# Patient Record
Sex: FEMALE | Race: WHITE | NOT HISPANIC OR LATINO | Employment: OTHER | URBAN - NONMETROPOLITAN AREA
[De-identification: names, ages, dates, MRNs, and addresses within clinical notes are randomized per-mention and may not be internally consistent; named-entity substitution may affect disease eponyms.]

---

## 2017-01-01 ENCOUNTER — OFFICE VISIT (OUTPATIENT)
Dept: ORTHOPEDIC SURGERY | Facility: CLINIC | Age: 77
End: 2017-01-01

## 2017-01-01 VITALS — WEIGHT: 140 LBS | HEIGHT: 62 IN | BODY MASS INDEX: 25.76 KG/M2 | RESPIRATION RATE: 16 BRPM

## 2017-01-01 DIAGNOSIS — S72.002D CLOSED FRACTURE OF NECK OF LEFT FEMUR WITH ROUTINE HEALING, SUBSEQUENT ENCOUNTER: Primary | ICD-10-CM

## 2017-01-01 DIAGNOSIS — Z98.890 STATUS POST HIP SURGERY: ICD-10-CM

## 2017-01-01 PROCEDURE — 73502 X-RAY EXAM HIP UNI 2-3 VIEWS: CPT | Performed by: ORTHOPAEDIC SURGERY

## 2017-01-01 PROCEDURE — 99213 OFFICE O/P EST LOW 20 MIN: CPT | Performed by: ORTHOPAEDIC SURGERY

## 2017-01-01 RX ORDER — LIDOCAINE HYDROCHLORIDE 30 MG/G
1 CREAM TOPICAL 3 TIMES WEEKLY
COMMUNITY
Start: 2017-09-22

## 2017-01-01 RX ORDER — VIT B COMP NO.3/FOLIC/C/BIOTIN 1 MG-60 MG
1 TABLET ORAL DAILY
COMMUNITY
Start: 2017-09-18

## 2017-01-30 ENCOUNTER — HOSPITAL (OUTPATIENT)
Dept: OTHER | Age: 77
End: 2017-01-30
Attending: FAMILY MEDICINE

## 2017-01-30 ENCOUNTER — IMAGING SERVICES (OUTPATIENT)
Dept: OTHER | Age: 77
End: 2017-01-30

## 2017-01-31 ENCOUNTER — CHARTING TRANS (OUTPATIENT)
Dept: OTHER | Age: 77
End: 2017-01-31

## 2017-03-01 ENCOUNTER — CHARTING TRANS (OUTPATIENT)
Dept: OTHER | Age: 77
End: 2017-03-01

## 2017-03-01 ASSESSMENT — PAIN SCALES - GENERAL: PAINLEVEL_OUTOF10: 0

## 2017-03-02 ENCOUNTER — CHARTING TRANS (OUTPATIENT)
Dept: OTHER | Age: 77
End: 2017-03-02

## 2017-03-02 ENCOUNTER — LAB SERVICES (OUTPATIENT)
Dept: OTHER | Age: 77
End: 2017-03-02

## 2017-03-02 LAB
ALBUMIN SERPL-MCNC: 4.1 G/DL (ref 3.6–5.1)
ALBUMIN/GLOB SERPL: 1.2 (ref 1–2.4)
ALP SERPL-CCNC: 52 UNITS/L (ref 45–117)
ALT SERPL-CCNC: 23 UNITS/L
ANION GAP SERPL CALC-SCNC: 14 MMOL/L (ref 10–20)
AST SERPL-CCNC: 24 UNITS/L
BASOPHILS # BLD: 0 K/MCL (ref 0–0.3)
BASOPHILS NFR BLD: 0 %
BILIRUB SERPL-MCNC: 0.3 MG/DL (ref 0.2–1)
BUN SERPL-MCNC: 14 MG/DL (ref 6–20)
BUN/CREAT SERPL: 19 (ref 7–25)
CALCIUM SERPL-MCNC: 9.5 MG/DL (ref 8.4–10.2)
CHLORIDE SERPL-SCNC: 108 MMOL/L (ref 98–107)
CHOLEST SERPL-MCNC: 189 MG/DL
CHOLEST/HDLC SERPL: 2.7
CO2 SERPL-SCNC: 25 MMOL/L (ref 21–32)
CREAT SERPL-MCNC: 0.73 MG/DL (ref 0.51–0.95)
DIFFERENTIAL METHOD BLD: ABNORMAL
EOSINOPHIL # BLD: 0.1 K/MCL (ref 0.1–0.5)
EOSINOPHIL NFR BLD: 1 %
ERYTHROCYTE [DISTWIDTH] IN BLOOD: 14 % (ref 11–15)
GLOBULIN SER-MCNC: 3.5 G/DL (ref 2–4)
GLUCOSE SERPL-MCNC: 93 MG/DL (ref 65–99)
HDLC SERPL-MCNC: 70 MG/DL
HEMATOCRIT: 41.8 % (ref 36–46.5)
HEMOGLOBIN: 13.7 G/DL (ref 12–15.5)
LDLC SERPL CALC-MCNC: 81 MG/DL
LENGTH OF FAST TIME PATIENT: ABNORMAL HRS
LENGTH OF FAST TIME PATIENT: ABNORMAL HRS
LYMPHOCYTES # BLD: 2.2 K/MCL (ref 1–4)
LYMPHOCYTES NFR BLD: 43 %
MEAN CORPUSCULAR HEMOGLOBIN: 31.4 PG (ref 26–34)
MEAN CORPUSCULAR HGB CONC: 32.8 G/DL (ref 32–36.5)
MEAN CORPUSCULAR VOLUME: 95.9 FL (ref 78–100)
MONOCYTES # BLD: 0.4 K/MCL (ref 0.3–0.9)
MONOCYTES NFR BLD: 7 %
NEUTROPHILS # BLD: 2.4 K/MCL (ref 1.8–7.7)
NEUTROPHILS NFR BLD: 49 %
NONHDLC SERPL-MCNC: 119 MG/DL
PLATELET COUNT: 225 K/MCL (ref 140–450)
POTASSIUM SERPL-SCNC: 4.1 MMOL/L (ref 3.4–5.1)
RED CELL COUNT: 4.36 MIL/MCL (ref 4–5.2)
SODIUM SERPL-SCNC: 143 MMOL/L (ref 135–145)
TOTAL PROTEIN: 7.6 G/DL (ref 6.4–8.2)
TRIGL SERPL-MCNC: 190 MG/DL
WHITE BLOOD COUNT: 5 K/MCL (ref 4.2–11)

## 2017-03-03 LAB
APPEARANCE UR: CLEAR
BACTERIA #/AREA URNS HPF: ABNORMAL /HPF
BILIRUB UR QL: NEGATIVE
COLOR UR: YELLOW
GLUCOSE UR-MCNC: NEGATIVE MG/DL
HYALINE CASTS #/AREA URNS LPF: ABNORMAL /LPF (ref 0–5)
KETONES UR-MCNC: NEGATIVE MG/DL
MUCOUS THREADS URNS QL MICRO: PRESENT
NITRITE UR QL: NEGATIVE
PH UR: 6 UNITS (ref 5–7)
PROT UR QL: NEGATIVE MG/DL
RBC #/AREA URNS HPF: ABNORMAL /HPF (ref 0–3)
RBC-URINE: NEGATIVE
SP GR UR: 1.01 (ref 1–1.03)
SPECIMEN SOURCE: ABNORMAL
SQUAMOUS #/AREA URNS HPF: ABNORMAL /HPF (ref 0–5)
UROBILINOGEN UR QL: 0.2 MG/DL (ref 0–1)
WBC #/AREA URNS HPF: ABNORMAL /HPF (ref 0–5)
WBC-URINE: ABNORMAL

## 2017-03-13 ENCOUNTER — LAB (OUTPATIENT)
Dept: LAB | Facility: HOSPITAL | Age: 77
End: 2017-03-13
Attending: INTERNAL MEDICINE

## 2017-03-13 ENCOUNTER — TRANSCRIBE ORDERS (OUTPATIENT)
Dept: LAB | Facility: HOSPITAL | Age: 77
End: 2017-03-13

## 2017-03-13 DIAGNOSIS — N18.4 CHRONIC KIDNEY DISEASE, STAGE IV (SEVERE) (HCC): ICD-10-CM

## 2017-03-13 DIAGNOSIS — N18.4 CHRONIC KIDNEY DISEASE, STAGE IV (SEVERE) (HCC): Primary | ICD-10-CM

## 2017-03-13 LAB
ALBUMIN SERPL-MCNC: 3.6 G/DL (ref 3.5–5)
ANION GAP SERPL CALCULATED.3IONS-SCNC: 11 MMOL/L
BUN BLD-MCNC: 54 MG/DL (ref 7–20)
BUN/CREAT SERPL: 18 (ref 7.1–23.5)
CALCIUM SPEC-SCNC: 8.9 MG/DL (ref 8.4–10.2)
CHLORIDE SERPL-SCNC: 108 MMOL/L (ref 98–107)
CO2 SERPL-SCNC: 26 MMOL/L (ref 26–30)
CREAT BLD-MCNC: 3 MG/DL (ref 0.6–1.3)
GFR SERPL CREATININE-BSD FRML MDRD: 15 ML/MIN/1.73
GLUCOSE BLD-MCNC: 194 MG/DL (ref 74–98)
PHOSPHATE SERPL-MCNC: 4.6 MG/DL (ref 2.5–4.5)
POTASSIUM BLD-SCNC: 5 MMOL/L (ref 3.5–5.1)
SODIUM BLD-SCNC: 140 MMOL/L (ref 137–145)

## 2017-03-13 PROCEDURE — 80069 RENAL FUNCTION PANEL: CPT | Performed by: INTERNAL MEDICINE

## 2017-03-13 PROCEDURE — 36415 COLL VENOUS BLD VENIPUNCTURE: CPT

## 2017-04-24 ENCOUNTER — OFFICE VISIT (OUTPATIENT)
Dept: ENDOCRINOLOGY | Facility: CLINIC | Age: 77
End: 2017-04-24

## 2017-04-24 VITALS
HEART RATE: 73 BPM | OXYGEN SATURATION: 98 % | SYSTOLIC BLOOD PRESSURE: 178 MMHG | BODY MASS INDEX: 25.52 KG/M2 | DIASTOLIC BLOOD PRESSURE: 70 MMHG | HEIGHT: 63 IN | WEIGHT: 144 LBS

## 2017-04-24 DIAGNOSIS — E10.9 TYPE 1 DIABETES MELLITUS WITHOUT COMPLICATION (HCC): Primary | ICD-10-CM

## 2017-04-24 DIAGNOSIS — I10 ESSENTIAL HYPERTENSION: ICD-10-CM

## 2017-04-24 DIAGNOSIS — E78.2 MIXED HYPERLIPIDEMIA: ICD-10-CM

## 2017-04-24 DIAGNOSIS — E03.9 ACQUIRED HYPOTHYROIDISM: ICD-10-CM

## 2017-04-24 LAB
ARTICHOKE IGE QN: 82 MG/DL (ref 0–130)
CHOLEST SERPL-MCNC: 202 MG/DL (ref 0–200)
GLUCOSE BLDC GLUCOMTR-MCNC: 264 MG/DL (ref 70–130)
HBA1C MFR BLD: 9.5 %
HDLC SERPL-MCNC: 86 MG/DL (ref 40–60)
T4 FREE SERPL-MCNC: 1.07 NG/DL (ref 0.89–1.76)
TRIGL SERPL-MCNC: 174 MG/DL (ref 0–150)
TSH SERPL DL<=0.05 MIU/L-ACNC: 7.45 MIU/ML (ref 0.35–5.35)

## 2017-04-24 PROCEDURE — 82947 ASSAY GLUCOSE BLOOD QUANT: CPT | Performed by: INTERNAL MEDICINE

## 2017-04-24 PROCEDURE — 99214 OFFICE O/P EST MOD 30 MIN: CPT | Performed by: INTERNAL MEDICINE

## 2017-04-24 PROCEDURE — 80061 LIPID PANEL: CPT | Performed by: INTERNAL MEDICINE

## 2017-04-24 PROCEDURE — 83036 HEMOGLOBIN GLYCOSYLATED A1C: CPT | Performed by: INTERNAL MEDICINE

## 2017-04-24 PROCEDURE — 84443 ASSAY THYROID STIM HORMONE: CPT | Performed by: INTERNAL MEDICINE

## 2017-04-24 PROCEDURE — 84439 ASSAY OF FREE THYROXINE: CPT | Performed by: INTERNAL MEDICINE

## 2017-04-24 NOTE — PROGRESS NOTES
Sigrid Casarez 77 y.o.  [facility]:Follow-up; Diabetes (Type I, last eye exam was one month ago with Dr Pozo (in Dr Pope's office)); Hypothyroidism; and Chronic Kidney Disease    [place]: Follow-up; Diabetes (Type I, last eye exam was one month ago with Dr Pozo (in Dr Pope's office)); Hypothyroidism; and Chronic Kidney Disease    Blood sugar and 90 day average sugar reviewed  Results for orders placed or performed in visit on 04/24/17   POC Glycosylated Hemoglobin (Hb A1C)   Result Value Ref Range    Hemoglobin A1C 9.5 %   POC Glucose Fingerstick   Result Value Ref Range    Glucose 264 (A) 70 - 130 mg/dL   she has a terrible time estimating carbs and adjusting insulin   This has gotten worse with kidney failure  occ low sugars - about 1 x a week  Worse in am- discussed cutting toujeo   She is using sliding scale pre meal humalog  She wakes up urinating all night   She cannot tell what will make her sugar go up- she feels like diet soda causes severe high sugars  She is up to date with eye exam Dr Iverson 3/17 - getting injection in right eye monthly  Neuropathy with no callus or ulcer  Seeing nephrology monthly ([place], Dr Baker)  On statin, no ace /arb   Sees nephrology for ckd - nephrotic proteinuria and creat baseline 3.0  Blood sugar fsting   Pre lunch [id number]  Pre dinner       Allergies   Allergen Reactions   • Duloxetine    • Exenatide    • Lisinopril Cough   • Penicillins    • Phentermine        Current Outpatient Prescriptions:   •  amLODIPine (NORVASC) 5 MG tablet, Take 1 tablet by mouth Daily., Disp: , Rfl:   •  aspirin 81 MG EC tablet, Take 1 tablet by mouth daily., Disp: , Rfl:   •  carvedilol (COREG) 25 MG tablet, Take 1 tablet by mouth 2 (two) times a day., Disp: , Rfl:   •  Cholecalciferol (VITAMIN D) 1000 UNITS tablet, Take  by mouth., Disp: , Rfl:   •  ferrous sulfate 325 (65 FE) MG tablet, Take 1 tablet by mouth 2 (two) times a day. With meals, Disp: , Rfl:   •  furosemide  (LASIX) 20 MG tablet, Take 2 pills daily, Disp: 180 tablet, Rfl: 2  •  HUMALOG KWIKPEN 100 UNIT/ML solution pen-injector, INJECT 5 TO 15 UNITS THREE TIMES A DAY WITH MEALS OR AS DIRECTED, Disp: 30 mL, Rfl: 2  •  Insulin Pen Needle (BD ULTRA-FINE PEN NEEDLES) 29G X 12.7MM misc, Use 3 times daily, Disp: , Rfl:   •  levothyroxine (SYNTHROID, LEVOTHROID) 75 MCG tablet, Take 1 tablet by mouth daily., Disp: 30 tablet, Rfl: 5  •  Pediatric Multivitamins-Iron (FLINTSTONES PLUS IRON PO), Take 1 tablet by mouth Daily., Disp: , Rfl:   •  simvastatin (ZOCOR) 20 MG tablet, Take  by mouth., Disp: , Rfl:   •  glucose blood (FREESTYLE LITE) test strip, 3 (three) times a day., Disp: , Rfl:   •  TOUJEO SOLOSTAR 300 UNIT/ML solution pen-injector, 10 Units Daily., Disp: , Rfl:   Patient Active Problem List    Diagnosis   • Bilateral nondiabetic proliferative retinopathy [H35.23]   • Anemia [D64.9]     Overview Note:     Impression: 09/09/2015 - check cbc, iron and vitamin B12  Impression: 01/06/2015 - acd - followed by nephrology;      • Chronic kidney disease, stage IV (severe) [N18.4]     Overview Note:     Impression: 09/09/2015 - update cmp  Impression: 09/30/2014 - update cmp- continues to f/u with nephrology  Impression: 04/30/2014 - sees nephrology  may be causing some of her low sugars;      • Depression [F32.9]     Overview Note:     Impression: 01/06/2015 - add citalopram 20 mg daily;      • Diabetic peripheral neuropathy [E11.42]     Overview Note:     Impression: 05/15/2015 - stable without foot lesion  Impression: 04/30/2014 - trial elevil- if no help use ultram.;      • 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 [E10.3599]     Overview Note:     Impression: 05/15/2015 - discussed f/u - discuss occulomotor findings; Description: [facility] 2010     • Dizziness [R42]     Overview Note:     Impression: 09/09/2015 - some postural but not all orthostatic in nature  repeat bp  "is good  has tried zofran and meclizine  cat scan normal  refer ent, MRI scheduled  Impression: 05/15/2015 - supine frontal ha  check cat scan  meclizine at hs  zofran prn; Description: [facility]- vestibular training.     • Fatigue [R53.83]   • Hypertension [I10]     Overview Note:     Impression: 09/09/2015 - higher bp, repeat improved  check bp daily at home, call if over 145/85  Impression: 05/15/2015 - bp repeat 172/70  add cardura 1 mg bid  Impression: 05/15/2015 - bp repeat 172/70  Impression: 01/06/2015 - bp high- double lasix  cont f/u with nephrology  Impression: 09/30/2014 - bp is good today, no changes  Impression: 04/30/2014 - bp is stable overall, goals reviewed;      • Hypoglycemia [E16.2]   • Hypothyroidism [E03.9]     Overview Note:     Impression: 09/09/2015 - update tfts  we recently changed  Impression: 05/15/2015 - last tsh 1/15 normal  Impression: 01/06/2015 - we reduced supplement dose - check tft  Impression: 09/30/2014 - check tft  Impression: 04/30/2014 - check tft;      • Diabetic macular edema [E11.311]     Overview Note:     Impression: 09/30/2014 - stable eye exam per patient;      • Menopause present [Z78.0]   • Mixed hyperlipidemia [E78.2]     Overview Note:     Impression: 09/09/2015 - check flp  Impression: 09/30/2014 - update flp; Description: check flp     • Osteopenia [M85.80]     Overview Note:     Description: 3/10     • Type 1 diabetes mellitus [E10.9]     Overview Note:     Impression: 09/09/2015 - insulin dependant- poor control  struggles with insulin adjustment with exercise and carbs.  discussed sick day management again and she will start covering higher sugars. we have discussed this in the past, blood sugar was \"high\" this am and she did cover with insulin , commended for this  is up to date with exam  f/u 3-4 months  Impression: 01/06/2015 - blood sugar 166, hgn a1c 9.1% (average 210)  update lab work today  continue to work on blood sugar control- is improving " gradually  cont f/u  has nephropathy and retinopathy  foot care is good  Impression: 01/06/2015 - blood sugar 166, hgn a1c 9.1% (average 210)  update lab work today  continue to work on blood sugar control- is improving gradually  cont f/u; Description: x 39 years     • Vitamin D deficiency [E55.9]     Overview Note:     Impression: 09/09/2015 - repeat levels;        Review of Systems   Constitutional: Negative for activity change, appetite change, chills, diaphoresis, fatigue, fever and unexpected weight change.   HENT: Negative for congestion, dental problem, drooling, ear discharge, ear pain, facial swelling, hearing loss, mouth sores, nosebleeds, postnasal drip, rhinorrhea, sinus pressure, sneezing, sore throat, tinnitus, trouble swallowing and voice change.    Eyes: Negative for photophobia, pain, discharge, redness, itching and visual disturbance.        Retinopathy with laser, injections    Respiratory: Negative for apnea, cough, choking, chest tightness, shortness of breath, wheezing and stridor.    Cardiovascular: Negative for chest pain, palpitations and leg swelling.   Gastrointestinal: Negative for abdominal distention, abdominal pain, anal bleeding, blood in stool, constipation, diarrhea, nausea, rectal pain and vomiting.   Endocrine: Negative for cold intolerance, heat intolerance, polydipsia, polyphagia and polyuria.   Genitourinary: Negative for decreased urine volume, difficulty urinating, dysuria, enuresis, flank pain, frequency, genital sores, hematuria and urgency.        Ckd - end stage    Musculoskeletal: Negative for arthralgias, back pain, gait problem, joint swelling, myalgias, neck pain and neck stiffness.   Skin: Negative for color change, pallor, rash and wound.   Allergic/Immunologic: Negative for environmental allergies, food allergies and immunocompromised state.   Neurological: Negative for dizziness, tremors, seizures, syncope, facial asymmetry, speech difficulty, weakness,  "light-headedness, numbness and headaches.        Neuropathy    Hematological: Negative for adenopathy. Does not bruise/bleed easily.   Psychiatric/Behavioral: Negative for agitation, behavioral problems, confusion, decreased concentration, dysphoric mood, hallucinations, self-injury, sleep disturbance and suicidal ideas. The patient is not nervous/anxious and is not hyperactive.         Stress eating      Social History     Social History   • Marital status:      Spouse name: N/A   • Number of children: N/A   • Years of education: N/A     Occupational History   • Not on file.     Social History Main Topics   • Smoking status: Former Smoker   • Smokeless tobacco: Not on file   • Alcohol use No   • Drug use: Not on file   • Sexual activity: Not on file     Other Topics Concern   • Not on file     Social History Narrative     Family History   Problem Relation Age of Onset   • Diabetes Other    • Heart disease Other    • Heart attack Mother    • Lung cancer Father      /70  Pulse 73  Ht 63\" (160 cm)  Wt 144 lb (65.3 kg)  SpO2 98%  BMI 25.51 kg/m2  Physical Exam   Constitutional: She is oriented to person, place, and time. She appears well-developed and well-nourished.   HENT:   Head: Normocephalic and atraumatic.   Nose: Nose normal.   Mouth/Throat: Oropharynx is clear and moist.   Eyes: Conjunctivae, EOM and lids are normal. Pupils are equal, round, and reactive to light.   Neck: Trachea normal and normal range of motion. Neck supple. Carotid bruit is not present. No tracheal deviation present. No thyroid mass and no thyromegaly present.   Cardiovascular: Normal rate, regular rhythm, normal heart sounds and intact distal pulses.  Exam reveals no gallop and no friction rub.    No murmur heard.  Pulmonary/Chest: Effort normal and breath sounds normal. No respiratory distress. She has no wheezes.   Musculoskeletal: Normal range of motion. She exhibits edema. She exhibits no deformity.       Neurological " Sensory Findings - Altered hot/cold right ankle/foot discrimination and altered hot/cold left ankle/foot discrimination. Altered sharp/dull right ankle/foot discrimination and altered sharp/dull left ankle/foot discrimination. Right ankle/foot altered proprioception and left ankle/foot altered proprioception.    Vascular Status -  Her exam exhibits right foot vasculature normal. Her exam exhibits no right foot edema. Her exam exhibits left foot vasculature normal. Her exam exhibits no left foot edema.   Skin Integrity  -  Her right foot skin is intact.     Sigrid 's left foot skin is intact. .  Lymphadenopathy:     She has no cervical adenopathy.   Neurological: She is alert and oriented to person, place, and time. She has normal reflexes. She displays normal reflexes. No cranial nerve deficit.   Skin: Skin is warm and dry. No rash noted. No cyanosis or erythema. Nails show no clubbing.   Psychiatric: She has a normal mood and affect. Her speech is normal and behavior is normal. Judgment and thought content normal. Cognition and memory are normal.   Nursing note and vitals reviewed.    Results for orders placed or performed in visit on 03/13/17   Renal Function Panel   Result Value Ref Range    Glucose 194 (H) 74 - 98 mg/dL    BUN 54 (H) 7 - 20 mg/dL    Creatinine 3.00 (H) 0.60 - 1.30 mg/dL    Sodium 140 137 - 145 mmol/L    Potassium 5.0 3.5 - 5.1 mmol/L    Chloride 108 (H) 98 - 107 mmol/L    CO2 26.0 26.0 - 30.0 mmol/L    Calcium 8.9 8.4 - 10.2 mg/dL    Albumin 3.60 3.50 - 5.00 g/dL    Phosphorus 4.6 (H) 2.5 - 4.5 mg/dL    Anion Gap 11.0 mmol/L    BUN/Creatinine Ratio 18.0 7.1 - 23.5    eGFR Non African Amer 15 (L) >60 mL/min/1.73     Problem List Items Addressed This Visit        Cardiovascular and Mediastinum    Hypertension     bp is higher- continue current medications and monitor at home- call if over 165/95  F/u 3 months          Mixed hyperlipidemia     Update flp          Relevant Orders    TSH    Lipid Panel        Endocrine    Hypothyroidism     Check tfts          Relevant Orders    TSH    T4, Free    Lipid Panel    Type 1 diabetes mellitus - Primary     Chronic high blood sugar and 90 day average sugar   Results for orders placed or performed in visit on 04/24/17   POC Glycosylated Hemoglobin (Hb A1C)   Result Value Ref Range    Hemoglobin A1C 9.5 %   POC Glucose Fingerstick   Result Value Ref Range    Glucose 264 (A) 70 - 130 mg/dL     She is using sliding scale  Is having higher pp sugars (eating due to stress)  Low sugars in the morning  Discussed reducing toujeo to 9 units if fasting low sugars continue   Is utd with eye exam Dr Iverson(getting injections OD)  Nephrotic proteinuria with stage 4 ckd followed by Dr Baker  H/o arb/ace but not currently on this  Discussed foot care, monitor closely (neuropathy stable)  She would like xanax for nerves and to help cut back on eating - will discuss with pcp          Relevant Orders    POC Glycosylated Hemoglobin (Hb A1C) (Completed)    POC Glucose Fingerstick (Completed)        Return in about 3 months (around 7/24/2017) for Recheck 30 min .      Mary Beth Emerson MA

## 2017-04-24 NOTE — ASSESSMENT & PLAN NOTE
bp is higher- continue current medications and monitor at home- call if over 165/95  F/u 3 months

## 2017-04-24 NOTE — ASSESSMENT & PLAN NOTE
Chronic high blood sugar and 90 day average sugar   Results for orders placed or performed in visit on 04/24/17   POC Glycosylated Hemoglobin (Hb A1C)   Result Value Ref Range    Hemoglobin A1C 9.5 %   POC Glucose Fingerstick   Result Value Ref Range    Glucose 264 (A) 70 - 130 mg/dL     She is using sliding scale  Is having higher pp sugars (eating due to stress)  Low sugars in the morning  Discussed reducing toujeo to 9 units if fasting low sugars continue   Is utd with eye exam Dr Iverson(getting injections OD)  Nephrotic proteinuria with stage 4 ckd followed by Dr Baker  H/o arb/ace but not currently on this  Discussed foot care, monitor closely (neuropathy stable)  She would like xanax for nerves and to help cut back on eating - will discuss with pcp

## 2017-05-04 ENCOUNTER — CHARTING TRANS (OUTPATIENT)
Dept: OTHER | Age: 77
End: 2017-05-04

## 2017-05-04 ASSESSMENT — PAIN SCALES - GENERAL: PAINLEVEL_OUTOF10: 0

## 2017-05-17 ENCOUNTER — CHARTING TRANS (OUTPATIENT)
Dept: OTHER | Age: 77
End: 2017-05-17

## 2017-05-17 ASSESSMENT — PAIN SCALES - GENERAL: PAINLEVEL_OUTOF10: 0

## 2017-06-10 ENCOUNTER — APPOINTMENT (OUTPATIENT)
Dept: GENERAL RADIOLOGY | Facility: HOSPITAL | Age: 77
End: 2017-06-10

## 2017-06-10 ENCOUNTER — HOSPITAL ENCOUNTER (INPATIENT)
Facility: HOSPITAL | Age: 77
LOS: 3 days | Discharge: NURSING, REHAB OR LONG-TERM CARE | End: 2017-06-13
Attending: EMERGENCY MEDICINE | Admitting: INTERNAL MEDICINE

## 2017-06-10 DIAGNOSIS — E03.9 ACQUIRED HYPOTHYROIDISM: ICD-10-CM

## 2017-06-10 DIAGNOSIS — S72.002A CLOSED FRACTURE OF NECK OF LEFT FEMUR, INITIAL ENCOUNTER (HCC): ICD-10-CM

## 2017-06-10 DIAGNOSIS — Z74.09 IMPAIRED MOBILITY AND ADLS: ICD-10-CM

## 2017-06-10 DIAGNOSIS — D64.9 ANEMIA, UNSPECIFIED TYPE: ICD-10-CM

## 2017-06-10 DIAGNOSIS — E10.22 TYPE 1 DIABETES MELLITUS WITH DIABETIC CHRONIC KIDNEY DISEASE, UNSPECIFIED CKD STAGE (HCC): ICD-10-CM

## 2017-06-10 DIAGNOSIS — E11.42 DIABETIC PERIPHERAL NEUROPATHY (HCC): ICD-10-CM

## 2017-06-10 DIAGNOSIS — Z74.09 IMPAIRED FUNCTIONAL MOBILITY, BALANCE, GAIT, AND ENDURANCE: ICD-10-CM

## 2017-06-10 DIAGNOSIS — S72.002A HIP FRACTURE, LEFT, CLOSED, INITIAL ENCOUNTER (HCC): Primary | ICD-10-CM

## 2017-06-10 DIAGNOSIS — N18.4 CHRONIC KIDNEY DISEASE, STAGE IV (SEVERE) (HCC): ICD-10-CM

## 2017-06-10 DIAGNOSIS — Z78.9 IMPAIRED MOBILITY AND ADLS: ICD-10-CM

## 2017-06-10 PROBLEM — Z91.81 STATUS POST FALL: Status: ACTIVE | Noted: 2017-06-10

## 2017-06-10 PROBLEM — E11.21 TYPE 2 DIABETES MELLITUS WITH NEPHROPATHY (HCC): Status: ACTIVE | Noted: 2017-06-10

## 2017-06-10 LAB
ABO GROUP BLD: NORMAL
ABO GROUP BLD: NORMAL
ALBUMIN SERPL-MCNC: 3.6 G/DL (ref 3.5–5)
ALBUMIN/GLOB SERPL: 1.2 G/DL (ref 1–2)
ALP SERPL-CCNC: 97 U/L (ref 38–126)
ALT SERPL W P-5'-P-CCNC: 25 U/L (ref 13–69)
ANION GAP SERPL CALCULATED.3IONS-SCNC: 16 MMOL/L
APTT PPP: 27 SECONDS (ref 25–36)
AST SERPL-CCNC: 24 U/L (ref 15–46)
BACTERIA UR QL AUTO: ABNORMAL /HPF
BASOPHILS # BLD AUTO: 0.03 10*3/MM3 (ref 0–0.2)
BASOPHILS NFR BLD AUTO: 0.3 % (ref 0–2.5)
BILIRUB SERPL-MCNC: 0.6 MG/DL (ref 0.2–1.3)
BILIRUB UR QL STRIP: NEGATIVE
BLD GP AB SCN SERPL QL: NEGATIVE
BUN BLD-MCNC: 52 MG/DL (ref 7–20)
BUN/CREAT SERPL: 17.3 (ref 7.1–23.5)
CALCIUM SPEC-SCNC: 9 MG/DL (ref 8.4–10.2)
CHLORIDE SERPL-SCNC: 107 MMOL/L (ref 98–107)
CLARITY UR: ABNORMAL
CO2 SERPL-SCNC: 22 MMOL/L (ref 26–30)
COLOR UR: YELLOW
CREAT BLD-MCNC: 3 MG/DL (ref 0.6–1.3)
DEPRECATED RDW RBC AUTO: 42.8 FL (ref 37–54)
EOSINOPHIL # BLD AUTO: 0.26 10*3/MM3 (ref 0–0.7)
EOSINOPHIL NFR BLD AUTO: 2.6 % (ref 0–7)
ERYTHROCYTE [DISTWIDTH] IN BLOOD BY AUTOMATED COUNT: 13 % (ref 11.5–14.5)
GFR SERPL CREATININE-BSD FRML MDRD: 15 ML/MIN/1.73
GLOBULIN UR ELPH-MCNC: 3 GM/DL
GLUCOSE BLD-MCNC: 202 MG/DL (ref 74–98)
GLUCOSE BLDC GLUCOMTR-MCNC: 118 MG/DL (ref 70–130)
GLUCOSE BLDC GLUCOMTR-MCNC: 168 MG/DL (ref 70–130)
GLUCOSE BLDC GLUCOMTR-MCNC: 202 MG/DL (ref 70–130)
GLUCOSE BLDC GLUCOMTR-MCNC: 212 MG/DL (ref 70–130)
GLUCOSE UR STRIP-MCNC: ABNORMAL MG/DL
HCT VFR BLD AUTO: 30.2 % (ref 37–47)
HGB BLD-MCNC: 9.9 G/DL (ref 12–16)
HGB UR QL STRIP.AUTO: ABNORMAL
HYALINE CASTS UR QL AUTO: ABNORMAL /LPF
IMM GRANULOCYTES # BLD: 0.03 10*3/MM3 (ref 0–0.06)
IMM GRANULOCYTES NFR BLD: 0.3 % (ref 0–0.6)
INR PPP: 1.12 (ref 0.9–1.1)
KETONES UR QL STRIP: NEGATIVE
LEUKOCYTE ESTERASE UR QL STRIP.AUTO: ABNORMAL
LYMPHOCYTES # BLD AUTO: 0.71 10*3/MM3 (ref 0.6–3.4)
LYMPHOCYTES NFR BLD AUTO: 7.1 % (ref 10–50)
MAGNESIUM SERPL-MCNC: 2.1 MG/DL (ref 1.6–2.3)
MCH RBC QN AUTO: 29.6 PG (ref 27–31)
MCHC RBC AUTO-ENTMCNC: 32.8 G/DL (ref 30–37)
MCV RBC AUTO: 90.1 FL (ref 81–99)
MONOCYTES # BLD AUTO: 0.49 10*3/MM3 (ref 0–0.9)
MONOCYTES NFR BLD AUTO: 4.9 % (ref 0–12)
NEUTROPHILS # BLD AUTO: 8.49 10*3/MM3 (ref 2–6.9)
NEUTROPHILS NFR BLD AUTO: 84.8 % (ref 37–80)
NITRITE UR QL STRIP: POSITIVE
NRBC BLD MANUAL-RTO: 0 /100 WBC (ref 0–0)
PH UR STRIP.AUTO: 6 [PH] (ref 5–8)
PLATELET # BLD AUTO: 178 10*3/MM3 (ref 130–400)
PMV BLD AUTO: 11.5 FL (ref 6–12)
POTASSIUM BLD-SCNC: 4 MMOL/L (ref 3.5–5.1)
PROT SERPL-MCNC: 6.6 G/DL (ref 6.3–8.2)
PROT UR QL STRIP: ABNORMAL
PROTHROMBIN TIME: 12.3 SECONDS (ref 9.3–12.1)
RBC # BLD AUTO: 3.35 10*6/MM3 (ref 4.2–5.4)
RBC # UR: ABNORMAL /HPF
REF LAB TEST METHOD: ABNORMAL
RH BLD: POSITIVE
RH BLD: POSITIVE
SODIUM BLD-SCNC: 141 MMOL/L (ref 137–145)
SP GR UR STRIP: 1.02 (ref 1–1.03)
SQUAMOUS #/AREA URNS HPF: ABNORMAL /HPF
TROPONIN I SERPL-MCNC: <0.012 NG/ML (ref 0–0.03)
UROBILINOGEN UR QL STRIP: ABNORMAL
WBC NRBC COR # BLD: 10.01 10*3/MM3 (ref 4.8–10.8)
WBC UR QL AUTO: ABNORMAL /HPF

## 2017-06-10 PROCEDURE — 86920 COMPATIBILITY TEST SPIN: CPT

## 2017-06-10 PROCEDURE — 86901 BLOOD TYPING SEROLOGIC RH(D): CPT | Performed by: EMERGENCY MEDICINE

## 2017-06-10 PROCEDURE — 86901 BLOOD TYPING SEROLOGIC RH(D): CPT

## 2017-06-10 PROCEDURE — 85730 THROMBOPLASTIN TIME PARTIAL: CPT | Performed by: EMERGENCY MEDICINE

## 2017-06-10 PROCEDURE — 84484 ASSAY OF TROPONIN QUANT: CPT | Performed by: INTERNAL MEDICINE

## 2017-06-10 PROCEDURE — 83735 ASSAY OF MAGNESIUM: CPT | Performed by: EMERGENCY MEDICINE

## 2017-06-10 PROCEDURE — 81001 URINALYSIS AUTO W/SCOPE: CPT | Performed by: ORTHOPAEDIC SURGERY

## 2017-06-10 PROCEDURE — 87086 URINE CULTURE/COLONY COUNT: CPT | Performed by: ORTHOPAEDIC SURGERY

## 2017-06-10 PROCEDURE — 73502 X-RAY EXAM HIP UNI 2-3 VIEWS: CPT

## 2017-06-10 PROCEDURE — 86850 RBC ANTIBODY SCREEN: CPT | Performed by: EMERGENCY MEDICINE

## 2017-06-10 PROCEDURE — 99285 EMERGENCY DEPT VISIT HI MDM: CPT

## 2017-06-10 PROCEDURE — 93005 ELECTROCARDIOGRAM TRACING: CPT | Performed by: EMERGENCY MEDICINE

## 2017-06-10 PROCEDURE — 87077 CULTURE AEROBIC IDENTIFY: CPT | Performed by: ORTHOPAEDIC SURGERY

## 2017-06-10 PROCEDURE — 85025 COMPLETE CBC W/AUTO DIFF WBC: CPT | Performed by: EMERGENCY MEDICINE

## 2017-06-10 PROCEDURE — 99222 1ST HOSP IP/OBS MODERATE 55: CPT | Performed by: ORTHOPAEDIC SURGERY

## 2017-06-10 PROCEDURE — 93005 ELECTROCARDIOGRAM TRACING: CPT | Performed by: ORTHOPAEDIC SURGERY

## 2017-06-10 PROCEDURE — 99223 1ST HOSP IP/OBS HIGH 75: CPT | Performed by: INTERNAL MEDICINE

## 2017-06-10 PROCEDURE — 25010000002 MORPHINE PER 10 MG: Performed by: INTERNAL MEDICINE

## 2017-06-10 PROCEDURE — 25010000002 ONDANSETRON PER 1 MG: Performed by: EMERGENCY MEDICINE

## 2017-06-10 PROCEDURE — 85610 PROTHROMBIN TIME: CPT | Performed by: EMERGENCY MEDICINE

## 2017-06-10 PROCEDURE — 73562 X-RAY EXAM OF KNEE 3: CPT

## 2017-06-10 PROCEDURE — 80053 COMPREHEN METABOLIC PANEL: CPT | Performed by: EMERGENCY MEDICINE

## 2017-06-10 PROCEDURE — 87186 SC STD MICRODIL/AGAR DIL: CPT | Performed by: ORTHOPAEDIC SURGERY

## 2017-06-10 PROCEDURE — 82962 GLUCOSE BLOOD TEST: CPT

## 2017-06-10 PROCEDURE — 25010000002 HEPARIN (PORCINE) PER 1000 UNITS: Performed by: INTERNAL MEDICINE

## 2017-06-10 PROCEDURE — 86900 BLOOD TYPING SEROLOGIC ABO: CPT | Performed by: EMERGENCY MEDICINE

## 2017-06-10 PROCEDURE — 86900 BLOOD TYPING SEROLOGIC ABO: CPT

## 2017-06-10 PROCEDURE — 63710000001 INSULIN ASPART PER 5 UNITS: Performed by: INTERNAL MEDICINE

## 2017-06-10 PROCEDURE — 71020 HC CHEST PA AND LATERAL: CPT

## 2017-06-10 RX ORDER — HYDROCODONE BITARTRATE AND ACETAMINOPHEN 5; 325 MG/1; MG/1
1 TABLET ORAL ONCE
Status: COMPLETED | OUTPATIENT
Start: 2017-06-10 | End: 2017-06-10

## 2017-06-10 RX ORDER — ACETAMINOPHEN 325 MG/1
650 TABLET ORAL EVERY 4 HOURS PRN
Status: DISCONTINUED | OUTPATIENT
Start: 2017-06-10 | End: 2017-06-11

## 2017-06-10 RX ORDER — DEXTROSE MONOHYDRATE 25 G/50ML
25 INJECTION, SOLUTION INTRAVENOUS
Status: DISCONTINUED | OUTPATIENT
Start: 2017-06-10 | End: 2017-06-13 | Stop reason: HOSPADM

## 2017-06-10 RX ORDER — NICOTINE POLACRILEX 4 MG
15 LOZENGE BUCCAL
Status: DISCONTINUED | OUTPATIENT
Start: 2017-06-10 | End: 2017-06-13 | Stop reason: HOSPADM

## 2017-06-10 RX ORDER — HEPARIN SODIUM 5000 [USP'U]/ML
5000 INJECTION, SOLUTION INTRAVENOUS; SUBCUTANEOUS EVERY 8 HOURS
Status: DISCONTINUED | OUTPATIENT
Start: 2017-06-10 | End: 2017-06-11

## 2017-06-10 RX ORDER — SODIUM CHLORIDE 0.9 % (FLUSH) 0.9 %
1-10 SYRINGE (ML) INJECTION AS NEEDED
Status: DISCONTINUED | OUTPATIENT
Start: 2017-06-10 | End: 2017-06-13 | Stop reason: HOSPADM

## 2017-06-10 RX ORDER — ONDANSETRON 4 MG/1
4 TABLET, ORALLY DISINTEGRATING ORAL EVERY 6 HOURS PRN
Status: DISCONTINUED | OUTPATIENT
Start: 2017-06-10 | End: 2017-06-13 | Stop reason: HOSPADM

## 2017-06-10 RX ORDER — SODIUM CHLORIDE 9 MG/ML
125 INJECTION, SOLUTION INTRAVENOUS CONTINUOUS
Status: DISCONTINUED | OUTPATIENT
Start: 2017-06-10 | End: 2017-06-10

## 2017-06-10 RX ORDER — ONDANSETRON 2 MG/ML
4 INJECTION INTRAMUSCULAR; INTRAVENOUS EVERY 6 HOURS PRN
Status: DISCONTINUED | OUTPATIENT
Start: 2017-06-10 | End: 2017-06-13 | Stop reason: HOSPADM

## 2017-06-10 RX ORDER — CARVEDILOL 25 MG/1
25 TABLET ORAL 2 TIMES DAILY
Status: DISCONTINUED | OUTPATIENT
Start: 2017-06-10 | End: 2017-06-13 | Stop reason: HOSPADM

## 2017-06-10 RX ORDER — ONDANSETRON 4 MG/1
4 TABLET, FILM COATED ORAL EVERY 6 HOURS PRN
Status: DISCONTINUED | OUTPATIENT
Start: 2017-06-10 | End: 2017-06-13 | Stop reason: HOSPADM

## 2017-06-10 RX ORDER — ATORVASTATIN CALCIUM 10 MG/1
10 TABLET, FILM COATED ORAL DAILY
Status: DISCONTINUED | OUTPATIENT
Start: 2017-06-10 | End: 2017-06-13 | Stop reason: HOSPADM

## 2017-06-10 RX ORDER — NALOXONE HCL 0.4 MG/ML
0.4 VIAL (ML) INJECTION
Status: DISCONTINUED | OUTPATIENT
Start: 2017-06-10 | End: 2017-06-10

## 2017-06-10 RX ORDER — MORPHINE SULFATE 2 MG/ML
2 INJECTION, SOLUTION INTRAMUSCULAR; INTRAVENOUS EVERY 4 HOURS PRN
Status: DISCONTINUED | OUTPATIENT
Start: 2017-06-10 | End: 2017-06-13 | Stop reason: HOSPADM

## 2017-06-10 RX ORDER — SODIUM CHLORIDE 0.9 % (FLUSH) 0.9 %
10 SYRINGE (ML) INJECTION AS NEEDED
Status: DISCONTINUED | OUTPATIENT
Start: 2017-06-10 | End: 2017-06-10

## 2017-06-10 RX ORDER — NALOXONE HCL 0.4 MG/ML
0.4 VIAL (ML) INJECTION
Status: DISCONTINUED | OUTPATIENT
Start: 2017-06-10 | End: 2017-06-13 | Stop reason: HOSPADM

## 2017-06-10 RX ORDER — LEVOTHYROXINE SODIUM 0.07 MG/1
75 TABLET ORAL EVERY MORNING
Status: DISCONTINUED | OUTPATIENT
Start: 2017-06-10 | End: 2017-06-13 | Stop reason: HOSPADM

## 2017-06-10 RX ORDER — SODIUM CHLORIDE 9 MG/ML
100 INJECTION, SOLUTION INTRAVENOUS CONTINUOUS
Status: DISCONTINUED | OUTPATIENT
Start: 2017-06-10 | End: 2017-06-11

## 2017-06-10 RX ORDER — ASPIRIN 81 MG/1
81 TABLET ORAL DAILY
Status: DISCONTINUED | OUTPATIENT
Start: 2017-06-10 | End: 2017-06-13 | Stop reason: HOSPADM

## 2017-06-10 RX ORDER — AMLODIPINE BESYLATE 5 MG/1
5 TABLET ORAL DAILY
Status: DISCONTINUED | OUTPATIENT
Start: 2017-06-10 | End: 2017-06-11

## 2017-06-10 RX ORDER — MORPHINE SULFATE 2 MG/ML
1 INJECTION, SOLUTION INTRAMUSCULAR; INTRAVENOUS EVERY 4 HOURS PRN
Status: DISCONTINUED | OUTPATIENT
Start: 2017-06-10 | End: 2017-06-10

## 2017-06-10 RX ORDER — ONDANSETRON 2 MG/ML
4 INJECTION INTRAMUSCULAR; INTRAVENOUS ONCE
Status: COMPLETED | OUTPATIENT
Start: 2017-06-10 | End: 2017-06-10

## 2017-06-10 RX ORDER — ONDANSETRON 4 MG/1
4 TABLET, ORALLY DISINTEGRATING ORAL ONCE
Status: DISCONTINUED | OUTPATIENT
Start: 2017-06-10 | End: 2017-06-10

## 2017-06-10 RX ADMIN — HYDROCODONE BITARTRATE AND ACETAMINOPHEN 1 TABLET: 5; 325 TABLET ORAL at 06:07

## 2017-06-10 RX ADMIN — HEPARIN SODIUM 5000 UNITS: 5000 INJECTION, SOLUTION INTRAVENOUS; SUBCUTANEOUS at 12:25

## 2017-06-10 RX ADMIN — LEVOTHYROXINE SODIUM 75 MCG: 75 TABLET ORAL at 12:26

## 2017-06-10 RX ADMIN — SODIUM CHLORIDE 125 ML/HR: 9 INJECTION, SOLUTION INTRAVENOUS at 07:51

## 2017-06-10 RX ADMIN — ASPIRIN 81 MG: 81 TABLET, COATED ORAL at 12:25

## 2017-06-10 RX ADMIN — HEPARIN SODIUM 5000 UNITS: 5000 INJECTION, SOLUTION INTRAVENOUS; SUBCUTANEOUS at 20:15

## 2017-06-10 RX ADMIN — CARVEDILOL 25 MG: 25 TABLET, FILM COATED ORAL at 20:15

## 2017-06-10 RX ADMIN — Medication 10 ML: at 12:26

## 2017-06-10 RX ADMIN — AMLODIPINE BESYLATE 5 MG: 5 TABLET ORAL at 12:25

## 2017-06-10 RX ADMIN — INSULIN ASPART 3 UNITS: 100 INJECTION, SOLUTION INTRAVENOUS; SUBCUTANEOUS at 12:26

## 2017-06-10 RX ADMIN — ONDANSETRON 4 MG: 2 INJECTION INTRAMUSCULAR; INTRAVENOUS at 07:53

## 2017-06-10 RX ADMIN — ACETAMINOPHEN 650 MG: 325 TABLET, FILM COATED ORAL at 17:20

## 2017-06-10 RX ADMIN — MORPHINE SULFATE 1 MG: 2 INJECTION, SOLUTION INTRAMUSCULAR; INTRAVENOUS at 12:25

## 2017-06-10 RX ADMIN — SODIUM CHLORIDE 100 ML/HR: 9 INJECTION, SOLUTION INTRAVENOUS at 12:26

## 2017-06-10 RX ADMIN — CARVEDILOL 25 MG: 25 TABLET, FILM COATED ORAL at 12:26

## 2017-06-10 RX ADMIN — INSULIN ASPART 2 UNITS: 100 INJECTION, SOLUTION INTRAVENOUS; SUBCUTANEOUS at 17:17

## 2017-06-10 RX ADMIN — ATORVASTATIN CALCIUM 10 MG: 10 TABLET, FILM COATED ORAL at 12:26

## 2017-06-10 RX ADMIN — SODIUM CHLORIDE 100 ML/HR: 9 INJECTION, SOLUTION INTRAVENOUS at 17:16

## 2017-06-10 RX ADMIN — MORPHINE SULFATE 2 MG: 2 INJECTION, SOLUTION INTRAMUSCULAR; INTRAVENOUS at 17:16

## 2017-06-10 RX ADMIN — Medication 10 ML: at 17:16

## 2017-06-10 NOTE — ED PROVIDER NOTES
Subjective   History of Present Illness  TRIAGE CHIEF COMPLAINT:   Chief Complaint   Patient presents with   • Fall         HPI: Sigrid Casarez   is a 77 y.o. female   who presents to the emergency department complaining of Left lower extremity injury.  Yesterday morning around 10 AM patient fell when her dog tripped her.  She landed on her left side.  Patient denies striking her head or losing consciousness.  Patient states she felt immediate pain in her left hip and left knee.  States now she still has some left hip discomfort but the majority of her pain is in her left knee.  This evening she noticed left knee swelling and states she has no longer able to ambulate due to pain.  She took a single dose of aspirin for pain after injury but nothing else.  Denies prior injury to this area.  No other complaints.  Denies any recent illness.            Review of Systems   All other systems reviewed and are negative.      Past Medical History:   Diagnosis Date   • Anemia    • Chronic kidney disease    • Chronic kidney disease    • Community acquired pneumonia    • Dexa Body Composition study lumosacral spine and femur     ostepenic   • Diabetic nephropathy, type I    • Diabetic peripheral neuropathy type 1    • Hypertension    • Menopause    • Pneumonia        Allergies   Allergen Reactions   • Duloxetine    • Exenatide    • Lisinopril Cough   • Penicillins    • Phentermine        Past Surgical History:   Procedure Laterality Date   • CATARACT EXTRACTION     • CHOLECYSTECTOMY         Family History   Problem Relation Age of Onset   • Diabetes Other    • Heart disease Other    • Heart attack Mother    • Lung cancer Father        Social History     Social History   • Marital status:      Spouse name: N/A   • Number of children: N/A   • Years of education: N/A     Social History Main Topics   • Smoking status: Former Smoker   • Smokeless tobacco: None   • Alcohol use No   • Drug use: No   • Sexual activity: Not Asked      Other Topics Concern   • None     Social History Narrative   • None           Objective   Physical Exam    CONSTITUTIONAL: Awake, oriented, appears non-toxic   HENT: Atraumatic, normocephalic, oral mucosa pink and moist, airway patent. Nares patent without drainage. External ears normal.  Hard of hearing.  EYES: Conjunctiva clear, EOMI, PERRL   NECK: Trachea midline, non-tender, supple   CARDIOVASCULAR: Normal heart rate, Normal rhythm, No murmurs, rubs, gallops   PULMONARY/CHEST: Clear to auscultation, no rhonchi, wheezes, or rales. Symmetrical breath sounds. Non-tender.   ABDOMINAL: Non-distended, soft, non-tender - no rebound or guarding. BS normal.   NEUROLOGIC: Non-focal, moving all four extremities, no gross sensory or motor deficits.   EXTREMITIES: Left lateral hip tenderness.  Left lower extremity is not shortened or rotated.  Left knee with mild-to-moderate diffuse swelling and tenderness with infrapatellar effusion.  No ecchymosis or erythema.  Range of motion limited by pain.  Distal pulses, capillary refill, sensation intact.   SKIN: Warm, Dry, No erythema, No rash     XR Hip With or Without Pelvis 2 - 3 View Left   Final Result   Subcapital/transcervical nondisplaced impaction fracture of the left femoral neck.      Authenticated by Roberto Chávez MD on 06/10/2017 07:24:55 AM      XR Knee 3 View Left   Final Result   1. Infrapatellar soft tissue swelling with mild spurring on the superior patella.      2. Subtle lucent as above. Recommend clinical correlation with site of pain.      Authenticated by Roberto Chávez MD on 06/10/2017 07:22:45 AM              EKG:         Procedures         ED Course  ED Course          ED COURSE / MEDICAL DECISION MAKING:   Nursing notes reviewed.    Left hip fracture after mechanical fall yesterday.  Case discussed with Dr. Sheehan who will consult.  Preoperative labs ordered.  Case discussed with Dr. Ribera who will admit to the hospitalist service.    DECISION TO  DISCHARGE/ADMIT: see ED care timeline       Electronically signed by: Breezy Cheatham MD, 6/10/2017 7:37 AM              MDM    Final diagnoses:   Hip fracture, left, closed, initial encounter            Breezy Cheatham MD  06/10/17 0737

## 2017-06-10 NOTE — H&P
[facility]   HISTORY AND PHYSICAL      Name:  Sigrid Casarez   Age:  77 y.o.  Sex:  female  :  1940  MRN:  [id number]   Visit Number:  [id number]  Admission Date:  6/10/2017  Date Of Service:  06/10/17  Primary Care Physician:  Tiburcio Hernandez MD   Primary Nephrologist: Deric Mejia MD  Primary Cardiologist: Rafal Steve MD    Chief Complaint:     Left hip pain since yesterday.    History Of Presenting Illness:      This is a 77-year-old pleasant female with history of diabetes mellitus type 2 with nephropathy, chronic kidney disease was brought to the emergency room by her  with the above complaints.  The history is obtained from the patient as well as the medical chart.    Patient states that she was in her usual state of health until yesterday morning.  While she was walking at home, she was tripped over by her dog and she fell on her left side.  She did have some pain in her left hip but was able to get up and walk.  She however started having increasing pain through evening but the pain significantly got worse through the night and this morning.  She states that the pain prior to coming to the hospital was 9 out of 10 in intensity and she was not able to walk or do any of her activities of daily living.  No history of head trauma.    Patient was evaluated in the emergency room.  She was hemodynamically stable.  Blood work was unremarkable except for her baseline creatinine of 3 and chronic anemia with a hemoglobin of 10.  Troponin was negative.  CT of the head was negative for any acute abnormalities.  X-ray of the pelvis showed nondisplaced left hip fracture.  A call was placed to Dr. Sheehan from orthopedic services who recommended admission for surgical intervention.  She is currently lying down on bed and is comfortable at rest and denies any significant left hip pain at this time.    Patient has been seen by Dr. Steve in the past and did have cardiac stress  test which was negative.  She was seen by both Dr. Sheehan and Dr. Steve this morning.    Review Of Systems:     General ROS: Patient denies any fevers, chills or loss of consciousness.  Psychological ROS: No history of any hallucinations and delusions.  Ophthalmic ROS: No history of any diplopia or transient loss of vision.  ENT ROS: No history of sore throat, nasal congestion or ear pain.   Allergy and Immunology ROS: No history of rash or itching.  Hematological and Lymphatic ROS: No history of neck swelling or easy bleeding.  Endocrine ROS: No history of any recent unintentional weight gain or loss.  Respiratory ROS: No history of cough or shortness of breath.  Cardiovascular ROS: No history of chest pain or palpitations.  Gastrointestinal ROS: No history of nausea and vomiting. Denies any abdominal pain. No diarrhea.  Genito-Urinary ROS: No history of dysuria or hematuria.  Musculoskeletal ROS: No muscle pain. No calf pain.  Complaints of left hip pain.  Neurological ROS: No history of any focal weakness. No loss of consciousness. Denies any numbness. Denies neck pain.  Dermatological ROS: No history of any redness or pruritis.     Past Medical History:    Past Medical History:   Diagnosis Date   • Anemia    • Chronic kidney disease    • Chronic kidney disease    • Community acquired pneumonia    • Dexa Body Composition study lumosacral spine and femur     ostepenic   • Diabetic nephropathy, type I    • Diabetic peripheral neuropathy type 1    • Hypertension    • Menopause    • Pneumonia        Past Surgical history:    Past Surgical History:   Procedure Laterality Date   • CATARACT EXTRACTION     • CHOLECYSTECTOMY         Social History:    Social History     Social History   • Marital status:      Spouse name: N/A   • Number of children: N/A   • Years of education: N/A     Occupational History   • Not on file.     Social History Main Topics   • Smoking status: Former Smoker   • Smokeless tobacco: Not  on file   • Alcohol use No   • Drug use: No   • Sexual activity: Not on file     Other Topics Concern   • Not on file     Social History Narrative   • No narrative on file       Family History:    Family History   Problem Relation Age of Onset   • Diabetes Other    • Heart disease Other    • Heart attack Mother    • Lung cancer Father        Allergies:      Duloxetine; Exenatide; Lisinopril; Penicillins; and Phentermine    Home Medications:    Prior to Admission Medications     Prescriptions Last Dose Informant Patient Reported? Taking?    amLODIPine (NORVASC) 5 MG tablet   Yes No    Take 1 tablet by mouth Daily.    aspirin 81 MG EC tablet   Yes No    Take 1 tablet by mouth daily.    carvedilol (COREG) 25 MG tablet   Yes No    Take 1 tablet by mouth 2 (two) times a day.    Cholecalciferol (VITAMIN D) 1000 UNITS tablet  Self Yes No    Take 1,000 Units by mouth 2 (Two) Times a Day.    ferrous sulfate 325 (65 FE) MG tablet  Self Yes No    Take 1 tablet by mouth 1 (One) Time Per Week. With meals    furosemide (LASIX) 20 MG tablet  Self No No    Take 2 pills daily    Patient taking differently:  Take 20 mg by mouth Every Other Day. Take 2 pills daily    glucose blood (FREESTYLE LITE) test strip   Yes No    3 (three) times a day.    HUMALOG KWIKPEN 100 UNIT/ML solution pen-injector   No No    INJECT 5 TO 15 UNITS THREE TIMES A DAY WITH MEALS OR AS DIRECTED    Insulin Pen Needle (BD ULTRA-FINE PEN NEEDLES) 29G X 12.7MM misc   Yes No    Use 3 times daily    levothyroxine (SYNTHROID, LEVOTHROID) 75 MCG tablet   No No    Take 1 tablet by mouth daily.    Pediatric Multivitamins-Iron (FLINTSTONES PLUS IRON PO)   Yes No    Take 1 tablet by mouth Daily.    simvastatin (ZOCOR) 20 MG tablet   Yes No    Take  by mouth.    TOUJEO SOLOSTAR 300 UNIT/ML solution pen-injector   Yes No    10 Units Daily.            ED Medications:    Medications   sodium chloride 0.9 % flush 10 mL (not administered)   sodium chloride 0.9 % infusion (125  mL/hr Intravenous New Bag 6/10/17 0751)   HYDROcodone-acetaminophen (NORCO) 5-325 MG per tablet 1 tablet (1 tablet Oral Given 6/10/17 0607)   ondansetron (ZOFRAN) injection 4 mg (4 mg Intravenous Given 6/10/17 6757)       Vital Signs:    Temp:  [97.7 °F (36.5 °C)] 97.7 °F (36.5 °C)  Heart Rate:  [75-86] 78  Resp:  [17-18] 18  BP: (145-176)/(60-90) 176/60    Last 3 weights    06/10/17  0935 06/10/17  0937 06/10/17  1113   Weight: 144 lb 11.2 oz (65.6 kg) 145 lb (65.8 kg) 145 lb 1 oz (65.8 kg)       Body mass index is 25.7 kg/(m^2).    Physical Exam:    General Appearance:  Alert and cooperative, not in any acute distress.   Head:  Atraumatic and normocephalic, without obvious abnormality.   Eyes:          PERRLA, conjunctivae and sclerae normal, no Icterus. No pallor. Extra-occular movements are within normal limits.   Ears:  Ears appear intact with no abnormalities noted.   Throat: No oral lesions, no thrush, oral mucosa moist.   Neck: Supple, trachea midline, no thyromegaly, no carotid bruit.   Back:   No kyphoscoliosis present. No tenderness to palpation,   range of motion normal.   Lungs:   Chest shape is normal. Breath sounds heard bilaterally equally.  No crackles or wheezing. No Pleural rub or bronchial breathing.   Heart:  Normal S1 and S2, no murmur, no gallop, no rub. No JVD.   Abdomen:   Normal bowel sounds, no masses, no organomegaly. Soft non-tender, non-distended, no guarding, no rebound tenderness.   Extremities: Moves all extremities well, no edema, no cyanosis, no clubbing.  No ecchymosis noted on the left hip.   Pulses: Pulses palpable and equal bilaterally.   Skin: No bleeding, bruising or rash.   Neurologic: Alert and oriented x 3. Moves all four limbs equally. No tremors. No facial asymetry.     Laboratory data:    I have reviewed the labs done in the emergency room.      Results from last 7 days  Lab Units 06/10/17  0747   SODIUM mmol/L 141   POTASSIUM mmol/L 4.0   CHLORIDE mmol/L 107   TOTAL  CO2 mmol/L 22.0*   BUN mg/dL 52*   CREATININE mg/dL 3.00*   CALCIUM mg/dL 9.0   BILIRUBIN mg/dL 0.6   ALK PHOS U/L 97   ALT (SGPT) U/L 25   AST (SGOT) U/L 24   GLUCOSE mg/dL 202*       Results from last 7 days  Lab Units 06/10/17  0747   WBC 10*3/mm3 10.01   HEMOGLOBIN g/dL 9.9*   HEMATOCRIT % 30.2*   PLATELETS 10*3/mm3 178       Results from last 7 days  Lab Units 06/10/17  0747   INR  1.12*     EKG:      EKG done in the emergency room was reviewed by me.  It shows sinus rhythm at 80 bpm.  Normal axis.  No significant ST-T changes were noted.    Radiology:    Imaging Results (last 72 hours)     Procedure Component Value Units Date/Time    XR Knee 3 View Left [[id number]] Collected:  06/10/17 0722     Updated:  06/10/17 0724    Narrative:       FINAL REPORT    CLINICAL HISTORY:  LT KNEE PAIN, FALL    FINDINGS:  Three views of the left knee were performed. There is no evidence of a joint effusion. There is infrapatellar soft tissue swelling. There is mild spurring along the superior patella. On the internal view there is subtle lucency within the lateral femoral   condyle which most likely represents muscular insertion site rather than fracture. Recommend clinical correlation with site of pain.      Impression:       1. Infrapatellar soft tissue swelling with mild spurring on the superior patella.    2. Subtle lucent as above. Recommend clinical correlation with site of pain.    Authenticated by Roberto Chávez MD on 06/10/2017 07:22:45 AM    XR Hip With or Without Pelvis 2 - 3 View Left [[id number]] Collected:  06/10/17 0724     Updated:  06/10/17 0726    Narrative:       FINAL REPORT    CLINICAL HISTORY:  LT HIP PAIN, FALL    FINDINGS:  LEFT HIP    2 views of the left hip are obtained. Films are underpenetrated. The bones are osteopenic. There are degenerative facet changes in the lower lumbar spine. There is SI joint sclerosis bilaterally. There is a subcapital/transcervical nondisplaced impaction   fracture of the  left femoral neck. There is no acute soft tissue abnormality.      Impression:       Subcapital/transcervical nondisplaced impaction fracture of the left femoral neck.    Authenticated by Roberto Chávez MD on 06/10/2017 07:24:55 AM    XR Chest 2 View [[id number]] Collected:  06/10/17 0936     Updated:  06/10/17 0939    Narrative:       PROCEDURE: XR CHEST 2 VW-        HISTORY: medical clearance; S72.002A-Fracture of unspecified part of  neck of left femur, initial encounter for closed fracture     COMPARISON: April 5, 2016.     FINDINGS: The heart is normal in size. The mediastinum is unremarkable.  There are chronic interstitial lung changes. There is also chronic  blunting of the left costophrenic angle. No new opacities were effusions  are evident. There is no pneumothorax. There are no acute osseous  abnormalities.           Impression:       No acute cardiopulmonary process.     This report was finalized on 6/10/2017 9:37 AM by Mariel Solano M.D..          Assessment:    1.  Left hip nondisplaced fracture, present on admission.  2.  Status post mechanical fall at home.  3.  Diabetes mellitus type 2 with nephropathy.  4.  Chronic kidney disease stage IV.  5.  Essential hypertension.  6.  Acquired hypothyroidism.  7.  Anemia of chronic disease.    Plan:     Patient is currently being admitted to the medical floor due to her left hip fracture.  We will keep her on bedrest.  She will be placed on IV fluids and nothing by mouth walker midnight.  She was seen by Dr. Sheehan and Dr. Steve this morning and I have discussed the patient's condition with both of them.  We will also consult Dr. Mejia from nephrology.  Her home medications will be continued.  We will start her on heparin for DVT prophylaxis from tomorrow.  She will be placed on incentive spirometry.  She will be placed on morphine for pain control.  Further recommendations depend upon her clinical course.  She lives with her  and prefers to  go home with home health when discharged.    Jordan Ribera MD  06/10/17  11:14 AM    Please note that portions of this note may have been completed with a voice recognition program. Efforts were made to edit the dictations, but occasionally words are mistranscribed.

## 2017-06-10 NOTE — PLAN OF CARE
Problem: Patient Care Overview (Adult)  Goal: Plan of Care Review  Outcome: Ongoing (interventions implemented as appropriate)    06/10/17 1004   Coping/Psychosocial Response Interventions   Plan Of Care Reviewed With patient   Patient Care Overview   Progress no change   Outcome Evaluation   Outcome Summary/Follow up Plan patient is resting in bed without s/s of distress;          Problem: Fall Risk (Adult)  Goal: Absence of Falls  Outcome: Ongoing (interventions implemented as appropriate)    Problem: Skin Integrity Impairment, Risk/Actual (Adult)  Goal: Skin Integrity/Wound Healing  Outcome: Ongoing (interventions implemented as appropriate)    Problem: Pain, Acute (Adult)  Goal: Acceptable Pain Control/Comfort Level  Outcome: Ongoing (interventions implemented as appropriate)

## 2017-06-10 NOTE — PROGRESS NOTES
"Adult Nutrition  Assessment/PES    Patient Name:  Sigrid Casarez  YOB: 1940  MRN: [id number]  Admit Date:  6/10/2017    Assessment Date:  6/10/2017        Reason for Assessment       06/10/17 1111    Reason for Assessment    Reason For Assessment/Visit admission assessment;diagnosis/disease state    Identified At Risk By Screening Criteria diagnosis    Diagnosis Diagnosis    Nutrition related Increased nutrition needs    Cardiac HTN    Endocrine DM Type 1;Hypothyroid    Ortho Fracture    Renal CKD                Anthropometrics       06/10/17 1113    Anthropometrics    Height Method Stated    Height 160 cm (63\")    Weight Method Bed scale    Weight 65.8 kg (145 lb 1 oz)    Ideal Body Weight (IBW)    Ideal Body Weight (IBW), Female 53.12    % Ideal Body Weight 124.13    Body Mass Index (BMI)    BMI (kg/m2) 25.75    BMI Grade 25 - 29.9 - overweight            Labs/Tests/Procedures/Meds       06/10/17 1113    Labs/Tests/Procedures/Meds    Labs/Tests Review Reviewed   High: Glucose, BUN, Cr   Low: Hgb/Hct    Medication Review Reviewed, pertinent              Estimated/Assessed Needs       06/10/17 1114    Calculation Measurements    Weight Used For Calculations 65.8 kg (145 lb 1 oz)    Height Used for Calculations 1.6 m (5' 3\")    Estimated/Assessed Energy Needs    Energy Need Method [place] Jeor    Age 77    RMR ([place]St[place] Jeor Equation) 1102.12    Activity Factors ([place] Jeor)  Out of bed, ambulatory  1.3    Estimated Kcal Range  ~7297-7160    Estimated/Assessed Protein Needs    Weight Used for Protein Calculation 65.8 kg (145 lb 1 oz)    Protein (gm/kg) 0.8    0.8 Gm Protein (gm) 52.64    Estimated Protein Range ~39.48-52.64 gm    Estimated/Assessed Fluid Needs    Fluid Need Method --   output + 1000 ml            Nutrition Prescription Ordered       06/10/17 1115    Nutrition Prescription PO    Current PO Diet NPO   x 1 day            Evaluation of Received Nutrient/Fluid Intake       " 06/10/17 1116    PO Evaluation    Number of Days PO Intake Evaluated Insufficient Data   NPO              Problem/Interventions:        Problem 1       06/10/17 1116    Nutrition Diagnoses Problem 1    Problem 1 Increased Nutrient Needs    Macronutrient Kcal;Fluid;Protein    Micronutrient Vitamin;Mineral    Etiology (related to) Medical Diagnosis    Ortho Fracture    Signs/Symptoms (evidenced by) Other (comment)   healing            Problem 2       06/10/17 1116    Nutrition Diagnoses Problem 2    Problem 2 Inadequate Intake/Infusion    Inadequate Intake Type Oral    Macronutrient Kcal;Fluid;Fiber;Protein;Carbohydrate;Fat    Micronutrient Vitamin;Mineral    Etiology (related to) MNT for Treatment/Condition    Signs/Symptoms (evidenced by) NPO            Problem 3       06/10/17 1117    Nutrition Diagnoses Problem 3    Problem 3 Altered Nutrition Related to Labs    Etiology (related to) Medical Diagnosis    Endocrine DM Type 1    Renal CKD    Signs/Symptoms (evidenced by) Biochemical    Labs Reviewed Done    Specific Labs Noted Glucose;BUN;Creatinine;Hgb & Hct                Intervention Goal       06/10/17 1117    Intervention Goal    General Meet nutritional needs for age/condition    PO Advance diet            Nutrition Intervention       06/10/17 1117    Nutrition Intervention    RD/Tech Action Await begin PO;Follow Tx progress            Nutrition Prescription       06/10/17 1117    Nutrition Prescription PO    PO Prescription Begin/change diet;Begin/change supplement    Begin/Change Diet to Clear Liquid    Supplement Ensure Clear    Supplement Frequency 3 times a day    New PO Prescription Ordered? No, recommended    Other Orders    Obtain Weight Daily    Obtain Weight Ordered? No, recommended    Supplement Vitamin mineral supplement    Supplement Ordered? No, recommended            Education/Evaluation       06/10/17 1118    Education    Education Provided education regarding;Education topics    Education  Topics Cardiac diabetic;Renal diet    Monitor/Evaluation    Monitor Per protocol;PO intake;Pertinent labs;Weight        Comments:  Rec. #1: Advance diet once medically feasible to Consistent Carbohydrate, Cardiac, Renal. Rec. #2: Consider adding MVI with minerals to promote healing daily. RD to add nutritional supplements once diet advances. RD to follow pt. Consult RD PRN.     Electronically signed by:  Massiel Claudio RD  06/10/17 11:18 AM

## 2017-06-10 NOTE — ED NOTES
DR LUNDBERG WAS CALLED AT 0729 AND TRANSFERRED TO DR BABIN AT THIS TIME.      Suze Swann  06/10/17 4672

## 2017-06-10 NOTE — CONSULTS
Nephrology Consultation    Referring Provider:   Tiburcio Hernandez MD    Reason for Consultation:    Chronic kidney disease stage IV and associated problems.      Subjective     Chief complaint   Chief Complaint   Patient presents with   • Fall       History of present illness:    Patient is a 77 year old white female with multiple medical problems as listed below in the past medical history.  She has known chronic kidney disease stage IV details about the dialysis and access placement has been discussed with her and she is scheduled to have a fistula placement evaluation during this month.  She is known to have 6-8 g of proteinuria for years secondary to diabetes.    She can recall well the circumstances of her injury. She states that at about 10:30 am she was taking shower curtains outside to give to her  to throw away when her one year old dog came running across the yard and knocked her to the ground. She stated this is unusual for the dog to do though says since she was holding the curtains the dog may not have known who was there. She was able to stand and her hip was sore though she could walk and thought it was a deep bruise. 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   • Diabetic nephropathy, type I    • Diabetic peripheral neuropathy type 1    • Hypertension    • Menopause    • Pneumonia        Past Surgical History:   Procedure Laterality Date   • CATARACT EXTRACTION     • CHOLECYSTECTOMY         Family History   Problem Relation Age of Onset   • Diabetes Other    • Heart disease Other    • Heart attack Mother    • Lung cancer Father           negative h/o  ESRD     Social History   Substance Use Topics   • Smoking status: Former Smoker   • Smokeless tobacco: None   • Alcohol use No     Prescriptions Prior to Admission   Medication Sig Dispense Refill Last Dose   • amLODIPine (NORVASC) 5 MG tablet Take 1 tablet by mouth Daily.   Taking   • aspirin 81 MG EC tablet Take 1 tablet by mouth daily.   Taking   • carvedilol (COREG) 25 MG tablet Take 1 tablet by mouth 2 (two) times a day.   Taking   • Cholecalciferol (VITAMIN D) 1000 UNITS tablet Take 1,000 Units by mouth 2 (Two) Times a Day.   Taking   • ferrous sulfate 325 (65 FE) MG tablet Take 1 tablet by mouth 1 (One) Time Per Week. With meals   Taking   • furosemide (LASIX) 20 MG tablet Take 2 pills daily (Patient taking differently: Take 20 mg by mouth Every Other Day. Take 2 pills daily) 180 tablet 2 Taking   • glucose blood (FREESTYLE LITE) test strip 3 (three) times a day.   Taking   • HUMALOG KWIKPEN 100 UNIT/ML solution pen-injector INJECT 5 TO 15 UNITS THREE TIMES A DAY WITH MEALS OR AS DIRECTED 30 mL 2 Taking   • Insulin Pen Needle (BD ULTRA-FINE PEN NEEDLES) 29G X 12.7MM misc Use 3 times daily   Taking   • levothyroxine (SYNTHROID, LEVOTHROID) 75 MCG tablet Take 1 tablet by mouth daily. 30 tablet 5 Taking   • Pediatric Multivitamins-Iron (FLINTSTONES PLUS IRON PO) Take 1 tablet by mouth Daily.   Taking   • simvastatin (ZOCOR) 20 MG tablet Take  by mouth.   Taking   • TOUJEO SOLOSTAR 300 UNIT/ML solution pen-injector 10 Units Daily.   Taking     Allergies:  Duloxetine; Exenatide; Lisinopril; Penicillins; and Phentermine    Review of Systems  Constitutional: Negative for fever and chills, no diaphoresis, fatigue and unexpected weight change.   HENT: Negative for congestion and hearing loss.   Eyes: Negative for redness and visual disturbance.   Respiratory: negative for shortness of breath. Negative for chest pain . Negative for cough and chest tightness.   Cardiovascular: Negative for chest pain and palpitations.  "  Gastrointestinal: Negative for abdominal distention, abdominal pain and blood in stool. Denies any constipation or diarrhea.  Endocrine: Negative for cold or heat intolerance.   Genitourinary: Positive for difficulty urinating, dysuria and frequency.   Musculoskeletal: Negative for arthralgias, back pain and myalgias.  Since her fall she is having hip pain before the fall she had off and on mild arthralgias.  Skin: Negative for color change, rash and wound.   Neurological: Negative for syncope, new onset weakness or numbness of any extremities. Denies any headaches.   Hematological: Negative for adenopathy. Does not bruise/bleed easily.   Psychiatric/Behavioral: Negative for confusion. The patient is not nervous/anxious.     Objective     Vital Signs  /60 (BP Location: Left arm, Patient Position: Lying)  Pulse 78  Temp 97.7 °F (36.5 °C) (Oral)   Resp 18  Ht 63\" (160 cm)  Wt 145 lb 1 oz (65.8 kg)  SpO2 94%  BMI 25.7 kg/m2         I/O this shift:  In: 240 [P.O.:240]  Out: 400 [Urine:400]    Intake/Output Summary (Last 24 hours) at 06/10/17 1557  Last data filed at 06/10/17 1246   Gross per 24 hour   Intake              240 ml   Output              400 ml   Net             -160 ml       Physical Exam:     General Appearance:   Alert, cooperative, in no acute distress.     Head:   Normocephalic, without obvious abnormality, atraumatic.     Eyes:       Normal, conjunctivae and sclerae, no icterus, no pallor, corneas clear, PERRLA        Throat:   Oral mucosa dry      Neck:  No adenopathy, supple, trachea midline, no thyromegaly, no carotid bruit, no JVD      Back:   No CVA tenderness on Percussion.     Lungs:    Clear to auscultation and fair air movement noted.      Heart:   Regular rhythm and normal rate, normal S1 and S2.       Abdomen:   Scaphoid. Normal bowel sounds, no masses, no organomegaly, soft non-tender, non-distended, no guarding, no rebound tenderness        Extremities:  Moves all " extremities. Except the left hip and knee area, no edema, no cyanosis, no redness.     Pulses:  Pulses palpable and equal bilaterally but weak.     Skin:  No bleeding, bruising or rash        Neurologic:  Cranial nerves grossly intact, move all extremities             Results Review:  Lab Results (last 7 days)     Procedure Component Value Units Date/Time    POC Glucose Fingerstick [[id number]]  (Abnormal) Collected:  06/10/17 0730    Specimen:  Blood Updated:  06/10/17 0735     Glucose 212 (H) mg/dL       Serial Number: [id number]    : [id number]       CBC & Differential [[id number]] Collected:  06/10/17 0747    Specimen:  Blood Updated:  06/10/17 0812    Narrative:       The following orders were created for panel order CBC & Differential.  Procedure                               Abnormality         Status                     ---------                               -----------         ------                     CBC Auto Differential[[id number]]        Abnormal            Final result                 Please view results for these tests on the individual orders.    CBC Auto Differential [[id number]]  (Abnormal) Collected:  06/10/17 0747    Specimen:  Blood Updated:  06/10/17 0812     WBC 10.01 10*3/mm3      RBC 3.35 (L) 10*6/mm3      Hemoglobin 9.9 (L) g/dL      Hematocrit 30.2 (L) %      MCV 90.1 fL      MCH 29.6 pg      MCHC 32.8 g/dL      RDW 13.0 %      RDW-SD 42.8 fl      MPV 11.5 fL      Platelets 178 10*3/mm3      Neutrophil % 84.8 (H) %      Lymphocyte % 7.1 (L) %      Monocyte % 4.9 %      Eosinophil % 2.6 %      Basophil % 0.3 %      Immature Grans % 0.3 %      Neutrophils, Absolute 8.49 (H) 10*3/mm3      Lymphocytes, Absolute 0.71 10*3/mm3      Monocytes, Absolute 0.49 10*3/mm3      Eosinophils, Absolute 0.26 10*3/mm3      Basophils, Absolute 0.03 10*3/mm3      Immature Grans, Absolute 0.03 10*3/mm3      nRBC 0.0 /100 WBC     Comprehensive Metabolic Panel [[id number]]  (Abnormal) Collected:  06/10/17 0747     Specimen:  Blood Updated:  06/10/17 0821     Glucose 202 (H) mg/dL       Glucose >180, Hemoglobin A1C recommended.        BUN 52 (H) mg/dL      Creatinine 3.00 (H) mg/dL      Sodium 141 mmol/L      Potassium 4.0 mmol/L      Chloride 107 mmol/L      CO2 22.0 (L) mmol/L      Calcium 9.0 mg/dL      Total Protein 6.6 g/dL      Albumin 3.60 g/dL      ALT (SGPT) 25 U/L      AST (SGOT) 24 U/L      Alkaline Phosphatase 97 U/L      Total Bilirubin 0.6 mg/dL      eGFR Non African Amer 15 (L) mL/min/1.73      Globulin 3.0 gm/dL      A/G Ratio 1.2 g/dL      BUN/Creatinine Ratio 17.3     Anion Gap 16.0 mmol/L     Narrative:       The MDRD GFR formula is only valid for adults with stable renal function between ages 18 and 70.    Magnesium [[id number]]  (Normal) Collected:  06/10/17 0747    Specimen:  Blood Updated:  06/10/17 0821     Magnesium 2.1 mg/dL     Protime-INR [[id number]]  (Abnormal) Collected:  06/10/17 0747    Specimen:  Blood Updated:  06/10/17 0828     Protime 12.3 (H) Seconds      INR 1.12 (H)    aPTT [[id number]]  (Normal) Collected:  06/10/17 0747    Specimen:  Blood Updated:  06/10/17 0828     PTT 27.0 seconds     POC Glucose Fingerstick [[id number]]  (Abnormal) Collected:  06/10/17 1133    Specimen:  Blood Updated:  06/10/17 1135     Glucose 202 (H) mg/dL       Serial Number: [id number]    : [id number]       Troponin [[id number]]  (Normal) Collected:  06/10/17 0751    Specimen:  Blood Updated:  06/10/17 1137     Troponin I <0.012 ng/mL     Narrative:       Normal Patient Upper Reference Limit (URL) (99th Percentile)=0.03 ng/mL   Non-AMI Illness Reference Limit=0.03-0.11 ng/mL   AMI Confirmation=0.12 ng/mL and above        Imaging Results (last 72 hours)     Procedure Component Value Units Date/Time    XR Knee 3 View Left [[id number]] Collected:  06/10/17 0722     Updated:  06/10/17 0724    Narrative:       FINAL REPORT    CLINICAL HISTORY:  LT KNEE PAIN, FALL    FINDINGS:  Three views of the left knee were  performed. There is no evidence of a joint effusion. There is infrapatellar soft tissue swelling. There is mild spurring along the superior patella. On the internal view there is subtle lucency within the lateral femoral   condyle which most likely represents muscular insertion site rather than fracture. Recommend clinical correlation with site of pain.      Impression:       1. Infrapatellar soft tissue swelling with mild spurring on the superior patella.    2. Subtle lucent as above. Recommend clinical correlation with site of pain.    Authenticated by Roberto Chávez MD on 06/10/2017 07:22:45 AM    XR Hip With or Without Pelvis 2 - 3 View Left [[id number]] Collected:  06/10/17 0724     Updated:  06/10/17 0726    Narrative:       FINAL REPORT    CLINICAL HISTORY:  LT HIP PAIN, FALL    FINDINGS:  LEFT HIP    2 views of the left hip are obtained. Films are underpenetrated. The bones are osteopenic. There are degenerative facet changes in the lower lumbar spine. There is SI joint sclerosis bilaterally. There is a subcapital/transcervical nondisplaced impaction   fracture of the left femoral neck. There is no acute soft tissue abnormality.      Impression:       Subcapital/transcervical nondisplaced impaction fracture of the left femoral neck.    Authenticated by Roberto Chávez MD on 06/10/2017 07:24:55 AM    XR Chest 2 View [[id number]] Collected:  06/10/17 0936     Updated:  06/10/17 0939    Narrative:       PROCEDURE: XR CHEST 2 VW-        HISTORY: medical clearance; S72.002A-Fracture of unspecified part of  neck of left femur, initial encounter for closed fracture     COMPARISON: April 5, 2016.     FINDINGS: The heart is normal in size. The mediastinum is unremarkable.  There are chronic interstitial lung changes. There is also chronic  blunting of the left costophrenic angle. No new opacities were effusions  are evident. There is no pneumothorax. There are no acute osseous  abnormalities.           Impression:        No acute cardiopulmonary process.           This report was finalized on 6/10/2017 9:37 AM by Mariel Solano M.D..              amLODIPine 5 mg Oral Daily   aspirin 81 mg Oral Daily   atorvastatin 10 mg Oral Daily   carvedilol 25 mg Oral BID   heparin (porcine) 5,000 Units Subcutaneous Q8H   insulin aspart 0-7 Units Subcutaneous 4x Daily AC & at Bedtime   levothyroxine 75 mcg Oral QAM       sodium chloride 100 mL/hr Last Rate: 100 mL/hr (06/10/17 1226)       Assessment/Plan     1.   Chronic kidney disease, stage IV (severe): Continue to follow labs closely likelihood all 4 ATN secondary to surgical intervention is a possibility.  We'll continue to follow daily labs and hopefully she can maintain her renal perfusion and function during this acute issues of hip fracture and repair.  2.   Hip fracture, left: Likely surgery scheduled for tomorrow.  3.   Chronic anemia: History of iron deficiency as well as IV iron along with erythropoietin therapy.  4 acute settings we'll transfuse as needed.  4.   Essential hypertension: Continue to optimize medications and keep the blood pressure in 140-150 range.  5.   Acquired hypothyroidism: Treated  6.   Status post fall: Does not appear to have syncopal episode it is all mechanical.  She has been thinking about letting the dog, go at this point.  7.   Type 2 diabetes mellitus with nephropathy: She is agreed for dialysis as needed.      Details discussed with the patient as well as family and the hospitalist service.     Rest as ordered    In closing, I sincerely appreciate opportunity to participate in care of this patient. If I can be of any further assistance with the management of this patient, please don’t hesitate to contact me.    Deric Mejia MD  06/10/17  3:57 PM    EMR Dragon/Transcription disclaimer:   Much of this encounter note is an electronic transcription/translation of spoken language to printed text. The electronic translation of spoken language may  permit erroneous, or at times, nonsensical words or phrases to be inadvertently transcribed; Although I have reviewed the note for such errors, some may still exist.

## 2017-06-10 NOTE — ED NOTES
DR. BARCLAY WAS CALLED AT 0732 AND TRANSFERRED TO DR. BABIN AT THIS TIME.      Suze Swann  06/10/17 3290

## 2017-06-10 NOTE — CONSULTS
Rafal Steve MD  CARDIOLOGY CONSULT    PATIENT: Sigrid Casarez                                                   DATE: 06/10/17   412/1  : 1940     PRIMARY PHYSICIAN: Tiburcio Hernandez M.D.    CHIEF COMPLAINT: Risk profile for atherosclerotic process and preoperative risk stratification    HISTORY OF PRESENT ILLNESS:  The patient is a 77-year-old  female with significant risk profile for atherosclerotic cardiovascular disease comprising  of longstanding and poorly controlled diabetes, hypertension with significant  hypertensive heart disease, dyslipidemia and decreased functional capacity who  has a history of progressive , who is known to have diffuse atherosclerotic process and history of recurrent fluid overload dyspnea and fluid overload and chronic kidney disease, who was hospitalized earlier on account of left hip fracture for which orthopedic options of being explored..       The patient, who is known to have well-preserved global LV systolic function, relates history of functional decline for the last several years,   predominantly on account of exertional dyspnea, which to some degree has   Has improved after patient underwent thoracentesis.  Patient had prior history of orthopnea and was sleeping in a recliner but lately patient can sleep relatively flat at night without significant shortness of hair which still, however, remains an issue during physical activities.  Nonetheless patient has been able to do household chores without major issues.  She has history of dependent edema as well which to some degree has worsened in the recent past and was thought to reflect intermittent cor pulmonale versus running venous insufficiency and potential food overload secondary to chronic kidney disease       The patient who has several substrates for dysrhythmia, denies palpitation in general and there is no recent history of syncope or loss of consciousness.  Patient, who was walking towards the door  with shower curtains was apparently struck by her dog and patient fell over in the yard which is followed by immediate pain involving left hip area.  Eventually patient was able to get up without assistance the walking lead to worsening of her pain though she did not seek medical attention yesterday.  Eventually patient developed severe left hip pain yesterday prompting evaluation in emergency room which was demonstrated to have hip fracture.      The patient with potential for coronary artery plaque, has not had any recurrent chest discomfort since loss encounter though she is functionally limited.     Review of systems: constitutional:  Patient has had some weight gain and has been afebrile. HEENT: No history of nasal discharge/nasal obstruction or sore throat. CNS: No history of headache and no history of visual complaints, seizure disorder, or sensory or motor complaints. BRONCHOPULMONARY: Patient has no history of cough, pleuritic chest pain or hemoptysis. GI: No history of nausea, vomiting, hematemesis, melena; No history of rectal bleeding : No history of nocturia, hematuria or dysuria; MUSCULOSKELETAL: History of hip pain after fracture DERM: No history of skin rash. ENDO: No history of cold or heat intolerance or thyromegaly. HEMATOPOIETEC: No history of bleeding from any site, anemia or lymphadenopathy. PSYCH: No history depression or anxiety; SLEEP: Poor sleeping pattern    PAST MEDICAL HISTORY:   1. Diffuse atherosclerotic process.   A. Likely carotid artery disease with bilateral carotid bruits especially right.   B. Rather high likelihood of underlying coronary artery disease with history of chronic stable angina with no definitive prior scintigraphic or angiographic studies.  Patient eventually underwent myocardial perfusion imaging studies in 2017 which did not assess nuchal reversible scintigraphic ischemia.  Patient had fixed apical for pain.  C. History of unremarkable duplex arterial  "Doppler.  2. History of longstanding hypertension with significant hypertensive heart   3. disease as suggested by concentric left ventricular hypertrophy based on non-recorded echocardiogram in December 2015.   4. History of cellulitis, left lower extremity with likely underlying chronic venous insufficiency.  Her duplex Doppler in the past did not demonstrate DVT.  5. History of diabetes since mid-70s with poor glycemic control.   6. History of longstanding dyslipidemia.   7. Prior history of tobacco abuse.   8. History of pleural effusion prompting thoracentesis under care of Dr. Walls.  9. High likelihood of sleep-disordered breathing for which she was recommended polysomnography which she declined.  10. History of chronic kidney disease.      PAST SURGICAL HISTORY:   1. Gallbladder surgery.   2. Gastric surgery.   3. History of thoracentesis.      SOCIAL HISTORY: The patient is a homemaker. Had smoked at least a pack per day for almost 50 years, but quit smoking in 2006. Her  was a   nonsmoker.       FAMILY HISTORY: Noncontributory.     PHYSICAL EXAMINATION:  GENERAL: Very pleasant elderly female /60 (BP Location: Left arm, Patient Position: Lying)  Pulse 78  Temp 97.7 °F (36.5 °C) (Oral)   Resp 18  Ht 63\" (160 cm)  Wt 145 lb 1 oz (65.8 kg)  SpO2 94%  BMI 25.7 kg/m2 Body mass index is 25.7 kg/(m^2). HEENT:  Head: Atraumatic, normocephalic, No tenderness over paranasal sinuses, external nares normal. No oral or nasal mucosal lesion. Sclera non-icteric ocular movements are normal with pupils reacting both to light and accommodations. Ocular fundi not seen. NECK: Carotid upstroke is normal, patient has a right carotid bruit, no JVD, no thyromegaly, no cervical or axillary lymphadenopathy. HEART: Grandin beat is not displaced, no thrill is appreciated and both heart sounds are normal.  There is no murmur or rub audible. BRONCHOPULMONARY: Patient has good air entry bilaterally with bronchovesicular " sounds. No adventious sounds audible. GI & : Soft, no tenderness elicited and no viscera are palpable. Bowel sounds are positive and there is no renal bruits audible. EXTREMITIES:  There is no radio-femoral delay . All vessels are normally palpable and there are no femoral bruits audible. Patient does not have dependent edema. DERM: No skin rash. CNS: No gross motor neurological deficit. MUSCULOSKELETAL: Not examined       Intake/Output Summary (Last 24 hours) at 06/10/17 1203  Last data filed at 06/10/17 1115   Gross per 24 hour   Intake                0 ml   Output              400 ml   Net             -400 ml     Wt Readings from Last 7 Encounters:   06/10/17 145 lb 1 oz (65.8 kg)   04/24/17 144 lb (65.3 kg)   12/22/16 142 lb (64.4 kg)   08/22/16 129 lb (58.5 kg)   05/17/16 127 lb 11.2 oz (57.9 kg)   09/09/15 150 lb (68 kg)   05/15/15 150 lb 15.9 oz (68.5 kg)     LABS:     Results from last 7 days  Lab Units 06/10/17  0747   WBC 10*3/mm3 10.01   HEMOGLOBIN g/dL 9.9*   HEMATOCRIT % 30.2*   PLATELETS 10*3/mm3 178       Results from last 7 days  Lab Units 06/10/17  0747   SODIUM mmol/L 141   POTASSIUM mmol/L 4.0   CHLORIDE mmol/L 107   TOTAL CO2 mmol/L 22.0*   BUN mg/dL 52*   CREATININE mg/dL 3.00*   GLUCOSE mg/dL 202*   CALCIUM mg/dL 9.0       Results from last 7 days  Lab Units 06/10/17  0751   TROPONIN I ng/mL <0.012            Results from last 7 days  Lab Units 06/10/17  0747   INR  1.12*   APTT seconds 27.0     Lab Results   Component Value Date    HGBA1C 9.5 04/24/2017           RADIOLOGY: Imaging Results (last 24 hours)     Procedure Component Value Units Date/Time    XR Knee 3 View Left [[id number]] Collected:  06/10/17 0722     Updated:  06/10/17 0724    Narrative:       FINAL REPORT    CLINICAL HISTORY:  LT KNEE PAIN, FALL    FINDINGS:  Three views of the left knee were performed. There is no evidence of a joint effusion. There is infrapatellar soft tissue swelling. There is mild spurring along the  superior patella. On the internal view there is subtle lucency within the lateral femoral   condyle which most likely represents muscular insertion site rather than fracture. Recommend clinical correlation with site of pain.      Impression:       1. Infrapatellar soft tissue swelling with mild spurring on the superior patella.    2. Subtle lucent as above. Recommend clinical correlation with site of pain.    Authenticated by Roberto Chávez MD on 06/10/2017 07:22:45 AM    XR Hip With or Without Pelvis 2 - 3 View Left [[id number]] Collected:  06/10/17 0724     Updated:  06/10/17 0726    Narrative:       FINAL REPORT    CLINICAL HISTORY:  LT HIP PAIN, FALL    FINDINGS:  LEFT HIP    2 views of the left hip are obtained. Films are underpenetrated. The bones are osteopenic. There are degenerative facet changes in the lower lumbar spine. There is SI joint sclerosis bilaterally. There is a subcapital/transcervical nondisplaced impaction   fracture of the left femoral neck. There is no acute soft tissue abnormality.      Impression:       Subcapital/transcervical nondisplaced impaction fracture of the left femoral neck.    Authenticated by Roberto Chávez MD on 06/10/2017 07:24:55 AM    XR Chest 2 View [[id number]] Collected:  06/10/17 0936     Updated:  06/10/17 0939    Narrative:       PROCEDURE: XR CHEST 2 VW-        HISTORY: medical clearance; S72.002A-Fracture of unspecified part of  neck of left femur, initial encounter for closed fracture     COMPARISON: April 5, 2016.     FINDINGS: The heart is normal in size. The mediastinum is unremarkable.  There are chronic interstitial lung changes. There is also chronic  blunting of the left costophrenic angle. No new opacities were effusions  are evident. There is no pneumothorax. There are no acute osseous  abnormalities.           Impression:       No acute cardiopulmonary process.           This report was finalized on 6/10/2017 9:37 AM by Mariel Solano M.D..           Allergies   Allergen Reactions   • Duloxetine    • Exenatide    • Lisinopril Cough   • Penicillins    • Phentermine        amLODIPine 5 mg Oral Daily   aspirin 81 mg Oral Daily   atorvastatin 10 mg Oral Daily   carvedilol 25 mg Oral BID   heparin (porcine) 5,000 Units Subcutaneous Q8H   insulin aspart 0-7 Units Subcutaneous 4x Daily AC & at Bedtime   levothyroxine 75 mcg Oral QAM        ASSESSMENT /PLAN:    1. Patient is 77 y.o. with risk profile for atherosclerotic cardiovascular disease as outlined previously, who was hospitalized on account of Left femur fracture for which orthopedic options are being entertained.  As noted earlier patient does have potential for diffuse atherosclerotic process with obvious carotid bruit and hence based association potential for underlying coronary artery plaque as well.  However as noted earlier her fairly recent myocardial perfusion imaging study did not demonstrate significant reversible scintigraphic ischemia.  Understandably lack of scintigraphic ischemia would not exclude underlying plaque especially given history of diabetes and chronic kidney disease.  However at this stage patient fortunately does not have any high risk predictors such as depressed LV function, critical valvular disease, high-grade dysrhythmias or recent coronary event in she should be able to proceed with proposed procedure under coverage of beta blocker therapy and statin.  Patient fully understand that it is possible to reduce risk for cardiovascular dick-and postoperative morbidity and mortality that is not possible to eliminate the risk entirely.  Patient, fully understands risks benefit profile as decided to proceed with proposed procedure as scheduled for tomorrow.  2. Patient, who is known to have normal LV function based on multiple imaging studies, continues to experience shortness of hair which appears to reflect volume overload secondary to kidney disease pressure was thought to have  high likelihood of sleep-disordered breathing and she declined option of polysomnography under care of Dr. Walls.  She has undergone thoracentesis with some symptomatic improvement.  3. Patient has history of chronic lower extremity dependent edema which may reflect intermittent cor pulmonale versus chronic venous insufficiency.  Depending on her description she merit further workup as an outpatient.  4. Risk reduction.  Patient fortunately is an ex-smoker.  Given history of diabetes and carotid bruit she would likely merit statin to stabilize the plaque.  5. Other issues noted include history of chronic kidney disease which is thought to reflect diabetic nephropathy, history of recurrent fluid overload and history of multiple muscular skeletal issues.                     In closing, I sincerely appreciate opportunity to participate in care of this patient. If I can be of any further assistance with the management of this patient, please don’t hesitate to contact me.    WILBER VELASCO M.D. [facility]    Please note that portions of this note may have been completed with a voice recognition program. Efforts were made to edit the dictations, but occasionally words are mistranscribed.

## 2017-06-10 NOTE — H&P
[facility]   HISTORY AND PHYSICAL      Name:  Sigrid Casarez   Age:  77 y.o.  Sex:  female  :  1940  MRN:  [id number]   Visit Number:  [id number]  Admission Date:  6/10/2017  Date Of Service:  06/10/17  Primary Care Physician:  Tiburcio Hernandez MD    Chief Complaint:     Left hip pain and inability to ambulate after a mechanical fall.    History Of Presenting Illness:      77 year old woman with past medical history notable for type 1 diabetes with neuropathy, cardiac disease, hypertension, anemia and chronic renal insufficiency nearing end-stage and with planning for possible dialysis in the near future, was at home morning of 17 in her usual health.  She can recall well the circumstances of her injury.  She states that at about 10:30 am she was taking shower curtains outside to give to her  to throw away when her one year old dog came running across the yard and knocked her to the ground.  She stated this is unusual for the dog to do though says since she was holding the curtains the dog may not have known who was there.  She was able to stand and her hip was sore though she could walk and thought it was a deep bruise.  By that night her hip pain worsened, she noticed some knee swelling and she could not walk at all and at 5 am this morning told her  she needed to go to the hospital.  Exam and imaging at the hospital revealed a Garden type 1 subcapital impacted femoral neck fracture of the left hip.  Her knee imaging was without any acute fracture.  She was admitted to the hospitalist service, and plans for Cardiology and possibly Nephrology consultations and plans for possible hip fracture surgery.    Review Of Systems:    General ROS: No fevers, chills or loss of consciousness.  Psychological ROS:  No hallucinations and delusions.  Ophthalmic ROS: No visual disturbance  ENT ROS: No acute sore throat, nasal congestion.   Allergy and Immunology ROS: No rash.  Hematological  and Lymphatic ROS: No tendency to bleed.  Endocrine ROS: No recent reported weight gain or loss.  Respiratory ROS: No acute shortness of breath.  Cardiovascular ROS: No reported chest pain or tightness.  Gastrointestinal ROS: No reported abdominal pain, nausea and vomiting.  Genito-Urinary ROS: No reported dysuria or hematuria.    She reports her GFR gradually decreased and appointment to vascular surgeon was planned for later this month for possible arm shunt placement in preparation for any plans for dialysis.  Musculoskeletal ROS: Complains of focal left hip pain and radiation to distal thigh.  No knee effusion noted.  No focal calf pain.  Neurological ROS: No acute focal motor weakness. No acute loss of sensation.  Dermatological ROS: No redness, rash or pruritis.     Past Medical History:    Past Medical History:   Diagnosis Date   • Anemia    • Chronic kidney disease    • Chronic kidney disease    • Community acquired pneumonia    • Dexa Body Composition study lumosacral spine and femur     ostepenic   • Diabetic nephropathy, type I    • Diabetic peripheral neuropathy type 1    • Hypertension    • Menopause    • Pneumonia        Past Surgical history:    Past Surgical History:   Procedure Laterality Date   • CATARACT EXTRACTION     • CHOLECYSTECTOMY         Social History:    Social History     Social History   • Marital status:      Spouse name: N/A   • Number of children: N/A   • Years of education: N/A     Social History Main Topics   • Smoking status: Former Smoker   • Smokeless tobacco: None   • Alcohol use No   • Drug use: No   • Sexual activity: Not Asked     Other Topics Concern   • None     Social History Narrative   • None       Family History:    Family History   Problem Relation Age of Onset   • Diabetes Other    • Heart disease Other    • Heart attack Mother    • Lung cancer Father        Allergies:      Duloxetine; Exenatide; Lisinopril; Penicillins; and Phentermine    Home  Medications:    Prior to Admission Medications     Prescriptions Last Dose Informant Patient Reported? Taking?    amLODIPine (NORVASC) 5 MG tablet   Yes No    Take 1 tablet by mouth Daily.    aspirin 81 MG EC tablet   Yes No    Take 1 tablet by mouth daily.    carvedilol (COREG) 25 MG tablet   Yes No    Take 1 tablet by mouth 2 (two) times a day.    Cholecalciferol (VITAMIN D) 1000 UNITS tablet  Self Yes No    Take 1,000 Units by mouth 2 (Two) Times a Day.    ferrous sulfate 325 (65 FE) MG tablet  Self Yes No    Take 1 tablet by mouth 1 (One) Time Per Week. With meals    furosemide (LASIX) 20 MG tablet  Self No No    Take 2 pills daily    Patient taking differently:  Take 20 mg by mouth Every Other Day. Take 2 pills daily    glucose blood (FREESTYLE LITE) test strip   Yes No    3 (three) times a day.    HUMALOG KWIKPEN 100 UNIT/ML solution pen-injector   No No    INJECT 5 TO 15 UNITS THREE TIMES A DAY WITH MEALS OR AS DIRECTED    Insulin Pen Needle (Gremln ULTRA-FINE PEN NEEDLES) 29G X 12.7MM misc   Yes No    Use 3 times daily    levothyroxine (SYNTHROID, LEVOTHROID) 75 MCG tablet   No No    Take 1 tablet by mouth daily.    Pediatric Multivitamins-Iron (FLINTSTONES PLUS IRON PO)   Yes No    Take 1 tablet by mouth Daily.    simvastatin (ZOCOR) 20 MG tablet   Yes No    Take  by mouth.    TOUJEO SOLOSTAR 300 UNIT/ML solution pen-injector   Yes No    10 Units Daily.             ED Medications:    Medications   amLODIPine (NORVASC) tablet 5 mg (not administered)   aspirin EC tablet 81 mg (not administered)   carvedilol (COREG) tablet 25 mg (not administered)   levothyroxine (SYNTHROID, LEVOTHROID) tablet 75 mcg (not administered)   atorvastatin (LIPITOR) tablet 10 mg (not administered)   sodium chloride 0.9 % flush 1-10 mL (not administered)   acetaminophen (TYLENOL) tablet 650 mg (not administered)   ondansetron (ZOFRAN) tablet 4 mg (not administered)     Or   ondansetron ODT (ZOFRAN-ODT) disintegrating tablet 4 mg (not  "administered)     Or   ondansetron (ZOFRAN) injection 4 mg (not administered)   heparin (porcine) 5000 UNIT/ML injection 5,000 Units (not administered)   morphine injection 1 mg (not administered)     And   naloxone (NARCAN) injection 0.4 mg (not administered)   dextrose (GLUTOSE) oral gel 15 g (not administered)   dextrose (D50W) solution 25 g (not administered)   glucagon (human recombinant) (GLUCAGEN DIAGNOSTIC) injection 1 mg (not administered)   insulin aspart (novoLOG) injection 0-7 Units (not administered)   sodium chloride 0.9 % infusion (not administered)   HYDROcodone-acetaminophen (NORCO) 5-325 MG per tablet 1 tablet (1 tablet Oral Given 6/10/17 0607)   ondansetron (ZOFRAN) injection 4 mg (4 mg Intravenous Given 6/10/17 0753)       Vital Signs:    Temp:  [97.7 °F (36.5 °C)] 97.7 °F (36.5 °C)  Heart Rate:  [75-86] 78  Resp:  [17-18] 18  BP: (145-176)/(60-90) 176/60    Vitals:    06/10/17 0934 06/10/17 0935 06/10/17 0937 06/10/17 1113   BP: 176/60      BP Location: Left arm      Patient Position: Lying      Pulse: 78      Resp: 18      Temp: 97.7 °F (36.5 °C)      TempSrc: Oral      SpO2: 94%      Weight:  144 lb 11.2 oz (65.6 kg) 145 lb (65.8 kg) 145 lb 1 oz (65.8 kg)   Height:    63\" (160 cm)       Last 2 weights    06/10/17  0937 06/10/17  1113   Weight: 145 lb (65.8 kg) 145 lb 1 oz (65.8 kg)       Body mass index is 25.7 kg/(m^2).      Intake/Output Summary (Last 24 hours) at 06/10/17 1156  Last data filed at 06/10/17 1115   Gross per 24 hour   Intake                0 ml   Output              400 ml   Net             -400 ml       Physical Exam:    General Appearance:    Alert and cooperative, not in any acute distress.   Head:    Atraumatic and normocephalic, without obvious abnormality.   Eyes:            PERRLA, Extra-occular movements are within normal limits.   Ears:    Ears appear intact with no abnormalities noted.   Throat:   Oral mucosa moist.   Neck:   Supple, trachea midline.   Back:     No " kyphoscoliosis present.  No midline spine tenderrness.   Lungs:     Breath sounds heard bilaterally.  No active wheezing.      Heart:    Regular rate and rhythm.   Abdomen:     Soft, non-tender, non-distended, no guarding.   Extremities:   Moves all extremities, limited left hip and leg mobility due to hip pain.  No deformity of left leg noted, no calf pain and Shavon sign negative, no cyanosis, no edema.   Pulses:   Distal pulses palpable and equal bilaterally   Skin:   No acute bleeding, bruising or rash.  No open wounds.   Lymph nodes:   No palpable adenopathy   Neurologic:   Motor and sensation grossly intact.       Labs:    Lab Results (last 24 hours)     Procedure Component Value Units Date/Time    POC Glucose Fingerstick [[id number]]  (Abnormal) Collected:  06/10/17 0730    Specimen:  Blood Updated:  06/10/17 0735     Glucose 212 (H) mg/dL       Serial Number: [id number]    : [id number]       CBC & Differential [[id number]] Collected:  06/10/17 0747    Specimen:  Blood Updated:  06/10/17 0812    Narrative:       The following orders were created for panel order CBC & Differential.  Procedure                               Abnormality         Status                     ---------                               -----------         ------                     CBC Auto Differential[[id number]]        Abnormal            Final result                 Please view results for these tests on the individual orders.    CBC Auto Differential [[id number]]  (Abnormal) Collected:  06/10/17 0747    Specimen:  Blood Updated:  06/10/17 0812     WBC 10.01 10*3/mm3      RBC 3.35 (L) 10*6/mm3      Hemoglobin 9.9 (L) g/dL      Hematocrit 30.2 (L) %      MCV 90.1 fL      MCH 29.6 pg      MCHC 32.8 g/dL      RDW 13.0 %      RDW-SD 42.8 fl      MPV 11.5 fL      Platelets 178 10*3/mm3      Neutrophil % 84.8 (H) %      Lymphocyte % 7.1 (L) %      Monocyte % 4.9 %      Eosinophil % 2.6 %      Basophil % 0.3 %      Immature Grans % 0.3 %       Neutrophils, Absolute 8.49 (H) 10*3/mm3      Lymphocytes, Absolute 0.71 10*3/mm3      Monocytes, Absolute 0.49 10*3/mm3      Eosinophils, Absolute 0.26 10*3/mm3      Basophils, Absolute 0.03 10*3/mm3      Immature Grans, Absolute 0.03 10*3/mm3      nRBC 0.0 /100 WBC     Comprehensive Metabolic Panel [[id number]]  (Abnormal) Collected:  06/10/17 0747    Specimen:  Blood Updated:  06/10/17 0821     Glucose 202 (H) mg/dL       Glucose >180, Hemoglobin A1C recommended.        BUN 52 (H) mg/dL      Creatinine 3.00 (H) mg/dL      Sodium 141 mmol/L      Potassium 4.0 mmol/L      Chloride 107 mmol/L      CO2 22.0 (L) mmol/L      Calcium 9.0 mg/dL      Total Protein 6.6 g/dL      Albumin 3.60 g/dL      ALT (SGPT) 25 U/L      AST (SGOT) 24 U/L      Alkaline Phosphatase 97 U/L      Total Bilirubin 0.6 mg/dL      eGFR Non African Amer 15 (L) mL/min/1.73      Globulin 3.0 gm/dL      A/G Ratio 1.2 g/dL      BUN/Creatinine Ratio 17.3     Anion Gap 16.0 mmol/L     Narrative:       The MDRD GFR formula is only valid for adults with stable renal function between ages 18 and 70.    Magnesium [[id number]]  (Normal) Collected:  06/10/17 0747    Specimen:  Blood Updated:  06/10/17 0821     Magnesium 2.1 mg/dL     Protime-INR [[id number]]  (Abnormal) Collected:  06/10/17 0747    Specimen:  Blood Updated:  06/10/17 0828     Protime 12.3 (H) Seconds      INR 1.12 (H)    aPTT [[id number]]  (Normal) Collected:  06/10/17 0747    Specimen:  Blood Updated:  06/10/17 0828     PTT 27.0 seconds     POC Glucose Fingerstick [[id number]]  (Abnormal) Collected:  06/10/17 1133    Specimen:  Blood Updated:  06/10/17 1135     Glucose 202 (H) mg/dL       Serial Number: [id number]    : [id number]       Troponin [[id number]]  (Normal) Collected:  06/10/17 0751    Specimen:  Blood Updated:  06/10/17 1137     Troponin I <0.012 ng/mL     Narrative:       Normal Patient Upper Reference Limit (URL) (99th Percentile)=0.03 ng/mL   Non-AMI Illness Reference  Limit=0.03-0.11 ng/mL   AMI Confirmation=0.12 ng/mL and above    Hemoglobin A1c [[id number]] Updated:  06/10/17 1146    Specimen:  Blood           Radiology:    Imaging Results (last 72 hours)     Procedure Component Value Units Date/Time    XR Knee 3 View Left [[id number]] Collected:  06/10/17 0722     Updated:  06/10/17 0724    Narrative:       FINAL REPORT    CLINICAL HISTORY:  LT KNEE PAIN, FALL    FINDINGS:  Three views of the left knee were performed. There is no evidence of a joint effusion. There is infrapatellar soft tissue swelling. There is mild spurring along the superior patella. On the internal view there is subtle lucency within the lateral femoral   condyle which most likely represents muscular insertion site rather than fracture. Recommend clinical correlation with site of pain.      Impression:       1. Infrapatellar soft tissue swelling with mild spurring on the superior patella.    2. Subtle lucent as above. Recommend clinical correlation with site of pain.    Authenticated by Roberto Chávez MD on 06/10/2017 07:22:45 AM    XR Hip With or Without Pelvis 2 - 3 View Left [[id number]] Collected:  06/10/17 0724     Updated:  06/10/17 0726    Narrative:       FINAL REPORT    CLINICAL HISTORY:  LT HIP PAIN, FALL    FINDINGS:  LEFT HIP    2 views of the left hip are obtained. Films are underpenetrated. The bones are osteopenic. There are degenerative facet changes in the lower lumbar spine. There is SI joint sclerosis bilaterally. There is a subcapital/transcervical nondisplaced impaction   fracture of the left femoral neck. There is no acute soft tissue abnormality.      Impression:       Subcapital/transcervical nondisplaced impaction fracture of the left femoral neck.    Authenticated by Roberto Chávez MD on 06/10/2017 07:24:55 AM    XR Chest 2 View [[id number]] Collected:  06/10/17 0936     Updated:  06/10/17 0939    Narrative:       PROCEDURE: XR CHEST 2 VW-        HISTORY: medical clearance;  S72.002A-Fracture of unspecified part of  neck of left femur, initial encounter for closed fracture     COMPARISON: April 5, 2016.     FINDINGS: The heart is normal in size. The mediastinum is unremarkable.  There are chronic interstitial lung changes. There is also chronic  blunting of the left costophrenic angle. No new opacities were effusions  are evident. There is no pneumothorax. There are no acute osseous  abnormalities.           Impression:       No acute cardiopulmonary process.           This report was finalized on 6/10/2017 9:37 AM by Mariel Solano M.D..          Assessment:    Closed nondisplaced mild impacted Garden Type 1 subcapital left femoral neck fracture    Plan:     Medical optimization.  Cardiology consult with MELISSA Steve MD.  Per admission and orthopaedic pre-op orders as well.  Risks, benefits and merits of different hip fracture treatment options reviewed with the patient and she understands well. In particular reviewed that with smaller operation of cannulated screws placement, there is also the potential that the fracture may not heal and for persistent pain and limitations could require revision to hip hemiarthroplasty replacement.  Plan for multiple cannulated compression screws fixation versus left hip hemiarthroplasty replacement for femoral neck fracture, depending on fracture position, findings and factors at time of surgery.  Multi-modal anesthesia, multi-modal DVT prophylaxis, surgical antibiotic prophylaxis, PT/OT after surgery and considerations of home health PT/OT versus Rehabilitation Facility placement after hospital discharge depending on her early walker ambulation capability and progress.      Jimmy Sheehan MD  06/10/17  11:56 AM

## 2017-06-11 ENCOUNTER — ANESTHESIA (OUTPATIENT)
Dept: PERIOP | Facility: HOSPITAL | Age: 77
End: 2017-06-11

## 2017-06-11 ENCOUNTER — APPOINTMENT (OUTPATIENT)
Dept: GENERAL RADIOLOGY | Facility: HOSPITAL | Age: 77
End: 2017-06-11

## 2017-06-11 ENCOUNTER — ANESTHESIA EVENT (OUTPATIENT)
Dept: PERIOP | Facility: HOSPITAL | Age: 77
End: 2017-06-11

## 2017-06-11 LAB
ANION GAP SERPL CALCULATED.3IONS-SCNC: 11 MMOL/L
BASOPHILS # BLD AUTO: 0.02 10*3/MM3 (ref 0–0.2)
BASOPHILS NFR BLD AUTO: 0.3 % (ref 0–2.5)
BILIRUB UR QL STRIP: NEGATIVE
BUN BLD-MCNC: 44 MG/DL (ref 7–20)
BUN/CREAT SERPL: 13.8 (ref 7.1–23.5)
CALCIUM SPEC-SCNC: 7.9 MG/DL (ref 8.4–10.2)
CHLORIDE SERPL-SCNC: 111 MMOL/L (ref 98–107)
CLARITY UR: ABNORMAL
CO2 SERPL-SCNC: 21 MMOL/L (ref 26–30)
COLOR UR: ABNORMAL
CREAT BLD-MCNC: 3.2 MG/DL (ref 0.6–1.3)
DEPRECATED RDW RBC AUTO: 45.2 FL (ref 37–54)
EOSINOPHIL # BLD AUTO: 0.23 10*3/MM3 (ref 0–0.7)
EOSINOPHIL NFR BLD AUTO: 3.2 % (ref 0–7)
ERYTHROCYTE [DISTWIDTH] IN BLOOD BY AUTOMATED COUNT: 13.2 % (ref 11.5–14.5)
GFR SERPL CREATININE-BSD FRML MDRD: 14 ML/MIN/1.73
GLUCOSE BLD-MCNC: 119 MG/DL (ref 74–98)
GLUCOSE BLDC GLUCOMTR-MCNC: 122 MG/DL (ref 70–130)
GLUCOSE UR STRIP-MCNC: NEGATIVE MG/DL
HCT VFR BLD AUTO: 24.1 % (ref 37–47)
HCT VFR BLD AUTO: 25 % (ref 37–47)
HGB BLD-MCNC: 7.6 G/DL (ref 12–16)
HGB BLD-MCNC: 7.8 G/DL (ref 12–16)
HGB UR QL STRIP.AUTO: ABNORMAL
IMM GRANULOCYTES # BLD: 0.03 10*3/MM3 (ref 0–0.06)
IMM GRANULOCYTES NFR BLD: 0.4 % (ref 0–0.6)
KETONES UR QL STRIP: NEGATIVE
LEUKOCYTE ESTERASE UR QL STRIP.AUTO: ABNORMAL
LYMPHOCYTES # BLD AUTO: 1.08 10*3/MM3 (ref 0.6–3.4)
LYMPHOCYTES NFR BLD AUTO: 14.9 % (ref 10–50)
MCH RBC QN AUTO: 29.6 PG (ref 27–31)
MCHC RBC AUTO-ENTMCNC: 31.5 G/DL (ref 30–37)
MCV RBC AUTO: 93.8 FL (ref 81–99)
MONOCYTES # BLD AUTO: 0.45 10*3/MM3 (ref 0–0.9)
MONOCYTES NFR BLD AUTO: 6.2 % (ref 0–12)
NEUTROPHILS # BLD AUTO: 5.45 10*3/MM3 (ref 2–6.9)
NEUTROPHILS NFR BLD AUTO: 75 % (ref 37–80)
NITRITE UR QL STRIP: NEGATIVE
NRBC BLD MANUAL-RTO: 0 /100 WBC (ref 0–0)
PH UR STRIP.AUTO: 5.5 [PH] (ref 5–8)
PLATELET # BLD AUTO: 139 10*3/MM3 (ref 130–400)
PMV BLD AUTO: 12 FL (ref 6–12)
POTASSIUM BLD-SCNC: 4 MMOL/L (ref 3.5–5.1)
PROT UR QL STRIP: ABNORMAL
RBC # BLD AUTO: 2.57 10*6/MM3 (ref 4.2–5.4)
SODIUM BLD-SCNC: 139 MMOL/L (ref 137–145)
SP GR UR STRIP: 1.02 (ref 1–1.03)
UROBILINOGEN UR QL STRIP: ABNORMAL
WBC NRBC COR # BLD: 7.26 10*3/MM3 (ref 4.8–10.8)

## 2017-06-11 PROCEDURE — 25010000002 MORPHINE PER 10 MG

## 2017-06-11 PROCEDURE — 85025 COMPLETE CBC W/AUTO DIFF WBC: CPT | Performed by: INTERNAL MEDICINE

## 2017-06-11 PROCEDURE — 86900 BLOOD TYPING SEROLOGIC ABO: CPT

## 2017-06-11 PROCEDURE — 82962 GLUCOSE BLOOD TEST: CPT

## 2017-06-11 PROCEDURE — 0QH734Z INSERTION OF INTERNAL FIXATION DEVICE INTO LEFT UPPER FEMUR, PERCUTANEOUS APPROACH: ICD-10-PCS | Performed by: ORTHOPAEDIC SURGERY

## 2017-06-11 PROCEDURE — 27236 TREAT THIGH FRACTURE: CPT | Performed by: ORTHOPAEDIC SURGERY

## 2017-06-11 PROCEDURE — 25010000002 MORPHINE PER 10 MG: Performed by: INTERNAL MEDICINE

## 2017-06-11 PROCEDURE — 25010000002 ONDANSETRON PER 1 MG: Performed by: INTERNAL MEDICINE

## 2017-06-11 PROCEDURE — 36430 TRANSFUSION BLD/BLD COMPNT: CPT

## 2017-06-11 PROCEDURE — 25010000002 DEXAMETHASONE SODIUM PHOSPHATE 10 MG/ML SOLUTION: Performed by: NURSE ANESTHETIST, CERTIFIED REGISTERED

## 2017-06-11 PROCEDURE — 85018 HEMOGLOBIN: CPT | Performed by: INTERNAL MEDICINE

## 2017-06-11 PROCEDURE — 81003 URINALYSIS AUTO W/O SCOPE: CPT | Performed by: INTERNAL MEDICINE

## 2017-06-11 PROCEDURE — 99232 SBSQ HOSP IP/OBS MODERATE 35: CPT | Performed by: INTERNAL MEDICINE

## 2017-06-11 PROCEDURE — 25010000002 ENOXAPARIN PER 10 MG: Performed by: ORTHOPAEDIC SURGERY

## 2017-06-11 PROCEDURE — P9016 RBC LEUKOCYTES REDUCED: HCPCS

## 2017-06-11 PROCEDURE — C1713 ANCHOR/SCREW BN/BN,TIS/BN: HCPCS | Performed by: ORTHOPAEDIC SURGERY

## 2017-06-11 PROCEDURE — 87040 BLOOD CULTURE FOR BACTERIA: CPT | Performed by: INTERNAL MEDICINE

## 2017-06-11 PROCEDURE — 73501 X-RAY EXAM HIP UNI 1 VIEW: CPT

## 2017-06-11 PROCEDURE — 85014 HEMATOCRIT: CPT | Performed by: INTERNAL MEDICINE

## 2017-06-11 PROCEDURE — 63710000001 INSULIN ASPART PER 5 UNITS: Performed by: INTERNAL MEDICINE

## 2017-06-11 PROCEDURE — 25010000002 DEXAMETHASONE PER 1 MG: Performed by: NURSE ANESTHETIST, CERTIFIED REGISTERED

## 2017-06-11 PROCEDURE — 83036 HEMOGLOBIN GLYCOSYLATED A1C: CPT | Performed by: INTERNAL MEDICINE

## 2017-06-11 PROCEDURE — 25010000002 PROPOFOL 200 MG/20ML EMULSION: Performed by: NURSE ANESTHETIST, CERTIFIED REGISTERED

## 2017-06-11 PROCEDURE — 25010000002 MEPERIDINE PER 100 MG: Performed by: NURSE ANESTHETIST, CERTIFIED REGISTERED

## 2017-06-11 PROCEDURE — 76000 FLUOROSCOPY <1 HR PHYS/QHP: CPT

## 2017-06-11 PROCEDURE — 80048 BASIC METABOLIC PNL TOTAL CA: CPT | Performed by: INTERNAL MEDICINE

## 2017-06-11 DEVICE — IMPLANTABLE DEVICE: Type: IMPLANTABLE DEVICE | Site: HIP | Status: FUNCTIONAL

## 2017-06-11 RX ORDER — HYDROCODONE BITARTRATE AND ACETAMINOPHEN 7.5; 325 MG/1; MG/1
1 TABLET ORAL EVERY 6 HOURS PRN
Status: DISCONTINUED | OUTPATIENT
Start: 2017-06-11 | End: 2017-06-13 | Stop reason: HOSPADM

## 2017-06-11 RX ORDER — DEXAMETHASONE SODIUM PHOSPHATE 10 MG/ML
INJECTION, SOLUTION INTRAMUSCULAR; INTRAVENOUS
Status: DISPENSED
Start: 2017-06-11 | End: 2017-06-11

## 2017-06-11 RX ORDER — NALOXONE HCL 0.4 MG/ML
0.4 VIAL (ML) INJECTION
Status: DISCONTINUED | OUTPATIENT
Start: 2017-06-11 | End: 2017-06-13 | Stop reason: HOSPADM

## 2017-06-11 RX ORDER — ONDANSETRON 4 MG/1
4 TABLET, FILM COATED ORAL EVERY 6 HOURS PRN
Status: DISCONTINUED | OUTPATIENT
Start: 2017-06-11 | End: 2017-06-13 | Stop reason: HOSPADM

## 2017-06-11 RX ORDER — CLINDAMYCIN PHOSPHATE 150 MG/ML
INJECTION, SOLUTION INTRAVENOUS
Status: DISPENSED
Start: 2017-06-11 | End: 2017-06-11

## 2017-06-11 RX ORDER — ONDANSETRON 2 MG/ML
4 INJECTION INTRAMUSCULAR; INTRAVENOUS ONCE
Status: DISCONTINUED | OUTPATIENT
Start: 2017-06-11 | End: 2017-06-11

## 2017-06-11 RX ORDER — DEXAMETHASONE SODIUM PHOSPHATE 10 MG/ML
INJECTION, SOLUTION INTRAMUSCULAR; INTRAVENOUS AS NEEDED
Status: DISCONTINUED | OUTPATIENT
Start: 2017-06-11 | End: 2017-06-11 | Stop reason: SURG

## 2017-06-11 RX ORDER — ONDANSETRON 4 MG/1
4 TABLET, ORALLY DISINTEGRATING ORAL EVERY 6 HOURS PRN
Status: DISCONTINUED | OUTPATIENT
Start: 2017-06-11 | End: 2017-06-13 | Stop reason: HOSPADM

## 2017-06-11 RX ORDER — PROPOFOL 10 MG/ML
INJECTION, EMULSION INTRAVENOUS AS NEEDED
Status: DISCONTINUED | OUTPATIENT
Start: 2017-06-11 | End: 2017-06-11 | Stop reason: SURG

## 2017-06-11 RX ORDER — WHITE PETROLATUM 450 MG/G
1 STICK TOPICAL 3 TIMES DAILY PRN
Status: DISCONTINUED | OUTPATIENT
Start: 2017-06-11 | End: 2017-06-13 | Stop reason: HOSPADM

## 2017-06-11 RX ORDER — MORPHINE SULFATE 4 MG/ML
4 INJECTION, SOLUTION INTRAMUSCULAR; INTRAVENOUS
Status: DISCONTINUED | OUTPATIENT
Start: 2017-06-11 | End: 2017-06-13 | Stop reason: HOSPADM

## 2017-06-11 RX ORDER — DOCUSATE SODIUM 100 MG/1
100 CAPSULE, LIQUID FILLED ORAL 2 TIMES DAILY PRN
Status: DISCONTINUED | OUTPATIENT
Start: 2017-06-11 | End: 2017-06-13 | Stop reason: HOSPADM

## 2017-06-11 RX ORDER — CLINDAMYCIN PHOSPHATE 150 MG/ML
INJECTION, SOLUTION INTRAVENOUS
Status: DISCONTINUED
Start: 2017-06-11 | End: 2017-06-11 | Stop reason: WASHOUT

## 2017-06-11 RX ORDER — MEPERIDINE HYDROCHLORIDE 50 MG/ML
50 INJECTION INTRAMUSCULAR; INTRAVENOUS; SUBCUTANEOUS ONCE
Status: DISCONTINUED | OUTPATIENT
Start: 2017-06-11 | End: 2017-06-11

## 2017-06-11 RX ORDER — MORPHINE SULFATE 2 MG/ML
2 INJECTION, SOLUTION INTRAMUSCULAR; INTRAVENOUS
Status: DISCONTINUED | OUTPATIENT
Start: 2017-06-11 | End: 2017-06-11

## 2017-06-11 RX ORDER — BUPIVACAINE HYDROCHLORIDE 2.5 MG/ML
INJECTION, SOLUTION EPIDURAL; INFILTRATION; INTRACAUDAL AS NEEDED
Status: DISCONTINUED | OUTPATIENT
Start: 2017-06-11 | End: 2017-06-11 | Stop reason: SURG

## 2017-06-11 RX ORDER — ONDANSETRON 2 MG/ML
4 INJECTION INTRAMUSCULAR; INTRAVENOUS EVERY 6 HOURS PRN
Status: DISCONTINUED | OUTPATIENT
Start: 2017-06-11 | End: 2017-06-13 | Stop reason: HOSPADM

## 2017-06-11 RX ORDER — MORPHINE SULFATE 2 MG/ML
INJECTION, SOLUTION INTRAMUSCULAR; INTRAVENOUS
Status: COMPLETED
Start: 2017-06-11 | End: 2017-06-11

## 2017-06-11 RX ORDER — BUPIVACAINE HYDROCHLORIDE 2.5 MG/ML
INJECTION, SOLUTION EPIDURAL; INFILTRATION; INTRACAUDAL
Status: DISPENSED
Start: 2017-06-11 | End: 2017-06-11

## 2017-06-11 RX ORDER — SODIUM CHLORIDE 9 MG/ML
30 INJECTION, SOLUTION INTRAVENOUS CONTINUOUS
Status: ACTIVE | OUTPATIENT
Start: 2017-06-11 | End: 2017-06-13

## 2017-06-11 RX ORDER — MEPERIDINE HYDROCHLORIDE 50 MG/ML
INJECTION INTRAMUSCULAR; INTRAVENOUS; SUBCUTANEOUS AS NEEDED
Status: DISCONTINUED | OUTPATIENT
Start: 2017-06-11 | End: 2017-06-11 | Stop reason: SURG

## 2017-06-11 RX ORDER — ACETAMINOPHEN 325 MG/1
650 TABLET ORAL EVERY 12 HOURS PRN
Status: DISCONTINUED | OUTPATIENT
Start: 2017-06-11 | End: 2017-06-13 | Stop reason: HOSPADM

## 2017-06-11 RX ORDER — DEXAMETHASONE SODIUM PHOSPHATE 4 MG/ML
INJECTION, SOLUTION INTRA-ARTICULAR; INTRALESIONAL; INTRAMUSCULAR; INTRAVENOUS; SOFT TISSUE AS NEEDED
Status: DISCONTINUED | OUTPATIENT
Start: 2017-06-11 | End: 2017-06-11 | Stop reason: SURG

## 2017-06-11 RX ORDER — BISACODYL 10 MG
10 SUPPOSITORY, RECTAL RECTAL DAILY PRN
Status: DISCONTINUED | OUTPATIENT
Start: 2017-06-11 | End: 2017-06-13 | Stop reason: HOSPADM

## 2017-06-11 RX ORDER — ACETAMINOPHEN 650 MG/1
650 SUPPOSITORY RECTAL EVERY 4 HOURS PRN
Status: DISCONTINUED | OUTPATIENT
Start: 2017-06-11 | End: 2017-06-11

## 2017-06-11 RX ORDER — SODIUM CHLORIDE 0.9 % (FLUSH) 0.9 %
1-10 SYRINGE (ML) INJECTION AS NEEDED
Status: DISCONTINUED | OUTPATIENT
Start: 2017-06-11 | End: 2017-06-13 | Stop reason: HOSPADM

## 2017-06-11 RX ADMIN — ENOXAPARIN SODIUM 30 MG: 30 INJECTION SUBCUTANEOUS at 22:20

## 2017-06-11 RX ADMIN — MORPHINE SULFATE 2 MG: 2 INJECTION, SOLUTION INTRAMUSCULAR; INTRAVENOUS at 02:45

## 2017-06-11 RX ADMIN — CARVEDILOL 25 MG: 25 TABLET, FILM COATED ORAL at 22:20

## 2017-06-11 RX ADMIN — SODIUM CHLORIDE 100 ML/HR: 9 INJECTION, SOLUTION INTRAVENOUS at 13:19

## 2017-06-11 RX ADMIN — SODIUM CHLORIDE 600 MG: 9 INJECTION, SOLUTION INTRAVENOUS at 17:52

## 2017-06-11 RX ADMIN — SODIUM CHLORIDE 100 ML/HR: 9 INJECTION, SOLUTION INTRAVENOUS at 22:15

## 2017-06-11 RX ADMIN — DEXAMETHASONE SODIUM PHOSPHATE 4 MG: 4 INJECTION, SOLUTION INTRAMUSCULAR; INTRAVENOUS at 08:28

## 2017-06-11 RX ADMIN — MORPHINE SULFATE 2 MG: 2 INJECTION, SOLUTION INTRAMUSCULAR; INTRAVENOUS at 10:45

## 2017-06-11 RX ADMIN — MEPERIDINE HYDROCHLORIDE 50 MG: 50 INJECTION INTRAMUSCULAR; INTRAVENOUS; SUBCUTANEOUS at 10:18

## 2017-06-11 RX ADMIN — ONDANSETRON 4 MG: 2 INJECTION INTRAMUSCULAR; INTRAVENOUS at 08:33

## 2017-06-11 RX ADMIN — MORPHINE SULFATE 2 MG: 2 INJECTION, SOLUTION INTRAMUSCULAR; INTRAVENOUS at 13:19

## 2017-06-11 RX ADMIN — ACETAMINOPHEN 650 MG: 650 SUPPOSITORY RECTAL at 02:45

## 2017-06-11 RX ADMIN — ONDANSETRON 4 MG: 2 INJECTION INTRAMUSCULAR; INTRAVENOUS at 06:15

## 2017-06-11 RX ADMIN — SODIUM CHLORIDE: 9 INJECTION, SOLUTION INTRAVENOUS at 08:36

## 2017-06-11 RX ADMIN — BUPIVACAINE HYDROCHLORIDE 50 ML: 2.5 INJECTION, SOLUTION EPIDURAL; INFILTRATION; INTRACAUDAL; PERINEURAL at 11:00

## 2017-06-11 RX ADMIN — SODIUM CHLORIDE 900 MG: 9 INJECTION, SOLUTION INTRAVENOUS at 08:36

## 2017-06-11 RX ADMIN — PROPOFOL 60 MG: 10 INJECTION, EMULSION INTRAVENOUS at 08:33

## 2017-06-11 RX ADMIN — DEXAMETHASONE SODIUM PHOSPHATE 10 MG: 10 INJECTION, SOLUTION INTRAMUSCULAR; INTRAVENOUS at 11:00

## 2017-06-11 RX ADMIN — INSULIN ASPART 4 UNITS: 100 INJECTION, SOLUTION INTRAVENOUS; SUBCUTANEOUS at 17:52

## 2017-06-11 NOTE — PROGRESS NOTES
Discharge Planning Assessment  CHANDAN Matthews     Patient Name: Sigrid Casarez  MRN: [id number]  Today's Date: 6/11/2017    Admit Date: 6/10/2017          Discharge Needs Assessment       06/11/17 1611    Discharge Needs Assessment    Concerns To Be Addressed discharge planning concerns    Readmission Within The Last 30 Days no previous admission in last 30 days    Equipment Currently Used at Home walker, standard;oxygen            Discharge Plan       06/11/17 1612    Case Management/Social Work Plan    Plan SNF short term rehab     Patient/Family In Agreement With Plan yes    Additional Comments Pt would prefer to go for short term rehab and would prefer [facility] she also would like her  to be here  when the the admissions coordinator  from  the [facility] visits         Discharge Placement     No information found                Demographic Summary       06/11/17 1611    Referral Information    Admission Type inpatient    Arrived From home or self-care    Referral Source admission list    Contact Information    Permission Granted to Share Information With ;family/designee            Functional Status     None            Psychosocial     None            Abuse/Neglect     None            Legal     None            Substance Abuse     None            Patient Forms     None          Melody Botello

## 2017-06-11 NOTE — ANESTHESIA PREPROCEDURE EVALUATION
Anesthesia Evaluation     Patient summary reviewed and Nursing notes reviewed   no history of anesthetic complications:  NPO Solid Status: > 8 hours  NPO Liquid Status: > 8 hours     Airway   Mallampati: I  TM distance: >3 FB  Neck ROM: full  no difficulty expected  Dental - normal exam     Pulmonary - negative pulmonary ROS and normal exam   Cardiovascular - normal exam    ECG reviewed    (+) hypertension, hyperlipidemia      Neuro/Psych  (+) dizziness/light headedness,    GI/Hepatic/Renal/Endo    (+)  diabetes mellitus well controlled,     Musculoskeletal (-) negative ROS    Abdominal    Substance History - negative use     OB/GYN negative ob/gyn ROS         Other - negative ROS                                       Anesthesia Plan    ASA 3     general     intravenous induction   Anesthetic plan and risks discussed with patient and spouse/significant other.

## 2017-06-11 NOTE — OP NOTE
[place] [address] [facility],  [address]  [place]  [address]  [phone]      OPERATIVE REPORT      PATIENT NAME:  Sigrid Casarez                            YOB: 1940       PREOP DIAGNOSIS:   Left hip femoral neck fracture, Garden Type 1.    POSTOP DIAGNOSIS:   Same    PROCEDURE:    Compression screws fixation of Left hip femoral neck fracture.    SURGEON:     Yimi Sheehan MD    OPERATIVE TEAM:  Circulator: Leydi Huang RN      Scrub Person: Sylvester Song; Jimmy Flores    ANESTHETIST:  CRNA: Vj Montero CRNA    ANESTHESIA:  General plus left fascia-iliacus block.    FLUIDS:      300 ml crystalloid and one unit PRBC (300 ml)    ESTIM BLOOD LOSS:   50 ml      URINE:      200 ml    FINDINGS:     Mild impacted valgus nondisplaced femoral neck fracture.    SPECIMENS:    None.    IMPLANTS:     Synthes 7.0 mm diameter cannulated partial threaded compression screws (85 mm, 80 mm, 75 mm)    DRAINS:     Regan to gravity.    COMPLICATIONS:    None    DISPOSITION:    Stable to Recovery    INDICATIONS:   Painful limiting hip fracture on clinical exam and imaging.    NARRATIVE:    Risks, benefits and alternatives discussed and informed consent for surgical procedure obtained.  Risks discussed including but not limited to anesthesia, infection, nerve/vessel/tendon injury, fracture malunion or nonunion, implant loosening, hip dislocation, morbidities from any chronic medical conditions, DVT, PE and death.  Goals include pain relief, earlier mobilization and rehabilitation to improve ambulatory and functional level.  Patient presents for planned hip fracture stabilization surgery.     Antibiotic prophylaxis was given.  Surgeon site marking and a time out were performed prior to the procedure.  Anesthesia was effective and well tolerated.  The patient was placed onto the fracture table in the supine position with care taken to pad all areas of the body and observe skin  precautions.  Also, care was taken to provide DVT/PE prophylaxis including athrombic protocol.  The away leg was kept in a comfortable position in the padded armstrong and to provide room for the C-arm image.  The involved leg was gently placed in a padded fracture boot in mild traction.  The hip and leg were prepped and draped in the usual sterile fashion.    After sterile prep and drape, a small incision was made at the proximal lateral aspect of the femur and dissection to the level of the bone.  A parallel aiming 135 degree guide was used and three parallel threaded pins were placed from the proximal lateral aspect of the femur and extending across the femoral neck fracture and into the femoral head in the desired positions.  Care was taken to keep the pins parallel and to prefer the stronger medial calcar bone.  Over the pins, standard depth gauge measurement, drilling and self tapping screw placement was performed.  Care was taken to use the shorter partial threads when indicated to obtain compression at the fracture site.  After final screw placement, final C-arm views were saved showing a good fracture reduction and hardware position in AP and lateral planes.      The small wound was irrigated copiously.  Routine closure was performed and a sterile dressing applied.  The patient was gently removed from the fracture table and supine on the hospital bed.  The anesthesia was effective and well tolerated.  There were no complications of the procedure.  The patient was transferred in stable condition to recovery.

## 2017-06-11 NOTE — PLAN OF CARE
Problem: Perioperative Period (Adult)  Goal: Signs and Symptoms of Listed Potential Problems Will be Absent or Manageable (Perioperative Period)  Outcome: Ongoing (interventions implemented as appropriate)    06/11/17 1030   Perioperative Period   Problems Assessed (Perioperative Period) all   Problems Present (Perioperative Period) pain

## 2017-06-11 NOTE — INTERVAL H&P NOTE
"H&P updated. The patient was examined and admission anemia of hemoglobin 9.9 now decreased and hemoglobin today is 7.8.  Type and screen ordered yesterday and blood transfusion ordered for today.  Reviewed with hospitalist Dr. Ribera and with anesthetist DIAZ Montero CRNA     BP (!) 163/38  Pulse 72  Temp 99 °F (37.2 °C) (Oral)   Resp 16  Ht 63\" (160 cm)  Wt 148 lb 12.8 oz (67.5 kg)  SpO2 90%  BMI 26.36 kg/m2    .  WBC   Date Value Ref Range Status   06/11/2017 7.26 4.80 - 10.80 10*3/mm3 Final     RBC   Date Value Ref Range Status   06/11/2017 2.57 (L) 4.20 - 5.40 10*6/mm3 Final     Hemoglobin   Date Value Ref Range Status   06/11/2017 7.8 (C) 12.0 - 16.0 g/dL Final     Hematocrit   Date Value Ref Range Status   06/11/2017 25.0 (L) 37.0 - 47.0 % Final     MCV   Date Value Ref Range Status   06/11/2017 93.8 81.0 - 99.0 fL Final     MCH   Date Value Ref Range Status   06/11/2017 29.6 27.0 - 31.0 pg Final     MCHC   Date Value Ref Range Status   06/11/2017 31.5 30.0 - 37.0 g/dL Final     RDW   Date Value Ref Range Status   06/11/2017 13.2 11.5 - 14.5 % Final     RDW-SD   Date Value Ref Range Status   06/11/2017 45.2 37.0 - 54.0 fl Final     MPV   Date Value Ref Range Status   06/11/2017 12.0 6.0 - 12.0 fL Final     Platelets   Date Value Ref Range Status   06/11/2017 139 130 - 400 10*3/mm3 Final     Neutrophil %   Date Value Ref Range Status   06/11/2017 75.0 37.0 - 80.0 % Final     Lymphocyte %   Date Value Ref Range Status   06/11/2017 14.9 10.0 - 50.0 % Final     Monocyte %   Date Value Ref Range Status   06/11/2017 6.2 0.0 - 12.0 % Final     Eosinophil %   Date Value Ref Range Status   06/11/2017 3.2 0.0 - 7.0 % Final     Basophil %   Date Value Ref Range Status   06/11/2017 0.3 0.0 - 2.5 % Final     Immature Grans %   Date Value Ref Range Status   06/11/2017 0.4 0.0 - 0.6 % Final     Neutrophils, Absolute   Date Value Ref Range Status   06/11/2017 5.45 2.00 - 6.90 10*3/mm3 Final     Lymphocytes, Absolute "   Date Value Ref Range Status   06/11/2017 1.08 0.60 - 3.40 10*3/mm3 Final     Monocytes, Absolute   Date Value Ref Range Status   06/11/2017 0.45 0.00 - 0.90 10*3/mm3 Final     Eosinophils, Absolute   Date Value Ref Range Status   06/11/2017 0.23 0.00 - 0.70 10*3/mm3 Final     Basophils, Absolute   Date Value Ref Range Status   06/11/2017 0.02 0.00 - 0.20 10*3/mm3 Final     Immature Grans, Absolute   Date Value Ref Range Status   06/11/2017 0.03 0.00 - 0.06 10*3/mm3 Final     nRBC   Date Value Ref Range Status   06/11/2017 0.0 0.0 - 0.0 /100 WBC Final         Glucose   Date Value Ref Range Status   06/11/2017 119 (H) 74 - 98 mg/dL Final     Sodium   Date Value Ref Range Status   06/11/2017 139 137 - 145 mmol/L Final     Potassium   Date Value Ref Range Status   06/11/2017 4.0 3.5 - 5.1 mmol/L Final     CO2   Date Value Ref Range Status   06/11/2017 21.0 (L) 26.0 - 30.0 mmol/L Final     Chloride   Date Value Ref Range Status   06/11/2017 111 (H) 98 - 107 mmol/L Final     Anion Gap   Date Value Ref Range Status   06/11/2017 11.0 mmol/L Final     Creatinine   Date Value Ref Range Status   06/11/2017 3.20 (H) 0.60 - 1.30 mg/dL Final     BUN   Date Value Ref Range Status   06/11/2017 44 (H) 7 - 20 mg/dL Final     BUN/Creatinine Ratio   Date Value Ref Range Status   06/11/2017 13.8 7.1 - 23.5 Final     Calcium   Date Value Ref Range Status   06/11/2017 7.9 (L) 8.4 - 10.2 mg/dL Final     eGFR Non  Amer   Date Value Ref Range Status   06/11/2017 14 (L) >60 mL/min/1.73 Final     Alkaline Phosphatase   Date Value Ref Range Status   06/10/2017 97 38 - 126 U/L Final     Total Protein   Date Value Ref Range Status   06/10/2017 6.6 6.3 - 8.2 g/dL Final     ALT (SGPT)   Date Value Ref Range Status   06/10/2017 25 13 - 69 U/L Final     AST (SGOT)   Date Value Ref Range Status   06/10/2017 24 15 - 46 U/L Final     Total Bilirubin   Date Value Ref Range Status   06/10/2017 0.6 0.2 - 1.3 mg/dL Final     Albumin   Date Value  Ref Range Status   06/10/2017 3.60 3.50 - 5.00 g/dL Final     Globulin   Date Value Ref Range Status   06/10/2017 3.0 gm/dL Final     A/G Ratio   Date Value Ref Range Status   06/10/2017 1.2 1.0 - 2.0 g/dL Final       Jimmy Sheehan MD  6/11/2017  8:33 AM

## 2017-06-11 NOTE — ADDENDUM NOTE
Addendum  created 06/11/17 1143 by Vj Montero CRNA    Anesthesia Intra Blocks edited, Anesthesia Intra Meds edited, Child order released for a procedure order, Sign clinical note

## 2017-06-11 NOTE — PLAN OF CARE
Problem: Patient Care Overview (Adult)  Goal: Plan of Care Review  Outcome: Ongoing (interventions implemented as appropriate)    06/11/17 1120   Coping/Psychosocial Response Interventions   Plan Of Care Reviewed With patient   Patient Care Overview   Progress improving   Outcome Evaluation   Outcome Summary/Follow up Plan vss, pacu discharge criteria met

## 2017-06-11 NOTE — ANESTHESIA PROCEDURE NOTES
Peripheral Block    Patient location during procedure: post-op  Start time: 6/11/2017 10:50 AM  Stop time: 6/11/2017 11:03 AM  Reason for block: at surgeon's request and post-op pain management  Preanesthetic Checklist  Completed: patient identified, site marked, surgical consent, pre-op evaluation, timeout performed, IV checked, risks and benefits discussed and monitors and equipment checked  Peripheral Block Prep:  Sterile barriers:cap, gloves and mask  Prep: ChloraPrep  Patient monitoring: EKG, continuous pulse oximetry and blood pressure monitoring  Peripheral Procedure  Sedation:yes  Guidance:ultrasound guided  Images:still images obtained    Block Type:fascia iliaca compartment  Injection Technique:single-shot  Needle Type:Tuohy  Needle Gauge:20 G    Medications  Comment:Adjuncts per total volume of LA:    Decadron 10 mg PSF    Local Injected:bupivacaine 0.25% Local Amount Injected:50mL  Post Assessment  Injection Assessment: negative aspiration for heme, no paresthesia on injection and incremental injection  Patient Tolerance:comfortable throughout block  Complications:no

## 2017-06-11 NOTE — PROGRESS NOTES
"Nephrology Progress Note.    LOS: 1 day    Patient Care Team:  Tiburcio Hernandez MD as PCP - General    Chief Complaint:    Chief Complaint   Patient presents with   • Fall       Subjective   Patient seen and examined this morning.  Events from last night noted.  Patient denies having any fevers chills.  No nausea or vomiting no abdominal pain.  Denies any chest pain shortness of breath cough or sputum production.  There is no significant edema.   Patient also denies having new onset weakness of numbness of either extremity. She is in good spirits.      Review of Systems:    The pertinent  ROS was done and it is noted above, rest  was negative.    Objective     Vital Signs  /95 (BP Location: Left arm, Patient Position: Lying)  Pulse 73  Temp 99.1 °F (37.3 °C) (Temporal Artery )   Resp 17  Ht 63\" (160 cm)  Wt 148 lb 12.8 oz (67.5 kg)  SpO2 91%  BMI 26.36 kg/m2      I/O this shift:  In: 700 [I.V.:300; Blood:300; IV Piggyback:100]  Out: 50 [Blood:50]    Intake/Output Summary (Last 24 hours) at 06/11/17 1104  Last data filed at 06/11/17 1030   Gross per 24 hour   Intake             2787 ml   Output             1000 ml   Net             1787 ml       Physical Exam:    General Appearance: alert, oriented x 3, no acute distress,   HEENT: pupils round and reactive to light, oral mucosa dry, extra occular movements intact.  Neck: supple, no JVD, trachea midline  Lungs: Clear to Auscultation, unlabored breathing effort  Heart: RRR, normal S1 and S2, no S3, no rub  Abdomen: soft, non-tender, no palpable bladder, present bowel sounds to auscultation  Extremities: Trace edema, no cyanosis or clubbing.   Neuro: normal speech and mental status, grossly non focal.     Results Review:      Results from last 7 days  Lab Units 06/11/17  0449 06/10/17  0747   SODIUM mmol/L 139 141   POTASSIUM mmol/L 4.0 4.0   CHLORIDE mmol/L 111* 107   TOTAL CO2 mmol/L 21.0* 22.0*   BUN mg/dL 44* 52*   CREATININE mg/dL 3.20* 3.00*   CALCIUM " mg/dL 7.9* 9.0   BILIRUBIN mg/dL  --  0.6   ALK PHOS U/L  --  97   ALT (SGPT) U/L  --  25   AST (SGOT) U/L  --  24   GLUCOSE mg/dL 119* 202*       Estimated Creatinine Clearance: 13.1 mL/min (by C-G formula based on Cr of 3.2).      Results from last 7 days  Lab Units 06/10/17  0747   MAGNESIUM mg/dL 2.1               Results from last 7 days  Lab Units 06/11/17  0703 06/11/17  0449 06/10/17  0747   WBC 10*3/mm3  --  7.26 10.01   HEMOGLOBIN g/dL 7.8* 7.6* 9.9*   PLATELETS 10*3/mm3  --  139 178         Results from last 7 days  Lab Units 06/10/17  0747   INR  1.12*         Imaging Results (last 24 hours)     Procedure Component Value Units Date/Time    FL C Arm During Surgery [[id number]] Resulted:  06/11/17 1007     Updated:  06/11/17 1007    Narrative:       This procedure was auto-finalized with no dictation required.    XR Hip 1 View Without Pelvis Left (Surgery Only) [[id number]] Updated:  06/11/17 1007          amLODIPine 5 mg Oral Daily   [MAR Hold] aspirin 81 mg Oral Daily   [MAR Hold] atorvastatin 10 mg Oral Daily   bupivacaine PF      bupivacaine PF      carvedilol 25 mg Oral BID   clindamycin      clindamycin      dexamethasone sodium phosphate      [MAR Hold] insulin aspart 0-7 Units Subcutaneous 4x Daily AC & at Bedtime   [MAR Hold] levothyroxine 75 mcg Oral QAM   meperidine 50 mg Intravenous Once   ondansetron 4 mg Intravenous Once       sodium chloride 100 mL/hr Last Rate: 100 mL/hr (06/10/17 1716)       Medication Review:   Current Facility-Administered Medications   Medication Dose Route Frequency Provider Last Rate Last Dose   • [MAR Hold] acetaminophen (TYLENOL) suppository 650 mg  650 mg Rectal Q4H PRN Moe Barrera DO   650 mg at 06/11/17 0245   • [MAR Hold] acetaminophen (TYLENOL) tablet 650 mg  650 mg Oral Q4H PRN Jordan Ribera MD   650 mg at 06/10/17 1720   • amLODIPine (NORVASC) tablet 5 mg  5 mg Oral Daily Jordan Ribera MD   5 mg at 06/10/17 1225   • [MAR Hold] aspirin EC tablet 81 mg  81  mg Oral Daily Jordan Ribera MD   81 mg at 06/10/17 1225   • [MAR Hold] atorvastatin (LIPITOR) tablet 10 mg  10 mg Oral Daily Jordan Ribera MD   10 mg at 06/10/17 1226   • bupivacaine PF (MARCAINE) 0.25 % injection  - ADS Override Pull            • bupivacaine PF (MARCAINE) 0.25 % injection  - ADS Override Pull            • carvedilol (COREG) tablet 25 mg  25 mg Oral BID Jordan Ribera MD   25 mg at 06/10/17 2015   • [MAR Hold] CHAPSTICK 1 each  1 each Apply externally TID PRN Moe Barrera DO       • clindamycin (CLEOCIN) 900 MG/6ML injection  - ADS Override Pull            • clindamycin (CLEOCIN) 900 MG/6ML injection  - ADS Override Pull            • dexamethasone sodium phosphate 10 MG/ML injection  - ADS Override Pull            • [MAR Hold] dextrose (D50W) solution 25 g  25 g Intravenous Q15 Min PRN Jordan Ribera MD       • [MAR Hold] dextrose (GLUTOSE) oral gel 15 g  15 g Oral Q15 Min PRN Jordan Ribera MD       • [MAR Hold] glucagon (human recombinant) (GLUCAGEN DIAGNOSTIC) injection 1 mg  1 mg Subcutaneous Q15 Min PRN Jordan Ribera MD       • HYDROmorphone (DILAUDID) injection 0.5 mg  0.5 mg Intravenous Q10 Min PRN Vj Montero CRNA       • [MAR Hold] insulin aspart (novoLOG) injection 0-7 Units  0-7 Units Subcutaneous 4x Daily AC & at Bedtime Jordan Ribera MD   2 Units at 06/10/17 1717   • [MAR Hold] levothyroxine (SYNTHROID, LEVOTHROID) tablet 75 mcg  75 mcg Oral QAM Jordan Ribera MD   75 mcg at 06/10/17 1226   • meperidine (DEMEROL) injection 50 mg  50 mg Intravenous Once Vj Montero CRNA       • [MAR Hold] morphine injection 2 mg  2 mg Intravenous Q4H PRN Deric Mejia MD   2 mg at 06/11/17 0245    And   • [MAR Hold] naloxone (NARCAN) injection 0.4 mg  0.4 mg Intravenous Q5 Min PRN Deric Mejia MD       • morphine injection 2 mg  2 mg Intravenous Q10 Min PRN Vj Montero CRNA   2 mg at 06/11/17 1045   • [MAR Hold] ondansetron (ZOFRAN) tablet 4 mg  4 mg Oral Q6H PRN  Jordan Ribera MD        Or   • [MAR Hold] ondansetron ODT (ZOFRAN-ODT) disintegrating tablet 4 mg  4 mg Oral Q6H PRN Jordan Ribera MD        Or   • [MAR Hold] ondansetron (ZOFRAN) injection 4 mg  4 mg Intravenous Q6H PRN Jordan Ribera MD   4 mg at 06/11/17 0833   • ondansetron (ZOFRAN) injection 4 mg  4 mg Intravenous Once Vj Montero, CRNA       • [MAR Hold] sodium chloride 0.9 % flush 1-10 mL  1-10 mL Intravenous PRN Jordan Ribera MD   10 mL at 06/10/17 1716   • sodium chloride 0.9 % infusion  100 mL/hr Intravenous Continuous Jimmy Sheehan  mL/hr at 06/10/17 1716         Assessment/Plan      1. Chronic kidney disease, stage IV (severe): Continue to follow labs closely likelihood all 4 ATN secondary to surgical intervention is a possibility. We'll continue to follow daily labs and hopefully she can maintain her renal perfusion and function during this acute issues of hip fracture and repair.Slight worsening noted.  Continue to follow daily labs.  2. Chronic anemia: History of iron deficiency as well as IV iron along with erythropoietin therapy.  She did drop her hemoglobin and was transfused.  3. Essential hypertension: Continue to optimize medications and keep the blood pressure in 140-150 range.  4. Acquired hypothyroidism: Treated  5. Status post fall: Does not appear to have syncopal episode it is all mechanical. She has been thinking about letting the dog, go at this point.  6. Type 2 diabetes mellitus with nephropathy: She is agreed for dialysis as needed.  Continue sliding scale for the time being.  7. Hip fracture, left: S/P repair.            Details were discussed with the patient as well as family in the room.  I will also discussed the assessment and plan with the hospitalist service.    Rest as ordered.    Deric Mejia MD  06/11/17  11:04 AM      EMR Dragon/Transcription disclaimer:   Much of this encounter note is an electronic transcription/translation of spoken language to  printed text. The electronic translation of spoken language may permit erroneous, or at times, nonsensical words or phrases to be inadvertently transcribed; Although I have reviewed the note for such errors, some may still exist.

## 2017-06-11 NOTE — PROGRESS NOTES
[facility]    Name:  Sigrid Casarez   Age:  77 y.o.  Sex:  female  :  1940  MRN:  [id number]   Visit Number:  [id number]  Admission Date:  6/10/2017  Date Of Service:  17  Primary Care Physician:  Tiburcio Hernandez MD     LOS: 1 day :  Patient Care Team:  Tiburcio Hernandez MD as PCP - General:    Chief Complaint:      Left hip pain.    Subjective / Interval History:     Patient is currently lying down on the bed and is comfortable at rest except for the left hip pain.  She was evaluated by Dr. Sheehan this morning and he is taking her down for surgical intervention to her left hip.  Unfortunately, she did drop her hemoglobin from a baseline of 10 to 7.8 today.  No active bleeding noted.  She probably has chronic anemia due to her chronic kidney disease but probably lost some amount of blood due to the hip fracture.  No fevers, chest pain or shortness of breath.    Review of Systems:     General ROS: Patient denies any fevers, chills or loss of consciousness.  Respiratory ROS: Denies cough or shortness of breath.  Cardiovascular ROS: Denies chest pain or palpitations. No history of exertional chest pain.  Gastrointestinal ROS: Denies nausea and vomiting. Denies any abdominal pain. No diarrhea.  Neurological ROS: Denies any focal weakness. No loss of consciousness. Denies any numbness.  Dermatological ROS: Denies any redness or pruritis.    Vital Signs:    Temp:  [98.8 °F (37.1 °C)-100.4 °F (38 °C)] 99 °F (37.2 °C)  Heart Rate:  [72-87] 72  Resp:  [16] 16  BP: (106-181)/(38-69) 163/38    Intake and output:    I/O last 3 completed shifts:  In:  [P.O.:600; I.V.:987]  Out: 950 [Urine:950]  I/O this shift:  In: 300 [Blood:300]  Out: -     Physical Examination:    General Appearance:  Alert and cooperative, not in any acute distress.   Head:  Atraumatic and normocephalic, without obvious abnormality.   Eyes:          PERRLA, conjunctivae and sclerae normal, no  Icterus. No pallor. Extra-occular movements are within normal limits.   Neck: Supple, trachea midline, no thyromegaly, no carotid bruit.   Lungs:   Chest shape is normal. Breath sounds heard bilaterally equally.  No crackles or wheezing. No Pleural rub or bronchial breathing.   Heart:  Normal S1 and S2, no murmur, no gallop, no rub. No JVD   Abdomen:   Normal bowel sounds, no masses, no organomegaly. Soft       non-tender, non-distended, no guarding, no rebound tenderness.   Extremities: Moves all extremities well, no edema, no cyanosis, no            Clubbing. No ecchymosis noted on the left hip.   Skin: No bleeding, bruising or rash.   Neurologic: Awake, alert and oriented times 3. Moves all 4 extremities equally.   Laboratory results:      Results from last 7 days  Lab Units 06/11/17  0449 06/10/17  0747   SODIUM mmol/L 139 141   POTASSIUM mmol/L 4.0 4.0   CHLORIDE mmol/L 111* 107   TOTAL CO2 mmol/L 21.0* 22.0*   BUN mg/dL 44* 52*   CREATININE mg/dL 3.20* 3.00*   CALCIUM mg/dL 7.9* 9.0   BILIRUBIN mg/dL  --  0.6   ALK PHOS U/L  --  97   ALT (SGPT) U/L  --  25   AST (SGOT) U/L  --  24   GLUCOSE mg/dL 119* 202*       Results from last 7 days  Lab Units 06/11/17  0703 06/11/17  0449 06/10/17  0747   WBC 10*3/mm3  --  7.26 10.01   HEMOGLOBIN g/dL 7.8* 7.6* 9.9*   HEMATOCRIT % 25.0* 24.1* 30.2*   PLATELETS 10*3/mm3  --  139 178       Results from last 7 days  Lab Units 06/10/17  0747   INR  1.12*       Results from last 7 days  Lab Units 06/10/17  0751   TROPONIN I ng/mL <0.012       Results from last 7 days  Lab Units 06/10/17  1405   URINECX  >100,000 CFU/mL Gram Negative Bacilli*     I have reviewed the patient's laboratory results.    Radiology results:    Imaging Results (last 24 hours)     Procedure Component Value Units Date/Time    FL C Arm During Surgery [[id number]] Resulted:  06/11/17 1007     Updated:  06/11/17 1007    Narrative:       This procedure was auto-finalized with no dictation required.    XR Hip  1 View Without Pelvis Left (Surgery Only) [[id number]] Updated:  06/11/17 1007        Medication Review:     I have reviewed the patients active and prn medications.     Principal Problem:    Hip fracture, left  Active Problems:    Status post fall    Chronic kidney disease, stage IV (severe)    Chronic anemia    Essential hypertension    Type 2 diabetes mellitus with nephropathy    Acquired hypothyroidism      Assessment:    1. Left hip nondisplaced fracture, present on admission.  2. Status post mechanical fall at home.  3.  Acute blood loss anemia secondary to #1, status post 1 unit of packed red blood cell transfusion.  4. Diabetes mellitus type 2 with nephropathy.  5. Chronic kidney disease stage IV.  6. Essential hypertension.  7. Acquired hypothyroidism.  8. Anemia of chronic disease.    Plan:    Patient is currently being taken down to the OR for surgery.  We will transfuse her with 1 unit of packed red blood cells perioperatively.  She was seen by Dr. Steve yesterday and he has cleared her for surgery.  She will be continued on IV fluids and pain medications.  I have discussed the patient's condition with Dr. Sheehan.  She will be started on DVT prophylaxis anticoagulation after her surgery.  I have discussed the patient's condition with her  was at the bedside.  Further recommendations depend upon her clinical course.    Jordan Ribera MD  06/11/17  10:30 AM    Please note that portions of this note may have been completed with a voice recognition program. Efforts were made to edit the dictations, but occasionally words are mistranscribed.

## 2017-06-11 NOTE — ANESTHESIA POSTPROCEDURE EVALUATION
Patient: Sigrid Casarez    Procedure Summary     Date Anesthesia Start Anesthesia Stop Room / Location    06/11/17 0827 1031  [facility] /  [facility]       Procedure Diagnosis Surgeon Provider     LEFT HIP MULTIPLE CANNULATED COMPRESSION SCREWS- FRMORAL NECK  FRACTURE (Left Hip) Chronic kidney disease, stage IV (severe); Acquired hypothyroidism; Diabetic peripheral neuropathy; Closed fracture of neck of left femur, initial encounter; Hip fracture, left, closed, initial encounter; Type 1 diabetes mellitus with diabetic chronic kidney disease, unspecified CKD stage; Anemia, unspecified type  (Chronic kidney disease, stage IV (severe) [N18.4]; Acquired hypothyroidism [E03.9]; Diabetic peripheral neuropathy [E11.42]; Closed fracture of neck of left femur, initial encounter [S72.002A]; Hip fracture, left, closed, initial encounter [S72.002A]; Type 1 diabetes mellitus with diabetic chronic kidney disease, unspecified CKD stage [E10.22, N18.9]; Anemia, unspecified type [D64.9]) MD Vj Fay CRNA          Anesthesia Type: general  Last vitals  BP     Temp      Pulse     Resp      SpO2        Post Anesthesia Care and Evaluation    Patient location during evaluation: PACU  Patient participation: complete - patient participated  Level of consciousness: responsive to light touch  Pain score: 0  Pain management: adequate  Airway patency: patent  Anesthetic complications: No anesthetic complications  PONV Status: controlled  Cardiovascular status: acceptable and stable  Respiratory status: acceptable and face mask  Hydration status: acceptable

## 2017-06-11 NOTE — PLAN OF CARE
Problem: Perioperative Period (Adult)  Intervention: Promote Pulmonary Hygiene and Secretion Clearance    06/11/17 1030   Promote Aggressive Pulmonary Hygiene/Secretion Management   Cough And Deep Breathing done with encouragement       Intervention: Monitor/Manage Pain    06/11/17 1030   Safety Interventions   Medication Review/Management medications reviewed

## 2017-06-11 NOTE — PROGRESS NOTES
Cardiology Progress Note  Rafal Steve MD  PATIENT: Sigrid Casarez : 1940   DATE: 17   [facility] OR/NONE    Patient Care Team:  Tiburcio Hernandez MD as PCP - General    Subjective .  Patient has uneventfully undergone orthopedic procedure today and other than borderline hypertension, which is attributed to anesthetic agent, patient has not had any intraoperative issues though postoperatively when patient was drowsy and somnolent she did develop significant desaturation.  At this stage patient has no ongoing complaints except for discomfort at the surgical site.  She has not experienced any chest pain, pressure or tightness.      Vital Sign Min/Max for last 24 hours  Temp  Min: 98 °F (36.7 °C)  Max: 100.4 °F (38 °C)   BP  Min: 106/48  Max: 181/69   Pulse  Min: 67  Max: 87   Resp  Min: 13  Max: 18   SpO2  Min: 90 %  Max: 98 %   Flow (L/min)  Min: 2  Max: 6   Weight  Min: 148 lb 12.8 oz (67.5 kg)  Max: 148 lb 12.8 oz (67.5 kg)     Wt Readings from Last 7 Encounters:   17 148 lb 12.8 oz (67.5 kg)   17 144 lb (65.3 kg)   16 142 lb (64.4 kg)   16 129 lb (58.5 kg)   16 127 lb 11.2 oz (57.9 kg)   09/09/15 150 lb (68 kg)   05/15/15 150 lb 15.9 oz (68.5 kg)          Physical Exam:    CONSTITUTIONAL: Not in acute distress.    NECK: Carotid upstroke is normal, there are no carotid bruits, no JVD, no thyromegaly, no cervical or axillary lymphadenopathy.     HEART: [place] beat is not displaced, no thrill is appreciated and both heart sounds are normal.  There is no murmur or rub audible.     BRONCHOPULMONARY: Patient has good air entry bilaterally with bronchovesicular sounds. No adventious sounds audible.     GI & : Soft, no tenderness elicited and no viscera are palpable. Bowel sounds are positive and there is no renal bruits audible.       Intake/Output Summary (Last 24 hours) at 17 1251  Last data filed at 17 1120   Gross per 24 hour   Intake             2747 ml   Output               650 ml   Net             2097 ml       Results Review:    LABS:     Results from last 7 days  Lab Units 06/11/17  0703 06/11/17  0449 06/10/17  0747   WBC 10*3/mm3  --  7.26 10.01   HEMOGLOBIN g/dL 7.8* 7.6* 9.9*   HEMATOCRIT % 25.0* 24.1* 30.2*   PLATELETS 10*3/mm3  --  139 178     I reviewed the patient's new clinical results.    Results from last 7 days  Lab Units 06/11/17  0703 06/11/17  0449 06/10/17  0747   WBC 10*3/mm3  --  7.26 10.01   HEMOGLOBIN g/dL 7.8* 7.6* 9.9*   HEMATOCRIT % 25.0* 24.1* 30.2*   PLATELETS 10*3/mm3  --  139 178       Results from last 7 days  Lab Units 06/11/17  0449 06/10/17  0747   SODIUM mmol/L 139 141   POTASSIUM mmol/L 4.0 4.0   CHLORIDE mmol/L 111* 107   TOTAL CO2 mmol/L 21.0* 22.0*   BUN mg/dL 44* 52*   CREATININE mg/dL 3.20* 3.00*   GLUCOSE mg/dL 119* 202*   CALCIUM mg/dL 7.9* 9.0       Results from last 7 days  Lab Units 06/10/17  0751   TROPONIN I ng/mL <0.012            Results from last 7 days  Lab Units 06/10/17  0747   INR  1.12*   APTT seconds 27.0     Lab Results   Component Value Date    HGBA1C 9.5 04/24/2017               RADIOLOGY: Imaging Results (last 24 hours)     Procedure Component Value Units Date/Time    FL C Arm During Surgery [[id number]] Resulted:  06/11/17 1007     Updated:  06/11/17 1007    Narrative:       This procedure was auto-finalized with no dictation required.    XR Hip 1 View Without Pelvis Left (Surgery Only) [[id number]] Updated:  06/11/17 1007    XR Hip With or Without Pelvis 1 View Left [[id number]] Updated:  06/11/17 1107                 Allergies   Allergen Reactions   • Duloxetine    • Exenatide    • Lisinopril Cough   • Penicillins    • Phentermine        amLODIPine 5 mg Oral Daily   aspirin 81 mg Oral Daily   atorvastatin 10 mg Oral Daily   bupivacaine PF      bupivacaine PF      carvedilol 25 mg Oral BID   clindamycin 600 mg Intravenous Q8H   clindamycin      clindamycin      dexamethasone sodium phosphate      [START ON 6/12/2017]  enoxaparin 30 mg Subcutaneous Daily   insulin aspart 0-7 Units Subcutaneous 4x Daily AC & at Bedtime   levothyroxine 75 mcg Oral QAM        Principal Problem:    Hip fracture, left  Active Problems:    Chronic anemia    Essential hypertension    Acquired hypothyroidism    Status post fall    Type 2 diabetes mellitus with nephropathy    Chronic kidney disease, stage IV (severe)    Assessment/Plan     1. Patient, who has evidence of diffuse atherosclerotic process but has had negative myocardial perfusion imaging study in the past, has undergone orthopedic procedure uneventfully today without major issues.  She did have transient hypotension which is attributed to anesthesia.  2. Patient, who is known to have normal global LV systolic function, had significant desaturation when she was drowsy and somnolent which is thought to be worrisome for sleep-disordered breathing.  I have had again extensive discussion with the patient as well as her extended family regarding need for polysomnography which she has canceled on multiple occasions on account of apparent intolerance to BiPAP when she was hospitalized previously for fluid overload.  3. Patient has history of recurrent fluid overload which is thought to reflect extracardiac issues.  She does have history of sleep disorder breathing and underlying progressive kidney disease.  Patient also has history of anemia after fracture conceivably reflecting local hemorrhage.  She understandably does have underlying chronic kidney disease and potential contributor mechanism.  4. Other issues noted include history of carotid disease and history of chronic venous insufficiency.    I discussed the patients findings and my recommendations with the patient and family.    Rafal Steve MD  06/11/17  12:51 PM  Please note that portions of this note may have been completed with a voice recognition program. Efforts were made to edit the dictations, but occasionally words are  mistranscribed.

## 2017-06-11 NOTE — PLAN OF CARE
Problem: Patient Care Overview (Adult)  Goal: Plan of Care Review  Outcome: Ongoing (interventions implemented as appropriate)    06/10/17 2015   Coping/Psychosocial Response Interventions   Plan Of Care Reviewed With patient   Patient Care Overview   Progress improving   Outcome Evaluation   Outcome Summary/Follow up Plan patient has minimal complaint of pain-supportive family at bedside-blood sugar monitored with coverage per protocol-NPO after midnight-will continue to monitor       Goal: Adult Individualization and Mutuality  Outcome: Ongoing (interventions implemented as appropriate)  Goal: Discharge Needs Assessment  Outcome: Ongoing (interventions implemented as appropriate)    Problem: Fall Risk (Adult)  Goal: Identify Related Risk Factors and Signs and Symptoms  Outcome: Ongoing (interventions implemented as appropriate)  Goal: Absence of Falls  Outcome: Ongoing (interventions implemented as appropriate)    Problem: Skin Integrity Impairment, Risk/Actual (Adult)  Goal: Identify Related Risk Factors and Signs and Symptoms  Outcome: Ongoing (interventions implemented as appropriate)  Goal: Skin Integrity/Wound Healing  Outcome: Ongoing (interventions implemented as appropriate)    Problem: Pain, Acute (Adult)  Goal: Identify Related Risk Factors and Signs and Symptoms  Outcome: Ongoing (interventions implemented as appropriate)  Goal: Acceptable Pain Control/Comfort Level  Outcome: Ongoing (interventions implemented as appropriate)

## 2017-06-12 LAB
ABO + RH BLD: NORMAL
ANION GAP SERPL CALCULATED.3IONS-SCNC: 22.1 MMOL/L
BACTERIA SPEC AEROBE CULT: ABNORMAL
BASOPHILS # BLD AUTO: 0.02 10*3/MM3 (ref 0–0.2)
BASOPHILS NFR BLD AUTO: 0.2 % (ref 0–2.5)
BH BB BLOOD EXPIRATION DATE: NORMAL
BH BB BLOOD TYPE BARCODE: 6200
BH BB DISPENSE STATUS: NORMAL
BH BB PRODUCT CODE: NORMAL
BH BB UNIT NUMBER: NORMAL
BUN BLD-MCNC: 56 MG/DL (ref 7–20)
BUN/CREAT SERPL: 15.6 (ref 7.1–23.5)
CALCIUM SPEC-SCNC: 8.2 MG/DL (ref 8.4–10.2)
CHLORIDE SERPL-SCNC: 104 MMOL/L (ref 98–107)
CO2 SERPL-SCNC: 12 MMOL/L (ref 26–30)
CREAT BLD-MCNC: 3.6 MG/DL (ref 0.6–1.3)
CROSSMATCH INTERPRETATION: NORMAL
DEPRECATED RDW RBC AUTO: 48.8 FL (ref 37–54)
EOSINOPHIL # BLD AUTO: 0 10*3/MM3 (ref 0–0.7)
EOSINOPHIL NFR BLD AUTO: 0 % (ref 0–7)
ERYTHROCYTE [DISTWIDTH] IN BLOOD BY AUTOMATED COUNT: 13.8 % (ref 11.5–14.5)
GFR SERPL CREATININE-BSD FRML MDRD: 12 ML/MIN/1.73
GLUCOSE BLD-MCNC: 437 MG/DL (ref 74–98)
GLUCOSE BLDC GLUCOMTR-MCNC: 182 MG/DL (ref 70–130)
GLUCOSE BLDC GLUCOMTR-MCNC: 276 MG/DL (ref 70–130)
GLUCOSE BLDC GLUCOMTR-MCNC: 315 MG/DL (ref 70–130)
GLUCOSE BLDC GLUCOMTR-MCNC: 350 MG/DL (ref 70–130)
GLUCOSE BLDC GLUCOMTR-MCNC: 354 MG/DL (ref 70–130)
GLUCOSE BLDC GLUCOMTR-MCNC: 453 MG/DL (ref 70–130)
HBA1C MFR BLD: 8.8 % (ref 3–6)
HCT VFR BLD AUTO: 30.3 % (ref 37–47)
HGB BLD-MCNC: 9.4 G/DL (ref 12–16)
IMM GRANULOCYTES # BLD: 0.13 10*3/MM3 (ref 0–0.06)
IMM GRANULOCYTES NFR BLD: 1.3 % (ref 0–0.6)
LYMPHOCYTES # BLD AUTO: 0.45 10*3/MM3 (ref 0.6–3.4)
LYMPHOCYTES NFR BLD AUTO: 4.6 % (ref 10–50)
MCH RBC QN AUTO: 29.8 PG (ref 27–31)
MCHC RBC AUTO-ENTMCNC: 31 G/DL (ref 30–37)
MCV RBC AUTO: 96.2 FL (ref 81–99)
MONOCYTES # BLD AUTO: 0.28 10*3/MM3 (ref 0–0.9)
MONOCYTES NFR BLD AUTO: 2.8 % (ref 0–12)
NEUTROPHILS # BLD AUTO: 9.01 10*3/MM3 (ref 2–6.9)
NEUTROPHILS NFR BLD AUTO: 91.1 % (ref 37–80)
NRBC BLD MANUAL-RTO: 0 /100 WBC (ref 0–0)
PLATELET # BLD AUTO: 139 10*3/MM3 (ref 130–400)
PMV BLD AUTO: 12.4 FL (ref 6–12)
POTASSIUM BLD-SCNC: 5.1 MMOL/L (ref 3.5–5.1)
RBC # BLD AUTO: 3.15 10*6/MM3 (ref 4.2–5.4)
SODIUM BLD-SCNC: 133 MMOL/L (ref 137–145)
UNIT  ABO: NORMAL
UNIT  RH: NORMAL
WBC NRBC COR # BLD: 9.89 10*3/MM3 (ref 4.8–10.8)

## 2017-06-12 PROCEDURE — 25010000002 FUROSEMIDE PER 20 MG: Performed by: INTERNAL MEDICINE

## 2017-06-12 PROCEDURE — 80048 BASIC METABOLIC PNL TOTAL CA: CPT | Performed by: ORTHOPAEDIC SURGERY

## 2017-06-12 PROCEDURE — 82962 GLUCOSE BLOOD TEST: CPT

## 2017-06-12 PROCEDURE — 99232 SBSQ HOSP IP/OBS MODERATE 35: CPT | Performed by: INTERNAL MEDICINE

## 2017-06-12 PROCEDURE — 97116 GAIT TRAINING THERAPY: CPT

## 2017-06-12 PROCEDURE — 25010000002 ENOXAPARIN PER 10 MG: Performed by: ORTHOPAEDIC SURGERY

## 2017-06-12 PROCEDURE — 97165 OT EVAL LOW COMPLEX 30 MIN: CPT

## 2017-06-12 PROCEDURE — 63710000001 INSULIN ASPART PER 5 UNITS: Performed by: INTERNAL MEDICINE

## 2017-06-12 PROCEDURE — 97162 PT EVAL MOD COMPLEX 30 MIN: CPT

## 2017-06-12 PROCEDURE — 63710000001 INSULIN DETEMIR PER 5 UNITS: Performed by: INTERNAL MEDICINE

## 2017-06-12 PROCEDURE — 25010000002 MORPHINE PER 10 MG: Performed by: INTERNAL MEDICINE

## 2017-06-12 PROCEDURE — 85025 COMPLETE CBC W/AUTO DIFF WBC: CPT | Performed by: ORTHOPAEDIC SURGERY

## 2017-06-12 PROCEDURE — 99024 POSTOP FOLLOW-UP VISIT: CPT | Performed by: ORTHOPAEDIC SURGERY

## 2017-06-12 PROCEDURE — 97530 THERAPEUTIC ACTIVITIES: CPT

## 2017-06-12 RX ORDER — FUROSEMIDE 20 MG/1
20 TABLET ORAL 2 TIMES DAILY
Status: DISCONTINUED | OUTPATIENT
Start: 2017-06-13 | End: 2017-06-13 | Stop reason: HOSPADM

## 2017-06-12 RX ORDER — ALPRAZOLAM 0.5 MG/1
0.5 TABLET ORAL NIGHTLY
Status: DISCONTINUED | OUTPATIENT
Start: 2017-06-12 | End: 2017-06-13 | Stop reason: HOSPADM

## 2017-06-12 RX ORDER — FUROSEMIDE 10 MG/ML
20 INJECTION INTRAMUSCULAR; INTRAVENOUS ONCE
Status: COMPLETED | OUTPATIENT
Start: 2017-06-12 | End: 2017-06-12

## 2017-06-12 RX ADMIN — MORPHINE SULFATE 2 MG: 2 INJECTION, SOLUTION INTRAMUSCULAR; INTRAVENOUS at 09:47

## 2017-06-12 RX ADMIN — ALPRAZOLAM 0.5 MG: 0.5 TABLET ORAL at 21:06

## 2017-06-12 RX ADMIN — LEVOTHYROXINE SODIUM 75 MCG: 75 TABLET ORAL at 06:00

## 2017-06-12 RX ADMIN — ASPIRIN 81 MG: 81 TABLET, COATED ORAL at 08:45

## 2017-06-12 RX ADMIN — INSULIN ASPART 10 UNITS: 100 INJECTION, SOLUTION INTRAVENOUS; SUBCUTANEOUS at 06:40

## 2017-06-12 RX ADMIN — INSULIN ASPART 10 UNITS: 100 INJECTION, SOLUTION INTRAVENOUS; SUBCUTANEOUS at 12:00

## 2017-06-12 RX ADMIN — FUROSEMIDE 20 MG: 10 INJECTION, SOLUTION INTRAMUSCULAR; INTRAVENOUS at 09:44

## 2017-06-12 RX ADMIN — INSULIN ASPART 3 UNITS: 100 INJECTION, SOLUTION INTRAVENOUS; SUBCUTANEOUS at 21:05

## 2017-06-12 RX ADMIN — HYDROCODONE BITARTRATE AND ACETAMINOPHEN 1 TABLET: 7.5; 325 TABLET ORAL at 02:35

## 2017-06-12 RX ADMIN — ENOXAPARIN SODIUM 30 MG: 30 INJECTION SUBCUTANEOUS at 21:04

## 2017-06-12 RX ADMIN — ATORVASTATIN CALCIUM 10 MG: 10 TABLET, FILM COATED ORAL at 08:45

## 2017-06-12 RX ADMIN — INSULIN DETEMIR 20 UNITS: 100 INJECTION, SOLUTION SUBCUTANEOUS at 09:00

## 2017-06-12 RX ADMIN — CARVEDILOL 25 MG: 25 TABLET, FILM COATED ORAL at 08:46

## 2017-06-12 RX ADMIN — ONDANSETRON 4 MG: 4 TABLET, ORALLY DISINTEGRATING ORAL at 08:38

## 2017-06-12 RX ADMIN — CARVEDILOL 25 MG: 25 TABLET, FILM COATED ORAL at 21:06

## 2017-06-12 RX ADMIN — INSULIN ASPART 5 UNITS: 100 INJECTION, SOLUTION INTRAVENOUS; SUBCUTANEOUS at 17:55

## 2017-06-12 RX ADMIN — SODIUM CHLORIDE 600 MG: 9 INJECTION, SOLUTION INTRAVENOUS at 01:00

## 2017-06-12 NOTE — THERAPY EVALUATION
Acute Care - Physical Therapy Initial Evaluation   [place]     Patient Name: Sigrid Casarez  : 1940  MRN: [id number]  Today's Date: 2017   Onset of Illness/Injury or Date of Surgery Date: 06/10/17  Date of Referral to PT: 17  Referring Physician: Dr. Sheehan      Admit Date: 6/10/2017     Visit Dx:    ICD-10-CM ICD-9-CM   1. Hip fracture, left, closed, initial encounter S72.002A 820.8   2. Closed fracture of neck of left femur, initial encounter S72.002A 820.8   3. Anemia, unspecified type D64.9 285.9   4. Chronic kidney disease, stage IV (severe) N18.4 585.4   5. Diabetic peripheral neuropathy E11.42 250.60     357.2   6. Acquired hypothyroidism E03.9 244.9   7. Type 1 diabetes mellitus with diabetic chronic kidney disease, unspecified CKD stage E10.22 250.41    N18.9 585.9   8. Impaired mobility and ADLs Z74.09 799.89   9. Impaired functional mobility, balance, gait, and endurance Z74.09 V49.89     Patient Active Problem List   Diagnosis   • Chronic anemia   • Chronic kidney disease, stage IV (severe)   • Depression   • Diabetic peripheral neuropathy   • 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   • Dizziness   • Fatigue   • Essential hypertension   • Hypoglycemia   • Acquired hypothyroidism   • Diabetic macular edema   • Menopause present   • Mixed hyperlipidemia   • Osteopenia   • Type 1 diabetes mellitus   • Vitamin D deficiency   • Bilateral nondiabetic proliferative retinopathy   • Hip fracture, left   • Status post fall   • Type 2 diabetes mellitus with nephropathy   • Chronic kidney disease, stage IV (severe)     Past Medical History:   Diagnosis Date   • Anemia    • Chronic kidney disease    • Chronic kidney disease    • Community acquired pneumonia    • Dexa Body Composition study lumosacral spine and femur     ostepenic   • Diabetic nephropathy, type I    • Diabetic peripheral neuropathy type 1    • Hypertension    • Menopause     • Pneumonia      Past Surgical History:   Procedure Laterality Date   • CATARACT EXTRACTION     • CHOLECYSTECTOMY     • HIP CANNULATED SCREW PLACEMENT Left 6/11/2017    Procedure:  LEFT HIP MULTIPLE CANNULATED COMPRESSION SCREWS- FERMORAL NECK  FRACTURE;  Surgeon: Jimmy Sheehan MD;  Location: [facility];  Service:           PT ASSESSMENT (last 72 hours)      PT Evaluation       06/12/17 0946 06/12/17 0940    Rehab Evaluation    Document Type evaluation  - evaluation  -JR    Subjective Information agree to therapy;no complaints  - agree to therapy;no complaints  -JR    Patient Effort, Rehab Treatment good  -AH good  -JR    Symptoms Noted During/After Treatment increased pain  - increased pain  -JR    General Information    Patient Profile Review yes  - yes  -JR    Onset of Illness/Injury or Date of Surgery Date 06/10/17  -AH 06/09/17  -    Referring Physician Dr. Sheehan  - Jimmy Sheehan MD  -JR    General Observations Pt received supine in bed with 2L O2 via nc.  - Supine with 2LPM of oxygen per nasal cannula, IV in LUE, LLE dressing   -JR    Pertinent History Of Current Problem Pt with fall resulting in hip fracture with ORIF LLE.  - fall with resulting left femur fx, s/p ORIF  -JR    Precautions/Limitations --   Pt is WBAT LLE  - --   WBAT  -JR    Prior Level of Function independent:;all household mobility;ADL's  - independent:;all household mobility  -    Equipment Currently Used at Home cane, straight;commode;shower chair  - cane, straight;commode;shower chair  -JR    Plans/Goals Discussed With patient;agreed upon  - patient;agreed upon  -JR    Risks Reviewed patient:;increased discomfort  - patient:;increased discomfort  -    Benefits Reviewed patient:;improve function;increase independence;increase strength  - patient:;increase balance;increase strength;increase independence;improve function  -JR    Barriers to Rehab  none identified  -JR    Living Environment    Lives  With spouse  - spouse  -JR    Living Arrangements house  - house  -JR    Home Accessibility bed and bath on same level;stairs to enter home  - bed and bath on same level;stairs to enter home  -JR    Number of Stairs to Enter Home 2   Pt has 2 steps to front door and 3 steps to back door  - 2  -JR    Living Environment Comment  Going to rehab prior to home  -    Clinical Impression    Date of Referral to PT  06/11/17  -    PT Diagnosis  Gait abnormality, decreased balance and activity tolerance  -    Prognosis  Good  -    Patient/Family Goals Statement  Patient wants to return to normal activity  -    Criteria for Skilled Therapeutic Interventions Met  yes;treatment indicated  -    Pathology/Pathophysiology Noted (Describe Specifically for Each System)  musculoskeletal;pulmonary;neuromuscular;endocrine/metabolic  -    Impairments Found (describe specific impairments)  gait, locomotion, and balance;aerobic capacity/endurance;joint integrity and mobility;ROM  -JR    Rehab Potential  good, to achieve stated therapy goals  -    Vital Signs    Pre SpO2 (%) 96  - 96  -JR    O2 Delivery Pre Treatment supplemental O2   2L  - supplemental O2   2LPM per nasal cannula  -    Intra SpO2 (%) 84   sats were noted to drop prior to activity, O2 was replaced  - 84  -JR    O2 Delivery Intra Treatment room air  - supplemental O2   2LPM per nasal cannula  -    Post SpO2 (%) 96  - 96  -JR    O2 Delivery Post Treatment supplemental O2   2L  -AH supplemental O2   2LPM per nasal cannula  -JR    Pre Patient Position Supine  - Supine  -JR    Intra Patient Position Sitting  - Standing  -    Post Patient Position Sitting  - Sitting  -    Pain Assessment    Pain Assessment 0-10  - 0-10  -JR    Pain Score 0  - 0  -JR    Post Pain Score 0   Pt c/o 8/10 pain transitioning from supine>sit/stand>sit  - 0   8/10 with hip movements and transitional movements  -    Pain Type Acute pain;Surgical pain   - Acute pain;Surgical pain  -    Pain Location Leg  - Hip  -JR    Pain Orientation Left  - Left  -JR    Pain Intervention(s) Repositioned;Ambulation/increased activity  - Medication (See MAR);Repositioned;Ambulation/increased activity  -    Response to Interventions tolerated  -     Vision Assessment/Intervention    Visual Impairment WFL with corrective lenses  -     Cognitive Assessment/Intervention    Current Cognitive/Communication Assessment functional  - functional  -    Orientation Status oriented x 4  - oriented x 4  -    Follows Commands/Answers Questions able to follow multi-step instructions  - able to follow multi-step instructions  -    Personal Safety WNL/WFL  - decreased awareness, need for assist  -    Personal Safety Interventions  gait belt;fall prevention program maintained  -    ROM (Range of Motion)    General ROM no range of motion deficits identified  -     General ROM Detail  WFL except Left hip d/t pain s/p ORIF  -    MMT (Manual Muscle Testing)    General MMT Assessment no strength deficits identified  -     General MMT Assessment Detail  WFL except Left Hip NT d/t pain  -    Mobility Assessment/Training    Extremity Weight-Bearing Status  left lower extremity  -    Left Lower Extremity Weight-Bearing  weight-bearing as tolerated  -    Bed Mobility, Assessment/Treatment    Bed Mobility, Assistive Device bed rails;head of bed elevated  - head of bed elevated;bed rails  -    Bed Mob, Supine to Sit, [place] minimum assist (75% patient effort)  - minimum assist (75% patient effort)  -    Bed Mobility, Safety Issues  decreased use of legs for bridging/pushing  -    Bed Mobility, Impairments  pain;strength decreased;ROM decreased;motor control impaired  -    Transfer Assessment/Treatment    Transfers, Sit-Stand [place] minimum assist (75% patient effort)  - minimum assist (75% patient effort);verbal cues required  -     Transfers, Stand-Sit [place] minimum assist (75% patient effort)  - minimum assist (75% patient effort);verbal cues required  -    Transfers, Sit-Stand-Sit, Assist Device rolling walker  - rolling walker  -    Transfer, Safety Issues  steps too close front assistive device;step length decreased;sequencing ability decreased;balance decreased during turns;weight-shifting ability decreased  -    Transfer, Impairments  strength decreased;ROM decreased;impaired balance;pain  -    Gait Assessment/Treatment    Gait, [place] Level  contact guard assist;verbal cues required  -    Gait, Assistive Device  rolling walker  -    Gait, Distance (Feet)  66  -    Gait, Gait Pattern Analysis  swing-to gait  -    Gait, Gait Deviations  decreased heel strike;weight-shifting ability decreased;stride length decreased;step length decreased;forward flexed posture;marianne decreased  -    Gait, Safety Issues  steps too close front assistive device;weight-shifting ability decreased;step length decreased;supplemental O2   2LPM per nasal cannula  -    Gait, Impairments  pain;strength decreased;ROM decreased;impaired balance;coordination impaired;postural control impaired  -    Positioning and Restraints    Pre-Treatment Position in bed  - in bed  -    Post Treatment Position chair  - chair  -    In Chair sitting;call light within reach;encouraged to call for assist  - sitting;call light within reach;encouraged to call for assist;notified nsg  -JR      06/11/17 2030 06/11/17 1611    General Information    Equipment Currently Used at Home walker, standard;oxygen;glucometer  -DW walker, standard;oxygen  -LP    Living Environment    Transportation Available ambulance  -       06/10/17 2015 06/10/17 1700    General Information    Equipment Currently Used at Home glucometer  -DW none  -CC    Living Environment    Transportation Available ambulance  -       06/10/17 0939       Living Environment     Lives With spouse  -CC     Living Arrangements house  -CC     Home Accessibility no concerns  -CC     Stair Railings at Home inside, present on left side;outside, present on left side  -CC     Type of Financial/Environmental Concern none  -CC     Transportation Available car  -CC       User Key  (r) = Recorded By, (t) = Taken By, (c) = Cosigned By    Initials Name Provider Type    YAIR Frost Occupational Therapist     Lise Pacheco, PT Physical Therapist    CC Kitty Knapp, RN Registered Nurse    WANDA Jeronimo, RN Registered Nurse    LATISHA Botello           Physical Therapy Education     Title: PT OT SLP Therapies (Active)     Topic: Physical Therapy (Active)     Point: Mobility training (Done)    Learning Progress Summary    Learner Readiness Method Response Comment Documented by Status   Patient Acceptance E,TB VU,DU Role of PT/POC, safety with mobility  06/12/17 1510 Done                      User Key     Initials Effective Dates Name Provider Type Discipline     10/26/16 -  Lise Pacheco, PT Physical Therapist PT                PT Recommendation and Plan  Anticipated Discharge Disposition: inpatient rehabilitation facility  Planned Therapy Interventions: strengthening, balance training, bed mobility training, transfer training, gait training, patient/family education  PT Frequency: 2 times/day, daily (daily Sat/Sun)  Plan of Care Review  Plan Of Care Reviewed With: patient  Outcome Summary/Follow up Plan: Patient participated well in skilled PT evaluation and demonstrates decreased strength, balance, transfers, gait quality, and activity tolerance.  She is expected to benefit from additional PT while hospitalized and upon discharge to inpatient rehab setting, prior to return to home.                                                                       IP PT Goals       06/12/17 1518          Bed Mobility PT LTG    Bed Mobility PT LTG, Date Established 06/12/17  -      Bed  Mobility PT LTG, Time to Achieve 2 wks  -JR      Bed Mobility PT LTG, Activity Type all bed mobility  -JR      Bed Mobility PT LTG, [place] Level conditional independence  -JR      Bed Mobility PT Goal  LTG, Assist Device bed rails  -JR      Bed Mobility PT LTG, Date Goal Reviewed 06/12/17  -JR      Bed Mobility PT LTG, Outcome goal ongoing  -JR      Transfer Training PT LTG    Transfer Training PT LTG, Date Established 06/12/17  -JR      Transfer Training PT LTG, Time to Achieve 2 wks  -JR      Transfer Training PT LTG, Activity Type sit to stand/stand to sit;bed to chair /chair to bed  -JR      Transfer Training PT LTG, [place] Level supervision required  -JR      Transfer Training PT LTG, Assist Device walker, rolling  -JR      Transfer Training PT  LTG, Date Goal Reviewed 06/12/17  -JR      Transfer Training PT LTG, Outcome goal ongoing  -JR      Gait Training PT LTG    Gait Training Goal PT LTG, Date Established 06/12/17  -JR      Gait Training Goal PT LTG, Time to Achieve 2 wks  -JR      Gait Training Goal PT LTG, [place] Level supervision required  -JR      Gait Training Goal PT LTG, Assist Device walker, rolling  -JR      Gait Training Goal PT LTG, Distance to Achieve 125  -JR      Gait Training Goal PT LTG, Additional Goal with even step lengths and heel strike for initial contact at least 75% of the time  -JR      Gait Training Goal PT LTG, Date Goal Reviewed 06/12/17  -JR      Gait Training Goal PT LTG, Outcome goal ongoing  -JR        User Key  (r) = Recorded By, (t) = Taken By, (c) = Cosigned By    Initials Name Provider Type    JR Lise Pacheco PT Physical Therapist                Outcome Measures       06/12/17 0946 06/12/17 0940       How much help from another person do you currently need...    Turning from your back to your side while in flat bed without using bedrails?  3  -JR     Moving from lying on back to sitting on the side of a flat bed without bedrails?  3  -JR     Moving to  and from a bed to a chair (including a wheelchair)?  3  -JR     Standing up from a chair using your arms (e.g., wheelchair, bedside chair)?  3  -JR     Climbing 3-5 steps with a railing?  2  -JR     To walk in hospital room?  3  -JR     AM-PAC 6 Clicks Score  17  -JR     How much help from another is currently needed...    Putting on and taking off regular lower body clothing? 3  -AH      Bathing (including washing, rinsing, and drying) 3  -AH      Toileting (which includes using toilet bed pan or urinal) 3  -AH      Putting on and taking off regular upper body clothing 4  -AH      Taking care of personal grooming (such as brushing teeth) 4  -AH      Eating meals 4  -      Score 21  -      Functional Assessment    Outcome Measure Options AM-PAC 6 Clicks Daily Activity (OT)  - AM-PAC 6 Clicks Basic Mobility (PT)  -       User Key  (r) = Recorded By, (t) = Taken By, (c) = Cosigned By    Initials Name Provider Type     Aline Frost Occupational Therapist    JR Lise Pacheco, PT Physical Therapist           Time Calculation:         PT Charges       06/12/17 1514          Time Calculation    Start Time 0940  -      PT Received On 06/12/17  -      PT Goal Re-Cert Due Date 06/22/17  -        User Key  (r) = Recorded By, (t) = Taken By, (c) = Cosigned By    Initials Name Provider Type    JR Lise Pacheco PT Physical Therapist          Therapy Charges for Today     Code Description Service Date Service Provider Modifiers Qty    00967185641 HC PT EVAL MOD COMPLEXITY 4 6/12/2017 Lise Pacheco, PT GP 1          PT G-Codes  Outcome Measure Options: AM-PAC 6 Clicks Daily Activity (OT)      Lise Pacheco, PT  6/12/2017

## 2017-06-12 NOTE — PLAN OF CARE
Problem: Patient Care Overview (Adult)  Goal: Plan of Care Review  Outcome: Ongoing (interventions implemented as appropriate)    06/12/17 1518   Coping/Psychosocial Response Interventions   Plan Of Care Reviewed With patient   Outcome Evaluation   Outcome Summary/Follow up Plan Patient participated well in skilled PT evaluation and demonstrates decreased strength, balance, transfers, gait quality, and activity tolerance. She is expected to benefit from additional PT while hospitalized and upon discharge to inpatient rehab setting, prior to return to home.          Problem: Inpatient Physical Therapy  Goal: Bed Mobility Goal LTG- PT  Outcome: Ongoing (interventions implemented as appropriate)    06/12/17 1518   Bed Mobility PT LTG   Bed Mobility PT LTG, Date Established 06/12/17   Bed Mobility PT LTG, Time to Achieve 2 wks   Bed Mobility PT LTG, Activity Type all bed mobility   Bed Mobility PT LTG, [place] Level conditional independence   Bed Mobility PT Goal LTG, Assist Device bed rails   Bed Mobility PT LTG, Date Goal Reviewed 06/12/17   Bed Mobility PT LTG, Outcome goal ongoing       Goal: Transfer Training Goal 1 LTG- PT  Outcome: Ongoing (interventions implemented as appropriate)    06/12/17 1518   Transfer Training PT LTG   Transfer Training PT LTG, Date Established 06/12/17   Transfer Training PT LTG, Time to Achieve 2 wks   Transfer Training PT LTG, Activity Type sit to stand/stand to sit;bed to chair /chair to bed   Transfer Training PT LTG, [place] Level supervision required   Transfer Training PT LTG, Assist Device walker, rolling   Transfer Training PT LTG, Date Goal Reviewed 06/12/17   Transfer Training PT LTG, Outcome goal ongoing       Goal: Gait Training Goal LTG- PT  Outcome: Ongoing (interventions implemented as appropriate)    06/12/17 1518   Gait Training PT LTG   Gait Training Goal PT LTG, Date Established 06/12/17   Gait Training Goal PT LTG, Time to Achieve 2 wks   Gait Training Goal  PT LTG, [place] Level supervision required   Gait Training Goal PT LTG, Assist Device walker, rolling   Gait Training Goal PT LTG, Distance to Achieve 125   Gait Training Goal PT LTG, Additional Goal with even step lengths and heel strike for initial contact at least 75% of the time   Gait Training Goal PT LTG, Date Goal Reviewed 06/12/17   Gait Training Goal PT LTG, Outcome goal ongoing

## 2017-06-12 NOTE — PROGRESS NOTES
Continued Stay Note  CHANDAN [facility]     Patient Name: Sigrid Casarez  MRN: [id number]  Today's Date: 6/12/2017    Admit Date: 6/10/2017          Discharge Plan       06/12/17 1643    Case Management/Social Work Plan    Plan Short Term Rehab    Final Note    Final Note Spoke with pt regarding rehab.  She would like referrals sent to [facility] and [facility] facilities.  She is willing to go to [place], but last resort.  She states she would like to stay close to home.  Referrals efaxed.              Discharge Codes     None            Melody Marquez

## 2017-06-12 NOTE — PROGRESS NOTES
"Nephrology Progress Note.    LOS: 2 days    Patient Care Team:  Tiburcio Hernandez MD as PCP - General    Chief Complaint:    Chief Complaint   Patient presents with   • Fall       Subjective   Patient seen and examined this morning.  Events from last night noted.  Patient denies having any fevers chills.  No nausea or vomiting no abdominal pain.  Denies any chest pain shortness of breath cough or sputum production.  There is no significant edema.   Patient also denies having new onset weakness of numbness of either extremity.She said her heels are sore.  She feels like that she is ready to get up and walk around.  Pain is under fairly good control.  She wants to go to the rehabilitation and arrangements are being made.  She said she had some problems sleeping last night that has been fairly anxious does not want pain medicine to help her sleep.  Although she does not think that she has significant nausea but would like sublingual Zofran she thinks if she ever had nausea this works really well.      Review of Systems:    The pertinent  ROS was done and it is noted above, rest  was negative.    Objective     Vital Signs  /49 (BP Location: Right arm, Patient Position: Lying)  Pulse 87  Temp 97.9 °F (36.6 °C) (Oral)   Resp 16  Ht 63\" (160 cm)  Wt 148 lb 11.2 oz (67.4 kg)  SpO2 95%  BMI 26.34 kg/m2           Intake/Output Summary (Last 24 hours) at 06/12/17 0823  Last data filed at 06/12/17 0600   Gross per 24 hour   Intake          3173.33 ml   Output              650 ml   Net          2523.33 ml       Physical Exam:    General Appearance: alert, oriented x 3, no acute distress,   HEENT: pupils round and reactive to light, oral mucosa dry, extra occular movements intact.  Neck: supple, no JVD, trachea midline  Lungs: Clear to Auscultation, unlabored breathing effort  Heart: RRR, normal S1 and S2, no S3, no rub  Abdomen: soft, non-tender, no palpable bladder, present bowel sounds to auscultation  Extremities: " Trace to 1+ edema, no cyanosis or clubbing.   Neuro: normal speech and mental status, grossly non focal.     Results Review:      Results from last 7 days  Lab Units 06/12/17  0528 06/11/17  0449 06/10/17  0747   SODIUM mmol/L 133* 139 141   POTASSIUM mmol/L 5.1 4.0 4.0   CHLORIDE mmol/L 104 111* 107   TOTAL CO2 mmol/L 12.0* 21.0* 22.0*   BUN mg/dL 56* 44* 52*   CREATININE mg/dL 3.60* 3.20* 3.00*   CALCIUM mg/dL 8.2* 7.9* 9.0   BILIRUBIN mg/dL  --   --  0.6   ALK PHOS U/L  --   --  97   ALT (SGPT) U/L  --   --  25   AST (SGOT) U/L  --   --  24   GLUCOSE mg/dL 437* 119* 202*       Estimated Creatinine Clearance: 11.7 mL/min (by C-G formula based on Cr of 3.6).      Results from last 7 days  Lab Units 06/10/17  0747   MAGNESIUM mg/dL 2.1               Results from last 7 days  Lab Units 06/12/17  0528 06/11/17  0703 06/11/17  0449 06/10/17  0747   WBC 10*3/mm3 9.89  --  7.26 10.01   HEMOGLOBIN g/dL 9.4* 7.8* 7.6* 9.9*   PLATELETS 10*3/mm3 139  --  139 178         Results from last 7 days  Lab Units 06/10/17  0747   INR  1.12*         Imaging Results (last 24 hours)     Procedure Component Value Units Date/Time    FL C Arm During Surgery [[id number]] Resulted:  06/11/17 1007     Updated:  06/11/17 1007    Narrative:       This procedure was auto-finalized with no dictation required.    XR Hip 1 View Without Pelvis Left (Surgery Only) [[id number]] Updated:  06/11/17 1007    XR Hip With or Without Pelvis 1 View Left [[id number]] Updated:  06/11/17 1107          aspirin 81 mg Oral Daily   atorvastatin 10 mg Oral Daily   carvedilol 25 mg Oral BID   enoxaparin 30 mg Subcutaneous Q24H   insulin aspart 0-7 Units Subcutaneous 4x Daily AC & at Bedtime   insulin detemir 20 Units Subcutaneous QAM   levothyroxine 75 mcg Oral QAM       sodium chloride 100 mL/hr Last Rate: 100 mL/hr (06/12/17 0130)       Medication Review:   Current Facility-Administered Medications   Medication Dose Route Frequency Provider Last Rate Last Dose   •  acetaminophen (TYLENOL) tablet 650 mg  650 mg Oral Q12H PRN Jimmy Sheehan MD       • aspirin EC tablet 81 mg  81 mg Oral Daily Jordan Ribera MD   81 mg at 06/10/17 1225   • atorvastatin (LIPITOR) tablet 10 mg  10 mg Oral Daily Jordan Ribera MD   10 mg at 06/10/17 1226   • bisacodyl (DULCOLAX) suppository 10 mg  10 mg Rectal Daily PRN Jimmy Sheehan MD       • carvedilol (COREG) tablet 25 mg  25 mg Oral BID Jordan Ribera MD   25 mg at 06/11/17 2220   • CHAPSTICK 1 each  1 each Apply externally TID PRN Moe Barrera DO       • dextrose (D50W) solution 25 g  25 g Intravenous Q15 Min PRN Jordan Ribera MD       • dextrose (GLUTOSE) oral gel 15 g  15 g Oral Q15 Min PRN Jordan Ribera MD       • docusate sodium (COLACE) capsule 100 mg  100 mg Oral BID PRN Jimmy Sheehan MD       • enoxaparin (LOVENOX) syringe 30 mg  30 mg Subcutaneous Q24H Jimmy Sheehan MD   30 mg at 06/11/17 2220   • glucagon (human recombinant) (GLUCAGEN DIAGNOSTIC) injection 1 mg  1 mg Subcutaneous Q15 Min PRN Jordan Ribera MD       • HYDROcodone-acetaminophen (NORCO) 7.5-325 MG per tablet 1 tablet  1 tablet Oral Q6H PRN Jimmy Sheehan MD   1 tablet at 06/12/17 0235   • insulin aspart (novoLOG) injection 0-7 Units  0-7 Units Subcutaneous 4x Daily AC & at Bedtime Jordan Ribera MD   10 Units at 06/12/17 0640   • insulin detemir (LEVEMIR) injection 20 Units  20 Units Subcutaneous QAM Jordan Ribera MD       • levothyroxine (SYNTHROID, LEVOTHROID) tablet 75 mcg  75 mcg Oral QAM Jordan Ribera MD   75 mcg at 06/12/17 0600   • morphine injection 2 mg  2 mg Intravenous Q4H PRN Deric Mejia MD   2 mg at 06/11/17 1319    And   • naloxone (NARCAN) injection 0.4 mg  0.4 mg Intravenous Q5 Min PRN Deric Mejia MD       • Morphine injection 4 mg  4 mg Intravenous Q2H PRN Jimmy Sheehan MD        And   • naloxone (NARCAN) injection 0.4 mg  0.4 mg Intravenous Q5 Min PRN Jimmy Sheehan MD       • ondansetron (ZOFRAN) tablet 4 mg  4 mg Oral  Q6H PRN Jordan Ribera MD        Or   • ondansetron ODT (ZOFRAN-ODT) disintegrating tablet 4 mg  4 mg Oral Q6H PRN Jordan Ribera MD        Or   • ondansetron (ZOFRAN) injection 4 mg  4 mg Intravenous Q6H PRN Jordan Ribera MD   4 mg at 06/11/17 0833   • ondansetron (ZOFRAN) tablet 4 mg  4 mg Oral Q6H PRN Jimmy Sheehan MD        Or   • ondansetron ODT (ZOFRAN-ODT) disintegrating tablet 4 mg  4 mg Oral Q6H PRN Jimmy Sheehan MD        Or   • ondansetron (ZOFRAN) injection 4 mg  4 mg Intravenous Q6H PRN Jimmy Sheehan MD       • sodium chloride 0.9 % flush 1-10 mL  1-10 mL Intravenous PRN Jordan Ribera MD   10 mL at 06/10/17 1716   • sodium chloride 0.9 % flush 1-10 mL  1-10 mL Intravenous PRN Jimmy Sheehan MD       • sodium chloride 0.9 % infusion  100 mL/hr Intravenous Continuous Jimmy Sheehan  mL/hr at 06/12/17 0130 100 mL/hr at 06/12/17 0130       Assessment/Plan      1. Chronic kidney disease, stage IV (severe): Continue to follow labs closely likelihood all 4 ATN secondary to surgical intervention is a possibility. We'll continue to follow daily labs and hopefully she can maintain her renal perfusion and function during this acute issues of hip fracture and repair.Slight worsening noted.  Continue to follow daily labs.  2. Hip fracture, left: Status post surgery yesterday, her pain is under control she is actively participating in physical therapy.  3. Fluid overload: She does appear to have some extra fluid on she's been off her diuretics I'll go ahead and give her 1 dose of IV Lasix today and reassess on day-to-day basis.  4. Chronic anemia: History of iron deficiency as well as IV iron along with erythropoietin therapy.  She did drop her hemoglobin and was transfused.  Hemoglobin today is 9.4  5. Essential hypertension: Continue to optimize medications and keep the blood pressure in 140-150 range.  6. Acquired hypothyroidism: Treated  7. Status post fall: Does not appear to have syncopal  episode it is all mechanical. She has been thinking about letting the dog, go at this point.  She is willing to go ahead and go to the rehabilitation.  8. Type 2 diabetes mellitus with nephropathy: She is agreed for dialysis as needed.  Continue sliding scale for the time being.        Details were discussed with the patient as well as family in the room.  I will also discussed the assessment and plan with the hospitalist service.    Rest as ordered.    Deric Mejia MD  06/12/17  8:23 AM      EMR Dragon/Transcription disclaimer:   Much of this encounter note is an electronic transcription/translation of spoken language to printed text. The electronic translation of spoken language may permit erroneous, or at times, nonsensical words or phrases to be inadvertently transcribed; Although I have reviewed the note for such errors, some may still exist.

## 2017-06-12 NOTE — THERAPY TREATMENT NOTE
Acute Care - Physical Therapy Treatment Note   [facility]     Patient Name: Sigrid Casarez  : 1940  MRN: [id number]  Today's Date: 2017  Onset of Illness/Injury or Date of Surgery Date: 06/10/17  Date of Referral to PT: 17  Referring Physician: Dr. Sheehan    Admit Date: 6/10/2017    Visit Dx:    ICD-10-CM ICD-9-CM   1. Hip fracture, left, closed, initial encounter S72.002A 820.8   2. Closed fracture of neck of left femur, initial encounter S72.002A 820.8   3. Anemia, unspecified type D64.9 285.9   4. Chronic kidney disease, stage IV (severe) N18.4 585.4   5. Diabetic peripheral neuropathy E11.42 250.60     357.2   6. Acquired hypothyroidism E03.9 244.9   7. Type 1 diabetes mellitus with diabetic chronic kidney disease, unspecified CKD stage E10.22 250.41    N18.9 585.9   8. Impaired mobility and ADLs Z74.09 799.89   9. Impaired functional mobility, balance, gait, and endurance Z74.09 V49.89     Patient Active Problem List   Diagnosis   • Chronic anemia   • Chronic kidney disease, stage IV (severe)   • Depression   • Diabetic peripheral neuropathy   • 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   • Dizziness   • Fatigue   • Essential hypertension   • Hypoglycemia   • Acquired hypothyroidism   • Diabetic macular edema   • Menopause present   • Mixed hyperlipidemia   • Osteopenia   • Type 1 diabetes mellitus   • Vitamin D deficiency   • Bilateral nondiabetic proliferative retinopathy   • Hip fracture, left   • Status post fall   • Type 2 diabetes mellitus with nephropathy   • Chronic kidney disease, stage IV (severe)               Adult Rehabilitation Note       17 1255          Rehab Assessment/Intervention    Discipline physical therapist  -JR      Document Type therapy note (daily note)  -JR      Subjective Information agree to therapy;complains of;fatigue;pain  -JR      Patient Effort, Rehab Treatment good  -JR      Symptoms Noted  During/After Treatment none  -JR      Recorded by [JR] Lise Pacheco PT      Pain Assessment    Pain Assessment 0-10  -JR      Pain Score 0  -JR      Post Pain Score 0  -JR      Pain Type Acute pain;Surgical pain  -JR      Pain Location Hip  -JR      Pain Orientation Left  -JR      Pain Intervention(s) Ambulation/increased activity;Medication (See MAR)  -JR      Response to Interventions only c/o pain with movements  -JR      Recorded by [JR] Lise Pacheco PT      Bed Mobility, Assessment/Treatment    Bed Mobility, Assistive Device head of bed elevated;bed rails  -JR      Bed Mob, Sit to Supine, [place] minimum assist (75% patient effort)  -JR      Recorded by [JR] Lise Pacheco PT      Transfer Assessment/Treatment    Transfers, Sit-Stand [place] minimum assist (75% patient effort)  -JR      Transfers, Stand-Sit [place] minimum assist (75% patient effort)  -JR      Transfers, Sit-Stand-Sit, Assist Device rolling walker  -JR      Recorded by [JR] Lise Pacheco PT      Gait Assessment/Treatment    Gait, [place] Level contact guard assist  -JR      Gait, Assistive Device rolling walker  -JR      Gait, Distance (Feet) 84  -JR      Gait, Gait Pattern Analysis swing-to gait  -JR      Gait, Gait Deviations marianne decreased;decreased heel strike;forward flexed posture;stride length decreased;swing-to-stance ratio decreased  -JR      Gait, Safety Issues weight-shifting ability decreased;step length decreased;sequencing ability decreased;steps too close front assistive device  -JR      Gait, Impairments pain;strength decreased;ROM decreased;impaired balance;coordination impaired;postural control impaired  -JR      Recorded by [JR] Lise Pacheco PT      Therapy Exercises    Left Lower Extremity AROM:;10 reps;sitting;LAQ;ankle pumps/circles  -JR      Recorded by [JR] Lise Pacheco PT      Positioning and Restraints    Pre-Treatment Position sitting in chair/recliner  -JR      Post Treatment Position bed  -JR       In Bed supine;call light within reach;encouraged to call for assist;notified nsg  -JR      Recorded by [JR] Lise Pacheco, PT        User Key  (r) = Recorded By, (t) = Taken By, (c) = Cosigned By    Initials Name Effective Dates    JR Lise Pacheco, PT 10/26/16 -                 IP PT Goals       06/12/17 1518          Bed Mobility PT LTG    Bed Mobility PT LTG, Date Established 06/12/17  -JR      Bed Mobility PT LTG, Time to Achieve 2 wks  -JR      Bed Mobility PT LTG, Activity Type all bed mobility  -JR      Bed Mobility PT LTG, [place] Level conditional independence  -JR      Bed Mobility PT Goal  LTG, Assist Device bed rails  -JR      Bed Mobility PT LTG, Date Goal Reviewed 06/12/17  -JR      Bed Mobility PT LTG, Outcome goal ongoing  -JR      Transfer Training PT LTG    Transfer Training PT LTG, Date Established 06/12/17  -JR      Transfer Training PT LTG, Time to Achieve 2 wks  -JR      Transfer Training PT LTG, Activity Type sit to stand/stand to sit;bed to chair /chair to bed  -JR      Transfer Training PT LTG, [place] Level supervision required  -JR      Transfer Training PT LTG, Assist Device walker, rolling  -JR      Transfer Training PT  LTG, Date Goal Reviewed 06/12/17  -JR      Transfer Training PT LTG, Outcome goal ongoing  -JR      Gait Training PT LTG    Gait Training Goal PT LTG, Date Established 06/12/17  -JR      Gait Training Goal PT LTG, Time to Achieve 2 wks  -JR      Gait Training Goal PT LTG, [place] Level supervision required  -JR      Gait Training Goal PT LTG, Assist Device walker, rolling  -JR      Gait Training Goal PT LTG, Distance to Achieve 125  -JR      Gait Training Goal PT LTG, Additional Goal with even step lengths and heel strike for initial contact at least 75% of the time  -JR      Gait Training Goal PT LTG, Date Goal Reviewed 06/12/17  -JR      Gait Training Goal PT LTG, Outcome goal ongoing  -JR        User Key  (r) = Recorded By, (t) = Taken By, (c) = Cosigned  By    Initials Name Provider Type    JR Lise Pacheco, MARCUS Physical Therapist          Physical Therapy Education     Title: PT OT SLP Therapies (Active)     Topic: Physical Therapy (Active)     Point: Mobility training (Done)    Learning Progress Summary    Learner Readiness Method Response Comment Documented by Status   Patient Acceptance E,TB VU,DU Role of PT/POC, safety with mobility  06/12/17 1510 Done                      User Key     Initials Effective Dates Name Provider Type Discipline     10/26/16 -  Lise Pacheco PT Physical Therapist PT                    PT Recommendation and Plan  Anticipated Discharge Disposition: inpatient rehabilitation facility  Planned Therapy Interventions: strengthening, balance training, bed mobility training, transfer training, gait training, patient/family education  PT Frequency: 2 times/day, daily (daily Sat/Sun)  Plan of Care Review  Plan Of Care Reviewed With: patient  Progress: progress toward functional goals as expected  Outcome Summary/Follow up Plan: Patient is able to perform LLE therapeutic exercises and gait with RW.  She is able to improve gait quality this afternoon.  She is expected to continue progressing with additional PT services.          Outcome Measures       06/12/17 0946 06/12/17 0940       How much help from another person do you currently need...    Turning from your back to your side while in flat bed without using bedrails?  3  -JR     Moving from lying on back to sitting on the side of a flat bed without bedrails?  3  -JR     Moving to and from a bed to a chair (including a wheelchair)?  3  -JR     Standing up from a chair using your arms (e.g., wheelchair, bedside chair)?  3  -JR     Climbing 3-5 steps with a railing?  2  -JR     To walk in hospital room?  3  -JR     AM-PAC 6 Clicks Score  17  -JR     How much help from another is currently needed...    Putting on and taking off regular lower body clothing? 3  -AH      Bathing (including washing,  rinsing, and drying) 3  -AH      Toileting (which includes using toilet bed pan or urinal) 3  -AH      Putting on and taking off regular upper body clothing 4  -AH      Taking care of personal grooming (such as brushing teeth) 4  -AH      Eating meals 4  -      Score 21  -      Functional Assessment    Outcome Measure Options AM-PAC 6 Clicks Daily Activity (OT)  - AM-PAC 6 Clicks Basic Mobility (PT)  -       User Key  (r) = Recorded By, (t) = Taken By, (c) = Cosigned By    Initials Name Provider Type     Aline Frost Occupational Therapist    JR Lise Pacheco, PT Physical Therapist           Time Calculation:         PT Charges       06/12/17 1946 06/12/17 1514       Time Calculation    Start Time 1255  - 0940  -     PT Received On 06/12/17  - 06/12/17  -     PT Goal Re-Cert Due Date 06/22/17  - 06/22/17  -     Time Calculation- PT    Total Timed Code Minutes- PT 28 minute(s)  -        User Key  (r) = Recorded By, (t) = Taken By, (c) = Cosigned By    Initials Name Provider Type    JR Lise Pacheco, MARCUS Physical Therapist          Therapy Charges for Today     Code Description Service Date Service Provider Modifiers Qty    34541897967 HC PT EVAL MOD COMPLEXITY 4 6/12/2017 Lise Pacheco, PT GP 1    30254799161 HC GAIT TRAINING EA 15 MIN 6/12/2017 Lise Pacheco, PT GP 1    12536178923 HC PT THERAPEUTIC ACT EA 15 MIN 6/12/2017 Lise Pacheco, PT GP 1          PT G-Codes  Outcome Measure Options: AM-PAC 6 Clicks Daily Activity (OT)    Lise Pacheco PT  6/12/2017

## 2017-06-12 NOTE — PLAN OF CARE
Problem: Patient Care Overview (Adult)  Goal: Plan of Care Review  Outcome: Ongoing (interventions implemented as appropriate)    06/12/17 1139   Coping/Psychosocial Response Interventions   Plan Of Care Reviewed With patient   Patient Care Overview   Progress (OT evaluation completed today)   Outcome Evaluation   Outcome Summary/Follow up Plan Pt seen for OT evaluation today. Pt presents with decreased independence with self care, transfers and functional mobility tasks. Pt is expected to benefit from skilled OT to improve her functional independence with ADL tasks.         Problem: Inpatient Occupational Therapy  Goal: Bed Mobility Goal LTG- OT  Outcome: Ongoing (interventions implemented as appropriate)    06/12/17 1139   Bed Mobility OT LTG   Bed Mobility OT LTG, Date Established 06/12/17   Bed Mobility OT LTG, Time to Achieve 2 wks   Bed Mobility OT LTG, Activity Type supine to sit/sit to supine   Bed Mobility OT LTG, [place] Level contact guard assist   Bed Mobility OT LTG, Assist Device bed rails   Bed Mobility OT LTG, Outcome goal ongoing       Goal: Strength Goal LTG- OT  Outcome: Ongoing (interventions implemented as appropriate)    06/12/17 1139   Strength OT LTG   Strength Goal OT LTG, Date Established 06/12/17   Strength Goal OT LTG, Time to Achieve 2 wks   Strength Goal OT LTG, Functional Goal Pt will participate in BUE strengthening ex using theraband for resistance   Strength Goal OT LTG, Outcome goal ongoing       Goal: LB Dressing Goal LTG- OT  Outcome: Ongoing (interventions implemented as appropriate)    06/12/17 1139   LB Dressing OT LTG   LB Dressing Goal OT LTG, Date Established 06/12/17   LB Dressing Goal OT LTG, Time to Achieve 2 wks   LB Dressing Goal OT LTG, [place] Level contact guard assist   LB Dressing Goal OT LTG, Adaptive Equipment sock-aid   LB Dressing Goal OT LTG, Outcome goal ongoing       Goal: Functional Mobility Goal LTG- OT  Outcome: Ongoing (interventions  implemented as appropriate)    06/12/17 1139   Functional Mobility OT LTG   Functional Mobility Goal OT LTG, Date Established 06/12/17   Functional Mobility Goal OT LTG, Time to Achieve 2 wks   Functional Mobility Goal OT LTG, [place] Level contact guard   Functional Mobility Goal OT LTG, Assist Device rolling walker   Functional Mobility Goal OT LTG, Additional Goal Pt will walk 150' with cga using RW   Functional Mobility Goal OT LTG, Outcome goal ongoing

## 2017-06-12 NOTE — THERAPY EVALUATION
Acute Care - Occupational Therapy Initial Evaluation   [facility]     Patient Name: Sigrid Casarez  : 1940  MRN: [id number]  Today's Date: 2017  Onset of Illness/Injury or Date of Surgery Date: 06/10/17  Date of Referral to OT: 17  Referring Physician: Dr. Sheehan    Admit Date: 6/10/2017       ICD-10-CM ICD-9-CM   1. Hip fracture, left, closed, initial encounter S72.002A 820.8   2. Closed fracture of neck of left femur, initial encounter S72.002A 820.8   3. Anemia, unspecified type D64.9 285.9   4. Chronic kidney disease, stage IV (severe) N18.4 585.4   5. Diabetic peripheral neuropathy E11.42 250.60     357.2   6. Acquired hypothyroidism E03.9 244.9   7. Type 1 diabetes mellitus with diabetic chronic kidney disease, unspecified CKD stage E10.22 250.41    N18.9 585.9   8. Impaired mobility and ADLs Z74.09 799.89     Patient Active Problem List   Diagnosis   • Chronic anemia   • Chronic kidney disease, stage IV (severe)   • Depression   • Diabetic peripheral neuropathy   • 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   • Dizziness   • Fatigue   • Essential hypertension   • Hypoglycemia   • Acquired hypothyroidism   • Diabetic macular edema   • Menopause present   • Mixed hyperlipidemia   • Osteopenia   • Type 1 diabetes mellitus   • Vitamin D deficiency   • Bilateral nondiabetic proliferative retinopathy   • Hip fracture, left   • Status post fall   • Type 2 diabetes mellitus with nephropathy   • Chronic kidney disease, stage IV (severe)     Past Medical History:   Diagnosis Date   • Anemia    • Chronic kidney disease    • Chronic kidney disease    • Community acquired pneumonia    • Dexa Body Composition study lumosacral spine and femur     ostepenic   • Diabetic nephropathy, type I    • Diabetic peripheral neuropathy type 1    • Hypertension    • Menopause    • Pneumonia      Past Surgical History:   Procedure Laterality Date   • CATARACT  EXTRACTION     • CHOLECYSTECTOMY     • HIP CANNULATED SCREW PLACEMENT Left 6/11/2017    Procedure:  LEFT HIP MULTIPLE CANNULATED COMPRESSION SCREWS- FERMORAL NECK  FRACTURE;  Surgeon: Jimmy Sheehan MD;  Location: [facility];  Service:           OT ASSESSMENT FLOWSHEET (last 72 hours)      OT Evaluation       06/12/17 0946 06/11/17 2030 06/11/17 1611 06/10/17 2015 06/10/17 1800    Rehab Evaluation    Document Type evaluation  -        Subjective Information agree to therapy;no complaints  -        Patient Effort, Rehab Treatment good  -        Symptoms Noted During/After Treatment increased pain  -        General Information    Patient Profile Review yes  -        Onset of Illness/Injury or Date of Surgery Date 06/10/17  -        Referring Physician Dr. Sheehan  -        General Observations Pt received supine in bed with 2L O2 via nc.  -        Pertinent History Of Current Problem Pt with fall resulting in hip fracture with ORIF LLE.  -        Precautions/Limitations --   Pt is WBAT LLE  -        Prior Level of Function independent:;all household mobility;ADL's  -        Equipment Currently Used at Home cane, straight;commode;shower chair  - walker, standard;oxygen;glucometer  - walker, standard;oxygen  -LP glucometer  -     Plans/Goals Discussed With patient;agreed upon  -        Risks Reviewed patient:;increased discomfort  -        Benefits Reviewed patient:;improve function;increase independence;increase strength  -        Living Environment    Lives With spouse  -        Living Arrangements house  -        Home Accessibility bed and bath on same level;stairs to enter home  -        Number of Stairs to Enter Home 2   Pt has 2 steps to front door and 3 steps to back door  -        Transportation Available  ambulance  -  ambulance  -     Clinical Impression    Date of Referral to OT 06/11/17  -        OT Diagnosis decreased ADL independence  -         Patient/Family Goals Statement Pt would like to return home, but is agreeable to rehab if needed  -        Criteria for Skilled Therapeutic Interventions Met yes;treatment indicated  -        Rehab Potential good, to achieve stated therapy goals  -        Anticipated Discharge Disposition home with home health  -        Functional Level Prior    Ambulation 0-->independent  -AH    0-->independent  -CC    Transferring 0-->independent  -AH    0-->independent  -CC    Toileting 0-->independent  -AH    0-->independent  -CC    Bathing 0-->independent  -AH    0-->independent  -CC    Dressing 0-->independent  -AH    0-->independent  -CC    Eating 0-->independent  -AH    0-->independent  -CC    Communication 0-->understands/communicates without difficulty  -    0-->understands/communicates without difficulty  -    Swallowing 0-->swallows foods/liquids without difficulty  -AH    0-->swallows foods/liquids without difficulty  -    Prior Functional Level Comment     none  -    Vital Signs    Pre SpO2 (%) 96  -AH        O2 Delivery Pre Treatment supplemental O2   2L  -AH        Intra SpO2 (%) 84   sats were noted to drop prior to activity, O2 was replaced  -        O2 Delivery Intra Treatment room air  -        Post SpO2 (%) 96  -AH        O2 Delivery Post Treatment supplemental O2   2L  -AH        Pre Patient Position Supine  -        Intra Patient Position Sitting  -        Post Patient Position Sitting  -        Pain Assessment    Pain Assessment 0-10  -        Pain Score 0  -        Post Pain Score 0   Pt c/o 8/10 pain transitioning from supine>sit/stand>sit  -        Pain Type Acute pain;Surgical pain  -        Pain Location Leg  -        Pain Orientation Left  -        Pain Intervention(s) Repositioned;Ambulation/increased activity  -        Response to Interventions tolerated  -        Vision Assessment/Intervention    Visual Impairment WFL with corrective lenses  -         Cognitive Assessment/Intervention    Current Cognitive/Communication Assessment functional  -        Orientation Status oriented x 4  -        Follows Commands/Answers Questions able to follow multi-step instructions  -        Personal Safety WNL/WFL  -        ROM (Range of Motion)    General ROM no range of motion deficits identified  -        MMT (Manual Muscle Testing)    General MMT Assessment no strength deficits identified  -        Bed Mobility, Assessment/Treatment    Bed Mobility, Assistive Device bed rails;head of bed elevated  -        Bed Mob, Supine to Sit, [place]th[place]w minimum assist (75% patient effort)  -        Transfer Assessment/Treatment    Transfers, Sit-Stand Barth[place]w minimum assist (75% patient effort)  -        Transfers, Stand-Sit [place]w minimum assist (75% patient effort)  -        Transfers, Sit-Stand-Sit, Assist Device rolling walker  -        Functional Mobility    Functional Mobility- Ind. Level contact guard assist  -        Functional Mobility- Device rolling walker  -        Functional Mobility-Distance (Feet) 66  -        Functional Mobility-Maintain WBing able to maintain weight bearing status  -        Functional Mobility- Safety Issues supplemental O2  -        Upper Body Bathing Assessment/Training    UB Bathing Assess/Train, Barth[place]w Level set up required  -        Lower Body Bathing Assessment/Training    LB Bathing Assess/Train, [place]th[place]w Level minimum assist (75% patient effort)  -        Upper Body Dressing Assessment/Training    UB Dressing Assess/Train, Bartholomew set up required  -        Lower Body Dressing Assessment/Training    LB Dressing Assess/Train, Bartholomew minimum assist (75% patient effort)  -        Toileting Assessment/Training    Toileting Assess/Train, Indepen Level minimum assist (75% patient effort)  -        Grooming Assessment/Training    Grooming Assess/Train, Indepen Level independent   -        General Therapy Interventions    Planned Therapy Interventions adaptive equipment training;ADL retraining;strengthening;transfer training  -        Positioning and Restraints    Pre-Treatment Position in bed  -        Post Treatment Position chair  -        In Chair sitting;call light within reach;encouraged to call for assist  -          06/10/17 1700 06/10/17 0939             General Information    Equipment Currently Used at Home none  -CC        Living Environment    Lives With  spouse  -       Living Arrangements  house  -       Home Accessibility  no concerns  -       Stair Railings at Home  inside, present on left side;outside, present on left side  -       Type of Financial/Environmental Concern  none  -CC       Transportation Available  car  -CC         User Key  (r) = Recorded By, (t) = Taken By, (c) = Cosigned By    Initials Name Effective Dates     Aline Frost 10/26/16 -     CC Kitty Knapp RN 10/26/16 -     DW Kina Jeronimo RN 10/26/16 -     LATISHA Botello 10/26/16 -            Occupational Therapy Education     Title: PT OT SLP Therapies (Active)     Topic: Occupational Therapy (Active)     Point: ADL training (Done)    Description: Instruct learner(s) on proper safety adaptation and remediation techniques during self care or transfers.   Instruct in proper use of assistive devices.    Learning Progress Summary    Learner Readiness Method Response Comment Documented by Status   Patient Acceptance E VU Role of OT  06/12/17 1138 Done                      User Key     Initials Effective Dates Name Provider Type Discipline     10/26/16 -  Aline Frost Occupational Therapist OT                  OT Recommendation and Plan  Anticipated Discharge Disposition: home with home health  Planned Therapy Interventions: adaptive equipment training, ADL retraining, strengthening, transfer training  Plan of Care Review  Plan Of Care Reviewed With: patient  Progress:   (OT evaluation completed today)  Outcome Summary/Follow up Plan: Pt seen for OT evaluation today.  Pt presents with decreased independence with self care, transfers and functional mobility tasks.  Pt is expected to benefit from skilled OT to improve her functional independence with ADL tasks.          OT Goals       06/12/17 1139          Bed Mobility OT LTG    Bed Mobility OT LTG, Date Established 06/12/17  -      Bed Mobility OT LTG, Time to Achieve 2 wks  -      Bed Mobility OT LTG, Activity Type supine to sit/sit to supine  -      Bed Mobility OT LTG, [place] Level contact guard assist  -      Bed Mobility OT LTG, Assist Device bed rails  -      Bed Mobility OT LTG, Outcome goal ongoing  -      Strength OT LTG    Strength Goal OT LTG, Date Established 06/12/17  -      Strength Goal OT LTG, Time to Achieve 2 wks  -      Strength Goal OT LTG, Functional Goal Pt will participate in BUE strengthening ex using theraband for resistance  -      Strength Goal OT LTG, Outcome goal ongoing  -      LB Dressing OT LTG    LB Dressing Goal OT LTG, Date Established 06/12/17  -      LB Dressing Goal OT LTG, Time to Achieve 2 wks  -      LB Dressing Goal OT LTG, [place] Level contact guard assist  -      LB Dressing Goal OT LTG, Adaptive Equipment sock-aid  -      LB Dressing Goal OT LTG, Outcome goal ongoing  -      Functional Mobility OT LTG    Functional Mobility Goal OT LTG, Date Established 06/12/17  -      Functional Mobility Goal OT LTG, Time to Achieve 2 wks  -      Functional Mobility Goal OT LTG, [place] Level contact guard  -      Functional Mobility Goal OT LTG, Assist Device rolling walker  -      Functional Mobility Goal OT LTG, Additional Goal Pt will walk 150' with cga using RW  -      Functional Mobility Goal OT LTG, Outcome goal ongoing  -        User Key  (r) = Recorded By, (t) = Taken By, (c) = Cosigned By    Initials Name Provider Type    YAIR Mireles  Sant[place] Occupational Therapist                Outcome Measures       06/12/17 0946          How much help from another is currently needed...    Putting on and taking off regular lower body clothing? 3  -AH      Bathing (including washing, rinsing, and drying) 3  -AH      Toileting (which includes using toilet bed pan or urinal) 3  -AH      Putting on and taking off regular upper body clothing 4  -AH      Taking care of personal grooming (such as brushing teeth) 4  -AH      Eating meals 4  -      Score 21  -      Functional Assessment    Outcome Measure Options AM-PAC 6 Clicks Daily Activity (OT)  -        User Key  (r) = Recorded By, (t) = Taken By, (c) = Cosigned By    Initials Name Provider Type     Aline Frost Occupational Therapist          Time Calculation:   OT Start Time: 0946    Therapy Charges for Today     Code Description Service Date Service Provider Modifiers Qty    [id number]  OT EVAL LOW COMPLEXITY 4 6/12/2017 Aline Frost GO 1               Aline Frost  6/12/2017

## 2017-06-12 NOTE — PLAN OF CARE
Problem: Patient Care Overview (Adult)  Goal: Plan of Care Review  Outcome: Ongoing (interventions implemented as appropriate)    06/11/17 2030   Coping/Psychosocial Response Interventions   Plan Of Care Reviewed With patient   Patient Care Overview   Progress improving   Outcome Evaluation   Outcome Summary/Follow up Plan patient minimal complaint of pain-post op left hip fracture per Dr. Sheehan-dressing intact-blood sugar coverage per protocol-will monitor       Goal: Adult Individualization and Mutuality  Outcome: Ongoing (interventions implemented as appropriate)  Goal: Discharge Needs Assessment  Outcome: Ongoing (interventions implemented as appropriate)    Problem: Fall Risk (Adult)  Goal: Identify Related Risk Factors and Signs and Symptoms  Outcome: Ongoing (interventions implemented as appropriate)  Goal: Absence of Falls  Outcome: Ongoing (interventions implemented as appropriate)    Problem: Skin Integrity Impairment, Risk/Actual (Adult)  Goal: Identify Related Risk Factors and Signs and Symptoms  Outcome: Ongoing (interventions implemented as appropriate)  Goal: Skin Integrity/Wound Healing  Outcome: Ongoing (interventions implemented as appropriate)    Problem: Pain, Acute (Adult)  Goal: Identify Related Risk Factors and Signs and Symptoms  Outcome: Ongoing (interventions implemented as appropriate)  Goal: Acceptable Pain Control/Comfort Level  Outcome: Ongoing (interventions implemented as appropriate)

## 2017-06-12 NOTE — PLAN OF CARE
Problem: Patient Care Overview (Adult)  Goal: Plan of Care Review  Outcome: Ongoing (interventions implemented as appropriate)    06/12/17 1948   Coping/Psychosocial Response Interventions   Plan Of Care Reviewed With patient   Patient Care Overview   Progress progress toward functional goals as expected   Outcome Evaluation   Outcome Summary/Follow up Plan Patient is able to perform LLE therapeutic exercises and gait with RW. She is able to improve gait quality this afternoon. She is expected to continue progressing with additional PT services.         Problem: Inpatient Physical Therapy  Goal: Bed Mobility Goal LTG- PT  Outcome: Ongoing (interventions implemented as appropriate)    06/12/17 1518   Bed Mobility PT LTG   Bed Mobility PT LTG, Date Established 06/12/17   Bed Mobility PT LTG, Time to Achieve 2 wks   Bed Mobility PT LTG, Activity Type all bed mobility   Bed Mobility PT LTG, [place] Level conditional independence   Bed Mobility PT Goal LTG, Assist Device bed rails   Bed Mobility PT LTG, Date Goal Reviewed 06/12/17   Bed Mobility PT LTG, Outcome goal ongoing       Goal: Transfer Training Goal 1 LTG- PT  Outcome: Ongoing (interventions implemented as appropriate)    06/12/17 1518   Transfer Training PT LTG   Transfer Training PT LTG, Date Established 06/12/17   Transfer Training PT LTG, Time to Achieve 2 wks   Transfer Training PT LTG, Activity Type sit to stand/stand to sit;bed to chair /chair to bed   Transfer Training PT LTG, [place] Level supervision required   Transfer Training PT LTG, Assist Device walker, rolling   Transfer Training PT LTG, Date Goal Reviewed 06/12/17   Transfer Training PT LTG, Outcome goal ongoing       Goal: Gait Training Goal LTG- PT  Outcome: Ongoing (interventions implemented as appropriate)    06/12/17 1518   Gait Training PT LTG   Gait Training Goal PT LTG, Date Established 06/12/17   Gait Training Goal PT LTG, Time to Achieve 2 wks   Gait Training Goal PT LTG,  [place] Level supervision required   Gait Training Goal PT LTG, Assist Device walker, rolling   Gait Training Goal PT LTG, Distance to Achieve 125   Gait Training Goal PT LTG, Additional Goal with even step lengths and heel strike for initial contact at least 75% of the time   Gait Training Goal PT LTG, Date Goal Reviewed 06/12/17   Gait Training Goal PT LTG, Outcome goal ongoing

## 2017-06-12 NOTE — PROGRESS NOTES
[facility]    Name:  Sigrid Casarez   Age:  77 y.o.  Sex:  female  :  1940  MRN:  [id number]   Visit Number:  [id number]  Admission Date:  6/10/2017  Date Of Service:  17  Primary Care Physician:  Tiburcio Hernandez MD     LOS: 2 days :  Patient Care Team:  Tiburcio Hernandez MD as PCP - General:    Chief Complaint:      Left hip pain.    Subjective / Interval History:     Patient is currently lying down in the bed and is comfortable.  Her left hip pain has improved.  She is status post ORIF of the left hip done by Dr. Sheehan yesterday.  She received 1 unit of packed red blood cells transfusion yesterday and her hemoglobin has remained stable.  She did not require any further transfusions.  No fevers, chest pain or shortness of breath.    Review of Systems:     General ROS: Patient denies any fevers, chills or loss of consciousness.  Respiratory ROS: Denies cough or shortness of breath.  Cardiovascular ROS: Denies chest pain or palpitations. No history of exertional chest pain.  Gastrointestinal ROS: Denies nausea and vomiting. Denies any abdominal pain. No diarrhea.  Neurological ROS: Denies any focal weakness. No loss of consciousness. Denies any numbness.  Dermatological ROS: Denies any redness or pruritis.    Vital Signs:    Temp:  [97.9 °F (36.6 °C)-98.3 °F (36.8 °C)] 98.2 °F (36.8 °C)  Heart Rate:  [71-87] 72  Resp:  [16-18] 16  BP: (137-155)/(41-49) 137/48    Intake and output:    I/O last 3 completed shifts:  In: 3293.3 [P.O.:1500; I.V.:1393.3; Blood:300; IV Piggyback:100]  Out: 1000 [Urine:950; Blood:50]  I/O this shift:  In: 360 [P.O.:360]  Out: -     Physical Examination:    General Appearance:  Alert and cooperative, not in any acute distress.   Head:  Atraumatic and normocephalic, without obvious abnormality.   Eyes:          PERRLA, conjunctivae and sclerae normal, no Icterus. No pallor. Extra-occular movements are within normal limits.   Neck:  Supple, trachea midline, no thyromegaly, no carotid bruit.   Lungs:   Chest shape is normal. Breath sounds heard bilaterally equally.  No crackles or wheezing. No Pleural rub or bronchial breathing.   Heart:  Normal S1 and S2, no murmur, no gallop, no rub. No JVD   Abdomen:   Normal bowel sounds, no masses, no organomegaly. Soft       non-tender, non-distended, no guarding, no rebound tenderness.   Extremities: Moves all extremities well, no edema, no cyanosis, no            Clubbing.  Surgical bandage is noted in the lateral aspect of the left midthigh.   Skin: No bleeding, bruising or rash.   Neurologic: Awake, alert and oriented times 3. Moves all 4 extremities equally.   Laboratory results:      Results from last 7 days  Lab Units 06/12/17  0528 06/11/17  0449 06/10/17  0747   SODIUM mmol/L 133* 139 141   POTASSIUM mmol/L 5.1 4.0 4.0   CHLORIDE mmol/L 104 111* 107   TOTAL CO2 mmol/L 12.0* 21.0* 22.0*   BUN mg/dL 56* 44* 52*   CREATININE mg/dL 3.60* 3.20* 3.00*   CALCIUM mg/dL 8.2* 7.9* 9.0   BILIRUBIN mg/dL  --   --  0.6   ALK PHOS U/L  --   --  97   ALT (SGPT) U/L  --   --  25   AST (SGOT) U/L  --   --  24   GLUCOSE mg/dL 437* 119* 202*       Results from last 7 days  Lab Units 06/12/17  0528 06/11/17  0703 06/11/17  0449 06/10/17  0747   WBC 10*3/mm3 9.89  --  7.26 10.01   HEMOGLOBIN g/dL 9.4* 7.8* 7.6* 9.9*   HEMATOCRIT % 30.3* 25.0* 24.1* 30.2*   PLATELETS 10*3/mm3 139  --  139 178       Results from last 7 days  Lab Units 06/10/17  0747   INR  1.12*       Results from last 7 days  Lab Units 06/10/17  0751   TROPONIN I ng/mL <0.012       Results from last 7 days  Lab Units 06/11/17  0457 06/11/17  0454 06/10/17  1405   BLOODCX  No growth at 24 hours No growth at 24 hours  --    URINECX   --   --  >100,000 CFU/mL Citrobacter freundii complex*     I have reviewed the patient's laboratory results.    Radiology results:    Imaging Results (last 24 hours)     Procedure Component Value Units Date/Time    XR Hip 1  View Without Pelvis Left (Surgery Only) [[id number]] Collected:  06/12/17 0956     Updated:  06/12/17 1051    Narrative:       PROCEDURE: XR HIP W OR WO PELVIS 1 VIEW LEFT-, XR HIP 1 VIEW WO PELVIS  LEFT- performed at 10:54 AM     HISTORY: post surgery; S72.002A-Fracture of unspecified part of neck of  left femur, initial encounter for closed fracture; S72.002A-Fracture of  unspecified part of neck of left femur, initial encounter for closed  fracture; D64.9-Anemia, unspecified; N18.4-Chronic kidney disease, stage  4 (severe); E11.42-Type 2 diabetes mellitus with diabetic  polyneuropathy; E03.9-Hypothyroidism, unspecified; E10.22-Type 1     COMPARISON: 3 hours prior.     FINDINGS:  A 2 view exam demonstrates 3 screws spanning a subcapital  left femoral neck fracture. The fracture appears well aligned.   Skin  staples are present.       Impression:       Postsurgical changes as above.                  PROCEDURE: XR HIP W OR WO PELVIS 1 VIEW LEFT-, XR HIP 1 VIEW WO PELVIS  LEFT- performed at 7:37 AM     History: post surgery; S72.002A-Fracture of unspecified part of neck of  left femur, initial encounter for closed fracture; S72.002A-Fracture of  unspecified part of neck of left femur, initial encounter for closed  fracture; D64.9-Anemia, unspecified; N18.4-Chronic kidney disease, stage  4 (severe); E11.42-Type 2 diabetes mellitus with diabetic  polyneuropathy; E03.9-Hypothyroidism, unspecified; E10.22-Type 1     COMPARISON: None.     FINDINGS:  A 4 view intraoperative exam demonstrates 3 screws spanning a  left subcapital femoral neck fracture.  Fracture appears well aligned.     IMPRESSION: Postsurgical changes as above.                 Images were reviewed, interpreted, and dictated by Dr. Solano.  Transcribed by Dariela Medina PA-C.     This report was finalized on 6/12/2017 10:49 AM by Mariel Solano M.D..    XR Hip With or Without Pelvis 1 View Left [[id number]] Collected:  06/12/17 0956     Updated:   06/12/17 1051    Narrative:       PROCEDURE: XR HIP W OR WO PELVIS 1 VIEW LEFT-, XR HIP 1 VIEW WO PELVIS  LEFT- performed at 10:54 AM     HISTORY: post surgery; S72.002A-Fracture of unspecified part of neck of  left femur, initial encounter for closed fracture; S72.002A-Fracture of  unspecified part of neck of left femur, initial encounter for closed  fracture; D64.9-Anemia, unspecified; N18.4-Chronic kidney disease, stage  4 (severe); E11.42-Type 2 diabetes mellitus with diabetic  polyneuropathy; E03.9-Hypothyroidism, unspecified; E10.22-Type 1     COMPARISON: 3 hours prior.     FINDINGS:  A 2 view exam demonstrates 3 screws spanning a subcapital  left femoral neck fracture. The fracture appears well aligned.   Skin  staples are present.       Impression:       Postsurgical changes as above.                  PROCEDURE: XR HIP W OR WO PELVIS 1 VIEW LEFT-, XR HIP 1 VIEW WO PELVIS  LEFT- performed at 7:37 AM     History: post surgery; S72.002A-Fracture of unspecified part of neck of  left femur, initial encounter for closed fracture; S72.002A-Fracture of  unspecified part of neck of left femur, initial encounter for closed  fracture; D64.9-Anemia, unspecified; N18.4-Chronic kidney disease, stage  4 (severe); E11.42-Type 2 diabetes mellitus with diabetic  polyneuropathy; E03.9-Hypothyroidism, unspecified; E10.22-Type 1     COMPARISON: None.     FINDINGS:  A 4 view intraoperative exam demonstrates 3 screws spanning a  left subcapital femoral neck fracture.  Fracture appears well aligned.     IMPRESSION: Postsurgical changes as above.                 Images were reviewed, interpreted, and dictated by Dr. Solano.  Transcribed by Dariela Medina PA-C.     This report was finalized on 6/12/2017 10:49 AM by Mariel Solano M.D..        Medication Review:     I have reviewed the patients active and prn medications.     Principal Problem:    Hip fracture, left  Active Problems:    Status post fall    Chronic kidney  disease, stage IV (severe)    Chronic anemia    Essential hypertension    Type 2 diabetes mellitus with nephropathy    Acquired hypothyroidism      Assessment:    1. Left hip nondisplaced fracture, present on admission, Status post ORIF on 6/11/2017.  2. Status post mechanical fall at home.  3.  Acute blood loss anemia secondary to #1, status post 1 unit of packed red blood cell transfusion.  4. Diabetes mellitus type 2 with nephropathy.  5. Chronic kidney disease stage IV.  6. Essential hypertension.  7. Acquired hypothyroidism.  8. Anemia of chronic disease.    Plan:    Patient is currently doing better and has tolerated the left hip surgery very well.  She was seen by Dr. Sheehan this morning and she will be started on physical and occupational therapy.  She is on Lovenox for DVT prophylaxis.  She is also being followed by Dr. Steve and Dr. Mejia.  Her creatinine is 3.6 today which is slightly worse compared to her baseline.  Her anemia is likely related to chronic kidney disease but she did have acute blood loss related worsening due to the hip fracture.  We will hold off any further blood transfusions at this time.  Hopefully, she should be able to go to rehabilitation facility when bed is available.    Jordan Ribera MD  06/12/17  12:55 PM    Please note that portions of this note may have been completed with a voice recognition program. Efforts were made to edit the dictations, but occasionally words are mistranscribed.

## 2017-06-12 NOTE — PROGRESS NOTES
"Patient: Sigrid Casarez  Procedure(s):   LEFT HIP MULTIPLE CANNULATED COMPRESSION SCREWS- FERMORAL NECK  FRACTURE  Anesthesia type: [unfilled]    Patient location: [facility]  Last vitals: /44  Pulse 71  Temp 97.8 °F (36.6 °C) (Oral)   Resp 16  Ht 63\" (160 cm)  Wt 148 lb 11.2 oz (67.4 kg)  SpO2 96%  BMI 26.34 kg/m2  Level of consciousness: awake, alert and oriented    Post-anesthesia pain: adequate analgesia  Airway patency: patent  Respiratory: unassisted  Cardiovascular: stable and blood pressure at baseline  Hydration: euvolemic    Anesthetic complications: no  "

## 2017-06-12 NOTE — PROGRESS NOTES
[facility]  PROGRESS NOTE    Name:  Sigrid Casarez   Age:  77 y.o.  Sex:  female  :  1940  MRN:  [id number]   Visit Number:  [id number]  Admission Date:  06/10/17    Date Of Service:  17  Primary Care Physician:  Tiburcio Hernandez MD     LOS: 2 days :  Patient Care Team:  Tiburcio Hernandez MD as PCP - General:    Chief Complaint:      Pain controlled, patient comfortable, tolerating diet.  Complains of some contralateral right heel soreness and skin precautions observed.    Subjective / Interval History:     As above noted in chief complaint.  She is post-op day #1 from left hip compression screws for femoral neck fracture.    Review of Systems:     General ROS: No fever spike, no chills or loss of consciousness.  Ophthalmic ROS: no acute visual disturbance.  ENT ROS: No sinus congestion or sore throat.   Respiratory ROS: No shortness of breath.   Cardiovascular ROS: No chest pain or palpitations.  Gastrointestinal ROS: No acute abdominal change. Non-distended.  Genito-Urinary ROS: No reported dysuria or hematuria.  Musculoskeletal ROS: No deep calf pain. Shavon sign negative.  No acute focal motor deficit.  Neurological ROS: No cyanosis, no new numbness or tingling.  Dermatological ROS: No erythema.  No rash or pruritis.  Left hip incision clean, dry, no erythema, no drainage, pristine.    Vital Signs:    Temp:  [97.9 °F (36.6 °C)-98.3 °F (36.8 °C)] 98.2 °F (36.8 °C)  Heart Rate:  [71-87] 72  Resp:  [16-18] 16  BP: (137-155)/(41-49) 137/48    Intake and output:    I/O last 3 completed shifts:  In: 3293.3 [P.O.:1500; I.V.:1393.3; Blood:300; IV Piggyback:100]  Out: 1000 [Urine:950; Blood:50]  I/O this shift:  In: 360 [P.O.:360]  Out: -     Physical Examination:    General Appearance:    Alert and cooperative, no acute distress.   Head:    Atraumatic and normocephalic, without obvious abnormality.   Eyes:    PERRLA.  Extraocular movements are within normal limits.   ENT:   No acute change.    Neck:   Supple, trachea midline.   Lungs:     Normal respirations, unlabored.    Heart:    Regular rate.   Abdomen:     Soft non-tender, non-distended.   Extremities:   No new motor deficit, no calf pain, Shavon sign negative.   Skin:     No rash.  No cyanosis.  No erythema.  No active drainage.  With contact precautions, sterile dressing change done.   Neurologic:   Sensation intact, pulses intact.     Laboratory results:    Lab Results (last 24 hours)     Procedure Component Value Units Date/Time    Hemoglobin A1c [[id number]]  (Abnormal) Collected:  06/11/17 0449    Specimen:  Blood Updated:  06/12/17 0242     Hemoglobin A1C 8.8 (H) %     Narrative:       Expected HgbA1C results:     3% to 6% HgbA1C      HgbA1C                 Estimated Average Glucose     5%                              97 mg/dl   6%                             126 mg/dl   7%                             154 mg/dl   8%                             183 mg/dl   9%                             212 mg/dl  >10%                           >240 mg/dl      Blood Culture [[id number]]  (Normal) Collected:  06/11/17 0457    Specimen:  Blood from Arm, Left Updated:  06/12/17 0601     Blood Culture No growth at 24 hours    Blood Culture [[id number]]  (Normal) Collected:  06/11/17 0454    Specimen:  Blood from Hand, Left Updated:  06/12/17 0601     Blood Culture No growth at 24 hours    POC Glucose Fingerstick [[id number]]  (Abnormal) Collected:  06/11/17 1338    Specimen:  Blood Updated:  06/12/17 0631     Glucose 182 (H) mg/dL       Serial Number: [id number]    : [id number]       POC Glucose Fingerstick [[id number]]  (Abnormal) Collected:  06/11/17 1605    Specimen:  Blood Updated:  06/12/17 0631     Glucose 276 (H) mg/dL       Serial Number: [id number]    : [id number]       CBC & Differential [[id number]] Collected:  06/12/17 0528    Specimen:  Blood Updated:  06/12/17 0631    Narrative:       The following orders were created for panel order CBC &  Differential.  Procedure                               Abnormality         Status                     ---------                               -----------         ------                     CBC Auto Differential[[id number]]        Abnormal            Final result                 Please view results for these tests on the individual orders.    CBC Auto Differential [[id number]]  (Abnormal) Collected:  06/12/17 0528    Specimen:  Blood Updated:  06/12/17 0631     WBC 9.89 10*3/mm3      RBC 3.15 (L) 10*6/mm3      Hemoglobin 9.4 (L) g/dL      Hematocrit 30.3 (L) %      MCV 96.2 fL      MCH 29.8 pg      MCHC 31.0 g/dL      RDW 13.8 %      RDW-SD 48.8 fl      MPV 12.4 (H) fL      Platelets 139 10*3/mm3      Neutrophil % 91.1 (H) %      Lymphocyte % 4.6 (L) %      Monocyte % 2.8 %      Eosinophil % 0.0 %      Basophil % 0.2 %      Immature Grans % 1.3 (H) %      Neutrophils, Absolute 9.01 (H) 10*3/mm3      Lymphocytes, Absolute 0.45 (L) 10*3/mm3      Monocytes, Absolute 0.28 10*3/mm3      Eosinophils, Absolute 0.00 10*3/mm3      Basophils, Absolute 0.02 10*3/mm3      Immature Grans, Absolute 0.13 (H) 10*3/mm3      nRBC 0.0 /100 WBC     POC Glucose Fingerstick [[id number]]  (Abnormal) Collected:  06/11/17 2007    Specimen:  Blood Updated:  06/12/17 0631     Glucose 350 (H) mg/dL       Serial Number: [id number]    : [id number]       POC Glucose Fingerstick [[id number]]  (Abnormal) Collected:  06/12/17 0612    Specimen:  Blood Updated:  06/12/17 0632     Glucose 453 (C) mg/dL       Serial Number: [id number]    : [id number]       Basic Metabolic Panel [[id number]]  (Abnormal) Collected:  06/12/17 0528    Specimen:  Blood Updated:  06/12/17 0655     Glucose 437 (C) mg/dL      BUN 56 (H) mg/dL      Creatinine 3.60 (H) mg/dL      Sodium 133 (L) mmol/L      Potassium 5.1 mmol/L      Chloride 104 mmol/L      CO2 12.0 (L) mmol/L      Calcium 8.2 (L) mg/dL      eGFR Non African Amer 12 (L) mL/min/1.73      BUN/Creatinine Ratio 15.6      Anion Gap 22.1 mmol/L     Narrative:       The MDRD GFR formula is only valid for adults with stable renal function between ages 18 and 70.    Urine Culture [[id number]]  (Abnormal)  (Susceptibility) Collected:  06/10/17 1405    Specimen:  Urine from Urine, Clean Catch Updated:  06/12/17 1027     Urine Culture >100,000 CFU/mL Citrobacter freundii complex (A)      Identification and KASSANDRA to follow.         Susceptibility      Citrobacter freundii complex     KASSANDRA     Amikacin <=16 ug/ml Susceptible     Ampicillin <=8 ug/ml Resistant     Ampicillin + Sulbactam <=8/4 ug/ml Resistant     Aztreonam <=4 ug/ml Susceptible     Cefazolin >16 ug/ml Resistant     Cefepime <=4 ug/ml Susceptible     Cefotaxime <=2 ug/ml Susceptible     Cefoxitin >16 ug/ml Resistant     Ceftazidime <=1 ug/ml Susceptible     Ceftriaxone <=8 ug/ml Susceptible     Cefuroxime 8 ug/ml Resistant     Ciprofloxacin <=1 ug/ml Susceptible     Doripenem <=0.5 ug/ml Susceptible     Ertapenem <=1 ug/ml Susceptible     Gentamicin <=4 ug/ml Susceptible     Levofloxacin <=2 ug/ml Susceptible     Nitrofurantoin <=32 ug/ml Susceptible     Piperacillin + Tazobactam <=16 ug/ml Susceptible     Tetracycline <=4 ug/ml Susceptible     Tigecycline <=1 ug/ml Susceptible     Tobramycin <=4 ug/ml Susceptible     Trimethoprim + Sulfamethoxazole <=2/38 ug/ml Susceptible                          I have reviewed the patient's laboratory results.    Radiology results:    Imaging Results (last 24 hours)     Procedure Component Value Units Date/Time    XR Hip 1 View Without Pelvis Left (Surgery Only) [[id number]] Collected:  06/12/17 0956     Updated:  06/12/17 1051    Narrative:       PROCEDURE: XR HIP W OR WO PELVIS 1 VIEW LEFT-, XR HIP 1 VIEW WO PELVIS  LEFT- performed at 10:54 AM     HISTORY: post surgery; S72.002A-Fracture of unspecified part of neck of  left femur, initial encounter for closed fracture; S72.002A-Fracture of  unspecified part of neck of left femur, initial  encounter for closed  fracture; D64.9-Anemia, unspecified; N18.4-Chronic kidney disease, stage  4 (severe); E11.42-Type 2 diabetes mellitus with diabetic  polyneuropathy; E03.9-Hypothyroidism, unspecified; E10.22-Type 1     COMPARISON: 3 hours prior.     FINDINGS:  A 2 view exam demonstrates 3 screws spanning a subcapital  left femoral neck fracture. The fracture appears well aligned.   Skin  staples are present.       Impression:       Postsurgical changes as above.                  PROCEDURE: XR HIP W OR WO PELVIS 1 VIEW LEFT-, XR HIP 1 VIEW WO PELVIS  LEFT- performed at 7:37 AM     History: post surgery; S72.002A-Fracture of unspecified part of neck of  left femur, initial encounter for closed fracture; S72.002A-Fracture of  unspecified part of neck of left femur, initial encounter for closed  fracture; D64.9-Anemia, unspecified; N18.4-Chronic kidney disease, stage  4 (severe); E11.42-Type 2 diabetes mellitus with diabetic  polyneuropathy; E03.9-Hypothyroidism, unspecified; E10.22-Type 1     COMPARISON: None.     FINDINGS:  A 4 view intraoperative exam demonstrates 3 screws spanning a  left subcapital femoral neck fracture.  Fracture appears well aligned.     IMPRESSION: Postsurgical changes as above.                 Images were reviewed, interpreted, and dictated by Dr. Solano.  Transcribed by Dariela Medina PA-C.     This report was finalized on 6/12/2017 10:49 AM by Mariel Solano M.D..    XR Hip With or Without Pelvis 1 View Left [[id number]] Collected:  06/12/17 0956     Updated:  06/12/17 1051    Narrative:       PROCEDURE: XR HIP W OR WO PELVIS 1 VIEW LEFT-, XR HIP 1 VIEW WO PELVIS  LEFT- performed at 10:54 AM     HISTORY: post surgery; S72.002A-Fracture of unspecified part of neck of  left femur, initial encounter for closed fracture; S72.002A-Fracture of  unspecified part of neck of left femur, initial encounter for closed  fracture; D64.9-Anemia, unspecified; N18.4-Chronic kidney disease,  stage  4 (severe); E11.42-Type 2 diabetes mellitus with diabetic  polyneuropathy; E03.9-Hypothyroidism, unspecified; E10.22-Type 1     COMPARISON: 3 hours prior.     FINDINGS:  A 2 view exam demonstrates 3 screws spanning a subcapital  left femoral neck fracture. The fracture appears well aligned.   Skin  staples are present.       Impression:       Postsurgical changes as above.                  PROCEDURE: XR HIP W OR WO PELVIS 1 VIEW LEFT-, XR HIP 1 VIEW WO PELVIS  LEFT- performed at 7:37 AM     History: post surgery; S72.002A-Fracture of unspecified part of neck of  left femur, initial encounter for closed fracture; S72.002A-Fracture of  unspecified part of neck of left femur, initial encounter for closed  fracture; D64.9-Anemia, unspecified; N18.4-Chronic kidney disease, stage  4 (severe); E11.42-Type 2 diabetes mellitus with diabetic  polyneuropathy; E03.9-Hypothyroidism, unspecified; E10.22-Type 1     COMPARISON: None.     FINDINGS:  A 4 view intraoperative exam demonstrates 3 screws spanning a  left subcapital femoral neck fracture.  Fracture appears well aligned.     IMPRESSION: Postsurgical changes as above.                 Images were reviewed, interpreted, and dictated by Dr. Solano.  Transcribed by Dariela Medina PA-C.     This report was finalized on 6/12/2017 10:49 AM by Mariel Solano M.D..          I have reviewed the patient's radiology reports.    C-arm hip images and additional pelvis and hip images taken in recovery room shows no acute concern.  Good position and alignment of prosthesis and/or hardware.       Assessment/Plan    Principal Problem:    Hip fracture, left  Active Problems:    Chronic anemia    Essential hypertension    Acquired hypothyroidism    Status post fall    Type 2 diabetes mellitus with nephropathy    Chronic kidney disease, stage IV (severe)        Continue current management per the detailed orders and instructions, including skin, safety and fall precautions,  respiratory therapy with incentive spirometer, advance diet as tolerated, bowel regimen, multi-modal analgesia, multi-modal DVT prophylaxis, Hospitalist consult, PT/OT following post-surgical rehab protocol, and Case Management for discharge plans.  Patient and  request [facility].    Jimmy Sheehan MD  06/12/17  11:35 AM

## 2017-06-13 VITALS
DIASTOLIC BLOOD PRESSURE: 42 MMHG | RESPIRATION RATE: 16 BRPM | SYSTOLIC BLOOD PRESSURE: 156 MMHG | OXYGEN SATURATION: 94 % | BODY MASS INDEX: 26.35 KG/M2 | TEMPERATURE: 98.2 F | WEIGHT: 148.7 LBS | HEIGHT: 63 IN | HEART RATE: 65 BPM

## 2017-06-13 LAB
ALBUMIN SERPL-MCNC: 2.9 G/DL (ref 3.5–5)
ANION GAP SERPL CALCULATED.3IONS-SCNC: 15.6 MMOL/L
BUN BLD-MCNC: 72 MG/DL (ref 7–20)
BUN/CREAT SERPL: 18 (ref 7.1–23.5)
CALCIUM SPEC-SCNC: 8.2 MG/DL (ref 8.4–10.2)
CHLORIDE SERPL-SCNC: 105 MMOL/L (ref 98–107)
CO2 SERPL-SCNC: 19 MMOL/L (ref 26–30)
CREAT BLD-MCNC: 4 MG/DL (ref 0.6–1.3)
DEPRECATED RDW RBC AUTO: 46.6 FL (ref 37–54)
ERYTHROCYTE [DISTWIDTH] IN BLOOD BY AUTOMATED COUNT: 13.6 % (ref 11.5–14.5)
GFR SERPL CREATININE-BSD FRML MDRD: 11 ML/MIN/1.73
GLUCOSE BLD-MCNC: 135 MG/DL (ref 74–98)
GLUCOSE BLDC GLUCOMTR-MCNC: 146 MG/DL (ref 70–130)
GLUCOSE BLDC GLUCOMTR-MCNC: 206 MG/DL (ref 70–130)
GLUCOSE BLDC GLUCOMTR-MCNC: 340 MG/DL (ref 70–130)
GLUCOSE BLDC GLUCOMTR-MCNC: 413 MG/DL (ref 70–130)
HCT VFR BLD AUTO: 29.9 % (ref 37–47)
HGB BLD-MCNC: 9.7 G/DL (ref 12–16)
MCH RBC QN AUTO: 30.3 PG (ref 27–31)
MCHC RBC AUTO-ENTMCNC: 32.4 G/DL (ref 30–37)
MCV RBC AUTO: 93.4 FL (ref 81–99)
PHOSPHATE SERPL-MCNC: 6.4 MG/DL (ref 2.5–4.5)
PLATELET # BLD AUTO: 163 10*3/MM3 (ref 130–400)
PMV BLD AUTO: 12.4 FL (ref 6–12)
POTASSIUM BLD-SCNC: 4.6 MMOL/L (ref 3.5–5.1)
RBC # BLD AUTO: 3.2 10*6/MM3 (ref 4.2–5.4)
SODIUM BLD-SCNC: 135 MMOL/L (ref 137–145)
WBC NRBC COR # BLD: 10.65 10*3/MM3 (ref 4.8–10.8)

## 2017-06-13 PROCEDURE — 97530 THERAPEUTIC ACTIVITIES: CPT

## 2017-06-13 PROCEDURE — 99238 HOSP IP/OBS DSCHRG MGMT 30/<: CPT | Performed by: INTERNAL MEDICINE

## 2017-06-13 PROCEDURE — 97116 GAIT TRAINING THERAPY: CPT

## 2017-06-13 PROCEDURE — 63710000001 INSULIN ASPART PER 5 UNITS: Performed by: INTERNAL MEDICINE

## 2017-06-13 PROCEDURE — 85027 COMPLETE CBC AUTOMATED: CPT | Performed by: INTERNAL MEDICINE

## 2017-06-13 PROCEDURE — 97110 THERAPEUTIC EXERCISES: CPT

## 2017-06-13 PROCEDURE — 80069 RENAL FUNCTION PANEL: CPT | Performed by: INTERNAL MEDICINE

## 2017-06-13 PROCEDURE — 82962 GLUCOSE BLOOD TEST: CPT

## 2017-06-13 RX ORDER — HYDROCODONE BITARTRATE AND ACETAMINOPHEN 5; 325 MG/1; MG/1
1 TABLET ORAL EVERY 6 HOURS PRN
Qty: 20 TABLET | Refills: 0 | Status: SHIPPED | OUTPATIENT
Start: 2017-06-13 | End: 2017-08-16

## 2017-06-13 RX ADMIN — CARVEDILOL 25 MG: 25 TABLET, FILM COATED ORAL at 08:35

## 2017-06-13 RX ADMIN — FUROSEMIDE 20 MG: 20 TABLET ORAL at 08:35

## 2017-06-13 RX ADMIN — BISACODYL 10 MG: 10 SUPPOSITORY RECTAL at 13:10

## 2017-06-13 RX ADMIN — ASPIRIN 81 MG: 81 TABLET, COATED ORAL at 08:35

## 2017-06-13 RX ADMIN — ATORVASTATIN CALCIUM 10 MG: 10 TABLET, FILM COATED ORAL at 08:35

## 2017-06-13 RX ADMIN — INSULIN ASPART 5 UNITS: 100 INJECTION, SOLUTION INTRAVENOUS; SUBCUTANEOUS at 11:18

## 2017-06-13 NOTE — PLAN OF CARE
Problem: Patient Care Overview (Adult)  Goal: Plan of Care Review  Outcome: Unable to achieve outcome(s) by discharge Date Met:  06/13/17 06/13/17 1504   Coping/Psychosocial Response Interventions   Plan Of Care Reviewed With patient   Patient Care Overview   Progress progress towards functional goals is fair   Outcome Evaluation   Outcome Summary/Follow up Plan Patient able to partially achieve 1/3 outlined goals prior to D/C.         Problem: Inpatient Physical Therapy  Goal: Bed Mobility Goal LTG- PT  Outcome: Outcome(s) achieved Date Met:  06/13/17 06/12/17 1518 06/13/17 1504   Bed Mobility PT LTG   Bed Mobility PT LTG, Date Established 06/12/17 --    Bed Mobility PT LTG, Time to Achieve 2 wks --    Bed Mobility PT LTG, Activity Type all bed mobility --    Bed Mobility PT LTG, [place] Level conditional independence --    Bed Mobility PT Goal LTG, Assist Device bed rails --    Bed Mobility PT LTG, Date Goal Reviewed 06/12/17 --    Bed Mobility PT LTG, Outcome --  goal met   Bed Mobility PT LTG, Reason Goal Not Met --  discharged from facility       Goal: Transfer Training Goal 1 LTG- PT  Outcome: Unable to achieve outcome(s) by discharge Date Met:  06/13/17 06/12/17 1518 06/13/17 1504   Transfer Training PT LTG   Transfer Training PT LTG, Date Established 06/12/17 --    Transfer Training PT LTG, Time to Achieve 2 wks --    Transfer Training PT LTG, Activity Type sit to stand/stand to sit;bed to chair /chair to bed --    Transfer Training PT LTG, [place] Level supervision required --    Transfer Training PT LTG, Assist Device walker, rolling --    Transfer Training PT LTG, Date Goal Reviewed 06/12/17 --    Transfer Training PT LTG, Outcome --  goal not met   Transfer Training PT LTG, Reason Goal Not Met --  discharged from facility       Goal: Gait Training Goal LTG- PT  Outcome: Unable to achieve outcome(s) by discharge Date Met:  06/13/17 06/12/17 1518 06/13/17 1504   Gait Training PT LTG    Gait Training Goal PT LTG, Date Established 06/12/17 --    Gait Training Goal PT LTG, Time to Achieve 2 wks --    Gait Training Goal PT LTG, [place] Level supervision required --    Gait Training Goal PT LTG, Assist Device walker, rolling --    Gait Training Goal PT LTG, Distance to Achieve 125 --    Gait Training Goal PT LTG, Additional Goal with even step lengths and heel strike for initial contact at least 75% of the time --    Gait Training Goal PT LTG, Date Goal Reviewed 06/12/17 --    Gait Training Goal PT LTG, Outcome --  goal not met   Gait Training Goal PT LTG, Reason Goal Not Met --  discharged from facility

## 2017-06-13 NOTE — DISCHARGE PLACEMENT REQUEST
"ANTHONY Walker RN  Case Management  Phone:  [phone]; Fax:  [phone]    New referral for [facility]      Curtis Casarez (77 y.o. Female)     Date of Birth Social Security Number Address Home Phone MRN    1940  [address] [place], [place] [address] [phone] [id number]    Evangel[employer] Marital Status          Sikhism        Admission Date Admission Type Admitting Provider Attending Provider Department, Room/Bed    6/10/17 Emergency Jordan Ribera MD Pais, Roshan, MD [facility] MED SURG  4, 412/1    Discharge Date Discharge Disposition Discharge Destination                      Attending Provider: Jordan Ribera MD     Allergies:  Duloxetine, Exenatide, Lisinopril, Penicillins, Phentermine    Isolation:  None   Infection:  None   Code Status:  FULL    Ht:  63\" (160 cm)   Wt:  148 lb 11.2 oz (67.4 kg)    Admission Cmt:  None   Principal Problem:  Hip fracture, left [S72.002A]                 Active Insurance as of 6/10/2017     Primary Coverage     Payor Plan Insurance Group Employer/Plan Group    MEDICARE MEDICARE A & B      Payor Plan Address Payor Plan Phone Number Effective From Effective To    [address] [phone] 1/1/2003     [place] [address]       Subscriber Name Subscriber Birth Date Member ID       CURTIS CASAREZ 1940 [id number]           Secondary Coverage     Payor Plan Insurance Group Employer/Plan Group    MISC MED SUPP MISC MCARE SUPPLEMENT [id number]     Coverage Address Coverage Phone Number Effective From Effective To    [address] [facility] [phone] 5/17/2011     [place] [address]       Subscriber Name Subscriber Birth Date Member ID       CURTIS CASAREZ 1940 [id number]                 Emergency Contacts      (Rel.) Home Phone Work Phone Mobile Phone    Sasha Casareze (Spouse) [phone] -- [phone]               History & Physical      Jordan Ribera MD at 6/10/2017 11:14 AM              [facility] " HOSPITALIST   HISTORY AND PHYSICAL      Name:  Sigrid Casarez   Age:  77 y.o.  Sex:  female  :  1940  MRN:  [id number]   Visit Number:  [id number]  Admission Date:  6/10/2017  Date Of Service:  06/10/17  Primary Care Physician:  Tiburcio Hernandez MD   Primary Nephrologist: Deric Mejia MD  Primary Cardiologist: Rafal Steve MD    Chief Complaint:     Left hip pain since yesterday.    History Of Presenting Illness:      This is a 77-year-old pleasant female with history of diabetes mellitus type 2 with nephropathy, chronic kidney disease was brought to the emergency room by her  with the above complaints.  The history is obtained from the patient as well as the medical chart.    Patient states that she was in her usual state of health until yesterday morning.  While she was walking at home, she was tripped over by her dog and she fell on her left side.  She did have some pain in her left hip but was able to get up and walk.  She however started having increasing pain through evening but the pain significantly got worse through the night and this morning.  She states that the pain prior to coming to the hospital was 9 out of 10 in intensity and she was not able to walk or do any of her activities of daily living.  No history of head trauma.    Patient was evaluated in the emergency room.  She was hemodynamically stable.  Blood work was unremarkable except for her baseline creatinine of 3 and chronic anemia with a hemoglobin of 10.  Troponin was negative.  CT of the head was negative for any acute abnormalities.  X-ray of the pelvis showed nondisplaced left hip fracture.  A call was placed to Dr. Sheehan from orthopedic services who recommended admission for surgical intervention.  She is currently lying down on bed and is comfortable at rest and denies any significant left hip pain at this time.    Patient has been seen by Dr. Steve in the past and did have cardiac stress test which was negative.  She  was seen by both Dr. Sheehan and Dr. Steve this morning.    Review Of Systems:     General ROS: Patient denies any fevers, chills or loss of consciousness.  Psychological ROS: No history of any hallucinations and delusions.  Ophthalmic ROS: No history of any diplopia or transient loss of vision.  ENT ROS: No history of sore throat, nasal congestion or ear pain.   Allergy and Immunology ROS: No history of rash or itching.  Hematological and Lymphatic ROS: No history of neck swelling or easy bleeding.  Endocrine ROS: No history of any recent unintentional weight gain or loss.  Respiratory ROS: No history of cough or shortness of breath.  Cardiovascular ROS: No history of chest pain or palpitations.  Gastrointestinal ROS: No history of nausea and vomiting. Denies any abdominal pain. No diarrhea.  Genito-Urinary ROS: No history of dysuria or hematuria.  Musculoskeletal ROS: No muscle pain. No calf pain.  Complaints of left hip pain.  Neurological ROS: No history of any focal weakness. No loss of consciousness. Denies any numbness. Denies neck pain.  Dermatological ROS: No history of any redness or pruritis.     Past Medical History:    Past Medical History:   Diagnosis Date   • Anemia    • Chronic kidney disease    • Chronic kidney disease    • Community acquired pneumonia    • Dexa Body Composition study lumosacral spine and femur     ostepenic   • Diabetic nephropathy, type I    • Diabetic peripheral neuropathy type 1    • Hypertension    • Menopause    • Pneumonia        Past Surgical history:    Past Surgical History:   Procedure Laterality Date   • CATARACT EXTRACTION     • CHOLECYSTECTOMY         Social History:    Social History     Social History   • Marital status:      Spouse name: N/A   • Number of children: N/A   • Years of education: N/A     Occupational History   • Not on file.     Social History Main Topics   • Smoking status: Former Smoker   • Smokeless tobacco: Not on file   • Alcohol use No   •  Drug use: No   • Sexual activity: Not on file     Other Topics Concern   • Not on file     Social History Narrative   • No narrative on file       Family History:    Family History   Problem Relation Age of Onset   • Diabetes Other    • Heart disease Other    • Heart attack Mother    • Lung cancer Father        Allergies:      Duloxetine; Exenatide; Lisinopril; Penicillins; and Phentermine    Home Medications:    Prior to Admission Medications     Prescriptions Last Dose Informant Patient Reported? Taking?    amLODIPine (NORVASC) 5 MG tablet   Yes No    Take 1 tablet by mouth Daily.    aspirin 81 MG EC tablet   Yes No    Take 1 tablet by mouth daily.    carvedilol (COREG) 25 MG tablet   Yes No    Take 1 tablet by mouth 2 (two) times a day.    Cholecalciferol (VITAMIN D) 1000 UNITS tablet  Self Yes No    Take 1,000 Units by mouth 2 (Two) Times a Day.    ferrous sulfate 325 (65 FE) MG tablet  Self Yes No    Take 1 tablet by mouth 1 (One) Time Per Week. With meals    furosemide (LASIX) 20 MG tablet  Self No No    Take 2 pills daily    Patient taking differently:  Take 20 mg by mouth Every Other Day. Take 2 pills daily    glucose blood (FREESTYLE LITE) test strip   Yes No    3 (three) times a day.    HUMALOG KWIKPEN 100 UNIT/ML solution pen-injector   No No    INJECT 5 TO 15 UNITS THREE TIMES A DAY WITH MEALS OR AS DIRECTED    Insulin Pen Needle (BD ULTRA-FINE PEN NEEDLES) 29G X 12.7MM misc   Yes No    Use 3 times daily    levothyroxine (SYNTHROID, LEVOTHROID) 75 MCG tablet   No No    Take 1 tablet by mouth daily.    Pediatric Multivitamins-Iron (FLINTSTONES PLUS IRON PO)   Yes No    Take 1 tablet by mouth Daily.    simvastatin (ZOCOR) 20 MG tablet   Yes No    Take  by mouth.    TOUJEO SOLOSTAR 300 UNIT/ML solution pen-injector   Yes No    10 Units Daily.            ED Medications:    Medications   sodium chloride 0.9 % flush 10 mL (not administered)   sodium chloride 0.9 % infusion (125 mL/hr Intravenous New Bag  6/10/17 0751)   HYDROcodone-acetaminophen (NORCO) 5-325 MG per tablet 1 tablet (1 tablet Oral Given 6/10/17 0607)   ondansetron (ZOFRAN) injection 4 mg (4 mg Intravenous Given 6/10/17 2633)       Vital Signs:    Temp:  [97.7 °F (36.5 °C)] 97.7 °F (36.5 °C)  Heart Rate:  [75-86] 78  Resp:  [17-18] 18  BP: (145-176)/(60-90) 176/60    Last 3 weights    06/10/17  0935 06/10/17  0937 06/10/17  1113   Weight: 144 lb 11.2 oz (65.6 kg) 145 lb (65.8 kg) 145 lb 1 oz (65.8 kg)       Body mass index is 25.7 kg/(m^2).    Physical Exam:    General Appearance:  Alert and cooperative, not in any acute distress.   Head:  Atraumatic and normocephalic, without obvious abnormality.   Eyes:          PERRLA, conjunctivae and sclerae normal, no Icterus. No pallor. Extra-occular movements are within normal limits.   Ears:  Ears appear intact with no abnormalities noted.   Throat: No oral lesions, no thrush, oral mucosa moist.   Neck: Supple, trachea midline, no thyromegaly, no carotid bruit.   Back:   No kyphoscoliosis present. No tenderness to palpation,   range of motion normal.   Lungs:   Chest shape is normal. Breath sounds heard bilaterally equally.  No crackles or wheezing. No Pleural rub or bronchial breathing.   Heart:  Normal S1 and S2, no murmur, no gallop, no rub. No JVD.   Abdomen:   Normal bowel sounds, no masses, no organomegaly. Soft non-tender, non-distended, no guarding, no rebound tenderness.   Extremities: Moves all extremities well, no edema, no cyanosis, no clubbing.  No ecchymosis noted on the left hip.   Pulses: Pulses palpable and equal bilaterally.   Skin: No bleeding, bruising or rash.   Neurologic: Alert and oriented x 3. Moves all four limbs equally. No tremors. No facial asymetry.     Laboratory data:    I have reviewed the labs done in the emergency room.      Results from last 7 days  Lab Units 06/10/17  0747   SODIUM mmol/L 141   POTASSIUM mmol/L 4.0   CHLORIDE mmol/L 107   TOTAL CO2 mmol/L 22.0*   BUN mg/dL  52*   CREATININE mg/dL 3.00*   CALCIUM mg/dL 9.0   BILIRUBIN mg/dL 0.6   ALK PHOS U/L 97   ALT (SGPT) U/L 25   AST (SGOT) U/L 24   GLUCOSE mg/dL 202*       Results from last 7 days  Lab Units 06/10/17  0747   WBC 10*3/mm3 10.01   HEMOGLOBIN g/dL 9.9*   HEMATOCRIT % 30.2*   PLATELETS 10*3/mm3 178       Results from last 7 days  Lab Units 06/10/17  0747   INR  1.12*     EKG:      EKG done in the emergency room was reviewed by me.  It shows sinus rhythm at 80 bpm.  Normal axis.  No significant ST-T changes were noted.    Radiology:    Imaging Results (last 72 hours)     Procedure Component Value Units Date/Time    XR Knee 3 View Left [[id number]] Collected:  06/10/17 0722     Updated:  06/10/17 0724    Narrative:       FINAL REPORT    CLINICAL HISTORY:  LT KNEE PAIN, FALL    FINDINGS:  Three views of the left knee were performed. There is no evidence of a joint effusion. There is infrapatellar soft tissue swelling. There is mild spurring along the superior patella. On the internal view there is subtle lucency within the lateral femoral   condyle which most likely represents muscular insertion site rather than fracture. Recommend clinical correlation with site of pain.      Impression:       1. Infrapatellar soft tissue swelling with mild spurring on the superior patella.    2. Subtle lucent as above. Recommend clinical correlation with site of pain.    Authenticated by Roberto Chávez MD on 06/10/2017 07:22:45 AM    XR Hip With or Without Pelvis 2 - 3 View Left [[id number]] Collected:  06/10/17 0724     Updated:  06/10/17 0726    Narrative:       FINAL REPORT    CLINICAL HISTORY:  LT HIP PAIN, FALL    FINDINGS:  LEFT HIP    2 views of the left hip are obtained. Films are underpenetrated. The bones are osteopenic. There are degenerative facet changes in the lower lumbar spine. There is SI joint sclerosis bilaterally. There is a subcapital/transcervical nondisplaced impaction   fracture of the left femoral neck. There is no  acute soft tissue abnormality.      Impression:       Subcapital/transcervical nondisplaced impaction fracture of the left femoral neck.    Authenticated by Roberto Chávez MD on 06/10/2017 07:24:55 AM    XR Chest 2 View [[id number]] Collected:  06/10/17 0936     Updated:  06/10/17 0939    Narrative:       PROCEDURE: XR CHEST 2 VW-        HISTORY: medical clearance; S72.002A-Fracture of unspecified part of  neck of left femur, initial encounter for closed fracture     COMPARISON: April 5, 2016.     FINDINGS: The heart is normal in size. The mediastinum is unremarkable.  There are chronic interstitial lung changes. There is also chronic  blunting of the left costophrenic angle. No new opacities were effusions  are evident. There is no pneumothorax. There are no acute osseous  abnormalities.           Impression:       No acute cardiopulmonary process.     This report was finalized on 6/10/2017 9:37 AM by Mariel Solano M.D..          Assessment:    1.  Left hip nondisplaced fracture, present on admission.  2.  Status post mechanical fall at home.  3.  Diabetes mellitus type 2 with nephropathy.  4.  Chronic kidney disease stage IV.  5.  Essential hypertension.  6.  Acquired hypothyroidism.  7.  Anemia of chronic disease.    Plan:     Patient is currently being admitted to the medical floor due to her left hip fracture.  We will keep her on bedrest.  She will be placed on IV fluids and nothing by mouth walker midnight.  She was seen by Dr. Sheehan and Dr. Steve this morning and I have discussed the patient's condition with both of them.  We will also consult Dr. Mejia from nephrology.  Her home medications will be continued.  We will start her on heparin for DVT prophylaxis from tomorrow.  She will be placed on incentive spirometry.  She will be placed on morphine for pain control.  Further recommendations depend upon her clinical course.  She lives with her  and prefers to go home with home health when  discharged.                Operative/Procedure Notes (most recent note)      Jimmy Sheehan MD at 6/11/2017  1:45 PM  Version 1 of 1           [facility]  [address], [address]  [place]  [address] [phone]      OPERATIVE REPORT      PATIENT NAME:  Sigrid Casarez                            YOB: 1940       PREOP DIAGNOSIS:   Left hip femoral neck fracture, Garden Type 1.    POSTOP DIAGNOSIS:   Same    PROCEDURE:    Compression screws fixation of Left hip femoral neck fracture.    SURGEON:     Yimi Sheehan MD    OPERATIVE TEAM:  Circulator: Leydi Huang RN      Scrub Person: Sylvester Flores    ANESTHETIST:  CRNA: Vj Montero CRNA    ANESTHESIA:  General plus left fascia-iliacus block.    FLUIDS:      300 ml crystalloid and one unit PRBC (300 ml)    ESTIM BLOOD LOSS:   50 ml      URINE:      200 ml    FINDINGS:     Mild impacted valgus nondisplaced femoral neck fracture.    SPECIMENS:    None.    IMPLANTS:     Synthes 7.0 mm diameter cannulated partial threaded compression screws (85 mm, 80 mm, 75 mm)    DRAINS:     Regan to gravity.    COMPLICATIONS:    None    DISPOSITION:    Stable to Recovery    INDICATIONS:   Painful limiting hip fracture on clinical exam and imaging.    NARRATIVE:    Risks, benefits and alternatives discussed and informed consent for surgical procedure obtained.  Risks discussed including but not limited to anesthesia, infection, nerve/vessel/tendon injury, fracture malunion or nonunion, implant loosening, hip dislocation, morbidities from any chronic medical conditions, DVT, PE and death.  Goals include pain relief, earlier mobilization and rehabilitation to improve ambulatory and functional level.  Patient presents for planned hip fracture stabilization surgery.     Antibiotic prophylaxis was given.  Surgeon site marking and a time out were performed prior to the procedure.  Anesthesia was effective and well  tolerated.  The patient was placed onto the fracture table in the supine position with care taken to pad all areas of the body and observe skin precautions.  Also, care was taken to provide DVT/PE prophylaxis including athrombic protocol.  The away leg was kept in a comfortable position in the padded armstrong and to provide room for the C-arm image.  The involved leg was gently placed in a padded fracture boot in mild traction.  The hip and leg were prepped and draped in the usual sterile fashion.    After sterile prep and drape, a small incision was made at the proximal lateral aspect of the femur and dissection to the level of the bone.  A parallel aiming 135 degree guide was used and three parallel threaded pins were placed from the proximal lateral aspect of the femur and extending across the femoral neck fracture and into the femoral head in the desired positions.  Care was taken to keep the pins parallel and to prefer the stronger medial calcar bone.  Over the pins, standard depth gauge measurement, drilling and self tapping screw placement was performed.  Care was taken to use the shorter partial threads when indicated to obtain compression at the fracture site.  After final screw placement, final C-arm views were saved showing a good fracture reduction and hardware position in AP and lateral planes.      The small wound was irrigated copiously.  Routine closure was performed and a sterile dressing applied.  The patient was gently removed from the fracture table and supine on the hospital bed.  The anesthesia was effective and well tolerated.  There were no complications of the procedure.  The patient was transferred in stable condition to recovery.     Electronically signed by Jimmy Sheehan MD at 6/11/2017  1:48 PM           Physician Progress Notes (last 24 hours) (Notes from 6/12/2017  8:24 AM through 6/13/2017  8:24 AM)      Jimmy Sheehan MD at 6/12/2017 11:34 AM  Version 1 of 1                [facility]  PROGRESS NOTE    Name:  Sigrid Casarez   Age:  77 y.o.  Sex:  female  :  1940  MRN:  [id number]   Visit Number:  [id number]  Admission Date:  06/10/17    Date Of Service:  17  Primary Care Physician:  Tiburcio Hernandez MD     LOS: 2 days :  Patient Care Team:  Tiburcio Hernandez MD as PCP - General:    Chief Complaint:      Pain controlled, patient comfortable, tolerating diet.  Complains of some contralateral right heel soreness and skin precautions observed.    Subjective / Interval History:     As above noted in chief complaint.  She is post-op day #1 from left hip compression screws for femoral neck fracture.    Review of Systems:     General ROS: No fever spike, no chills or loss of consciousness.  Ophthalmic ROS: no acute visual disturbance.  ENT ROS: No sinus congestion or sore throat.   Respiratory ROS: No shortness of breath.   Cardiovascular ROS: No chest pain or palpitations.  Gastrointestinal ROS: No acute abdominal change. Non-distended.  Genito-Urinary ROS: No reported dysuria or hematuria.  Musculoskeletal ROS: No deep calf pain. Shavon sign negative.  No acute focal motor deficit.  Neurological ROS: No cyanosis, no new numbness or tingling.  Dermatological ROS: No erythema.  No rash or pruritis.  Left hip incision clean, dry, no erythema, no drainage, pristine.    Vital Signs:    Temp:  [97.9 °F (36.6 °C)-98.3 °F (36.8 °C)] 98.2 °F (36.8 °C)  Heart Rate:  [71-87] 72  Resp:  [16-18] 16  BP: (137-155)/(41-49) 137/48    Intake and output:    I/O last 3 completed shifts:  In: 3293.3 [P.O.:1500; I.V.:1393.3; Blood:300; IV Piggyback:100]  Out: 1000 [Urine:950; Blood:50]  I/O this shift:  In: 360 [P.O.:360]  Out: -     Physical Examination:    General Appearance:    Alert and cooperative, no acute distress.   Head:    Atraumatic and normocephalic, without obvious abnormality.   Eyes:    PERRLA.  Extraocular movements are within normal limits.   ENT:   No acute  change.   Neck:   Supple, trachea midline.   Lungs:     Normal respirations, unlabored.    Heart:    Regular rate.   Abdomen:     Soft non-tender, non-distended.   Extremities:   No new motor deficit, no calf pain, Shavon sign negative.   Skin:     No rash.  No cyanosis.  No erythema.  No active drainage.  With contact precautions, sterile dressing change done.   Neurologic:   Sensation intact, pulses intact.     Laboratory results:    Lab Results (last 24 hours)     Procedure Component Value Units Date/Time    Hemoglobin A1c [[id number]]  (Abnormal) Collected:  06/11/17 0449    Specimen:  Blood Updated:  06/12/17 0242     Hemoglobin A1C 8.8 (H) %     Narrative:       Expected HgbA1C results:     3% to 6% HgbA1C      HgbA1C                 Estimated Average Glucose     5%                              97 mg/dl   6%                             126 mg/dl   7%                             154 mg/dl   8%                             183 mg/dl   9%                             212 mg/dl  >10%                           >240 mg/dl      Blood Culture [[id number]]  (Normal) Collected:  06/11/17 0457    Specimen:  Blood from Arm, Left Updated:  06/12/17 0601     Blood Culture No growth at 24 hours    Blood Culture [[id number]]  (Normal) Collected:  06/11/17 0454    Specimen:  Blood from Hand, Left Updated:  06/12/17 0601     Blood Culture No growth at 24 hours    POC Glucose Fingerstick [[id number]]  (Abnormal) Collected:  06/11/17 1338    Specimen:  Blood Updated:  06/12/17 0631     Glucose 182 (H) mg/dL       Serial Number: [id number]    : [id number]       POC Glucose Fingerstick [[id number]]  (Abnormal) Collected:  06/11/17 1605    Specimen:  Blood Updated:  06/12/17 0631     Glucose 276 (H) mg/dL       Serial Number: [id number]    : [id number]       CBC & Differential [[id number]] Collected:  06/12/17 0528    Specimen:  Blood Updated:  06/12/17 0631    Narrative:       The following orders were created for panel order CBC &  Differential.  Procedure                               Abnormality         Status                     ---------                               -----------         ------                     CBC Auto Differential[[id number]]        Abnormal            Final result                 Please view results for these tests on the individual orders.    CBC Auto Differential [[id number]]  (Abnormal) Collected:  06/12/17 0528    Specimen:  Blood Updated:  06/12/17 0631     WBC 9.89 10*3/mm3      RBC 3.15 (L) 10*6/mm3      Hemoglobin 9.4 (L) g/dL      Hematocrit 30.3 (L) %      MCV 96.2 fL      MCH 29.8 pg      MCHC 31.0 g/dL      RDW 13.8 %      RDW-SD 48.8 fl      MPV 12.4 (H) fL      Platelets 139 10*3/mm3      Neutrophil % 91.1 (H) %      Lymphocyte % 4.6 (L) %      Monocyte % 2.8 %      Eosinophil % 0.0 %      Basophil % 0.2 %      Immature Grans % 1.3 (H) %      Neutrophils, Absolute 9.01 (H) 10*3/mm3      Lymphocytes, Absolute 0.45 (L) 10*3/mm3      Monocytes, Absolute 0.28 10*3/mm3      Eosinophils, Absolute 0.00 10*3/mm3      Basophils, Absolute 0.02 10*3/mm3      Immature Grans, Absolute 0.13 (H) 10*3/mm3      nRBC 0.0 /100 WBC     POC Glucose Fingerstick [[id number]]  (Abnormal) Collected:  06/11/17 2007    Specimen:  Blood Updated:  06/12/17 0631     Glucose 350 (H) mg/dL       Serial Number: [id number]    : [id number]       POC Glucose Fingerstick [[id number]]  (Abnormal) Collected:  06/12/17 0612    Specimen:  Blood Updated:  06/12/17 0632     Glucose 453 (C) mg/dL       Serial Number: [id number]    : [id number]       Basic Metabolic Panel [[id number]]  (Abnormal) Collected:  06/12/17 0528    Specimen:  Blood Updated:  06/12/17 0655     Glucose 437 (C) mg/dL      BUN 56 (H) mg/dL      Creatinine 3.60 (H) mg/dL      Sodium 133 (L) mmol/L      Potassium 5.1 mmol/L      Chloride 104 mmol/L      CO2 12.0 (L) mmol/L      Calcium 8.2 (L) mg/dL      eGFR Non African Amer 12 (L) mL/min/1.73      BUN/Creatinine Ratio 15.6      Anion Gap 22.1 mmol/L     Narrative:       The MDRD GFR formula is only valid for adults with stable renal function between ages 18 and 70.    Urine Culture [[id number]]  (Abnormal)  (Susceptibility) Collected:  06/10/17 1405    Specimen:  Urine from Urine, Clean Catch Updated:  06/12/17 1027     Urine Culture >100,000 CFU/mL Citrobacter freundii complex (A)      Identification and KASSANDRA to follow.         Susceptibility      Citrobacter freundii complex     KASSANDRA     Amikacin <=16 ug/ml Susceptible     Ampicillin <=8 ug/ml Resistant     Ampicillin + Sulbactam <=8/4 ug/ml Resistant     Aztreonam <=4 ug/ml Susceptible     Cefazolin >16 ug/ml Resistant     Cefepime <=4 ug/ml Susceptible     Cefotaxime <=2 ug/ml Susceptible     Cefoxitin >16 ug/ml Resistant     Ceftazidime <=1 ug/ml Susceptible     Ceftriaxone <=8 ug/ml Susceptible     Cefuroxime 8 ug/ml Resistant     Ciprofloxacin <=1 ug/ml Susceptible     Doripenem <=0.5 ug/ml Susceptible     Ertapenem <=1 ug/ml Susceptible     Gentamicin <=4 ug/ml Susceptible     Levofloxacin <=2 ug/ml Susceptible     Nitrofurantoin <=32 ug/ml Susceptible     Piperacillin + Tazobactam <=16 ug/ml Susceptible     Tetracycline <=4 ug/ml Susceptible     Tigecycline <=1 ug/ml Susceptible     Tobramycin <=4 ug/ml Susceptible     Trimethoprim + Sulfamethoxazole <=2/38 ug/ml Susceptible                          I have reviewed the patient's laboratory results.    Radiology results:    Imaging Results (last 24 hours)     Procedure Component Value Units Date/Time    XR Hip 1 View Without Pelvis Left (Surgery Only) [[id number]] Collected:  06/12/17 0956     Updated:  06/12/17 1051    Narrative:       PROCEDURE: XR HIP W OR WO PELVIS 1 VIEW LEFT-, XR HIP 1 VIEW WO PELVIS  LEFT- performed at 10:54 AM     HISTORY: post surgery; S72.002A-Fracture of unspecified part of neck of  left femur, initial encounter for closed fracture; S72.002A-Fracture of  unspecified part of neck of left femur, initial  encounter for closed  fracture; D64.9-Anemia, unspecified; N18.4-Chronic kidney disease, stage  4 (severe); E11.42-Type 2 diabetes mellitus with diabetic  polyneuropathy; E03.9-Hypothyroidism, unspecified; E10.22-Type 1     COMPARISON: 3 hours prior.     FINDINGS:  A 2 view exam demonstrates 3 screws spanning a subcapital  left femoral neck fracture. The fracture appears well aligned.   Skin  staples are present.       Impression:       Postsurgical changes as above.                  PROCEDURE: XR HIP W OR WO PELVIS 1 VIEW LEFT-, XR HIP 1 VIEW WO PELVIS  LEFT- performed at 7:37 AM     History: post surgery; S72.002A-Fracture of unspecified part of neck of  left femur, initial encounter for closed fracture; S72.002A-Fracture of  unspecified part of neck of left femur, initial encounter for closed  fracture; D64.9-Anemia, unspecified; N18.4-Chronic kidney disease, stage  4 (severe); E11.42-Type 2 diabetes mellitus with diabetic  polyneuropathy; E03.9-Hypothyroidism, unspecified; E10.22-Type 1     COMPARISON: None.     FINDINGS:  A 4 view intraoperative exam demonstrates 3 screws spanning a  left subcapital femoral neck fracture.  Fracture appears well aligned.     IMPRESSION: Postsurgical changes as above.                 Images were reviewed, interpreted, and dictated by Dr. Solnao.  Transcribed by Dariela Medina PA-C.     This report was finalized on 6/12/2017 10:49 AM by Mariel Solano M.D..    XR Hip With or Without Pelvis 1 View Left [[id number]] Collected:  06/12/17 0956     Updated:  06/12/17 1051    Narrative:       PROCEDURE: XR HIP W OR WO PELVIS 1 VIEW LEFT-, XR HIP 1 VIEW WO PELVIS  LEFT- performed at 10:54 AM     HISTORY: post surgery; S72.002A-Fracture of unspecified part of neck of  left femur, initial encounter for closed fracture; S72.002A-Fracture of  unspecified part of neck of left femur, initial encounter for closed  fracture; D64.9-Anemia, unspecified; N18.4-Chronic kidney disease,  stage  4 (severe); E11.42-Type 2 diabetes mellitus with diabetic  polyneuropathy; E03.9-Hypothyroidism, unspecified; E10.22-Type 1     COMPARISON: 3 hours prior.     FINDINGS:  A 2 view exam demonstrates 3 screws spanning a subcapital  left femoral neck fracture. The fracture appears well aligned.   Skin  staples are present.       Impression:       Postsurgical changes as above.                  PROCEDURE: XR HIP W OR WO PELVIS 1 VIEW LEFT-, XR HIP 1 VIEW WO PELVIS  LEFT- performed at 7:37 AM     History: post surgery; S72.002A-Fracture of unspecified part of neck of  left femur, initial encounter for closed fracture; S72.002A-Fracture of  unspecified part of neck of left femur, initial encounter for closed  fracture; D64.9-Anemia, unspecified; N18.4-Chronic kidney disease, stage  4 (severe); E11.42-Type 2 diabetes mellitus with diabetic  polyneuropathy; E03.9-Hypothyroidism, unspecified; E10.22-Type 1     COMPARISON: None.     FINDINGS:  A 4 view intraoperative exam demonstrates 3 screws spanning a  left subcapital femoral neck fracture.  Fracture appears well aligned.     IMPRESSION: Postsurgical changes as above.                 Images were reviewed, interpreted, and dictated by Dr. Solano.  Transcribed by Dariela Medina PA-C.     This report was finalized on 6/12/2017 10:49 AM by Mariel Solano M.D..          I have reviewed the patient's radiology reports.    C-arm hip images and additional pelvis and hip images taken in recovery room shows no acute concern.  Good position and alignment of prosthesis and/or hardware.       Assessment/Plan    Principal Problem:    Hip fracture, left  Active Problems:    Chronic anemia    Essential hypertension    Acquired hypothyroidism    Status post fall    Type 2 diabetes mellitus with nephropathy    Chronic kidney disease, stage IV (severe)        Continue current management per the detailed orders and instructions, including skin, safety and fall precautions,  respiratory therapy with incentive spirometer, advance diet as tolerated, bowel regimen, multi-modal analgesia, multi-modal DVT prophylaxis, Hospitalist consult, PT/OT following post-surgical rehab protocol, and Case Management for discharge plans.  Patient and  request [facility].    Jimmy Sheehan MD  17  11:35 AM     Electronically signed by Jimmy Sheehan MD at 2017 11:43 AM      Jordan Ribera MD at 2017 12:55 PM  Version 1 of 1               [facility]    Name:  Sigrid Casarez   Age:  77 y.o.  Sex:  female  :  1940  MRN:  [id number]   Visit Number:  [id number]  Admission Date:  6/10/2017  Date Of Service:  17  Primary Care Physician:  Tiburcio Hernandez MD     LOS: 2 days :  Patient Care Team:  Tiburcio Hernandez MD as PCP - General:    Chief Complaint:      Left hip pain.    Subjective / Interval History:     Patient is currently lying down in the bed and is comfortable.  Her left hip pain has improved.  She is status post ORIF of the left hip done by Dr. Sheehan yesterday.  She received 1 unit of packed red blood cells transfusion yesterday and her hemoglobin has remained stable.  She did not require any further transfusions.  No fevers, chest pain or shortness of breath.    Review of Systems:     General ROS: Patient denies any fevers, chills or loss of consciousness.  Respiratory ROS: Denies cough or shortness of breath.  Cardiovascular ROS: Denies chest pain or palpitations. No history of exertional chest pain.  Gastrointestinal ROS: Denies nausea and vomiting. Denies any abdominal pain. No diarrhea.  Neurological ROS: Denies any focal weakness. No loss of consciousness. Denies any numbness.  Dermatological ROS: Denies any redness or pruritis.    Vital Signs:    Temp:  [97.9 °F (36.6 °C)-98.3 °F (36.8 °C)] 98.2 °F (36.8 °C)  Heart Rate:  [71-87] 72  Resp:  [16-18] 16  BP: (137-155)/(41-49)  137/48    Intake and output:    I/O last 3 completed shifts:  In: 3293.3 [P.O.:1500; I.V.:1393.3; Blood:300; IV Piggyback:100]  Out: 1000 [Urine:950; Blood:50]  I/O this shift:  In: 360 [P.O.:360]  Out: -     Physical Examination:    General Appearance:  Alert and cooperative, not in any acute distress.   Head:  Atraumatic and normocephalic, without obvious abnormality.   Eyes:          PERRLA, conjunctivae and sclerae normal, no Icterus. No pallor. Extra-occular movements are within normal limits.   Neck: Supple, trachea midline, no thyromegaly, no carotid bruit.   Lungs:   Chest shape is normal. Breath sounds heard bilaterally equally.  No crackles or wheezing. No Pleural rub or bronchial breathing.   Heart:  Normal S1 and S2, no murmur, no gallop, no rub. No JVD   Abdomen:   Normal bowel sounds, no masses, no organomegaly. Soft       non-tender, non-distended, no guarding, no rebound tenderness.   Extremities: Moves all extremities well, no edema, no cyanosis, no            Clubbing.  Surgical bandage is noted in the lateral aspect of the left midthigh.   Skin: No bleeding, bruising or rash.   Neurologic: Awake, alert and oriented times 3. Moves all 4 extremities equally.   Laboratory results:      Results from last 7 days  Lab Units 06/12/17  0528 06/11/17  0449 06/10/17  0747   SODIUM mmol/L 133* 139 141   POTASSIUM mmol/L 5.1 4.0 4.0   CHLORIDE mmol/L 104 111* 107   TOTAL CO2 mmol/L 12.0* 21.0* 22.0*   BUN mg/dL 56* 44* 52*   CREATININE mg/dL 3.60* 3.20* 3.00*   CALCIUM mg/dL 8.2* 7.9* 9.0   BILIRUBIN mg/dL  --   --  0.6   ALK PHOS U/L  --   --  97   ALT (SGPT) U/L  --   --  25   AST (SGOT) U/L  --   --  24   GLUCOSE mg/dL 437* 119* 202*       Results from last 7 days  Lab Units 06/12/17 0528 06/11/17  0703 06/11/17  0449 06/10/17  0747   WBC 10*3/mm3 9.89  --  7.26 10.01   HEMOGLOBIN g/dL 9.4* 7.8* 7.6* 9.9*   HEMATOCRIT % 30.3* 25.0* 24.1* 30.2*   PLATELETS 10*3/mm3 139  --  139 178       Results from last  7 days  Lab Units 06/10/17  0747   INR  1.12*       Results from last 7 days  Lab Units 06/10/17  0751   TROPONIN I ng/mL <0.012       Results from last 7 days  Lab Units 06/11/17  0457 06/11/17  0454 06/10/17  1405   BLOODCX  No growth at 24 hours No growth at 24 hours  --    URINECX   --   --  >100,000 CFU/mL Citrobacter freundii complex*     I have reviewed the patient's laboratory results.    Radiology results:    Imaging Results (last 24 hours)     Procedure Component Value Units Date/Time    XR Hip 1 View Without Pelvis Left (Surgery Only) [[id number]] Collected:  06/12/17 0956     Updated:  06/12/17 1051    Narrative:       PROCEDURE: XR HIP W OR WO PELVIS 1 VIEW LEFT-, XR HIP 1 VIEW WO PELVIS  LEFT- performed at 10:54 AM     HISTORY: post surgery; S72.002A-Fracture of unspecified part of neck of  left femur, initial encounter for closed fracture; S72.002A-Fracture of  unspecified part of neck of left femur, initial encounter for closed  fracture; D64.9-Anemia, unspecified; N18.4-Chronic kidney disease, stage  4 (severe); E11.42-Type 2 diabetes mellitus with diabetic  polyneuropathy; E03.9-Hypothyroidism, unspecified; E10.22-Type 1     COMPARISON: 3 hours prior.     FINDINGS:  A 2 view exam demonstrates 3 screws spanning a subcapital  left femoral neck fracture. The fracture appears well aligned.   Skin  staples are present.       Impression:       Postsurgical changes as above.                  PROCEDURE: XR HIP W OR WO PELVIS 1 VIEW LEFT-, XR HIP 1 VIEW WO PELVIS  LEFT- performed at 7:37 AM     History: post surgery; S72.002A-Fracture of unspecified part of neck of  left femur, initial encounter for closed fracture; S72.002A-Fracture of  unspecified part of neck of left femur, initial encounter for closed  fracture; D64.9-Anemia, unspecified; N18.4-Chronic kidney disease, stage  4 (severe); E11.42-Type 2 diabetes mellitus with diabetic  polyneuropathy; E03.9-Hypothyroidism, unspecified; E10.22-Type 1      COMPARISON: None.     FINDINGS:  A 4 view intraoperative exam demonstrates 3 screws spanning a  left subcapital femoral neck fracture.  Fracture appears well aligned.     IMPRESSION: Postsurgical changes as above.                 Images were reviewed, interpreted, and dictated by Dr. Solano.  Transcribed by Dariela Medina PA-C.     This report was finalized on 6/12/2017 10:49 AM by Mariel Solano M.D..    XR Hip With or Without Pelvis 1 View Left [[id number]] Collected:  06/12/17 0956     Updated:  06/12/17 1051    Narrative:       PROCEDURE: XR HIP W OR WO PELVIS 1 VIEW LEFT-, XR HIP 1 VIEW WO PELVIS  LEFT- performed at 10:54 AM     HISTORY: post surgery; S72.002A-Fracture of unspecified part of neck of  left femur, initial encounter for closed fracture; S72.002A-Fracture of  unspecified part of neck of left femur, initial encounter for closed  fracture; D64.9-Anemia, unspecified; N18.4-Chronic kidney disease, stage  4 (severe); E11.42-Type 2 diabetes mellitus with diabetic  polyneuropathy; E03.9-Hypothyroidism, unspecified; E10.22-Type 1     COMPARISON: 3 hours prior.     FINDINGS:  A 2 view exam demonstrates 3 screws spanning a subcapital  left femoral neck fracture. The fracture appears well aligned.   Skin  staples are present.       Impression:       Postsurgical changes as above.                  PROCEDURE: XR HIP W OR WO PELVIS 1 VIEW LEFT-, XR HIP 1 VIEW WO PELVIS  LEFT- performed at 7:37 AM     History: post surgery; S72.002A-Fracture of unspecified part of neck of  left femur, initial encounter for closed fracture; S72.002A-Fracture of  unspecified part of neck of left femur, initial encounter for closed  fracture; D64.9-Anemia, unspecified; N18.4-Chronic kidney disease, stage  4 (severe); E11.42-Type 2 diabetes mellitus with diabetic  polyneuropathy; E03.9-Hypothyroidism, unspecified; E10.22-Type 1     COMPARISON: None.     FINDINGS:  A 4 view intraoperative exam demonstrates 3 screws spanning  a  left subcapital femoral neck fracture.  Fracture appears well aligned.     IMPRESSION: Postsurgical changes as above.                 Images were reviewed, interpreted, and dictated by Dr. Solano.  Transcribed by Dariela Medina PA-C.     This report was finalized on 6/12/2017 10:49 AM by Mariel Solano M.D..        Medication Review:     I have reviewed the patients active and prn medications.     Principal Problem:    Hip fracture, left  Active Problems:    Status post fall    Chronic kidney disease, stage IV (severe)    Chronic anemia    Essential hypertension    Type 2 diabetes mellitus with nephropathy    Acquired hypothyroidism      Assessment:    1. Left hip nondisplaced fracture, present on admission, Status post ORIF on 6/11/2017.  2. Status post mechanical fall at home.  3.  Acute blood loss anemia secondary to #1, status post 1 unit of packed red blood cell transfusion.  4. Diabetes mellitus type 2 with nephropathy.  5. Chronic kidney disease stage IV.  6. Essential hypertension.  7. Acquired hypothyroidism.  8. Anemia of chronic disease.    Plan:    Patient is currently doing better and has tolerated the left hip surgery very well.  She was seen by Dr. Sheehan this morning and she will be started on physical and occupational therapy.  She is on Lovenox for DVT prophylaxis.  She is also being followed by Dr. Steve and Dr. Mejia.  Her creatinine is 3.6 today which is slightly worse compared to her baseline.  Her anemia is likely related to chronic kidney disease but she did have acute blood loss related worsening due to the hip fracture.  We will hold off any further blood transfusions at this time.  Hopefully, she should be able to go to rehabilitation facility when bed is available.    Jordan Ribera MD  06/12/17  12:55 PM    Please note that portions of this note may have been completed with a voice recognition program. Efforts were made to edit the dictations, but occasionally words are  mistranscribed.       Electronically signed by Jordan Ribera MD at 6/12/2017  5:12 PM        Consult Notes (last 24 hours) (Notes from 6/12/2017  8:24 AM through 6/13/2017  8:24 AM)     No notes of this type exist for this encounter.           Physical Therapy Notes (last 24 hours) (Notes from 6/12/2017  8:25 AM through 6/13/2017  8:25 AM)      Lise Pacheco, PT at 6/12/2017  3:33 PM  Version 1 of 1         Problem: Patient Care Overview (Adult)  Goal: Plan of Care Review  Outcome: Ongoing (interventions implemented as appropriate)    06/12/17 1518   Coping/Psychosocial Response Interventions   Plan Of Care Reviewed With patient   Outcome Evaluation   Outcome Summary/Follow up Plan Patient participated well in skilled PT evaluation and demonstrates decreased strength, balance, transfers, gait quality, and activity tolerance. She is expected to benefit from additional PT while hospitalized and upon discharge to inpatient rehab setting, prior to return to home.          Problem: Inpatient Physical Therapy  Goal: Bed Mobility Goal LTG- PT  Outcome: Ongoing (interventions implemented as appropriate)    06/12/17 1518   Bed Mobility PT LTG   Bed Mobility PT LTG, Date Established 06/12/17   Bed Mobility PT LTG, Time to Achieve 2 wks   Bed Mobility PT LTG, Activity Type all bed mobility   Bed Mobility PT LTG, [place] Level conditional independence   Bed Mobility PT Goal LTG, Assist Device bed rails   Bed Mobility PT LTG, Date Goal Reviewed 06/12/17   Bed Mobility PT LTG, Outcome goal ongoing       Goal: Transfer Training Goal 1 LTG- PT  Outcome: Ongoing (interventions implemented as appropriate)    06/12/17 1518   Transfer Training PT LTG   Transfer Training PT LTG, Date Established 06/12/17   Transfer Training PT LTG, Time to Achieve 2 wks   Transfer Training PT LTG, Activity Type sit to stand/stand to sit;bed to chair /chair to bed   Transfer Training PT LTG, [place]ps Level supervision required   Transfer Training  PT LTG, Assist Device walker, rolling   Transfer Training PT LTG, Date Goal Reviewed 17   Transfer Training PT LTG, Outcome goal ongoing       Goal: Gait Training Goal LTG- PT  Outcome: Ongoing (interventions implemented as appropriate)    17 1518   Gait Training PT LTG   Gait Training Goal PT LTG, Date Established 17   Gait Training Goal PT LTG, Time to Achieve 2 wks   Gait Training Goal PT LTG, [place] Level supervision required   Gait Training Goal PT LTG, Assist Device walker, rolling   Gait Training Goal PT LTG, Distance to Achieve 125   Gait Training Goal PT LTG, Additional Goal with even step lengths and heel strike for initial contact at least 75% of the time   Gait Training Goal PT LTG, Date Goal Reviewed 17   Gait Training Goal PT LTG, Outcome goal ongoing              Electronically signed by Lise Pacheco PT at 2017  3:33 PM      Lise Pacheco PT at 2017  3:34 PM  Version 1 of 1         Acute Care - Physical Therapy Initial Evaluation   [facility]     Patient Name: Sigrid Casarez  : 1940  MRN: [id number]  Today's Date: 2017   Onset of Illness/Injury or Date of Surgery Date: 06/10/17  Date of Referral to PT: 17  Referring Physician: Dr. Sheehan      Admit Date: 6/10/2017     Visit Dx:    ICD-10-CM ICD-9-CM   1. Hip fracture, left, closed, initial encounter S72.002A 820.8   2. Closed fracture of neck of left femur, initial encounter S72.002A 820.8   3. Anemia, unspecified type D64.9 285.9   4. Chronic kidney disease, stage IV (severe) N18.4 585.4   5. Diabetic peripheral neuropathy E11.42 250.60     357.2   6. Acquired hypothyroidism E03.9 244.9   7. Type 1 diabetes mellitus with diabetic chronic kidney disease, unspecified CKD stage E10.22 250.41    N18.9 585.9   8. Impaired mobility and ADLs Z74.09 799.89   9. Impaired functional mobility, balance, gait, and endurance Z74.09 V49.89     Patient Active Problem List   Diagnosis   • Chronic anemia   •  Chronic kidney disease, stage IV (severe)   • Depression   • Diabetic peripheral neuropathy   • 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   • Dizziness   • Fatigue   • Essential hypertension   • Hypoglycemia   • Acquired hypothyroidism   • Diabetic macular edema   • Menopause present   • Mixed hyperlipidemia   • Osteopenia   • Type 1 diabetes mellitus   • Vitamin D deficiency   • Bilateral nondiabetic proliferative retinopathy   • Hip fracture, left   • Status post fall   • Type 2 diabetes mellitus with nephropathy   • Chronic kidney disease, stage IV (severe)     Past Medical History:   Diagnosis Date   • Anemia    • Chronic kidney disease    • Chronic kidney disease    • Community acquired pneumonia    • Dexa Body Composition study lumosacral spine and femur     ostepenic   • Diabetic nephropathy, type I    • Diabetic peripheral neuropathy type 1    • Hypertension    • Menopause    • Pneumonia      Past Surgical History:   Procedure Laterality Date   • CATARACT EXTRACTION     • CHOLECYSTECTOMY     • HIP CANNULATED SCREW PLACEMENT Left 6/11/2017    Procedure:  LEFT HIP MULTIPLE CANNULATED COMPRESSION SCREWS- FERMORAL NECK  FRACTURE;  Surgeon: Jimmy Sheehan MD;  Location: [facility];  Service:           PT ASSESSMENT (last 72 hours)      PT Evaluation       06/12/17 0946 06/12/17 0940    Rehab Evaluation    Document Type evaluation  -AH evaluation  -JR    Subjective Information agree to therapy;no complaints  -AH agree to therapy;no complaints  -JR    Patient Effort, Rehab Treatment good  -AH good  -JR    Symptoms Noted During/After Treatment increased pain  -AH increased pain  -JR    General Information    Patient Profile Review yes  -AH yes  -JR    Onset of Illness/Injury or Date of Surgery Date 06/10/17  -AH 06/09/17  -JR    Referring Physician Dr. Sheehan  - iJmmy Sheehan MD  -JR    General Observations Pt received supine in bed with 2L O2 via nc.   - Supine with 2LPM of oxygen per nasal cannula, IV in LUE, LLE dressing   -JR    Pertinent History Of Current Problem Pt with fall resulting in hip fracture with ORIF LLE.  - fall with resulting left femur fx, s/p ORIF  -JR    Precautions/Limitations --   Pt is WBAT LLE  - --   WBAT  -JR    Prior Level of Function independent:;all household mobility;ADL's  - independent:;all household mobility  -JR    Equipment Currently Used at Home cane, straight;commode;shower chair  - cane, straight;commode;shower chair  -    Plans/Goals Discussed With patient;agreed upon  - patient;agreed upon  -    Risks Reviewed patient:;increased discomfort  - patient:;increased discomfort  -    Benefits Reviewed patient:;improve function;increase independence;increase strength  - patient:;increase balance;increase strength;increase independence;improve function  -    Barriers to Rehab  none identified  -    Living Environment    Lives With spouse  - spouse  -    Living Arrangements house  - house  -JR    Home Accessibility bed and bath on same level;stairs to enter home  - bed and bath on same level;stairs to enter home  -JR    Number of Stairs to Enter Home 2   Pt has 2 steps to front door and 3 steps to back door  - 2  -JR    Living Environment Comment  Going to rehab prior to home  -JR    Clinical Impression    Date of Referral to PT  06/11/17  -JR    PT Diagnosis  Gait abnormality, decreased balance and activity tolerance  -JR    Prognosis  Good  -JR    Patient/Family Goals Statement  Patient wants to return to normal activity  -    Criteria for Skilled Therapeutic Interventions Met  yes;treatment indicated  -JR    Pathology/Pathophysiology Noted (Describe Specifically for Each System)  musculoskeletal;pulmonary;neuromuscular;endocrine/metabolic  -JR    Impairments Found (describe specific impairments)  gait, locomotion, and balance;aerobic capacity/endurance;joint integrity and mobility;ROM  -JR     Rehab Potential  good, to achieve stated therapy goals  -    Vital Signs    Pre SpO2 (%) 96  -AH 96  -JR    O2 Delivery Pre Treatment supplemental O2   2L  -AH supplemental O2   2LPM per nasal cannula  -JR    Intra SpO2 (%) 84   sats were noted to drop prior to activity, O2 was replaced  - 84  -JR    O2 Delivery Intra Treatment room air  - supplemental O2   2LPM per nasal cannula  -JR    Post SpO2 (%) 96  -AH 96  -JR    O2 Delivery Post Treatment supplemental O2   2L  -AH supplemental O2   2LPM per nasal cannula  -JR    Pre Patient Position Supine  - Supine  -JR    Intra Patient Position Sitting  - Standing  -JR    Post Patient Position Sitting  - Sitting  -JR    Pain Assessment    Pain Assessment 0-10  - 0-10  -JR    Pain Score 0  - 0  -JR    Post Pain Score 0   Pt c/o 8/10 pain transitioning from supine>sit/stand>sit  - 0   8/10 with hip movements and transitional movements  -    Pain Type Acute pain;Surgical pain  - Acute pain;Surgical pain  -    Pain Location Leg  - Hip  -    Pain Orientation Left  - Left  -    Pain Intervention(s) Repositioned;Ambulation/increased activity  - Medication (See MAR);Repositioned;Ambulation/increased activity  -    Response to Interventions tolerated  -     Vision Assessment/Intervention    Visual Impairment WFL with corrective lenses  -     Cognitive Assessment/Intervention    Current Cognitive/Communication Assessment functional  - functional  -    Orientation Status oriented x 4  - oriented x 4  -    Follows Commands/Answers Questions able to follow multi-step instructions  - able to follow multi-step instructions  -    Personal Safety WNL/WFL  - decreased awareness, need for assist  -    Personal Safety Interventions  gait belt;fall prevention program maintained  -    ROM (Range of Motion)    General ROM no range of motion deficits identified  -     General ROM Detail  WFL except Left hip d/t pain s/p ORIF  -    MMT  (Manual Muscle Testing)    General MMT Assessment no strength deficits identified  -     General MMT Assessment Detail  WFL except Left Hip NT d/t pain  -    Mobility Assessment/Training    Extremity Weight-Bearing Status  left lower extremity  -    Left Lower Extremity Weight-Bearing  weight-bearing as tolerated  -    Bed Mobility, Assessment/Treatment    Bed Mobility, Assistive Device bed rails;head of bed elevated  - head of bed elevated;bed rails  -    Bed Mob, Supine to Sit, [place] minimum assist (75% patient effort)  - minimum assist (75% patient effort)  -    Bed Mobility, Safety Issues  decreased use of legs for bridging/pushing  -    Bed Mobility, Impairments  pain;strength decreased;ROM decreased;motor control impaired  -    Transfer Assessment/Treatment    Transfers, Sit-Stand [place] minimum assist (75% patient effort)  - minimum assist (75% patient effort);verbal cues required  -    Transfers, Stand-Sit [place] minimum assist (75% patient effort)  - minimum assist (75% patient effort);verbal cues required  -    Transfers, Sit-Stand-Sit, Assist Device rolling walker  - rolling walker  -    Transfer, Safety Issues  steps too close front assistive device;step length decreased;sequencing ability decreased;balance decreased during turns;weight-shifting ability decreased  -    Transfer, Impairments  strength decreased;ROM decreased;impaired balance;pain  -    Gait Assessment/Treatment    Gait, [place] Level  contact guard assist;verbal cues required  -    Gait, Assistive Device  rolling walker  -    Gait, Distance (Feet)  66  -    Gait, Gait Pattern Analysis  swing-to gait  -    Gait, Gait Deviations  decreased heel strike;weight-shifting ability decreased;stride length decreased;step length decreased;forward flexed posture;marianne decreased  -    Gait, Safety Issues  steps too close front assistive device;weight-shifting ability  decreased;step length decreased;supplemental O2   2LPM per nasal cannula  -    Gait, Impairments  pain;strength decreased;ROM decreased;impaired balance;coordination impaired;postural control impaired  -    Positioning and Restraints    Pre-Treatment Position in bed  - in bed  -    Post Treatment Position chair  - chair  -    In Chair sitting;call light within reach;encouraged to call for assist  - sitting;call light within reach;encouraged to call for assist;notified nsg  -JR      06/11/17 2030 06/11/17 1611    General Information    Equipment Currently Used at Home walker, standard;oxygen;glucometer  - walker, standard;oxygen  -LP    Living Environment    Transportation Available ambulance  -       06/10/17 2015 06/10/17 1700    General Information    Equipment Currently Used at Home glucometer  - none  -    Living Environment    Transportation Available ambulance  -       06/10/17 0939       Living Environment    Lives With spouse  -     Living Arrangements house  -     Home Accessibility no concerns  -     Stair Railings at Home inside, present on left side;outside, present on left side  -     Type of Financial/Environmental Concern none  -CC     Transportation Available car  -CC       User Key  (r) = Recorded By, (t) = Taken By, (c) = Cosigned By    Initials Name Provider Type    YAIR Frost Occupational Therapist     Lise Pacheco, PT Physical Therapist    CC Kitty Knapp, RN Registered Nurse     Kina Jeronimo, RN Registered Nurse    LATISHA Botello           Physical Therapy Education     Title: PT OT SLP Therapies (Active)     Topic: Physical Therapy (Active)     Point: Mobility training (Done)    Learning Progress Summary    Learner Readiness Method Response Comment Documented by Status   Patient Acceptance E,TB VU,DU Role of PT/POC, safety with mobility  06/12/17 1510 Done                      User Key     Initials Effective Dates Name  Provider Type Discipline     10/26/16 -  Lise Pacheco, PT Physical Therapist PT                PT Recommendation and Plan  Anticipated Discharge Disposition: inpatient rehabilitation facility  Planned Therapy Interventions: strengthening, balance training, bed mobility training, transfer training, gait training, patient/family education  PT Frequency: 2 times/day, daily (daily Sat/Sun)  Plan of Care Review  Plan Of Care Reviewed With: patient  Outcome Summary/Follow up Plan: Patient participated well in skilled PT evaluation and demonstrates decreased strength, balance, transfers, gait quality, and activity tolerance.  She is expected to benefit from additional PT while hospitalized and upon discharge to inpatient rehab setting, prior to return to home.                                                                       IP PT Goals       06/12/17 1518          Bed Mobility PT LTG    Bed Mobility PT LTG, Date Established 06/12/17  -      Bed Mobility PT LTG, Time to Achieve 2 wks  -      Bed Mobility PT LTG, Activity Type all bed mobility  -      Bed Mobility PT LTG, [place] Level conditional independence  -      Bed Mobility PT Goal  LTG, Assist Device bed rails  -      Bed Mobility PT LTG, Date Goal Reviewed 06/12/17  -      Bed Mobility PT LTG, Outcome goal ongoing  -      Transfer Training PT LTG    Transfer Training PT LTG, Date Established 06/12/17  -      Transfer Training PT LTG, Time to Achieve 2 wks  -      Transfer Training PT LTG, Activity Type sit to stand/stand to sit;bed to chair /chair to bed  -      Transfer Training PT LTG, [place] Level supervision required  -      Transfer Training PT LTG, Assist Device walker, rolling  -      Transfer Training PT  LTG, Date Goal Reviewed 06/12/17  -      Transfer Training PT LTG, Outcome goal ongoing  -      Gait Training PT LTG    Gait Training Goal PT LTG, Date Established 06/12/17  -      Gait Training Goal PT LTG,  Time to Achieve 2 wks  -      Gait Training Goal PT LTG, [place] Level supervision required  -      Gait Training Goal PT LTG, Assist Device walker, rolling  -      Gait Training Goal PT LTG, Distance to Achieve 125  -      Gait Training Goal PT LTG, Additional Goal with even step lengths and heel strike for initial contact at least 75% of the time  -      Gait Training Goal PT LTG, Date Goal Reviewed 06/12/17  -      Gait Training Goal PT LTG, Outcome goal ongoing  -        User Key  (r) = Recorded By, (t) = Taken By, (c) = Cosigned By    Initials Name Provider Type    JR Lise Pacheco, PT Physical Therapist                Outcome Measures       06/12/17 0946 06/12/17 0940       How much help from another person do you currently need...    Turning from your back to your side while in flat bed without using bedrails?  3  -JR     Moving from lying on back to sitting on the side of a flat bed without bedrails?  3  -JR     Moving to and from a bed to a chair (including a wheelchair)?  3  -JR     Standing up from a chair using your arms (e.g., wheelchair, bedside chair)?  3  -JR     Climbing 3-5 steps with a railing?  2  -JR     To walk in hospital room?  3  -     AM-PAC 6 Clicks Score  17  -JR     How much help from another is currently needed...    Putting on and taking off regular lower body clothing? 3  -AH      Bathing (including washing, rinsing, and drying) 3  -AH      Toileting (which includes using toilet bed pan or urinal) 3  -AH      Putting on and taking off regular upper body clothing 4  -AH      Taking care of personal grooming (such as brushing teeth) 4  -AH      Eating meals 4  -      Score 21  -      Functional Assessment    Outcome Measure Options AM-PAC 6 Clicks Daily Activity (OT)  - AM-[facility] 6 Clicks Basic Mobility (PT)  -       User Key  (r) = Recorded By, (t) = Taken By, (c) = Cosigned By    Initials Name Provider Type     Aline Frost Occupational Therapist    JR Lezama  Junior PT Physical Therapist           Time Calculation:         PT Charges       06/12/17 1514          Time Calculation    Start Time 0940  -JR      PT Received On 06/12/17  -      PT Goal Re-Cert Due Date 06/22/17  -JR        User Key  (r) = Recorded By, (t) = Taken By, (c) = Cosigned By    Initials Name Provider Type    JR Lise Pacheco PT Physical Therapist          Therapy Charges for Today     Code Description Service Date Service Provider Modifiers Qty    23624230447 HC PT EVAL MOD COMPLEXITY 4 6/12/2017 Lise Pacheco, PT GP 1          PT G-Codes  Outcome Measure Options: AM-PAC 6 Clicks Daily Activity (OT)      Lise Pacheco PT  6/12/2017          Electronically signed by Lise Pacheco PT at 6/12/2017  3:36 PM      Lise Pacheco PT at 6/12/2017  7:52 PM  Version 1 of 1         Problem: Patient Care Overview (Adult)  Goal: Plan of Care Review  Outcome: Ongoing (interventions implemented as appropriate)    06/12/17 1948   Coping/Psychosocial Response Interventions   Plan Of Care Reviewed With patient   Patient Care Overview   Progress progress toward functional goals as expected   Outcome Evaluation   Outcome Summary/Follow up Plan Patient is able to perform LLE therapeutic exercises and gait with RW. She is able to improve gait quality this afternoon. She is expected to continue progressing with additional PT services.         Problem: Inpatient Physical Therapy  Goal: Bed Mobility Goal LTG- PT  Outcome: Ongoing (interventions implemented as appropriate)    06/12/17 1518   Bed Mobility PT LTG   Bed Mobility PT LTG, Date Established 06/12/17   Bed Mobility PT LTG, Time to Achieve 2 wks   Bed Mobility PT LTG, Activity Type all bed mobility   Bed Mobility PT LTG, [place] Level conditional independence   Bed Mobility PT Goal LTG, Assist Device bed rails   Bed Mobility PT LTG, Date Goal Reviewed 06/12/17   Bed Mobility PT LTG, Outcome goal ongoing       Goal: Transfer Training Goal 1 LTG- PT  Outcome: Ongoing  (interventions implemented as appropriate)    17 1518   Transfer Training PT LTG   Transfer Training PT LTG, Date Established 17   Transfer Training PT LTG, Time to Achieve 2 wks   Transfer Training PT LTG, Activity Type sit to stand/stand to sit;bed to chair /chair to bed   Transfer Training PT LTG, [place] Level supervision required   Transfer Training PT LTG, Assist Device walker, rolling   Transfer Training PT LTG, Date Goal Reviewed 17   Transfer Training PT LTG, Outcome goal ongoing       Goal: Gait Training Goal LTG- PT  Outcome: Ongoing (interventions implemented as appropriate)    17 1518   Gait Training PT LTG   Gait Training Goal PT LTG, Date Established 17   Gait Training Goal PT LTG, Time to Achieve 2 wks   Gait Training Goal PT LTG, [place] Level supervision required   Gait Training Goal PT LTG, Assist Device walker, rolling   Gait Training Goal PT LTG, Distance to Achieve 125   Gait Training Goal PT LTG, Additional Goal with even step lengths and heel strike for initial contact at least 75% of the time   Gait Training Goal PT LTG, Date Goal Reviewed 17   Gait Training Goal PT LTG, Outcome goal ongoing              Electronically signed by Lise Pacheco PT at 2017  7:52 PM      Lise Pacheco PT at 2017  7:53 PM  Version 1 of 1         Acute Care - Physical Therapy Treatment Note  CHANDAN Matthews     Patient Name: Sigrid Casarez  : 1940  MRN: [id number]  Today's Date: 2017  Onset of Illness/Injury or Date of Surgery Date: 06/10/17  Date of Referral to PT: 17  Referring Physician: Dr. Sheehan    Admit Date: 6/10/2017    Visit Dx:    ICD-10-CM ICD-9-CM   1. Hip fracture, left, closed, initial encounter S72.002A 820.8   2. Closed fracture of neck of left femur, initial encounter S72.002A 820.8   3. Anemia, unspecified type D64.9 285.9   4. Chronic kidney disease, stage IV (severe) N18.4 585.4   5. Diabetic peripheral neuropathy E11.42  250.60     357.2   6. Acquired hypothyroidism E03.9 244.9   7. Type 1 diabetes mellitus with diabetic chronic kidney disease, unspecified CKD stage E10.22 250.41    N18.9 585.9   8. Impaired mobility and ADLs Z74.09 799.89   9. Impaired functional mobility, balance, gait, and endurance Z74.09 V49.89     Patient Active Problem List   Diagnosis   • Chronic anemia   • Chronic kidney disease, stage IV (severe)   • Depression   • Diabetic peripheral neuropathy   • 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   • Dizziness   • Fatigue   • Essential hypertension   • Hypoglycemia   • Acquired hypothyroidism   • Diabetic macular edema   • Menopause present   • Mixed hyperlipidemia   • Osteopenia   • Type 1 diabetes mellitus   • Vitamin D deficiency   • Bilateral nondiabetic proliferative retinopathy   • Hip fracture, left   • Status post fall   • Type 2 diabetes mellitus with nephropathy   • Chronic kidney disease, stage IV (severe)               Adult Rehabilitation Note       06/12/17 1255          Rehab Assessment/Intervention    Discipline physical therapist  -JR      Document Type therapy note (daily note)  -JR      Subjective Information agree to therapy;complains of;fatigue;pain  -JR      Patient Effort, Rehab Treatment good  -JR      Symptoms Noted During/After Treatment none  -JR      Recorded by [JR] Lise Pacheco, PT      Pain Assessment    Pain Assessment 0-10  -JR      Pain Score 0  -JR      Post Pain Score 0  -JR      Pain Type Acute pain;Surgical pain  -JR      Pain Location Hip  -JR      Pain Orientation Left  -JR      Pain Intervention(s) Ambulation/increased activity;Medication (See MAR)  -JR      Response to Interventions only c/o pain with movements  -JR      Recorded by [JR] Lise Pacheco, PT      Bed Mobility, Assessment/Treatment    Bed Mobility, Assistive Device head of bed elevated;bed rails  -JR      Bed Mob, Sit to Supine, [place] minimum assist  (75% patient effort)  -JR      Recorded by [] Lise Pacheco PT      Transfer Assessment/Treatment    Transfers, Sit-Stand [place] minimum assist (75% patient effort)  -JR      Transfers, Stand-Sit [place] minimum assist (75% patient effort)  -JR      Transfers, Sit-Stand-Sit, Assist Device rolling walker  -JR      Recorded by [JR] Lise Pacheco PT      Gait Assessment/Treatment    Gait, [place] Level contact guard assist  -JR      Gait, Assistive Device rolling walker  -JR      Gait, Distance (Feet) 84  -JR      Gait, Gait Pattern Analysis swing-to gait  -JR      Gait, Gait Deviations marianne decreased;decreased heel strike;forward flexed posture;stride length decreased;swing-to-stance ratio decreased  -JR      Gait, Safety Issues weight-shifting ability decreased;step length decreased;sequencing ability decreased;steps too close front assistive device  -JR      Gait, Impairments pain;strength decreased;ROM decreased;impaired balance;coordination impaired;postural control impaired  -JR      Recorded by [] Lise Pacheco PT      Therapy Exercises    Left Lower Extremity AROM:;10 reps;sitting;LAQ;ankle pumps/circles  -JR      Recorded by [] Lise Pacheco PT      Positioning and Restraints    Pre-Treatment Position sitting in chair/recliner  -JR      Post Treatment Position bed  -JR      In Bed supine;call light within reach;encouraged to call for assist;notified nsg  -JR      Recorded by [] Lise Pacheco PT        User Key  (r) = Recorded By, (t) = Taken By, (c) = Cosigned By    Initials Name Effective Dates    JR Lise Pacheco PT 10/26/16 -                 IP PT Goals       06/12/17 1518          Bed Mobility PT LTG    Bed Mobility PT LTG, Date Established 06/12/17  -JR      Bed Mobility PT LTG, Time to Achieve 2 wks  -JR      Bed Mobility PT LTG, Activity Type all bed mobility  -JR      Bed Mobility PT LTG, [place] Level conditional independence  -JR      Bed Mobility PT Goal  LTG, Assist Device  bed rails  -JR      Bed Mobility PT LTG, Date Goal Reviewed 06/12/17  -      Bed Mobility PT LTG, Outcome goal ongoing  -      Transfer Training PT LTG    Transfer Training PT LTG, Date Established 06/12/17  -      Transfer Training PT LTG, Time to Achieve 2 wks  -JR      Transfer Training PT LTG, Activity Type sit to stand/stand to sit;bed to chair /chair to bed  -JR      Transfer Training PT LTG, [place] Level supervision required  -JR      Transfer Training PT LTG, Assist Device walker, rolling  -JR      Transfer Training PT  LTG, Date Goal Reviewed 06/12/17  -      Transfer Training PT LTG, Outcome goal ongoing  -      Gait Training PT LTG    Gait Training Goal PT LTG, Date Established 06/12/17  -      Gait Training Goal PT LTG, Time to Achieve 2 wks  -JR      Gait Training Goal PT LTG, [place] Level supervision required  -JR      Gait Training Goal PT LTG, Assist Device walker, rolling  -JR      Gait Training Goal PT LTG, Distance to Achieve 125  -JR      Gait Training Goal PT LTG, Additional Goal with even step lengths and heel strike for initial contact at least 75% of the time  -JR      Gait Training Goal PT LTG, Date Goal Reviewed 06/12/17  -      Gait Training Goal PT LTG, Outcome goal ongoing  -        User Key  (r) = Recorded By, (t) = Taken By, (c) = Cosigned By    Initials Name Provider Type    JR Lise Pacheco PT Physical Therapist          Physical Therapy Education     Title: PT OT SLP Therapies (Active)     Topic: Physical Therapy (Active)     Point: Mobility training (Done)    Learning Progress Summary    Learner Readiness Method Response Comment Documented by Status   Patient Acceptance E,TB VU,DU Role of PT/POC, safety with mobility  06/12/17 1510 Done                      User Key     Initials Effective Dates Name Provider Type Discipline     10/26/16 -  Lise Pacheco, PT Physical Therapist PT                    PT Recommendation and Plan  Anticipated Discharge  Disposition: inpatient rehabilitation facility  Planned Therapy Interventions: strengthening, balance training, bed mobility training, transfer training, gait training, patient/family education  PT Frequency: 2 times/day, daily (daily Sat/Sun)  Plan of Care Review  Plan Of Care Reviewed With: patient  Progress: progress toward functional goals as expected  Outcome Summary/Follow up Plan: Patient is able to perform LLE therapeutic exercises and gait with RW.  She is able to improve gait quality this afternoon.  She is expected to continue progressing with additional PT services.          Outcome Measures       06/12/17 0946 06/12/17 0940       How much help from another person do you currently need...    Turning from your back to your side while in flat bed without using bedrails?  3  -JR     Moving from lying on back to sitting on the side of a flat bed without bedrails?  3  -JR     Moving to and from a bed to a chair (including a wheelchair)?  3  -JR     Standing up from a chair using your arms (e.g., wheelchair, bedside chair)?  3  -JR     Climbing 3-5 steps with a railing?  2  -JR     To walk in hospital room?  3  -JR     AM-PAC 6 Clicks Score  17  -JR     How much help from another is currently needed...    Putting on and taking off regular lower body clothing? 3  -AH      Bathing (including washing, rinsing, and drying) 3  -AH      Toileting (which includes using toilet bed pan or urinal) 3  -AH      Putting on and taking off regular upper body clothing 4  -AH      Taking care of personal grooming (such as brushing teeth) 4  -AH      Eating meals 4  -      Score 21  -AH      Functional Assessment    Outcome Measure Options AM-PAC 6 Clicks Daily Activity (OT)  - AM-PAC 6 Clicks Basic Mobility (PT)  -       User Key  (r) = Recorded By, (t) = Taken By, (c) = Cosigned By    Initials Name Provider Type    YAIR Frost Occupational Therapist    JR Lise Pacheco, PT Physical Therapist           Time  Calculation:         PT Charges       06/12/17 1946 06/12/17 1514       Time Calculation    Start Time 1255  - 0940  -     PT Received On 06/12/17  -JR 06/12/17  -     PT Goal Re-Cert Due Date 06/22/17  -JR 06/22/17  -     Time Calculation- PT    Total Timed Code Minutes- PT 28 minute(s)  -        User Key  (r) = Recorded By, (t) = Taken By, (c) = Cosigned By    Initials Name Provider Type    JR Lise Pacheco PT Physical Therapist          Therapy Charges for Today     Code Description Service Date Service Provider Modifiers Qty    14966786899 HC PT EVAL MOD COMPLEXITY 4 6/12/2017 Lise Pacheco, PT GP 1    57912296875 HC GAIT TRAINING EA 15 MIN 6/12/2017 Lise Pacheco, PT GP 1    13059396732 HC PT THERAPEUTIC ACT EA 15 MIN 6/12/2017 Lise Pacheco, MARCUS GP 1          PT G-Codes  Outcome Measure Options: AM-PAC 6 Clicks Daily Activity (OT)    Lise Pacheco PT  6/12/2017          Electronically signed by Lise Pacheco PT at 6/12/2017  7:54 PM           Occupational Therapy Notes (last 24 hours) (Notes from 6/12/2017  8:25 AM through 6/13/2017  8:25 AM)      Aline Frost at 6/12/2017 11:42 AM  Version 1 of 1         Problem: Patient Care Overview (Adult)  Goal: Plan of Care Review  Outcome: Ongoing (interventions implemented as appropriate)    06/12/17 1139   Coping/Psychosocial Response Interventions   Plan Of Care Reviewed With patient   Patient Care Overview   Progress (OT evaluation completed today)   Outcome Evaluation   Outcome Summary/Follow up Plan Pt seen for OT evaluation today. Pt presents with decreased independence with self care, transfers and functional mobility tasks. Pt is expected to benefit from skilled OT to improve her functional independence with ADL tasks.         Problem: Inpatient Occupational Therapy  Goal: Bed Mobility Goal LTG- OT  Outcome: Ongoing (interventions implemented as appropriate)    06/12/17 1139   Bed Mobility OT LTG   Bed Mobility OT LTG, Date Established 06/12/17   Bed Mobility  OT LTG, Time to Achieve 2 wks   Bed Mobility OT LTG, Activity Type supine to sit/sit to supine   Bed Mobility OT LTG, [place] Level contact guard assist   Bed Mobility OT LTG, Assist Device bed rails   Bed Mobility OT LTG, Outcome goal ongoing       Goal: Strength Goal LTG- OT  Outcome: Ongoing (interventions implemented as appropriate)    17 1139   Strength OT LTG   Strength Goal OT LTG, Date Established 17   Strength Goal OT LTG, Time to Achieve 2 wks   Strength Goal OT LTG, Functional Goal Pt will participate in BUE strengthening ex using theraband for resistance   Strength Goal OT LTG, Outcome goal ongoing       Goal: LB Dressing Goal LTG- OT  Outcome: Ongoing (interventions implemented as appropriate)    17 1139   LB Dressing OT LTG   LB Dressing Goal OT LTG, Date Established 17   LB Dressing Goal OT LTG, Time to Achieve 2 wks   LB Dressing Goal OT LTG, [place] Level contact guard assist   LB Dressing Goal OT LTG, Adaptive Equipment sock-aid   LB Dressing Goal OT LTG, Outcome goal ongoing       Goal: Functional Mobility Goal LTG- OT  Outcome: Ongoing (interventions implemented as appropriate)    17 1139   Functional Mobility OT LTG   Functional Mobility Goal OT LTG, Date Established 17   Functional Mobility Goal OT LTG, Time to Achieve 2 wks   Functional Mobility Goal OT LTG, [place] Level contact guard   Functional Mobility Goal OT LTG, Assist Device rolling walker   Functional Mobility Goal OT LTG, Additional Goal Pt will walk 150' with cga using RW   Functional Mobility Goal OT LTG, Outcome goal ongoing              Electronically signed by Aline Frost at 2017 11:42 AM      Aline Frost at 2017 11:43 AM  Version 1 of 1         Acute Care - Occupational Therapy Initial Evaluation  CHANDAN Matthews     Patient Name: Sigrid Casarez  : 1940  MRN: [id number]  Today's Date: 2017  Onset of Illness/Injury or Date of Surgery Date: 06/10/17  Date  of Referral to OT: 06/11/17  Referring Physician: Dr. Sheehan    Admit Date: 6/10/2017       ICD-10-CM ICD-9-CM   1. Hip fracture, left, closed, initial encounter S72.002A 820.8   2. Closed fracture of neck of left femur, initial encounter S72.002A 820.8   3. Anemia, unspecified type D64.9 285.9   4. Chronic kidney disease, stage IV (severe) N18.4 585.4   5. Diabetic peripheral neuropathy E11.42 250.60     357.2   6. Acquired hypothyroidism E03.9 244.9   7. Type 1 diabetes mellitus with diabetic chronic kidney disease, unspecified CKD stage E10.22 250.41    N18.9 585.9   8. Impaired mobility and ADLs Z74.09 799.89     Patient Active Problem List   Diagnosis   • Chronic anemia   • Chronic kidney disease, stage IV (severe)   • Depression   • Diabetic peripheral neuropathy   • 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   • Dizziness   • Fatigue   • Essential hypertension   • Hypoglycemia   • Acquired hypothyroidism   • Diabetic macular edema   • Menopause present   • Mixed hyperlipidemia   • Osteopenia   • Type 1 diabetes mellitus   • Vitamin D deficiency   • Bilateral nondiabetic proliferative retinopathy   • Hip fracture, left   • Status post fall   • Type 2 diabetes mellitus with nephropathy   • Chronic kidney disease, stage IV (severe)     Past Medical History:   Diagnosis Date   • Anemia    • Chronic kidney disease    • Chronic kidney disease    • Community acquired pneumonia    • Dexa Body Composition study lumosacral spine and femur     ostepenic   • Diabetic nephropathy, type I    • Diabetic peripheral neuropathy type 1    • Hypertension    • Menopause    • Pneumonia      Past Surgical History:   Procedure Laterality Date   • CATARACT EXTRACTION     • CHOLECYSTECTOMY     • HIP CANNULATED SCREW PLACEMENT Left 6/11/2017    Procedure:  LEFT HIP MULTIPLE CANNULATED COMPRESSION SCREWS- FERMORAL NECK  FRACTURE;  Surgeon: Jimmy Sheehan MD;  Location: [facility]  OR;  Service:           OT ASSESSMENT FLOWSHEET (last 72 hours)      OT Evaluation       06/12/17 0946 06/11/17 2030 06/11/17 1611 06/10/17 2015 06/10/17 1800    Rehab Evaluation    Document Type evaluation  -        Subjective Information agree to therapy;no complaints  -        Patient Effort, Rehab Treatment good  -        Symptoms Noted During/After Treatment increased pain  -        General Information    Patient Profile Review yes  -        Onset of Illness/Injury or Date of Surgery Date 06/10/17  -        Referring Physician Dr. Sheehan  -        General Observations Pt received supine in bed with 2L O2 via nc.  -        Pertinent History Of Current Problem Pt with fall resulting in hip fracture with ORIF LLE.  -        Precautions/Limitations --   Pt is WBAT LLE  -        Prior Level of Function independent:;all household mobility;ADL's  -        Equipment Currently Used at Home cane, straight;commode;shower chair  - walker, standard;oxygen;glucometer  - walker, standard;oxygen  -LP glucometer  -     Plans/Goals Discussed With patient;agreed upon  -        Risks Reviewed patient:;increased discomfort  -        Benefits Reviewed patient:;improve function;increase independence;increase strength  -        Living Environment    Lives With spouse  -        Living Arrangements house  -        Home Accessibility bed and bath on same level;stairs to enter home  -        Number of Stairs to Enter Home 2   Pt has 2 steps to front door and 3 steps to back door  -        Transportation Available  ambulance  -  ambulance  -     Clinical Impression    Date of Referral to OT 06/11/17  -        OT Diagnosis decreased ADL independence  -        Patient/Family Goals Statement Pt would like to return home, but is agreeable to rehab if needed  [facility]        Criteria for Skilled Therapeutic Interventions Met yes;treatment indicated  -        Rehab Potential good, to achieve stated  therapy goals  -        Anticipated Discharge Disposition home with home health  -        Functional Level Prior    Ambulation 0-->independent  -AH    0-->independent  -CC    Transferring 0-->independent  -AH    0-->independent  -CC    Toileting 0-->independent  -AH    0-->independent  -CC    Bathing 0-->independent  -AH    0-->independent  -CC    Dressing 0-->independent  -AH    0-->independent  -CC    Eating 0-->independent  -AH    0-->independent  -CC    Communication 0-->understands/communicates without difficulty  -AH    0-->understands/communicates without difficulty  -CC    Swallowing 0-->swallows foods/liquids without difficulty  -    0-->swallows foods/liquids without difficulty  -    Prior Functional Level Comment     none  -    Vital Signs    Pre SpO2 (%) 96  -        O2 Delivery Pre Treatment supplemental O2   2L  -AH        Intra SpO2 (%) 84   sats were noted to drop prior to activity, O2 was replaced  -        O2 Delivery Intra Treatment room air  -        Post SpO2 (%) 96  -        O2 Delivery Post Treatment supplemental O2   2L  -AH        Pre Patient Position Supine  -        Intra Patient Position Sitting  -        Post Patient Position Sitting  -        Pain Assessment    Pain Assessment 0-10  -        Pain Score 0  -        Post Pain Score 0   Pt c/o 8/10 pain transitioning from supine>sit/stand>sit  -        Pain Type Acute pain;Surgical pain  -        Pain Location Leg  -        Pain Orientation Left  -        Pain Intervention(s) Repositioned;Ambulation/increased activity  -        Response to Interventions tolerated  -        Vision Assessment/Intervention    Visual Impairment WFL with corrective lenses  -        Cognitive Assessment/Intervention    Current Cognitive/Communication Assessment functional  -        Orientation Status oriented x 4  -        Follows Commands/Answers Questions able to follow multi-step instructions  -        Personal  Safety WNL/WFL  -        ROM (Range of Motion)    General ROM no range of motion deficits identified  -        MMT (Manual Muscle Testing)    General MMT Assessment no strength deficits identified  -        Bed Mobility, Assessment/Treatment    Bed Mobility, Assistive Device bed rails;head of bed elevated  -        Bed Mob, Supine to Sit, [place] minimum assist (75% patient effort)  -        Transfer Assessment/Treatment    Transfers, Sit-Stand [place] minimum assist (75% patient effort)  -        Transfers, Stand-Sit [place] minimum assist (75% patient effort)  -        Transfers, Sit-Stand-Sit, Assist Device rolling walker  -        Functional Mobility    Functional Mobility- Ind. Level contact guard assist  -        Functional Mobility- Device rolling walker  -        Functional Mobility-Distance (Feet) 66  -        Functional Mobility-Maintain WBing able to maintain weight bearing status  -        Functional Mobility- Safety Issues supplemental O2  -        Upper Body Bathing Assessment/Training    UB Bathing Assess/Train, [place] Level set up required  -        Lower Body Bathing Assessment/Training    LB Bathing Assess/Train, [place] Level minimum assist (75% patient effort)  -        Upper Body Dressing Assessment/Training    UB Dressing Assess/Train, [place] set up required  -        Lower Body Dressing Assessment/Training    LB Dressing Assess/Train, [place] minimum assist (75% patient effort)  -        Toileting Assessment/Training    Toileting Assess/Train, [place] Level minimum assist (75% patient effort)  -        Grooming Assessment/Training    Grooming Assess/Train, [place]ep[place] Level independent  -        General Therapy Interventions    Planned Therapy Interventions adaptive equipment training;ADL retraining;strengthening;transfer training  -        Positioning and Restraints    Pre-Treatment Position in bed  -        Post  Treatment Position chair  -        In Chair sitting;call light within reach;encouraged to call for assist  -          06/10/17 1700 06/10/17 0966             General Information    Equipment Currently Used at Home none  -CC        Living Environment    Lives With  spouse  -CC       Living Arrangements  house  -CC       Home Accessibility  no concerns  -CC       Stair Railings at Home  inside, present on left side;outside, present on left side  -CC       Type of Financial/Environmental Concern  none  -CC       Transportation Available  car  -CC         User Key  (r) = Recorded By, (t) = Taken By, (c) = Cosigned By    Initials Name Effective Dates     Aline Frost 10/26/16 -     CC Kitty Knapp, RN 10/26/16 -     DW Kina Jeronimo RN 10/26/16 -     LP Melody Botello 10/26/16 -            Occupational Therapy Education     Title: PT OT SLP Therapies (Active)     Topic: Occupational Therapy (Active)     Point: ADL training (Done)    Description: Instruct learner(s) on proper safety adaptation and remediation techniques during self care or transfers.   Instruct in proper use of assistive devices.    Learning Progress Summary    Learner Readiness Method Response Comment Documented by Status   Patient Acceptance E VU Role of OT  06/12/17 1138 Done                      User Key     Initials Effective Dates Name Provider Type Discipline     10/26/16 -  Aline Frost Occupational Therapist OT                  OT Recommendation and Plan  Anticipated Discharge Disposition: home with home health  Planned Therapy Interventions: adaptive equipment training, ADL retraining, strengthening, transfer training  Plan of Care Review  Plan Of Care Reviewed With: patient  Progress:  (OT evaluation completed today)  Outcome Summary/Follow up Plan: Pt seen for OT evaluation today.  Pt presents with decreased independence with self care, transfers and functional mobility tasks.  Pt is expected to benefit from skilled OT  to improve her functional independence with ADL tasks.          OT Goals       06/12/17 1139          Bed Mobility OT LTG    Bed Mobility OT LTG, Date Established 06/12/17  -      Bed Mobility OT LTG, Time to Achieve 2 wks  -      Bed Mobility OT LTG, Activity Type supine to sit/sit to supine  -      Bed Mobility OT LTG, [place] Level contact guard assist  -      Bed Mobility OT LTG, Assist Device bed rails  -      Bed Mobility OT LTG, Outcome goal ongoing  -      Strength OT LTG    Strength Goal OT LTG, Date Established 06/12/17  -      Strength Goal OT LTG, Time to Achieve 2 wks  -      Strength Goal OT LTG, Functional Goal Pt will participate in BUE strengthening ex using theraband for resistance  -      Strength Goal OT LTG, Outcome goal ongoing  -      LB Dressing OT LTG    LB Dressing Goal OT LTG, Date Established 06/12/17  -      LB Dressing Goal OT LTG, Time to Achieve 2 wks  -      LB Dressing Goal OT LTG, [place] Level contact guard assist  -      LB Dressing Goal OT LTG, Adaptive Equipment sock-aid  -      LB Dressing Goal OT LTG, Outcome goal ongoing  -      Functional Mobility OT LTG    Functional Mobility Goal OT LTG, Date Established 06/12/17  -      Functional Mobility Goal OT LTG, Time to Achieve 2 wks  -      Functional Mobility Goal OT LTG, [place] Level contact guard  -      Functional Mobility Goal OT LTG, Assist Device rolling walker  -      Functional Mobility Goal OT LTG, Additional Goal Pt will walk 150' with cga using RW  -      Functional Mobility Goal OT LTG, Outcome goal ongoing  -        User Key  (r) = Recorded By, (t) = Taken By, (c) = Cosigned By    Initials Name Provider Type    YAIR Frost Occupational Therapist                Outcome Measures       06/12/17 0994          How much help from another is currently needed...    Putting on and taking off regular lower body clothing? 3  -      Bathing (including washing,  rinsing, and drying) 3  -AH      Toileting (which includes using toilet bed pan or urinal) 3  -AH      Putting on and taking off regular upper body clothing 4  -AH      Taking care of personal grooming (such as brushing teeth) 4  -AH      Eating meals 4  -      Score 21  -      Functional Assessment    Outcome Measure Options AM-PAC 6 Clicks Daily Activity (OT)  -AH        User Key  (r) = Recorded By, (t) = Taken By, (c) = Cosigned By    Initials Name Provider Type     Aline Frost Occupational Therapist          Time Calculation:   OT Start Time: 0946    Therapy Charges for Today     Code Description Service Date Service Provider Modifiers Qty    80910931281 HC OT EVAL LOW COMPLEXITY 4 6/12/2017 Aline Frost GO 1               Aline Frost  6/12/2017     Electronically signed by Aline Frost at 6/12/2017 11:44 AM

## 2017-06-13 NOTE — DISCHARGE PLACEMENT REQUEST
"ANTHONY Walker RN  Case Management  Phone:  [phone]; Fax:  [phone]    Discharge summary.      Curtis Casarez (77 y.o. Female)     Date of Birth Social Security Number Address Home Phone MRN    1940  [address] [place] [address] [phone] [id number]    Christianity Marital Status          Anabaptist        Admission Date Admission Type Admitting Provider Attending Provider Department, Room/Bed    6/10/17 Emergency Jordan Ribera MD Pais, Roshan, MD [facility] MED SURG  4, 412/1    Discharge Date Discharge Disposition Discharge Destination         Rehab Facility or Unit (DC - External)             Attending Provider: Jordan Ribera MD     Allergies:  Duloxetine, Exenatide, Lisinopril, Penicillins, Phentermine    Isolation:  None   Infection:  None   Code Status:  FULL    Ht:  63\" (160 cm)   Wt:  148 lb 11.2 oz (67.4 kg)    Admission Cmt:  None   Principal Problem:  Hip fracture, left [S72.002A]                 Active Insurance as of 6/10/2017     Primary Coverage     Payor Plan Insurance Group Employer/Plan Group    MEDICARE MEDICARE A & B      Payor Plan Address Payor Plan Phone Number Effective From Effective To    [address] [phone] 1/1/2003     [place] [address]       Subscriber Name Subscriber Birth Date Member ID       CURTIS CASAREZ 1940 [id number]           Secondary Coverage     Payor Plan Insurance Group Employer/Plan Group    MISC MED SUPP MISC MCARE SUPPLEMENT [id number]     Coverage Address Coverage Phone Number Effective From Effective To    [address] [facility] [phone] 5/17/2011     [place] [address]       Subscriber Name Subscriber Birth Date Member ID       CURTIS CASAREZ 1940 [id number]                 Emergency Contacts      (Rel.) Home Phone Work Phone Mobile Phone    HermelindoGhassan (Spouse) [phone] -- [phone]               Discharge Summary      Jordan Ribera MD at 6/13/2017 11:27 AM      "         [facility]   [facility]      Name:  Sigrid Casarez   Age:  77 y.o.  Sex:  female  :  1940  MRN:  [id number]   Visit Number:  [id number]    Admission Date:  6/10/2017  Date of Discharge:  2017  Primary Care Physician:  Tiburcio Hernandez MD   Primary Nephrologist: Deric Mejia MD  Primary Cardiologist: Rafal Steve MD    Discharge Diagnoses:     1. Left hip nondisplaced fracture, present on admission, Status post ORIF on 2017.  2. Status post mechanical fall at home.  3. Acute blood loss anemia secondary to #1, status post 1 unit of packed red blood cell transfusion.  4. Diabetes mellitus type 2 with nephropathy.  5. Chronic kidney disease stage V.  6. Essential hypertension.  7. Acquired hypothyroidism.  8. Anemia of chronic disease.    Principal Problem:    Hip fracture, left  Active Problems:    Status post fall    Chronic kidney disease, stage IV (severe)    Chronic anemia    Essential hypertension    Type 2 diabetes mellitus with nephropathy    Acquired hypothyroidism      Presenting Problem:    Hip fracture, left, closed, initial encounter [S72.002A]     Consults:     Consults     Date and Time Order Name Status Description    2017 1246 Inpatient Consult to Hospitalist      6/10/2017 1144 Inpatient Consult to Cardiology      6/10/2017 1121 Inpatient Consult to Nephrology Completed     6/10/2017 1120 Inpatient Consult to Orthopedic Surgery      6/10/2017 1120 Inpatient Consult to Cardiology          Consulting Physician(s)     Provider Relationship    Rafal Steve MD Consulting Physician    Deric Mejia MD Consulting Physician    Jordan Ribera MD Consulting Physician    Jimmy Sheehan MD Consulting Physician        Procedures Performed:    Procedure(s):   LEFT HIP MULTIPLE CANNULATED COMPRESSION SCREWS- FERMORAL NECK  FRACTURE      1 Unit of PRBC transfusion.    History of presenting illness:    This is a 77-year-old pleasant female with  history of diabetes mellitus type 2 with nephropathy, chronic kidney disease was brought to the emergency room by her  with the above complaints. The history is obtained from the patient as well as the medical chart.     Patient states that she was in her usual state of health until yesterday morning. While she was walking at home, she was tripped over by her dog and she fell on her left side. She did have some pain in her left hip but was able to get up and walk. She however started having increasing pain through evening but the pain significantly got worse through the night and this morning. She states that the pain prior to coming to the hospital was 9 out of 10 in intensity and she was not able to walk or do any of her activities of daily living. No history of head trauma.     Patient was evaluated in the emergency room. She was hemodynamically stable. Blood work was unremarkable except for her baseline creatinine of 3 and chronic anemia with a hemoglobin of 10. Troponin was negative. CT of the head was negative for any acute abnormalities. X-ray of the pelvis showed nondisplaced left hip fracture. A call was placed to Dr. Sheehan from orthopedic services who recommended admission for surgical intervention. She is currently lying down on bed and is comfortable at rest and denies any significant left hip pain at this time.    Hospital Course:    Patient was admitted to the medical floor and was seen by Dr. Sheehan who scheduled her for left hip ORIF next day. She was seen by Dr. Steve and was cleared for surgery. She does not have any history of CAD. She was continued on her home medications.  Patient did drop her hemoglobin from 9.9 on admission to 7.8 today.  She was transfused with 1 unit of packed red blood cell transfusion perioperatively.  She underwent left hip open reduction and internal fixation on 6/11/2017 by Dr. Sheehan and she tolerated the procedure well.  She was transferred back to the  floor in stable condition.    Patient was also seen by Dr. Mejia who is her primary nephrologist.  Patient was continued on gentle hydration.  She does have chronic kidney disease stage IV/V and in fact has an AV fistula surgery planned for this week.  Patient did not require any further blood transfusions and her hemoglobin remained stable.  Her hemoglobin is 9.7 today.  She was on Lovenox for DVT prophylaxis.  She was seen by physical and occupational therapy and was able to walk in the hallway without any difficulty.    On admission she was noted to have borderline positive urinalysis.  She did not have urinary tract symptoms.  It is possible that the patient may have colonization and her urine culture did grow Citrobacter.  She did have significant epithelial cells on her urinalysis, she did not have any fevers or white count elevation.  I will hold off on antibiotic therapy at this time.    Patient is currently doing better and will be discharged to rehabilitation facility.  She is advised to follow-up with Dr. Mejia in 2-3 weeks.  She is otherwise to follow-up with Dr. Sheehan in one week.  She is otherwise to follow-up with her primary care physician in 2 weeks.  No change has been made to her home medication regimen.  She will be discharged on Lortab for pain control.  I have discussed the patient's condition and discharge plan with Dr. Sheehan and Dr. Mejia.  Her creatinine is 4 at discharge.    Vital Signs:    Temp:  [97.8 °F (36.6 °C)-98.3 °F (36.8 °C)] 98.2 °F (36.8 °C)  Heart Rate:  [65-74] 65  Resp:  [16-18] 16  BP: (120-177)/(42-70) 156/42    Physical Exam:    General Appearance:  Alert and cooperative, not in any acute distress.   Head:  Atraumatic and normocephalic, without obvious abnormality.   Eyes:          PERRLA, conjunctivae and sclerae normal, no Icterus. No pallor. Extra-occular movements are within normal limits.   Ears:  Ears appear intact with no abnormalities noted.   Throat: No oral  lesions, no thrush, oral mucosa moist.   Neck: Supple, trachea midline, no thyromegaly, no carotid bruit.   Back:   No kyphoscoliosis present. No tenderness to palpation,   range of motion normal.   Lungs:   Chest shape is normal. Breath sounds heard bilaterally equally.  No crackles or wheezing. No Pleural rub or bronchial breathing.   Heart:  Normal S1 and S2, no murmur, no gallop, no rub. No JVD.   Abdomen:   Normal bowel sounds, no masses, no organomegaly. Soft     non-tender, non-distended, no guarding, no rebound tenderness.   Extremities: Moves all extremities well, no edema, no cyanosis, no clubbing. Surgical bandage is noted in the lateral aspect of the left midthigh.   Pulses: Pulses palpable and equal bilaterally.   Skin: No bleeding, bruising or rash.   Neurologic: Alert and oriented x 3. Moves all four limbs equally. No tremors. No facial asymetry.     Pertinent Lab Results:       Results from last 7 days  Lab Units 06/13/17  0555 06/12/17  0528 06/11/17  0449 06/10/17  0747   SODIUM mmol/L 135* 133* 139 141   POTASSIUM mmol/L 4.6 5.1 4.0 4.0   CHLORIDE mmol/L 105 104 111* 107   TOTAL CO2 mmol/L 19.0* 12.0* 21.0* 22.0*   BUN mg/dL 72* 56* 44* 52*   CREATININE mg/dL 4.00* 3.60* 3.20* 3.00*   CALCIUM mg/dL 8.2* 8.2* 7.9* 9.0   BILIRUBIN mg/dL  --   --   --  0.6   ALK PHOS U/L  --   --   --  97   ALT (SGPT) U/L  --   --   --  25   AST (SGOT) U/L  --   --   --  24   GLUCOSE mg/dL 135* 437* 119* 202*       Results from last 7 days  Lab Units 06/13/17  0555 06/12/17  0528 06/11/17  0703 06/11/17  0449   WBC 10*3/mm3 10.65 9.89  --  7.26   HEMOGLOBIN g/dL 9.7* 9.4* 7.8* 7.6*   HEMATOCRIT % 29.9* 30.3* 25.0* 24.1*   PLATELETS 10*3/mm3 163 139  --  139       Results from last 7 days  Lab Units 06/10/17  0747   INR  1.12*       Results from last 7 days  Lab Units 06/10/17  0751   TROPONIN I ng/mL <0.012                       Results from last 7 days  Lab Units 06/11/17  0410   COLOR UA  Straw   GLUCOSE UA   Negative   KETONES UA  Negative   LEUKOCYTES UA  Moderate (2+)*   PH, URINE  5.5   BILIRUBIN UA  Negative   UROBILINOGEN UA  0.2 E.U./dL     Pain Management Panel     Pain Management Panel Latest Ref Rng & Units 8/22/2016 12/29/2015    CREATININE UR Not Estab. mg/dL 44.9 17.3          Results from last 7 days  Lab Units 06/11/17  0457 06/11/17  0454 06/10/17  1405   BLOODCX  No growth at 2 days No growth at 2 days  --    URINECX   --   --  >100,000 CFU/mL Citrobacter freundii complex*     Pertinent Radiology Results:    Imaging Results (all)     Procedure Component Value Units Date/Time    XR Knee 3 View Left [[id number]] Collected:  06/10/17 0722     Updated:  06/10/17 0724    Narrative:       FINAL REPORT    CLINICAL HISTORY:  LT KNEE PAIN, FALL    FINDINGS:  Three views of the left knee were performed. There is no evidence of a joint effusion. There is infrapatellar soft tissue swelling. There is mild spurring along the superior patella. On the internal view there is subtle lucency within the lateral femoral   condyle which most likely represents muscular insertion site rather than fracture. Recommend clinical correlation with site of pain.      Impression:       1. Infrapatellar soft tissue swelling with mild spurring on the superior patella.    2. Subtle lucent as above. Recommend clinical correlation with site of pain.    Authenticated by Roberto Chávez MD on 06/10/2017 07:22:45 AM    XR Hip With or Without Pelvis 2 - 3 View Left [[id number]] Collected:  06/10/17 0724     Updated:  06/10/17 0726    Narrative:       FINAL REPORT    CLINICAL HISTORY:  LT HIP PAIN, FALL    FINDINGS:  LEFT HIP    2 views of the left hip are obtained. Films are underpenetrated. The bones are osteopenic. There are degenerative facet changes in the lower lumbar spine. There is SI joint sclerosis bilaterally. There is a subcapital/transcervical nondisplaced impaction   fracture of the left femoral neck. There is no acute soft tissue  abnormality.      Impression:       Subcapital/transcervical nondisplaced impaction fracture of the left femoral neck.    Authenticated by Roberto Chávez MD on 06/10/2017 07:24:55 AM    XR Chest 2 View [[id number]] Collected:  06/10/17 0936     Updated:  06/10/17 0939    Narrative:       PROCEDURE: XR CHEST 2 VW-        HISTORY: medical clearance; S72.002A-Fracture of unspecified part of  neck of left femur, initial encounter for closed fracture     COMPARISON: April 5, 2016.     FINDINGS: The heart is normal in size. The mediastinum is unremarkable.  There are chronic interstitial lung changes. There is also chronic  blunting of the left costophrenic angle. No new opacities were effusions  are evident. There is no pneumothorax. There are no acute osseous  abnormalities.           Impression:       No acute cardiopulmonary process.     This report was finalized on 6/10/2017 9:37 AM by Mariel Solano M.D..    FL C Arm During Surgery [[id number]] Resulted:  06/11/17 1007     Updated:  06/11/17 1007    Narrative:       This procedure was auto-finalized with no dictation required.    XR Hip 1 View Without Pelvis Left (Surgery Only) [[id number]] Collected:  06/12/17 0956     Updated:  06/12/17 1051    Narrative:       PROCEDURE: XR HIP W OR WO PELVIS 1 VIEW LEFT-, XR HIP 1 VIEW WO PELVIS  LEFT- performed at 10:54 AM     HISTORY: post surgery; S72.002A-Fracture of unspecified part of neck of  left femur, initial encounter for closed fracture; S72.002A-Fracture of  unspecified part of neck of left femur, initial encounter for closed  fracture; D64.9-Anemia, unspecified; N18.4-Chronic kidney disease, stage  4 (severe); E11.42-Type 2 diabetes mellitus with diabetic  polyneuropathy; E03.9-Hypothyroidism, unspecified; E10.22-Type 1     COMPARISON: 3 hours prior.     FINDINGS:  A 2 view exam demonstrates 3 screws spanning a subcapital  left femoral neck fracture. The fracture appears well aligned.   Skin  staples are  present.       Impression:       Postsurgical changes as above.          PROCEDURE: XR HIP W OR WO PELVIS 1 VIEW LEFT-, XR HIP 1 VIEW WO PELVIS  LEFT- performed at 7:37 AM     History: post surgery; S72.002A-Fracture of unspecified part of neck of  left femur, initial encounter for closed fracture; S72.002A-Fracture of  unspecified part of neck of left femur, initial encounter for closed  fracture; D64.9-Anemia, unspecified; N18.4-Chronic kidney disease, stage  4 (severe); E11.42-Type 2 diabetes mellitus with diabetic  polyneuropathy; E03.9-Hypothyroidism, unspecified; E10.22-Type 1     COMPARISON: None.     FINDINGS:  A 4 view intraoperative exam demonstrates 3 screws spanning a  left subcapital femoral neck fracture.  Fracture appears well aligned.     IMPRESSION: Postsurgical changes as above.      Images were reviewed, interpreted, and dictated by Dr. Solano.  Transcribed by Dariela Medina PA-C.     This report was finalized on 6/12/2017 10:49 AM by Mariel Solano M.D..    XR Hip With or Without Pelvis 1 View Left [[id number]] Collected:  06/12/17 0956     Updated:  06/12/17 1051    Narrative:       PROCEDURE: XR HIP W OR WO PELVIS 1 VIEW LEFT-, XR HIP 1 VIEW WO PELVIS  LEFT- performed at 10:54 AM     HISTORY: post surgery; S72.002A-Fracture of unspecified part of neck of  left femur, initial encounter for closed fracture; S72.002A-Fracture of  unspecified part of neck of left femur, initial encounter for closed  fracture; D64.9-Anemia, unspecified; N18.4-Chronic kidney disease, stage  4 (severe); E11.42-Type 2 diabetes mellitus with diabetic  polyneuropathy; E03.9-Hypothyroidism, unspecified; E10.22-Type 1     COMPARISON: 3 hours prior.     FINDINGS:  A 2 view exam demonstrates 3 screws spanning a subcapital  left femoral neck fracture. The fracture appears well aligned.   Skin  staples are present.       Impression:       Postsurgical changes as above.        PROCEDURE: XR HIP W OR WO PELVIS 1 VIEW  LEFT-, XR HIP 1 VIEW WO PELVIS  LEFT- performed at 7:37 AM     History: post surgery; S72.002A-Fracture of unspecified part of neck of  left femur, initial encounter for closed fracture; S72.002A-Fracture of  unspecified part of neck of left femur, initial encounter for closed  fracture; D64.9-Anemia, unspecified; N18.4-Chronic kidney disease, stage  4 (severe); E11.42-Type 2 diabetes mellitus with diabetic  polyneuropathy; E03.9-Hypothyroidism, unspecified; E10.22-Type 1     COMPARISON: None.     FINDINGS:  A 4 view intraoperative exam demonstrates 3 screws spanning a  left subcapital femoral neck fracture.  Fracture appears well aligned.     IMPRESSION: Postsurgical changes as above.      Images were reviewed, interpreted, and dictated by Dr. Solano.  Transcribed by Dariela Medina PA-C.     This report was finalized on 6/12/2017 10:49 AM by Mariel Solano M.D..        Condition on Discharge:      Stable.    Code status during the hospital stay:    Full Code    Discharge Disposition:    Rehab Facility or Unit (DC - External)    Discharge Medications:     Hermelindo Sondra   Home Medication Instructions DANIELA:[id number]    Printed on:06/13/17 1127   Medication Information                      amLODIPine (NORVASC) 5 MG tablet  Take 1 tablet by mouth Daily.             aspirin 81 MG EC tablet  Take 1 tablet by mouth daily.             carvedilol (COREG) 25 MG tablet  Take 1 tablet by mouth 2 (two) times a day.             Cholecalciferol (VITAMIN D) 1000 UNITS tablet  Take 1,000 Units by mouth 2 (Two) Times a Day.             enoxaparin (LOVENOX) 30 MG/0.3ML solution syringe  Inject 0.3 mL under the skin Daily.             ferrous sulfate 325 (65 FE) MG tablet  Take 1 tablet by mouth 1 (One) Time Per Week. With meals             furosemide (LASIX) 20 MG tablet  Take 2 pills daily             glucose blood (FREESTYLE LITE) test strip  3 (three) times a day.             HUMALOG KWIKPEN 100 UNIT/ML solution  pen-injector  INJECT 5 TO 15 UNITS THREE TIMES A DAY WITH MEALS OR AS DIRECTED             HYDROcodone-acetaminophen (NORCO) 5-325 MG per tablet  Take 1 tablet by mouth Every 6 (Six) Hours As Needed for Moderate Pain (4-6).             Insulin Pen Needle (BD ULTRA-FINE PEN NEEDLES) 29G X 12.7MM misc  Use 3 times daily             levothyroxine (SYNTHROID, LEVOTHROID) 75 MCG tablet  Take 1 tablet by mouth daily.             Pediatric Multivitamins-Iron (FLINTSTONES PLUS IRON PO)  Take 1 tablet by mouth Daily.             simvastatin (ZOCOR) 20 MG tablet  Take  by mouth.             TOUJEO SOLOSTAR 300 UNIT/ML solution pen-injector  10 Units Daily.               Discharge Diet:     Diet Instructions     Diet: Consistent Carbohydrate, Cardiac, Renal; Thin Liquids, No Restrictions       Discharge Diet:   Consistent Carbohydrate  Cardiac  Renal      Fluid Consistency:  Thin Liquids, No Restrictions               Activity at Discharge:     Activity Instructions     Activity as Tolerated                   Follow-up Appointments:    Follow-up Information     Follow up with Tiburcio Hernandez MD Follow up in 2 week(s).    Specialty:  Internal Medicine    Contact information:    [address]  [place] [address]  [phone]          Follow up with Jimmy Sheehan MD Follow up in 1 week(s).    Specialty:  Orthopedic Surgery    Contact information:    [address]  [facility] [place] [facility] [place] [address]  [phone]          Follow up with Deric Mejia MD Follow up in 3 week(s).    Specialties:  Nephrology, Hospitalist    Contact information:    [address]  [place] [address]  [phone]          Future Appointments  Date Time Provider Department [place]   8/18/2017 11:00 AM Violeta Pittman MD [facility] None     Test Results Pending at Discharge:     Order Current Status    Blood Culture Preliminary result    Blood Culture Preliminary result             Jordan Ribera MD  06/13/17  11:27 AM    Time  spent: 20 min    Please note that portions of this note may have been completed with a voice recognition program. Efforts were made to edit the dictations, but occasionally words are mistranscribed.       Electronically signed by Jordan Ribera MD at 6/13/2017 11:51 AM

## 2017-06-13 NOTE — THERAPY DISCHARGE NOTE
Acute Care - Physical Therapy Discharge Summary   [place]       Patient Name: Sigrid Casarez  : 1940  MRN: [id number]    Today's Date: 2017  Onset of Illness/Injury or Date of Surgery Date: 06/10/17    Date of Referral to PT: 17  Referring Physician: Dr. Sheehan      Admit Date: 6/10/2017      PT Recommendation and Plan    Visit Dx:    ICD-10-CM ICD-9-CM   1. Hip fracture, left, closed, initial encounter S72.002A 820.8   2. Closed fracture of neck of left femur, initial encounter S72.002A 820.8   3. Anemia, unspecified type D64.9 285.9   4. Chronic kidney disease, stage IV (severe) N18.4 585.4   5. Diabetic peripheral neuropathy E11.42 250.60     357.2   6. Acquired hypothyroidism E03.9 244.9   7. Type 1 diabetes mellitus with diabetic chronic kidney disease, unspecified CKD stage E10.22 250.41    N18.9 585.9   8. Impaired mobility and ADLs Z74.09 799.89   9. Impaired functional mobility, balance, gait, and endurance Z74.09 V49.89             Outcome Measures       17 0804 17 0946 17 0940    How much help from another person do you currently need...    Turning from your back to your side while in flat bed without using bedrails? 4  -RM  3  -JR    Moving from lying on back to sitting on the side of a flat bed without bedrails? 4  -RM  3  -JR    Moving to and from a bed to a chair (including a wheelchair)? 3  -RM  3  -JR    Standing up from a chair using your arms (e.g., wheelchair, bedside chair)? 3  -RM  3  -JR    Climbing 3-5 steps with a railing? 2  -RM  2  -JR    To walk in hospital room? 3  -RM  3  -JR    AM-PAC 6 Clicks Score 19  -RM  17  -JR    How much help from another is currently needed...    Putting on and taking off regular lower body clothing?  3  -AH     Bathing (including washing, rinsing, and drying)  3  -AH     Toileting (which includes using toilet bed pan or urinal)  3  -AH     Putting on and taking off regular upper body clothing  4  -AH     Taking care of  personal grooming (such as brushing teeth)  4  -     Eating meals  4  -     Score  21  -     Functional Assessment    Outcome Measure Options AM-PAC 6 Clicks Basic Mobility (PT)  - AM-PAC 6 Clicks Daily Activity (OT)  - AM-PAC 6 Clicks Basic Mobility (PT)  -JR      User Key  (r) = Recorded By, (t) = Taken By, (c) = Cosigned By    Initials Name Provider Type     Aline Frost Occupational Therapist     Lise Pacheco, PT Physical Therapist     Shaji Saab PTA Physical Therapy Assistant                PT Charges       06/13/17 1149          Time Calculation    Start Time 0804  -RM      PT Received On 06/13/17  -RM      PT Goal Re-Cert Due Date 06/22/17  -RM      Time Calculation- PT    Total Timed Code Minutes- PT 39 minute(s)  -        User Key  (r) = Recorded By, (t) = Taken By, (c) = Cosigned By    Initials Name Provider Type     Shaji Saab PTA Physical Therapy Assistant                  IP PT Goals       06/13/17 1504 06/13/17 1145 06/12/17 1518    Bed Mobility PT LTG    Bed Mobility PT LTG, Date Established   06/12/17  -    Bed Mobility PT LTG, Time to Achieve   2 wks  -JR    Bed Mobility PT LTG, Activity Type   all bed mobility  -JR    Bed Mobility PT LTG, [place] Level   conditional independence  -JR    Bed Mobility PT Goal  LTG, Assist Device   bed rails  -JR    Bed Mobility PT LTG, Date Goal Reviewed   06/12/17  -JR    Bed Mobility PT LTG, Outcome goal met  -LM goal met  -RM goal ongoing  -JR    Bed Mobility PT LTG, Reason Goal Not Met discharged from facility  -      Transfer Training PT LTG    Transfer Training PT LTG, Date Established   06/12/17  -JR    Transfer Training PT LTG, Time to Achieve   2 wks  -JR    Transfer Training PT LTG, Activity Type   sit to stand/stand to sit;bed to chair /chair to bed  -JR    Transfer Training PT LTG, [place] Level   supervision required  -JR    Transfer Training PT LTG, Assist Device   walker, rolling  -JR    Transfer Training  PT  LTG, Date Goal Reviewed   06/12/17  -JR    Transfer Training PT LTG, Outcome goal not met  -LM goal ongoing  -RM goal ongoing  -JR    Transfer Training PT LTG, Reason Goal Not Met discharged from facility  -      Gait Training PT LTG    Gait Training Goal PT LTG, Date Established   06/12/17  -JR    Gait Training Goal PT LTG, Time to Achieve   2 wks  -JR    Gait Training Goal PT LTG, [place] Level   supervision required  -JR    Gait Training Goal PT LTG, Assist Device   walker, rolling  -JR    Gait Training Goal PT LTG, Distance to Achieve   125  -JR    Gait Training Goal PT LTG, Additional Goal   with even step lengths and heel strike for initial contact at least 75% of the time  -JR    Gait Training Goal PT LTG, Date Goal Reviewed   06/12/17  -JR    Gait Training Goal PT LTG, Outcome goal not met  -LM goal ongoing  -RM goal ongoing  -JR    Gait Training Goal PT LTG, Reason Goal Not Met discharged from facility  -        User Key  (r) = Recorded By, (t) = Taken By, (c) = Cosigned By    Initials Name Provider Type    JR Lise Pacheco, PT Physical Therapist    CLOTILDE Chin, PT Physical Therapist    RM Shaji Saab, PTA Physical Therapy Assistant              PT Discharge Summary  Anticipated Discharge Disposition: inpatient rehabilitation facility  Reason for Discharge: Discharge from facility  Outcomes Achieved: Refer to plan of care for updates on goals achieved  Discharge Destination: Inpatient rehabilitation facility      Shikha Chin, PT   6/13/2017

## 2017-06-13 NOTE — PROGRESS NOTES
Continued Stay Note  CHANDAN Matthews     Patient Name: Sigrid Casarez  MRN: [id number]  Today's Date: 6/13/2017    Admit Date: 6/10/2017          Discharge Plan       06/13/17 1232    Case Management/Social Work Plan    Plan [facility]    Final Note    Final Note Received phone call from Loida with the [facility].  They can accept pt today, if family is in agreement.  Spoke with pt and  regarding DCP to rehab facility.  They are in agreement with going to [facility].  Called Loida at [phone] and update given.  She states she will be out of the office and requests pt be discharge to their facility no earlier than 2 pm, but no later then 4 pm.  Report can be called to [phone].  Discharge summary efaxed to [phone].                Discharge Codes     None        Expected Discharge Date and Time     Expected Discharge Date Expected Discharge Time    Jun 13, 2017             Melody Marquez

## 2017-06-13 NOTE — DISCHARGE SUMMARY
[facility]      Name:  Sigrid Casarez   Age:  77 y.o.  Sex:  female  :  1940  MRN:  [id number]   Visit Number:  [id number]    Admission Date:  6/10/2017  Date of Discharge:  2017  Primary Care Physician:  Tiburcio Hernandez MD   Primary Nephrologist: Deric Mejia MD  Primary Cardiologist: Rafal Steve MD    Discharge Diagnoses:     1. Left hip nondisplaced fracture, present on admission, Status post ORIF on 2017.  2. Status post mechanical fall at home.  3. Acute blood loss anemia secondary to #1, status post 1 unit of packed red blood cell transfusion.  4. Diabetes mellitus type 2 with nephropathy.  5. Chronic kidney disease stage V.  6. Essential hypertension.  7. Acquired hypothyroidism.  8. Anemia of chronic disease.    Principal Problem:    Hip fracture, left  Active Problems:    Status post fall    Chronic kidney disease, stage IV (severe)    Chronic anemia    Essential hypertension    Type 2 diabetes mellitus with nephropathy    Acquired hypothyroidism      Presenting Problem:    Hip fracture, left, closed, initial encounter [S72.002A]     Consults:     Consults     Date and Time Order Name Status Description    2017 1246 Inpatient Consult to Hospitalist      6/10/2017 1144 Inpatient Consult to Cardiology      6/10/2017 1121 Inpatient Consult to Nephrology Completed     6/10/2017 1120 Inpatient Consult to Orthopedic Surgery      6/10/2017 1120 Inpatient Consult to Cardiology          Consulting Physician(s)     Provider Relationship    Rafal Steve MD Consulting Physician    Deric Mejia MD Consulting Physician    Jordan Ribera MD Consulting Physician    Jimmy Sheehan MD Consulting Physician        Procedures Performed:    Procedure(s):   LEFT HIP MULTIPLE CANNULATED COMPRESSION SCREWS- FERMORAL NECK  FRACTURE      1 Unit of PRBC transfusion.    History of presenting illness:    This is a 77-year-old pleasant female with history of  diabetes mellitus type 2 with nephropathy, chronic kidney disease was brought to the emergency room by her  with the above complaints. The history is obtained from the patient as well as the medical chart.     Patient states that she was in her usual state of health until yesterday morning. While she was walking at home, she was tripped over by her dog and she fell on her left side. She did have some pain in her left hip but was able to get up and walk. She however started having increasing pain through evening but the pain significantly got worse through the night and this morning. She states that the pain prior to coming to the hospital was 9 out of 10 in intensity and she was not able to walk or do any of her activities of daily living. No history of head trauma.     Patient was evaluated in the emergency room. She was hemodynamically stable. Blood work was unremarkable except for her baseline creatinine of 3 and chronic anemia with a hemoglobin of 10. Troponin was negative. CT of the head was negative for any acute abnormalities. X-ray of the pelvis showed nondisplaced left hip fracture. A call was placed to Dr. Sheehan from orthopedic services who recommended admission for surgical intervention. She is currently lying down on bed and is comfortable at rest and denies any significant left hip pain at this time.    Hospital Course:    Patient was admitted to the medical floor and was seen by Dr. Sheehan who scheduled her for left hip ORIF next day. She was seen by Dr. Steve and was cleared for surgery. She does not have any history of CAD. She was continued on her home medications.  Patient did drop her hemoglobin from 9.9 on admission to 7.8 today.  She was transfused with 1 unit of packed red blood cell transfusion perioperatively.  She underwent left hip open reduction and internal fixation on 6/11/2017 by Dr. Sheehan and she tolerated the procedure well.  She was transferred back to the floor in  stable condition.    Patient was also seen by Dr. Mejia who is her primary nephrologist.  Patient was continued on gentle hydration.  She does have chronic kidney disease stage IV/V and in fact has an AV fistula surgery planned for this week.  Patient did not require any further blood transfusions and her hemoglobin remained stable.  Her hemoglobin is 9.7 today.  She was on Lovenox for DVT prophylaxis.  She was seen by physical and occupational therapy and was able to walk in the hallway without any difficulty.    On admission she was noted to have borderline positive urinalysis.  She did not have urinary tract symptoms.  It is possible that the patient may have colonization and her urine culture did grow Citrobacter.  She did have significant epithelial cells on her urinalysis, she did not have any fevers or white count elevation.  I will hold off on antibiotic therapy at this time.    Patient is currently doing better and will be discharged to rehabilitation facility.  She is advised to follow-up with Dr. Mejia in 2-3 weeks.  She is otherwise to follow-up with Dr. Sheehan in one week.  She is otherwise to follow-up with her primary care physician in 2 weeks.  No change has been made to her home medication regimen.  She will be discharged on Lortab for pain control.  I have discussed the patient's condition and discharge plan with Dr. Sheehan and Dr. Mejia.  Her creatinine is 4 at discharge.    Vital Signs:    Temp:  [97.8 °F (36.6 °C)-98.3 °F (36.8 °C)] 98.2 °F (36.8 °C)  Heart Rate:  [65-74] 65  Resp:  [16-18] 16  BP: (120-177)/(42-70) 156/42    Physical Exam:    General Appearance:  Alert and cooperative, not in any acute distress.   Head:  Atraumatic and normocephalic, without obvious abnormality.   Eyes:          PERRLA, conjunctivae and sclerae normal, no Icterus. No pallor. Extra-occular movements are within normal limits.   Ears:  Ears appear intact with no abnormalities noted.   Throat: No oral lesions,  no thrush, oral mucosa moist.   Neck: Supple, trachea midline, no thyromegaly, no carotid bruit.   Back:   No kyphoscoliosis present. No tenderness to palpation,   range of motion normal.   Lungs:   Chest shape is normal. Breath sounds heard bilaterally equally.  No crackles or wheezing. No Pleural rub or bronchial breathing.   Heart:  Normal S1 and S2, no murmur, no gallop, no rub. No JVD.   Abdomen:   Normal bowel sounds, no masses, no organomegaly. Soft     non-tender, non-distended, no guarding, no rebound tenderness.   Extremities: Moves all extremities well, no edema, no cyanosis, no clubbing. Surgical bandage is noted in the lateral aspect of the left midthigh.   Pulses: Pulses palpable and equal bilaterally.   Skin: No bleeding, bruising or rash.   Neurologic: Alert and oriented x 3. Moves all four limbs equally. No tremors. No facial asymetry.     Pertinent Lab Results:       Results from last 7 days  Lab Units 06/13/17  0555 06/12/17  0528 06/11/17  0449 06/10/17  0747   SODIUM mmol/L 135* 133* 139 141   POTASSIUM mmol/L 4.6 5.1 4.0 4.0   CHLORIDE mmol/L 105 104 111* 107   TOTAL CO2 mmol/L 19.0* 12.0* 21.0* 22.0*   BUN mg/dL 72* 56* 44* 52*   CREATININE mg/dL 4.00* 3.60* 3.20* 3.00*   CALCIUM mg/dL 8.2* 8.2* 7.9* 9.0   BILIRUBIN mg/dL  --   --   --  0.6   ALK PHOS U/L  --   --   --  97   ALT (SGPT) U/L  --   --   --  25   AST (SGOT) U/L  --   --   --  24   GLUCOSE mg/dL 135* 437* 119* 202*       Results from last 7 days  Lab Units 06/13/17  0555 06/12/17  0528 06/11/17  0703 06/11/17  0449   WBC 10*3/mm3 10.65 9.89  --  7.26   HEMOGLOBIN g/dL 9.7* 9.4* 7.8* 7.6*   HEMATOCRIT % 29.9* 30.3* 25.0* 24.1*   PLATELETS 10*3/mm3 163 139  --  139       Results from last 7 days  Lab Units 06/10/17  0747   INR  1.12*       Results from last 7 days  Lab Units 06/10/17  0751   TROPONIN I ng/mL <0.012                       Results from last 7 days  Lab Units 06/11/17  0410   COLOR UA  Straw   GLUCOSE UA  Negative    KETONES UA  Negative   LEUKOCYTES UA  Moderate (2+)*   PH, URINE  5.5   BILIRUBIN UA  Negative   UROBILINOGEN UA  0.2 E.U./dL     Pain Management Panel     Pain Management Panel Latest Ref Rng & Units 8/22/2016 12/29/2015    CREATININE UR Not Estab. mg/dL 44.9 17.3          Results from last 7 days  Lab Units 06/11/17  0457 06/11/17  0454 06/10/17  1405   BLOODCX  No growth at 2 days No growth at 2 days  --    URINECX   --   --  >100,000 CFU/mL Citrobacter freundii complex*     Pertinent Radiology Results:    Imaging Results (all)     Procedure Component Value Units Date/Time    XR Knee 3 View Left [[id number]] Collected:  06/10/17 0722     Updated:  06/10/17 0724    Narrative:       FINAL REPORT    CLINICAL HISTORY:  LT KNEE PAIN, FALL    FINDINGS:  Three views of the left knee were performed. There is no evidence of a joint effusion. There is infrapatellar soft tissue swelling. There is mild spurring along the superior patella. On the internal view there is subtle lucency within the lateral femoral   condyle which most likely represents muscular insertion site rather than fracture. Recommend clinical correlation with site of pain.      Impression:       1. Infrapatellar soft tissue swelling with mild spurring on the superior patella.    2. Subtle lucent as above. Recommend clinical correlation with site of pain.    Authenticated by Roberto Chávez MD on 06/10/2017 07:22:45 AM    XR Hip With or Without Pelvis 2 - 3 View Left [[id number]] Collected:  06/10/17 0724     Updated:  06/10/17 0726    Narrative:       FINAL REPORT    CLINICAL HISTORY:  LT HIP PAIN, FALL    FINDINGS:  LEFT HIP    2 views of the left hip are obtained. Films are underpenetrated. The bones are osteopenic. There are degenerative facet changes in the lower lumbar spine. There is SI joint sclerosis bilaterally. There is a subcapital/transcervical nondisplaced impaction   fracture of the left femoral neck. There is no acute soft tissue  abnormality.      Impression:       Subcapital/transcervical nondisplaced impaction fracture of the left femoral neck.    Authenticated by Roberto Chávez MD on 06/10/2017 07:24:55 AM    XR Chest 2 View [[id number]] Collected:  06/10/17 0936     Updated:  06/10/17 0939    Narrative:       PROCEDURE: XR CHEST 2 VW-        HISTORY: medical clearance; S72.002A-Fracture of unspecified part of  neck of left femur, initial encounter for closed fracture     COMPARISON: April 5, 2016.     FINDINGS: The heart is normal in size. The mediastinum is unremarkable.  There are chronic interstitial lung changes. There is also chronic  blunting of the left costophrenic angle. No new opacities were effusions  are evident. There is no pneumothorax. There are no acute osseous  abnormalities.           Impression:       No acute cardiopulmonary process.     This report was finalized on 6/10/2017 9:37 AM by Mariel Solano M.D..    FL C Arm During Surgery [[id number]] Resulted:  06/11/17 1007     Updated:  06/11/17 1007    Narrative:       This procedure was auto-finalized with no dictation required.    XR Hip 1 View Without Pelvis Left (Surgery Only) [[id number]] Collected:  06/12/17 0956     Updated:  06/12/17 1051    Narrative:       PROCEDURE: XR HIP W OR WO PELVIS 1 VIEW LEFT-, XR HIP 1 VIEW WO PELVIS  LEFT- performed at 10:54 AM     HISTORY: post surgery; S72.002A-Fracture of unspecified part of neck of  left femur, initial encounter for closed fracture; S72.002A-Fracture of  unspecified part of neck of left femur, initial encounter for closed  fracture; D64.9-Anemia, unspecified; N18.4-Chronic kidney disease, stage  4 (severe); E11.42-Type 2 diabetes mellitus with diabetic  polyneuropathy; E03.9-Hypothyroidism, unspecified; E10.22-Type 1     COMPARISON: 3 hours prior.     FINDINGS:  A 2 view exam demonstrates 3 screws spanning a subcapital  left femoral neck fracture. The fracture appears well aligned.   Skin  staples are  present.       Impression:       Postsurgical changes as above.          PROCEDURE: XR HIP W OR WO PELVIS 1 VIEW LEFT-, XR HIP 1 VIEW WO PELVIS  LEFT- performed at 7:37 AM     History: post surgery; S72.002A-Fracture of unspecified part of neck of  left femur, initial encounter for closed fracture; S72.002A-Fracture of  unspecified part of neck of left femur, initial encounter for closed  fracture; D64.9-Anemia, unspecified; N18.4-Chronic kidney disease, stage  4 (severe); E11.42-Type 2 diabetes mellitus with diabetic  polyneuropathy; E03.9-Hypothyroidism, unspecified; E10.22-Type 1     COMPARISON: None.     FINDINGS:  A 4 view intraoperative exam demonstrates 3 screws spanning a  left subcapital femoral neck fracture.  Fracture appears well aligned.     IMPRESSION: Postsurgical changes as above.      Images were reviewed, interpreted, and dictated by Dr. Solano.  Transcribed by Dariela Medina PA-C.     This report was finalized on 6/12/2017 10:49 AM by Mariel Solano M.D..    XR Hip With or Without Pelvis 1 View Left [[id number]] Collected:  06/12/17 0956     Updated:  06/12/17 1051    Narrative:       PROCEDURE: XR HIP W OR WO PELVIS 1 VIEW LEFT-, XR HIP 1 VIEW WO PELVIS  LEFT- performed at 10:54 AM     HISTORY: post surgery; S72.002A-Fracture of unspecified part of neck of  left femur, initial encounter for closed fracture; S72.002A-Fracture of  unspecified part of neck of left femur, initial encounter for closed  fracture; D64.9-Anemia, unspecified; N18.4-Chronic kidney disease, stage  4 (severe); E11.42-Type 2 diabetes mellitus with diabetic  polyneuropathy; E03.9-Hypothyroidism, unspecified; E10.22-Type 1     COMPARISON: 3 hours prior.     FINDINGS:  A 2 view exam demonstrates 3 screws spanning a subcapital  left femoral neck fracture. The fracture appears well aligned.   Skin  staples are present.       Impression:       Postsurgical changes as above.        PROCEDURE: XR HIP W OR WO PELVIS 1 VIEW  LEFT-, XR HIP 1 VIEW WO PELVIS  LEFT- performed at 7:37 AM     History: post surgery; S72.002A-Fracture of unspecified part of neck of  left femur, initial encounter for closed fracture; S72.002A-Fracture of  unspecified part of neck of left femur, initial encounter for closed  fracture; D64.9-Anemia, unspecified; N18.4-Chronic kidney disease, stage  4 (severe); E11.42-Type 2 diabetes mellitus with diabetic  polyneuropathy; E03.9-Hypothyroidism, unspecified; E10.22-Type 1     COMPARISON: None.     FINDINGS:  A 4 view intraoperative exam demonstrates 3 screws spanning a  left subcapital femoral neck fracture.  Fracture appears well aligned.     IMPRESSION: Postsurgical changes as above.      Images were reviewed, interpreted, and dictated by Dr. Solano.  Transcribed by Dariela Medina PA-C.     This report was finalized on 6/12/2017 10:49 AM by Mariel Solano M.D..        Condition on Discharge:      Stable.    Code status during the hospital stay:    Full Code    Discharge Disposition:    Rehab Facility or Unit (DC - External)    Discharge Medications:     Hermelindo Sondra   Home Medication Instructions DANIELA:[id number]    Printed on:06/13/17 1127   Medication Information                      amLODIPine (NORVASC) 5 MG tablet  Take 1 tablet by mouth Daily.             aspirin 81 MG EC tablet  Take 1 tablet by mouth daily.             carvedilol (COREG) 25 MG tablet  Take 1 tablet by mouth 2 (two) times a day.             Cholecalciferol (VITAMIN D) 1000 UNITS tablet  Take 1,000 Units by mouth 2 (Two) Times a Day.             enoxaparin (LOVENOX) 30 MG/0.3ML solution syringe  Inject 0.3 mL under the skin Daily.             ferrous sulfate 325 (65 FE) MG tablet  Take 1 tablet by mouth 1 (One) Time Per Week. With meals             furosemide (LASIX) 20 MG tablet  Take 2 pills daily             glucose blood (FREESTYLE LITE) test strip  3 (three) times a day.             HUMALOG KWIKPEN 100 UNIT/ML solution  pen-injector  INJECT 5 TO 15 UNITS THREE TIMES A DAY WITH MEALS OR AS DIRECTED             HYDROcodone-acetaminophen (NORCO) 5-325 MG per tablet  Take 1 tablet by mouth Every 6 (Six) Hours As Needed for Moderate Pain (4-6).             Insulin Pen Needle (BD ULTRA-FINE PEN NEEDLES) 29G X 12.7MM misc  Use 3 times daily             levothyroxine (SYNTHROID, LEVOTHROID) 75 MCG tablet  Take 1 tablet by mouth daily.             Pediatric Multivitamins-Iron (FLINTSTONES PLUS IRON PO)  Take 1 tablet by mouth Daily.             simvastatin (ZOCOR) 20 MG tablet  Take  by mouth.             TOUJEO SOLOSTAR 300 UNIT/ML solution pen-injector  10 Units Daily.               Discharge Diet:     Diet Instructions     Diet: Consistent Carbohydrate, Cardiac, Renal; Thin Liquids, No Restrictions       Discharge Diet:   Consistent Carbohydrate  Cardiac  Renal      Fluid Consistency:  Thin Liquids, No Restrictions               Activity at Discharge:     Activity Instructions     Activity as Tolerated                   Follow-up Appointments:    Follow-up Information     Follow up with Tiburcio Hernandez MD Follow up in 2 week(s).    Specialty:  Internal Medicine    Contact information:    [address]  [place] [address]  [phone]          Follow up with Jimmy Sheehan MD Follow up in 1 week(s).    Specialty:  Orthopedic Surgery    Contact information:    [address]  [facility] [address]  [phone]          Follow up with Deric Mejia MD Follow up in 3 week(s).    Specialties:  Nephrology, Hospitalist    Contact information:    [address]  [place] [address]  [phone]          Future Appointments  Date Time Provider Department [place]   8/18/2017 11:00 AM Violeta Pittman MD [facility] None     Test Results Pending at Discharge:     Order Current Status    Blood Culture Preliminary result    Blood Culture Preliminary result             Jordan Ribera MD  06/13/17  11:27 AM    Time  spent: 20 min    Please note that portions of this note may have been completed with a voice recognition program. Efforts were made to edit the dictations, but occasionally words are mistranscribed.

## 2017-06-13 NOTE — PLAN OF CARE
Problem: Patient Care Overview (Adult)  Goal: Plan of Care Review  Outcome: Ongoing (interventions implemented as appropriate)    06/13/17 1145   Coping/Psychosocial Response Interventions   Plan Of Care Reviewed With patient   Patient Care Overview   Progress improving   Outcome Evaluation   Outcome Summary/Follow up Plan Pt presented with improved pain control as well as increased activity tolerance evident by increased ambulation distance and quality as well as increased ther ex reps. Pt does require several verbal cues to maintain and stay on task as well as for sequencing during ambulation.          Problem: Inpatient Physical Therapy  Goal: Bed Mobility Goal LTG- PT  Outcome: Outcome(s) achieved Date Met:  06/13/17 06/12/17 1518 06/13/17 1145   Bed Mobility PT LTG   Bed Mobility PT LTG, Date Established 06/12/17 --    Bed Mobility PT LTG, Time to Achieve 2 wks --    Bed Mobility PT LTG, Activity Type all bed mobility --    Bed Mobility PT LTG, [place] Level conditional independence --    Bed Mobility PT Goal LTG, Assist Device bed rails --    Bed Mobility PT LTG, Date Goal Reviewed 06/12/17 --    Bed Mobility PT LTG, Outcome --  goal met       Goal: Transfer Training Goal 1 LTG- PT  Outcome: Ongoing (interventions implemented as appropriate)    06/12/17 1518 06/13/17 1145   Transfer Training PT LTG   Transfer Training PT LTG, Date Established 06/12/17 --    Transfer Training PT LTG, Time to Achieve 2 wks --    Transfer Training PT LTG, Activity Type sit to stand/stand to sit;bed to chair /chair to bed --    Transfer Training PT LTG, [place] Level supervision required --    Transfer Training PT LTG, Assist Device walker, rolling --    Transfer Training PT LTG, Date Goal Reviewed 06/12/17 --    Transfer Training PT LTG, Outcome --  goal ongoing       Goal: Gait Training Goal LTG- PT  Outcome: Ongoing (interventions implemented as appropriate)    06/12/17 1518 06/13/17 1145   Gait Training PT LTG    Gait Training Goal PT LTG, Date Established 06/12/17 --    Gait Training Goal PT LTG, Time to Achieve 2 wks --    Gait Training Goal PT LTG, [place] Level supervision required --    Gait Training Goal PT LTG, Assist Device walker, rolling --    Gait Training Goal PT LTG, Distance to Achieve 125 --    Gait Training Goal PT LTG, Additional Goal with even step lengths and heel strike for initial contact at least 75% of the time --    Gait Training Goal PT LTG, Date Goal Reviewed 06/12/17 --    Gait Training Goal PT LTG, Outcome --  goal ongoing

## 2017-06-13 NOTE — THERAPY TREATMENT NOTE
Acute Care - Physical Therapy Treatment Note   [facility]     Patient Name: Sigrid Casarez  : 1940  MRN: [id number]  Today's Date: 2017  Onset of Illness/Injury or Date of Surgery Date: 06/10/17  Date of Referral to PT: 17  Referring Physician: Dr. Sheehan    Admit Date: 6/10/2017    Visit Dx:    ICD-10-CM ICD-9-CM   1. Hip fracture, left, closed, initial encounter S72.002A 820.8   2. Closed fracture of neck of left femur, initial encounter S72.002A 820.8   3. Anemia, unspecified type D64.9 285.9   4. Chronic kidney disease, stage IV (severe) N18.4 585.4   5. Diabetic peripheral neuropathy E11.42 250.60     357.2   6. Acquired hypothyroidism E03.9 244.9   7. Type 1 diabetes mellitus with diabetic chronic kidney disease, unspecified CKD stage E10.22 250.41    N18.9 585.9   8. Impaired mobility and ADLs Z74.09 799.89   9. Impaired functional mobility, balance, gait, and endurance Z74.09 V49.89     Patient Active Problem List   Diagnosis   • Chronic anemia   • Chronic kidney disease, stage IV (severe)   • Depression   • Diabetic peripheral neuropathy   • 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   • Dizziness   • Fatigue   • Essential hypertension   • Hypoglycemia   • Acquired hypothyroidism   • Diabetic macular edema   • Menopause present   • Mixed hyperlipidemia   • Osteopenia   • Type 1 diabetes mellitus   • Vitamin D deficiency   • Bilateral nondiabetic proliferative retinopathy   • Hip fracture, left   • Status post fall   • Type 2 diabetes mellitus with nephropathy   • Chronic kidney disease, stage IV (severe)               Adult Rehabilitation Note       17 0804 17 1255       Rehab Assessment/Intervention    Discipline physical therapy assistant  -RM physical therapist  -JR     Document Type therapy note (daily note)  -RM therapy note (daily note)  -JR     Subjective Information agree to therapy;no complaints  -RM agree to  therapy;complains of;fatigue;pain  -JR     Patient Effort, Rehab Treatment good  -RM good  -JR     Symptoms Noted During/After Treatment none  -RM none  -JR     Recorded by [RM] Shaji Saab PTA [JR] Lise Pacheco, PT     Vital Signs    Pre SpO2 (%) 88  -RM      O2 Delivery Pre Treatment room air  -RM      Intra SpO2 (%) 87  -RM      O2 Delivery Intra Treatment supplemental O2   2 LPM   -RM      Post SpO2 (%) 89   directly after ambulation  -RM      O2 Delivery Post Treatment supplemental O2   3 lpm   -RM      Pre Patient Position Sitting  -RM      Intra Patient Position --   ambulating   -RM      Post Patient Position Sitting  -RM      Recovery Time approx 1 min returned to 94% on 2 lpm sitting   -RM      Recorded by [RM] Shaji Saab PTA      Pain Assessment    Pain Assessment No/denies pain  -RM 0-10  -JR     Pain Score  0  -JR     Post Pain Score  0  -JR     Pain Type  Acute pain;Surgical pain  -JR     Pain Location  Hip  -JR     Pain Orientation  Left  -JR     Pain Intervention(s)  Ambulation/increased activity;Medication (See MAR)  -JR     Response to Interventions  only c/o pain with movements  -JR     Recorded by [RM] Shaji Saab PTA [JR] Lise Pacheco, PT     Mobility Assessment/Training    Extremity Weight-Bearing Status left lower extremity  -RM      Left Lower Extremity Weight-Bearing weight-bearing as tolerated  -RM      Recorded by [RM] Shaji Saab PTA      Bed Mobility, Assessment/Treatment    Bed Mobility, Assistive Device head of bed elevated;bed rails  -RM head of bed elevated;bed rails  -JR     Bed Mob, Supine to Sit, [place] set up required;supervision required   extended time required  -RM      Bed Mob, Sit to Supine, [place]  minimum assist (75% patient effort)  -JR     Recorded by [RM] Shaji Saab PTA [JR] Lise Pacheco, PT     Transfer Assessment/Treatment    Transfers, Sit-Stand [place] contact guard assist;verbal cues required  -RM minimum assist (75%  patient effort)  -JR     Transfers, Stand-Sit [place] verbal cues required;contact guard assist  -RM minimum assist (75% patient effort)  -JR     Transfers, Sit-Stand-Sit, Assist Device rolling walker  -RM rolling walker  -JR     Recorded by [RM] Shaji Saab PTA [JR] Lise Pacheco, PT     Gait Assessment/Treatment    Gait, [place] Level contact guard assist  -RM contact guard assist  -JR     Gait, Assistive Device rolling walker  -RM rolling walker  -JR     Gait, Distance (Feet) 128  -RM 84  -JR     Gait, Gait Pattern Analysis swing-through gait  -RM swing-to gait  -JR     Gait, Gait Deviations marianne decreased;decreased heel strike;antalgic;toe-to-floor clearance decreased  -RM marianne decreased;decreased heel strike;forward flexed posture;stride length decreased;swing-to-stance ratio decreased  -JR     Gait, Safety Issues  weight-shifting ability decreased;step length decreased;sequencing ability decreased;steps too close front assistive device  -JR     Gait, Impairments  pain;strength decreased;ROM decreased;impaired balance;coordination impaired;postural control impaired  -JR     Recorded by [RM] Shaji Saab PTA [JR] Lise Pacheco, PT     Therapy Exercises    Left Lower Extremity AAROM:;15 reps;supine;ankle pumps/circles;heel slides;hip abduction/adduction;glut sets;SAQ;SLR;quad sets  -RM AROM:;10 reps;sitting;LAQ;ankle pumps/circles  -JR     Recorded by [RM] Shaji Saab PTA [JR] Lise Pacheco, PT     Positioning and Restraints    Pre-Treatment Position in bed  -RM sitting in chair/recliner  -JR     Post Treatment Position chair  -RM bed  -JR     In Bed  supine;call light within reach;encouraged to call for assist;notified nsg  -JR     In Chair reclined;call light within reach;encouraged to call for assist;notified nsg  -RM      Recorded by [RM] Shaji Saab PTA [JR] Lise Pacheco, PT       User Key  (r) = Recorded By, (t) = Taken By, (c) = Cosigned By    Initials Name Effective Dates     JR Lise Pacheco, PT 10/26/16 -     RM Shaji Saab, PTA 10/26/16 -                 IP PT Goals       06/13/17 1145 06/12/17 1518       Bed Mobility PT LTG    Bed Mobility PT LTG, Date Established  06/12/17  -JR     Bed Mobility PT LTG, Time to Achieve  2 wks  -JR     Bed Mobility PT LTG, Activity Type  all bed mobility  -JR     Bed Mobility PT LTG, [place] Level  conditional independence  -JR     Bed Mobility PT Goal  LTG, Assist Device  bed rails  -JR     Bed Mobility PT LTG, Date Goal Reviewed  06/12/17  -JR     Bed Mobility PT LTG, Outcome goal met  -RM goal ongoing  -JR     Transfer Training PT LTG    Transfer Training PT LTG, Date Established  06/12/17  -JR     Transfer Training PT LTG, Time to Achieve  2 wks  -JR     Transfer Training PT LTG, Activity Type  sit to stand/stand to sit;bed to chair /chair to bed  -JR     Transfer Training PT LTG, [place] Level  supervision required  -JR     Transfer Training PT LTG, Assist Device  walker, rolling  -JR     Transfer Training PT  LTG, Date Goal Reviewed  06/12/17  -JR     Transfer Training PT LTG, Outcome goal ongoing  -RM goal ongoing  -JR     Gait Training PT LTG    Gait Training Goal PT LTG, Date Established  06/12/17  -JR     Gait Training Goal PT LTG, Time to Achieve  2 wks  -JR     Gait Training Goal PT LTG, [place] Level  supervision required  -JR     Gait Training Goal PT LTG, Assist Device  walker, rolling  -JR     Gait Training Goal PT LTG, Distance to Achieve  125  -JR     Gait Training Goal PT LTG, Additional Goal  with even step lengths and heel strike for initial contact at least 75% of the time  -JR     Gait Training Goal PT LTG, Date Goal Reviewed  06/12/17  -JR     Gait Training Goal PT LTG, Outcome goal ongoing  -RM goal ongoing  -JR       User Key  (r) = Recorded By, (t) = Taken By, (c) = Cosigned By    Initials Name Provider Type    JR Lise Pacheco, PT Physical Therapist    RM Shaji Saab, PTA Physical Therapy Assistant           Physical Therapy Education     Title: PT OT SLP Therapies (Active)     Topic: Physical Therapy (Done)     Point: Mobility training (Done)    Learning Progress Summary    Learner Readiness Method Response Comment Documented by Status   Patient Acceptance E,D VU,NR   06/13/17 1145 Done    Acceptance JAYLENE GARCIA DU Role of PT/POC, safety with mobility  06/12/17 1510 Done               Point: Home exercise program (Done)    Learning Progress Summary    Learner Readiness Method Response Comment Documented by Status   Patient Acceptance E,D VU,NR   06/13/17 1145 Done               Point: Body mechanics (Done)    Learning Progress Summary    Learner Readiness Method Response Comment Documented by Status   Patient Acceptance E,D VU,NR   06/13/17 1145 Done               Point: Precautions (Done)    Learning Progress Summary    Learner Readiness Method Response Comment Documented by Status   Patient Acceptance E,D VU,NR   06/13/17 1145 Done                      User Key     Initials Effective Dates Name Provider Type Discipline     10/26/16 -  Lise Pacheco, PT Physical Therapist PT     10/26/16 -  Shaji Saab PTA Physical Therapy Assistant PT                    PT Recommendation and Plan  Anticipated Discharge Disposition: inpatient rehabilitation facility  Planned Therapy Interventions: strengthening, balance training, bed mobility training, transfer training, gait training, patient/family education  PT Frequency: 2 times/day, daily (daily Sat/Sun)  Plan of Care Review  Plan Of Care Reviewed With: patient  Progress: improving  Outcome Summary/Follow up Plan: Pt presented with improved pain control as well as increased activity tolerance evident by increased ambulation distance and quality as well as increased ther ex reps. Pt does require several verbal cues to maintain and stay on task as well as for sequencing during ambulation.            Outcome Measures       06/13/17 0804 06/12/17 0946 06/12/17  0940    How much help from another person do you currently need...    Turning from your back to your side while in flat bed without using bedrails? 4  -RM  3  -JR    Moving from lying on back to sitting on the side of a flat bed without bedrails? 4  -RM  3  -JR    Moving to and from a bed to a chair (including a wheelchair)? 3  -RM  3  -JR    Standing up from a chair using your arms (e.g., wheelchair, bedside chair)? 3  -RM  3  -JR    Climbing 3-5 steps with a railing? 2  -RM  2  -JR    To walk in hospital room? 3  -RM  3  -JR    AM-PAC 6 Clicks Score 19  -RM  17  -JR    How much help from another is currently needed...    Putting on and taking off regular lower body clothing?  3  -AH     Bathing (including washing, rinsing, and drying)  3  -AH     Toileting (which includes using toilet bed pan or urinal)  3  -AH     Putting on and taking off regular upper body clothing  4  -AH     Taking care of personal grooming (such as brushing teeth)  4  -AH     Eating meals  4  -     Score  21  -     Functional Assessment    Outcome Measure Options AM-PAC 6 Clicks Basic Mobility (PT)  - AM-PAC 6 Clicks Daily Activity (OT)  - AM-PAC 6 Clicks Basic Mobility (PT)  -      User Key  (r) = Recorded By, (t) = Taken By, (c) = Cosigned By    Initials Name Provider Type     Aline Frost Occupational Therapist    JR Lise Pacheco, PT Physical Therapist    EUFEMIA Saab PTA Physical Therapy Assistant           Time Calculation:         PT Charges       06/13/17 1149          Time Calculation    Start Time 0804  -RM      PT Received On 06/13/17  -      PT Goal Re-Cert Due Date 06/22/17  -RM      Time Calculation- PT    Total Timed Code Minutes- PT 39 minute(s)  -        User Key  (r) = Recorded By, (t) = Taken By, (c) = Cosigned By    Initials Name Provider Type    EUFEMIA Saab PTA Physical Therapy Assistant          Therapy Charges for Today     Code Description Service Date Service Provider Modifiers Qty     11039324569 HC GAIT TRAINING EA 15 MIN 6/13/2017 Shaji Saab, PTA GP 1    50783903835 HC PT THERAPEUTIC ACT EA 15 MIN 6/13/2017 Shaji Saab, PTA GP 1    09974414160 HC PT THER PROC EA 15 MIN 6/13/2017 Shaji Saab, PTA GP 1          PT G-Codes  Outcome Measure Options: AM-PAC 6 Clicks Basic Mobility (PT)    Shaji Saab, PRAVEEN  6/13/2017

## 2017-06-13 NOTE — PLAN OF CARE
Problem: Patient Care Overview (Adult)  Goal: Plan of Care Review  Outcome: Ongoing (interventions implemented as appropriate)    06/13/17 1031   Coping/Psychosocial Response Interventions   Plan Of Care Reviewed With patient   Patient Care Overview   Progress improving   Outcome Evaluation   Outcome Summary/Follow up Plan patient is ambulating with PT; up in chair; tolerating diet         Problem: Fall Risk (Adult)  Goal: Absence of Falls  Outcome: Ongoing (interventions implemented as appropriate)    Problem: Skin Integrity Impairment, Risk/Actual (Adult)  Goal: Skin Integrity/Wound Healing  Outcome: Ongoing (interventions implemented as appropriate)    Problem: Pain, Acute (Adult)  Goal: Acceptable Pain Control/Comfort Level  Outcome: Ongoing (interventions implemented as appropriate)    Problem: Perioperative Period (Adult)  Goal: Signs and Symptoms of Listed Potential Problems Will be Absent or Manageable (Perioperative Period)  Outcome: Outcome(s) achieved Date Met:  06/13/17

## 2017-06-13 NOTE — PLAN OF CARE
Problem: Patient Care Overview (Adult)  Goal: Plan of Care Review  Outcome: Ongoing (interventions implemented as appropriate)    06/13/17 0418   Coping/Psychosocial Response Interventions   Plan Of Care Reviewed With patient   Patient Care Overview   Progress progress toward functional goals as expected   Outcome Evaluation   Outcome Summary/Follow up Plan rested well

## 2017-06-13 NOTE — PROGRESS NOTES
"Nephrology Progress Note.    LOS: 3 days    Patient Care Team:  Tiburcio Hernandez MD as PCP - General    Chief Complaint:    Chief Complaint   Patient presents with   • Fall       Subjective   Patient seen and examined this morning.  Events from last night noted.  Patient denies having any fevers chills.  No nausea or vomiting no abdominal pain.  Denies any chest pain shortness of breath cough or sputum production.  There is significant edema.   Patient also denies having new onset weakness of numbness of either extremity.She said her heels are sore.  She feels like that she is ready to get up and walk around.  Pain is under fairly good control.  She wants to go to the rehabilitation and arrangements are being made.  She said she had some problems sleeping last night that has been fairly anxious does not want pain medicine to help her sleep.  Although she does not think that she has significant nausea but would like sublingual Zofran she thinks if she ever had nausea this works really well. It appears that she is scheduled to be transferred to the rehabilitation later today.    Review of Systems:    The pertinent  ROS was done and it is noted above, rest  was negative.    Objective     Vital Signs  /42 (BP Location: Right arm)  Pulse 65  Temp 98.2 °F (36.8 °C) (Oral)   Resp 16  Ht 63\" (160 cm)  Wt 148 lb 11.2 oz (67.4 kg)  SpO2 99%  BMI 26.34 kg/m2           Intake/Output Summary (Last 24 hours) at 06/13/17 0950  Last data filed at 06/12/17 2130   Gross per 24 hour   Intake              600 ml   Output              600 ml   Net                0 ml       Physical Exam:    General Appearance: alert, oriented x 3, no acute distress,   HEENT: pupils round and reactive to light, oral mucosa dry, extra occular movements intact.  Neck: supple, no JVD, trachea midline  Lungs: Clear to Auscultation, unlabored breathing effort  Heart: RRR, normal S1 and S2, no S3, no rub  Abdomen: soft, non-tender, no palpable " bladder, present bowel sounds to auscultation  Extremities: 2+ edema, no cyanosis or clubbing.   Neuro: normal speech and mental status, grossly non focal.     Results Review:      Results from last 7 days  Lab Units 06/13/17  0555 06/12/17  0528 06/11/17  0449 06/10/17  0747   SODIUM mmol/L 135* 133* 139 141   POTASSIUM mmol/L 4.6 5.1 4.0 4.0   CHLORIDE mmol/L 105 104 111* 107   TOTAL CO2 mmol/L 19.0* 12.0* 21.0* 22.0*   BUN mg/dL 72* 56* 44* 52*   CREATININE mg/dL 4.00* 3.60* 3.20* 3.00*   CALCIUM mg/dL 8.2* 8.2* 7.9* 9.0   BILIRUBIN mg/dL  --   --   --  0.6   ALK PHOS U/L  --   --   --  97   ALT (SGPT) U/L  --   --   --  25   AST (SGOT) U/L  --   --   --  24   GLUCOSE mg/dL 135* 437* 119* 202*       Estimated Creatinine Clearance: 10.5 mL/min (by C-G formula based on Cr of 4).      Results from last 7 days  Lab Units 06/13/17  0555 06/10/17  0747   MAGNESIUM mg/dL  --  2.1   PHOSPHORUS mg/dL 6.4*  --                Results from last 7 days  Lab Units 06/13/17  0555 06/12/17  0528 06/11/17  0703 06/11/17  0449 06/10/17  0747   WBC 10*3/mm3 10.65 9.89  --  7.26 10.01   HEMOGLOBIN g/dL 9.7* 9.4* 7.8* 7.6* 9.9*   PLATELETS 10*3/mm3 163 139  --  139 178         Results from last 7 days  Lab Units 06/10/17  0747   INR  1.12*         Imaging Results (last 24 hours)     Procedure Component Value Units Date/Time    XR Hip 1 View Without Pelvis Left (Surgery Only) [[id number]] Collected:  06/12/17 0956     Updated:  06/12/17 1051    Narrative:       PROCEDURE: XR HIP W OR WO PELVIS 1 VIEW LEFT-, XR HIP 1 VIEW WO PELVIS  LEFT- performed at 10:54 AM     HISTORY: post surgery; S72.002A-Fracture of unspecified part of neck of  left femur, initial encounter for closed fracture; S72.002A-Fracture of  unspecified part of neck of left femur, initial encounter for closed  fracture; D64.9-Anemia, unspecified; N18.4-Chronic kidney disease, stage  4 (severe); E11.42-Type 2 diabetes mellitus with diabetic  polyneuropathy;  E03.9-Hypothyroidism, unspecified; E10.22-Type 1     COMPARISON: 3 hours prior.     FINDINGS:  A 2 view exam demonstrates 3 screws spanning a subcapital  left femoral neck fracture. The fracture appears well aligned.   Skin  staples are present.       Impression:       Postsurgical changes as above.                  PROCEDURE: XR HIP W OR WO PELVIS 1 VIEW LEFT-, XR HIP 1 VIEW WO PELVIS  LEFT- performed at 7:37 AM     History: post surgery; S72.002A-Fracture of unspecified part of neck of  left femur, initial encounter for closed fracture; S72.002A-Fracture of  unspecified part of neck of left femur, initial encounter for closed  fracture; D64.9-Anemia, unspecified; N18.4-Chronic kidney disease, stage  4 (severe); E11.42-Type 2 diabetes mellitus with diabetic  polyneuropathy; E03.9-Hypothyroidism, unspecified; E10.22-Type 1     COMPARISON: None.     FINDINGS:  A 4 view intraoperative exam demonstrates 3 screws spanning a  left subcapital femoral neck fracture.  Fracture appears well aligned.     IMPRESSION: Postsurgical changes as above.                 Images were reviewed, interpreted, and dictated by Dr. Solano.  Transcribed by Dariela Medina PA-C.     This report was finalized on 6/12/2017 10:49 AM by Mariel Solano M.D..    XR Hip With or Without Pelvis 1 View Left [[id number]] Collected:  06/12/17 0956     Updated:  06/12/17 1051    Narrative:       PROCEDURE: XR HIP W OR WO PELVIS 1 VIEW LEFT-, XR HIP 1 VIEW WO PELVIS  LEFT- performed at 10:54 AM     HISTORY: post surgery; S72.002A-Fracture of unspecified part of neck of  left femur, initial encounter for closed fracture; S72.002A-Fracture of  unspecified part of neck of left femur, initial encounter for closed  fracture; D64.9-Anemia, unspecified; N18.4-Chronic kidney disease, stage  4 (severe); E11.42-Type 2 diabetes mellitus with diabetic  polyneuropathy; E03.9-Hypothyroidism, unspecified; E10.22-Type 1     COMPARISON: 3 hours prior.     FINDINGS:   A 2 view exam demonstrates 3 screws spanning a subcapital  left femoral neck fracture. The fracture appears well aligned.   Skin  staples are present.       Impression:       Postsurgical changes as above.                  PROCEDURE: XR HIP W OR WO PELVIS 1 VIEW LEFT-, XR HIP 1 VIEW WO PELVIS  LEFT- performed at 7:37 AM     History: post surgery; S72.002A-Fracture of unspecified part of neck of  left femur, initial encounter for closed fracture; S72.002A-Fracture of  unspecified part of neck of left femur, initial encounter for closed  fracture; D64.9-Anemia, unspecified; N18.4-Chronic kidney disease, stage  4 (severe); E11.42-Type 2 diabetes mellitus with diabetic  polyneuropathy; E03.9-Hypothyroidism, unspecified; E10.22-Type 1     COMPARISON: None.     FINDINGS:  A 4 view intraoperative exam demonstrates 3 screws spanning a  left subcapital femoral neck fracture.  Fracture appears well aligned.     IMPRESSION: Postsurgical changes as above.                 Images were reviewed, interpreted, and dictated by Dr. Solano.  Transcribed by Dariela Medina PA-C.     This report was finalized on 6/12/2017 10:49 AM by Mariel Solano M.D..          ALPRAZolam 0.5 mg Oral Nightly   aspirin 81 mg Oral Daily   atorvastatin 10 mg Oral Daily   carvedilol 25 mg Oral BID   enoxaparin 30 mg Subcutaneous Q24H   furosemide 20 mg Oral BID   insulin aspart 0-7 Units Subcutaneous 4x Daily AC & at Bedtime   insulin detemir 20 Units Subcutaneous QAM   levothyroxine 75 mcg Oral QAM          Medication Review:   Current Facility-Administered Medications   Medication Dose Route Frequency Provider Last Rate Last Dose   • acetaminophen (TYLENOL) tablet 650 mg  650 mg Oral Q12H PRN Jimmy Sheehan MD       • ALPRAZolam (XANAX) tablet 0.5 mg  0.5 mg Oral Nightly Deric Mejia MD   0.5 mg at 06/12/17 2106   • aspirin EC tablet 81 mg  81 mg Oral Daily Jordan Ribera MD   81 mg at 06/13/17 0835   • atorvastatin (LIPITOR) tablet 10 mg   10 mg Oral Daily Jordan Ribera MD   10 mg at 06/13/17 0835   • bisacodyl (DULCOLAX) suppository 10 mg  10 mg Rectal Daily PRN Jimmy Sheehan MD       • carvedilol (COREG) tablet 25 mg  25 mg Oral BID Jordan Ribera MD   25 mg at 06/13/17 0835   • CHAPSTICK 1 each  1 each Apply externally TID PRN Moe Barrera DO       • dextrose (D50W) solution 25 g  25 g Intravenous Q15 Min PRN Jordan Ribera MD       • dextrose (GLUTOSE) oral gel 15 g  15 g Oral Q15 Min PRN Jordan Ribera MD       • docusate sodium (COLACE) capsule 100 mg  100 mg Oral BID PRN Jimmy Sheehan MD       • enoxaparin (LOVENOX) syringe 30 mg  30 mg Subcutaneous Q24H Jimmy Sheehan MD   30 mg at 06/12/17 2104   • furosemide (LASIX) tablet 20 mg  20 mg Oral BID Deric Mejia MD   20 mg at 06/13/17 0835   • glucagon (human recombinant) (GLUCAGEN DIAGNOSTIC) injection 1 mg  1 mg Subcutaneous Q15 Min PRN Jordan Ribera MD       • HYDROcodone-acetaminophen (NORCO) 7.5-325 MG per tablet 1 tablet  1 tablet Oral Q6H PRN Jimmy Sheehan MD   1 tablet at 06/12/17 0235   • insulin aspart (novoLOG) injection 0-7 Units  0-7 Units Subcutaneous 4x Daily AC & at Bedtime Jordan Ribera MD   3 Units at 06/12/17 2105   • insulin detemir (LEVEMIR) injection 20 Units  20 Units Subcutaneous SHIVA Ribera MD   20 Units at 06/12/17 0900   • levothyroxine (SYNTHROID, LEVOTHROID) tablet 75 mcg  75 mcg Oral SHIVA Ribera MD   75 mcg at 06/12/17 0600   • morphine injection 2 mg  2 mg Intravenous Q4H PRN Deric Mejia MD   2 mg at 06/12/17 0947    And   • naloxone (NARCAN) injection 0.4 mg  0.4 mg Intravenous Q5 Min PRN Deric Mejia MD       • Morphine injection 4 mg  4 mg Intravenous Q2H PRN Jimmy Sheehan MD        And   • naloxone (NARCAN) injection 0.4 mg  0.4 mg Intravenous Q5 Min PRN Jimmy Sheehan MD       • ondansetron (ZOFRAN) tablet 4 mg  4 mg Oral Q6H PRN Jordan Ribera MD        Or   • ondansetron ODT (ZOFRAN-ODT) disintegrating tablet 4 mg  4  mg Oral Q6H PRN Jordan Ribera MD        Or   • ondansetron (ZOFRAN) injection 4 mg  4 mg Intravenous Q6H PRN Jordan Ribera MD   4 mg at 06/11/17 0833   • ondansetron (ZOFRAN) tablet 4 mg  4 mg Oral Q6H PRN Jimmy Sheehna MD        Or   • ondansetron ODT (ZOFRAN-ODT) disintegrating tablet 4 mg  4 mg Oral Q6H PRN Jimmy Sheehan MD   4 mg at 06/12/17 0838    Or   • ondansetron (ZOFRAN) injection 4 mg  4 mg Intravenous Q6H PRN Jimmy Sheehan MD       • sodium chloride 0.9 % flush 1-10 mL  1-10 mL Intravenous PRN Jordan Ribera MD   10 mL at 06/10/17 1716   • sodium chloride 0.9 % flush 1-10 mL  1-10 mL Intravenous PRN Jimmy Sheehan MD           Assessment/Plan      1. Chronic kidney disease, stage IV (severe): Continue to follow labs closely,  may have ATN secondary to surgical intervention. We'll continue to follow daily labs and hopefully she can maintain her renal perfusion and function during this acute issues of hip fracture and repair.Slight worsening noted.  Continue to follow  labs.  If she goes to the rehabilitation today she will need to daily or every other day BMP that can be faxed over to my office for close follow-up.  She has fairly significant decrease in her renal function and if it continues to worsen likely will end up needing dialysis.  Patient is well aware of this situation and is supposed to go get a fistula placed this Friday.  2. Hip fracture, left: Status post surgery yesterday, her pain is under control she is actively participating in physical therapy.  3. Fluid overload: If she leaves today she will need Lasix 20 mg every day  4. Chronic anemia: History of iron deficiency as well as IV iron along with erythropoietin therapy.  She did drop her hemoglobin and was transfused.  Hemoglobin today is 9.7  5. Essential hypertension: Continue to optimize medications and keep the blood pressure in 140-150 range.  6. Acquired hypothyroidism: Treated  7. Status post fall: Does not appear to  have syncopal episode it is all mechanical. She has been thinking about letting the dog, go at this point.  She is willing to go ahead and go to the rehabilitation.  8. Type 2 diabetes mellitus with nephropathy: She is agreed for dialysis as needed.  Continue sliding scale for the time being.        Details were discussed with the patient as well as family in the room.  I will also discussed the assessment and plan with the hospitalist service.    Rest as ordered.    Deric Mejia MD  06/13/17  9:50 AM      EMR Dragon/Transcription disclaimer:   Much of this encounter note is an electronic transcription/translation of spoken language to printed text. The electronic translation of spoken language may permit erroneous, or at times, nonsensical words or phrases to be inadvertently transcribed; Although I have reviewed the note for such errors, some may still exist.

## 2017-06-14 LAB
ABO + RH BLD: NORMAL
BH BB BLOOD EXPIRATION DATE: NORMAL
BH BB BLOOD TYPE BARCODE: 6200
BH BB DISPENSE STATUS: NORMAL
BH BB PRODUCT CODE: NORMAL
BH BB UNIT NUMBER: NORMAL
CROSSMATCH INTERPRETATION: NORMAL
UNIT  ABO: NORMAL
UNIT  RH: NORMAL

## 2017-06-14 NOTE — THERAPY DISCHARGE NOTE
Acute Care - Occupational Therapy Discharge Summary   [place]     Patient Name: Sigrid Casarez  : 1940  MRN: [id number]    Today's Date: 2017  Onset of Illness/Injury or Date of Surgery Date: 06/10/17    Date of Referral to OT: 17  Referring Physician: Dr. Sheehan      Admit Date: 6/10/2017        OT Recommendation and Plan    Visit Dx:    ICD-10-CM ICD-9-CM   1. Hip fracture, left, closed, initial encounter S72.002A 820.8   2. Closed fracture of neck of left femur, initial encounter S72.002A 820.8   3. Anemia, unspecified type D64.9 285.9   4. Chronic kidney disease, stage IV (severe) N18.4 585.4   5. Diabetic peripheral neuropathy E11.42 250.60     357.2   6. Acquired hypothyroidism E03.9 244.9   7. Type 1 diabetes mellitus with diabetic chronic kidney disease, unspecified CKD stage E10.22 250.41    N18.9 585.9   8. Impaired mobility and ADLs Z74.09 799.89   9. Impaired functional mobility, balance, gait, and endurance Z74.09 V49.89                     OT Goals       17 1139          Bed Mobility OT LTG    Bed Mobility OT LTG, Date Established 17  -      Bed Mobility OT LTG, Time to Achieve 2 wks  -AH      Bed Mobility OT LTG, Activity Type supine to sit/sit to supine  -      Bed Mobility OT LTG, [place] Level contact guard assist  -      Bed Mobility OT LTG, Assist Device bed rails  -      Bed Mobility OT LTG, Outcome goal ongoing  -      Strength OT LTG    Strength Goal OT LTG, Date Established 17  -      Strength Goal OT LTG, Time to Achieve 2 wks  -AH      Strength Goal OT LTG, Functional Goal Pt will participate in BUE strengthening ex using theraband for resistance  -AH      Strength Goal OT LTG, Outcome goal ongoing  -AH      LB Dressing OT LTG    LB Dressing Goal OT LTG, Date Established 17  -      LB Dressing Goal OT LTG, Time to Achieve 2 wks  -AH      LB Dressing Goal OT LTG, [place] Level contact guard assist  -AH      LB Dressing  Goal OT LTG, Adaptive Equipment sock-aid  -      LB Dressing Goal OT LTG, Outcome goal ongoing  -      Functional Mobility OT LTG    Functional Mobility Goal OT LTG, Date Established 06/12/17  -      Functional Mobility Goal OT LTG, Time to Achieve 2 wks  -      Functional Mobility Goal OT LTG, [place] Level contact guard  -      Functional Mobility Goal OT LTG, Assist Device rolling walker  -      Functional Mobility Goal OT LTG, Additional Goal Pt will walk 150' with cga using RW  -      Functional Mobility Goal OT LTG, Outcome goal ongoing  -        User Key  (r) = Recorded By, (t) = Taken By, (c) = Cosigned By    Initials Name Provider Type     Aline Frost Occupational Therapist                Outcome Measures       06/13/17 0804 06/12/17 0946 06/12/17 0940    How much help from another person do you currently need...    Turning from your back to your side while in flat bed without using bedrails? 4  -RM  3  -JR    Moving from lying on back to sitting on the side of a flat bed without bedrails? 4  -RM  3  -JR    Moving to and from a bed to a chair (including a wheelchair)? 3  -RM  3  -JR    Standing up from a chair using your arms (e.g., wheelchair, bedside chair)? 3  -RM  3  -JR    Climbing 3-5 steps with a railing? 2  -RM  2  -JR    To walk in hospital room? 3  -RM  3  -JR    AM-PAC 6 Clicks Score 19  -RM  17  -JR    How much help from another is currently needed...    Putting on and taking off regular lower body clothing?  3  -AH     Bathing (including washing, rinsing, and drying)  3  -AH     Toileting (which includes using toilet bed pan or urinal)  3  -AH     Putting on and taking off regular upper body clothing  4  -AH     Taking care of personal grooming (such as brushing teeth)  4  -AH     Eating meals  4  -     Score  21  -     Functional Assessment    Outcome Measure Options AM-PAC 6 Clicks Basic Mobility (PT)  -RM AM-PAC 6 Clicks Daily Activity (OT)  - AM-PAC 6 Clicks  Basic Mobility (PT)  -JR      User Key  (r) = Recorded By, (t) = Taken By, (c) = Cosigned By    Initials Name Provider Type     Aline Frost Occupational Therapist    JR Lise Pacheco, PT Physical Therapist    RM Shaji Saab, PTA Physical Therapy Assistant              OT Discharge Summary  Anticipated Discharge Disposition: inpatient rehabilitation facility  Reason for Discharge: Discharge from facility  Outcomes Achieved: Unable to make functional progress toward goals at this time (Pt seen for OT evaluation only)  Discharge Destination: Inpatient rehabilitation facility      Aline Frost  6/14/2017

## 2017-06-15 ENCOUNTER — OUTSIDE FACILITY SERVICE (OUTPATIENT)
Dept: FAMILY MEDICINE CLINIC | Facility: CLINIC | Age: 77
End: 2017-06-15

## 2017-06-15 PROCEDURE — 99305 1ST NF CARE MODERATE MDM 35: CPT | Performed by: INTERNAL MEDICINE

## 2017-06-16 LAB
BACTERIA SPEC AEROBE CULT: NORMAL
BACTERIA SPEC AEROBE CULT: NORMAL

## 2017-06-19 ENCOUNTER — OFFICE VISIT (OUTPATIENT)
Dept: ORTHOPEDIC SURGERY | Facility: CLINIC | Age: 77
End: 2017-06-19

## 2017-06-19 VITALS — HEIGHT: 63 IN | RESPIRATION RATE: 18 BRPM | WEIGHT: 148 LBS | BODY MASS INDEX: 26.22 KG/M2

## 2017-06-19 DIAGNOSIS — S72.002D CLOSED FRACTURE OF NECK OF LEFT FEMUR WITH ROUTINE HEALING, SUBSEQUENT ENCOUNTER: Primary | ICD-10-CM

## 2017-06-19 PROCEDURE — 73502 X-RAY EXAM HIP UNI 2-3 VIEWS: CPT | Performed by: ORTHOPAEDIC SURGERY

## 2017-06-19 PROCEDURE — 99024 POSTOP FOLLOW-UP VISIT: CPT | Performed by: ORTHOPAEDIC SURGERY

## 2017-06-19 NOTE — PROGRESS NOTES
"Subjective   Patient ID: Sigrid Casarez is a 77 y.o. female is here today for a post-operative visit  Pain and Post-op of the Left Hip        History of Present Illness     Pain controlled: [] no   [x] yes   Medication refill requested: [x] no   [] yes    Patient compliant with instructions: [] no   [x] yes   Other: She is pleased with her early walker assist ambulation ability.  She is having some trouble sleeping at night and will request a sleep aid from the Rehab Facility Attending.     Past Medical History:   Diagnosis Date   • Anemia    • Chronic kidney disease    • Chronic kidney disease    • Community acquired pneumonia    • Dexa Body Composition study lumosacral spine and femur     ostepenic   • Diabetes    • Diabetic nephropathy, type I    • Diabetic peripheral neuropathy type 1    • Fracture    • Hypertension    • Menopause    • Pneumonia         Past Surgical History:   Procedure Laterality Date   • CATARACT EXTRACTION     • CHOLECYSTECTOMY     • HIP CANNULATED SCREW PLACEMENT Left 6/11/2017    Procedure:  LEFT HIP MULTIPLE CANNULATED COMPRESSION SCREWS- FERMORAL NECK  FRACTURE;  Surgeon: Jimmy Sheehan MD;  Location: [facility];  Service:        Allergies   Allergen Reactions   • Duloxetine    • Exenatide    • Lisinopril Cough   • Penicillins    • Phentermine        Review of Systems   Constitutional: Negative for fever.   All other systems reviewed and are negative.      Objective   Resp 18  Ht 63\" (160 cm)  Wt 148 lb (67.1 kg)  BMI 26.22 kg/m2      Signs of infection: [x] no                    [] yes   Drainage: [x] no                    [] yes   Incision: [x] healing well     []healed well   Motor exam intact: [] no                    [x] yes   Neurovascular exam intact: [] no                    [x] yes   Signs of compartment syndrome: [x] no                    [] yes   Signs of DVT: [x] no                    [] yes   Other:      Physical Exam   Constitutional: She appears well-developed. No " distress.   Neurological: She is alert.   Skin: Skin is warm and dry. No rash noted. No erythema.   Psychiatric: She has a normal mood and affect. Her speech is normal.   Vitals reviewed.    Left Hip Exam     Tenderness   The patient is experiencing tenderness in the lateral.    Other   Erythema: absent (diffuse soft ecchymosis, no fluctuance)  Scars: present (healing stable)  Sensation: normal  Pulse: present      Back Exam     Muscle Strength   Right Quadriceps:  5/5   Left Quadriceps:  5/5   Right Hamstrings:  5/5   Left Hamstrings:  5/5         Extremity DVT signs are Negative on physical exam with negative Shavon sign, with no calf pain and with no palpable cords  Neurologic Exam     Mental Status   Attention: normal.   Speech: speech is normal   Level of consciousness: alert  Knowledge: good.     Motor Exam   Overall muscle tone: normal    Strength   Right quadriceps: 5/5  Left quadriceps: 5/5  Right hamstrin/5  Left hamstrin/5    Gait, Coordination, and Reflexes     Gait  Gait: (stable walker assist ambulation)    Ortho Exam    Assessment/Plan   Independent Review of Radiographic Studies:    Indication to evaluate fracture healing, and compared with prior imaging, shows interim fracture healing with good maintained reduction and alignment and intact position of fracture fixation hardware.  Laboratory and Other Studies:  No new results reviewed today.   Medical Decision Making:    No complications of procedure and treatments.  Significantly improved.  Continue current plan, and management.     Procedures  [x] No procedures were performed in office today.     Sigrid was seen today for pain and post-op.    Diagnoses and all orders for this visit:    Closed fracture of neck of left femur with routine healing, subsequent encounter  -     XR pelvis 1 or 2 vw       Recommendations/Plan:     Staples removed, steris [x] Removed today  [] At prior visit  [] Plan removal later   Splint/cast applied: [x]no []SAC  []LAC []SLC []LLC []PTB []Splint:    Brace: [x]not provided        []pt provided with:   Physical therapy: [x][facility]    []home-based    []outpatient referral   Ultrasound: [x]not ordered         []order given to patient   Labs: [x]not ordered         []order given to patient   Weight Bearing status: []Full [x]WBAT []PWB []NWB []Other   Work/Activity status: [x]Regular []Medium []Light []Sedentary []No use extr   Prescriptions: [x]None given today  []As Noted   Imaging at next appt: [x]Yes  []No       XOA left hip   Additional instructions: Patient agreeable to return sooner for any new concern.     Regular exercise as tolerated  Orthopedic activities reviewed and patient expressed appreciation  Discussion of orthopedic goals  Physical therapy referral given  Take prescribed medications as instructed only as tolerated  Reduced physical activity as appropriate  Weight bearing parameters reviewed  Ice, heat, and/or modalities as beneficial  Watch for signs and symptoms of infection      Exercise, medications, injections, other patient advice, and return appointment as noted.  Brace: No brace was given at today's visit  Referral: Physical and/or Occupational Therapy referral  Test/Studies: No additional studies ordered.  Surgery: No surgery proposed at this visit.  Work/Activity Status: May perform usual activities as tolerated and No strenuous activity.  Patient is encouraged to call or return for any issues or concerns.    Return in about 2 months (around 8/19/2017) for XOA left hip, Recheck.  Patient agreeable to call or return sooner for any concerns.

## 2017-06-22 ENCOUNTER — OUTSIDE FACILITY SERVICE (OUTPATIENT)
Dept: FAMILY MEDICINE CLINIC | Facility: CLINIC | Age: 77
End: 2017-06-22

## 2017-06-22 PROCEDURE — 99309 SBSQ NF CARE MODERATE MDM 30: CPT | Performed by: INTERNAL MEDICINE

## 2017-06-30 ENCOUNTER — TELEPHONE (OUTPATIENT)
Dept: FAMILY MEDICINE CLINIC | Facility: CLINIC | Age: 77
End: 2017-06-30

## 2017-06-30 NOTE — TELEPHONE ENCOUNTER
Christine with [employer] called to get verbal oders to treat Ms. Casarez for ADL training, Strengthening, and balance training as far as I can tell you have only seen her in the nursing home.

## 2017-07-06 ENCOUNTER — OUTSIDE FACILITY SERVICE (OUTPATIENT)
Dept: FAMILY MEDICINE CLINIC | Facility: CLINIC | Age: 77
End: 2017-07-06

## 2017-07-06 PROCEDURE — G0180 MD CERTIFICATION HHA PATIENT: HCPCS | Performed by: INTERNAL MEDICINE

## 2017-07-14 DIAGNOSIS — S72.002D CLOSED FRACTURE OF NECK OF LEFT FEMUR WITH ROUTINE HEALING, SUBSEQUENT ENCOUNTER: Primary | ICD-10-CM

## 2017-07-18 ENCOUNTER — OFFICE VISIT (OUTPATIENT)
Dept: ORTHOPEDIC SURGERY | Facility: CLINIC | Age: 77
End: 2017-07-18

## 2017-07-18 ENCOUNTER — HOSPITAL ENCOUNTER (OUTPATIENT)
Dept: ULTRASOUND IMAGING | Facility: HOSPITAL | Age: 77
Discharge: HOME | End: 2017-07-18
Admitting: PHYSICIAN ASSISTANT

## 2017-07-18 VITALS — WEIGHT: 148 LBS | HEIGHT: 63 IN | RESPIRATION RATE: 18 BRPM | BODY MASS INDEX: 26.22 KG/M2

## 2017-07-18 DIAGNOSIS — S72.002D CLOSED FRACTURE OF NECK OF LEFT FEMUR WITH ROUTINE HEALING, SUBSEQUENT ENCOUNTER: ICD-10-CM

## 2017-07-18 DIAGNOSIS — R60.0 LEG EDEMA, LEFT: Primary | ICD-10-CM

## 2017-07-18 DIAGNOSIS — Z98.890 STATUS POST HIP SURGERY: ICD-10-CM

## 2017-07-18 PROCEDURE — 93971 EXTREMITY STUDY: CPT

## 2017-07-18 PROCEDURE — 99024 POSTOP FOLLOW-UP VISIT: CPT | Performed by: PHYSICIAN ASSISTANT

## 2017-07-18 RX ORDER — HYDROCODONE BITARTRATE AND ACETAMINOPHEN 7.5; 325 MG/1; MG/1
TABLET ORAL
COMMUNITY
Start: 2017-06-29 | End: 2017-08-16

## 2017-07-18 RX ORDER — ONDANSETRON 4 MG/1
TABLET, ORALLY DISINTEGRATING ORAL
Status: ON HOLD | COMMUNITY
Start: 2017-07-06 | End: 2018-01-01

## 2017-07-18 RX ORDER — PEN NEEDLE, DIABETIC 29 G X1/2"
NEEDLE, DISPOSABLE MISCELLANEOUS
COMMUNITY
Start: 2017-06-28

## 2017-07-19 RX ORDER — HYDROCODONE BITARTRATE AND ACETAMINOPHEN 5; 325 MG/1; MG/1
1 TABLET ORAL EVERY 12 HOURS PRN
Qty: 20 TABLET | Refills: 0
Start: 2017-07-19 | End: 2017-08-16

## 2017-07-21 ENCOUNTER — CHARTING TRANS (OUTPATIENT)
Dept: OTHER | Age: 77
End: 2017-07-21

## 2017-07-21 ASSESSMENT — PAIN SCALES - GENERAL: PAINLEVEL_OUTOF10: 5

## 2017-07-27 ENCOUNTER — TELEPHONE (OUTPATIENT)
Dept: FAMILY MEDICINE CLINIC | Facility: CLINIC | Age: 77
End: 2017-07-27

## 2017-07-27 NOTE — TELEPHONE ENCOUNTER
Home Health OT called to let you know that Ms. Casarez has been dc'd due to goals being met.  Patient is still having LLE swelling.  Dr. Sheehan is getting patient Compression Hose.

## 2017-07-28 ENCOUNTER — CHARTING TRANS (OUTPATIENT)
Dept: OTHER | Age: 77
End: 2017-07-28

## 2017-07-28 ENCOUNTER — HOSPITAL (OUTPATIENT)
Dept: OTHER | Age: 77
End: 2017-07-28
Attending: PAIN MEDICINE

## 2017-08-07 ENCOUNTER — HOSPITAL (OUTPATIENT)
Dept: OTHER | Age: 77
End: 2017-08-07
Attending: FAMILY MEDICINE

## 2017-08-07 ENCOUNTER — IMAGING SERVICES (OUTPATIENT)
Dept: OTHER | Age: 77
End: 2017-08-07

## 2017-08-15 DIAGNOSIS — Z98.890 STATUS POST HIP SURGERY: Primary | ICD-10-CM

## 2017-08-15 DIAGNOSIS — S72.002D CLOSED FRACTURE OF NECK OF LEFT FEMUR WITH ROUTINE HEALING, SUBSEQUENT ENCOUNTER: ICD-10-CM

## 2017-08-15 PROBLEM — D63.1 ERYTHROPOIETIN DEFICIENCY ANEMIA: Status: ACTIVE | Noted: 2017-08-15

## 2017-08-15 PROBLEM — N18.5 CHRONIC KIDNEY DISEASE, STAGE V (HCC): Status: ACTIVE | Noted: 2017-08-15

## 2017-08-16 ENCOUNTER — HOSPITAL ENCOUNTER (INPATIENT)
Facility: HOSPITAL | Age: 77
LOS: 5 days | Discharge: HOME HEALTH | End: 2017-08-22
Attending: INTERNAL MEDICINE | Admitting: INTERNAL MEDICINE

## 2017-08-16 ENCOUNTER — OFFICE VISIT (OUTPATIENT)
Dept: ORTHOPEDIC SURGERY | Facility: CLINIC | Age: 77
End: 2017-08-16

## 2017-08-16 ENCOUNTER — APPOINTMENT (OUTPATIENT)
Dept: GENERAL RADIOLOGY | Facility: HOSPITAL | Age: 77
End: 2017-08-16

## 2017-08-16 VITALS
HEIGHT: 63 IN | WEIGHT: 146 LBS | RESPIRATION RATE: 20 BRPM | BODY MASS INDEX: 25.87 KG/M2 | HEART RATE: 68 BPM | OXYGEN SATURATION: 91 %

## 2017-08-16 DIAGNOSIS — R53.81 PHYSICAL DECONDITIONING: Primary | ICD-10-CM

## 2017-08-16 DIAGNOSIS — I50.30 CHF WITH LEFT VENTRICULAR DIASTOLIC DYSFUNCTION, NYHA CLASS 2 (HCC): ICD-10-CM

## 2017-08-16 DIAGNOSIS — Z98.890 STATUS POST HIP SURGERY: ICD-10-CM

## 2017-08-16 DIAGNOSIS — J44.1 COPD WITH RESPIRATORY DISTRESS, ACUTE (HCC): ICD-10-CM

## 2017-08-16 DIAGNOSIS — S72.002D CLOSED FRACTURE OF NECK OF LEFT FEMUR WITH ROUTINE HEALING, SUBSEQUENT ENCOUNTER: Primary | ICD-10-CM

## 2017-08-16 PROBLEM — R09.02 HYPOXIA: Status: ACTIVE | Noted: 2017-08-16

## 2017-08-16 PROBLEM — R06.09 DYSPNEA ON EXERTION: Status: ACTIVE | Noted: 2017-08-16

## 2017-08-16 LAB
ALBUMIN SERPL-MCNC: 3.2 G/DL (ref 3.5–5)
ANION GAP SERPL CALCULATED.3IONS-SCNC: 15.4 MMOL/L
BASOPHILS # BLD AUTO: 0.07 10*3/MM3 (ref 0–0.2)
BASOPHILS NFR BLD AUTO: 1.5 % (ref 0–2.5)
BUN BLD-MCNC: 38 MG/DL (ref 7–20)
BUN/CREAT SERPL: 11.9 (ref 7.1–23.5)
CALCIUM SPEC-SCNC: 8.4 MG/DL (ref 8.4–10.2)
CHLORIDE SERPL-SCNC: 108 MMOL/L (ref 98–107)
CO2 SERPL-SCNC: 20 MMOL/L (ref 26–30)
CREAT BLD-MCNC: 3.2 MG/DL (ref 0.6–1.3)
DEPRECATED RDW RBC AUTO: 49.5 FL (ref 37–54)
EOSINOPHIL # BLD AUTO: 0.15 10*3/MM3 (ref 0–0.7)
EOSINOPHIL NFR BLD AUTO: 3.2 % (ref 0–7)
ERYTHROCYTE [DISTWIDTH] IN BLOOD BY AUTOMATED COUNT: 14.4 % (ref 11.5–14.5)
GFR SERPL CREATININE-BSD FRML MDRD: 14 ML/MIN/1.73
GLUCOSE BLD-MCNC: 147 MG/DL (ref 74–98)
GLUCOSE BLDC GLUCOMTR-MCNC: 106 MG/DL (ref 70–130)
GLUCOSE BLDC GLUCOMTR-MCNC: 186 MG/DL (ref 70–130)
GLUCOSE BLDC GLUCOMTR-MCNC: 244 MG/DL (ref 70–130)
HCT VFR BLD AUTO: 28.2 % (ref 37–47)
HGB BLD-MCNC: 9 G/DL (ref 12–16)
IMM GRANULOCYTES # BLD: 0.02 10*3/MM3 (ref 0–0.06)
IMM GRANULOCYTES NFR BLD: 0.4 % (ref 0–0.6)
LYMPHOCYTES # BLD AUTO: 0.81 10*3/MM3 (ref 0.6–3.4)
LYMPHOCYTES NFR BLD AUTO: 17 % (ref 10–50)
MCH RBC QN AUTO: 30.3 PG (ref 27–31)
MCHC RBC AUTO-ENTMCNC: 31.9 G/DL (ref 30–37)
MCV RBC AUTO: 94.9 FL (ref 81–99)
MONOCYTES # BLD AUTO: 0.34 10*3/MM3 (ref 0–0.9)
MONOCYTES NFR BLD AUTO: 7.1 % (ref 0–12)
NEUTROPHILS # BLD AUTO: 3.37 10*3/MM3 (ref 2–6.9)
NEUTROPHILS NFR BLD AUTO: 70.8 % (ref 37–80)
NRBC BLD MANUAL-RTO: 0 /100 WBC (ref 0–0)
PHOSPHATE SERPL-MCNC: 4.7 MG/DL (ref 2.5–4.5)
PLATELET # BLD AUTO: 193 10*3/MM3 (ref 130–400)
PMV BLD AUTO: 11.2 FL (ref 6–12)
POTASSIUM BLD-SCNC: 4.4 MMOL/L (ref 3.5–5.1)
RBC # BLD AUTO: 2.97 10*6/MM3 (ref 4.2–5.4)
SODIUM BLD-SCNC: 139 MMOL/L (ref 137–145)
TROPONIN I SERPL-MCNC: <0.012 NG/ML (ref 0–0.03)
WBC NRBC COR # BLD: 4.76 10*3/MM3 (ref 4.8–10.8)

## 2017-08-16 PROCEDURE — 94799 UNLISTED PULMONARY SVC/PX: CPT

## 2017-08-16 PROCEDURE — 84484 ASSAY OF TROPONIN QUANT: CPT | Performed by: INTERNAL MEDICINE

## 2017-08-16 PROCEDURE — G0378 HOSPITAL OBSERVATION PER HR: HCPCS

## 2017-08-16 PROCEDURE — 63710000001 INSULIN ASPART PER 5 UNITS: Performed by: FAMILY MEDICINE

## 2017-08-16 PROCEDURE — 85025 COMPLETE CBC W/AUTO DIFF WBC: CPT | Performed by: INTERNAL MEDICINE

## 2017-08-16 PROCEDURE — 99024 POSTOP FOLLOW-UP VISIT: CPT | Performed by: ORTHOPAEDIC SURGERY

## 2017-08-16 PROCEDURE — 94640 AIRWAY INHALATION TREATMENT: CPT

## 2017-08-16 PROCEDURE — 80069 RENAL FUNCTION PANEL: CPT | Performed by: INTERNAL MEDICINE

## 2017-08-16 PROCEDURE — 87081 CULTURE SCREEN ONLY: CPT | Performed by: INTERNAL MEDICINE

## 2017-08-16 PROCEDURE — 99220 PR INITIAL OBSERVATION CARE/DAY 70 MINUTES: CPT | Performed by: FAMILY MEDICINE

## 2017-08-16 PROCEDURE — 82962 GLUCOSE BLOOD TEST: CPT

## 2017-08-16 PROCEDURE — 71010 HC CHEST PA OR AP: CPT

## 2017-08-16 PROCEDURE — 73502 X-RAY EXAM HIP UNI 2-3 VIEWS: CPT | Performed by: ORTHOPAEDIC SURGERY

## 2017-08-16 PROCEDURE — 25010000002 HEPARIN (PORCINE) PER 1000 UNITS: Performed by: FAMILY MEDICINE

## 2017-08-16 RX ORDER — SODIUM CHLORIDE 0.9 % (FLUSH) 0.9 %
1-10 SYRINGE (ML) INJECTION AS NEEDED
Status: DISCONTINUED | OUTPATIENT
Start: 2017-08-16 | End: 2017-08-22 | Stop reason: HOSPADM

## 2017-08-16 RX ORDER — ATORVASTATIN CALCIUM 10 MG/1
10 TABLET, FILM COATED ORAL DAILY
Status: DISCONTINUED | OUTPATIENT
Start: 2017-08-16 | End: 2017-08-22 | Stop reason: HOSPADM

## 2017-08-16 RX ORDER — LEVOTHYROXINE SODIUM 0.07 MG/1
75 TABLET ORAL DAILY
Status: DISCONTINUED | OUTPATIENT
Start: 2017-08-17 | End: 2017-08-22 | Stop reason: HOSPADM

## 2017-08-16 RX ORDER — CARVEDILOL 25 MG/1
25 TABLET ORAL 2 TIMES DAILY
Status: DISCONTINUED | OUTPATIENT
Start: 2017-08-16 | End: 2017-08-22 | Stop reason: HOSPADM

## 2017-08-16 RX ORDER — IPRATROPIUM BROMIDE AND ALBUTEROL SULFATE 2.5; .5 MG/3ML; MG/3ML
3 SOLUTION RESPIRATORY (INHALATION) EVERY 4 HOURS PRN
Status: DISCONTINUED | OUTPATIENT
Start: 2017-08-16 | End: 2017-08-22 | Stop reason: HOSPADM

## 2017-08-16 RX ORDER — FERROUS SULFATE TAB EC 324 MG (65 MG FE EQUIVALENT) 324 (65 FE) MG
324 TABLET DELAYED RESPONSE ORAL 2 TIMES DAILY WITH MEALS
Status: DISCONTINUED | OUTPATIENT
Start: 2017-08-16 | End: 2017-08-19

## 2017-08-16 RX ORDER — TRAZODONE HYDROCHLORIDE 50 MG/1
50 TABLET ORAL NIGHTLY PRN
Status: DISCONTINUED | OUTPATIENT
Start: 2017-08-16 | End: 2017-08-22 | Stop reason: HOSPADM

## 2017-08-16 RX ORDER — TRAZODONE HYDROCHLORIDE 50 MG/1
50 TABLET ORAL NIGHTLY
Status: DISCONTINUED | OUTPATIENT
Start: 2017-08-16 | End: 2017-08-16

## 2017-08-16 RX ORDER — AMLODIPINE BESYLATE 5 MG/1
5 TABLET ORAL DAILY
Status: DISCONTINUED | OUTPATIENT
Start: 2017-08-16 | End: 2017-08-18

## 2017-08-16 RX ORDER — DEXTROSE MONOHYDRATE 25 G/50ML
25 INJECTION, SOLUTION INTRAVENOUS
Status: DISCONTINUED | OUTPATIENT
Start: 2017-08-16 | End: 2017-08-22 | Stop reason: HOSPADM

## 2017-08-16 RX ORDER — HEPARIN SODIUM 5000 [USP'U]/ML
5000 INJECTION, SOLUTION INTRAVENOUS; SUBCUTANEOUS EVERY 12 HOURS SCHEDULED
Status: DISCONTINUED | OUTPATIENT
Start: 2017-08-16 | End: 2017-08-22 | Stop reason: HOSPADM

## 2017-08-16 RX ORDER — ASPIRIN 81 MG/1
81 TABLET ORAL DAILY
Status: DISCONTINUED | OUTPATIENT
Start: 2017-08-16 | End: 2017-08-22 | Stop reason: HOSPADM

## 2017-08-16 RX ORDER — ONDANSETRON 4 MG/1
4 TABLET, ORALLY DISINTEGRATING ORAL EVERY 6 HOURS PRN
Status: DISCONTINUED | OUTPATIENT
Start: 2017-08-16 | End: 2017-08-22 | Stop reason: HOSPADM

## 2017-08-16 RX ORDER — IPRATROPIUM BROMIDE AND ALBUTEROL SULFATE 2.5; .5 MG/3ML; MG/3ML
3 SOLUTION RESPIRATORY (INHALATION) EVERY 6 HOURS PRN
Status: DISCONTINUED | OUTPATIENT
Start: 2017-08-16 | End: 2017-08-22 | Stop reason: HOSPADM

## 2017-08-16 RX ADMIN — IPRATROPIUM BROMIDE AND ALBUTEROL SULFATE 3 ML: .5; 3 SOLUTION RESPIRATORY (INHALATION) at 22:23

## 2017-08-16 RX ADMIN — CARVEDILOL 25 MG: 25 TABLET, FILM COATED ORAL at 17:24

## 2017-08-16 RX ADMIN — AMLODIPINE BESYLATE 5 MG: 5 TABLET ORAL at 17:24

## 2017-08-16 RX ADMIN — FERROUS SULFATE TAB EC 324 MG (65 MG FE EQUIVALENT) 324 MG: 324 (65 FE) TABLET DELAYED RESPONSE at 17:24

## 2017-08-16 RX ADMIN — ATORVASTATIN CALCIUM 10 MG: 10 TABLET, FILM COATED ORAL at 17:24

## 2017-08-16 RX ADMIN — TRAZODONE HYDROCHLORIDE 50 MG: 50 TABLET ORAL at 22:31

## 2017-08-16 RX ADMIN — INSULIN ASPART 3 UNITS: 100 INJECTION, SOLUTION INTRAVENOUS; SUBCUTANEOUS at 21:21

## 2017-08-16 RX ADMIN — HEPARIN SODIUM 5000 UNITS: 5000 INJECTION, SOLUTION INTRAVENOUS; SUBCUTANEOUS at 21:21

## 2017-08-16 RX ADMIN — INSULIN ASPART 2 UNITS: 100 INJECTION, SOLUTION INTRAVENOUS; SUBCUTANEOUS at 17:23

## 2017-08-16 NOTE — PROGRESS NOTES
Subjective   Patient ID: Sigrid Casarez is a 77 y.o. right hand dominant female is here today for a post-operative visit  Post-op of the Left Hip (LEFT HIP MULTIPLE CANNULATED COMPRESSION SCREWS- FERMORAL NECK  FRACTURE on 06/11/2017)       History of Present Illness     Patient notes she has increased shortness of breath the past week to ten days and increase in her baseline legs swelling.  She has an appointment to see Dr. Mejia tomorrow to address these concerns.  We offered to check her oxygen saturation during this visit.  Patient's O2 sat after walking ten steps was 78% and after a few minutes sitting it increased to 91% at rest on room air. I contacted Dr. Mejia and discuss her current issues and he recommends direct admission to hospitalist team for treatment.     Pain controlled: [] no   [x] yes   Medication refill requested: [x] no   [] yes    Patient compliant with instructions: [] no   [x] yes   Other: As noted above, primary complaint is dyspnea on exertion.  Hip pain is decreased and she ambulates well with a cane other than the breathing issues.  She reports in the past she had home oxygen therapy though not recently.     Past Medical History:   Diagnosis Date   • Anemia    • Chronic kidney disease    • Chronic kidney disease    • Community acquired pneumonia    • Dexa Body Composition study lumosacral spine and femur     ostepenic   • Diabetes    • Diabetic nephropathy, type I    • Diabetic peripheral neuropathy type 1    • Fracture    • Hypertension    • Menopause    • Pneumonia         Past Surgical History:   Procedure Laterality Date   • CATARACT EXTRACTION     • CHOLECYSTECTOMY     • HIP CANNULATED SCREW PLACEMENT Left 6/11/2017    Procedure:  LEFT HIP MULTIPLE CANNULATED COMPRESSION SCREWS- FERMORAL NECK  FRACTURE;  Surgeon: Jimmy Sheehan MD;  Location: [facility];  Service:        Allergies   Allergen Reactions   • Duloxetine    • Exenatide    • Lisinopril Cough   • Penicillins    •  "Phentermine        Review of Systems   Constitutional: Negative for fever.   HENT: Negative for voice change.    Eyes: Negative for visual disturbance.   Respiratory: Positive for shortness of breath (Patient has appointment with Dr. Lynn tomorrow ).    Cardiovascular: Negative for chest pain.   Gastrointestinal: Negative for abdominal distention and abdominal pain.   Genitourinary: Negative for dysuria.   Musculoskeletal: Positive for arthralgias. Negative for gait problem and joint swelling.   Skin: Negative for rash.   Neurological: Negative for speech difficulty.   Hematological: Does not bruise/bleed easily.   Psychiatric/Behavioral: Negative for confusion.   All other systems reviewed and are negative.      Objective   Pulse 68  Resp 20  Ht 63\" (160 cm)  Wt 146 lb (66.2 kg)  SpO2 91% Comment: Patient's O2 Stat was 78% after walking  BMI 25.86 kg/m2      Signs of infection: [x] no                    [] yes   Drainage: [x] no                    [] yes   Incision: [] healing well     [x]healed well   Motor exam intact: [] no                    [x] yes   Neurovascular exam intact: [] no                    [x] yes   Signs of compartment syndrome: [x] no                    [] yes   Signs of DVT: [x] no                    [] yes   Other:      Physical Exam   Constitutional: She appears well-developed. No distress.   Neurological: She is alert.   Skin: Skin is warm and dry. No rash noted. No erythema.   Baseline to mild increased 2+ semi-pitting diffuse lower leg edema bilaterally.   Psychiatric: She has a normal mood and affect. Her speech is normal.   Vitals reviewed.    Left Hip Exam     Tenderness   The patient is experiencing no tenderness.         Range of Motion   The patient has normal left hip ROM.    Muscle Strength   Abduction: 4/5   Flexion: 5/5     Tests   MAXIMUS: negative    Other   Erythema: absent  Scars: present (well healed)  Pulse: present        Extremity DVT signs are Negative on physical " exam with negative Shavon sign, with no calf pain and with no palpable cords  Neurologic Exam     Mental Status   Attention: normal.   Speech: speech is normal   Level of consciousness: alert  Knowledge: good.     Motor Exam   Overall muscle tone: normal    Gait, Coordination, and Reflexes     Gait  Gait: (goos capable ambulation with cane assist.  She becomes short of breath quickly.)    Left Hip Exam     Range of Motion   Normal left hip ROM        Assessment/Plan   Independent Review of Radiographic Studies:    Indication to evaluate fracture healing, and compared with prior imaging, shows interim fracture healing with good maintained reduction and alignment and intact position of fracture fixation hardware.  Laboratory and Other Studies:  No new results reviewed today.   Medical Decision Making:    No complications of procedure and treatments.     Procedures  [x] No procedures were performed in office today.     Sigrid was seen today for post-op.    Diagnoses and all orders for this visit:    Closed fracture of neck of left femur with routine healing, subsequent encounter    Status post hip surgery    COPD with respiratory distress, acute       Recommendations/Plan:     Sutures Staples or Pins [] Removed today  [x] At prior visit  [] Plan removal later   Splint/cast applied: [x]no []SAC []LAC []SLC []LLC []PTB []Splint:    Brace: []not provided        [x]pt provided with: fracture brace previously.   Physical therapy: [x]not at this time    []home-based    []outpatient referral   Ultrasound: [x]not ordered         []order given to patient   Labs: [x]not ordered         []order given to patient   Weight Bearing status: []Full [x]WBAT with cane support []PWB []NWB []Other   Prescriptions: [x]None given today  []As Noted   Imaging at next appt: [x]Yes  []No       Left hip   Additional instructions: Patient agreeable to return sooner for any new concern.     Regular exercise as tolerated  Orthopedic activities reviewed  and patient expressed appreciation  Discussion of orthopedic goals  Risk, benefits, and merits of treatment alternatives reviewed with the patient and questions answered  Weight bearing parameters reviewed  Guided on proper techniques for mobility, strength, agility and/or conditioning exercises  Discussed need to see MD about swelling and shortness of breath.  Dr. Mejia advised admission to hospital for treatment and this was arranged.      Exercise, medications, injections, other patient advice, and return appointment as noted.  Brace: No brace was given at today's visit  Referral: Admission to hospital for medical treatments as noted.  Test/Studies: No additional studies ordered.  Surgery: No surgery proposed at this visit.  Work/Activity Status: May perform usual activities as tolerated and No strenuous activity.  Patient is encouraged to call or return for any issues or concerns.    Return in about 2 months (around 10/16/2017) for XOA pelvis and left hip, Recheck.  Patient agreeable to call or return sooner for any concerns.

## 2017-08-16 NOTE — H&P
[facility] Medicine Services  HISTORY AND PHYSICAL    Primary Care Physician: Tiburcio Hernandez MD    Subjective     Chief Complaint:  No chief complaint on file.      History of Present Illness:     I have reviewed labs/imaging/records from this hospitalization, including ER staff and admitting/attending physicians H/P's and progress notes to establish a comprehensive understanding of this patient's clinical hospital course, as well as to establish a transition of care appropriately.    Pt is a 76 yo female that was seen by her orthopedic surgeon today in routine f/u of L hip fx, ORIF; pt has been doing well with respect to her hip, but has noticed a progressive worsening of anabela LE edema, not responsive to her usual oral diuretic use; she has also developed SOA/ARGUELLO, with some difficulty catching her breath; pt has hx of pleural effusions in past requiring thoracentesis per pt hx by Dr. Walls many years ago; being followed by her nephrologist Dr. Mejia for advancing diabetic/renal CKD; she had tunnel AV fistula placed approx 6-7 weeks ago to RUE; planned eval soon by Dr. Mejia for effectiveness of AV fistula.  Likely to need HD soon.    She was admitted for observation and further inpt eval/tx options.    Review of Systems   1. Constitutional: Negative for fever. Negative for chills, diaphoresis and unexpected weight change.  Malaise and fatigue  2. HENT: No dysphagia; no changes to vision/hearing/smell/taste; no epistaxis  3. Eyes: Negative for redness and visual disturbance.   4. Respiratory: ARGUELLO, but not at baseline. Negative for chest pain . Negative for cough and chest tightness.   5. Cardiovascular: Negative for chest pain and palpitations.   6. Gastrointestinal: Negative for abdominal distention, abdominal pain and blood in stool.   7. Endocrine: Negative for cold intolerance and heat intolerance.   8. Genitourinary: Negative for difficulty urinating, dysuria and frequency.    9. Musculoskeletal: Negative for arthralgias, back pain and myalgias.   10. Skin: Negative for color change, rash and wound.   11. Neurological: Negative for syncope and headaches.  Gen weakness.  12. Hematological: Negative for adenopathy. Does not bruise/bleed easily.   13. Psychiatric/Behavioral: Negative for confusion. The patient is not nervous/anxious.     Past Medical History:   Past Medical History:   Diagnosis Date   • Anemia    • Chronic kidney disease    • Chronic kidney disease    • Community acquired pneumonia    • Dexa Body Composition study lumosacral spine and femur     ostepenic   • Diabetes    • Diabetic nephropathy, type I    • Diabetic peripheral neuropathy type 1    • Disease of thyroid gland    • Fracture    • Hypertension    • Menopause    • Pneumonia        Past Surgical History:  Past Surgical History:   Procedure Laterality Date   • CATARACT EXTRACTION     • CHOLECYSTECTOMY     • HIP CANNULATED SCREW PLACEMENT Left 6/11/2017    Procedure:  LEFT HIP MULTIPLE CANNULATED COMPRESSION SCREWS- FERMORAL NECK  FRACTURE;  Surgeon: Jimmy Sheehan MD;  Location: [facility];  Service:        Family History: family history includes Diabetes in her other; Heart attack in her mother; Heart disease in her other; Lung cancer in her father.    Social History:  reports that she has quit smoking. She does not have any smokeless tobacco history on file. She reports that she does not drink alcohol or use illicit drugs.    Medications:  Prescriptions Prior to Admission   Medication Sig Dispense Refill Last Dose   • amLODIPine (NORVASC) 5 MG tablet Take 1 tablet by mouth Daily.   8/15/2017 at Unknown time   • aspirin 81 MG EC tablet Take 1 tablet by mouth daily.   8/16/2017 at Unknown time   • carvedilol (COREG) 25 MG tablet Take 1 tablet by mouth 2 (two) times a day.   8/16/2017 at Unknown time   • Cholecalciferol (VITAMIN D) 1000 UNITS tablet Take 1,000 Units by mouth 2 (Two) Times a Day.   8/16/2017 at  "Unknown time   • ferrous sulfate 325 (65 FE) MG tablet Take 1 tablet by mouth 1 (One) Time Per Week. With meals   8/16/2017 at Unknown time   • furosemide (LASIX) 20 MG tablet Take 2 pills daily (Patient taking differently: Take 20 mg by mouth Daily.) 180 tablet 2 8/16/2017 at Unknown time   • levothyroxine (SYNTHROID, LEVOTHROID) 75 MCG tablet Take 1 tablet by mouth daily. 30 tablet 5 8/16/2017 at Unknown time   • ondansetron ODT (ZOFRAN-ODT) 4 MG disintegrating tablet    Taking   • simvastatin (ZOCOR) 20 MG tablet Take  by mouth.   Taking   • TOUJEO SOLOSTAR 300 UNIT/ML solution pen-injector 10 Units Daily.   8/15/2017 at 2100   • glucose blood (FREESTYLE LITE) test strip 3 (three) times a day.   Taking   • Insulin Pen Needle (BD ULTRA-FINE PEN NEEDLES) 29G X 12.7MM misc Use 3 times daily   Taking   • Pediatric Multivitamins-Iron (FLINTSTONES PLUS IRON PO) Take 1 tablet by mouth Daily.   Taking   • RELION PEN NEEDLE 31G/8MM 31G X 8 MM misc    Taking       Allergies:  Allergies   Allergen Reactions   • Duloxetine    • Exenatide    • Lisinopril Cough   • Penicillins    • Phentermine          Objective     Physical Exam:  Vital Signs: /62  Pulse 68  Temp 97.7 °F (36.5 °C) (Oral)   Resp 18  Ht 63\" (160 cm)  Wt 156 lb 6.4 oz (70.9 kg)  SpO2 93%  BMI 27.71 kg/m2     General Appearance: alert, oriented x 3, no acute distress.  Skin: warm and dry.   HEENT: Atraumatic.  pupils round and reactive to light and accommodation, oral mucosa pink and moist.  Nares patent without epistaxis.  External auditory canals are patent tympanic membranes intact.  Neck: supple, no JVD, trachea midline.  No thyromegaly  Lungs: AAE to anabela upper fields, dec air exchange anabela bases; unlabored breathing effort, but has NC for oxygen supplementation; oximetry shows 93% on 2L  Heart: RRR, normal S1 and S2, no S3, no rub.  Abdomen: soft, non-tender, no palpable bladder, present bowel sounds to auscultation ×4.  No guarding or " rigidity.  Extremities: no clubbing or cyanosis.  Good range of motion actively and passively.  Symmetric muscle strength and development; AV fistula to RUE.  Edema noted to anabela LE, with pitting edema noted 1+ to knees; SCUDs in place.  Neuro: normal speech and mental status.  Cranial nerves II through XII intact.  No anosmia. DTR 2+; proprioception intact.  No focal motor/sensory deficits.          Results Reviewed:    Lab Results (last 72 hours)     Procedure Component Value Units Date/Time    POC Glucose Fingerstick [[id number]]  (Normal) Collected:  08/16/17 1222    Specimen:  Blood Updated:  08/16/17 1225     Glucose 106 mg/dL       Serial Number: [id number]:  [id number]       MRSA Screen Culture [[id number]] Collected:  08/16/17 1229    Specimen:  Swab from Nares Updated:  08/16/17 1233    CBC & Differential [[id number]] Collected:  08/16/17 1339    Specimen:  Blood Updated:  08/16/17 1355    Narrative:       The following orders were created for panel order CBC & Differential.  Procedure                               Abnormality         Status                     ---------                               -----------         ------                     CBC Auto Differential[[id number]]        Abnormal            Final result                 Please view results for these tests on the individual orders.    CBC Auto Differential [[id number]]  (Abnormal) Collected:  08/16/17 1339    Specimen:  Blood Updated:  08/16/17 1355     WBC 4.76 (L) 10*3/mm3      RBC 2.97 (L) 10*6/mm3      Hemoglobin 9.0 (L) g/dL      Hematocrit 28.2 (L) %      MCV 94.9 fL      MCH 30.3 pg      MCHC 31.9 g/dL      RDW 14.4 %      RDW-SD 49.5 fl      MPV 11.2 fL      Platelets 193 10*3/mm3      Neutrophil % 70.8 %      Lymphocyte % 17.0 %      Monocyte % 7.1 %      Eosinophil % 3.2 %      Basophil % 1.5 %      Immature Grans % 0.4 %      Neutrophils, Absolute 3.37 10*3/mm3      Lymphocytes, Absolute 0.81 10*3/mm3      Monocytes, Absolute 0.34  10*3/mm3      Eosinophils, Absolute 0.15 10*3/mm3      Basophils, Absolute 0.07 10*3/mm3      Immature Grans, Absolute 0.02 10*3/mm3      nRBC 0.0 /100 WBC     Renal Function Panel [[id number]]  (Abnormal) Collected:  08/16/17 1339    Specimen:  Blood Updated:  08/16/17 1420     Glucose 147 (H) mg/dL      BUN 38 (H) mg/dL      Creatinine 3.20 (H) mg/dL      Sodium 139 mmol/L      Potassium 4.4 mmol/L      Chloride 108 (H) mmol/L      CO2 20.0 (L) mmol/L      Calcium 8.4 mg/dL      Albumin 3.20 (L) g/dL      Phosphorus 4.7 (H) mg/dL      Anion Gap 15.4 mmol/L      BUN/Creatinine Ratio 11.9     eGFR Non African Amer 14 (L) mL/min/1.73     Narrative:       The MDRD GFR formula is only valid for adults with stable renal function between ages 18 and 70.    Troponin [[id number]]  (Normal) Collected:  08/16/17 1339    Specimen:  Blood Updated:  08/16/17 1420     Troponin I <0.012 ng/mL     Narrative:       Normal Patient Upper Reference Limit (URL) (99th Percentile)=0.03 ng/mL   Non-AMI Illness Reference Limit=0.03-0.11 ng/mL   AMI Confirmation=0.12 ng/mL and above          Imaging Results (last 72 hours)     ** No results found for the last 72 hours. **          ECG/EMG Results (most recent)     None          I have personally reviewed and interpreted available lab data, radiology studies and ECG obtained at time of admission.     Assessment / Plan     Assessment/Problem List:   Principal Problem:    Dyspnea on exertion  Active Problems:    Malaise and fatigue    Chronic anemia    Essential hypertension    Acquired hypothyroidism    Mixed hyperlipidemia    Vitamin D deficiency    Hip fracture, left    Type 2 diabetes mellitus with nephropathy    Chronic kidney disease, stage V    Hypoxia          Plan:    Will admit to med/surg telemetry; chest xray now; duonebs q6hrs prn; oxygen via NC supplementation; will repeat CBC/renal panel in am; consult to Dr. Mejia while inpt; anemia is of CKD, on iron supplementation  additionally; will cont SSI coverage in addition to her once daily long-acting insulin; likely has pulmonary edema, and will benefit from IV lasix; will follow electrolytes, correct as needed.  Cont IS and SCUDs to anabela LE; heparin for DVT prophylaxis; discussed POC in detail with pt today, she verb understanding and agrees; answered all of her questions today.    I have spent a total of 70 mins in direct pt care today.    Moy Salas,  08/16/17 3:52 PM    Please note that portions of this note may have been completed with a voice recognition program. Efforts were made to edit the dictations, but occasionally words are mistranscribed.

## 2017-08-16 NOTE — NURSING NOTE
1830 received report from Leta ICU RN. 1845 pt to room 319, 02 applied at 3L to bring pt 02 sats to 90%, NAD, denies pain, pt pleasant A/A/O x4. Resting in bed watching TV. VSS.

## 2017-08-17 ENCOUNTER — APPOINTMENT (OUTPATIENT)
Dept: CARDIOLOGY | Facility: HOSPITAL | Age: 77
End: 2017-08-17
Attending: INTERNAL MEDICINE

## 2017-08-17 ENCOUNTER — APPOINTMENT (OUTPATIENT)
Dept: NUCLEAR MEDICINE | Facility: HOSPITAL | Age: 77
End: 2017-08-17

## 2017-08-17 ENCOUNTER — HOSPITAL (OUTPATIENT)
Dept: OTHER | Age: 77
End: 2017-08-17
Attending: PAIN MEDICINE

## 2017-08-17 LAB
ALBUMIN SERPL-MCNC: 2.9 G/DL (ref 3.5–5)
ANION GAP SERPL CALCULATED.3IONS-SCNC: 13.4 MMOL/L
BASOPHILS # BLD AUTO: 0.08 10*3/MM3 (ref 0–0.2)
BASOPHILS NFR BLD AUTO: 1.5 % (ref 0–2.5)
BH CV ECHO MEAS - % IVS THICK: 57.9 %
BH CV ECHO MEAS - % LVPW THICK: 53.8 %
BH CV ECHO MEAS - AO ACC SLOPE: 1450 CM/SEC^2
BH CV ECHO MEAS - AO ACC TIME: 0.1 SEC
BH CV ECHO MEAS - AO MAX PG (FULL): 7.9 MMHG
BH CV ECHO MEAS - AO MAX PG: 15.9 MMHG
BH CV ECHO MEAS - AO MEAN PG (FULL): 4.7 MMHG
BH CV ECHO MEAS - AO MEAN PG: 8.6 MMHG
BH CV ECHO MEAS - AO ROOT AREA (BSA CORRECTED): 1.4
BH CV ECHO MEAS - AO ROOT AREA: 4.9 CM^2
BH CV ECHO MEAS - AO ROOT DIAM: 2.5 CM
BH CV ECHO MEAS - AO V2 MAX: 199.5 CM/SEC
BH CV ECHO MEAS - AO V2 MEAN: 138.8 CM/SEC
BH CV ECHO MEAS - AO V2 VTI: 52.8 CM
BH CV ECHO MEAS - AVA(I,A): 1.4 CM^2
BH CV ECHO MEAS - AVA(I,D): 1.4 CM^2
BH CV ECHO MEAS - AVA(V,A): 1.4 CM^2
BH CV ECHO MEAS - AVA(V,D): 1.4 CM^2
BH CV ECHO MEAS - BSA(HAYCOCK): 1.8 M^2
BH CV ECHO MEAS - BSA: 1.7 M^2
BH CV ECHO MEAS - BZI_BMI: 27.6 KILOGRAMS/M^2
BH CV ECHO MEAS - BZI_METRIC_HEIGHT: 160 CM
BH CV ECHO MEAS - BZI_METRIC_WEIGHT: 70.8 KG
BH CV ECHO MEAS - CONTRAST EF 4CH: 56 ML/M^2
BH CV ECHO MEAS - EDV(CUBED): 112.9 ML
BH CV ECHO MEAS - EDV(MOD-SP4): 50 ML
BH CV ECHO MEAS - EDV(TEICH): 109.3 ML
BH CV ECHO MEAS - EF(CUBED): 83.5 %
BH CV ECHO MEAS - EF(MOD-SP4): 88 %
BH CV ECHO MEAS - EF(TEICH): 76.4 %
BH CV ECHO MEAS - ESV(CUBED): 18.6 ML
BH CV ECHO MEAS - ESV(MOD-SP4): 22 ML
BH CV ECHO MEAS - ESV(TEICH): 25.8 ML
BH CV ECHO MEAS - FS: 45.2 %
BH CV ECHO MEAS - IVS/LVPW: 0.73
BH CV ECHO MEAS - IVSD: 1.3 CM
BH CV ECHO MEAS - IVSS: 1.1 CM
BH CV ECHO MEAS - LA DIMENSION: 3.7 CM
BH CV ECHO MEAS - LA/AO: 1.5
BH CV ECHO MEAS - LV DIASTOLIC VOL/BSA (35-75): 28.7 ML/M^2
BH CV ECHO MEAS - LV MASS(C)D: 129.4 GRAMS
BH CV ECHO MEAS - LV MASS(C)DI: 74.3 GRAMS/M^2
BH CV ECHO MEAS - LV MASS(C)S: 99.2 GRAMS
BH CV ECHO MEAS - LV MASS(C)SI: 57 GRAMS/M^2
BH CV ECHO MEAS - LV MAX PG: 8 MMHG
BH CV ECHO MEAS - LV MEAN PG: 3.9 MMHG
BH CV ECHO MEAS - LV SYSTOLIC VOL/BSA (12-30): 12.6 ML/M^2
BH CV ECHO MEAS - LV V1 MAX: 141.2 CM/SEC
BH CV ECHO MEAS - LV V1 MEAN: 92.4 CM/SEC
BH CV ECHO MEAS - LV V1 VTI: 39 CM
BH CV ECHO MEAS - LVIDD: 4.8 CM
BH CV ECHO MEAS - LVIDS: 2.6 CM
BH CV ECHO MEAS - LVLD AP4: 7 CM
BH CV ECHO MEAS - LVLS AP4: 5.8 CM
BH CV ECHO MEAS - LVOT AREA (M): 2 CM^2
BH CV ECHO MEAS - LVOT AREA: 1.9 CM^2
BH CV ECHO MEAS - LVOT DIAM: 1.6 CM
BH CV ECHO MEAS - LVPWD: 1.2 CM
BH CV ECHO MEAS - LVPWS: 1.4 CM
BH CV ECHO MEAS - MV A MAX VEL: 157.7 CM/SEC
BH CV ECHO MEAS - MV DEC SLOPE: 564.2 CM/SEC^2
BH CV ECHO MEAS - MV E MAX VEL: 157 CM/SEC
BH CV ECHO MEAS - MV E/A: 1
BH CV ECHO MEAS - MV MAX PG: 9.7 MMHG
BH CV ECHO MEAS - MV MEAN PG: 5 MMHG
BH CV ECHO MEAS - MV P1/2T MAX VEL: 155.6 CM/SEC
BH CV ECHO MEAS - MV P1/2T: 80.8 MSEC
BH CV ECHO MEAS - MV V2 MAX: 155.6 CM/SEC
BH CV ECHO MEAS - MV V2 MEAN: 103.7 CM/SEC
BH CV ECHO MEAS - MV V2 VTI: 50 CM
BH CV ECHO MEAS - MVA P1/2T LCG: 1.4 CM^2
BH CV ECHO MEAS - MVA(P1/2T): 2.7 CM^2
BH CV ECHO MEAS - MVA(VTI): 1.5 CM^2
BH CV ECHO MEAS - PA ACC SLOPE: 356.4 CM/SEC^2
BH CV ECHO MEAS - PA ACC TIME: 0.21 SEC
BH CV ECHO MEAS - PA MAX PG: 2.1 MMHG
BH CV ECHO MEAS - PA MEAN PG: 1.1 MMHG
BH CV ECHO MEAS - PA PR(ACCEL): -14.5 MMHG
BH CV ECHO MEAS - PA V2 MAX: 72.1 CM/SEC
BH CV ECHO MEAS - PA V2 MEAN: 48.3 CM/SEC
BH CV ECHO MEAS - PA V2 VTI: 18.7 CM
BH CV ECHO MEAS - RAP SYSTOLE: 8 MMHG
BH CV ECHO MEAS - RVSP: 42 MMHG
BH CV ECHO MEAS - SI(AO): 149.7 ML/M^2
BH CV ECHO MEAS - SI(CUBED): 54.2 ML/M^2
BH CV ECHO MEAS - SI(LVOT): 43.5 ML/M^2
BH CV ECHO MEAS - SI(MOD-SP4): 16.1 ML/M^2
BH CV ECHO MEAS - SI(TEICH): 48 ML/M^2
BH CV ECHO MEAS - SV(AO): 260.4 ML
BH CV ECHO MEAS - SV(CUBED): 94.3 ML
BH CV ECHO MEAS - SV(LVOT): 75.7 ML
BH CV ECHO MEAS - SV(MOD-SP4): 28 ML
BH CV ECHO MEAS - SV(TEICH): 83.5 ML
BH CV ECHO MEAS - TR MAX VEL: 229.4 CM/SEC
BUN BLD-MCNC: 39 MG/DL (ref 7–20)
BUN/CREAT SERPL: 12.6 (ref 7.1–23.5)
CALCIUM SPEC-SCNC: 8.4 MG/DL (ref 8.4–10.2)
CHLORIDE SERPL-SCNC: 110 MMOL/L (ref 98–107)
CO2 SERPL-SCNC: 22 MMOL/L (ref 26–30)
CREAT BLD-MCNC: 3.1 MG/DL (ref 0.6–1.3)
DEPRECATED RDW RBC AUTO: 49.6 FL (ref 37–54)
EOSINOPHIL # BLD AUTO: 0.24 10*3/MM3 (ref 0–0.7)
EOSINOPHIL NFR BLD AUTO: 4.4 % (ref 0–7)
ERYTHROCYTE [DISTWIDTH] IN BLOOD BY AUTOMATED COUNT: 14.4 % (ref 11.5–14.5)
GFR SERPL CREATININE-BSD FRML MDRD: 14 ML/MIN/1.73
GLUCOSE BLD-MCNC: 172 MG/DL (ref 74–98)
GLUCOSE BLDC GLUCOMTR-MCNC: 117 MG/DL (ref 70–130)
GLUCOSE BLDC GLUCOMTR-MCNC: 118 MG/DL (ref 70–130)
GLUCOSE BLDC GLUCOMTR-MCNC: 129 MG/DL (ref 70–130)
GLUCOSE BLDC GLUCOMTR-MCNC: 166 MG/DL (ref 70–130)
GLUCOSE BLDC GLUCOMTR-MCNC: 203 MG/DL (ref 70–130)
HCT VFR BLD AUTO: 27.2 % (ref 37–47)
HGB BLD-MCNC: 8.6 G/DL (ref 12–16)
IMM GRANULOCYTES # BLD: 0.05 10*3/MM3 (ref 0–0.06)
IMM GRANULOCYTES NFR BLD: 0.9 % (ref 0–0.6)
LEFT ATRIUM VOLUME INDEX: 34 ML/M2
LV EF 2D ECHO EST: 90 %
LYMPHOCYTES # BLD AUTO: 1.11 10*3/MM3 (ref 0.6–3.4)
LYMPHOCYTES NFR BLD AUTO: 20.5 % (ref 10–50)
MCH RBC QN AUTO: 30.1 PG (ref 27–31)
MCHC RBC AUTO-ENTMCNC: 31.6 G/DL (ref 30–37)
MCV RBC AUTO: 95.1 FL (ref 81–99)
MONOCYTES # BLD AUTO: 0.48 10*3/MM3 (ref 0–0.9)
MONOCYTES NFR BLD AUTO: 8.9 % (ref 0–12)
NEUTROPHILS # BLD AUTO: 3.45 10*3/MM3 (ref 2–6.9)
NEUTROPHILS NFR BLD AUTO: 63.8 % (ref 37–80)
NRBC BLD MANUAL-RTO: 0 /100 WBC (ref 0–0)
PHOSPHATE SERPL-MCNC: 4.7 MG/DL (ref 2.5–4.5)
PLATELET # BLD AUTO: 203 10*3/MM3 (ref 130–400)
PMV BLD AUTO: 11.7 FL (ref 6–12)
POTASSIUM BLD-SCNC: 4.4 MMOL/L (ref 3.5–5.1)
RBC # BLD AUTO: 2.86 10*6/MM3 (ref 4.2–5.4)
SODIUM BLD-SCNC: 141 MMOL/L (ref 137–145)
WBC NRBC COR # BLD: 5.41 10*3/MM3 (ref 4.8–10.8)

## 2017-08-17 PROCEDURE — 85025 COMPLETE CBC W/AUTO DIFF WBC: CPT | Performed by: FAMILY MEDICINE

## 2017-08-17 PROCEDURE — 93306 TTE W/DOPPLER COMPLETE: CPT

## 2017-08-17 PROCEDURE — 94799 UNLISTED PULMONARY SVC/PX: CPT

## 2017-08-17 PROCEDURE — 80069 RENAL FUNCTION PANEL: CPT | Performed by: FAMILY MEDICINE

## 2017-08-17 PROCEDURE — 25010000002 HEPARIN (PORCINE) PER 1000 UNITS: Performed by: FAMILY MEDICINE

## 2017-08-17 PROCEDURE — 93306 TTE W/DOPPLER COMPLETE: CPT | Performed by: INTERNAL MEDICINE

## 2017-08-17 PROCEDURE — 82962 GLUCOSE BLOOD TEST: CPT

## 2017-08-17 PROCEDURE — A9540 TC99M MAA: HCPCS | Performed by: INTERNAL MEDICINE

## 2017-08-17 PROCEDURE — 63710000001 INSULIN ASPART PER 5 UNITS: Performed by: FAMILY MEDICINE

## 2017-08-17 PROCEDURE — 25010000002 AZITHROMYCIN: Performed by: INTERNAL MEDICINE

## 2017-08-17 PROCEDURE — A9539 TC99M PENTETATE: HCPCS | Performed by: INTERNAL MEDICINE

## 2017-08-17 PROCEDURE — 25010000002 CEFTRIAXONE: Performed by: INTERNAL MEDICINE

## 2017-08-17 PROCEDURE — 0 TECHNETIUM ALBUMIN AGGREGATED: Performed by: INTERNAL MEDICINE

## 2017-08-17 PROCEDURE — 63710000001 INSULIN DETEMIR PER 5 UNITS: Performed by: FAMILY MEDICINE

## 2017-08-17 PROCEDURE — 78582 LUNG VENTILAT&PERFUS IMAGING: CPT

## 2017-08-17 PROCEDURE — 99232 SBSQ HOSP IP/OBS MODERATE 35: CPT | Performed by: INTERNAL MEDICINE

## 2017-08-17 PROCEDURE — 0 TECHNETIUM TC 99M PENTETATE KIT: Performed by: INTERNAL MEDICINE

## 2017-08-17 PROCEDURE — 87040 BLOOD CULTURE FOR BACTERIA: CPT | Performed by: INTERNAL MEDICINE

## 2017-08-17 RX ORDER — BUMETANIDE 0.25 MG/ML
2 INJECTION INTRAMUSCULAR; INTRAVENOUS ONCE
Status: COMPLETED | OUTPATIENT
Start: 2017-08-17 | End: 2017-08-17

## 2017-08-17 RX ORDER — BUMETANIDE 0.25 MG/ML
1 INJECTION INTRAMUSCULAR; INTRAVENOUS
Status: DISCONTINUED | OUTPATIENT
Start: 2017-08-17 | End: 2017-08-18

## 2017-08-17 RX ADMIN — ATORVASTATIN CALCIUM 10 MG: 10 TABLET, FILM COATED ORAL at 09:38

## 2017-08-17 RX ADMIN — BUMETANIDE 2 MG: 0.25 INJECTION INTRAMUSCULAR; INTRAVENOUS at 15:25

## 2017-08-17 RX ADMIN — AMLODIPINE BESYLATE 5 MG: 5 TABLET ORAL at 09:39

## 2017-08-17 RX ADMIN — INSULIN ASPART 2 UNITS: 100 INJECTION, SOLUTION INTRAVENOUS; SUBCUTANEOUS at 06:31

## 2017-08-17 RX ADMIN — ASPIRIN 81 MG: 81 TABLET, COATED ORAL at 09:39

## 2017-08-17 RX ADMIN — FERROUS SULFATE TAB EC 324 MG (65 MG FE EQUIVALENT) 324 MG: 324 (65 FE) TABLET DELAYED RESPONSE at 09:39

## 2017-08-17 RX ADMIN — IPRATROPIUM BROMIDE AND ALBUTEROL SULFATE 3 ML: .5; 3 SOLUTION RESPIRATORY (INHALATION) at 06:50

## 2017-08-17 RX ADMIN — INSULIN DETEMIR 10 UNITS: 100 INJECTION, SOLUTION SUBCUTANEOUS at 06:32

## 2017-08-17 RX ADMIN — HEPARIN SODIUM 5000 UNITS: 5000 INJECTION, SOLUTION INTRAVENOUS; SUBCUTANEOUS at 20:56

## 2017-08-17 RX ADMIN — FERROUS SULFATE TAB EC 324 MG (65 MG FE EQUIVALENT) 324 MG: 324 (65 FE) TABLET DELAYED RESPONSE at 18:22

## 2017-08-17 RX ADMIN — LEVOTHYROXINE SODIUM 75 MCG: 75 TABLET ORAL at 06:31

## 2017-08-17 RX ADMIN — KIT FOR THE PREPARATION OF TECHNETIUM TC 99M PENTETATE 1 DOSE: 20 INJECTION, POWDER, LYOPHILIZED, FOR SOLUTION INTRAVENOUS; RESPIRATORY (INHALATION) at 14:07

## 2017-08-17 RX ADMIN — BUMETANIDE 1 MG: 0.25 INJECTION INTRAMUSCULAR; INTRAVENOUS at 18:22

## 2017-08-17 RX ADMIN — Medication 1 DOSE: at 14:45

## 2017-08-17 RX ADMIN — CEFTRIAXONE 1 G: 1 INJECTION, POWDER, FOR SOLUTION INTRAMUSCULAR; INTRAVENOUS at 15:25

## 2017-08-17 RX ADMIN — CARVEDILOL 25 MG: 25 TABLET, FILM COATED ORAL at 09:39

## 2017-08-17 RX ADMIN — HEPARIN SODIUM 5000 UNITS: 5000 INJECTION, SOLUTION INTRAVENOUS; SUBCUTANEOUS at 09:39

## 2017-08-17 RX ADMIN — CARVEDILOL 25 MG: 25 TABLET, FILM COATED ORAL at 18:22

## 2017-08-17 RX ADMIN — AZITHROMYCIN 500 MG: 500 INJECTION, POWDER, LYOPHILIZED, FOR SOLUTION INTRAVENOUS at 18:22

## 2017-08-17 NOTE — CONSULTS
Nephrology Consultation    Referring Provider:   Jordan Ribera MD    Reason for Consultation:  ESRD and associated problems       Subjective     Chief complaint No chief complaint on file.      History of present illness:    Patient is 78 yo female with multiple medical problems as lsited below in the past medical history who presented with worsening of dyspnea with exertion to the point she cannot take a few steps without getting out of breath. She was felt to be in CHF exacerbation and admitted for diuresis and further treatment. For full details on admission please see the H+P from Dr Salas which was reviewed by me. Patient with CKD5 due to diabetic nephropathy not on dialysis at this time and noted to have significant fluid excess was consulted to manage the CKD and related issues.     I have reviewed labs/imaging/records from this hospitalization, including ER staff and admitting/attending physicians H/P's and progress notes to establish a comprehensive understanding of this patient's clinical hospital course, as well as to establish plan of care appropriately.   Past Medical History:   Diagnosis Date   • Anemia    • Chronic kidney disease    • Chronic kidney disease    • Community acquired pneumonia    • Dexa Body Composition study lumosacral spine and femur     ostepenic   • Diabetes    • Diabetic nephropathy, type I    • Diabetic peripheral neuropathy type 1    • Disease of thyroid gland    • Fracture    • Hypertension    • Menopause    • Pneumonia        Past Surgical History:   Procedure Laterality Date   • CATARACT EXTRACTION     • CHOLECYSTECTOMY     • HIP CANNULATED SCREW PLACEMENT Left 6/11/2017    Procedure:  LEFT HIP MULTIPLE CANNULATED COMPRESSION SCREWS- FERMORAL NECK  FRACTURE;  Surgeon: Jimmy Sheehan MD;  Location: [facility];  Service:        Family History   Problem Relation Age of Onset   • Diabetes Other    • Heart disease Other    • Heart attack Mother    • Lung cancer Father          Social History   Substance Use Topics   • Smoking status: Former Smoker   • Smokeless tobacco: Not on file   • Alcohol use No     Prescriptions Prior to Admission   Medication Sig Dispense Refill Last Dose   • amLODIPine (NORVASC) 5 MG tablet Take 1 tablet by mouth Daily.   8/15/2017 at Unknown time   • aspirin 81 MG EC tablet Take 1 tablet by mouth daily.   8/16/2017 at Unknown time   • carvedilol (COREG) 25 MG tablet Take 1 tablet by mouth 2 (two) times a day.   8/16/2017 at Unknown time   • Cholecalciferol (VITAMIN D) 1000 UNITS tablet Take 1,000 Units by mouth 2 (Two) Times a Day.   8/16/2017 at Unknown time   • ferrous sulfate 325 (65 FE) MG tablet Take 1 tablet by mouth 1 (One) Time Per Week. With meals   8/16/2017 at Unknown time   • furosemide (LASIX) 20 MG tablet Take 2 pills daily (Patient taking differently: Take 20 mg by mouth Daily.) 180 tablet 2 8/16/2017 at Unknown time   • levothyroxine (SYNTHROID, LEVOTHROID) 75 MCG tablet Take 1 tablet by mouth daily. 30 tablet 5 8/16/2017 at Unknown time   • ondansetron ODT (ZOFRAN-ODT) 4 MG disintegrating tablet    Taking   • simvastatin (ZOCOR) 20 MG tablet Take  by mouth.   Taking   • TOUJEO SOLOSTAR 300 UNIT/ML solution pen-injector 10 Units Daily.   8/15/2017 at 2100   • glucose blood (FREESTYLE LITE) test strip 3 (three) times a day.   Taking   • Insulin Pen Needle (BD ULTRA-FINE PEN NEEDLES) 29G X 12.7MM misc Use 3 times daily   Taking   • Pediatric Multivitamins-Iron (FLINTSTONES PLUS IRON PO) Take 1 tablet by mouth Daily.   Taking   • RELION PEN NEEDLE 31G/8MM 31G X 8 MM misc    Taking     Allergies:  Duloxetine; Exenatide; Lisinopril; Penicillins; and Phentermine    Review of Systems  Constitutional: Negative for fever and chills, no diaphoresis, fatigue and unexpected weight change.   HENT: Negative for congestion and hearing loss.   Eyes: Negative for redness and visual disturbance.   Respiratory: positive for shortness of breath, worse with  "exertion. Negative for chest pain . Negative for cough and chest tightness.   Cardiovascular: Negative for chest pain and palpitations.   Gastrointestinal: Negative for abdominal distention, abdominal pain and blood in stool. Denies any constipation or diarrhea.  Endocrine: Negative for cold or heat intolerance.   Genitourinary: Negative for difficulty urinating, dysuria and frequency.   Musculoskeletal: Negative for arthralgias, back pain and myalgias.   Skin: Negative for color change, rash and wound.   Neurological: Negative for syncope, new onset weakness or numbness of any extremities. Denies any headaches.   Hematological: Negative for adenopathy. Does not bruise/bleed easily.   Psychiatric/Behavioral: Negative for confusion. The patient is not nervous/anxious.     Objective     Vital Signs  /64  Pulse 72  Temp 98 °F (36.7 °C)  Resp 17  Ht 63\" (160 cm)  Wt 156 lb 4.9 oz (70.9 kg)  SpO2 90%  BMI 27.69 kg/m2         I/O this shift:  In: 240 [P.O.:240]  Out: 300 [Urine:300]    Intake/Output Summary (Last 24 hours) at 08/17/17 1457  Last data filed at 08/17/17 1243   Gross per 24 hour   Intake              720 ml   Output              800 ml   Net              -80 ml       Physical Exam:     General Appearance:   Alert, cooperative, in no acute distress.     Head:   Normocephalic, without obvious abnormality, atraumatic.     Eyes:       Normal, conjunctivae and sclerae, no icterus, no pallor, corneas clear, PERRLA        Throat:   Oral mucosa dry      Neck:  No adenopathy, supple, trachea midline, no thyromegaly, no carotid bruit, no JVD      Back:   No CVA tenderness on Percussion.     Lungs:    Clear to auscultation and fair air movement noted.she still has crackles about half up the chest      Heart:   Regular rhythm and normal rate, normal S1 and S2.       Abdomen:   Obese. Normal bowel sounds, no masses, no organomegaly, soft non-tender, non-distended, no guarding, no rebound tenderness      "   Extremities:  Moves all extremities, 2 to 3 + edema, no cyanosis, no redness.     Pulses:  Pulses palpable and equal bilaterally but weak.     Skin:  No bleeding, bruising or rash        Neurologic:  Cranial nerves grossly intact, move all extremities             Results Review:  Lab Results (last 7 days)     Procedure Component Value Units Date/Time    POC Glucose Fingerstick [[id number]]  (Normal) Collected:  08/16/17 1222    Specimen:  Blood Updated:  08/16/17 1225     Glucose 106 mg/dL       Serial Number: [id number]:  [id number]       MRSA Screen Culture [[id number]] Collected:  08/16/17 1229    Specimen:  Swab from Nares Updated:  08/16/17 1233    CBC & Differential [[id number]] Collected:  08/16/17 1339    Specimen:  Blood Updated:  08/16/17 1355    Narrative:       The following orders were created for panel order CBC & Differential.  Procedure                               Abnormality         Status                     ---------                               -----------         ------                     CBC Auto Differential[[id number]]        Abnormal            Final result                 Please view results for these tests on the individual orders.    CBC Auto Differential [[id number]]  (Abnormal) Collected:  08/16/17 1339    Specimen:  Blood Updated:  08/16/17 1355     WBC 4.76 (L) 10*3/mm3      RBC 2.97 (L) 10*6/mm3      Hemoglobin 9.0 (L) g/dL      Hematocrit 28.2 (L) %      MCV 94.9 fL      MCH 30.3 pg      MCHC 31.9 g/dL      RDW 14.4 %      RDW-SD 49.5 fl      MPV 11.2 fL      Platelets 193 10*3/mm3      Neutrophil % 70.8 %      Lymphocyte % 17.0 %      Monocyte % 7.1 %      Eosinophil % 3.2 %      Basophil % 1.5 %      Immature Grans % 0.4 %      Neutrophils, Absolute 3.37 10*3/mm3      Lymphocytes, Absolute 0.81 10*3/mm3      Monocytes, Absolute 0.34 10*3/mm3      Eosinophils, Absolute 0.15 10*3/mm3      Basophils, Absolute 0.07 10*3/mm3      Immature Grans, Absolute 0.02 10*3/mm3      nRBC  0.0 /100 WBC     Renal Function Panel [[id number]]  (Abnormal) Collected:  08/16/17 1339    Specimen:  Blood Updated:  08/16/17 1420     Glucose 147 (H) mg/dL      BUN 38 (H) mg/dL      Creatinine 3.20 (H) mg/dL      Sodium 139 mmol/L      Potassium 4.4 mmol/L      Chloride 108 (H) mmol/L      CO2 20.0 (L) mmol/L      Calcium 8.4 mg/dL      Albumin 3.20 (L) g/dL      Phosphorus 4.7 (H) mg/dL      Anion Gap 15.4 mmol/L      BUN/Creatinine Ratio 11.9     eGFR Non African Amer 14 (L) mL/min/1.73     Narrative:       The MDRD GFR formula is only valid for adults with stable renal function between ages 18 and 70.    Troponin [[id number]]  (Normal) Collected:  08/16/17 1339    Specimen:  Blood Updated:  08/16/17 1420     Troponin I <0.012 ng/mL     Narrative:       Normal Patient Upper Reference Limit (URL) (99th Percentile)=0.03 ng/mL   Non-AMI Illness Reference Limit=0.03-0.11 ng/mL   AMI Confirmation=0.12 ng/mL and above    POC Glucose Fingerstick [[id number]]  (Abnormal) Collected:  08/16/17 1558    Specimen:  Blood Updated:  08/16/17 1605     Glucose 186 (H) mg/dL       Serial Number: [id number]:  [id number]       POC Glucose Fingerstick [[id number]]  (Abnormal) Collected:  08/16/17 2047    Specimen:  Blood Updated:  08/16/17 2055     Glucose 244 (H) mg/dL       Serial Number: [id number]:  [id number]       CBC Auto Differential [[id number]]  (Abnormal) Collected:  08/17/17 0552    Specimen:  Blood Updated:  08/17/17 0619     WBC 5.41 10*3/mm3      RBC 2.86 (L) 10*6/mm3      Hemoglobin 8.6 (L) g/dL      Hematocrit 27.2 (L) %      MCV 95.1 fL      MCH 30.1 pg      MCHC 31.6 g/dL      RDW 14.4 %      RDW-SD 49.6 fl      MPV 11.7 fL      Platelets 203 10*3/mm3      Neutrophil % 63.8 %      Lymphocyte % 20.5 %      Monocyte % 8.9 %      Eosinophil % 4.4 %      Basophil % 1.5 %      Immature Grans % 0.9 (H) %      Neutrophils, Absolute 3.45 10*3/mm3      Lymphocytes, Absolute 1.11 10*3/mm3      Monocytes, Absolute 0.48  10*3/mm3      Eosinophils, Absolute 0.24 10*3/mm3      Basophils, Absolute 0.08 10*3/mm3      Immature Grans, Absolute 0.05 10*3/mm3      nRBC 0.0 /100 WBC     POC Glucose Fingerstick [[id number]]  (Abnormal) Collected:  08/17/17 0553    Specimen:  Blood Updated:  08/17/17 0620     Glucose 166 (H) mg/dL       Serial Number: [id number]:  [id number]       Renal Function Panel [[id number]]  (Abnormal) Collected:  08/17/17 0552    Specimen:  Blood Updated:  08/17/17 0630     Glucose 172 (H) mg/dL      BUN 39 (H) mg/dL      Creatinine 3.10 (H) mg/dL      Sodium 141 mmol/L      Potassium 4.4 mmol/L      Chloride 110 (H) mmol/L      CO2 22.0 (L) mmol/L      Calcium 8.4 mg/dL      Albumin 2.90 (L) g/dL      Phosphorus 4.7 (H) mg/dL      Anion Gap 13.4 mmol/L      BUN/Creatinine Ratio 12.6     eGFR Non African Amer 14 (L) mL/min/1.73     Narrative:       The MDRD GFR formula is only valid for adults with stable renal function between ages 18 and 70.    POC Glucose Fingerstick [[id number]]  (Normal) Collected:  08/17/17 1103    Specimen:  Blood Updated:  08/17/17 1123     Glucose 118 mg/dL       Serial Number: [id number]:  [id number]       Blood Culture [[id number]] Collected:  08/17/17 1205    Specimen:  Blood from Arm, Left Updated:  08/17/17 1209    Blood Culture [[id number]] Collected:  08/17/17 1200    Specimen:  Blood from Arm, Left Updated:  08/17/17 1209        Imaging Results (last 72 hours)     Procedure Component Value Units Date/Time    XR Chest 1 View [[id number]] Collected:  08/16/17 1641     Updated:  08/16/17 1645    Narrative:       PROCEDURE: XR CHEST 1 VW-     HISTORY: pleural effusions/hypoxia     COMPARISON: Melanie 10, 2017.     FINDINGS: The heart is enlarged. The mediastinum is unremarkable. There  is opacification of the right lower lobe as well as bilateral pleural  effusions. Slight prominence of the pulmonary vasculature. There is no  pneumothorax.  There are no acute osseous abnormalities.            Impression:       Bilateral pleural effusions and right lower lobe opacity.     Continued followup is recommended.     This report was finalized on 8/16/2017 4:43 PM by Jah Dickinson DO.    NM Lung Ventilation Perfusion Aerosol [[id number]] Updated:  08/17/17 1408              amLODIPine 5 mg Oral Daily   aspirin 81 mg Oral Daily   atorvastatin 10 mg Oral Daily   azithromycin 500 mg Intravenous Q24H   bumetanide 1 mg Intravenous TID AC   bumetanide 2 mg Intravenous Once   carvedilol 25 mg Oral BID   ceftriaxone 1 g Intravenous Q24H   ferrous sulfate 324 mg Oral BID With Meals   heparin (porcine) 5,000 Units Subcutaneous Q12H   insulin aspart 0-7 Units Subcutaneous 4x Daily AC & at Bedtime   insulin detemir 10 Units Subcutaneous QAM   levothyroxine 75 mcg Oral Daily          Assessment/Plan     1.   Chronic kidney disease, stage V- due to diabetic nephropathy with significant proteinuria unable to tolerate ARB due to recurrent hyperkalemia. She is s/p access placement which is maturing but not ready for dialysis. Will watch and optimize the volume status, may need to initiate dialysis and details discussed with her and the . She may need dialysis during this admission.  2.   Dyspnea on exertion- with fluid excess, increase the diuretics as ordered.  3.   Chronic anemia- with h/o AUGUST use. I will go ahead and give more iron and Procrit if needed.  4.   Essential hypertension- treated, optimize the medication as ordered  5.   Acquired hypothyroidism- treated  6.   Mixed hyperlipidemia- treated  7.   Vitamin D deficiency: continue oral supplement.  8.   Hypothyroidism: TSH appears to be within range.  9.   Type 2 diabetes mellitus with nephropathy- poor control, treated        Details discussed with the patient as well as family and the hospitalist service. Further recommendations will depend on clinical course of the patient during the current hospitalization.      Rest as ordered    In closing, I sincerely  appreciate opportunity to participate in care of this patient. If I can be of any further assistance with the management of this patient, please don’t hesitate to contact me.    Deric Mejia MD  08/17/17  2:57 PM    EMR Dragon/Transcription disclaimer:   Much of this encounter note is an electronic transcription/translation of spoken language to printed text. The electronic translation of spoken language may permit erroneous, or at times, nonsensical words or phrases to be inadvertently transcribed; Although I have reviewed the note for such errors, some may still exist.

## 2017-08-17 NOTE — PROGRESS NOTES
Discharge Planning Assessment   [place]     Patient Name: Sigrid Casarez  MRN: [id number]  Today's Date: 8/17/2017    Admit Date: 8/16/2017          Discharge Needs Assessment     None            Discharge Plan       08/17/17 1121    Case Management/Social Work Plan    Plan Spoke with patient.  She lives with her  in a house.  Went to [facility] in June after surgery and then home with [facility]. Was discharged last week from [facility].  Still using a cane for ambulation.  Also has a walker and besdise commode at home.  Patient states she was on O2 approximately 2 years ago but has not needed it recently until this admission.  She cannot remember who the provider was.  Does not have a POA but her  would make her decisions if she was not able.   Demos and contact information verified as correct.  CM will continue to follow for any needs.      Patient/Family In Agreement With Plan yes      08/17/17 1025    Case Management/Social Work Plan    Plan Per Dr. Espinosa patient has not been on home oxygen in over 2 years, she was admitted and has needed 4 liters upon arrival to maintain sats.         Discharge Placement     No information found                Demographic Summary       08/17/17 1120    Referral Information    Admission Type inpatient    Referral Source admission list    Reason For Consult discharge planning            Functional Status     None            Psychosocial     None            Abuse/Neglect     None            Legal     None            Substance Abuse     None            Patient Forms     None          Amanda Lau

## 2017-08-17 NOTE — PROGRESS NOTES
Continued Stay Note  CHANDAN Matthews     Patient Name: Sigrid Casarez  MRN: [id number]  Today's Date: 8/17/2017    Admit Date: 8/16/2017          Discharge Plan       08/17/17 1025    Case Management/Social Work Plan    Plan Per Dr. Espinosa patient has not been on home oxygen in over 2 years, she was admitted and has needed 4 liters upon arrival to maintain sats.               Discharge Codes     None            Bhavya Luis

## 2017-08-17 NOTE — PROGRESS NOTES
"Hospitalist Progress Note.    LOS: 0 days    Patient Care Team:  Tiburcio Hernandez MD as PCP - General    Chief Complaint:  No chief complaint on file.      Subjective   Patient seen and examined this morning.  Events from last night noted.  Patient denies having any fevers chills.  No nausea or vomiting no abdominal pain.  Denies any chest pain.   Patient also denies having new onset weakness of numbness of either extremity.    SOA much worse than baseline. Feels as though she can barely walk to bathroom. C/o recent severe LE edema which has since resolved. LE US with no evidence of DVT 1 month ago. No c/p chest pain, nausea, PND. States she had PNA a couple of years ago that resulted in pleural effusions requiring thoracentesis. Cough notable today.       Review of Systems:    The pertinent  ROS was done and it is noted above, rest  was negative.    Objective     Vital Signs  /73 (BP Location: Left arm, Patient Position: Lying)  Pulse 72  Temp 97.9 °F (36.6 °C) (Oral)   Resp 16  Ht 63\" (160 cm)  Wt 156 lb 4.9 oz (70.9 kg)  SpO2 90%  BMI 27.69 kg/m2           Intake/Output Summary (Last 24 hours) at 08/17/17 1153  Last data filed at 08/17/17 0700   Gross per 24 hour   Intake              480 ml   Output              300 ml   Net              180 ml       Physical Exam:    General Appearance: alert, oriented x 3, no acute distress,   HEENT: pupils round and reactive to light, oral mucosa dry, extra occular movements intact.  Neck: supple, no JVD, trachea midline  Lungs: slight crackle BLL, good aeration  Heart: RRR, normal S1 and S2, no S3, no rub  Abdomen: soft, non-tender, no palpable bladder, present bowel sounds to auscultation  Extremities: 1+ BLE edema, cyanosis or clubbing.   Neuro: normal speech and mental status, grossly non focal.     Results Review:      Results from last 7 days  Lab Units 08/17/17  0552 08/16/17  1339   SODIUM mmol/L 141 139   POTASSIUM mmol/L 4.4 4.4   CHLORIDE mmol/L 110* " 108*   CO2 mmol/L 22.0* 20.0*   BUN mg/dL 39* 38*   CREATININE mg/dL 3.10* 3.20*   CALCIUM mg/dL 8.4 8.4   GLUCOSE mg/dL 172* 147*       Estimated Creatinine Clearance: 13.9 mL/min (by C-G formula based on Cr of 3.1).      Results from last 7 days  Lab Units 08/17/17  0552 08/16/17  1339   PHOSPHORUS mg/dL 4.7* 4.7*               Results from last 7 days  Lab Units 08/17/17  0552 08/16/17  1339   WBC 10*3/mm3 5.41 4.76*   HEMOGLOBIN g/dL 8.6* 9.0*   PLATELETS 10*3/mm3 203 193               Imaging Results (last 24 hours)     Procedure Component Value Units Date/Time    XR Chest 1 View [[id number]] Collected:  08/16/17 1641     Updated:  08/16/17 1645    Narrative:       PROCEDURE: XR CHEST 1 VW-     HISTORY: pleural effusions/hypoxia     COMPARISON: Melanie 10, 2017.     FINDINGS: The heart is enlarged. The mediastinum is unremarkable. There  is opacification of the right lower lobe as well as bilateral pleural  effusions. Slight prominence of the pulmonary vasculature. There is no  pneumothorax.  There are no acute osseous abnormalities.           Impression:       Bilateral pleural effusions and right lower lobe opacity.     Continued followup is recommended.     This report was finalized on 8/16/2017 4:43 PM by Jah Dickinson DO.          amLODIPine 5 mg Oral Daily   aspirin 81 mg Oral Daily   atorvastatin 10 mg Oral Daily   azithromycin 500 mg Intravenous Q24H   carvedilol 25 mg Oral BID   ceftriaxone 1 g Intravenous Q24H   ferrous sulfate 324 mg Oral BID With Meals   heparin (porcine) 5,000 Units Subcutaneous Q12H   insulin aspart 0-7 Units Subcutaneous 4x Daily AC & at Bedtime   insulin detemir 10 Units Subcutaneous QAM   levothyroxine 75 mcg Oral Daily          Medication Review:   Current Facility-Administered Medications   Medication Dose Route Frequency Provider Last Rate Last Dose   • amLODIPine (NORVASC) tablet 5 mg  5 mg Oral Daily Moy Salas DO   5 mg at 08/17/17 0939   • aspirin EC tablet 81 mg  81 mg  Oral Daily Moy K Fargo, DO   81 mg at 08/17/17 0939   • atorvastatin (LIPITOR) tablet 10 mg  10 mg Oral Daily Moy K Fargo, DO   10 mg at 08/17/17 0938   • AZITHROMYCIN 500 MG/250 ML 0.9% NS IVPB (vial-mate)  500 mg Intravenous Q24H [facility]       • carvedilol (COREG) tablet 25 mg  25 mg Oral BID Moy K Fargo, DO   25 mg at 08/17/17 0939   • cefTRIAXone (ROCEPHIN) 1 g/100 mL 0.9% NS VTB (mbp)  1 g Intravenous Q24H April G [facility], DO       • dextrose (D50W) solution 25 g  25 g Intravenous Q15 Min PRN Moy K Maritza, DO       • dextrose (INSTA-GLUCOSE) 77.4 % gel 1 tube  1 tube Oral Q15 Min PRN Moy K Maritza, DO       • ferrous sulfate EC tablet 324 mg  324 mg Oral BID With Meals Moy K Maritza, DO   324 mg at 08/17/17 0939   • glucagon (human recombinant) (GLUCAGEN DIAGNOSTIC) injection 1 mg  1 mg Subcutaneous Q15 Min PRN Moy K Fargo, DO       • heparin (porcine) 5000 UNIT/ML injection 5,000 Units  5,000 Units Subcutaneous Q12H Moy K Fargo, DO   5,000 Units at 08/17/17 0939   • insulin aspart (novoLOG) injection 0-7 Units  0-7 Units Subcutaneous 4x Daily AC & at Bedtime Moy K Maritza, DO   2 Units at 08/17/17 0631   • insulin detemir (LEVEMIR) injection 10 Units  10 Units Subcutaneous QAM Moy K Maritza, DO   10 Units at 08/17/17 0632   • ipratropium-albuterol (DUO-NEB) nebulizer solution 3 mL  3 mL Nebulization Q4H PRN Jordan Ribera MD   3 mL at 08/17/17 0650   • ipratropium-albuterol (DUO-NEB) nebulizer solution 3 mL  3 mL Nebulization Q6H PRN Moy K Maritza, DO   3 mL at 08/16/17 2223   • levothyroxine (SYNTHROID, LEVOTHROID) tablet 75 mcg  75 mcg Oral Daily Moy K Maritza, DO   75 mcg at 08/17/17 0631   • ondansetron ODT (ZOFRAN-ODT) disintegrating tablet 4 mg  4 mg Oral Q6H PRN Moy Salas DO       • sodium chloride 0.9 % flush 1-10 mL  1-10 mL Intravenous PRN Moy Salas DO       • traZODone (DESYREL) tablet 50 mg  50 mg Oral Nightly PRN Moe JOHNSON  Holly, DO   50 mg at 08/16/17 2181       Assessment/Plan   Acute respiratory failure (no O2 at home per pt now on 4L NC)  New LE edema -- yet improving  RLL PNA - POA  Bilateral pleural effusions  - ECHO pending (last in 2015 with EF 77%)    Malaise and fatigue    Chronic anemia    Essential hypertension    Acquired hypothyroidism    Mixed hyperlipidemia    Vitamin D deficiency    Hip fracture, left--- 2 month ago (June 2017)    Type 2 diabetes mellitus with nephropathy    Chronic kidney disease, stage V    Hypoxia    Principal Problem:    Dyspnea on exertion  Active Problems:    Chronic anemia    Malaise and fatigue    Essential hypertension    Acquired hypothyroidism    Mixed hyperlipidemia    Vitamin D deficiency    Hip fracture, left    Type 2 diabetes mellitus with nephropathy    Chronic kidney disease, stage V    Hypoxia    Going to check a V/Q scan in light of recent events and hip fx  In June with subsequent swelling. Unable to tolerate IV contrast with CT due to CKD. Discussed BLE Venous dopplers, which the pt declined at this time. Last dopplers completed 1 month ago were NEG. Will also add ECHO in light of effusions.     RLL opacity noted on CXR. Sx c/o cough today. WBC WNL. Will start empiric tx with rocephin and azithro - D1 today. Blood cultures ordered.     Wean O2 as able. Pt states she was not on any at home.     PT as able. SQH for PPX.       Details were discussed with the patient as well as family in the room.   I also discussed the details with the nursing staff.    Rest as ordered.    Felicia Hurtado DO  08/17/17  11:53 AM    Please note that portions of this note may have been completed with a voice recognition program. Efforts were made to edit the dictations, but occasionally words are mistranscribed.

## 2017-08-17 NOTE — PLAN OF CARE
Problem: Patient Care Overview (Adult)  Goal: Plan of Care Review  Outcome: Ongoing (interventions implemented as appropriate)    08/17/17 0434   Coping/Psychosocial Response Interventions   Plan Of Care Reviewed With patient   Patient Care Overview   Progress no change   Outcome Evaluation   Outcome Summary/Follow up Plan Patient admitted for Dr. Sheehan's office for hypoxia. The patient is currently on 3-4L NC with O2 staying above 90%. The patient is short of breath of exertion so a bedside commode was used this evening. The patient has no other complaints and is resting comfortably at this time.

## 2017-08-17 NOTE — PLAN OF CARE
Problem: Activity Intolerance (Adult)  Goal: Identify Related Risk Factors and Signs and Symptoms  Outcome: Ongoing (interventions implemented as appropriate)

## 2017-08-17 NOTE — PROGRESS NOTES
"Adult Nutrition  Assessment/PES    Patient Name:  Sigrid Casarez  YOB: 1940  MRN: [id number]  Admit Date:  8/16/2017    Assessment Date:  8/17/2017        Reason for Assessment       08/17/17 0824    Reason for Assessment    Reason For Assessment/Visit diagnosis/disease state    Diagnosis Diagnosis    Cardiac Dyslipidemia    Endocrine DM Type 2    Hematological Chronic anemia    Pulmonary/Critical Care Other (comment)   Dyspnea on exertion, hypoxia    Renal CKD                Anthropometrics       08/17/17 0827    Anthropometrics    Height Method Stated    Height 160 cm (63\")    Weight Method Bed scale    Weight 70.9 kg (156 lb 4.9 oz)    Ideal Body Weight (IBW)    Ideal Body Weight (IBW), Female 53.12    % Ideal Body Weight 133.76    Body Mass Index (BMI)    BMI (kg/m2) 27.75    BMI Grade 25 - 29.9 - overweight            Labs/Tests/Procedures/Meds       08/17/17 0829    Labs/Tests/Procedures/Meds    Labs/Tests Review Reviewed;Alb   High: Cl, Glu, BUn, Creat, Phos    Medication Review Reviewed, pertinent;Anticoag;Insulin              Estimated/Assessed Needs       08/17/17 0848    Calculation Measurements    Weight Used For Calculations 57.5 kg (126 lb 12 oz)   ABW    Height Used for Calculations 1.6 m (5' 3\")    Estimated/Assessed Energy Needs    Energy Need Method [place]-St Jeor    Age 77    RMR (D[place]-St. Jeor Equation) 1019.06    Activity Factors ([place] St Jeor)  Out of bed, ambulatory  1.3    Estimated Kcal Range  ~5508-7450 kcal    Estimated/Assessed Protein Needs    Weight Used for Protein Calculation 70.9 kg (156 lb 4.9 oz)    Protein (gm/kg) 0.8    0.8 Gm Protein (gm) 56.72    Estimated Protein Range ~43-57 gm    Estimated/Assessed Fluid Needs    Fluid Need Method RDA method    RDA Method (mL)  1600            Nutrition Prescription Ordered       08/17/17 0851    Nutrition Prescription PO    Current PO Diet Regular    Fluid Consistency Thin    Common Modifiers Cardiac;Consistent " Carbohydrate;Renal            Evaluation of Received Nutrient/Fluid Intake       08/17/17 0848    PO Evaluation    Number of Days PO Intake Evaluated 1 day    Number of Meals 2    % PO Intake 75              Problem/Interventions:        Problem 1       08/17/17 0852    Nutrition Diagnoses Problem 1    Problem 1 Impaired Nutrient Utilization    Etiology (related to) Medical Diagnosis    Endocrine DM2    Renal CKD    Signs/Symptoms (evidenced by) Biochemical    Specific Labs Noted Glucose;HgbA1C;BUN;Creatinine;Phosphorus                    Intervention Goal       08/17/17 0855    Intervention Goal    General Meet nutritional needs for age/condition;Provide information regarding MNT for treatment/condition    PO Meet estimated needs;Increase intake;PO intake (%)    PO Intake % 100 %    Weight Maintain weight            Nutrition Intervention       08/17/17 0856    Nutrition Intervention    RD/Tech Action Care plan reviewd;Follow Tx progress;Encourage intake            Nutrition Prescription       08/17/17 0856    Nutrition Prescription PO    PO Prescription --   Continue current diet order    New PO Prescription Ordered? No, recommended    Other Orders    Obtain Weight Daily    Obtain Weight Ordered? No, recommended    Supplement Vitamin mineral supplement    Supplement Ordered? No, recommended            Education/Evaluation       08/17/17 0857    Education    Education Provided education regarding;Education topics    Provided education regarding Healthy eating for diabetes    Education Topics Diabetes;Cardiac diabetic;Cardiac heart health;CHO counting;Renal diet    Monitor/Evaluation    Monitor Per protocol;I&O;PO intake;Pertinent labs;Weight        Comments:  Rec #1: Continue current diet order; Encouraging intake. Pt averaging 75% PO intake over two meals. Rec #2: Consider MVI with minerals daily. RD to follow pt. Consult RD PRN.     Electronically signed by:  Jenni Young RD  08/17/17 8:58 AM

## 2017-08-17 NOTE — CONSULTS
"Diabetes Education  Assessment/Teaching    Patient Name:  Sigrid Casarez  YOB: 1940  MRN: [id number]  Admit Date:  8/16/2017      Assessment Date:  8/17/2017       Most Recent Value    General Information      Referral From:  Other (comment) [Placed by RN]    Height  5' 3\" (1.6 m)    Height Method  Stated    Weight  156 lb 4.9 oz (70.9 kg)    Weight Method  Bed scale    Pregnancy Assessment     Diabetes History     What type of diabetes do you have?  Type 2    Education Preferences     What areas of diabetes would you like to learn about?  understanding diabetes [Reviewed basic pathophysiology of diabetes and controlling blood glucose to lessen risk of complications.]    Nutrition Information     Assessment Topics     DM Goals                Most Recent Value    DM Education Needs     Meter  Has own [States has working glucometer at home.]    Frequency of Testing  -- [Discussed value of testing blood glucose at different times of day and logging results. Usually checks AM fasting and PRN.]    Blood Glucose Target  -- [Reviewed current ADA target goals with advice to discuss with PCP to individualize these for her.]    Medication  -- [States she takes \"long-acting insulin daily\". Reviewed basics of how this medication works. Discussed importance of taking medications as directed and keeping healthcare provider up to date on how they are working for her.]    Problem Solving  Hypoglycemia, Hyperglycemia, Sick days, Signs, Symptoms, Treatment [Reviewed s/s and tx. of above. Sick day care as per [facility] Diabetes reviewed.  Patient states she keeps regular appt. with providers. Is established with endocrinology.]    Reducing Risks  A1C testing, Eye exam, Dental exam, Foot care [Reviewed importance of preventive healthcare measures.]    Healthy Eating  -- [Healthy eating via My Plate reviewed. Basic carb counting covered in [facility] Well with Diabetes.]    Physical Activity  -- [The benefits of " physical activity (with guidance of PCP).]    Healthy Coping  -- [The effects of both emotional and physical health of blood glucose. Some healthy coping measures covered in Living Well with Diabetes book.]    Motivation  Engaged    Teaching Method  Explanation, Handouts, Teach back    Patient Response  Verbalized understanding            Other Comments:  Met with patient for diabetes education. Given [facility] Well [facility] Diabetes and [facility] diabetes ed. Handout and contents reviewed together. States she is interested in diabetes classes but does not want us to get referral at present. Given Contact information for Clinical Nutrition office for when ready for class.        Electronically signed by:  Peri Huntley RN  08/17/17 10:58 AM

## 2017-08-18 ENCOUNTER — APPOINTMENT (OUTPATIENT)
Dept: CT IMAGING | Facility: HOSPITAL | Age: 77
End: 2017-08-18

## 2017-08-18 LAB
ALBUMIN SERPL-MCNC: 3 G/DL (ref 3.5–5)
ANION GAP SERPL CALCULATED.3IONS-SCNC: 15.1 MMOL/L
BASOPHILS # BLD AUTO: 0.08 10*3/MM3 (ref 0–0.2)
BASOPHILS NFR BLD AUTO: 1.2 % (ref 0–2.5)
BUN BLD-MCNC: 37 MG/DL (ref 7–20)
BUN/CREAT SERPL: 11.9 (ref 7.1–23.5)
CALCIUM SPEC-SCNC: 8.3 MG/DL (ref 8.4–10.2)
CHLORIDE SERPL-SCNC: 108 MMOL/L (ref 98–107)
CO2 SERPL-SCNC: 21 MMOL/L (ref 26–30)
CREAT BLD-MCNC: 3.1 MG/DL (ref 0.6–1.3)
DEPRECATED RDW RBC AUTO: 50.2 FL (ref 37–54)
EOSINOPHIL # BLD AUTO: 0.3 10*3/MM3 (ref 0–0.7)
EOSINOPHIL NFR BLD AUTO: 4.5 % (ref 0–7)
ERYTHROCYTE [DISTWIDTH] IN BLOOD BY AUTOMATED COUNT: 14.4 % (ref 11.5–14.5)
GFR SERPL CREATININE-BSD FRML MDRD: 14 ML/MIN/1.73
GLUCOSE BLD-MCNC: 282 MG/DL (ref 74–98)
GLUCOSE BLDC GLUCOMTR-MCNC: 211 MG/DL (ref 70–130)
GLUCOSE BLDC GLUCOMTR-MCNC: 286 MG/DL (ref 70–130)
GLUCOSE BLDC GLUCOMTR-MCNC: 83 MG/DL (ref 70–130)
GLUCOSE BLDC GLUCOMTR-MCNC: 86 MG/DL (ref 70–130)
HCT VFR BLD AUTO: 28.1 % (ref 37–47)
HGB BLD-MCNC: 9 G/DL (ref 12–16)
IMM GRANULOCYTES # BLD: 0.06 10*3/MM3 (ref 0–0.06)
IMM GRANULOCYTES NFR BLD: 0.9 % (ref 0–0.6)
LYMPHOCYTES # BLD AUTO: 1.3 10*3/MM3 (ref 0.6–3.4)
LYMPHOCYTES NFR BLD AUTO: 19.3 % (ref 10–50)
MCH RBC QN AUTO: 30.6 PG (ref 27–31)
MCHC RBC AUTO-ENTMCNC: 32 G/DL (ref 30–37)
MCV RBC AUTO: 95.6 FL (ref 81–99)
MONOCYTES # BLD AUTO: 0.47 10*3/MM3 (ref 0–0.9)
MONOCYTES NFR BLD AUTO: 7 % (ref 0–12)
NEUTROPHILS # BLD AUTO: 4.52 10*3/MM3 (ref 2–6.9)
NEUTROPHILS NFR BLD AUTO: 67.1 % (ref 37–80)
NRBC BLD MANUAL-RTO: 0 /100 WBC (ref 0–0)
PHOSPHATE SERPL-MCNC: 4.5 MG/DL (ref 2.5–4.5)
PLATELET # BLD AUTO: 213 10*3/MM3 (ref 130–400)
PMV BLD AUTO: 11.5 FL (ref 6–12)
POTASSIUM BLD-SCNC: 4.1 MMOL/L (ref 3.5–5.1)
RBC # BLD AUTO: 2.94 10*6/MM3 (ref 4.2–5.4)
SODIUM BLD-SCNC: 140 MMOL/L (ref 137–145)
WBC NRBC COR # BLD: 6.73 10*3/MM3 (ref 4.8–10.8)

## 2017-08-18 PROCEDURE — 25010000002 CEFTRIAXONE: Performed by: INTERNAL MEDICINE

## 2017-08-18 PROCEDURE — 85025 COMPLETE CBC W/AUTO DIFF WBC: CPT | Performed by: FAMILY MEDICINE

## 2017-08-18 PROCEDURE — 82962 GLUCOSE BLOOD TEST: CPT

## 2017-08-18 PROCEDURE — 25010000002 HYDRALAZINE PER 20 MG: Performed by: INTERNAL MEDICINE

## 2017-08-18 PROCEDURE — 25010000002 AZITHROMYCIN: Performed by: INTERNAL MEDICINE

## 2017-08-18 PROCEDURE — 63710000001 INSULIN ASPART PER 5 UNITS: Performed by: FAMILY MEDICINE

## 2017-08-18 PROCEDURE — 80069 RENAL FUNCTION PANEL: CPT | Performed by: FAMILY MEDICINE

## 2017-08-18 PROCEDURE — 99232 SBSQ HOSP IP/OBS MODERATE 35: CPT | Performed by: INTERNAL MEDICINE

## 2017-08-18 PROCEDURE — 25010000002 HEPARIN (PORCINE) PER 1000 UNITS: Performed by: FAMILY MEDICINE

## 2017-08-18 PROCEDURE — 71250 CT THORAX DX C-: CPT

## 2017-08-18 RX ORDER — HYDRALAZINE HYDROCHLORIDE 20 MG/ML
10 INJECTION INTRAMUSCULAR; INTRAVENOUS
Status: DISCONTINUED | OUTPATIENT
Start: 2017-08-18 | End: 2017-08-22 | Stop reason: HOSPADM

## 2017-08-18 RX ORDER — HYDRALAZINE HYDROCHLORIDE 10 MG/1
10 TABLET, FILM COATED ORAL EVERY 8 HOURS SCHEDULED
Status: DISCONTINUED | OUTPATIENT
Start: 2017-08-18 | End: 2017-08-19

## 2017-08-18 RX ORDER — SPIRONOLACTONE 25 MG/1
50 TABLET ORAL ONCE
Status: COMPLETED | OUTPATIENT
Start: 2017-08-18 | End: 2017-08-18

## 2017-08-18 RX ORDER — BUMETANIDE 0.25 MG/ML
2 INJECTION INTRAMUSCULAR; INTRAVENOUS
Status: DISCONTINUED | OUTPATIENT
Start: 2017-08-18 | End: 2017-08-18 | Stop reason: SDUPTHER

## 2017-08-18 RX ORDER — TRAZODONE HYDROCHLORIDE 50 MG/1
50 TABLET ORAL NIGHTLY PRN
Status: DISCONTINUED | OUTPATIENT
Start: 2017-08-18 | End: 2017-08-18

## 2017-08-18 RX ORDER — BUMETANIDE 0.25 MG/ML
4 INJECTION INTRAMUSCULAR; INTRAVENOUS ONCE
Status: COMPLETED | OUTPATIENT
Start: 2017-08-18 | End: 2017-08-18

## 2017-08-18 RX ORDER — BUMETANIDE 0.25 MG/ML
2 INJECTION INTRAMUSCULAR; INTRAVENOUS
Status: DISCONTINUED | OUTPATIENT
Start: 2017-08-18 | End: 2017-08-19

## 2017-08-18 RX ORDER — HYDRALAZINE HYDROCHLORIDE 10 MG/1
10 TABLET, FILM COATED ORAL EVERY 6 HOURS PRN
Status: DISCONTINUED | OUTPATIENT
Start: 2017-08-18 | End: 2017-08-18

## 2017-08-18 RX ORDER — NIFEDIPINE 60 MG/1
60 TABLET, EXTENDED RELEASE ORAL
Status: DISCONTINUED | OUTPATIENT
Start: 2017-08-18 | End: 2017-08-19

## 2017-08-18 RX ADMIN — BUMETANIDE 4 MG: 0.25 INJECTION INTRAMUSCULAR; INTRAVENOUS at 12:10

## 2017-08-18 RX ADMIN — AZITHROMYCIN 500 MG: 500 INJECTION, POWDER, LYOPHILIZED, FOR SOLUTION INTRAVENOUS at 17:52

## 2017-08-18 RX ADMIN — SPIRONOLACTONE 50 MG: 25 TABLET, FILM COATED ORAL at 12:10

## 2017-08-18 RX ADMIN — HYDROCODONE POLISTIREX AND CHLORPHENIRAMINE POLISTIREX 5 ML: 10; 8 SUSPENSION, EXTENDED RELEASE ORAL at 20:52

## 2017-08-18 RX ADMIN — TRAZODONE HYDROCHLORIDE 50 MG: 50 TABLET ORAL at 20:52

## 2017-08-18 RX ADMIN — HEPARIN SODIUM 5000 UNITS: 5000 INJECTION, SOLUTION INTRAVENOUS; SUBCUTANEOUS at 08:21

## 2017-08-18 RX ADMIN — HYDRALAZINE HYDROCHLORIDE 10 MG: 10 TABLET, FILM COATED ORAL at 06:07

## 2017-08-18 RX ADMIN — HYDRALAZINE HYDROCHLORIDE 10 MG: 10 TABLET, FILM COATED ORAL at 15:25

## 2017-08-18 RX ADMIN — HEPARIN SODIUM 5000 UNITS: 5000 INJECTION, SOLUTION INTRAVENOUS; SUBCUTANEOUS at 20:51

## 2017-08-18 RX ADMIN — HYDRALAZINE HYDROCHLORIDE 10 MG: 10 TABLET, FILM COATED ORAL at 21:03

## 2017-08-18 RX ADMIN — INSULIN ASPART 4 UNITS: 100 INJECTION, SOLUTION INTRAVENOUS; SUBCUTANEOUS at 06:33

## 2017-08-18 RX ADMIN — LEVOTHYROXINE SODIUM 75 MCG: 75 TABLET ORAL at 06:07

## 2017-08-18 RX ADMIN — FERROUS SULFATE TAB EC 324 MG (65 MG FE EQUIVALENT) 324 MG: 324 (65 FE) TABLET DELAYED RESPONSE at 08:21

## 2017-08-18 RX ADMIN — ASPIRIN 81 MG: 81 TABLET, COATED ORAL at 08:20

## 2017-08-18 RX ADMIN — CEFTRIAXONE 1 G: 1 INJECTION, POWDER, FOR SOLUTION INTRAMUSCULAR; INTRAVENOUS at 12:10

## 2017-08-18 RX ADMIN — CARVEDILOL 25 MG: 25 TABLET, FILM COATED ORAL at 17:52

## 2017-08-18 RX ADMIN — BUMETANIDE 1 MG: 0.25 INJECTION INTRAMUSCULAR; INTRAVENOUS at 06:54

## 2017-08-18 RX ADMIN — HYDRALAZINE HYDROCHLORIDE 10 MG: 20 INJECTION INTRAMUSCULAR; INTRAVENOUS at 10:32

## 2017-08-18 RX ADMIN — INSULIN ASPART 3 UNITS: 100 INJECTION, SOLUTION INTRAVENOUS; SUBCUTANEOUS at 12:10

## 2017-08-18 RX ADMIN — CARVEDILOL 25 MG: 25 TABLET, FILM COATED ORAL at 08:20

## 2017-08-18 RX ADMIN — FERROUS SULFATE TAB EC 324 MG (65 MG FE EQUIVALENT) 324 MG: 324 (65 FE) TABLET DELAYED RESPONSE at 17:52

## 2017-08-18 RX ADMIN — ATORVASTATIN CALCIUM 10 MG: 10 TABLET, FILM COATED ORAL at 08:20

## 2017-08-18 RX ADMIN — NIFEDIPINE 60 MG: 60 TABLET, FILM COATED, EXTENDED RELEASE ORAL at 08:23

## 2017-08-18 RX ADMIN — BUMETANIDE 2 MG: 0.25 INJECTION INTRAMUSCULAR; INTRAVENOUS at 17:52

## 2017-08-18 NOTE — PLAN OF CARE
Problem: Patient Care Overview (Adult)  Goal: Plan of Care Review  Outcome: Ongoing (interventions implemented as appropriate)    08/18/17 7746   Coping/Psychosocial Response Interventions   Plan Of Care Reviewed With patient   Patient Care Overview   Progress progress toward functional goals as expected   Outcome Evaluation   Outcome Summary/Follow up Plan Minimal complaints of pain, blood pressure monitoring, intake and output monitoring         Problem: Activity Intolerance (Adult)  Goal: Identify Related Risk Factors and Signs and Symptoms  Outcome: Ongoing (interventions implemented as appropriate)  Goal: Activity Tolerance  Outcome: Ongoing (interventions implemented as appropriate)  Goal: Effective Energy Conservation Techniques  Outcome: Ongoing (interventions implemented as appropriate)    Problem: Skin Integrity Impairment, Risk/Actual (Adult)  Goal: Identify Related Risk Factors and Signs and Symptoms  Outcome: Ongoing (interventions implemented as appropriate)  Goal: Skin Integrity/Wound Healing  Outcome: Ongoing (interventions implemented as appropriate)    Problem: Older Inpatient, Acutely Ill (Adult)  Goal: Signs and Symptoms of Listed Potential Problems Will be Absent or Manageable (Older Inpatient, Acutely Ill)  Outcome: Ongoing (interventions implemented as appropriate)

## 2017-08-18 NOTE — PROGRESS NOTES
"Hospitalist Progress Note.    LOS: 1 day    Patient Care Team:  Tiburcio Hernandez MD as PCP - General    Chief Complaint:  No chief complaint on file.      Subjective   Patient seen and examined this morning.  Events from last night noted.  Patient denies having any fevers chills.  No nausea or vomiting no abdominal pain.  Denies any chest pain.   Patient also denies having new onset weakness of numbness of either extremity.    SOA much worse than baseline still without change since yesterday. Feels as though she can barely walk to bathroom. C/o recent severe LE edema which has since resolved. LE US with no evidence of DVT 1 month ago. No c/p chest pain, nausea, PND. States she had PNA a couple of years ago that resulted in pleural effusions requiring thoracentesis. Cough notable today.       Review of Systems:    The pertinent  ROS was done and it is noted above, rest  was negative.    Objective     Vital Signs  BP (!) 196/86 (BP Location: Left arm, Patient Position: Lying) Comment: RN notified  Pulse 85  Temp 98.2 °F (36.8 °C) (Oral)   Resp 18  Ht 63\" (160 cm)  Wt 155 lb 14.4 oz (70.7 kg)  SpO2 93%  BMI 27.62 kg/m2      I/O this shift:  In: 780 [P.O.:680; IV Piggyback:100]  Out: 800 [Urine:800]    Intake/Output Summary (Last 24 hours) at 08/18/17 1256  Last data filed at 08/18/17 1210   Gross per 24 hour   Intake              780 ml   Output              800 ml   Net              -20 ml       Physical Exam:    General Appearance: alert, oriented x 3, no acute distress,   HEENT: pupils round and reactive to light, oral mucosa dry, extra occular movements intact.  Neck: supple, no JVD, trachea midline  Lungs: slight crackle BLL, good aeration  Heart: RRR, normal S1 and S2, no S3, no rub  Abdomen: soft, non-tender, no palpable bladder, present bowel sounds to auscultation  Extremities: 1+ BLE edema, pitting edema on thigh and buttocks, no cyanosis or clubbing.   Neuro: normal speech and mental status, grossly non " focal.     Results Review:      Results from last 7 days  Lab Units 08/18/17  0543 08/17/17  0552 08/16/17  1339   SODIUM mmol/L 140 141 139   POTASSIUM mmol/L 4.1 4.4 4.4   CHLORIDE mmol/L 108* 110* 108*   CO2 mmol/L 21.0* 22.0* 20.0*   BUN mg/dL 37* 39* 38*   CREATININE mg/dL 3.10* 3.10* 3.20*   CALCIUM mg/dL 8.3* 8.4 8.4   GLUCOSE mg/dL 282* 172* 147*       Estimated Creatinine Clearance: 13.9 mL/min (by C-G formula based on Cr of 3.1).      Results from last 7 days  Lab Units 08/18/17  0543 08/17/17  0552 08/16/17  1339   PHOSPHORUS mg/dL 4.5 4.7* 4.7*               Results from last 7 days  Lab Units 08/18/17  0543 08/17/17  0552 08/16/17  1339   WBC 10*3/mm3 6.73 5.41 4.76*   HEMOGLOBIN g/dL 9.0* 8.6* 9.0*   PLATELETS 10*3/mm3 213 203 193               Imaging Results (last 24 hours)     Procedure Component Value Units Date/Time    NM Lung Ventilation Perfusion Aerosol [[id number]] Collected:  08/17/17 1608     Updated:  08/17/17 1612    Narrative:       PROCEDURE: NM LUNG VENTILATION PERFUSION AEROSOL-     HISTORY: V/Q scan for acute hypoxemia 1 month after hip fracture in pt  with CKD4     PROCEDURE: The patient was injected with 5.3 mCi of Tc 99m MAA.  Aerosolized DTPA at 31.7 mCi was utilized for ventilation. Images over  the lungs were obtained in multiple projections.     COMPARISON: Chest x-ray dated August 16, 2017.     FINDINGS: Overall, perfusion is superior to ventilation. There are no  mismatched defects to suggest PE. Multiple ventilation defects are  noted.       Impression:       Low probability for PE.     This report was finalized on 8/17/2017 4:10 PM by Jah Dickinson DO.          aspirin 81 mg Oral Daily   atorvastatin 10 mg Oral Daily   azithromycin 500 mg Intravenous Q24H   bumetanide 2 mg Intravenous TID AC   carvedilol 25 mg Oral BID   ceftriaxone 1 g Intravenous Q24H   ferrous sulfate 324 mg Oral BID With Meals   heparin (porcine) 5,000 Units Subcutaneous Q12H   hydrALAZINE 10 mg Oral Q8H    insulin aspart 0-7 Units Subcutaneous 4x Daily AC & at Bedtime   insulin detemir 10 Units Subcutaneous QAM   levothyroxine 75 mcg Oral Daily   NIFEdipine XL 60 mg Oral Q24H          Medication Review:   Current Facility-Administered Medications   Medication Dose Route Frequency Provider Last Rate Last Dose   • aspirin EC tablet 81 mg  81 mg Oral Daily Moy K [place], DO   81 mg at 08/18/17 0820   • atorvastatin (LIPITOR) tablet 10 mg  10 mg Oral Daily Moy K Maritza, DO   10 mg at 08/18/17 0820   • AZITHROMYCIN 500 MG/250 ML 0.9% NS IVPB (vial-mate)  500 mg Intravenous Q24H April G [facility], DO   500 mg at 08/17/17 1822   • bumetanide (BUMEX) injection 2 mg  2 mg Intravenous TID AC Deric Mejia MD       • carvedilol (COREG) tablet 25 mg  25 mg Oral BID Moy K Maritza, DO   25 mg at 08/18/17 0820   • cefTRIAXone (ROCEPHIN) 1 g/100 mL 0.9% NS VTB (mbp)  1 g Intravenous Q24H [facility]   1 g at 08/18/17 1210   • dextrose (D50W) solution 25 g  25 g Intravenous Q15 Min PRN Moy K Maritza, DO       • dextrose (INSTA-GLUCOSE) 77.4 % gel 1 tube  1 tube Oral Q15 Min PRN Moy K [place], DO       • ferrous sulfate EC tablet 324 mg  324 mg Oral BID With Meals Moy K [place], DO   324 mg at 08/18/17 0821   • glucagon (human recombinant) (GLUCAGEN DIAGNOSTIC) injection 1 mg  1 mg Subcutaneous Q15 Min PRN Moy K [place], DO       • heparin (porcine) 5000 UNIT/ML injection 5,000 Units  5,000 Units Subcutaneous Q12H Moy K [place]za, DO   5,000 Units at 08/18/17 0821   • hydrALAZINE (APRESOLINE) injection 10 mg  10 mg Intravenous Q2H PRN [facility]   10 mg at 08/18/17 1032   • hydrALAZINE (APRESOLINE) tablet 10 mg  10 mg Oral Q8H [facility]       • insulin aspart (novoLOG) injection 0-7 Units  0-7 Units Subcutaneous 4x Daily AC & at Bedtime Moy Salas, DO   3 Units at 08/18/17 1210   • insulin detemir (LEVEMIR) injection 10 Units  10 Units Subcutaneous QAM  Moy LEANNE Salas, DO   10 Units at 08/17/17 0632   • ipratropium-albuterol (DUO-NEB) nebulizer solution 3 mL  3 mL Nebulization Q4H PRN Jordan Ribera MD   3 mL at 08/17/17 0650   • ipratropium-albuterol (DUO-NEB) nebulizer solution 3 mL  3 mL Nebulization Q6H PRN Moyflorin Salas, DO   3 mL at 08/16/17 2223   • levothyroxine (SYNTHROID, LEVOTHROID) tablet 75 mcg  75 mcg Oral Daily Moy LEANNE Salas, DO   75 mcg at 08/18/17 0607   • NIFEdipine XL (PROCARDIA XL) 24 hr tablet 60 mg  60 mg Oral Q24H April G [facility], DO   60 mg at 08/18/17 0823   • ondansetron ODT (ZOFRAN-ODT) disintegrating tablet 4 mg  4 mg Oral Q6H PRN Moy Salas, DO       • sodium chloride 0.9 % flush 1-10 mL  1-10 mL Intravenous PRN Moy Salas DO       • traZODone (DESYREL) tablet 50 mg  50 mg Oral Nightly PRN Moe Barrera, DO   50 mg at 08/16/17 2231       Assessment/Plan   Acute respiratory failure (no O2 at home per pt now on 4L NC)  New LE edema -- yet improving  RLL PNA - POA  Bilateral pleural effusions  - ECHO pending (last in 2015 with EF 77%)    Malaise and fatigue    Chronic anemia    Essential hypertension    Acquired hypothyroidism    Mixed hyperlipidemia    Vitamin D deficiency    Hip fracture, left--- 2 month ago (June 2017)    Type 2 diabetes mellitus with nephropathy    Chronic kidney disease, stage V    Hypoxia    Principal Problem:    Dyspnea on exertion  Active Problems:    Chronic anemia    Malaise and fatigue    Essential hypertension    Acquired hypothyroidism    Mixed hyperlipidemia    Vitamin D deficiency    Hip fracture, left    Type 2 diabetes mellitus with nephropathy    Chronic kidney disease, stage V    Hypoxia    V/Q scan was low probability in light of recent events and hip fx  In June with subsequent swelling. Unable to tolerate IV contrast with CT due to CKD. Will obtain CT chest w/o contrast to better eval since clinically does not feel much improved despite diuresis and improving LE edema.  "Discussed BLE Venous dopplers, which the pt declined at this time-- no obvious asymmetry noted. Last dopplers completed 1 month ago were NEG. ECHO EF 77%, RVSP 40, diastolic dysfunction noted.    RLL opacity noted on CXR. Sx c/o cough still today. WBC WNL and AF. Cont empiric tx with rocephin and azithro - D2 today. Blood cultures ordered and pending. Imaging as above pending.    Wean O2 as able. Pt states she was not on any at home. Currently on 4.5L NC. Also, says she is a mouth breather but declines mask.    Nephrology following. Plans for more diuresis today. Cont to follow BMP.     HTN still uncontrolled. Changed norvasc to nifedipine, titrate up as needed. Also started on scheduled hydralazine 10mg TID. PRN IV meds ordered as well.    PT as able. SQH for PPX.     Details were discussed with the patient as well as family in the room.   I also discussed the details with the nursing staff.    Rest as ordered.    Addendum:  CT chest : \"Moderate bilateral pleural effusions with diffuse lower lobe  and right middle lobe atelectasis. An underlying pneumonia is not  entirely excluded.\"    May need Pulm consult for thoracentesis if no improvement with diuretics. Pt has had required this in the past as well.    Felicia Hurtado DO  08/18/17  12:56 PM    Please note that portions of this note may have been completed with a voice recognition program. Efforts were made to edit the dictations, but occasionally words are mistranscribed.      "

## 2017-08-18 NOTE — PROGRESS NOTES
"Nephrology Progress Note.    LOS: 1 day    Patient Care Team:  Tiburcio Hernandez MD as PCP - General    Chief Complaint:  No chief complaint on file.      Subjective   Patient seen and examined this morning.  Events from last night noted.  Patient denies having any fevers chills.  No nausea or vomiting no abdominal pain.  Denies any chest pain, she does feel shortness of breath as well as cough denies any sputum production.  There is still significant edema of the lower ext and sacral area.   Patient also denies having new onset weakness of numbness of either extremity.      Review of Systems:    The pertinent  ROS was done and it is noted above, rest  was negative.    Objective     Vital Signs  BP (!) 196/86 (BP Location: Left arm, Patient Position: Lying) Comment: RN notified  Pulse 85  Temp 98.2 °F (36.8 °C) (Oral)   Resp 18  Ht 63\" (160 cm)  Wt 155 lb 14.4 oz (70.7 kg)  SpO2 93%  BMI 27.62 kg/m2      I/O this shift:  In: 780 [P.O.:680; IV Piggyback:100]  Out: 800 [Urine:800]    Intake/Output Summary (Last 24 hours) at 08/18/17 1339  Last data filed at 08/18/17 1210   Gross per 24 hour   Intake              780 ml   Output              800 ml   Net              -20 ml       Physical Exam:    General Appearance: alert, oriented x 3, no acute distress,   HEENT: pupils round and reactive to light, oral mucosa dry, extra occular movements intact.  Neck: supple, no JVD, trachea midline  Lungs: She has crackles about 1/3 up the chest.  Heart: RRR, normal S1 and S2, no S3, no rub  Abdomen: soft, non-tender, no palpable bladder, present bowel sounds to auscultation  Extremities: 1-2 +  edema, no cyanosis or clubbing.   Neuro: normal speech and mental status, grossly non focal.     Results Review:      Results from last 7 days  Lab Units 08/18/17  0543 08/17/17  0552 08/16/17  1339   SODIUM mmol/L 140 141 139   POTASSIUM mmol/L 4.1 4.4 4.4   CHLORIDE mmol/L 108* 110* 108*   CO2 mmol/L 21.0* 22.0* 20.0*   BUN mg/dL 37* " 39* 38*   CREATININE mg/dL 3.10* 3.10* 3.20*   CALCIUM mg/dL 8.3* 8.4 8.4   GLUCOSE mg/dL 282* 172* 147*       Estimated Creatinine Clearance: 13.9 mL/min (by C-G formula based on Cr of 3.1).      Results from last 7 days  Lab Units 08/18/17  0543 08/17/17  0552 08/16/17  1339   PHOSPHORUS mg/dL 4.5 4.7* 4.7*               Results from last 7 days  Lab Units 08/18/17  0543 08/17/17  0552 08/16/17  1339   WBC 10*3/mm3 6.73 5.41 4.76*   HEMOGLOBIN g/dL 9.0* 8.6* 9.0*   PLATELETS 10*3/mm3 213 203 193               Imaging Results (last 24 hours)     Procedure Component Value Units Date/Time    NM Lung Ventilation Perfusion Aerosol [[id number]] Collected:  08/17/17 1608     Updated:  08/17/17 1612    Narrative:       PROCEDURE: NM LUNG VENTILATION PERFUSION AEROSOL-     HISTORY: V/Q scan for acute hypoxemia 1 month after hip fracture in pt  with CKD4     PROCEDURE: The patient was injected with 5.3 mCi of Tc 99m MAA.  Aerosolized DTPA at 31.7 mCi was utilized for ventilation. Images over  the lungs were obtained in multiple projections.     COMPARISON: Chest x-ray dated August 16, 2017.     FINDINGS: Overall, perfusion is superior to ventilation. There are no  mismatched defects to suggest PE. Multiple ventilation defects are  noted.       Impression:       Low probability for PE.     This report was finalized on 8/17/2017 4:10 PM by Jah Dickinson DO.          aspirin 81 mg Oral Daily   atorvastatin 10 mg Oral Daily   azithromycin 500 mg Intravenous Q24H   bumetanide 2 mg Intravenous TID AC   carvedilol 25 mg Oral BID   ceftriaxone 1 g Intravenous Q24H   ferrous sulfate 324 mg Oral BID With Meals   heparin (porcine) 5,000 Units Subcutaneous Q12H   hydrALAZINE 10 mg Oral Q8H   insulin aspart 0-7 Units Subcutaneous 4x Daily AC & at Bedtime   insulin detemir 10 Units Subcutaneous QAM   levothyroxine 75 mcg Oral Daily   NIFEdipine XL 60 mg Oral Q24H          Medication Review:   Current Facility-Administered Medications    Medication Dose Route Frequency Provider Last Rate Last Dose   • aspirin EC tablet 81 mg  81 mg Oral Daily Moy LEANNE Salas, DO   81 mg at 08/18/17 0820   • atorvastatin (LIPITOR) tablet 10 mg  10 mg Oral Daily Moy K [place], DO   10 mg at 08/18/17 0820   • AZITHROMYCIN 500 MG/250 ML 0.9% NS IVPB (vial-mate)  500 mg Intravenous Q24H April G [facility], DO   500 mg at 08/17/17 1822   • bumetanide (BUMEX) injection 2 mg  2 mg Intravenous TID AC Deric Mejia MD       • carvedilol (COREG) tablet 25 mg  25 mg Oral BID Moy LEANNE [place], DO   25 mg at 08/18/17 0820   • cefTRIAXone (ROCEPHIN) 1 g/100 mL 0.9% NS VTB (mbp)  1 g Intravenous Q24H [facility]   1 g at 08/18/17 1210   • dextrose (D50W) solution 25 g  25 g Intravenous Q15 Min PRN Moy Salas, DO       • dextrose (INSTA-GLUCOSE) 77.4 % gel 1 tube  1 tube Oral Q15 Min PRN Moy Salas, DO       • ferrous sulfate EC tablet 324 mg  324 mg Oral BID With Meals Moy LEANNE Salas, DO   324 mg at 08/18/17 0821   • glucagon (human recombinant) (GLUCAGEN DIAGNOSTIC) injection 1 mg  1 mg Subcutaneous Q15 Min PRN Moy Salas, DO       • heparin (porcine) 5000 UNIT/ML injection 5,000 Units  5,000 Units Subcutaneous Q12H Moy Salas, DO   5,000 Units at 08/18/17 0821   • hydrALAZINE (APRESOLINE) injection 10 mg  10 mg Intravenous Q2H PRN [facility]   10 mg at 08/18/17 1032   • hydrALAZINE (APRESOLINE) tablet 10 mg  10 mg Oral Q8H April G [facility], DO       • insulin aspart (novoLOG) injection 0-7 Units  0-7 Units Subcutaneous 4x Daily AC & at Bedtime Moy Salas, DO   3 Units at 08/18/17 1210   • insulin detemir (LEVEMIR) injection 10 Units  10 Units Subcutaneous QAM Moy K [place], DO   10 Units at 08/17/17 0632   • ipratropium-albuterol (DUO-NEB) nebulizer solution 3 mL  3 mL Nebulization Q4H PRN Jordan Ribera MD   3 mL at 08/17/17 0650   • ipratropium-albuterol (DUO-NEB) nebulizer solution 3 mL  3 mL  Nebulization Q6H PRN Moy K [place], DO   3 mL at 08/16/17 2223   • levothyroxine (SYNTHROID, LEVOTHROID) tablet 75 mcg  75 mcg Oral Daily Moy K [place], DO   75 mcg at 08/18/17 0607   • NIFEdipine XL (PROCARDIA XL) 24 hr tablet 60 mg  60 mg Oral Q24H April G [facility], DO   60 mg at 08/18/17 0823   • ondansetron ODT (ZOFRAN-ODT) disintegrating tablet 4 mg  4 mg Oral Q6H PRN Moy K [place], DO       • sodium chloride 0.9 % flush 1-10 mL  1-10 mL Intravenous PRN Moy K [place], DO       • traZODone (DESYREL) tablet 50 mg  50 mg Oral Nightly PRN Moe Barrera, DO   50 mg at 08/16/17 2231       Assessment/Plan     1.   Chronic kidney disease, stage V- due to diabetic nephropathy with significant proteinuria unable to tolerate ARB due to recurrent hyperkalemia. She is s/p access placement which is maturing but not ready for dialysis. Will watch and optimize the volume status, may need to initiate dialysis and details discussed with her and the . She may need dialysis during this admission.  2.   Dyspnea on exertion- with fluid excess, increase the diuretics as ordered. See if it helps otherwise will need to start dialysis for fluid and b/p control.  3.   Chronic anemia- with h/o AUGUST use. I will go ahead and give more iron and Procrit if needed.  4.   Essential hypertension- treated, optimize the medication as ordered  5.   Acquired hypothyroidism- treated  6.   Mixed hyperlipidemia- treated  7.   Vitamin D deficiency: continue oral supplement.  8.   Hypothyroidism: TSH appears to be within range.  9.   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  I will also discussed the assessment and plan with the hospitalist service.    Rest as ordered.    Deric Mejia MD  08/18/17  1:39 PM      EMR Dragon/Transcription disclaimer:   Much of this encounter note is an electronic transcription/translation of spoken  language to printed text. The electronic translation of spoken language may permit erroneous, or at times, nonsensical words or phrases to be inadvertently transcribed; Although I have reviewed the note for such errors, some may still exist.

## 2017-08-18 NOTE — PLAN OF CARE
Problem: Patient Care Overview (Adult)  Goal: Plan of Care Review  Outcome: Ongoing (interventions implemented as appropriate)    08/18/17 0509   Coping/Psychosocial Response Interventions   Plan Of Care Reviewed With patient   Patient Care Overview   Progress no change   Outcome Evaluation   Outcome Summary/Follow up Plan Pt slept well throughout the night. No complaints of pain.       Goal: Adult Individualization and Mutuality  Outcome: Ongoing (interventions implemented as appropriate)  Goal: Discharge Needs Assessment  Outcome: Ongoing (interventions implemented as appropriate)    Problem: Activity Intolerance (Adult)  Goal: Identify Related Risk Factors and Signs and Symptoms  Outcome: Ongoing (interventions implemented as appropriate)    08/18/17 0509   Activity Intolerance   Activity Intolerance: Related Risk Factors generalized weakness   Signs and Symptoms (Activity Intolerance) dyspnea/shortness of breath       Goal: Activity Tolerance  Outcome: Ongoing (interventions implemented as appropriate)    08/18/17 0509   Activity Intolerance (Adult)   Activity Tolerance making progress toward outcome       Goal: Effective Energy Conservation Techniques  Outcome: Ongoing (interventions implemented as appropriate)    08/18/17 0509   Activity Intolerance (Adult)   Effective Energy Conservation Techniques making progress toward outcome         Problem: Skin Integrity Impairment, Risk/Actual (Adult)  Goal: Identify Related Risk Factors and Signs and Symptoms  Outcome: Ongoing (interventions implemented as appropriate)    08/18/17 0509   Skin Integrity Impairment, Risk/Actual   Skin Integrity Impairment, Risk/Actual: Related Risk Factors age extremes;edema;fluid/nutrition status;infection/disease process;tissue perfusion impaired   Signs and Symptoms (Skin Integrity Impairment) edema       Goal: Skin Integrity/Wound Healing  Outcome: Ongoing (interventions implemented as appropriate)    08/18/17 0509   Skin Integrity  Impairment, Risk/Actual (Adult)   Skin Integrity/Wound Healing making progress toward outcome

## 2017-08-19 LAB
ALBUMIN SERPL-MCNC: 3 G/DL (ref 3.5–5)
ANION GAP SERPL CALCULATED.3IONS-SCNC: 16.5 MMOL/L
BASOPHILS # BLD AUTO: 0.09 10*3/MM3 (ref 0–0.2)
BASOPHILS NFR BLD AUTO: 1 % (ref 0–2.5)
BUN BLD-MCNC: 41 MG/DL (ref 7–20)
BUN/CREAT SERPL: 12.4 (ref 7.1–23.5)
CALCIUM SPEC-SCNC: 8.4 MG/DL (ref 8.4–10.2)
CHLORIDE SERPL-SCNC: 107 MMOL/L (ref 98–107)
CO2 SERPL-SCNC: 19 MMOL/L (ref 26–30)
CREAT BLD-MCNC: 3.3 MG/DL (ref 0.6–1.3)
DEPRECATED RDW RBC AUTO: 52.4 FL (ref 37–54)
EOSINOPHIL # BLD AUTO: 0.15 10*3/MM3 (ref 0–0.7)
EOSINOPHIL NFR BLD AUTO: 1.6 % (ref 0–7)
ERYTHROCYTE [DISTWIDTH] IN BLOOD BY AUTOMATED COUNT: 14.7 % (ref 11.5–14.5)
GFR SERPL CREATININE-BSD FRML MDRD: 13 ML/MIN/1.73
GLUCOSE BLD-MCNC: 350 MG/DL (ref 74–98)
GLUCOSE BLDC GLUCOMTR-MCNC: 106 MG/DL (ref 70–130)
GLUCOSE BLDC GLUCOMTR-MCNC: 122 MG/DL (ref 70–130)
GLUCOSE BLDC GLUCOMTR-MCNC: 359 MG/DL (ref 70–130)
GLUCOSE BLDC GLUCOMTR-MCNC: 77 MG/DL (ref 70–130)
HCT VFR BLD AUTO: 28.3 % (ref 37–47)
HGB BLD-MCNC: 8.9 G/DL (ref 12–16)
IMM GRANULOCYTES # BLD: 0.08 10*3/MM3 (ref 0–0.06)
IMM GRANULOCYTES NFR BLD: 0.9 % (ref 0–0.6)
LYMPHOCYTES # BLD AUTO: 1.62 10*3/MM3 (ref 0.6–3.4)
LYMPHOCYTES NFR BLD AUTO: 17.7 % (ref 10–50)
MCH RBC QN AUTO: 30.4 PG (ref 27–31)
MCHC RBC AUTO-ENTMCNC: 31.4 G/DL (ref 30–37)
MCV RBC AUTO: 96.6 FL (ref 81–99)
MONOCYTES # BLD AUTO: 0.54 10*3/MM3 (ref 0–0.9)
MONOCYTES NFR BLD AUTO: 5.9 % (ref 0–12)
MRSA SPEC QL CULT: NORMAL
NEUTROPHILS # BLD AUTO: 6.69 10*3/MM3 (ref 2–6.9)
NEUTROPHILS NFR BLD AUTO: 72.9 % (ref 37–80)
NRBC BLD MANUAL-RTO: 0 /100 WBC (ref 0–0)
PHOSPHATE SERPL-MCNC: 5 MG/DL (ref 2.5–4.5)
PLATELET # BLD AUTO: 213 10*3/MM3 (ref 130–400)
PMV BLD AUTO: 11.3 FL (ref 6–12)
POTASSIUM BLD-SCNC: 4.5 MMOL/L (ref 3.5–5.1)
RBC # BLD AUTO: 2.93 10*6/MM3 (ref 4.2–5.4)
SODIUM BLD-SCNC: 138 MMOL/L (ref 137–145)
WBC NRBC COR # BLD: 9.17 10*3/MM3 (ref 4.8–10.8)

## 2017-08-19 PROCEDURE — 80069 RENAL FUNCTION PANEL: CPT | Performed by: FAMILY MEDICINE

## 2017-08-19 PROCEDURE — 90935 HEMODIALYSIS ONE EVALUATION: CPT

## 2017-08-19 PROCEDURE — 63710000001 INSULIN ASPART PER 5 UNITS: Performed by: FAMILY MEDICINE

## 2017-08-19 PROCEDURE — 82962 GLUCOSE BLOOD TEST: CPT

## 2017-08-19 PROCEDURE — 80074 ACUTE HEPATITIS PANEL: CPT | Performed by: INTERNAL MEDICINE

## 2017-08-19 PROCEDURE — 99232 SBSQ HOSP IP/OBS MODERATE 35: CPT | Performed by: INTERNAL MEDICINE

## 2017-08-19 PROCEDURE — 25010000002 AZITHROMYCIN: Performed by: INTERNAL MEDICINE

## 2017-08-19 PROCEDURE — 85025 COMPLETE CBC W/AUTO DIFF WBC: CPT | Performed by: FAMILY MEDICINE

## 2017-08-19 PROCEDURE — 36591 DRAW BLOOD OFF VENOUS DEVICE: CPT

## 2017-08-19 PROCEDURE — 25010000002 CEFTRIAXONE: Performed by: INTERNAL MEDICINE

## 2017-08-19 PROCEDURE — 25010000002 HEPARIN (PORCINE) PER 1000 UNITS: Performed by: FAMILY MEDICINE

## 2017-08-19 PROCEDURE — 86706 HEP B SURFACE ANTIBODY: CPT | Performed by: INTERNAL MEDICINE

## 2017-08-19 PROCEDURE — 63710000001 INSULIN DETEMIR PER 5 UNITS: Performed by: FAMILY MEDICINE

## 2017-08-19 RX ORDER — VALSARTAN 80 MG/1
160 TABLET ORAL EVERY 12 HOURS SCHEDULED
Status: DISCONTINUED | OUTPATIENT
Start: 2017-08-19 | End: 2017-08-21

## 2017-08-19 RX ORDER — AMLODIPINE BESYLATE 5 MG/1
5 TABLET ORAL
Status: DISCONTINUED | OUTPATIENT
Start: 2017-08-20 | End: 2017-08-22 | Stop reason: HOSPADM

## 2017-08-19 RX ADMIN — TRAZODONE HYDROCHLORIDE 50 MG: 50 TABLET ORAL at 21:32

## 2017-08-19 RX ADMIN — ASPIRIN 81 MG: 81 TABLET, COATED ORAL at 08:03

## 2017-08-19 RX ADMIN — CARVEDILOL 25 MG: 25 TABLET, FILM COATED ORAL at 17:11

## 2017-08-19 RX ADMIN — VALSARTAN 160 MG: 80 TABLET ORAL at 21:28

## 2017-08-19 RX ADMIN — AZITHROMYCIN 500 MG: 500 INJECTION, POWDER, LYOPHILIZED, FOR SOLUTION INTRAVENOUS at 17:11

## 2017-08-19 RX ADMIN — HYDRALAZINE HYDROCHLORIDE 10 MG: 10 TABLET, FILM COATED ORAL at 06:42

## 2017-08-19 RX ADMIN — CEFTRIAXONE 1 G: 1 INJECTION, POWDER, FOR SOLUTION INTRAMUSCULAR; INTRAVENOUS at 13:44

## 2017-08-19 RX ADMIN — HEPARIN SODIUM 5000 UNITS: 5000 INJECTION, SOLUTION INTRAVENOUS; SUBCUTANEOUS at 08:02

## 2017-08-19 RX ADMIN — INSULIN DETEMIR 10 UNITS: 100 INJECTION, SOLUTION SUBCUTANEOUS at 06:44

## 2017-08-19 RX ADMIN — NIFEDIPINE 60 MG: 60 TABLET, FILM COATED, EXTENDED RELEASE ORAL at 08:03

## 2017-08-19 RX ADMIN — CARVEDILOL 25 MG: 25 TABLET, FILM COATED ORAL at 08:03

## 2017-08-19 RX ADMIN — INSULIN ASPART 6 UNITS: 100 INJECTION, SOLUTION INTRAVENOUS; SUBCUTANEOUS at 06:43

## 2017-08-19 RX ADMIN — LEVOTHYROXINE SODIUM 75 MCG: 75 TABLET ORAL at 06:42

## 2017-08-19 RX ADMIN — FERROUS SULFATE TAB EC 324 MG (65 MG FE EQUIVALENT) 324 MG: 324 (65 FE) TABLET DELAYED RESPONSE at 08:03

## 2017-08-19 RX ADMIN — ATORVASTATIN CALCIUM 10 MG: 10 TABLET, FILM COATED ORAL at 08:03

## 2017-08-19 RX ADMIN — BUMETANIDE 2 MG: 0.25 INJECTION INTRAMUSCULAR; INTRAVENOUS at 06:42

## 2017-08-19 RX ADMIN — HEPARIN SODIUM 5000 UNITS: 5000 INJECTION, SOLUTION INTRAVENOUS; SUBCUTANEOUS at 21:28

## 2017-08-19 NOTE — PROGRESS NOTES
[facility]    Name:  Sigrid Casarez   Age:  77 y.o.  Sex:  female  :  1940  MRN:  [id number]   Visit Number:  [id number]  Admission Date:  2017  Date Of Service:  17  Primary Care Physician:  Tiburcio Hernandez MD     LOS: 2 days :  Patient Care Team:  Tiburcio Hernandez MD as PCP - General:      Subjective / Interval History:     The chart has been reviewed and events noted since hospitalization.  The patient has been admitted because of low volume overload with chronic kidney disease and was failed tolerating the carbs and has hypokalemia recurrently.  Also with significant proteinuria Dr. Mckenzie decided to start with hemodialysis.  She has also already started feeling better since the dialysis has been ongoing at present.  Patient in his any chest pain and her breathing seems to be improved also.      Vital Signs:    Temp:  [97.5 °F (36.4 °C)-98.4 °F (36.9 °C)] 98.3 °F (36.8 °C)  Heart Rate:  [75-91] 81  Resp:  [18-22] 18  BP: (136-196)/(48-70) 136/48    Intake and output:    I/O last 3 completed shifts:  In: 1510 [P.O.:1160; IV Piggyback:350]  Out: 1100 [Urine:1100]  I/O this shift:  In: 360 [P.O.:360]  Out: -     Physical Examination:    General Appearance:    Alert and cooperative, not in any acute distress.   Head:    Atraumatic and normocephalic, without obvious abnormality.   Eyes:            PERRLA,  No pallor. Extra-occular movements are within normal limits.   Neck:   Supple,  No lymphglands, no bruit   Lungs:     Chest shape is normal. Breath sounds heard bilaterally equally.  No crackles or wheezing.     Heart:    Normal S1 and S2, no murmur,  No JVD   Abdomen:     Normal bowel sounds, no masses, no organomegaly. Soft        non-tender, no guarding, no rebound                tenderness   Extremities:   Moves all extremities well, no edema, no cyanosis, Swelling of bilateral extremities    Skin:   No  bruising or rash.   Neurologic:   Grossly non  focal and moves all extrimities equally.    Laboratory results:    Results from last 7 days  Lab Units 08/19/17  0533 08/18/17  0543 08/17/17  0552   SODIUM mmol/L 138 140 141   POTASSIUM mmol/L 4.5 4.1 4.4   CHLORIDE mmol/L 107 108* 110*   CO2 mmol/L 19.0* 21.0* 22.0*   BUN mg/dL 41* 37* 39*   CREATININE mg/dL 3.30* 3.10* 3.10*   CALCIUM mg/dL 8.4 8.3* 8.4   GLUCOSE mg/dL 350* 282* 172*       Results from last 7 days  Lab Units 08/19/17  0533 08/18/17  0543 08/17/17  0552   WBC 10*3/mm3 9.17 6.73 5.41   HEMOGLOBIN g/dL 8.9* 9.0* 8.6*   HEMATOCRIT % 28.3* 28.1* 27.2*   PLATELETS 10*3/mm3 213 213 203           Results from last 7 days  Lab Units 08/16/17  1339   TROPONIN I ng/mL <0.012       Results from last 7 days  Lab Units 08/17/17  1205 08/17/17  1200 08/16/17  1229   BLOODCX  No growth at 24 hours No growth at 24 hours  --    MRSA SCREEN CX   --   --  No Methicillin Resistant Staphylococcus aureus isolated       Radiology results:    Imaging Results (last 24 hours)     Procedure Component Value Units Date/Time    CT Chest Without Contrast [[id number]] Collected:  08/18/17 1630     Updated:  08/18/17 1635    Narrative:       PROCEDURE: CT CHEST WO CONTRAST-     HISTORY: NO contrast, increasing SOA and O2 requirements despite  diuresis, possible RLL PNA, h/o effusions     COMPARISON:  None .     PROCEDURE:  Multiple axial CT images were obtained from the thoracic  inlet through the upper abdomen without the use of contrast.      FINDINGS:   Soft tissue windows reveal no axillary, hilar or mediastinal adenopathy.  Heart size is normal. The aorta is normal in caliber. There are moderate  bilateral pleural effusions. Lung windows reveal diffuse atelectasis in  both lower lobes and within the right middle lobe. There is  centrilobular emphysema. The visualized upper abdomen is unremarkable.  Bone windows reveal no acute osseous abnormalities.       Impression:       Moderate bilateral pleural effusions with diffuse  lower lobe  and right middle lobe atelectasis. An underlying pneumonia is not  entirely excluded.     359.91 mGy.cm          This study was performed with techniques to keep radiation doses as low  as reasonably achievable (ALARA). Individualized dose reduction  techniques using automated exposure control or adjustment of mA and/or  kV according to the patient size were employed.      This report was finalized on 8/18/2017 4:33 PM by Mariel Solano M.D..          I have reviewed the patient's radiology reports.    Medication Review:     I have reviewed the patients active and prn medications.     Assessment:    Principal Problem:    Dyspnea on exertion  Active Problems:    Chronic anemia    Malaise and fatigue    Essential hypertension    Acquired hypothyroidism    Mixed hyperlipidemia    Vitamin D deficiency    Hip fracture, left    Type 2 diabetes mellitus with nephropathy    Chronic kidney disease, stage V    Hypoxia          Plan:    Patient with significant dyspnea on exertion who has diabetes with nephropathy and chronic kidney disease stage IV with her significant volume overload and has been started on hemodialysis.  Dr. Mckenzie been following she has failed medical management and already volume extraction 3 hemodialysis would be the mainstay of treatment.  We will continue all her other medications as before and will follow-up on the fusion.  Do not think that this is a pneumonia and will stop the IV antibiotic.    Shiva Mancini MD  08/19/17  12:48 PM      Please note that portions of this note may have been completed with a voice recognition program. Efforts were made to edit the dictations, but occasionally words are mistranscribed.

## 2017-08-19 NOTE — PLAN OF CARE
Problem: Patient Care Overview (Adult)  Goal: Plan of Care Review  Outcome: Ongoing (interventions implemented as appropriate)    08/19/17 1603   Coping/Psychosocial Response Interventions   Plan Of Care Reviewed With patient   Patient Care Overview   Progress improving   Outcome Evaluation   Outcome Summary/Follow up Plan Pt had dialysis today and took 1 liter off. Breathing is better today.        Goal: Adult Individualization and Mutuality  Outcome: Ongoing (interventions implemented as appropriate)  Goal: Discharge Needs Assessment  Outcome: Ongoing (interventions implemented as appropriate)    Problem: Activity Intolerance (Adult)  Goal: Identify Related Risk Factors and Signs and Symptoms  Outcome: Ongoing (interventions implemented as appropriate)  Goal: Activity Tolerance  Outcome: Ongoing (interventions implemented as appropriate)  Goal: Effective Energy Conservation Techniques  Outcome: Ongoing (interventions implemented as appropriate)    Problem: Skin Integrity Impairment, Risk/Actual (Adult)  Goal: Identify Related Risk Factors and Signs and Symptoms  Outcome: Ongoing (interventions implemented as appropriate)  Goal: Skin Integrity/Wound Healing  Outcome: Ongoing (interventions implemented as appropriate)    Problem: Older Inpatient, Acutely Ill (Adult)  Goal: Signs and Symptoms of Listed Potential Problems Will be Absent or Manageable (Older Inpatient, Acutely Ill)  Outcome: Ongoing (interventions implemented as appropriate)

## 2017-08-19 NOTE — PROGRESS NOTES
"Nephrology Progress Note.    LOS: 2 days    Patient Care Team:  Tiburcio Hernandez MD as PCP - General    Chief Complaint:  No chief complaint on file.      Subjective    I have reviewed labs/imaging/records from this hospitalization, including ER staff and admitting/attending physicians H/P's and progress notes to establish a comprehensive understanding of this patient's clinical hospital course, as well as to establish plan of care appropriately.     Patient seen and examined this morning.  Events from last night noted.  Patient denies having any fevers chills.  No nausea or vomiting no abdominal pain.  Denies any chest pain, she does feel shortness of breath as well as cough denies any sputum production.  There is still significant edema of the lower ext and sacral area.   Patient also denies having new onset weakness of numbness of either extremity.  She said she has not been making much urine.      Review of Systems:    The pertinent  ROS was done and it is noted above, rest  was negative.    Objective     Vital Signs  /67 (BP Location: Left arm, Patient Position: Lying)  Pulse 81  Temp 98.3 °F (36.8 °C) (Oral)   Resp 18  Ht 63\" (160 cm)  Wt 155 lb 14.4 oz (70.7 kg)  SpO2 94%  BMI 27.62 kg/m2           Intake/Output Summary (Last 24 hours) at 08/19/17 0721  Last data filed at 08/18/17 2202   Gross per 24 hour   Intake             1510 ml   Output             1100 ml   Net              410 ml       Physical Exam:    General Appearance: alert, oriented x 3, no acute distress,   HEENT: pupils round and reactive to light, oral mucosa dry, extra occular movements intact.  Neck: supple, no JVD, trachea midline  Lungs: She has crackles about 1/3 up the chest.  Heart: RRR, normal S1 and S2, no S3, no rub  Abdomen: soft, non-tender, no palpable bladder, present bowel sounds to auscultation  Extremities: 1-2 +  edema, no cyanosis or clubbing.   Neuro: normal speech and mental status, grossly non " focal.     Results Review:      Results from last 7 days  Lab Units 08/19/17  0533 08/18/17  0543 08/17/17  0552   SODIUM mmol/L 138 140 141   POTASSIUM mmol/L 4.5 4.1 4.4   CHLORIDE mmol/L 107 108* 110*   CO2 mmol/L 19.0* 21.0* 22.0*   BUN mg/dL 41* 37* 39*   CREATININE mg/dL 3.30* 3.10* 3.10*   CALCIUM mg/dL 8.4 8.3* 8.4   GLUCOSE mg/dL 350* 282* 172*       Estimated Creatinine Clearance: 13 mL/min (by C-G formula based on Cr of 3.3).      Results from last 7 days  Lab Units 08/19/17  0533 08/18/17  0543 08/17/17  0552   PHOSPHORUS mg/dL 5.0* 4.5 4.7*               Results from last 7 days  Lab Units 08/19/17  0533 08/18/17  0543 08/17/17  0552 08/16/17  1339   WBC 10*3/mm3 9.17 6.73 5.41 4.76*   HEMOGLOBIN g/dL 8.9* 9.0* 8.6* 9.0*   PLATELETS 10*3/mm3 213 213 203 193               Imaging Results (last 24 hours)     Procedure Component Value Units Date/Time    CT Chest Without Contrast [[id number]] Collected:  08/18/17 1630     Updated:  08/18/17 1635    Narrative:       PROCEDURE: CT CHEST WO CONTRAST-     HISTORY: NO contrast, increasing SOA and O2 requirements despite  diuresis, possible RLL PNA, h/o effusions     COMPARISON:  None .     PROCEDURE:  Multiple axial CT images were obtained from the thoracic  inlet through the upper abdomen without the use of contrast.      FINDINGS:   Soft tissue windows reveal no axillary, hilar or mediastinal adenopathy.  Heart size is normal. The aorta is normal in caliber. There are moderate  bilateral pleural effusions. Lung windows reveal diffuse atelectasis in  both lower lobes and within the right middle lobe. There is  centrilobular emphysema. The visualized upper abdomen is unremarkable.  Bone windows reveal no acute osseous abnormalities.       Impression:       Moderate bilateral pleural effusions with diffuse lower lobe  and right middle lobe atelectasis. An underlying pneumonia is not  entirely excluded.     359.91 mGy.cm          This study was performed with  techniques to keep radiation doses as low  as reasonably achievable (ALARA). Individualized dose reduction  techniques using automated exposure control or adjustment of mA and/or  kV according to the patient size were employed.      This report was finalized on 8/18/2017 4:33 PM by Mariel Solano M.D..          aspirin 81 mg Oral Daily   atorvastatin 10 mg Oral Daily   azithromycin 500 mg Intravenous Q24H   bumetanide 2 mg Intravenous TID AC   carvedilol 25 mg Oral BID   ceftriaxone 1 g Intravenous Q24H   ferrous sulfate 324 mg Oral BID With Meals   heparin (porcine) 5,000 Units Subcutaneous Q12H   hydrALAZINE 10 mg Oral Q8H   insulin aspart 0-7 Units Subcutaneous 4x Daily AC & at Bedtime   insulin detemir 10 Units Subcutaneous QAM   levothyroxine 75 mcg Oral Daily   NIFEdipine XL 60 mg Oral Q24H          Medication Review:   Current Facility-Administered Medications   Medication Dose Route Frequency Provider Last Rate Last Dose   • aspirin EC tablet 81 mg  81 mg Oral Daily Moy LEANNE Salas, DO   81 mg at 08/18/17 0820   • atorvastatin (LIPITOR) tablet 10 mg  10 mg Oral Daily Moy LEANNE Maritza, DO   10 mg at 08/18/17 0820   • AZITHROMYCIN 500 MG/250 ML 0.9% NS IVPB (vial-mate)  500 mg Intravenous Q24H April G Riley-Monique, DO   500 mg at 08/18/17 1752   • bumetanide (BUMEX) injection 2 mg  2 mg Intravenous TID AC Deric Mejia MD   2 mg at 08/19/17 0642   • carvedilol (COREG) tablet 25 mg  25 mg Oral BID Moy K Maritza, DO   25 mg at 08/18/17 1752   • cefTRIAXone (ROCEPHIN) 1 g/100 mL 0.9% NS VTB (mbp)  1 g Intravenous Q24H April G Riley-Monique, DO   1 g at 08/18/17 1210   • dextrose (D50W) solution 25 g  25 g Intravenous Q15 Min PRN Moy LEANNE Salas, DO       • dextrose (INSTA-GLUCOSE) 77.4 % gel 1 tube  1 tube Oral Q15 Min PRN Moy LEANNE Salas, DO       • ferrous sulfate EC tablet 324 mg  324 mg Oral BID With Meals Moy LEANNE Salas, DO   324 mg at 08/18/17 1752   • glucagon (human recombinant) (GLUCAGEN  DIAGNOSTIC) injection 1 mg  1 mg Subcutaneous Q15 Min PRN Moyflorin Salas, DO       • heparin (porcine) 5000 UNIT/ML injection 5,000 Units  5,000 Units Subcutaneous Q12H Moy LEANNE Salas, DO   5,000 Units at 08/18/17 2051   • hydrALAZINE (APRESOLINE) injection 10 mg  10 mg Intravenous Q2H PRN April G [facility], DO   10 mg at 08/18/17 1032   • hydrALAZINE (APRESOLINE) tablet 10 mg  10 mg Oral Q8H April G [facility], DO   10 mg at 08/19/17 0642   • HYDROcod Polst-CPM Polst ER (TUSSIONEX PENNKINETIC) 10-8 MG/5ML ER suspension 5 mL  5 mL Oral Q12H PRN Moe Barrera, DO   5 mL at 08/18/17 2052   • insulin aspart (novoLOG) injection 0-7 Units  0-7 Units Subcutaneous 4x Daily AC & at Bedtime Moy Salas, DO   6 Units at 08/19/17 0643   • insulin detemir (LEVEMIR) injection 10 Units  10 Units Subcutaneous QAM Moy Salas, DO   10 Units at 08/19/17 0644   • ipratropium-albuterol (DUO-NEB) nebulizer solution 3 mL  3 mL Nebulization Q4H PRN Jordan Ribera MD   3 mL at 08/17/17 0650   • ipratropium-albuterol (DUO-NEB) nebulizer solution 3 mL  3 mL Nebulization Q6H PRN Moy Salas, DO   3 mL at 08/16/17 2223   • levothyroxine (SYNTHROID, LEVOTHROID) tablet 75 mcg  75 mcg Oral Daily Moyflorin Salas, DO   75 mcg at 08/19/17 0642   • NIFEdipine XL (PROCARDIA XL) 24 hr tablet 60 mg  60 mg Oral Q24H April G [facility], DO   60 mg at 08/18/17 0823   • ondansetron ODT (ZOFRAN-ODT) disintegrating tablet 4 mg  4 mg Oral Q6H PRN Moy Salas, DO       • sodium chloride 0.9 % flush 1-10 mL  1-10 mL Intravenous PRN Moy K [place], DO       • traZODone (DESYREL) tablet 50 mg  50 mg Oral Nightly PRN Moe Barrera, DO   50 mg at 08/18/17 2052       Assessment/Plan     1.   Chronic kidney disease, stage V- due to diabetic nephropathy with significant proteinuria unable to tolerate ARB due to recurrent hyperkalemia. She is s/p access placement which is maturing but not ready for dialysis. Will watch and  optimize the volume status, She has not responded as good to the diuretics, details discussed with the patient as well as her  and will initiate dialysis today.  2.   Dyspnea on exertion- with fluid excess, and start dialysis today.  3.   Chronic anemia- with h/o AUGUST use. I will go ahead and give more iron and Procrit if needed.  4.   Essential hypertension- treated, optimize the medication as ordered  5.   Acquired hypothyroidism- treated  6.   Mixed hyperlipidemia- treated  7.   Vitamin D deficiency: continue oral supplement.  8.   Hypothyroidism: TSH appears to be within range.  9.   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  I will also discussed the assessment and plan with the hospitalist service.    Rest as ordered.    Deric Mejia MD  08/19/17  7:21 AM      EMR Dragon/Transcription disclaimer:   Much of this encounter note is an electronic transcription/translation of spoken language to printed text. The electronic translation of spoken language may permit erroneous, or at times, nonsensical words or phrases to be inadvertently transcribed; Although I have reviewed the note for such errors, some may still exist.

## 2017-08-19 NOTE — PLAN OF CARE
Problem: Patient Care Overview (Adult)  Goal: Plan of Care Review  Outcome: Ongoing (interventions implemented as appropriate)  Goal: Adult Individualization and Mutuality  Outcome: Ongoing (interventions implemented as appropriate)  Goal: Discharge Needs Assessment  Outcome: Ongoing (interventions implemented as appropriate)    Problem: Activity Intolerance (Adult)  Goal: Activity Tolerance  Outcome: Ongoing (interventions implemented as appropriate)    Problem: Skin Integrity Impairment, Risk/Actual (Adult)  Goal: Skin Integrity/Wound Healing  Outcome: Ongoing (interventions implemented as appropriate)    Problem: Older Inpatient, Acutely Ill (Adult)  Goal: Signs and Symptoms of Listed Potential Problems Will be Absent or Manageable (Older Inpatient, Acutely Ill)  Outcome: Ongoing (interventions implemented as appropriate)

## 2017-08-20 LAB
ALBUMIN SERPL-MCNC: 2.9 G/DL (ref 3.5–5)
ANION GAP SERPL CALCULATED.3IONS-SCNC: 15.1 MMOL/L
BASOPHILS # BLD AUTO: 0.09 10*3/MM3 (ref 0–0.2)
BASOPHILS NFR BLD AUTO: 1 % (ref 0–2.5)
BUN BLD-MCNC: 25 MG/DL (ref 7–20)
BUN/CREAT SERPL: 10 (ref 7.1–23.5)
CALCIUM SPEC-SCNC: 8.2 MG/DL (ref 8.4–10.2)
CHLORIDE SERPL-SCNC: 104 MMOL/L (ref 98–107)
CO2 SERPL-SCNC: 22 MMOL/L (ref 26–30)
CREAT BLD-MCNC: 2.5 MG/DL (ref 0.6–1.3)
DEPRECATED RDW RBC AUTO: 51.3 FL (ref 37–54)
EOSINOPHIL # BLD AUTO: 0.4 10*3/MM3 (ref 0–0.7)
EOSINOPHIL NFR BLD AUTO: 4.2 % (ref 0–7)
ERYTHROCYTE [DISTWIDTH] IN BLOOD BY AUTOMATED COUNT: 14.7 % (ref 11.5–14.5)
GFR SERPL CREATININE-BSD FRML MDRD: 19 ML/MIN/1.73
GLUCOSE BLD-MCNC: 189 MG/DL (ref 74–98)
GLUCOSE BLDC GLUCOMTR-MCNC: 136 MG/DL (ref 70–130)
GLUCOSE BLDC GLUCOMTR-MCNC: 176 MG/DL (ref 70–130)
GLUCOSE BLDC GLUCOMTR-MCNC: 187 MG/DL (ref 70–130)
GLUCOSE BLDC GLUCOMTR-MCNC: 87 MG/DL (ref 70–130)
HAV IGM SERPL QL IA: NEGATIVE
HBV CORE IGM SERPL QL IA: NEGATIVE
HBV SURFACE AB SER QL: NON REACTIVE
HBV SURFACE AG SERPL QL IA: NEGATIVE
HCT VFR BLD AUTO: 26.4 % (ref 37–47)
HCV AB S/CO SERPL IA: <0.1 S/CO RATIO (ref 0–0.9)
HGB BLD-MCNC: 8.2 G/DL (ref 12–16)
IMM GRANULOCYTES # BLD: 0.04 10*3/MM3 (ref 0–0.06)
IMM GRANULOCYTES NFR BLD: 0.4 % (ref 0–0.6)
LYMPHOCYTES # BLD AUTO: 2.17 10*3/MM3 (ref 0.6–3.4)
LYMPHOCYTES NFR BLD AUTO: 23 % (ref 10–50)
MCH RBC QN AUTO: 30 PG (ref 27–31)
MCHC RBC AUTO-ENTMCNC: 31.1 G/DL (ref 30–37)
MCV RBC AUTO: 96.7 FL (ref 81–99)
MONOCYTES # BLD AUTO: 0.83 10*3/MM3 (ref 0–0.9)
MONOCYTES NFR BLD AUTO: 8.8 % (ref 0–12)
NEUTROPHILS # BLD AUTO: 5.91 10*3/MM3 (ref 2–6.9)
NEUTROPHILS NFR BLD AUTO: 62.6 % (ref 37–80)
NRBC BLD MANUAL-RTO: 0 /100 WBC (ref 0–0)
PHOSPHATE SERPL-MCNC: 3.5 MG/DL (ref 2.5–4.5)
PLATELET # BLD AUTO: 195 10*3/MM3 (ref 130–400)
PMV BLD AUTO: 12.2 FL (ref 6–12)
POTASSIUM BLD-SCNC: 4.1 MMOL/L (ref 3.5–5.1)
RBC # BLD AUTO: 2.73 10*6/MM3 (ref 4.2–5.4)
SODIUM BLD-SCNC: 137 MMOL/L (ref 137–145)
WBC NRBC COR # BLD: 9.44 10*3/MM3 (ref 4.8–10.8)

## 2017-08-20 PROCEDURE — 85025 COMPLETE CBC W/AUTO DIFF WBC: CPT | Performed by: FAMILY MEDICINE

## 2017-08-20 PROCEDURE — 25010000002 HEPARIN (PORCINE) PER 1000 UNITS: Performed by: FAMILY MEDICINE

## 2017-08-20 PROCEDURE — 99232 SBSQ HOSP IP/OBS MODERATE 35: CPT | Performed by: INTERNAL MEDICINE

## 2017-08-20 PROCEDURE — 82962 GLUCOSE BLOOD TEST: CPT

## 2017-08-20 PROCEDURE — 90935 HEMODIALYSIS ONE EVALUATION: CPT

## 2017-08-20 PROCEDURE — 63710000001 INSULIN DETEMIR PER 5 UNITS: Performed by: FAMILY MEDICINE

## 2017-08-20 PROCEDURE — 63710000001 INSULIN ASPART PER 5 UNITS: Performed by: FAMILY MEDICINE

## 2017-08-20 PROCEDURE — 80069 RENAL FUNCTION PANEL: CPT | Performed by: FAMILY MEDICINE

## 2017-08-20 RX ORDER — POLYETHYLENE GLYCOL 3350 17 G/17G
17 POWDER, FOR SOLUTION ORAL DAILY
Status: DISCONTINUED | OUTPATIENT
Start: 2017-08-20 | End: 2017-08-22 | Stop reason: HOSPADM

## 2017-08-20 RX ADMIN — CARVEDILOL 25 MG: 25 TABLET, FILM COATED ORAL at 10:53

## 2017-08-20 RX ADMIN — HEPARIN SODIUM 5000 UNITS: 5000 INJECTION, SOLUTION INTRAVENOUS; SUBCUTANEOUS at 10:52

## 2017-08-20 RX ADMIN — LEVOTHYROXINE SODIUM 75 MCG: 75 TABLET ORAL at 06:52

## 2017-08-20 RX ADMIN — ATORVASTATIN CALCIUM 10 MG: 10 TABLET, FILM COATED ORAL at 10:55

## 2017-08-20 RX ADMIN — HEPARIN SODIUM 5000 UNITS: 5000 INJECTION, SOLUTION INTRAVENOUS; SUBCUTANEOUS at 21:34

## 2017-08-20 RX ADMIN — HYDROCODONE POLISTIREX AND CHLORPHENIRAMINE POLISTIREX 5 ML: 10; 8 SUSPENSION, EXTENDED RELEASE ORAL at 02:11

## 2017-08-20 RX ADMIN — POLYETHYLENE GLYCOL 3350 17 G: 17 POWDER, FOR SOLUTION ORAL at 12:48

## 2017-08-20 RX ADMIN — AMLODIPINE BESYLATE 5 MG: 5 TABLET ORAL at 10:53

## 2017-08-20 RX ADMIN — ONDANSETRON 4 MG: 4 TABLET, ORALLY DISINTEGRATING ORAL at 07:49

## 2017-08-20 RX ADMIN — INSULIN DETEMIR 10 UNITS: 100 INJECTION, SOLUTION SUBCUTANEOUS at 06:54

## 2017-08-20 RX ADMIN — VALSARTAN 160 MG: 80 TABLET ORAL at 21:34

## 2017-08-20 RX ADMIN — CARVEDILOL 25 MG: 25 TABLET, FILM COATED ORAL at 17:43

## 2017-08-20 RX ADMIN — VALSARTAN 160 MG: 80 TABLET ORAL at 10:52

## 2017-08-20 RX ADMIN — INSULIN ASPART 2 UNITS: 100 INJECTION, SOLUTION INTRAVENOUS; SUBCUTANEOUS at 06:53

## 2017-08-20 RX ADMIN — TRAZODONE HYDROCHLORIDE 50 MG: 50 TABLET ORAL at 21:37

## 2017-08-20 RX ADMIN — INSULIN ASPART 2 UNITS: 100 INJECTION, SOLUTION INTRAVENOUS; SUBCUTANEOUS at 11:57

## 2017-08-20 RX ADMIN — ASPIRIN 81 MG: 81 TABLET, COATED ORAL at 10:54

## 2017-08-20 NOTE — PLAN OF CARE
Problem: Activity Intolerance (Adult)  Goal: Effective Energy Conservation Techniques  Outcome: Ongoing (interventions implemented as appropriate)    08/20/17 0437   Activity Intolerance (Adult)   Effective Energy Conservation Techniques making progress toward outcome

## 2017-08-20 NOTE — PLAN OF CARE
Problem: Patient Care Overview (Adult)  Goal: Plan of Care Review  Outcome: Ongoing (interventions implemented as appropriate)    08/20/17 0546   Coping/Psychosocial Response Interventions   Plan Of Care Reviewed With patient   Patient Care Overview   Progress progress towards functional goals is fair   Outcome Evaluation   Outcome Summary/Follow up Plan Patient requested to have IV site changed this shift. Patient has reqested medication for pain for a headache.

## 2017-08-20 NOTE — PLAN OF CARE
Problem: Skin Integrity Impairment, Risk/Actual (Adult)  Goal: Identify Related Risk Factors and Signs and Symptoms  Outcome: Ongoing (interventions implemented as appropriate)    08/20/17 1417   Skin Integrity Impairment, Risk/Actual   Skin Integrity Impairment, Risk/Actual: Related Risk Factors moisture   Signs and Symptoms (Skin Integrity Impairment) edema

## 2017-08-20 NOTE — PLAN OF CARE
Problem: Older Inpatient, Acutely Ill (Adult)  Goal: Signs and Symptoms of Listed Potential Problems Will be Absent or Manageable (Older Inpatient, Acutely Ill)  Outcome: Ongoing (interventions implemented as appropriate)    08/20/17 1077   Older Adult Inpatient, Acutely Ill   Problems Assessed (Acutely Ill Older Adult) all   Problems Present (Acutely Ill Older Adult) constipation

## 2017-08-20 NOTE — PLAN OF CARE
Problem: Patient Care Overview (Adult)  Goal: Plan of Care Review  Outcome: Ongoing (interventions implemented as appropriate)    08/20/17 4144   Coping/Psychosocial Response Interventions   Plan Of Care Reviewed With patient   Patient Care Overview   Progress improving   Outcome Evaluation   Outcome Summary/Follow up Plan Patient continues to improve daily and states she feels better then when she came in

## 2017-08-20 NOTE — PLAN OF CARE
Problem: Skin Integrity Impairment, Risk/Actual (Adult)  Goal: Skin Integrity/Wound Healing  Outcome: Ongoing (interventions implemented as appropriate)    08/20/17 4841   Skin Integrity Impairment, Risk/Actual (Adult)   Skin Integrity/Wound Healing making progress toward outcome

## 2017-08-20 NOTE — PROGRESS NOTES
[facility]    Name:  Sigrid Casarez   Age:  77 y.o.  Sex:  female  :  1940  MRN:  [id number]   Visit Number:  [id number]  Admission Date:  2017  Date Of Service:  17  Primary Care Physician:  Tiburcio Hernandez MD     LOS: 3 days :  Patient Care Team:  Tiburcio Hernandez MD as PCP - General:      Subjective / Interval History:     The chart has been reviewed and events noted since hospitalization.  The patient has been admitted because of low volume overload with chronic kidney disease and was failed tolerating the carbs and has hypokalemia recurrently.  Also with significant proteinuria Dr. Maldonado decided to start with hemodialysis.  She has also already started feeling better since she's had hemodialysis yesterday and today morning..  Patient in his any chest pain and her breathing seems to be improved also.      Vital Signs:    Temp:  [97.6 °F (36.4 °C)-98.6 °F (37 °C)] 97.7 °F (36.5 °C)  Heart Rate:  [71-76] 74  Resp:  [13-18] 13  BP: (131-159)/(46-60) 152/52    Intake and output:    I/O last 3 completed shifts:  In: 820 [P.O.:720; IV Piggyback:100]  Out: 1525 [Urine:525; Other:1000]  I/O this shift:  In: 120 [P.O.:120]  Out: 1500 [Other:1500]    Physical Examination:    General Appearance:    Alert and cooperative, not in any acute distress.   Head:    Atraumatic and normocephalic, without obvious abnormality.   Eyes:            PERRLA,  No pallor. Extra-occular movements are within normal limits.   Neck:   Supple,  No lymphglands, no bruit   Lungs:     Chest shape is normal. Breath sounds heard bilaterally equally.  No crackles or wheezing.     Heart:    Normal S1 and S2, no murmur,  No JVD   Abdomen:     Normal bowel sounds, no masses, no organomegaly. Soft        non-tender, no guarding, no rebound                tenderness   Extremities:   Moves all extremities well, no edema, no cyanosis, Swelling of bilateral extremities Has improved    Skin:    No  bruising or rash.   Neurologic:   Grossly non focal and moves all extrimities equally.    Laboratory results:    Results from last 7 days  Lab Units 08/20/17  0608 08/19/17  0533 08/18/17  0543   SODIUM mmol/L 137 138 140   POTASSIUM mmol/L 4.1 4.5 4.1   CHLORIDE mmol/L 104 107 108*   CO2 mmol/L 22.0* 19.0* 21.0*   BUN mg/dL 25* 41* 37*   CREATININE mg/dL 2.50* 3.30* 3.10*   CALCIUM mg/dL 8.2* 8.4 8.3*   GLUCOSE mg/dL 189* 350* 282*       Results from last 7 days  Lab Units 08/20/17  0608 08/19/17  0533 08/18/17  0543   WBC 10*3/mm3 9.44 9.17 6.73   HEMOGLOBIN g/dL 8.2* 8.9* 9.0*   HEMATOCRIT % 26.4* 28.3* 28.1*   PLATELETS 10*3/mm3 195 213 213           Results from last 7 days  Lab Units 08/16/17  1339   TROPONIN I ng/mL <0.012       Results from last 7 days  Lab Units 08/17/17  1205 08/17/17  1200 08/16/17  1229   BLOODCX  No growth at 2 days No growth at 2 days  --    MRSA SCREEN CX   --   --  No Methicillin Resistant Staphylococcus aureus isolated       Radiology results:    Imaging Results (last 24 hours)     ** No results found for the last 24 hours. **          I have reviewed the patient's radiology reports.    Medication Review:     I have reviewed the patients active and prn medications.     Assessment:    Principal Problem:    Dyspnea on exertion  Active Problems:    Chronic anemia    Malaise and fatigue    Essential hypertension    Acquired hypothyroidism    Mixed hyperlipidemia    Vitamin D deficiency    Hip fracture, left    Type 2 diabetes mellitus with nephropathy    Chronic kidney disease, stage V    Hypoxia          Plan:    Patient with significant dyspnea on exertion who has diabetes with nephropathy and chronic kidney disease stage IV with her significant volume overload and has been started on hemodialysis.  Dr. Maldonado. been following she has failed medical management and already volume extraction 3 hemodialysis would be the mainstay of treatment.  We will continue all her other  medications as before and will follow-up   Do not think that this is a pneumonia and will stop the IV antibiotic.      Shiva Mancini MD  08/20/17  11:53 AM      Please note that portions of this note may have been completed with a voice recognition program. Efforts were made to edit the dictations, but occasionally words are mistranscribed.

## 2017-08-20 NOTE — NURSING NOTE
Patient went to dialysis around 0730 this morning and returned about 1045. Was given morning meds when returned to her room.

## 2017-08-20 NOTE — PROGRESS NOTES
"Nephrology Progress Note.    LOS: 3 days    Patient Care Team:  Tiburcio Hernandez MD as PCP - General    Chief Complaint:  No chief complaint on file.      Subjective    I have reviewed labs/imaging/records from this hospitalization, including ER staff and admitting/attending physicians H/P's and progress notes to establish a comprehensive understanding of this patient's clinical hospital course, as well as to establish plan of care appropriately.     Patient seen and examined this morning.  Events from last night noted.  Patient denies having any fevers chills.  No nausea or vomiting no abdominal pain.  Denies any chest pain, she does feel shortness of breath as well as cough denies any sputum production.  She thinks it's slightly better as compared to yesterday.  There is still significant edema of the lower ext and sacral area.   Patient also denies having new onset weakness of numbness of either extremity.  She said she has not been making much urine.  She did asked for something to help her sleep as she gets very anxious at night and has not been able to sleep.      Review of Systems:    The pertinent  ROS was done and it is noted above, rest  was negative.    Objective     Vital Signs  /60 (BP Location: Left arm, Patient Position: Lying)  Pulse 74  Temp 97.6 °F (36.4 °C) (Oral)   Resp 16  Ht 63\" (160 cm)  Wt 155 lb 14.4 oz (70.7 kg)  SpO2 97%  BMI 27.62 kg/m2           Intake/Output Summary (Last 24 hours) at 08/20/17 0834  Last data filed at 08/20/17 0500   Gross per 24 hour   Intake              460 ml   Output             1225 ml   Net             -765 ml       Physical Exam:    General Appearance: alert, oriented x 3, no acute distress,   HEENT: pupils round and reactive to light, oral mucosa dry, extra occular movements intact.  Neck: supple, no JVD, trachea midline  Lungs: She has crackles about 1/4 up the chest.  Heart: RRR, normal S1 and S2, no S3, no rub  Abdomen: soft, non-tender, no " palpable bladder, present bowel sounds to auscultation  Extremities: 1-2 +  edema, no cyanosis or clubbing.   Neuro: normal speech and mental status, grossly non focal.     Results Review:      Results from last 7 days  Lab Units 08/20/17  0608 08/19/17  0533 08/18/17  0543   SODIUM mmol/L 137 138 140   POTASSIUM mmol/L 4.1 4.5 4.1   CHLORIDE mmol/L 104 107 108*   CO2 mmol/L 22.0* 19.0* 21.0*   BUN mg/dL 25* 41* 37*   CREATININE mg/dL 2.50* 3.30* 3.10*   CALCIUM mg/dL 8.2* 8.4 8.3*   GLUCOSE mg/dL 189* 350* 282*       Estimated Creatinine Clearance: 17.2 mL/min (by C-G formula based on Cr of 2.5).      Results from last 7 days  Lab Units 08/20/17  0608 08/19/17  0533 08/18/17  0543   PHOSPHORUS mg/dL 3.5 5.0* 4.5               Results from last 7 days  Lab Units 08/20/17  0608 08/19/17  0533 08/18/17  0543 08/17/17  0552 08/16/17  1339   WBC 10*3/mm3 9.44 9.17 6.73 5.41 4.76*   HEMOGLOBIN g/dL 8.2* 8.9* 9.0* 8.6* 9.0*   PLATELETS 10*3/mm3 195 213 213 203 193               Imaging Results (last 24 hours)     ** No results found for the last 24 hours. **          amLODIPine 5 mg Oral Q24H   aspirin 81 mg Oral Daily   atorvastatin 10 mg Oral Daily   carvedilol 25 mg Oral BID   ceftriaxone 1 g Intravenous Q24H   heparin (porcine) 5,000 Units Subcutaneous Q12H   insulin aspart 0-7 Units Subcutaneous 4x Daily AC & at Bedtime   insulin detemir 10 Units Subcutaneous QAM   levothyroxine 75 mcg Oral Daily   valsartan 160 mg Oral Q12H          Medication Review:   Current Facility-Administered Medications   Medication Dose Route Frequency Provider Last Rate Last Dose   • amLODIPine (NORVASC) tablet 5 mg  5 mg Oral Q24H Deric Mejia MD       • aspirin EC tablet 81 mg  81 mg Oral Daily Moy Salas DO   81 mg at 08/19/17 0803   • atorvastatin (LIPITOR) tablet 10 mg  10 mg Oral Daily Moy Salas DO   10 mg at 08/19/17 0803   • carvedilol (COREG) tablet 25 mg  25 mg Oral BID Moy Salas DO   25 mg at 08/19/17 1711    • cefTRIAXone (ROCEPHIN) 1 g/100 mL 0.9% NS VTB (mbp)  1 g Intravenous Q24H April G RileyPatrice, DO   1 g at 08/19/17 1344   • dextrose (D50W) solution 25 g  25 g Intravenous Q15 Min PRN Moy Salas, DO       • dextrose (INSTA-GLUCOSE) 77.4 % gel 1 tube  1 tube Oral Q15 Min PRN Moy Salas, DO       • glucagon (human recombinant) (GLUCAGEN DIAGNOSTIC) injection 1 mg  1 mg Subcutaneous Q15 Min PRN Moy Salas, DO       • heparin (porcine) 5000 UNIT/ML injection 5,000 Units  5,000 Units Subcutaneous Q12H Moy Salas, DO   5,000 Units at 08/19/17 2128   • hydrALAZINE (APRESOLINE) injection 10 mg  10 mg Intravenous Q2H PRN [facility], [facility]   10 mg at 08/18/17 1032   • HYDROcod Polst-CPM Polst ER (TUSSIONEX PENNKINETIC) 10-8 MG/5ML ER suspension 5 mL  5 mL Oral Q12H PRN Moe Barrera, DO   5 mL at 08/20/17 0211   • insulin aspart (novoLOG) injection 0-7 Units  0-7 Units Subcutaneous 4x Daily AC & at Bedtime Moy Salas DO   2 Units at 08/20/17 0653   • insulin detemir (LEVEMIR) injection 10 Units  10 Units Subcutaneous QAM Moy Salas DO   10 Units at 08/20/17 0654   • ipratropium-albuterol (DUO-NEB) nebulizer solution 3 mL  3 mL Nebulization Q4H PRN Jordan Ribera MD   3 mL at 08/17/17 0650   • ipratropium-albuterol (DUO-NEB) nebulizer solution 3 mL  3 mL Nebulization Q6H PRN Moy Salas DO   3 mL at 08/16/17 2223   • levothyroxine (SYNTHROID, LEVOTHROID) tablet 75 mcg  75 mcg Oral Daily Moy Salas, DO   75 mcg at 08/20/17 0652   • ondansetron ODT (ZOFRAN-ODT) disintegrating tablet 4 mg  4 mg Oral Q6H PRN Moy Salas DO   4 mg at 08/20/17 0749   • sodium chloride 0.9 % bolus 1,000 mL  1,000 mL Intravenous PRN Deric Mejia MD       • sodium chloride 0.9 % flush 1-10 mL  1-10 mL Intravenous PRN Moy Salas DO       • traZODone (DESYREL) tablet 50 mg  50 mg Oral Nightly PRN Moe Barrera DO   50 mg at 08/19/17 8047   • valsartan (DIOVAN) tablet 160  mg  160 mg Oral Q12H Deric Mejia MD   160 mg at 08/19/17 9379       Assessment/Plan     1.   Chronic kidney disease, stage V- due to diabetic nephropathy with significant proteinuria unable to tolerate ARB due to recurrent hyperkalemia. She is s/p access placement which is maturing but not ready for dialysis. Will watch and optimize the volume status, She has not responded as good to the diuretics, details discussed with the patient as well as her .  She does not tolerated dialysis well we'll go ahead and do another dialysis today as ordered.  We'll consult  for outpatient dialysis placement.  2.   Dyspnea on exertion- with fluid excess, and start dialysis today.  She does feel better probably will take another few days before all the excess fluid is removed.  3.   Chronic anemia- with h/o AUGUST use. I will go ahead and give more iron and Procrit if needed.  It will be continued with dialysis as an outpatient.  4.   Essential hypertension- treated, optimize the medication as ordered, blood pressure much better with medication changes stent yesterday.  5.   Acquired hypothyroidism- treated  6.   Mixed hyperlipidemia- treated  7.   Vitamin D deficiency: continue oral supplement.  8.   Hypothyroidism: TSH appears to be within range.  9.   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   Anxiety: She is fairly anxious and did ask something to help her sleep at night in the hospital.  I will go ahead and start 25 of Seroquel at night and see how she'll do with that.      Details were discussed with the patient as well as family in the room.  I will also discussed the assessment and plan with the hospitalist service.    Rest as ordered.    Deric Mejia MD  08/20/17  8:34 AM      EMR Dragon/Transcription disclaimer:   Much of this encounter note is an electronic transcription/translation of spoken language to printed text. The electronic translation of spoken language may  permit erroneous, or at times, nonsensical words or phrases to be inadvertently transcribed; Although I have reviewed the note for such errors, some may still exist.

## 2017-08-21 LAB
ALBUMIN SERPL-MCNC: 2.7 G/DL (ref 3.5–5)
ANION GAP SERPL CALCULATED.3IONS-SCNC: 11.3 MMOL/L
BASOPHILS # BLD AUTO: 0.07 10*3/MM3 (ref 0–0.2)
BASOPHILS NFR BLD AUTO: 1 % (ref 0–2.5)
BUN BLD-MCNC: 17 MG/DL (ref 7–20)
BUN/CREAT SERPL: 6.8 (ref 7.1–23.5)
CALCIUM SPEC-SCNC: 7.9 MG/DL (ref 8.4–10.2)
CHLORIDE SERPL-SCNC: 102 MMOL/L (ref 98–107)
CO2 SERPL-SCNC: 28 MMOL/L (ref 26–30)
CREAT BLD-MCNC: 2.5 MG/DL (ref 0.6–1.3)
DEPRECATED RDW RBC AUTO: 51.8 FL (ref 37–54)
EOSINOPHIL # BLD AUTO: 0.32 10*3/MM3 (ref 0–0.7)
EOSINOPHIL NFR BLD AUTO: 4.4 % (ref 0–7)
ERYTHROCYTE [DISTWIDTH] IN BLOOD BY AUTOMATED COUNT: 14.5 % (ref 11.5–14.5)
GFR SERPL CREATININE-BSD FRML MDRD: 19 ML/MIN/1.73
GLUCOSE BLD-MCNC: 231 MG/DL (ref 74–98)
GLUCOSE BLDC GLUCOMTR-MCNC: 137 MG/DL (ref 70–130)
GLUCOSE BLDC GLUCOMTR-MCNC: 245 MG/DL (ref 70–130)
GLUCOSE BLDC GLUCOMTR-MCNC: 278 MG/DL (ref 70–130)
GLUCOSE BLDC GLUCOMTR-MCNC: 97 MG/DL (ref 70–130)
HCT VFR BLD AUTO: 25.7 % (ref 37–47)
HGB BLD-MCNC: 7.9 G/DL (ref 12–16)
IMM GRANULOCYTES # BLD: 0.04 10*3/MM3 (ref 0–0.06)
IMM GRANULOCYTES NFR BLD: 0.6 % (ref 0–0.6)
LYMPHOCYTES # BLD AUTO: 1.76 10*3/MM3 (ref 0.6–3.4)
LYMPHOCYTES NFR BLD AUTO: 24.4 % (ref 10–50)
MCH RBC QN AUTO: 30.3 PG (ref 27–31)
MCHC RBC AUTO-ENTMCNC: 30.7 G/DL (ref 30–37)
MCV RBC AUTO: 98.5 FL (ref 81–99)
MONOCYTES # BLD AUTO: 0.74 10*3/MM3 (ref 0–0.9)
MONOCYTES NFR BLD AUTO: 10.2 % (ref 0–12)
NEUTROPHILS # BLD AUTO: 4.29 10*3/MM3 (ref 2–6.9)
NEUTROPHILS NFR BLD AUTO: 59.4 % (ref 37–80)
NRBC BLD MANUAL-RTO: 0 /100 WBC (ref 0–0)
PHOSPHATE SERPL-MCNC: 3.9 MG/DL (ref 2.5–4.5)
PLATELET # BLD AUTO: 165 10*3/MM3 (ref 130–400)
PMV BLD AUTO: 11.6 FL (ref 6–12)
POTASSIUM BLD-SCNC: 4.3 MMOL/L (ref 3.5–5.1)
RBC # BLD AUTO: 2.61 10*6/MM3 (ref 4.2–5.4)
SODIUM BLD-SCNC: 137 MMOL/L (ref 137–145)
WBC NRBC COR # BLD: 7.22 10*3/MM3 (ref 4.8–10.8)

## 2017-08-21 PROCEDURE — 82962 GLUCOSE BLOOD TEST: CPT

## 2017-08-21 PROCEDURE — 63710000001 INSULIN DETEMIR PER 5 UNITS: Performed by: FAMILY MEDICINE

## 2017-08-21 PROCEDURE — 63710000001 INSULIN ASPART PER 5 UNITS: Performed by: FAMILY MEDICINE

## 2017-08-21 PROCEDURE — 25010000002 HEPARIN (PORCINE) PER 1000 UNITS: Performed by: FAMILY MEDICINE

## 2017-08-21 PROCEDURE — 63510000001 EPOETIN ALFA PER 1000 UNITS: Performed by: INTERNAL MEDICINE

## 2017-08-21 PROCEDURE — 80069 RENAL FUNCTION PANEL: CPT | Performed by: FAMILY MEDICINE

## 2017-08-21 PROCEDURE — 99232 SBSQ HOSP IP/OBS MODERATE 35: CPT | Performed by: INTERNAL MEDICINE

## 2017-08-21 PROCEDURE — 85025 COMPLETE CBC W/AUTO DIFF WBC: CPT | Performed by: FAMILY MEDICINE

## 2017-08-21 RX ORDER — ACETAMINOPHEN 325 MG/1
650 TABLET ORAL EVERY 6 HOURS PRN
Status: DISCONTINUED | OUTPATIENT
Start: 2017-08-21 | End: 2017-08-22 | Stop reason: HOSPADM

## 2017-08-21 RX ORDER — LIDOCAINE AND PRILOCAINE 25; 25 MG/G; MG/G
CREAM TOPICAL ONCE
Status: COMPLETED | OUTPATIENT
Start: 2017-08-22 | End: 2017-08-22

## 2017-08-21 RX ORDER — FOLIC ACID/VIT B COMPLEX AND C 0.8 MG
1 TABLET ORAL DAILY
Status: DISCONTINUED | OUTPATIENT
Start: 2017-08-21 | End: 2017-08-22 | Stop reason: HOSPADM

## 2017-08-21 RX ORDER — VALSARTAN 80 MG/1
320 TABLET ORAL
Status: DISCONTINUED | OUTPATIENT
Start: 2017-08-22 | End: 2017-08-22 | Stop reason: HOSPADM

## 2017-08-21 RX ADMIN — LEVOTHYROXINE SODIUM 75 MCG: 75 TABLET ORAL at 06:36

## 2017-08-21 RX ADMIN — INSULIN ASPART 4 UNITS: 100 INJECTION, SOLUTION INTRAVENOUS; SUBCUTANEOUS at 12:16

## 2017-08-21 RX ADMIN — ASPIRIN 81 MG: 81 TABLET, COATED ORAL at 09:48

## 2017-08-21 RX ADMIN — ERYTHROPOIETIN 20000 UNITS: 20000 INJECTION, SOLUTION INTRAVENOUS; SUBCUTANEOUS at 12:16

## 2017-08-21 RX ADMIN — INSULIN DETEMIR 10 UNITS: 100 INJECTION, SOLUTION SUBCUTANEOUS at 06:37

## 2017-08-21 RX ADMIN — ATORVASTATIN CALCIUM 10 MG: 10 TABLET, FILM COATED ORAL at 09:48

## 2017-08-21 RX ADMIN — AMLODIPINE BESYLATE 5 MG: 5 TABLET ORAL at 09:48

## 2017-08-21 RX ADMIN — CARVEDILOL 25 MG: 25 TABLET, FILM COATED ORAL at 09:48

## 2017-08-21 RX ADMIN — HEPARIN SODIUM 5000 UNITS: 5000 INJECTION, SOLUTION INTRAVENOUS; SUBCUTANEOUS at 20:58

## 2017-08-21 RX ADMIN — VALSARTAN 160 MG: 80 TABLET ORAL at 09:48

## 2017-08-21 RX ADMIN — HEPARIN SODIUM 5000 UNITS: 5000 INJECTION, SOLUTION INTRAVENOUS; SUBCUTANEOUS at 09:48

## 2017-08-21 RX ADMIN — CARVEDILOL 25 MG: 25 TABLET, FILM COATED ORAL at 17:29

## 2017-08-21 RX ADMIN — POLYETHYLENE GLYCOL 3350 17 G: 17 POWDER, FOR SOLUTION ORAL at 09:48

## 2017-08-21 RX ADMIN — TRAZODONE HYDROCHLORIDE 50 MG: 50 TABLET ORAL at 20:59

## 2017-08-21 RX ADMIN — INSULIN ASPART 3 UNITS: 100 INJECTION, SOLUTION INTRAVENOUS; SUBCUTANEOUS at 06:37

## 2017-08-21 RX ADMIN — Medication 1 TABLET: at 12:16

## 2017-08-21 RX ADMIN — ACETAMINOPHEN 650 MG: 325 TABLET, FILM COATED ORAL at 10:51

## 2017-08-21 NOTE — DISCHARGE PLACEMENT REQUEST
"Curtis Mejia (77 y.o. Female)     Date of Birth Social Security Number Address Home Phone MRN    1940  [address] [place] [address] [phone] [id number]    Scientologist Marital Status          Christianity        Admission Date Admission Type Admitting Provider Attending Provider Department, Room/Bed    8/16/17 Elective Jordan Ribera MD Gaspar, Gordon BARBOZA MD [facility] MED SURG  3, 319/1    Discharge Date Discharge Disposition Discharge Destination                      Attending Provider: Gordon Singh MD     Allergies:  Duloxetine, Exenatide, Lisinopril, Penicillins, Phentermine    Isolation:  None   Infection:  None   Code Status:  FULL    Ht:  63\" (160 cm)   Wt:  158 lb 3.2 oz (71.8 kg)    Admission Cmt:  None   Principal Problem:  Dyspnea on exertion [R06.09]                 Active Insurance as of 8/16/2017     Primary Coverage     Payor Plan Insurance Group Employer/Plan Group    MEDICARE MEDICARE A & B      Payor Plan Address Payor Plan Phone Number Effective From Effective To     [phone] 1/1/2003     [place] [address]       Subscriber Name Subscriber Birth Date Member ID       CURTIS MEJIA 1940 [id number]           Secondary Coverage     Payor Plan Insurance Group Employer/Plan Group    MISC MED SUPP MISC MCARE SUPPLEMENT [id number]     Coverage Address Coverage Phone Number Effective From Effective To    [address] [facility] [phone] 5/17/2011     [place] [address]       Subscriber Name Subscriber Birth Date Member ID       CURTIS MEJIA 1940 [id number]                 Emergency Contacts      (Rel.) Home Phone Work Phone Mobile Phone    Ghassan Mejia (Son) [phone] -- [phone]    Ji Mejia (Spouse) -- -- [phone]            Insurance Information                MEDICARE/MEDICARE A & B Phone: [phone]    Subscriber: Curtis Mejia Subscriber#: [id number]    Group#:  Precert#:         MISC MED " SUPP/MISC MCARE SUPPLEMENT Phone: [phone]    Subscriber: Sigrid Casarez Subscriber#: [id number]    Group#: [id number] Precert#:              History & Physical      Moy Salas DO at 8/16/2017  3:50 PM              [facility] Medicine Services  HISTORY AND PHYSICAL    Primary Care Physician: Tiburcio Hernandez MD    Subjective     Chief Complaint:  No chief complaint on file.      History of Present Illness:     I have reviewed labs/imaging/records from this hospitalization, including ER staff and admitting/attending physicians H/P's and progress notes to establish a comprehensive understanding of this patient's clinical hospital course, as well as to establish a transition of care appropriately.    Pt is a 76 yo female that was seen by her orthopedic surgeon today in routine f/u of L hip fx, ORIF; pt has been doing well with respect to her hip, but has noticed a progressive worsening of anabela LE edema, not responsive to her usual oral diuretic use; she has also developed SOA/ARGUELLO, with some difficulty catching her breath; pt has hx of pleural effusions in past requiring thoracentesis per pt hx by Dr. Walls many years ago; being followed by her nephrologist Dr. Mejia for advancing diabetic/renal CKD; she had tunnel AV fistula placed approx 6-7 weeks ago to RUE; planned eval soon by Dr. Mejia for effectiveness of AV fistula.  Likely to need HD soon.    She was admitted for observation and further inpt eval/tx options.    Review of Systems   1. Constitutional: Negative for fever. Negative for chills, diaphoresis and unexpected weight change.  Malaise and fatigue  2. HENT: No dysphagia; no changes to vision/hearing/smell/taste; no epistaxis  3. Eyes: Negative for redness and visual disturbance.   4. Respiratory: ARGUELLO, but not at baseline. Negative for chest pain . Negative for cough and chest tightness.   5. Cardiovascular: Negative for chest pain and palpitations.   6. Gastrointestinal: Negative  for abdominal distention, abdominal pain and blood in stool.   7. Endocrine: Negative for cold intolerance and heat intolerance.   8. Genitourinary: Negative for difficulty urinating, dysuria and frequency.   9. Musculoskeletal: Negative for arthralgias, back pain and myalgias.   10. Skin: Negative for color change, rash and wound.   11. Neurological: Negative for syncope and headaches.  Gen weakness.  12. Hematological: Negative for adenopathy. Does not bruise/bleed easily.   13. Psychiatric/Behavioral: Negative for confusion. The patient is not nervous/anxious.     Past Medical History:   Past Medical History:   Diagnosis Date   • Anemia    • Chronic kidney disease    • Chronic kidney disease    • Community acquired pneumonia    • Dexa Body Composition study lumosacral spine and femur     ostepenic   • Diabetes    • Diabetic nephropathy, type I    • Diabetic peripheral neuropathy type 1    • Disease of thyroid gland    • Fracture    • Hypertension    • Menopause    • Pneumonia        Past Surgical History:  Past Surgical History:   Procedure Laterality Date   • CATARACT EXTRACTION     • CHOLECYSTECTOMY     • HIP CANNULATED SCREW PLACEMENT Left 6/11/2017    Procedure:  LEFT HIP MULTIPLE CANNULATED COMPRESSION SCREWS- FERMORAL NECK  FRACTURE;  Surgeon: Jimmy Sheehan MD;  Location: [facility];  Service:        Family History: family history includes Diabetes in her other; Heart attack in her mother; Heart disease in her other; Lung cancer in her father.    Social History:  reports that she has quit smoking. She does not have any smokeless tobacco history on file. She reports that she does not drink alcohol or use illicit drugs.    Medications:  Prescriptions Prior to Admission   Medication Sig Dispense Refill Last Dose   • amLODIPine (NORVASC) 5 MG tablet Take 1 tablet by mouth Daily.   8/15/2017 at Unknown time   • aspirin 81 MG EC tablet Take 1 tablet by mouth daily.   8/16/2017 at Unknown time   • carvedilol  "(COREG) 25 MG tablet Take 1 tablet by mouth 2 (two) times a day.   8/16/2017 at Unknown time   • Cholecalciferol (VITAMIN D) 1000 UNITS tablet Take 1,000 Units by mouth 2 (Two) Times a Day.   8/16/2017 at Unknown time   • ferrous sulfate 325 (65 FE) MG tablet Take 1 tablet by mouth 1 (One) Time Per Week. With meals   8/16/2017 at Unknown time   • furosemide (LASIX) 20 MG tablet Take 2 pills daily (Patient taking differently: Take 20 mg by mouth Daily.) 180 tablet 2 8/16/2017 at Unknown time   • levothyroxine (SYNTHROID, LEVOTHROID) 75 MCG tablet Take 1 tablet by mouth daily. 30 tablet 5 8/16/2017 at Unknown time   • ondansetron ODT (ZOFRAN-ODT) 4 MG disintegrating tablet    Taking   • simvastatin (ZOCOR) 20 MG tablet Take  by mouth.   Taking   • TOUJEO SOLOSTAR 300 UNIT/ML solution pen-injector 10 Units Daily.   8/15/2017 at 2100   • glucose blood (FREESTYLE LITE) test strip 3 (three) times a day.   Taking   • Insulin Pen Needle (BD ULTRA-FINE PEN NEEDLES) 29G X 12.7MM misc Use 3 times daily   Taking   • Pediatric Multivitamins-Iron (FLINTSTONES PLUS IRON PO) Take 1 tablet by mouth Daily.   Taking   • RELION PEN NEEDLE 31G/8MM 31G X 8 MM misc    Taking       Allergies:  Allergies   Allergen Reactions   • Duloxetine    • Exenatide    • Lisinopril Cough   • Penicillins    • Phentermine          Objective     Physical Exam:  Vital Signs: /62  Pulse 68  Temp 97.7 °F (36.5 °C) (Oral)   Resp 18  Ht 63\" (160 cm)  Wt 156 lb 6.4 oz (70.9 kg)  SpO2 93%  BMI 27.71 kg/m2     General Appearance: alert, oriented x 3, no acute distress.  Skin: warm and dry.   HEENT: Atraumatic.  pupils round and reactive to light and accommodation, oral mucosa pink and moist.  Nares patent without epistaxis.  External auditory canals are patent tympanic membranes intact.  Neck: supple, no JVD, trachea midline.  No thyromegaly  Lungs: AAE to anabela upper fields, dec air exchange anabela bases; unlabored breathing effort, but has NC for oxygen " supplementation; oximetry shows 93% on 2L  Heart: RRR, normal S1 and S2, no S3, no rub.  Abdomen: soft, non-tender, no palpable bladder, present bowel sounds to auscultation ×4.  No guarding or rigidity.  Extremities: no clubbing or cyanosis.  Good range of motion actively and passively.  Symmetric muscle strength and development; AV fistula to RUE.  Edema noted to anabela LE, with pitting edema noted 1+ to knees; SCUDs in place.  Neuro: normal speech and mental status.  Cranial nerves II through XII intact.  No anosmia. DTR 2+; proprioception intact.  No focal motor/sensory deficits.          Results Reviewed:    Lab Results (last 72 hours)     Procedure Component Value Units Date/Time    POC Glucose Fingerstick [[id number]]  (Normal) Collected:  08/16/17 1222    Specimen:  Blood Updated:  08/16/17 1225     Glucose 106 mg/dL       Serial Number: [id number]:  [id number]       MRSA Screen Culture [[id number]] Collected:  08/16/17 1229    Specimen:  Swab from Nares Updated:  08/16/17 1233    CBC & Differential [[id number]] Collected:  08/16/17 1339    Specimen:  Blood Updated:  08/16/17 1355    Narrative:       The following orders were created for panel order CBC & Differential.  Procedure                               Abnormality         Status                     ---------                               -----------         ------                     CBC Auto Differential[[id number]]        Abnormal            Final result                 Please view results for these tests on the individual orders.    CBC Auto Differential [[id number]]  (Abnormal) Collected:  08/16/17 1339    Specimen:  Blood Updated:  08/16/17 1355     WBC 4.76 (L) 10*3/mm3      RBC 2.97 (L) 10*6/mm3      Hemoglobin 9.0 (L) g/dL      Hematocrit 28.2 (L) %      MCV 94.9 fL      MCH 30.3 pg      MCHC 31.9 g/dL      RDW 14.4 %      RDW-SD 49.5 fl      MPV 11.2 fL      Platelets 193 10*3/mm3      Neutrophil % 70.8 %      Lymphocyte % 17.0 %      Monocyte %  7.1 %      Eosinophil % 3.2 %      Basophil % 1.5 %      Immature Grans % 0.4 %      Neutrophils, Absolute 3.37 10*3/mm3      Lymphocytes, Absolute 0.81 10*3/mm3      Monocytes, Absolute 0.34 10*3/mm3      Eosinophils, Absolute 0.15 10*3/mm3      Basophils, Absolute 0.07 10*3/mm3      Immature Grans, Absolute 0.02 10*3/mm3      nRBC 0.0 /100 WBC     Renal Function Panel [[id number]]  (Abnormal) Collected:  08/16/17 1339    Specimen:  Blood Updated:  08/16/17 1420     Glucose 147 (H) mg/dL      BUN 38 (H) mg/dL      Creatinine 3.20 (H) mg/dL      Sodium 139 mmol/L      Potassium 4.4 mmol/L      Chloride 108 (H) mmol/L      CO2 20.0 (L) mmol/L      Calcium 8.4 mg/dL      Albumin 3.20 (L) g/dL      Phosphorus 4.7 (H) mg/dL      Anion Gap 15.4 mmol/L      BUN/Creatinine Ratio 11.9     eGFR Non African Amer 14 (L) mL/min/1.73     Narrative:       The MDRD GFR formula is only valid for adults with stable renal function between ages 18 and 70.    Troponin [[id number]]  (Normal) Collected:  08/16/17 1339    Specimen:  Blood Updated:  08/16/17 1420     Troponin I <0.012 ng/mL     Narrative:       Normal Patient Upper Reference Limit (URL) (99th Percentile)=0.03 ng/mL   Non-AMI Illness Reference Limit=0.03-0.11 ng/mL   AMI Confirmation=0.12 ng/mL and above          Imaging Results (last 72 hours)     ** No results found for the last 72 hours. **          ECG/EMG Results (most recent)     None          I have personally reviewed and interpreted available lab data, radiology studies and ECG obtained at time of admission.     Assessment / Plan     Assessment/Problem List:   Principal Problem:    Dyspnea on exertion  Active Problems:    Malaise and fatigue    Chronic anemia    Essential hypertension    Acquired hypothyroidism    Mixed hyperlipidemia    Vitamin D deficiency    Hip fracture, left    Type 2 diabetes mellitus with nephropathy    Chronic kidney disease, stage V    Hypoxia          Plan:    Will admit to med/surg  telemetry; chest xray now; duonebs q6hrs prn; oxygen via NC supplementation; will repeat CBC/renal panel in am; consult to Dr. Mejia while inpt; anemia is of CKD, on iron supplementation additionally; will cont SSI coverage in addition to her once daily long-acting insulin; likely has pulmonary edema, and will benefit from IV lasix; will follow electrolytes, correct as needed.  Cont IS and SCUDs to anabela LE; heparin for DVT prophylaxis; discussed POC in detail with pt today, she verb understanding and agrees; answered all of her questions today.    I have spent a total of 70 mins in direct pt care today.    Myo Salas DO 08/16/17 3:52 PM    Please note that portions of this note may have been completed with a voice recognition program. Efforts were made to edit the dictations, but occasionally words are mistranscribed.         Electronically signed by Moy Salas DO at 8/16/2017  4:02 PM        Lines, Drains & Airways    Active LDAs     Name:   Placement date:   Placement time:   Site:   Days:    Peripheral IV Line - Single Lumen 08/20/17 0200 cephalic vein (lateral side of arm), left 22 gauge  08/20/17    0200      1                Hospital Medications (active)       Dose Frequency Start End    amLODIPine (NORVASC) tablet 5 mg 5 mg Every 24 Hours Scheduled 8/20/2017     Sig - Route: Take 1 tablet by mouth Daily. - Oral    aspirin EC tablet 81 mg 81 mg Daily 8/16/2017     Sig - Route: Take 1 tablet by mouth Daily. - Oral    atorvastatin (LIPITOR) tablet 10 mg 10 mg Daily 8/16/2017     Sig - Route: Take 1 tablet by mouth Daily. - Oral    carvedilol (COREG) tablet 25 mg 25 mg 2 Times Daily 8/16/2017     Sig - Route: Take 1 tablet by mouth 2 (Two) Times a Day. - Oral    dextrose (D50W) solution 25 g 25 g Every 15 Minutes PRN 8/16/2017     Sig - Route: Infuse 50 mL into a venous catheter Every 15 (Fifteen) Minutes As Needed for Low Blood Sugar (Blood Sugar Less Than 70, Patient Has IV Access - Unresponsive,  NPO or Unable To Safely Swallow). - Intravenous    dextrose (INSTA-GLUCOSE) 77.4 % gel 1 tube 1 tube Every 15 Minutes PRN 8/16/2017     Sig - Route: Take 1 tube by mouth Every 15 (Fifteen) Minutes As Needed (Blood Sugar Less Than 70, Patient Alert, Is Not NPO & Can Safely Swallow). - Oral    glucagon (human recombinant) (GLUCAGEN DIAGNOSTIC) injection 1 mg 1 mg Every 15 Minutes PRN 8/16/2017     Sig - Route: Inject 1 mg under the skin Every 15 (Fifteen) Minutes As Needed (Blood Glucose Less Than 70 - Patient Without IV Access - Unresponsive, NPO or Unable To Safely Swallow). - Subcutaneous    heparin (porcine) 5000 UNIT/ML injection 5,000 Units 5,000 Units Every 12 Hours Scheduled 8/16/2017     Sig - Route: Inject 1 mL under the skin Every 12 (Twelve) Hours. - Subcutaneous    hydrALAZINE (APRESOLINE) injection 10 mg 10 mg Every 2 Hours PRN 8/18/2017     Sig - Route: Infuse 0.5 mL into a venous catheter Every 2 (Two) Hours As Needed for High Blood Pressure. - Intravenous    HYDROcod Polst-CPM Polst ER (TUSSIONEX PENNKINETIC) 10-8 MG/5ML ER suspension 5 mL 5 mL Every 12 Hours PRN 8/18/2017 8/28/2017    Sig - Route: Take 5 mL by mouth Every 12 (Twelve) Hours As Needed for Cough. - Oral    insulin aspart (novoLOG) injection 0-7 Units 0-7 Units 4 Times Daily Before Meals & Nightly 8/16/2017     Sig - Route: Inject 0-7 Units under the skin 4 (Four) Times a Day Before Meals & at Bedtime. - Subcutaneous    insulin detemir (LEVEMIR) injection 10 Units 10 Units Every Morning 8/17/2017     Sig - Route: Inject 10 Units under the skin Every Morning. - Subcutaneous    ipratropium-albuterol (DUO-NEB) nebulizer solution 3 mL 3 mL Every 4 Hours PRN 8/16/2017     Sig - Route: Take 3 mL by nebulization Every 4 (Four) Hours As Needed for Shortness of Air. - Nebulization    ipratropium-albuterol (DUO-NEB) nebulizer solution 3 mL 3 mL Every 6 Hours PRN 8/16/2017     Sig - Route: Take 3 mL by nebulization Every 6 (Six) Hours As Needed for  Wheezing or Shortness of Air. - Nebulization    levothyroxine (SYNTHROID, LEVOTHROID) tablet 75 mcg 75 mcg Daily 2017     Sig - Route: Take 1 tablet by mouth Daily. - Oral    magnesium hydroxide (MILK OF MAGNESIA) suspension 2400 mg/10mL 10 mL 10 mL Daily PRN 2017     Sig - Route: Take 10 mL by mouth Daily As Needed for Constipation. - Oral    ondansetron ODT (ZOFRAN-ODT) disintegrating tablet 4 mg 4 mg Every 6 Hours PRN 2017     Sig - Route: Take 1 tablet by mouth Every 6 (Six) Hours As Needed for Nausea or Vomiting. - Oral    polyethylene glycol (MIRALAX) powder 17 g 17 g Daily 2017     Sig - Route: Take 17 g by mouth Daily. - Oral    sodium chloride 0.9 % bolus 1,000 mL 1,000 mL As Needed 2017    Sig - Route: Infuse 1,000 mL into a venous catheter As Needed (to maintain SBP > 100 mmHG DURING DIALYSIS ONLY). - Intravenous    sodium chloride 0.9 % flush 1-10 mL 1-10 mL As Needed 2017     Sig - Route: Infuse 1-10 mL into a venous catheter As Needed for Line Care. - Intravenous    traZODone (DESYREL) tablet 50 mg 50 mg Nightly PRN 2017     Sig - Route: Take 1 tablet by mouth At Night As Needed for Sleep. - Oral    valsartan (DIOVAN) tablet 160 mg 160 mg Every 12 Hours Scheduled 2017     Sig - Route: Take 2 tablets by mouth Every 12 (Twelve) Hours. - Oral    cefTRIAXone (ROCEPHIN) 1 g/100 mL 0.9% NS VTB (mbp) (Discontinued) 1 g Every 24 Hours 2017    Sig - Route: Infuse 100 mL into a venous catheter Daily. - Intravenous    Notes to Pharmacy: Verify allergy to PCN is not severe/ tolerates cephalosporins             Physician Progress Notes (last 24 hours) (Notes from 2017  9:52 AM through 2017  9:52 AM)      Shiva Mancini MD at 2017 11:53 AM  Version 1 of 1               [facility]    [facility]    Name:  Sigrid Casarez   Age:  77 y.o.  Sex:  female  :  1940  MRN:  [id number]   Visit Number:   [id number]  Admission Date:  8/16/2017  Date Of Service:  08/20/17  Primary Care Physician:  Tiburcio Hernandez MD     LOS: 3 days :  Patient Care Team:  Tiburcio Hernandez MD as PCP - General:      Subjective / Interval History:     The chart has been reviewed and events noted since hospitalization.  The patient has been admitted because of low volume overload with chronic kidney disease and was failed tolerating the carbs and has hypokalemia recurrently.  Also with significant proteinuria Dr. Maldonado decided to start with hemodialysis.  She has also already started feeling better since she's had hemodialysis yesterday and today morning..  Patient in his any chest pain and her breathing seems to be improved also.      Vital Signs:    Temp:  [97.6 °F (36.4 °C)-98.6 °F (37 °C)] 97.7 °F (36.5 °C)  Heart Rate:  [71-76] 74  Resp:  [13-18] 13  BP: (131-159)/(46-60) 152/52    Intake and output:    I/O last 3 completed shifts:  In: 820 [P.O.:720; IV Piggyback:100]  Out: 1525 [Urine:525; Other:1000]  I/O this shift:  In: 120 [P.O.:120]  Out: 1500 [Other:1500]    Physical Examination:    General Appearance:    Alert and cooperative, not in any acute distress.   Head:    Atraumatic and normocephalic, without obvious abnormality.   Eyes:            PERRLA,  No pallor. Extra-occular movements are within normal limits.   Neck:   Supple,  No lymphglands, no bruit   Lungs:     Chest shape is normal. Breath sounds heard bilaterally equally.  No crackles or wheezing.     Heart:    Normal S1 and S2, no murmur,  No JVD   Abdomen:     Normal bowel sounds, no masses, no organomegaly. Soft        non-tender, no guarding, no rebound                tenderness   Extremities:   Moves all extremities well, no edema, no cyanosis, Swelling of bilateral extremities Has improved    Skin:   No  bruising or rash.   Neurologic:   Grossly non focal and moves all extrimities equally.    Laboratory results:    Results from last 7 days  Lab Units 08/20/17  0608  08/19/17  0533 08/18/17  0543   SODIUM mmol/L 137 138 140   POTASSIUM mmol/L 4.1 4.5 4.1   CHLORIDE mmol/L 104 107 108*   CO2 mmol/L 22.0* 19.0* 21.0*   BUN mg/dL 25* 41* 37*   CREATININE mg/dL 2.50* 3.30* 3.10*   CALCIUM mg/dL 8.2* 8.4 8.3*   GLUCOSE mg/dL 189* 350* 282*       Results from last 7 days  Lab Units 08/20/17  0608 08/19/17  0533 08/18/17  0543   WBC 10*3/mm3 9.44 9.17 6.73   HEMOGLOBIN g/dL 8.2* 8.9* 9.0*   HEMATOCRIT % 26.4* 28.3* 28.1*   PLATELETS 10*3/mm3 195 213 213           Results from last 7 days  Lab Units 08/16/17  1339   TROPONIN I ng/mL <0.012       Results from last 7 days  Lab Units 08/17/17  1205 08/17/17  1200 08/16/17  1229   BLOODCX  No growth at 2 days No growth at 2 days  --    MRSA SCREEN CX   --   --  No Methicillin Resistant Staphylococcus aureus isolated       Radiology results:    Imaging Results (last 24 hours)     ** No results found for the last 24 hours. **          I have reviewed the patient's radiology reports.    Medication Review:     I have reviewed the patients active and prn medications.     Assessment:    Principal Problem:    Dyspnea on exertion  Active Problems:    Chronic anemia    Malaise and fatigue    Essential hypertension    Acquired hypothyroidism    Mixed hyperlipidemia    Vitamin D deficiency    Hip fracture, left    Type 2 diabetes mellitus with nephropathy    Chronic kidney disease, stage V    Hypoxia          Plan:    Patient with significant dyspnea on exertion who has diabetes with nephropathy and chronic kidney disease stage IV with her significant volume overload and has been started on hemodialysis.  Dr. Maldonado. been following she has failed medical management and already volume extraction 3 hemodialysis would be the mainstay of treatment.  We will continue all her other medications as before and will follow-up   Do not think that this is a pneumonia and will stop the IV antibiotic.      Shiva Mancini MD  08/20/17  11:53 AM      Please note  that portions of this note may have been completed with a voice recognition program. Efforts were made to edit the dictations, but occasionally words are mistranscribed.       Electronically signed by Shiva Mancini MD at 8/20/2017 11:58 AM        Consult Notes (last 24 hours) (Notes from 8/20/2017  9:52 AM through 8/21/2017  9:52 AM)     No notes of this type exist for this encounter.        Nutrition Notes (last 24 hours) (Notes from 8/20/2017  9:52 AM through 8/21/2017  9:52 AM)     No notes of this type exist for this encounter.        Physical Therapy Notes (last 24 hours) (Notes from 8/20/2017  9:52 AM through 8/21/2017  9:52 AM)     No notes of this type exist for this encounter.        Occupational Therapy Notes (last 24 hours) (Notes from 8/20/2017  9:52 AM through 8/21/2017  9:52 AM)     No notes of this type exist for this encounter.        Speech Language Pathology Notes (last 24 hours) (Notes from 8/20/2017  9:52 AM through 8/21/2017  9:52 AM)     No notes of this type exist for this encounter.        Respiratory Therapy Notes (last 24 hours) (Notes from 8/20/2017  9:52 AM through 8/21/2017  9:52 AM)     No notes of this type exist for this encounter.           Nursing Assessments (last 24 hours)      Adult PCS Body System       08/20/17 1100 08/20/17 1400 08/20/17 1600 08/20/17 1800 08/20/17 2000    Pain/Comfort/Sleep    Presence Of Pain denies pain/discomfort denies pain/discomfort denies pain/discomfort denies pain/discomfort denies pain/discomfort    Preferred Pain Scale number (Numeric Rating Pain Scale) number (Numeric Rating Pain Scale) number (Numeric Rating Pain Scale) number (Numeric Rating Pain Scale)     Sleep/Rest/Relaxation awake awake awake awake awake    Coping/Psychosocial    Observed Emotional State calm;cooperative;pleasant calm;cooperative;pleasant calm;cooperative;pleasant calm;cooperative;pleasant calm;cooperative    Verbalized Emotional State acceptance acceptance acceptance  acceptance acceptance    Plan Of Care Reviewed With patient patient   patient    Coping Strategies    Supportive Measures     active listening utilized    Trust Relationship/Rapport     care explained;questions answered;reassurance provided    HEENT    HEENT WDL  WDL except;ear  WDL except;ear  WDL except;ear  WDL except;ear  WDL except;ear    Additional Documentation Ear WDL (Group) Ear WDL (Group) Ear WDL (Group) Ear WDL (Group)     Ear WDL    Ear WDL  WDL except;hearing change  WDL except;hearing change  WDL except;hearing change  WDL except;hearing change  WDL except;hearing change    Left Hearing Change decreased decreased decreased decreased decreased    Right Hearing Change decreased decreased decreased decreased decreased    Hearing Aids/Devices Care hearing aid not available hearing aid not available hearing aid not available hearing aid not available     Teeth WDL    Teeth WDL  WDL except;symptoms  WDL except;symptoms  WDL except;symptoms  WDL except;symptoms  WDL except;symptoms    Teeth Symptoms     dental appliance present (specify);tooth/teeth missing   Upper Dentures Only     Cognitive    Cognitive/Neuro/Behavioral WDL WDL WDL WDL WDL WDL    Neuro    Left Extraocular Movements     not tested    Right Extraocular Movements     not tested    Arnoldo Coma Scale    Best Eye Response 4-->(E4) spontaneous 4-->(E4) spontaneous 4-->(E4) spontaneous 4-->(E4) spontaneous     Best Motor Response 6-->(M6) obeys commands 6-->(M6) obeys commands 6-->(M6) obeys commands 6-->(M6) obeys commands     Best Verbal Response 5-->(V5) oriented 5-->(V5) oriented 5-->(V5) oriented 5-->(V5) oriented     Arnoldo Coma Scale Score 15 15 15 15     Hand /Ankle Strength    Hand , Left     strong    Hand , Right     strong    Dorsiflexion, Left     moderate    Dorsiflexion, Right     moderate    Plantarflexion, Left     moderate    Plantarflexion, Right     moderate    Behavior WDL    Behavior WDL WDL WDL WDL WDL WDL     Respiratory    Respiratory WDL  WDL except;breath sounds  WDL except;breath sounds  WDL except;breath sounds  WDL except;breath sounds  WDL except;breath sounds    Cough And Deep Breathing     done with encouragement    Cough Frequency infrequent infrequent infrequent infrequent infrequent    Cough Type nonproductive nonproductive nonproductive nonproductive nonproductive    Breath Sounds    Breath Sounds All Fields All Fields All Fields All Fields All Fields    All Fields Breath Sounds diminished diminished diminished diminished diminished;clear    MYA Breath Sounds clear clear clear clear     LLL Breath Sounds diminished diminished diminished diminished     RUL Breath Sounds clear clear clear clear     RML Breath Sounds clear clear clear clear     RLL Breath Sounds diminished diminished diminished diminished     Breath Sounds Post Respiratory Treatment    Breath Sounds Posttreatment All Fields All Fields All Fields All Fields     All Fields Breath Sounds Posttreatment no change no change no change no change     Oxygen Therapy    Flow (L/min) 3    3    O2 Device nasal cannula    nasal cannula    Cardiac    Cardiac WDL WDL WDL WDL WDL WDL    ECG    Lead Monitored Lead II Lead II Lead II Lead II Lead II    Sinus Rhythm normal sinus rhythm   68 normal sinus rhythm   68 normal sinus rhythm   68 normal sinus rhythm   68 normal sinus rhythm    Peripheral Neurovascular    Peripheral Neurovascular WDL  WDL except;edema  WDL except;edema  WDL except;edema  WDL except;edema  WDL except;edema    Neurovascular Assessment    All Extremities General General General General General    All Extremities Temperature warm warm warm warm warm    All Extremities Color no discoloration no discoloration no discoloration no discoloration no discoloration    All Extremities Sensation no tingling;no numbness no tingling;no numbness no tingling;no numbness no tingling;no numbness no tingling;no numbness    Radial Pulse    Left Radial Pulse 2+  (normal) 2+ (normal) 2+ (normal) 2+ (normal) 2+ (normal)    Right Radial Pulse 2+ (normal) 2+ (normal) 2+ (normal) 2+ (normal) 2+ (normal)    Dorsalis Pedis Pulse    Left Dorsalis Pedis Pulse 2+ (normal) 2+ (normal) 2+ (normal) 2+ (normal) 2+ (normal)    Right Dorsalis Pedis Pulse 2+ (normal) 2+ (normal) 2+ (normal) 2+ (normal) 2+ (normal)    Posterior Tibial Pulse    Left Posterior Tibial Pulse 1+ (weak) 1+ (weak) 1+ (weak) 1+ (weak)     Right Posterior Tibial Pulse 1+ (weak) 1+ (weak) 1+ (weak) 1+ (weak)     Edema    Edema leg, left;leg, right;ankle, left;ankle, right;foot, left;foot, right leg, left;leg, right;ankle, left;ankle, right;foot, left;foot, right leg, left;leg, right;ankle, left;ankle, right;foot, left;foot, right leg, left;leg, right;ankle, left;ankle, right;foot, left;foot, right leg, left;leg, right;ankle, left;ankle, right;foot, left;foot, right    Leg, Left Edema 1+ (Trace) 1+ (Trace) 1+ (Trace) 1+ (Trace) 1+ (Trace)    Leg, Right Edema 1+ (Trace) 1+ (Trace) 1+ (Trace) 1+ (Trace) 1+ (Trace)    Ankle, Left Edema 1+ (Trace) 1+ (Trace) 1+ (Trace) 1+ (Trace) 1+ (Trace)    Ankle, Right Edema 1+ (Trace) 1+ (Trace) 1+ (Trace) 1+ (Trace) 1+ (Trace)    Foot, Left Edema 1+ (Trace) 1+ (Trace) 1+ (Trace) 1+ (Trace) 1+ (Trace)    Foot, Right Edema 1+ (Trace) 1+ (Trace) 1+ (Trace) 1+ (Trace) 1+ (Trace)    Gastrointestinal    GI WDL WDL WDL WDL WDL WDL    Last Bowel Movement 08/15/17        Genitourinary    Genitourinary WDL WDL WDL WDL WDL WDL    Skin    Skin WDL WDL WDL WDL WDL WDL    Triston Risk Assessment (If Triston score </= 18, add the appropriate CPG to the care plan)    Sensory Perception 4-->no impairment    4-->no impairment    Moisture 4-->rarely moist    4-->rarely moist    Activity 3-->walks occasionally    3-->walks occasionally    Mobility 3-->slightly limited    3-->slightly limited    Nutrition 3-->adequate    3-->adequate    Friction and Shear 3-->no apparent problem    3-->no apparent problem     Triston Score 20    20    Skin Interventions    Pressure Reduction Devices heel offloading device utilized;pressure-redistributing mattress utilized    heel offloading device utilized;pressure-redistributing mattress utilized    Musculoskeletal    Musculoskeletal WDL  WDL except;mobility  WDL except;mobility  WDL except;mobility  WDL except;mobility  WDL except;mobility    General Mobility generalized weakness generalized weakness generalized weakness generalized weakness generalized weakness    Functional Screen Current    Ambulation 3-->assistive equipment and person 3-->assistive equipment and person 3-->assistive equipment and person 3-->assistive equipment and person 3-->assistive equipment and person    Transferring 2-->assistive person 2-->assistive person 2-->assistive person 2-->assistive person 2-->assistive person    Toileting 2-->assistive person 2-->assistive person 2-->assistive person 2-->assistive person 2-->assistive person    Bathing 2-->assistive person 2-->assistive person 2-->assistive person 2-->assistive person 2-->assistive person    Dressing 2-->assistive person 2-->assistive person 2-->assistive person 2-->assistive person 2-->assistive person    Eating 0-->independent 0-->independent 0-->independent 0-->independent 0-->independent    Communication 0-->understands/communicates without difficulty 0-->understands/communicates without difficulty 0-->understands/communicates without difficulty 0-->understands/communicates without difficulty 0-->understands/communicates without difficulty    Swallowing (if score 2 or more for any item, consult Rehab Services)     0-->swallows foods/liquids without difficulty    Nutrition    Diet/Nutrition Prescription consistent carb/diabetic diet consistent carb/diabetic diet consistent carb/diabetic diet consistent carb/diabetic diet consistent carb/diabetic diet    Specialty Diet/Nutrition Prescription renal diet renal diet renal diet renal diet      Peripheral IV Line - Single Lumen 08/20/17 0200 cephalic vein (lateral side of arm), left 22 gauge    Peripheral IV Line - Properties Group Placement Date: 08/20/17 Placement Time: 0200 Location: cephalic vein (lateral side of arm), left Device/Lot Number: over-the-needle catheter system Gauge/Length: 22 gauge    Indication/Daily Review of Necessity     fluid therapy intermittent;medication therapy intermittent    Site Preparation/Maintenance     dressing: dry and intact    Securement     sterile tape strips, secured with    Patency/Maintenance     flushed without difficulty    Phlebitis 0-->no symptoms 0-->no symptoms 0-->no symptoms 0-->no symptoms 0-->no symptoms    Infiltration 0-->no symptoms 0-->no symptoms 0-->no symptoms 0-->no symptoms 0-->no symptoms    Sepsis Screening Tool    Previous screen positive? no    no    Are 2 or > of the above criteria present? no: STOP/negative screen    no: STOP/negative screen    Safety    Safety WDL WDL WDL WDL WDL WDL    Safety ID band on;upper side rails raised x 2;call light in reach;wheels locked;bed in low position;patient up in chair ID band on;call light in reach;upper side rails raised x 2;wheels locked;bed in low position   ID band on;call light in reach;wheels locked;bed in low position;upper side rails raised x 2, lower side rail raised x 1    [facility] High Risk Falls Assessment (If Fall score is >/=13, add the Fall Risk CPG to the care plan)     Fallen in past 6 months     5--> Yes    Mental Status     0--> no mental status change    Elimination     0--> No elimination issues    Mobility     2--> Requires assistance- transfer, walker, etc.    Medications     1--> Insulin/ Oral hypoglycemic    Nurses' Clinical Judgement     4    Total Fall Risk Score     14    Safety Interventions    Safety Promotion/Fall Prevention safety round/check completed;nonskid shoes/slippers when out of bed;supervised activity safety round/check completed;nonskid shoes/slippers  when out of bed safety round/check completed;nonskid shoes/slippers when out of bed safety round/check completed;nonskid shoes/slippers when out of bed safety round/check completed;nonskid shoes/slippers when out of bed;fall prevention program maintained    Environmental Safety Modification assistive device/personal items within reach;clutter free environment maintained assistive device/personal items within reach;clutter free environment maintained assistive device/personal items within reach;clutter free environment maintained assistive device/personal items within reach;clutter free environment maintained clutter free environment maintained;assistive device/personal items within reach    Daily Care    Activity Type activity adjusted per tolerance activity adjusted per tolerance activity adjusted per tolerance activity adjusted per tolerance activity adjusted per tolerance    Activity Level of Assistance assistance, 1 person assistance, 1 person assistance, 1 person assistance, 1 person assistance, 1 person    Positioning    Positioning semi-Fowlers semi-Fowlers   supine, head elevated    Head Of Bed (HOB) Position HOB elevated HOB elevated   HOB at 30-45 degrees    Positioning/Transfer Devices pillows pillows   pillows      08/20/17 2200 08/21/17 0000 08/21/17 0200 08/21/17 0400 08/21/17 0600    Pain/Comfort/Sleep    Presence Of Pain non-verbal indicator of pain/discomfort not present non-verbal indicator of pain/discomfort not present non-verbal indicator of pain/discomfort not present non-verbal indicator of pain/discomfort not present non-verbal indicator of pain/discomfort not present    Sleep/Rest/Relaxation appears asleep appears asleep  appears asleep appears asleep    Respiratory    Respiratory WDL   WDL except;breath sounds   WDL except;breath sounds     Cough And Deep Breathing  done independently per patient  done independently per patient     Cough Frequency  infrequent  infrequent     Cough Type   nonproductive  nonproductive     Breath Sounds    Breath Sounds  All Fields  All Fields     All Fields Breath Sounds  diminished;clear  clear;diminished     Oxygen Therapy    Flow (L/min)  3  3     O2 Device  nasal cannula  nasal cannula     Cardiac    Cardiac WDL  WDL  WDL     ECG    Lead Monitored  Lead II  Lead II     Sinus Rhythm  normal sinus rhythm  normal sinus rhythm     Peripheral IV Line - Single Lumen 08/20/17 0200 cephalic vein (lateral side of arm), left 22 gauge    Peripheral IV Line - Properties Group Placement Date: 08/20/17 Placement Time: 0200 Location: cephalic vein (lateral side of arm), left Device/Lot Number: over-the-needle catheter system Gauge/Length: 22 gauge    Indication/Daily Review of Necessity  fluid therapy intermittent;medication therapy intermittent  fluid therapy intermittent;medication therapy intermittent     Site Preparation/Maintenance  dressing: dry and intact  dressing: dry and intact     Securement  sterile tape strips, secured with  sterile tape strips, secured with     Patency/Maintenance  flushed without difficulty  flushed without difficulty     Phlebitis  0-->no symptoms  0-->no symptoms     Infiltration  0-->no symptoms  0-->no symptoms     Safety    Safety WDL WDL WDL  WDL WDL    Safety ID band on;call light in reach;wheels locked;bed in low position;upper side rails raised x 2, lower side rail raised x 1 ID band on;call light in reach;wheels locked;bed in low position;upper side rails raised x 2, lower side rail raised x 1  ID band on;call light in reach;wheels locked;bed in low position;upper side rails raised x 2, lower side rail raised x 1 ID band on;call light in reach;wheels locked;bed in low position;upper side rails raised x 2, lower side rail raised x 1    Safety Interventions    Safety Promotion/Fall Prevention safety round/check completed;nonskid shoes/slippers when out of bed;fall prevention program maintained safety round/check completed;nonskid  shoes/slippers when out of bed;fall prevention program maintained  safety round/check completed;nonskid shoes/slippers when out of bed;fall prevention program maintained safety round/check completed;nonskid shoes/slippers when out of bed;fall prevention program maintained    Environmental Safety Modification clutter free environment maintained;assistive device/personal items within reach clutter free environment maintained;assistive device/personal items within reach  clutter free environment maintained;assistive device/personal items within reach clutter free environment maintained;assistive device/personal items within reach    Daily Care    Activity Type activity adjusted per tolerance activity adjusted per tolerance  activity adjusted per tolerance activity adjusted per tolerance    Activity Level of Assistance assistance, 1 person assistance, 1 person  assistance, 1 person assistance, 1 person    Positioning    Positioning supine, head elevated supine, head elevated  supine, head elevated supine, head elevated    Head Of Bed (HOB) Position HOB at 30-45 degrees HOB at 30-45 degrees  HOB at 30-45 degrees HOB at 30-45 degrees    Positioning/Transfer Devices pillows pillows  pillows pillows    Provider Notification    Reason for Communication     Critical lab value   Hemoglobin 7.9     Provider Name     Dr. Barrera     Notification Route     Phone call    Response     No new orders

## 2017-08-21 NOTE — PLAN OF CARE
Problem: Fall Risk (Adult)  Goal: Identify Related Risk Factors and Signs and Symptoms  Outcome: Outcome(s) achieved Date Met:  08/21/17

## 2017-08-21 NOTE — PLAN OF CARE
Problem: Fall Risk (Adult)  Goal: Absence of Falls  Outcome: Outcome(s) achieved Date Met:  08/21/17

## 2017-08-21 NOTE — PLAN OF CARE
Problem: Older Inpatient, Acutely Ill (Adult)  Goal: Signs and Symptoms of Listed Potential Problems Will be Absent or Manageable (Older Inpatient, Acutely Ill)    08/20/17 8964   Older Adult Inpatient, Acutely Ill   Problems Assessed (Acutely Ill Older Adult) all   Problems Present (Acutely Ill Older Adult) constipation

## 2017-08-21 NOTE — PLAN OF CARE
Problem: Activity Intolerance (Adult)  Goal: Effective Energy Conservation Techniques    08/20/17 1830   Activity Intolerance (Adult)   Effective Energy Conservation Techniques making progress toward outcome

## 2017-08-21 NOTE — PLAN OF CARE
Problem: Patient Care Overview (Adult)  Goal: Plan of Care Review  Outcome: Ongoing (interventions implemented as appropriate)    08/21/17 0439   Coping/Psychosocial Response Interventions   Plan Of Care Reviewed With patient   Patient Care Overview   Progress improving   Outcome Evaluation   Outcome Summary/Follow up Plan Patient had dialysis today with 1.5 L removed. The patient states she is feeling better and her breathing seems to be improving. The patient has been resting comfortably with no complaints at this time.

## 2017-08-21 NOTE — PROGRESS NOTES
[facility]    Name:  Sigrid Casarez   Age:  77 y.o.  Sex:  female  :  1940  MRN:  [id number]   Visit Number:  [id number]  Admission Date:  2017  Date Of Service:  17  Primary Care Physician:  Tiburcio Hernandez MD     LOS: 4 days :  Patient Care Team:  Tiburcio Hernandez MD as PCP - General:    Chief Complaint:      Weakness, SOA    Subjective / Interval History:     SOA is steadily improving.  Still with notable crackles in the lower lung fields bilaterally.  She reports no current cp or palpitations.  No f/c/s/.  No n/v or abdominal pain.     I have reviewed labs/imaging/records from this hospitalization, including ER staff and admitting/attending physicians H/P's and progress notes to establish a comprehensive understanding of this patient's clinical hospital course, as well as to establish a transition of care appropriately.    Vital Signs:    Temp:  [97.9 °F (36.6 °C)-98.3 °F (36.8 °C)] 98.3 °F (36.8 °C)  Heart Rate:  [57-74] 74  Resp:  [18] 18  BP: (138-152)/(52-72) 147/59    Intake and output:    Intake/Output       17 0700 - 17 0659    Intake (ml) 600    Output (ml) 1600    Net (ml) -1000    Last Weight 158 lb 3.2 oz (71.8 kg)          Physical Examination:    General Appearance:  Alert and cooperative, not in any acute distress.   Head:  Atraumatic and normocephalic, without obvious abnormality.   Eyes:      PERRLA, Extra-occular movements are within normal limits.   Lungs:   Crackles in the lower lung fields bilaterally    Heart:  Normal S1 and S2, no murmur, no gallop, no rub.   Abdomen:   Normal bowel sounds,  Soft non-tender, non-distended, no guarding, no rebound tenderness   Extremities: No edema                     Laboratory results:      Results from last 7 days  Lab Units 17  0534 17  0608 17  0533   SODIUM mmol/L 137 137 138   POTASSIUM mmol/L 4.3 4.1 4.5   CHLORIDE mmol/L 102 104 107   CO2 mmol/L 28.0 22.0*  19.0*   BUN mg/dL 17 25* 41*   CREATININE mg/dL 2.50* 2.50* 3.30*   CALCIUM mg/dL 7.9* 8.2* 8.4   GLUCOSE mg/dL 231* 189* 350*       Results from last 7 days  Lab Units 08/21/17  0534 08/20/17  0608 08/19/17  0533   WBC 10*3/mm3 7.22 9.44 9.17   HEMOGLOBIN g/dL 7.9* 8.2* 8.9*   HEMATOCRIT % 25.7* 26.4* 28.3*   PLATELETS 10*3/mm3 165 195 213       Results from last 7 days  Lab Units 08/16/17  1339   TROPONIN I ng/mL <0.012           I have reviewed the patient's laboratory results.    Radiology results:    Imaging Results (last 24 hours)     ** No results found for the last 24 hours. **          I have reviewed the patient's radiology reports.    Medication Review:     I have reviewed the patients active and prn medications.     Assessment/Plan:    Principal Problem:    Dyspnea on exertion  Active Problems:    Chronic anemia    Essential hypertension    Acquired hypothyroidism    Mixed hyperlipidemia    Vitamin D deficiency    Hip fracture, left    Type 2 diabetes mellitus with nephropathy    Chronic kidney disease, stage V    Generlized weakness     Hypoxia      -Patient is newly HD patient,  to setup HD.  Continue with HD accordingly, Dr. Lynn following.  On Iron and Procrit for underlying anemia related to ESRD.  Her HTN is currently stable.  Hypothyroidsim, on synthroid therapy. FS stable on Levemir with SSI coverage.     Gordon Singh MD  08/21/17  10:05 AM

## 2017-08-21 NOTE — PROGRESS NOTES
Continued Stay Note  [facility]     Patient Name: Sigrid Casarez  MRN: [id number]  Today's Date: 8/21/2017    Admit Date: 8/16/2017          Discharge Plan       08/21/17 1144    Case Management/Social Work Plan    Plan Sl increase in work of breathing during visit.  Pt reports feeling much better.  O2 sat 94% on O2.  Pt reports is not currently on home O2 however was on O2 approx 1 yr ago for a couple of months.  Does not remember name of DME.  Will continue to follow.      08/21/17 1001    Case Management/Social Work Plan    Plan Spoke to patient who will get dialysis in [place].  to transport. Faxed clinicals to [facility] for dialysis set up.      Patient/Family In Agreement With Plan yes    Final Note    Final Note Following for dialysis set up.               Discharge Codes     None        Expected Discharge Date and Time     Expected Discharge Date Expected Discharge Time    Aug 22, 2017             Zoraida Jonas

## 2017-08-21 NOTE — PROGRESS NOTES
"Nephrology Progress Note.    LOS: 4 days    Patient Care Team:  Tiburcio Hernandez MD as PCP - General    Chief Complaint:  No chief complaint on file.      Subjective    I have reviewed labs/imaging/records from this hospitalization, including ER staff and admitting/attending physicians H/P's and progress notes to establish a comprehensive understanding of this patient's clinical hospital course, as well as to establish plan of care appropriately.     Patient seen and examined this morning.  Events from last night noted.  Patient denies having any fevers chills.  No nausea or vomiting no abdominal pain.  Denies any chest pain, she does feel shortness of breath as well as cough denies any sputum production.  She thinks it's slightly better as compared to yesterday.  There is still significant edema of the lower ext and sacral area.   Patient also denies having new onset weakness of numbness of either extremity.  She said she has not been making much urine.  She did asked for something to help her sleep as she gets very anxious at night and has not been able to sleep.      Review of Systems:    The pertinent  ROS was done and it is noted above, rest  was negative.    Objective     Vital Signs  /59 (BP Location: Left arm, Patient Position: Lying)  Pulse 74  Temp 98.3 °F (36.8 °C) (Oral)   Resp 18  Ht 63\" (160 cm)  Wt 158 lb 3.2 oz (71.8 kg)  SpO2 90%  BMI 28.02 kg/m2           Intake/Output Summary (Last 24 hours) at 08/21/17 0911  Last data filed at 08/20/17 1900   Gross per 24 hour   Intake              600 ml   Output             1600 ml   Net            -1000 ml       Physical Exam:    General Appearance: alert, oriented x 3, no acute distress,   HEENT: pupils round and reactive to light, oral mucosa dry, extra occular movements intact.  Neck: supple, no JVD, trachea midline  Lungs: She has crackles about 1/4 up the chest.  Heart: RRR, normal S1 and S2, no S3, no rub  Abdomen: soft, non-tender, no " palpable bladder, present bowel sounds to auscultation  Extremities: 1-2 +  edema, no cyanosis or clubbing.   Neuro: normal speech and mental status, grossly non focal.     Results Review:      Results from last 7 days  Lab Units 08/21/17  0534 08/20/17  0608 08/19/17  0533   SODIUM mmol/L 137 137 138   POTASSIUM mmol/L 4.3 4.1 4.5   CHLORIDE mmol/L 102 104 107   CO2 mmol/L 28.0 22.0* 19.0*   BUN mg/dL 17 25* 41*   CREATININE mg/dL 2.50* 2.50* 3.30*   CALCIUM mg/dL 7.9* 8.2* 8.4   GLUCOSE mg/dL 231* 189* 350*       Estimated Creatinine Clearance: 17.3 mL/min (by C-G formula based on Cr of 2.5).      Results from last 7 days  Lab Units 08/21/17  0534 08/20/17  0608 08/19/17  0533   PHOSPHORUS mg/dL 3.9 3.5 5.0*               Results from last 7 days  Lab Units 08/21/17  0534 08/20/17  0608 08/19/17  0533 08/18/17  0543 08/17/17  0552   WBC 10*3/mm3 7.22 9.44 9.17 6.73 5.41   HEMOGLOBIN g/dL 7.9* 8.2* 8.9* 9.0* 8.6*   PLATELETS 10*3/mm3 165 195 213 213 203               Imaging Results (last 24 hours)     ** No results found for the last 24 hours. **          amLODIPine 5 mg Oral Q24H   aspirin 81 mg Oral Daily   atorvastatin 10 mg Oral Daily   carvedilol 25 mg Oral BID   heparin (porcine) 5,000 Units Subcutaneous Q12H   insulin aspart 0-7 Units Subcutaneous 4x Daily AC & at Bedtime   insulin detemir 10 Units Subcutaneous QAM   levothyroxine 75 mcg Oral Daily   polyethylene glycol 17 g Oral Daily   valsartan 160 mg Oral Q12H          Medication Review:   Current Facility-Administered Medications   Medication Dose Route Frequency Provider Last Rate Last Dose   • amLODIPine (NORVASC) tablet 5 mg  5 mg Oral Q24H Deric Mejia MD   5 mg at 08/20/17 1053   • aspirin EC tablet 81 mg  81 mg Oral Daily Moy Salas DO   81 mg at 08/20/17 1054   • atorvastatin (LIPITOR) tablet 10 mg  10 mg Oral Daily Moy Salas DO   10 mg at 08/20/17 1055   • carvedilol (COREG) tablet 25 mg  25 mg Oral BID Moy Salas, DO   25  mg at 08/20/17 1743   • dextrose (D50W) solution 25 g  25 g Intravenous Q15 Min PRN Moy Salas, DO       • dextrose (INSTA-GLUCOSE) 77.4 % gel 1 tube  1 tube Oral Q15 Min PRN Moy Salas, DO       • glucagon (human recombinant) (GLUCAGEN DIAGNOSTIC) injection 1 mg  1 mg Subcutaneous Q15 Min PRN Moy K Maritza, DO       • heparin (porcine) 5000 UNIT/ML injection 5,000 Units  5,000 Units Subcutaneous Q12H Moy LEANNE Salas, DO   5,000 Units at 08/20/17 2134   • hydrALAZINE (APRESOLINE) injection 10 mg  10 mg Intravenous Q2H PRN April G Genesis, DO   10 mg at 08/18/17 1032   • HYDROcod Polst-CPM Polst ER (TUSSIONEX PENNKINETIC) 10-8 MG/5ML ER suspension 5 mL  5 mL Oral Q12H PRN Moe Barrera, DO   5 mL at 08/20/17 0211   • insulin aspart (novoLOG) injection 0-7 Units  0-7 Units Subcutaneous 4x Daily AC & at Bedtime Moy Salas, DO   3 Units at 08/21/17 0637   • insulin detemir (LEVEMIR) injection 10 Units  10 Units Subcutaneous QAM Moy Salas, DO   10 Units at 08/21/17 0637   • ipratropium-albuterol (DUO-NEB) nebulizer solution 3 mL  3 mL Nebulization Q4H PRN Jordan Ribera MD   3 mL at 08/17/17 0650   • ipratropium-albuterol (DUO-NEB) nebulizer solution 3 mL  3 mL Nebulization Q6H PRN Moy Salas, DO   3 mL at 08/16/17 2223   • levothyroxine (SYNTHROID, LEVOTHROID) tablet 75 mcg  75 mcg Oral Daily Moy Salas, DO   75 mcg at 08/21/17 0636   • magnesium hydroxide (MILK OF MAGNESIA) suspension 2400 mg/10mL 10 mL  10 mL Oral Daily PRN Shiva Mancini MD       • ondansetron ODT (ZOFRAN-ODT) disintegrating tablet 4 mg  4 mg Oral Q6H PRN Moy Salas, DO   4 mg at 08/20/17 0749   • polyethylene glycol (MIRALAX) powder 17 g  17 g Oral Daily Shiva Mancini MD   17 g at 08/20/17 1248   • sodium chloride 0.9 % flush 1-10 mL  1-10 mL Intravenous PRN Moy Salas DO       • traZODone (DESYREL) tablet 50 mg  50 mg Oral Nightly PRN Moe Barrera DO   50 mg at 08/20/17 7585   •  valsartan (DIOVAN) tablet 160 mg  160 mg Oral Q12H Deric Mejia MD   160 mg at 08/20/17 9461       Assessment/Plan     1.   Chronic kidney disease, stage V- due to diabetic nephropathy with significant proteinuria unable to tolerate ARB due to recurrent hyperkalemia. She is s/p access placement which is maturing but not ready for dialysis. Will watch and optimize the volume status, She has not responded as good to the diuretics, details discussed with the patient as well as her .  She does not tolerated dialysis well we'll go ahead and do another dialysis today as ordered.  We'll consult  for outpatient dialysis placement.it appears she will have a Tuesday Thursday Saturday schedule, I will go ahead and plan dialysis tomorrow.  2.   Dyspnea on exertion- with fluid excess. She does feel better probably will take another few days before all the excess fluid is removed. She has been continuously improving.  3.   Chronic anemia- with h/o AUGUST use. I will go ahead and give more iron and Procrit if needed.  It will be continued with dialysis as an outpatient. First injection of Procrit given today  4.   Essential hypertension- treated, optimize the medication as ordered, blood pressure much better with medication changes stent yesterday.  5.   Acquired hypothyroidism- treated  6.   Mixed hyperlipidemia- treated  7.   Vitamin D deficiency: continue oral supplement.  8.   Hypothyroidism: TSH appears to be within range.  9.   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   Anxiety: She is fairly anxious and did ask something to help her sleep at night in the hospital.  I will go ahead and start 25 of Seroquel at night and see how she'll do with that.      Details were discussed with the patient as well as family in the room.  I will also discussed the assessment and plan with the hospitalist as well as  for her  placement.    Rest as ordered.    Deric Mejia  MD  08/21/17  9:11 AM      EMR Dragon/Transcription disclaimer:   Much of this encounter note is an electronic transcription/translation of spoken language to printed text. The electronic translation of spoken language may permit erroneous, or at times, nonsensical words or phrases to be inadvertently transcribed; Although I have reviewed the note for such errors, some may still exist.

## 2017-08-21 NOTE — PROGRESS NOTES
Continued Stay Note  [facility]     Patient Name: Sigrid Casarez  MRN: [id number]  Today's Date: 8/21/2017    Admit Date: 8/16/2017          Discharge Plan       08/21/17 1001    Case Management/Social Work Plan    Plan Spoke to patient who will get dialysis in [place].  to transport. Faxed clinicals to [facility] for dialysis set up.      Patient/Family In Agreement With Plan yes    Final Note    Final Note Following for dialysis set up.               Discharge Codes     None        Expected Discharge Date and Time     Expected Discharge Date Expected Discharge Time    Aug 22, 2017             Betty Cruz

## 2017-08-22 ENCOUNTER — HOSPITAL ENCOUNTER (OUTPATIENT)
Dept: INFUSION THERAPY | Facility: HOSPITAL | Age: 77
Discharge: HOME | End: 2017-08-22

## 2017-08-22 VITALS
SYSTOLIC BLOOD PRESSURE: 171 MMHG | OXYGEN SATURATION: 95 % | HEART RATE: 74 BPM | TEMPERATURE: 97.6 F | WEIGHT: 158.2 LBS | BODY MASS INDEX: 28.03 KG/M2 | HEIGHT: 63 IN | RESPIRATION RATE: 18 BRPM | DIASTOLIC BLOOD PRESSURE: 58 MMHG

## 2017-08-22 LAB
ALBUMIN SERPL-MCNC: 2.9 G/DL (ref 3.5–5)
ANION GAP SERPL CALCULATED.3IONS-SCNC: 11.4 MMOL/L
BACTERIA SPEC AEROBE CULT: NORMAL
BACTERIA SPEC AEROBE CULT: NORMAL
BASOPHILS # BLD AUTO: 0.06 10*3/MM3 (ref 0–0.2)
BASOPHILS NFR BLD AUTO: 0.8 % (ref 0–2.5)
BUN BLD-MCNC: 27 MG/DL (ref 7–20)
BUN/CREAT SERPL: 9 (ref 7.1–23.5)
CALCIUM SPEC-SCNC: 8.2 MG/DL (ref 8.4–10.2)
CHLORIDE SERPL-SCNC: 101 MMOL/L (ref 98–107)
CO2 SERPL-SCNC: 28 MMOL/L (ref 26–30)
CREAT BLD-MCNC: 3 MG/DL (ref 0.6–1.3)
DEPRECATED RDW RBC AUTO: 50.4 FL (ref 37–54)
EOSINOPHIL # BLD AUTO: 0.37 10*3/MM3 (ref 0–0.7)
EOSINOPHIL NFR BLD AUTO: 4.9 % (ref 0–7)
ERYTHROCYTE [DISTWIDTH] IN BLOOD BY AUTOMATED COUNT: 14.1 % (ref 11.5–14.5)
GFR SERPL CREATININE-BSD FRML MDRD: 15 ML/MIN/1.73
GLUCOSE BLD-MCNC: 186 MG/DL (ref 74–98)
GLUCOSE BLDC GLUCOMTR-MCNC: 171 MG/DL (ref 70–130)
GLUCOSE BLDC GLUCOMTR-MCNC: 204 MG/DL (ref 70–130)
HCT VFR BLD AUTO: 25.7 % (ref 37–47)
HGB BLD-MCNC: 7.8 G/DL (ref 12–16)
IMM GRANULOCYTES # BLD: 0.09 10*3/MM3 (ref 0–0.06)
IMM GRANULOCYTES NFR BLD: 1.2 % (ref 0–0.6)
LYMPHOCYTES # BLD AUTO: 1.7 10*3/MM3 (ref 0.6–3.4)
LYMPHOCYTES NFR BLD AUTO: 22.5 % (ref 10–50)
MCH RBC QN AUTO: 29.7 PG (ref 27–31)
MCHC RBC AUTO-ENTMCNC: 30.4 G/DL (ref 30–37)
MCV RBC AUTO: 97.7 FL (ref 81–99)
MONOCYTES # BLD AUTO: 0.69 10*3/MM3 (ref 0–0.9)
MONOCYTES NFR BLD AUTO: 9.1 % (ref 0–12)
NEUTROPHILS # BLD AUTO: 4.65 10*3/MM3 (ref 2–6.9)
NEUTROPHILS NFR BLD AUTO: 61.5 % (ref 37–80)
NRBC BLD MANUAL-RTO: 0 /100 WBC (ref 0–0)
PHOSPHATE SERPL-MCNC: 4.2 MG/DL (ref 2.5–4.5)
PLATELET # BLD AUTO: 167 10*3/MM3 (ref 130–400)
PMV BLD AUTO: 12.4 FL (ref 6–12)
POTASSIUM BLD-SCNC: 4.4 MMOL/L (ref 3.5–5.1)
RBC # BLD AUTO: 2.63 10*6/MM3 (ref 4.2–5.4)
SODIUM BLD-SCNC: 136 MMOL/L (ref 137–145)
WBC NRBC COR # BLD: 7.56 10*3/MM3 (ref 4.8–10.8)

## 2017-08-22 PROCEDURE — 94620 HC PULMONARY STRESS TEST SIMPLE: CPT

## 2017-08-22 PROCEDURE — 63710000001 INSULIN ASPART PER 5 UNITS: Performed by: FAMILY MEDICINE

## 2017-08-22 PROCEDURE — 90935 HEMODIALYSIS ONE EVALUATION: CPT

## 2017-08-22 PROCEDURE — 82962 GLUCOSE BLOOD TEST: CPT

## 2017-08-22 PROCEDURE — 94799 UNLISTED PULMONARY SVC/PX: CPT

## 2017-08-22 PROCEDURE — 99239 HOSP IP/OBS DSCHRG MGMT >30: CPT | Performed by: INTERNAL MEDICINE

## 2017-08-22 PROCEDURE — 5A1D00Z PERFORMANCE OF URINARY FILTRATION, SINGLE: ICD-10-PCS | Performed by: INTERNAL MEDICINE

## 2017-08-22 PROCEDURE — 25010000002 HEPARIN (PORCINE) PER 1000 UNITS: Performed by: FAMILY MEDICINE

## 2017-08-22 PROCEDURE — 63710000001 INSULIN DETEMIR PER 5 UNITS: Performed by: FAMILY MEDICINE

## 2017-08-22 PROCEDURE — 85025 COMPLETE CBC W/AUTO DIFF WBC: CPT | Performed by: FAMILY MEDICINE

## 2017-08-22 PROCEDURE — 80069 RENAL FUNCTION PANEL: CPT | Performed by: FAMILY MEDICINE

## 2017-08-22 RX ORDER — CITALOPRAM 10 MG/1
10 TABLET ORAL DAILY
Qty: 30 TABLET | Refills: 0 | Status: SHIPPED | OUTPATIENT
Start: 2017-08-22 | End: 2018-01-01 | Stop reason: ALTCHOICE

## 2017-08-22 RX ORDER — ATORVASTATIN CALCIUM 10 MG/1
10 TABLET, FILM COATED ORAL DAILY
Qty: 30 TABLET | Refills: 0 | Status: SHIPPED | OUTPATIENT
Start: 2017-08-22 | End: 2017-08-22

## 2017-08-22 RX ORDER — FOLIC ACID/VIT B COMPLEX AND C 0.8 MG
1 TABLET ORAL DAILY
Qty: 30 TABLET | Refills: 0 | Status: SHIPPED | OUTPATIENT
Start: 2017-08-22 | End: 2018-01-01 | Stop reason: HOSPADM

## 2017-08-22 RX ORDER — VALSARTAN 320 MG/1
320 TABLET ORAL
Qty: 30 TABLET | Refills: 0 | Status: SHIPPED | OUTPATIENT
Start: 2017-08-22 | End: 2017-08-22

## 2017-08-22 RX ORDER — CITALOPRAM 20 MG/1
10 TABLET ORAL DAILY
Status: DISCONTINUED | OUTPATIENT
Start: 2017-08-22 | End: 2017-08-22 | Stop reason: HOSPADM

## 2017-08-22 RX ORDER — FOLIC ACID/VIT B COMPLEX AND C 0.8 MG
1 TABLET ORAL DAILY
Qty: 30 TABLET | Refills: 0 | Status: SHIPPED | OUTPATIENT
Start: 2017-08-22 | End: 2017-08-22

## 2017-08-22 RX ORDER — CITALOPRAM 10 MG/1
10 TABLET ORAL DAILY
Qty: 30 TABLET | Refills: 0 | Status: SHIPPED | OUTPATIENT
Start: 2017-08-22 | End: 2017-08-22

## 2017-08-22 RX ORDER — BISACODYL 10 MG
10 SUPPOSITORY, RECTAL RECTAL DAILY
Status: DISCONTINUED | OUTPATIENT
Start: 2017-08-22 | End: 2017-08-22 | Stop reason: HOSPADM

## 2017-08-22 RX ORDER — VALSARTAN 320 MG/1
320 TABLET ORAL
Qty: 30 TABLET | Refills: 0 | Status: ON HOLD | OUTPATIENT
Start: 2017-08-22 | End: 2018-01-01

## 2017-08-22 RX ORDER — ATORVASTATIN CALCIUM 10 MG/1
10 TABLET, FILM COATED ORAL DAILY
Qty: 30 TABLET | Refills: 0 | Status: ON HOLD | OUTPATIENT
Start: 2017-08-22 | End: 2018-01-01 | Stop reason: ALTCHOICE

## 2017-08-22 RX ADMIN — VALSARTAN 320 MG: 80 TABLET ORAL at 09:09

## 2017-08-22 RX ADMIN — Medication 1 TABLET: at 09:09

## 2017-08-22 RX ADMIN — POLYETHYLENE GLYCOL 3350 17 G: 17 POWDER, FOR SOLUTION ORAL at 09:11

## 2017-08-22 RX ADMIN — AMLODIPINE BESYLATE 5 MG: 5 TABLET ORAL at 09:09

## 2017-08-22 RX ADMIN — ASPIRIN 81 MG: 81 TABLET, COATED ORAL at 09:09

## 2017-08-22 RX ADMIN — BISACODYL 10 MG: 10 SUPPOSITORY RECTAL at 15:10

## 2017-08-22 RX ADMIN — LEVOTHYROXINE SODIUM 75 MCG: 75 TABLET ORAL at 06:43

## 2017-08-22 RX ADMIN — ATORVASTATIN CALCIUM 10 MG: 10 TABLET, FILM COATED ORAL at 09:09

## 2017-08-22 RX ADMIN — INSULIN ASPART 3 UNITS: 100 INJECTION, SOLUTION INTRAVENOUS; SUBCUTANEOUS at 06:44

## 2017-08-22 RX ADMIN — CARVEDILOL 25 MG: 25 TABLET, FILM COATED ORAL at 09:09

## 2017-08-22 RX ADMIN — HYDROCODONE POLISTIREX AND CHLORPHENIRAMINE POLISTIREX 5 ML: 10; 8 SUSPENSION, EXTENDED RELEASE ORAL at 12:12

## 2017-08-22 RX ADMIN — CITALOPRAM HYDROBROMIDE 10 MG: 20 TABLET, FILM COATED ORAL at 12:12

## 2017-08-22 RX ADMIN — INSULIN DETEMIR 10 UNITS: 100 INJECTION, SOLUTION SUBCUTANEOUS at 06:45

## 2017-08-22 RX ADMIN — ACETAMINOPHEN 650 MG: 325 TABLET, FILM COATED ORAL at 06:43

## 2017-08-22 RX ADMIN — INSULIN ASPART 2 UNITS: 100 INJECTION, SOLUTION INTRAVENOUS; SUBCUTANEOUS at 12:13

## 2017-08-22 RX ADMIN — HEPARIN SODIUM 5000 UNITS: 5000 INJECTION, SOLUTION INTRAVENOUS; SUBCUTANEOUS at 09:08

## 2017-08-22 RX ADMIN — LIDOCAINE AND PRILOCAINE: 25; 25 CREAM TOPICAL at 11:58

## 2017-08-22 NOTE — DISCHARGE PLACEMENT REQUEST
"ANTHONY [place] RN  Case Management  Phone:  [phone]; Fax:  [phone]    New oxygen orders    Curtis Mejia (77 y.o. Female)     Date of Birth Social Security Number Address Home Phone MRN    1940  [address] [place] [address] [phone] [id number]    Mormon Marital Status          Taoism        Admission Date Admission Type Admitting Provider Attending Provider Department, Room/Bed    8/16/17 Elective Jordan Ribera MD Gaspar, Gordon BARBOZA MD [facility] MED SURG  3, 319/1    Discharge Date Discharge Disposition Discharge Destination         Home or Self Care             Attending Provider: Gordon Singh MD     Allergies:  Duloxetine, Exenatide, Lisinopril, Penicillins, Phentermine    Isolation:  None   Infection:  None   Code Status:  FULL    Ht:  63\" (160 cm)   Wt:  158 lb 3.2 oz (71.8 kg)    Admission Cmt:  None   Principal Problem:  Dyspnea on exertion [R06.09]                 Active Insurance as of 8/16/2017     Primary Coverage     Payor Plan Insurance Group Employer/Plan Group    MEDICARE MEDICARE A & B      Payor Plan Address Payor Plan Phone Number Effective From Effective To    [address] [phone] 1/1/2003     [place] [address]       Subscriber Name Subscriber Birth Date Member ID       CURTIS MEJIA 1940 [id number]           Secondary Coverage     Payor Plan Insurance Group Employer/Plan Group    MISC MED SUPP MISC MCARE SUPPLEMENT [id number]     Coverage Address Coverage Phone Number Effective From Effective To    [address] [facility] [phone] 5/17/2011     [place] [address]       Subscriber Name Subscriber Birth Date Member ID       CURTIS MEJIA 1940 [id number]                 Emergency Contacts      (Rel.) Home Phone Work Phone Mobile Phone    HermelindoGhassan (Son) [phone] -- [phone]    HermelindoJi (Spouse) -- -- [phone]          [facility] MED SURG  3  [address] " [facility] [address]  Phone:  [phone]  Fax:   Date Ordered: Aug 22, 2017         Patient:  Sigrid Casarez MRN:  [id number]   [address] [place] [id number] :  1940  SSN:    Phone: [phone] Sex:  F     Weight: 158 lb 3.2 oz (71.8 kg)         Ht Readings from Last 1 Encounters:   17 63\" (160 cm)         Oxygen Therapy                   (Order ID: [id number])    Diagnosis:  CHF with left ventricular diastolic dysfunction, NYHA class 2 (I50.30 [ICD-10-CM] 428.30,428.0 [ICD-9-CM])   Quantity:  1     Delivery Modality: Nasal Cannula  Liters Per Minute: 3  Duration: Continuous  Equipment:  Oxygen Concentrator &  &  Portable Gaseous Oxygen System & Portable Oxygen Contents Gaseous &  Conserving Regulator  The face to face evaluation was performed on: 2017  Length of Need (99 Months = Lifetime): 99 Months = Lifetime            Authorizing Provider:Gordon Singh MD  Authorizing Provider's NPI: [id number]  Order Entered By: Gordon Singh MD 2017  3:48 PM     Electronically signed by: Gordon Singh MD 2017  3:48 PM                  Respiratory Therapy Notes (last 24 hours) (Notes from 2017  4:07 PM through 2017  4:07 PM)      Beverly Doan, CRT at 2017  2:31 PM  Version 1 of 1         Exercise Oximetry    Patient Name:Sigrid Casarez   MRN: [id number]   Date: 17             ROOM AIR BASELINE   SpO2% 89   Heart Rate 64   Blood Pressure      EXERCISE ON ROOM AIR SpO2% EXERCISE ON O2 @  LPM SpO2%   1 MINUTE 84 1 MINUTE        2 MINUTES  2 MINUTES   2 lpm NC 87   3 MINUTES  3 MINUTES   3 lpm NC 92   4 MINUTES  4 MINUTES   3 lpm NC 93   5 MINUTES  5 MINUTES    6 MINUTES  6 MINUTES               Distance Walked  Distance Walked  20 FT   Dyspnea (Dania Scale)   Dyspnea (Dania Scale)  2   Fatigue (Dania Scale)   Fatigue (Dania Scale)    SpO2% Post Exercise   SpO2% Post Exercise   94 on 3 lpm NC   HR Post Exercise   HR " Post Exercise   70   Time to Recovery   Time to Recovery    5 Minutes     Comments: Pt walked inside of room.  Pt was only able to walk for 4 minutes.     Electronically signed by Bveerly Doan CRT at 2017  2:34 PM           Discharge Summary      Gordon Singh MD at 2017 12:17 PM              [facility]   DISCHARGE SUMMARY      Name:  Sigrid Casarez   Age:  77 y.o.  Sex:  female  :  1940  MRN:  [id number]   Visit Number:  [id number]  Primary Care Physician:  Tiburcio Hernandez MD  Date of Discharge:  2017  Admission Date:  2017      Discharge Diagnosis:     Principal Problem:    Dyspnea on exertion  Active Problems:    Chronic anemia    Malaise and fatigue    Essential hypertension    Acquired hypothyroidism    Mixed hyperlipidemia    Vitamin D deficiency    Hip fracture, left    Type 2 diabetes mellitus with nephropathy    Chronic kidney disease, stage V    Hypoxia         Consults:     Consults     Date and Time Order Name Status Description    2017 1603 Inpatient Consult to Nephrology Completed              Hospital Course:  The patient was admitted on 2017, please see H&P for further details of HPI and ROS.    Patient is a 78 yo female with a pmhx of HTN/HLD, hypothyroidism, DMII, CKD stage IV secondary to diabetic nephropathy with significant proteinuria which has progressed to end stage renal disease, vitamin D deficiency admitted to the ED for dyspnea on exertion with notable crackles in the lower lung fields bilaterally.  She reported no cough, f/c/s, CP, palpitations, or abdominal pain.  Patient has not responded to diuretic therapy and decision was made by Dr. Mejia for initiation of ongoing dialysis with a , , Sat schedule. Patient SOA is much improved today.  She is for dialysis later today with goal of 2 liters to be removed. Iron and Procrit was given for chronic renal related anemia.  She has hypothyroidism on synthroid with  normal TSH.  Dr. Lynn agrees that patient is stable for DC home today after dialysis with continued f/u with him and her PCP.      Vital Signs:    Temp:  [97.5 °F (36.4 °C)-98.4 °F (36.9 °C)] 97.6 °F (36.4 °C)  Resp:  [18] 18  BP: (129-171)/(49-61) 171/58          Physical Examination:    General Appearance:    Alert and cooperative, not in any acute distress.   Head:    Atraumatic and normocephalic, without obvious abnormality.   Eyes:        PERRLA, Extra-occular movements are within normal limits.    Lungs:     Mild lower bibasilar crackles    Heart:    Normal S1 and S2, no murmur, no gallop, no rub.   Abdomen:     Normal bowel sounds,  Soft non-tender, non-distended, no guarding, no rebound tenderness   Extremities:   Moves all extremities well, no edema             Skin:   No bruising or rash.   Neurologic:  Grossly non focal and moves all extremities equally.          Pertinent Lab Results:     Lab Results (last 24 hours)     Procedure Component Value Units Date/Time    POC Glucose Fingerstick [[id number]]  (Normal) Collected:  08/21/17 1634    Specimen:  Blood Updated:  08/21/17 1706     Glucose 97 mg/dL       Serial Number: [id number]:  [id number]       POC Glucose Fingerstick [[id number]]  (Abnormal) Collected:  08/21/17 2052    Specimen:  Blood Updated:  08/21/17 2140     Glucose 137 (H) mg/dL       Serial Number: [id number]:  [id number]       CBC Auto Differential [[id number]]  (Abnormal) Collected:  08/22/17 0550    Specimen:  Blood Updated:  08/22/17 0627     WBC 7.56 10*3/mm3      RBC 2.63 (L) 10*6/mm3      Hemoglobin 7.8 (C) g/dL      Hematocrit 25.7 (L) %      MCV 97.7 fL      MCH 29.7 pg      MCHC 30.4 g/dL      RDW 14.1 %      RDW-SD 50.4 fl      MPV 12.4 (H) fL      Platelets 167 10*3/mm3      Neutrophil % 61.5 %      Lymphocyte % 22.5 %      Monocyte % 9.1 %      Eosinophil % 4.9 %      Basophil % 0.8 %      Immature Grans % 1.2 (H) %      Neutrophils, Absolute 4.65 10*3/mm3       Lymphocytes, Absolute 1.70 10*3/mm3      Monocytes, Absolute 0.69 10*3/mm3      Eosinophils, Absolute 0.37 10*3/mm3      Basophils, Absolute 0.06 10*3/mm3      Immature Grans, Absolute 0.09 (H) 10*3/mm3      nRBC 0.0 /100 WBC     Renal Function Panel [[id number]]  (Abnormal) Collected:  08/22/17 0550    Specimen:  Blood Updated:  08/22/17 0631     Glucose 186 (H) mg/dL      BUN 27 (H) mg/dL      Creatinine 3.00 (H) mg/dL      Sodium 136 (L) mmol/L      Potassium 4.4 mmol/L      Chloride 101 mmol/L      CO2 28.0 mmol/L      Calcium 8.2 (L) mg/dL      Albumin 2.90 (L) g/dL      Phosphorus 4.2 mg/dL      Anion Gap 11.4 mmol/L      BUN/Creatinine Ratio 9.0     eGFR Non African Amer 15 (L) mL/min/1.73     Narrative:       The MDRD GFR formula is only valid for adults with stable renal function between ages 18 and 70.    POC Glucose Fingerstick [[id number]]  (Abnormal) Collected:  08/22/17 0625    Specimen:  Blood Updated:  08/22/17 0635     Glucose 204 (H) mg/dL       Serial Number: [id number]:  [id number]       POC Glucose Fingerstick [[id number]]  (Abnormal) Collected:  08/22/17 1125    Specimen:  Blood Updated:  08/22/17 1136     Glucose 171 (H) mg/dL       Serial Number: [id number]:  [id number]       Blood Culture [[id number]]  (Normal) Collected:  08/17/17 1200    Specimen:  Blood from Arm, Left Updated:  08/22/17 1301     Blood Culture No growth at 5 days    Blood Culture [[id number]]  (Normal) Collected:  08/17/17 1205    Specimen:  Blood from Arm, Left Updated:  08/22/17 1301     Blood Culture No growth at 5 days          Pertinent Radiology Results:  Imaging Results (most recent)     Procedure Component Value Units Date/Time    XR Chest 1 View [[id number]] Collected:  08/16/17 1641     Updated:  08/16/17 1645    Narrative:       PROCEDURE: XR CHEST 1 VW-     HISTORY: pleural effusions/hypoxia     COMPARISON: Melanie 10, 2017.     FINDINGS: The heart is enlarged. The mediastinum is unremarkable. There  is  opacification of the right lower lobe as well as bilateral pleural  effusions. Slight prominence of the pulmonary vasculature. There is no  pneumothorax.  There are no acute osseous abnormalities.           Impression:       Bilateral pleural effusions and right lower lobe opacity.     Continued followup is recommended.     This report was finalized on 8/16/2017 4:43 PM by Jah Dickinson DO.    NM Lung Ventilation Perfusion Aerosol [[id number]] Collected:  08/17/17 1608     Updated:  08/17/17 1612    Narrative:       PROCEDURE: NM LUNG VENTILATION PERFUSION AEROSOL-     HISTORY: V/Q scan for acute hypoxemia 1 month after hip fracture in pt  with CKD4     PROCEDURE: The patient was injected with 5.3 mCi of Tc 99m MAA.  Aerosolized DTPA at 31.7 mCi was utilized for ventilation. Images over  the lungs were obtained in multiple projections.     COMPARISON: Chest x-ray dated August 16, 2017.     FINDINGS: Overall, perfusion is superior to ventilation. There are no  mismatched defects to suggest PE. Multiple ventilation defects are  noted.       Impression:       Low probability for PE.     This report was finalized on 8/17/2017 4:10 PM by Jah Dickinson DO.    CT Chest Without Contrast [[id number]] Collected:  08/18/17 1630     Updated:  08/18/17 1635    Narrative:       PROCEDURE: CT CHEST WO CONTRAST-     HISTORY: NO contrast, increasing SOA and O2 requirements despite  diuresis, possible RLL PNA, h/o effusions     COMPARISON:  None .     PROCEDURE:  Multiple axial CT images were obtained from the thoracic  inlet through the upper abdomen without the use of contrast.      FINDINGS:   Soft tissue windows reveal no axillary, hilar or mediastinal adenopathy.  Heart size is normal. The aorta is normal in caliber. There are moderate  bilateral pleural effusions. Lung windows reveal diffuse atelectasis in  both lower lobes and within the right middle lobe. There is  centrilobular emphysema. The visualized upper abdomen is  unremarkable.  Bone windows reveal no acute osseous abnormalities.       Impression:       Moderate bilateral pleural effusions with diffuse lower lobe  and right middle lobe atelectasis. An underlying pneumonia is not  entirely excluded.     359.91 mGy.cm          This study was performed with techniques to keep radiation doses as low  as reasonably achievable (ALARA). Individualized dose reduction  techniques using automated exposure control or adjustment of mA and/or  kV according to the patient size were employed.      This report was finalized on 8/18/2017 4:33 PM by Mariel Solano M.D..            Code Status:  FULL     Discharge Disposition:    Home or Self Care    Discharge Medication:     Sigrid Casarez   Home Medication Instructions DANIELA:[id number]    Printed on:08/22/17 9923   Medication Information                      amLODIPine (NORVASC) 5 MG tablet  Take 1 tablet by mouth Daily.             aspirin 81 MG EC tablet  Take 1 tablet by mouth daily.             atorvastatin (LIPITOR) 10 MG tablet  Take 1 tablet by mouth Daily.             b complex-vitamin c-folic acid (NEPHRO-CLEOPATRA) 0.8 MG tablet tablet  Take 1 tablet by mouth Daily.             carvedilol (COREG) 25 MG tablet  Take 1 tablet by mouth 2 (two) times a day.             Cholecalciferol (VITAMIN D) 1000 UNITS tablet  Take 1,000 Units by mouth 2 (Two) Times a Day.             citalopram (CeleXA) 10 MG tablet  Take 1 tablet by mouth Daily.             ferrous sulfate 325 (65 FE) MG tablet  Take 1 tablet by mouth 1 (One) Time Per Week. With meals             glucose blood (FREESTYLE LITE) test strip  3 (three) times a day.             Insulin Pen Needle (BD ULTRA-FINE PEN NEEDLES) 29G X 12.7MM misc  Use 3 times daily             levothyroxine (SYNTHROID, LEVOTHROID) 75 MCG tablet  Take 1 tablet by mouth daily.             ondansetron ODT (ZOFRAN-ODT) 4 MG disintegrating tablet               RELION PEN NEEDLE 31G/8MM 31G X 8 MM misc                TOUJEO SOLOSTAR 300 UNIT/ML solution pen-injector  10 Units Daily.             valsartan (DIOVAN) 320 MG tablet  Take 1 tablet by mouth Daily.                 Discharge Diet:     Diet Instructions     Diet: Consistent Carbohydrate, Cardiac, Renal; Thin       Discharge Diet:   Consistent Carbohydrate  Cardiac  Renal      Fluid Consistency:  Thin                 Activity at Discharge:     Activity Instructions     Activity as Tolerated                     Follow-up Appointments:    Follow-up Information     Follow up with Tiburcio Hernandez MD Follow up on 9/5/2017.    Specialty:  Internal Medicine    Why:  F/U in one week with PCP for CBC and BMP check along with her continued chronic health issues, including DMII, HTN, HLD, and hypothyroidism. ON TUESDAY 9/5/17@11AM    Contact information:    [address] [place] [address] [address] [place] [address] [phone]              Test Results Pending at Discharge:    Patient to continue procrit with HD sessions.        Gordon Singh MD  08/22/17  1:37 PM    Time: Discharge 36 min             Electronically signed by Gordon Singh MD at 8/22/2017  1:37 PM

## 2017-08-22 NOTE — PROGRESS NOTES
Case Management Discharge Note    Final Note: Pt discharged home today with [employer] and [facility] oxygen (3L continuous).    Discharge Placement     Facility/Agency Request Status Selected? Address Phone Number Fax Number    [facility] Pending - Request Sent     [address] [place] [address] [phone]        Other: Other (Private vehicle)    Discharge Codes: 06  Discharged/transferred to home under care of organized home health service organization in anticipation of skilled care

## 2017-08-22 NOTE — PROGRESS NOTES
Continued Stay Note  CHANDAN Matthews     Patient Name: Sigrid Casarez  MRN: [id number]  Today's Date: 8/22/2017    Admit Date: 8/16/2017          Discharge Plan       08/22/17 1339    Case Management/Social Work Plan    Plan Contacted dialysis clinic after speaking to pharmacy and Pro crit is normally given at dialysis and ordered from dialysis clinic.      Patient/Family In Agreement With Plan yes    Final Note    Final Note Following for discharge planning.               Discharge Codes     None        Expected Discharge Date and Time     Expected Discharge Date Expected Discharge Time    Aug 22, 2017             Betty Cruz

## 2017-08-22 NOTE — PLAN OF CARE
Problem: Patient Care Overview (Adult)  Goal: Plan of Care Review  Outcome: Ongoing (interventions implemented as appropriate)    08/22/17 0438   Coping/Psychosocial Response Interventions   Plan Of Care Reviewed With patient   Patient Care Overview   Progress improving   Outcome Evaluation   Outcome Summary/Follow up Plan Patient states she is feeling better and feels her breathing has improved. The patient is currently on 3L O2 NC with her oxygen staying above 90%. The patient was also started on dialysis this admission and is scheduled to have it again today. The patient is resting comfortably with no complaints at this time.

## 2017-08-22 NOTE — DISCHARGE SUMMARY
[facility]      Name:  Sigrid Casarez   Age:  77 y.o.  Sex:  female  :  1940  MRN:  [id number]   Visit Number:  [id number]  Primary Care Physician:  Tiburcio Hernandez MD  Date of Discharge:  2017  Admission Date:  2017      Discharge Diagnosis:     Principal Problem:    Dyspnea on exertion  Active Problems:    Chronic anemia    Malaise and fatigue    Essential hypertension    Acquired hypothyroidism    Mixed hyperlipidemia    Vitamin D deficiency    Hip fracture, left    Type 2 diabetes mellitus with nephropathy    Chronic kidney disease, stage V    Hypoxia         Consults:     Consults     Date and Time Order Name Status Description    2017 1603 Inpatient Consult to Nephrology Completed              Hospital Course:  The patient was admitted on 2017, please see H&P for further details of HPI and ROS.    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  She reported no cough, f/c/s, CP, palpitations, or abdominal pain.  Patient has not responded to diuretic therapy and decision was made by Dr. Mejia for initiation of ongoing dialysis with a T, TH, Sat schedule. Patient SOA is much improved today.  She is for dialysis later today with goal of 2 liters to be removed. Iron and Procrit was given for chronic renal related anemia.  She has hypothyroidism on synthroid with normal TSH.  Dr. Lynn agrees that patient is stable for DC home today after dialysis with continued f/u with him and her PCP.      Vital Signs:    Temp:  [97.5 °F (36.4 °C)-98.4 °F (36.9 °C)] 97.6 °F (36.4 °C)  Resp:  [18] 18  BP: (129-171)/(49-61) 171/58          Physical Examination:    General Appearance:     Alert and cooperative, not in any acute distress.   Head:    Atraumatic and normocephalic, without obvious abnormality.   Eyes:        PERRLA, Extra-occular movements are within normal limits.    Lungs:     Mild lower bibasilar crackles    Heart:    Normal S1 and S2, no murmur, no gallop, no rub.   Abdomen:     Normal bowel sounds,  Soft non-tender, non-distended, no guarding, no rebound tenderness   Extremities:   Moves all extremities well, no edema             Skin:   No bruising or rash.   Neurologic:  Grossly non focal and moves all extremities equally.          Pertinent Lab Results:     Lab Results (last 24 hours)     Procedure Component Value Units Date/Time    POC Glucose Fingerstick [[id number]]  (Normal) Collected:  08/21/17 1634    Specimen:  Blood Updated:  08/21/17 1706     Glucose 97 mg/dL       Serial Number: [id number]:  [id number]       POC Glucose Fingerstick [[id number]]  (Abnormal) Collected:  08/21/17 2052    Specimen:  Blood Updated:  08/21/17 2140     Glucose 137 (H) mg/dL       Serial Number: [id number]:  [id number]       CBC Auto Differential [[id number]]  (Abnormal) Collected:  08/22/17 0550    Specimen:  Blood Updated:  08/22/17 0627     WBC 7.56 10*3/mm3      RBC 2.63 (L) 10*6/mm3      Hemoglobin 7.8 (C) g/dL      Hematocrit 25.7 (L) %      MCV 97.7 fL      MCH 29.7 pg      MCHC 30.4 g/dL      RDW 14.1 %      RDW-SD 50.4 fl      MPV 12.4 (H) fL      Platelets 167 10*3/mm3      Neutrophil % 61.5 %      Lymphocyte % 22.5 %      Monocyte % 9.1 %      Eosinophil % 4.9 %      Basophil % 0.8 %      Immature Grans % 1.2 (H) %      Neutrophils, Absolute 4.65 10*3/mm3      Lymphocytes, Absolute 1.70 10*3/mm3      Monocytes, Absolute 0.69 10*3/mm3      Eosinophils, Absolute 0.37 10*3/mm3      Basophils, Absolute 0.06 10*3/mm3      Immature Grans, Absolute 0.09 (H) 10*3/mm3      nRBC 0.0 /100 WBC     Renal Function Panel [[id number]]  (Abnormal) Collected:  08/22/17 0550    Specimen:  Blood  Updated:  08/22/17 0631     Glucose 186 (H) mg/dL      BUN 27 (H) mg/dL      Creatinine 3.00 (H) mg/dL      Sodium 136 (L) mmol/L      Potassium 4.4 mmol/L      Chloride 101 mmol/L      CO2 28.0 mmol/L      Calcium 8.2 (L) mg/dL      Albumin 2.90 (L) g/dL      Phosphorus 4.2 mg/dL      Anion Gap 11.4 mmol/L      BUN/Creatinine Ratio 9.0     eGFR Non African Amer 15 (L) mL/min/1.73     Narrative:       The MDRD GFR formula is only valid for adults with stable renal function between ages 18 and 70.    POC Glucose Fingerstick [[id number]]  (Abnormal) Collected:  08/22/17 0625    Specimen:  Blood Updated:  08/22/17 0635     Glucose 204 (H) mg/dL       Serial Number: [id number]:  [id number]       POC Glucose Fingerstick [[id number]]  (Abnormal) Collected:  08/22/17 1125    Specimen:  Blood Updated:  08/22/17 1136     Glucose 171 (H) mg/dL       Serial Number: [id number]:  [id number]       Blood Culture [[id number]]  (Normal) Collected:  08/17/17 1200    Specimen:  Blood from Arm, Left Updated:  08/22/17 1301     Blood Culture No growth at 5 days    Blood Culture [[id number]]  (Normal) Collected:  08/17/17 1205    Specimen:  Blood from Arm, Left Updated:  08/22/17 1301     Blood Culture No growth at 5 days          Pertinent Radiology Results:  Imaging Results (most recent)     Procedure Component Value Units Date/Time    XR Chest 1 View [[id number]] Collected:  08/16/17 1641     Updated:  08/16/17 1645    Narrative:       PROCEDURE: XR CHEST 1 VW-     HISTORY: pleural effusions/hypoxia     COMPARISON: Melanie 10, 2017.     FINDINGS: The heart is enlarged. The mediastinum is unremarkable. There  is opacification of the right lower lobe as well as bilateral pleural  effusions. Slight prominence of the pulmonary vasculature. There is no  pneumothorax.  There are no acute osseous abnormalities.           Impression:       Bilateral pleural effusions and right lower lobe opacity.     Continued followup is recommended.      This report was finalized on 8/16/2017 4:43 PM by Jah Dickinson DO.    NM Lung Ventilation Perfusion Aerosol [[id number]] Collected:  08/17/17 1608     Updated:  08/17/17 1612    Narrative:       PROCEDURE: NM LUNG VENTILATION PERFUSION AEROSOL-     HISTORY: V/Q scan for acute hypoxemia 1 month after hip fracture in pt  with CKD4     PROCEDURE: The patient was injected with 5.3 mCi of Tc 99m MAA.  Aerosolized DTPA at 31.7 mCi was utilized for ventilation. Images over  the lungs were obtained in multiple projections.     COMPARISON: Chest x-ray dated August 16, 2017.     FINDINGS: Overall, perfusion is superior to ventilation. There are no  mismatched defects to suggest PE. Multiple ventilation defects are  noted.       Impression:       Low probability for PE.     This report was finalized on 8/17/2017 4:10 PM by Jah Dickinson DO.    CT Chest Without Contrast [[id number]] Collected:  08/18/17 1630     Updated:  08/18/17 1635    Narrative:       PROCEDURE: CT CHEST WO CONTRAST-     HISTORY: NO contrast, increasing SOA and O2 requirements despite  diuresis, possible RLL PNA, h/o effusions     COMPARISON:  None .     PROCEDURE:  Multiple axial CT images were obtained from the thoracic  inlet through the upper abdomen without the use of contrast.      FINDINGS:   Soft tissue windows reveal no axillary, hilar or mediastinal adenopathy.  Heart size is normal. The aorta is normal in caliber. There are moderate  bilateral pleural effusions. Lung windows reveal diffuse atelectasis in  both lower lobes and within the right middle lobe. There is  centrilobular emphysema. The visualized upper abdomen is unremarkable.  Bone windows reveal no acute osseous abnormalities.       Impression:       Moderate bilateral pleural effusions with diffuse lower lobe  and right middle lobe atelectasis. An underlying pneumonia is not  entirely excluded.     359.91 mGy.cm          This study was performed with techniques to keep radiation doses  as low  as reasonably achievable (ALARA). Individualized dose reduction  techniques using automated exposure control or adjustment of mA and/or  kV according to the patient size were employed.      This report was finalized on 8/18/2017 4:33 PM by Mariel Solano M.D..            Code Status:  FULL     Discharge Disposition:    Home or Self Care    Discharge Medication:     Sigrid Casarez   Home Medication Instructions DANIELA:[id number]    Printed on:08/22/17 9036   Medication Information                      amLODIPine (NORVASC) 5 MG tablet  Take 1 tablet by mouth Daily.             aspirin 81 MG EC tablet  Take 1 tablet by mouth daily.             atorvastatin (LIPITOR) 10 MG tablet  Take 1 tablet by mouth Daily.             b complex-vitamin c-folic acid (NEPHRO-CLEOPATRA) 0.8 MG tablet tablet  Take 1 tablet by mouth Daily.             carvedilol (COREG) 25 MG tablet  Take 1 tablet by mouth 2 (two) times a day.             Cholecalciferol (VITAMIN D) 1000 UNITS tablet  Take 1,000 Units by mouth 2 (Two) Times a Day.             citalopram (CeleXA) 10 MG tablet  Take 1 tablet by mouth Daily.             ferrous sulfate 325 (65 FE) MG tablet  Take 1 tablet by mouth 1 (One) Time Per Week. With meals             glucose blood (FREESTYLE LITE) test strip  3 (three) times a day.             Insulin Pen Needle (BD ULTRA-FINE PEN NEEDLES) 29G X 12.7MM misc  Use 3 times daily             levothyroxine (SYNTHROID, LEVOTHROID) 75 MCG tablet  Take 1 tablet by mouth daily.             ondansetron ODT (ZOFRAN-ODT) 4 MG disintegrating tablet               RELION PEN NEEDLE 31G/8MM 31G X 8 MM misc               TOUJEO SOLOSTAR 300 UNIT/ML solution pen-injector  10 Units Daily.             valsartan (DIOVAN) 320 MG tablet  Take 1 tablet by mouth Daily.                 Discharge Diet:     Diet Instructions     Diet: Consistent Carbohydrate, Cardiac, Renal; Thin       Discharge Diet:   Consistent Carbohydrate  Cardiac  Renal       Fluid Consistency:  Thin                 Activity at Discharge:     Activity Instructions     Activity as Tolerated                     Follow-up Appointments:    Follow-up Information     Follow up with Tiburcio Hernandez MD Follow up on 9/5/2017.    Specialty:  Internal Medicine    Why:  F/U in one week with PCP for CBC and BMP check along with her continued chronic health issues, including DMII, HTN, HLD, and hypothyroidism. ON TUESDAY 9/5/17@11AM    Contact information:    [address] [place] [address] [address] CHOWDARY [address] [place] [id number]  [phone]              Test Results Pending at Discharge:    Patient to continue procrit with HD sessions.        Gordon Singh MD  08/22/17  1:37 PM    Time: Discharge 36 min

## 2017-08-22 NOTE — DISCHARGE PLACEMENT REQUEST
"ANTHONY Walker RN  Case Management  Phone:  [phone]; Fax:  [phone]    New  orders    Curtis Mejia (77 y.o. Female)     Date of Birth Social Security Number Address Home Phone MRN    1940  [address] [place] [address] [phone] [id number]    Zoroastrianism Marital Status          Jainism        Admission Date Admission Type Admitting Provider Attending Provider Department, Room/Bed    8/16/17 Elective Jordan Ribera MD Gaspar, Gordon BARBOZA MD [facility] MED SURG  3, 319/1    Discharge Date Discharge Disposition Discharge Destination         Home or Self Care             Attending Provider: Gordon Singh MD     Allergies:  Duloxetine, Exenatide, Lisinopril, Penicillins, Phentermine    Isolation:  None   Infection:  None   Code Status:  FULL    Ht:  63\" (160 cm)   Wt:  158 lb 3.2 oz (71.8 kg)    Admission Cmt:  None   Principal Problem:  Dyspnea on exertion [R06.09]                 Active Insurance as of 8/16/2017     Primary Coverage     Payor Plan Insurance Group Employer/Plan Group    MEDICARE MEDICARE A & B      Payor Plan Address Payor Plan Phone Number Effective From Effective To    [address] [phone] 1/1/2003     [place] [address]       Subscriber Name Subscriber Birth Date Member ID       CURTIS MEJIA 1940 [id number]           Secondary Coverage     Payor Plan Insurance Group Employer/Plan Group    MISC MED SUPP MISC MCARE SUPPLEMENT [id number]     Coverage Address Coverage Phone Number Effective From Effective To    [address] [facility] [phone] 5/17/2011     [place] [address]       Subscriber Name Subscriber Birth Date Member ID       CURTIS MEJIA 1940 [id number]                 Emergency Contacts      (Rel.) Home Phone Work Phone Mobile Phone    HermelindoGhassan (Son) [phone] -- [phone]    HermelindoJi (Spouse) -- -- [phone]          [facility] MED SURG  3  [address] [facility] [address]  Phone:  [phone]  Fax:   Date: Aug 22, 2017      Ambulatory Referral to Home Health     Patient:  Sigrid Casarez MRN:  [id number]   [address] [place] [address] :  1940  SSN:    Phone: [phone] Sex:  F      INSURANCE PAYOR PLAN GROUP # SUBSCRIBER ID   Primary:  Secondary: MEDICARE  MISC MED SUPP [id number]  [id number]    [id number]  [id number]      Referring Provider Information:  ALIA BENITEZ Phone: [phone] Fax:       Referral Information:   # Visits:  1 Referral Type: Home Health [42]   Urgency:  Routine Referral Reason: Specialty Services Required   Start Date: Aug 22, 2017 End Date:  To be determined by Insurer   Diagnosis: Physical deconditioning (R53.81 [ICD-10-CM] 799.3 [ICD-9-CM])      Refer to Dept:   Refer to Provider:   Refer to Facility:       Face to Face Visit Date: 2017  Follow-up Provider for Plan of Care? I treated the patient in an acute care facility and will not continue treatment after discharge.  Follow-up Provider: BELKYS LI [[id number]]  Reason/Clinical Findings: Deconditioning,  Describe mobility limitations that make leaving home difficult: Deconditioning, weakness  Nursing/Therapeutic Services Requested: Skilled Nursing  Nursing/Therapeutic Services Requested: Physical Therapy  Nursing/Therapeutic Services Requested: Occupational Therapy  Skilled nursing orders: Medication education  PT orders: Transfer training  PT orders: Strengthening  PT orders: Gait Training  PT orders: Therapeutic exercise  PT orders: Home safety assessment  Weight Bearing Status: As Tolerated  Occupational orders: Activities of daily living  Occupational orders: Energy conservation  Occupational orders: Strengthening  Occupational orders: Home safety assessment  Frequency: 1 Week 1     This document serves as a request of services and does not constitute Insurance authorization or approval of services.  To determine eligibility, please  contact the members Insurance carrier to verify and review coverage.     If you have medical questions regarding this request for services. Please contact [facility] MED SURG  3 at [phone] between the hours of 8:00am - 5:00pm (Mon-Fri).             Authorizing Provider:Gordon Singh MD  Authorizing Provider's NPI: [id number]  Order Entered By: Gordon Singh MD 2017  1:46 PM     Electronically signed by: Gordon Singh MD 2017  1:46 PM              Discharge Summary      Gordon Singh MD at 2017 12:17 PM              [facility]   [facility]GE SUMMARY      Name:  Sigrid Casarez   Age:  77 y.o.  Sex:  female  :  1940  MRN:  [id number]   Visit Number:  [id number]  Primary Care Physician:  Tiburcio Hernandez MD  Date of Discharge:  2017  Admission Date:  2017      Discharge Diagnosis:     Principal Problem:    Dyspnea on exertion  Active Problems:    Chronic anemia    Malaise and fatigue    Essential hypertension    Acquired hypothyroidism    Mixed hyperlipidemia    Vitamin D deficiency    Hip fracture, left    Type 2 diabetes mellitus with nephropathy    Chronic kidney disease, stage V    Hypoxia         Consults:     Consults     Date and Time Order Name Status Description    2017 1603 Inpatient Consult to Nephrology Completed              Hospital Course:  The patient was admitted on 2017, please see H&P for further details of HPI and ROS.    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  She reported no cough, f/c/s, CP, palpitations, or abdominal pain.  Patient has not responded to diuretic therapy and decision was made by Dr. Mejia for initiation of  ongoing dialysis with a T, TH, Sat schedule. Patient SOA is much improved today.  She is for dialysis later today with goal of 2 liters to be removed. Iron and Procrit was given for chronic renal related anemia.  She has hypothyroidism on synthroid with normal TSH.  Dr. Lynn agrees that patient is stable for DC home today after dialysis with continued f/u with him and her PCP.      Vital Signs:    Temp:  [97.5 °F (36.4 °C)-98.4 °F (36.9 °C)] 97.6 °F (36.4 °C)  Resp:  [18] 18  BP: (129-171)/(49-61) 171/58          Physical Examination:    General Appearance:    Alert and cooperative, not in any acute distress.   Head:    Atraumatic and normocephalic, without obvious abnormality.   Eyes:        PERRLA, Extra-occular movements are within normal limits.    Lungs:     Mild lower bibasilar crackles    Heart:    Normal S1 and S2, no murmur, no gallop, no rub.   Abdomen:     Normal bowel sounds,  Soft non-tender, non-distended, no guarding, no rebound tenderness   Extremities:   Moves all extremities well, no edema             Skin:   No bruising or rash.   Neurologic:  Grossly non focal and moves all extremities equally.          Pertinent Lab Results:     Lab Results (last 24 hours)     Procedure Component Value Units Date/Time    POC Glucose Fingerstick [[id number]]  (Normal) Collected:  08/21/17 1634    Specimen:  Blood Updated:  08/21/17 1706     Glucose 97 mg/dL       Serial Number: [id number]:  [id number]       POC Glucose Fingerstick [[id number]]  (Abnormal) Collected:  08/21/17 2052    Specimen:  Blood Updated:  08/21/17 2140     Glucose 137 (H) mg/dL       Serial Number: [id number]:  [id number]       CBC Auto Differential [[id number]]  (Abnormal) Collected:  08/22/17 0550    Specimen:  Blood Updated:  08/22/17 0627     WBC 7.56 10*3/mm3      RBC 2.63 (L) 10*6/mm3      Hemoglobin 7.8 (C) g/dL      Hematocrit 25.7 (L) %      MCV 97.7 fL      MCH 29.7 pg      MCHC 30.4 g/dL      RDW 14.1 %      RDW-SD 50.4 fl       MPV 12.4 (H) fL      Platelets 167 10*3/mm3      Neutrophil % 61.5 %      Lymphocyte % 22.5 %      Monocyte % 9.1 %      Eosinophil % 4.9 %      Basophil % 0.8 %      Immature Grans % 1.2 (H) %      Neutrophils, Absolute 4.65 10*3/mm3      Lymphocytes, Absolute 1.70 10*3/mm3      Monocytes, Absolute 0.69 10*3/mm3      Eosinophils, Absolute 0.37 10*3/mm3      Basophils, Absolute 0.06 10*3/mm3      Immature Grans, Absolute 0.09 (H) 10*3/mm3      nRBC 0.0 /100 WBC     Renal Function Panel [[id number]]  (Abnormal) Collected:  08/22/17 0550    Specimen:  Blood Updated:  08/22/17 0631     Glucose 186 (H) mg/dL      BUN 27 (H) mg/dL      Creatinine 3.00 (H) mg/dL      Sodium 136 (L) mmol/L      Potassium 4.4 mmol/L      Chloride 101 mmol/L      CO2 28.0 mmol/L      Calcium 8.2 (L) mg/dL      Albumin 2.90 (L) g/dL      Phosphorus 4.2 mg/dL      Anion Gap 11.4 mmol/L      BUN/Creatinine Ratio 9.0     eGFR Non African Amer 15 (L) mL/min/1.73     Narrative:       The MDRD GFR formula is only valid for adults with stable renal function between ages 18 and 70.    POC Glucose Fingerstick [[id number]]  (Abnormal) Collected:  08/22/17 0625    Specimen:  Blood Updated:  08/22/17 0635     Glucose 204 (H) mg/dL       Serial Number: [id number]:  [id number]       POC Glucose Fingerstick [[id number]]  (Abnormal) Collected:  08/22/17 1125    Specimen:  Blood Updated:  08/22/17 1136     Glucose 171 (H) mg/dL       Serial Number: [id number]:  [id number]       Blood Culture [[id number]]  (Normal) Collected:  08/17/17 1200    Specimen:  Blood from Arm, Left Updated:  08/22/17 1301     Blood Culture No growth at 5 days    Blood Culture [[id number]]  (Normal) Collected:  08/17/17 1205    Specimen:  Blood from Arm, Left Updated:  08/22/17 1301     Blood Culture No growth at 5 days          Pertinent Radiology Results:  Imaging Results (most recent)     Procedure Component Value Units Date/Time    XR Chest 1 View [[id number]] Collected:   08/16/17 1641     Updated:  08/16/17 1645    Narrative:       PROCEDURE: XR CHEST 1 VW-     HISTORY: pleural effusions/hypoxia     COMPARISON: Melanie 10, 2017.     FINDINGS: The heart is enlarged. The mediastinum is unremarkable. There  is opacification of the right lower lobe as well as bilateral pleural  effusions. Slight prominence of the pulmonary vasculature. There is no  pneumothorax.  There are no acute osseous abnormalities.           Impression:       Bilateral pleural effusions and right lower lobe opacity.     Continued followup is recommended.     This report was finalized on 8/16/2017 4:43 PM by Jah Dickinson DO.    NM Lung Ventilation Perfusion Aerosol [[id number]] Collected:  08/17/17 1608     Updated:  08/17/17 1612    Narrative:       PROCEDURE: NM LUNG VENTILATION PERFUSION AEROSOL-     HISTORY: V/Q scan for acute hypoxemia 1 month after hip fracture in pt  with CKD4     PROCEDURE: The patient was injected with 5.3 mCi of Tc 99m MAA.  Aerosolized DTPA at 31.7 mCi was utilized for ventilation. Images over  the lungs were obtained in multiple projections.     COMPARISON: Chest x-ray dated August 16, 2017.     FINDINGS: Overall, perfusion is superior to ventilation. There are no  mismatched defects to suggest PE. Multiple ventilation defects are  noted.       Impression:       Low probability for PE.     This report was finalized on 8/17/2017 4:10 PM by Jah Dickinson DO.    CT Chest Without Contrast [[id number]] Collected:  08/18/17 1630     Updated:  08/18/17 1635    Narrative:       PROCEDURE: CT CHEST WO CONTRAST-     HISTORY: NO contrast, increasing SOA and O2 requirements despite  diuresis, possible RLL PNA, h/o effusions     COMPARISON:  None .     PROCEDURE:  Multiple axial CT images were obtained from the thoracic  inlet through the upper abdomen without the use of contrast.      FINDINGS:   Soft tissue windows reveal no axillary, hilar or mediastinal adenopathy.  Heart size is normal. The aorta is  normal in caliber. There are moderate  bilateral pleural effusions. Lung windows reveal diffuse atelectasis in  both lower lobes and within the right middle lobe. There is  centrilobular emphysema. The visualized upper abdomen is unremarkable.  Bone windows reveal no acute osseous abnormalities.       Impression:       Moderate bilateral pleural effusions with diffuse lower lobe  and right middle lobe atelectasis. An underlying pneumonia is not  entirely excluded.     359.91 mGy.cm          This study was performed with techniques to keep radiation doses as low  as reasonably achievable (ALARA). Individualized dose reduction  techniques using automated exposure control or adjustment of mA and/or  kV according to the patient size were employed.      This report was finalized on 8/18/2017 4:33 PM by Mariel Solano M.D..            Code Status:  FULL     Discharge Disposition:    Home or Self Care    Discharge Medication:     Sigrid Casarez   Home Medication Instructions DANIELA:[id number]    Printed on:08/22/17 2997   Medication Information                      amLODIPine (NORVASC) 5 MG tablet  Take 1 tablet by mouth Daily.             aspirin 81 MG EC tablet  Take 1 tablet by mouth daily.             atorvastatin (LIPITOR) 10 MG tablet  Take 1 tablet by mouth Daily.             b complex-vitamin c-folic acid (NEPHRO-CLEOPATRA) 0.8 MG tablet tablet  Take 1 tablet by mouth Daily.             carvedilol (COREG) 25 MG tablet  Take 1 tablet by mouth 2 (two) times a day.             Cholecalciferol (VITAMIN D) 1000 UNITS tablet  Take 1,000 Units by mouth 2 (Two) Times a Day.             citalopram (CeleXA) 10 MG tablet  Take 1 tablet by mouth Daily.             ferrous sulfate 325 (65 FE) MG tablet  Take 1 tablet by mouth 1 (One) Time Per Week. With meals             glucose blood (FREESTYLE LITE) test strip  3 (three) times a day.             Insulin Pen Needle (BD ULTRA-FINE PEN NEEDLES) 29G X 12.7MM misc  Use 3 times  daily             levothyroxine (SYNTHROID, LEVOTHROID) 75 MCG tablet  Take 1 tablet by mouth daily.             ondansetron ODT (ZOFRAN-ODT) 4 MG disintegrating tablet               RELION PEN NEEDLE 31G/8MM 31G X 8 MM misc               TOUJEO SOLOSTAR 300 UNIT/ML solution pen-injector  10 Units Daily.             valsartan (DIOVAN) 320 MG tablet  Take 1 tablet by mouth Daily.                 Discharge Diet:     Diet Instructions     Diet: Consistent Carbohydrate, Cardiac, Renal; Thin       Discharge Diet:   Consistent Carbohydrate  Cardiac  Renal      Fluid Consistency:  Thin                 Activity at Discharge:     Activity Instructions     Activity as Tolerated                     Follow-up Appointments:    Follow-up Information     Follow up with Tiburcio Hernandez MD Follow up on 9/5/2017.    Specialty:  Internal Medicine    Why:  F/U in one week with PCP for CBC and BMP check along with her continued chronic health issues, including DMII, HTN, HLD, and hypothyroidism. ON TUESDAY 9/5/17@11AM    Contact information:    [address] [place] [address] [address]  [address]  [place] [address]  [phone]              Test Results Pending at Discharge:    Patient to continue procrit with HD sessions.        Gordon Singh MD  08/22/17  1:37 PM    Time: Discharge 36 min             Electronically signed by Gordon Singh MD at 8/22/2017  1:37 PM

## 2017-08-22 NOTE — PROGRESS NOTES
Exercise Oximetry    Patient Name:Sigrid Casarez   MRN: [id number]   Date: 08/22/17             ROOM AIR BASELINE   SpO2% 89   Heart Rate 64   Blood Pressure      EXERCISE ON ROOM AIR SpO2% EXERCISE ON O2 @  LPM SpO2%   1 MINUTE 84 1 MINUTE        2 MINUTES  2 MINUTES   2 lpm NC 87   3 MINUTES  3 MINUTES   3 lpm NC 92   4 MINUTES  4 MINUTES   3 lpm NC 93   5 MINUTES  5 MINUTES    6 MINUTES  6 MINUTES               Distance Walked  Distance Walked  20 FT   Dyspnea (Dania Scale)   Dyspnea (Dania Scale)  2   Fatigue (Dania Scale)   Fatigue (Dania Scale)    SpO2% Post Exercise   SpO2% Post Exercise   94 on 3 lpm NC   HR Post Exercise   HR Post Exercise   70   Time to Recovery   Time to Recovery    5 Minutes     Comments: Pt walked inside of room.  Pt was only able to walk for 4 minutes.

## 2017-08-22 NOTE — PROGRESS NOTES
Continued Stay Note  CHANDAN [place]     Patient Name: Sigrid Casarez  MRN: [id number]  Today's Date: 8/22/2017    Admit Date: 8/16/2017          Discharge Plan       08/22/17 1552    Case Management/Social Work Plan    Plan Home    Additional Comments Received new order for oxygen and HH.  Spoke with pt and  in room regarding orders.  Pt had [facility] in the past and would be okay with having them back.  Neither pt nor  remember who pt used for oxygen after she had pneumonia.  They are okay with CM contacting [facility] and starting the process of obtaining the oxgyen.  Called [facility], spoke with Alma Delia.  Update given and clinicals efaxed.  Called [facility], spoke with Priyanka.  Order and clinicals efaxed.      08/22/17 4706    Case Management/Social Work Plan    Plan Contacted dialysis clinic after speaking to pharmacy and Pro crit is normally given at dialysis and ordered from dialysis clinic.      Patient/Family In Agreement With Plan yes    Final Note    Final Note Following for discharge planning.               Discharge Codes     None        Expected Discharge Date and Time     Expected Discharge Date Expected Discharge Time    Aug 22, 2017             Melody Marquez

## 2017-08-22 NOTE — PROGRESS NOTES
"Nephrology Progress Note.    LOS: 5 days    Patient Care Team:  Tiburcio Hrenandez MD as PCP - General    Chief Complaint:  No chief complaint on file.      Subjective    I have reviewed labs/imaging/records from this hospitalization, including ER staff and admitting/attending physicians H/P's and progress notes to establish a comprehensive understanding of this patient's clinical hospital course, as well as to establish plan of care appropriately.     Patient seen and examined this morning.  Events from last night noted.  Patient denies having any fevers chills.  No nausea or vomiting no abdominal pain.  Denies any chest pain, she does feel shortness of breath as well as cough denies any sputum production.  She thinks it's slightly better as compared to yesterday.  There is still significant edema of the lower ext and sacral area.   Patient also denies having new onset weakness of numbness of either extremity.  She said she has not been making much urine.  She did asked for something to help her sleep as she gets very anxious at night and has not been able to sleep.      Review of Systems:    The pertinent  ROS was done and it is noted above, rest  was negative.    Objective     Vital Signs  /58 (BP Location: Left arm, Patient Position: Lying)  Pulse 74  Temp 97.6 °F (36.4 °C) (Oral)   Resp 18  Ht 63\" (160 cm)  Wt 158 lb 3.2 oz (71.8 kg)  SpO2 95%  BMI 28.02 kg/m2           Intake/Output Summary (Last 24 hours) at 08/22/17 0928  Last data filed at 08/22/17 0500   Gross per 24 hour   Intake              360 ml   Output              300 ml   Net               60 ml       Physical Exam:    General Appearance: alert, oriented x 3, no acute distress,   HEENT: pupils round and reactive to light, oral mucosa dry, extra occular movements intact.  Neck: supple, no JVD, trachea midline  Lungs: She has crackles about 1/4 up the chest.  Heart: RRR, normal S1 and S2, no S3, no rub  Abdomen: soft, non-tender, no " palpable bladder, present bowel sounds to auscultation  Extremities: 1-2 +  edema, no cyanosis or clubbing.   Neuro: normal speech and mental status, grossly non focal.     Results Review:      Results from last 7 days  Lab Units 08/22/17  0550 08/21/17  0534 08/20/17  0608   SODIUM mmol/L 136* 137 137   POTASSIUM mmol/L 4.4 4.3 4.1   CHLORIDE mmol/L 101 102 104   CO2 mmol/L 28.0 28.0 22.0*   BUN mg/dL 27* 17 25*   CREATININE mg/dL 3.00* 2.50* 2.50*   CALCIUM mg/dL 8.2* 7.9* 8.2*   GLUCOSE mg/dL 186* 231* 189*       Estimated Creatinine Clearance: 14.5 mL/min (by C-G formula based on Cr of 3).      Results from last 7 days  Lab Units 08/22/17  0550 08/21/17  0534 08/20/17  0608   PHOSPHORUS mg/dL 4.2 3.9 3.5               Results from last 7 days  Lab Units 08/22/17  0550 08/21/17  0534 08/20/17  0608 08/19/17  0533 08/18/17  0543   WBC 10*3/mm3 7.56 7.22 9.44 9.17 6.73   HEMOGLOBIN g/dL 7.8* 7.9* 8.2* 8.9* 9.0*   PLATELETS 10*3/mm3 167 165 195 213 213               Imaging Results (last 24 hours)     ** No results found for the last 24 hours. **          amLODIPine 5 mg Oral Q24H   aspirin 81 mg Oral Daily   atorvastatin 10 mg Oral Daily   b complex-vitamin c-folic acid 1 tablet Oral Daily   carvedilol 25 mg Oral BID   epoetin kusum 20,000 Units Subcutaneous Once per day on Mon Wed Fri   heparin (porcine) 5,000 Units Subcutaneous Q12H   insulin aspart 0-7 Units Subcutaneous 4x Daily AC & at Bedtime   insulin detemir 10 Units Subcutaneous QAM   levothyroxine 75 mcg Oral Daily   lidocaine-prilocaine  Topical Once   polyethylene glycol 17 g Oral Daily   valsartan 320 mg Oral Q24H          Medication Review:   Current Facility-Administered Medications   Medication Dose Route Frequency Provider Last Rate Last Dose   • acetaminophen (TYLENOL) tablet 650 mg  650 mg Oral Q6H PRN Gordon Singh MD   650 mg at 08/22/17 0643   • amLODIPine (NORVASC) tablet 5 mg  5 mg Oral Q24H Deric Mejia MD   5 mg at 08/22/17 0909   •  aspirin EC tablet 81 mg  81 mg Oral Daily Moy LEANNE Salas, DO   81 mg at 08/22/17 0909   • atorvastatin (LIPITOR) tablet 10 mg  10 mg Oral Daily Moy K Maritza, DO   10 mg at 08/22/17 0909   • b complex-vitamin c-folic acid (NEPHRO-CLEOPATRA) tablet 1 tablet  1 tablet Oral Daily Deric Mejia MD   1 tablet at 08/22/17 0909   • carvedilol (COREG) tablet 25 mg  25 mg Oral BID Moy LEANNE Salas, DO   25 mg at 08/22/17 0909   • dextrose (D50W) solution 25 g  25 g Intravenous Q15 Min PRN Moy Salas, DO       • dextrose (INSTA-GLUCOSE) 77.4 % gel 1 tube  1 tube Oral Q15 Min PRN Moy Salas, DO       • epoetin kusum (EPOGEN,PROCRIT) injection 20,000 Units  20,000 Units Subcutaneous Once per day on Mon Wed Fri Deric Mejia MD   20,000 Units at 08/21/17 1216   • glucagon (human recombinant) (GLUCAGEN DIAGNOSTIC) injection 1 mg  1 mg Subcutaneous Q15 Min PRN Moy Salas, DO       • heparin (porcine) 5000 UNIT/ML injection 5,000 Units  5,000 Units Subcutaneous Q12H Moy Salas, DO   5,000 Units at 08/22/17 0908   • hydrALAZINE (APRESOLINE) injection 10 mg  10 mg Intravenous Q2H PRN April G Genesis, DO   10 mg at 08/18/17 1032   • HYDROcod Polst-CPM Polst ER (TUSSIONEX PENNKINETIC) 10-8 MG/5ML ER suspension 5 mL  5 mL Oral Q12H PRN Moe Barrera, DO   5 mL at 08/20/17 0211   • insulin aspart (novoLOG) injection 0-7 Units  0-7 Units Subcutaneous 4x Daily AC & at Bedtime Moy Salas, DO   3 Units at 08/22/17 0644   • insulin detemir (LEVEMIR) injection 10 Units  10 Units Subcutaneous QAM Moy Salas, DO   10 Units at 08/22/17 0645   • ipratropium-albuterol (DUO-NEB) nebulizer solution 3 mL  3 mL Nebulization Q4H PRN Jordan Ribera MD   3 mL at 08/17/17 0650   • ipratropium-albuterol (DUO-NEB) nebulizer solution 3 mL  3 mL Nebulization Q6H PRN Moy Salas DO   3 mL at 08/16/17 2223   • levothyroxine (SYNTHROID, LEVOTHROID) tablet 75 mcg  75 mcg Oral Daily Moy Salas DO   75 mcg at  08/22/17 0643   • lidocaine-prilocaine (EMLA) 2.5-2.5 % cream   Topical Once Deric Mejia MD       • magnesium hydroxide (MILK OF MAGNESIA) suspension 2400 mg/10mL 10 mL  10 mL Oral Daily PRN Shiva Mancini MD       • ondansetron ODT (ZOFRAN-ODT) disintegrating tablet 4 mg  4 mg Oral Q6H PRN Moy Salas DO   4 mg at 08/20/17 0749   • polyethylene glycol (MIRALAX) powder 17 g  17 g Oral Daily Shiva Mancini MD   17 g at 08/22/17 0911   • sodium chloride 0.9 % bolus 1,000 mL  1,000 mL Intravenous PRN Deric Mejia MD       • sodium chloride 0.9 % flush 1-10 mL  1-10 mL Intravenous PRN Moy Salas DO       • traZODone (DESYREL) tablet 50 mg  50 mg Oral Nightly PRN Moe Barrera DO   50 mg at 08/21/17 2059   • valsartan (DIOVAN) tablet 320 mg  320 mg Oral Q24H Deric Mejia MD   320 mg at 08/22/17 0909       Assessment/Plan     1.   Chronic kidney disease, stage V- due to diabetic nephropathy with significant proteinuria unable to tolerate ARB due to recurrent hyperkalemia. She is s/p access placement which is maturing but not ready for dialysis. Will watch and optimize the volume status, She has not responded as good to the diuretics, details discussed with the patient as well as her .  She does not tolerated dialysis well we'll go ahead and do another dialysis today as ordered.  We'll consult  for outpatient dialysis placement. It appears she will have a Tuesday Thursday Saturday schedule, I will go ahead and plan dialysis todauy.  Her volume status is much better we'll try another 2 L ultrafiltration with dialysis today.      Outpatient dialysis orders are as follows,  Dialyzer , time 3 hours and 15 minutes, standard bath 2.5 calcium and 2 potassium.  30 bicarbonate, dialysate flow A 1.5,  Labs: Standard labs for diabetic Dialysis access: Right upper arm fistula and will need to use 17 gauge needles for until mature fistula.    2.   Chronic anemia- with h/o AUGUST use. I  will go ahead and give more iron and Procrit if needed.  It will be continued with dialysis as an outpatient. First injection of Procrit given today  3.   Essential hypertension- treated, optimize the medication as ordered, blood pressure much better with medication changes stent yesterday.  4.   Acquired hypothyroidism- treated  5.   Mixed hyperlipidemia- treated  6.   Vitamin D deficiency: continue oral supplement.  7.   Hypothyroidism: TSH appears to be within range.  8.   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   Anxiety: She is fairly anxious and did ask something to help her sleep at night in the hospital.  I will go ahead and start 25 of Seroquel at night and see how she'll do with that.      Details were discussed with the patient as well as family in the room.  I will also discussed the assessment and plan with the hospitalist as well as  for her placement.  If everything is in place she likely can be discharged home later today.  She'll follow-up with me at the dialysis clinic next week.  Her medications have been optimized she can be discharged with the current medications that she is on.    Rest as ordered.    Deric Mejia MD  08/22/17  9:28 AM      EMR Dragon/Transcription disclaimer:   Much of this encounter note is an electronic transcription/translation of spoken language to printed text. The electronic translation of spoken language may permit erroneous, or at times, nonsensical words or phrases to be inadvertently transcribed; Although I have reviewed the note for such errors, some may still exist.

## 2017-08-22 NOTE — DISCHARGE INSTR - LAB
FOLLOW UP WITH PRIMARY CARE DOCTOR, DR. LI ON September 5TH AT 11:00AM.      DIALYSIS ON Tuesday, THURSDAY, AND Saturday AT NOON

## 2017-08-31 ENCOUNTER — HOSPITAL (OUTPATIENT)
Dept: OTHER | Age: 77
End: 2017-08-31
Attending: PAIN MEDICINE

## 2017-08-31 RX ORDER — FUROSEMIDE 20 MG/1
TABLET ORAL
Qty: 180 TABLET | Refills: 2 | OUTPATIENT
Start: 2017-08-31

## 2017-09-01 ENCOUNTER — OFFICE VISIT (OUTPATIENT)
Dept: ENDOCRINOLOGY | Facility: CLINIC | Age: 77
End: 2017-09-01

## 2017-09-01 VITALS
HEIGHT: 63 IN | DIASTOLIC BLOOD PRESSURE: 68 MMHG | BODY MASS INDEX: 25.87 KG/M2 | SYSTOLIC BLOOD PRESSURE: 138 MMHG | OXYGEN SATURATION: 97 % | HEART RATE: 67 BPM | WEIGHT: 146 LBS

## 2017-09-01 DIAGNOSIS — I10 ESSENTIAL HYPERTENSION: ICD-10-CM

## 2017-09-01 DIAGNOSIS — E03.9 ACQUIRED HYPOTHYROIDISM: ICD-10-CM

## 2017-09-01 DIAGNOSIS — E10.3599 PROLIFERATIVE DIABETIC RETINOPATHY WITHOUT MACULAR EDEMA ASSOCIATED WITH TYPE 1 DIABETES MELLITUS (HCC): Primary | ICD-10-CM

## 2017-09-01 DIAGNOSIS — E78.2 MIXED HYPERLIPIDEMIA: ICD-10-CM

## 2017-09-01 DIAGNOSIS — N18.5 CHRONIC KIDNEY DISEASE, STAGE V (HCC): ICD-10-CM

## 2017-09-01 PROBLEM — E11.21 TYPE 2 DIABETES MELLITUS WITH NEPHROPATHY (HCC): Status: RESOLVED | Noted: 2017-06-10 | Resolved: 2017-09-01

## 2017-09-01 LAB
GLUCOSE BLDC GLUCOMTR-MCNC: 141 MG/DL (ref 70–130)
HBA1C MFR BLD: 6.7 %

## 2017-09-01 PROCEDURE — 82947 ASSAY GLUCOSE BLOOD QUANT: CPT | Performed by: INTERNAL MEDICINE

## 2017-09-01 PROCEDURE — 99214 OFFICE O/P EST MOD 30 MIN: CPT | Performed by: INTERNAL MEDICINE

## 2017-09-01 PROCEDURE — 83036 HEMOGLOBIN GLYCOSYLATED A1C: CPT | Performed by: INTERNAL MEDICINE

## 2017-09-01 RX ORDER — FUROSEMIDE 20 MG/1
TABLET ORAL
COMMUNITY
Start: 2017-08-31 | End: 2017-09-05 | Stop reason: SDUPTHER

## 2017-09-01 RX ORDER — LIDOCAINE AND PRILOCAINE 25; 25 MG/G; MG/G
1 CREAM TOPICAL 3 TIMES WEEKLY
COMMUNITY
Start: 2017-08-23

## 2017-09-01 NOTE — PROGRESS NOTES
Sigrid Casarez 77 y.o.  CC: Diabetes (Follow Up ) and Hypothyroidism      Chief Complaint   Patient presents with   • Diabetes     Follow Up    • Hypothyroidism       [place]: Diabetes (Follow Up ) and Hypothyroidism    In interim in June fell in the yard and broke hip- had surgery and went to rehab (3 screws - repair but no thr)  Is on dialysis now   Shortness of breath with low oxygen - in hospital (due to kidney failure - went on dialysis at that time- Dr Baker)  Dialysis Tues, Thurs, Saturday  First night after dialysis fainted and hit face on floor   Is on oxygen now   Is up to date with eye exam  90 day average sugar reviewed  Results for orders placed or performed in visit on 09/01/17   POC Glucose Fingerstick   Result Value Ref Range    Glucose 141 (A) 70 - 130 mg/dL   POC Glycosylated Hemoglobin (Hb A1C)   Result Value Ref Range    Hemoglobin A1C 6.7 %     Average sugar 140     Allergies   Allergen Reactions   • Duloxetine    • Exenatide    • Lisinopril Cough   • Penicillins    • Phentermine        Current Outpatient Prescriptions:   •  amLODIPine (NORVASC) 5 MG tablet, Take 1 tablet by mouth Daily., Disp: , Rfl:   •  aspirin 81 MG EC tablet, Take 1 tablet by mouth daily., Disp: , Rfl:   •  atorvastatin (LIPITOR) 10 MG tablet, Take 1 tablet by mouth Daily., Disp: 30 tablet, Rfl: 0  •  b complex-vitamin c-folic acid (NEPHRO-CLEOPATRA) 0.8 MG tablet tablet, Take 1 tablet by mouth Daily., Disp: 30 tablet, Rfl: 0  •  carvedilol (COREG) 25 MG tablet, Take 1 tablet by mouth 2 (two) times a day., Disp: , Rfl:   •  Cholecalciferol (VITAMIN D) 1000 UNITS tablet, Take 1,000 Units by mouth 2 (Two) Times a Day., Disp: , Rfl:   •  citalopram (CeleXA) 10 MG tablet, Take 1 tablet by mouth Daily., Disp: 30 tablet, Rfl: 0  •  ferrous sulfate 325 (65 FE) MG tablet, Take 1 tablet by mouth 1 (One) Time Per Week. With meals, Disp: , Rfl:   •  furosemide (LASIX) 20 MG tablet, , Disp: , Rfl:   •  glucose blood (FREESTYLE LITE) test  strip, 3 (three) times a day., Disp: , Rfl:   •  Insulin Pen Needle (BD ULTRA-FINE PEN NEEDLES) 29G X 12.7MM misc, Use 3 times daily, Disp: , Rfl:   •  levothyroxine (SYNTHROID, LEVOTHROID) 75 MCG tablet, Take 1 tablet by mouth daily., Disp: 30 tablet, Rfl: 5  •  lidocaine-prilocaine (EMLA) 2.5-2.5 % cream, , Disp: , Rfl:   •  ondansetron ODT (ZOFRAN-ODT) 4 MG disintegrating tablet, , Disp: , Rfl:   •  RELION PEN NEEDLE 31G/8MM 31G X 8 MM misc, , Disp: , Rfl:   •  TOUJEO SOLOSTAR 300 UNIT/ML solution pen-injector, 10 Units Daily., Disp: , Rfl:   •  valsartan (DIOVAN) 320 MG tablet, Take 1 tablet by mouth Daily., Disp: 30 tablet, Rfl: 0  Patient Active Problem List    Diagnosis   • Dyspnea on exertion [R06.09]   • Hypoxia [R09.02]   • 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]   • Chronic kidney disease, stage V [N18.5]   • Hip fracture, left [S72.002A]   • Status post fall [Z91.81]   • Type 2 diabetes mellitus with nephropathy [E11.21]   • Chronic kidney disease, stage IV (severe) [N18.4]   • Bilateral nondiabetic proliferative retinopathy [H35.23]   • Chronic anemia [D64.9]     Overview Note:     Impression: 09/09/2015 - check cbc, iron and vitamin B12  Impression: 01/06/2015 - acd - followed by nephrology;      • Depression [F32.9]     Overview Note:     Impression: 01/06/2015 - add citalopram 20 mg daily;      • Diabetic peripheral neuropathy [E11.42]     Overview Note:     Impression: 05/15/2015 - stable without foot lesion  Impression: 04/30/2014 - trial elevil- if no help use ultram.;      • 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 [E10.3599]     Overview Note:     Impression: 05/15/2015 - discussed f/u - discuss occulomotor findings; Description: [facility] 2010     • Dizziness [R42]     Overview Note:     Impression: 09/09/2015 - some postural but not all orthostatic in nature  repeat bp is good  has tried zofran and meclizine  cat scan normal  refer ent,  MRI scheduled  Impression: 05/15/2015 - supine frontal ha  check cat scan  meclizine at   zofran prn; Description: [facility]- vestibular training.     • Malaise and fatigue [R53.81, R53.83]   • Essential hypertension [I10]     Overview Note:     Impression: 09/09/2015 - higher bp, repeat improved  check bp daily at home, call if over 145/85  Impression: 05/15/2015 - bp repeat 172/70  add cardura 1 mg bid  Impression: 05/15/2015 - bp repeat 172/70  Impression: 01/06/2015 - bp high- double lasix  cont f/u with nephrology  Impression: 09/30/2014 - bp is good today, no changes  Impression: 04/30/2014 - bp is stable overall, goals reviewed;      • Hypoglycemia [E16.2]   • Acquired hypothyroidism [E03.9]     Overview Note:     Impression: 09/09/2015 - update tfts  we recently changed  Impression: 05/15/2015 - last tsh 1/15 normal  Impression: 01/06/2015 - we reduced supplement dose - check tft  Impression: 09/30/2014 - check tft  Impression: 04/30/2014 - check tft;      • Diabetic macular edema [E11.311]     Overview Note:     Impression: 09/30/2014 - stable eye exam per patient;      • Menopause present [Z78.0]   • Mixed hyperlipidemia [E78.2]     Overview Note:     Impression: 09/09/2015 - check flp  Impression: 09/30/2014 - update flp; Description: check flp     • Osteopenia [M85.80]     Overview Note:     Description: 3/10     • Vitamin D deficiency [E55.9]     Overview Note:     Impression: 09/09/2015 - repeat levels;        Review of Systems   Constitutional: Negative for activity change, appetite change, chills, diaphoresis, fatigue, fever and unexpected weight change.   HENT: Negative for congestion, dental problem, drooling, ear discharge, ear pain, facial swelling, hearing loss, mouth sores, nosebleeds, postnasal drip, rhinorrhea, sinus pressure, sneezing, sore throat, tinnitus, trouble swallowing and voice change.    Eyes: Negative for photophobia, pain, discharge, redness, itching and visual disturbance.    Respiratory: Negative for apnea, cough, choking, chest tightness, shortness of breath, wheezing and stridor.    Cardiovascular: Negative for chest pain, palpitations and leg swelling.   Gastrointestinal: Negative for abdominal distention, abdominal pain, anal bleeding, blood in stool, constipation, diarrhea, nausea, rectal pain and vomiting.   Endocrine: Negative for cold intolerance, heat intolerance, polydipsia, polyphagia and polyuria.   Genitourinary: Negative for decreased urine volume, difficulty urinating, dysuria, enuresis, flank pain, frequency, genital sores, hematuria and urgency.        Esrd on hemodialysis    Musculoskeletal: Positive for arthralgias. Negative for back pain, gait problem, joint swelling, myalgias, neck pain and neck stiffness.   Skin: Negative for color change, pallor, rash and wound.        Right facial bruising    Allergic/Immunologic: Negative for environmental allergies, food allergies and immunocompromised state.   Neurological: Negative for dizziness, tremors, seizures, syncope, facial asymmetry, speech difficulty, weakness, light-headedness, numbness and headaches.   Hematological: Negative for adenopathy. Does not bruise/bleed easily.   Psychiatric/Behavioral: Negative for agitation, behavioral problems, confusion, decreased concentration, dysphoric mood, hallucinations, self-injury, sleep disturbance and suicidal ideas. The patient is not nervous/anxious and is not hyperactive.      Social History     Social History   • Marital status:      Spouse name: N/A   • Number of children: N/A   • Years of education: N/A     Occupational History   • Not on file.     Social History Main Topics   • Smoking status: Former Smoker   • Smokeless tobacco: Not on file   • Alcohol use No   • Drug use: No   • Sexual activity: Defer     Other Topics Concern   • Not on file     Social History Narrative     Family History   Problem Relation Age of Onset   • Diabetes Other    • Heart disease  "Other    • Heart attack Mother    • Lung cancer Father      /68  Pulse 67  Ht 63\" (160 cm)  Wt 146 lb (66.2 kg)  SpO2 97%  BMI 25.86 kg/m2  Physical Exam   Constitutional: She is oriented to person, place, and time. She appears well-developed and well-nourished.   HENT:   Head: Normocephalic and atraumatic.   Nose: Nose normal.   Mouth/Throat: Oropharynx is clear and moist.   Eyes: Conjunctivae, EOM and lids are normal. Pupils are equal, round, and reactive to light.   Neck: Trachea normal and normal range of motion. Neck supple. Carotid bruit is not present. No tracheal deviation present. No thyroid mass and no thyromegaly present.   Cardiovascular: Normal rate, regular rhythm, normal heart sounds and intact distal pulses.  Exam reveals no gallop and no friction rub.    No murmur heard.  Pulmonary/Chest: Effort normal and breath sounds normal. No respiratory distress. She has no wheezes.   Musculoskeletal: Normal range of motion. She exhibits no edema or deformity.       Neurological Sensory Findings - Altered hot/cold right ankle/foot discrimination and altered hot/cold left ankle/foot discrimination. Altered sharp/dull right ankle/foot discrimination and altered sharp/dull left ankle/foot discrimination. Right ankle/foot altered proprioception and left ankle/foot altered proprioception.    Vascular Status -  Her exam exhibits right foot vasculature normal. Her exam exhibits no right foot edema. Her exam exhibits left foot vasculature normal. Her exam exhibits no left foot edema.   Skin Integrity  -  Her right foot skin is intact.     Sigrid 's left foot skin is intact. .  Lymphadenopathy:     She has no cervical adenopathy.   Neurological: She is alert and oriented to person, place, and time. She has normal reflexes. She displays normal reflexes. No cranial nerve deficit.   Skin: Skin is warm and dry. No rash noted. No cyanosis or erythema. Nails show no clubbing.   Psychiatric: She has a normal mood and " affect. Her speech is normal and behavior is normal. Judgment and thought content normal. Cognition and memory are normal.   Nursing note and vitals reviewed.    Results for orders placed or performed during the hospital encounter of 08/16/17   MRSA Screen Culture   Result Value Ref Range    MRSA SCREEN CX       No Methicillin Resistant Staphylococcus aureus isolated   Blood Culture   Result Value Ref Range    Blood Culture No growth at 5 days    Blood Culture   Result Value Ref Range    Blood Culture No growth at 5 days    Renal Function Panel   Result Value Ref Range    Glucose 147 (H) 74 - 98 mg/dL    BUN 38 (H) 7 - 20 mg/dL    Creatinine 3.20 (H) 0.60 - 1.30 mg/dL    Sodium 139 137 - 145 mmol/L    Potassium 4.4 3.5 - 5.1 mmol/L    Chloride 108 (H) 98 - 107 mmol/L    CO2 20.0 (L) 26.0 - 30.0 mmol/L    Calcium 8.4 8.4 - 10.2 mg/dL    Albumin 3.20 (L) 3.50 - 5.00 g/dL    Phosphorus 4.7 (H) 2.5 - 4.5 mg/dL    Anion Gap 15.4 mmol/L    BUN/Creatinine Ratio 11.9 7.1 - 23.5    eGFR Non African Amer 14 (L) >60 mL/min/1.73   Troponin   Result Value Ref Range    Troponin I <0.012 0.000 - 0.034 ng/mL   CBC Auto Differential   Result Value Ref Range    WBC 4.76 (L) 4.80 - 10.80 10*3/mm3    RBC 2.97 (L) 4.20 - 5.40 10*6/mm3    Hemoglobin 9.0 (L) 12.0 - 16.0 g/dL    Hematocrit 28.2 (L) 37.0 - 47.0 %    MCV 94.9 81.0 - 99.0 fL    MCH 30.3 27.0 - 31.0 pg    MCHC 31.9 30.0 - 37.0 g/dL    RDW 14.4 11.5 - 14.5 %    RDW-SD 49.5 37.0 - 54.0 fl    MPV 11.2 6.0 - 12.0 fL    Platelets 193 130 - 400 10*3/mm3    Neutrophil % 70.8 37.0 - 80.0 %    Lymphocyte % 17.0 10.0 - 50.0 %    Monocyte % 7.1 0.0 - 12.0 %    Eosinophil % 3.2 0.0 - 7.0 %    Basophil % 1.5 0.0 - 2.5 %    Immature Grans % 0.4 0.0 - 0.6 %    Neutrophils, Absolute 3.37 2.00 - 6.90 10*3/mm3    Lymphocytes, Absolute 0.81 0.60 - 3.40 10*3/mm3    Monocytes, Absolute 0.34 0.00 - 0.90 10*3/mm3    Eosinophils, Absolute 0.15 0.00 - 0.70 10*3/mm3    Basophils, Absolute 0.07 0.00 -  0.20 10*3/mm3    Immature Grans, Absolute 0.02 0.00 - 0.06 10*3/mm3    nRBC 0.0 0.0 - 0.0 /100 WBC   CBC Auto Differential   Result Value Ref Range    WBC 5.41 4.80 - 10.80 10*3/mm3    RBC 2.86 (L) 4.20 - 5.40 10*6/mm3    Hemoglobin 8.6 (L) 12.0 - 16.0 g/dL    Hematocrit 27.2 (L) 37.0 - 47.0 %    MCV 95.1 81.0 - 99.0 fL    MCH 30.1 27.0 - 31.0 pg    MCHC 31.6 30.0 - 37.0 g/dL    RDW 14.4 11.5 - 14.5 %    RDW-SD 49.6 37.0 - 54.0 fl    MPV 11.7 6.0 - 12.0 fL    Platelets 203 130 - 400 10*3/mm3    Neutrophil % 63.8 37.0 - 80.0 %    Lymphocyte % 20.5 10.0 - 50.0 %    Monocyte % 8.9 0.0 - 12.0 %    Eosinophil % 4.4 0.0 - 7.0 %    Basophil % 1.5 0.0 - 2.5 %    Immature Grans % 0.9 (H) 0.0 - 0.6 %    Neutrophils, Absolute 3.45 2.00 - 6.90 10*3/mm3    Lymphocytes, Absolute 1.11 0.60 - 3.40 10*3/mm3    Monocytes, Absolute 0.48 0.00 - 0.90 10*3/mm3    Eosinophils, Absolute 0.24 0.00 - 0.70 10*3/mm3    Basophils, Absolute 0.08 0.00 - 0.20 10*3/mm3    Immature Grans, Absolute 0.05 0.00 - 0.06 10*3/mm3    nRBC 0.0 0.0 - 0.0 /100 WBC   Renal Function Panel   Result Value Ref Range    Glucose 172 (H) 74 - 98 mg/dL    BUN 39 (H) 7 - 20 mg/dL    Creatinine 3.10 (H) 0.60 - 1.30 mg/dL    Sodium 141 137 - 145 mmol/L    Potassium 4.4 3.5 - 5.1 mmol/L    Chloride 110 (H) 98 - 107 mmol/L    CO2 22.0 (L) 26.0 - 30.0 mmol/L    Calcium 8.4 8.4 - 10.2 mg/dL    Albumin 2.90 (L) 3.50 - 5.00 g/dL    Phosphorus 4.7 (H) 2.5 - 4.5 mg/dL    Anion Gap 13.4 mmol/L    BUN/Creatinine Ratio 12.6 7.1 - 23.5    eGFR Non African Amer 14 (L) >60 mL/min/1.73   CBC Auto Differential   Result Value Ref Range    WBC 6.73 4.80 - 10.80 10*3/mm3    RBC 2.94 (L) 4.20 - 5.40 10*6/mm3    Hemoglobin 9.0 (L) 12.0 - 16.0 g/dL    Hematocrit 28.1 (L) 37.0 - 47.0 %    MCV 95.6 81.0 - 99.0 fL    MCH 30.6 27.0 - 31.0 pg    MCHC 32.0 30.0 - 37.0 g/dL    RDW 14.4 11.5 - 14.5 %    RDW-SD 50.2 37.0 - 54.0 fl    MPV 11.5 6.0 - 12.0 fL    Platelets 213 130 - 400 10*3/mm3     Neutrophil % 67.1 37.0 - 80.0 %    Lymphocyte % 19.3 10.0 - 50.0 %    Monocyte % 7.0 0.0 - 12.0 %    Eosinophil % 4.5 0.0 - 7.0 %    Basophil % 1.2 0.0 - 2.5 %    Immature Grans % 0.9 (H) 0.0 - 0.6 %    Neutrophils, Absolute 4.52 2.00 - 6.90 10*3/mm3    Lymphocytes, Absolute 1.30 0.60 - 3.40 10*3/mm3    Monocytes, Absolute 0.47 0.00 - 0.90 10*3/mm3    Eosinophils, Absolute 0.30 0.00 - 0.70 10*3/mm3    Basophils, Absolute 0.08 0.00 - 0.20 10*3/mm3    Immature Grans, Absolute 0.06 0.00 - 0.06 10*3/mm3    nRBC 0.0 0.0 - 0.0 /100 WBC   Renal Function Panel   Result Value Ref Range    Glucose 282 (H) 74 - 98 mg/dL    BUN 37 (H) 7 - 20 mg/dL    Creatinine 3.10 (H) 0.60 - 1.30 mg/dL    Sodium 140 137 - 145 mmol/L    Potassium 4.1 3.5 - 5.1 mmol/L    Chloride 108 (H) 98 - 107 mmol/L    CO2 21.0 (L) 26.0 - 30.0 mmol/L    Calcium 8.3 (L) 8.4 - 10.2 mg/dL    Albumin 3.00 (L) 3.50 - 5.00 g/dL    Phosphorus 4.5 2.5 - 4.5 mg/dL    Anion Gap 15.1 mmol/L    BUN/Creatinine Ratio 11.9 7.1 - 23.5    eGFR Non African Amer 14 (L) >60 mL/min/1.73   CBC Auto Differential   Result Value Ref Range    WBC 9.17 4.80 - 10.80 10*3/mm3    RBC 2.93 (L) 4.20 - 5.40 10*6/mm3    Hemoglobin 8.9 (L) 12.0 - 16.0 g/dL    Hematocrit 28.3 (L) 37.0 - 47.0 %    MCV 96.6 81.0 - 99.0 fL    MCH 30.4 27.0 - 31.0 pg    MCHC 31.4 30.0 - 37.0 g/dL    RDW 14.7 (H) 11.5 - 14.5 %    RDW-SD 52.4 37.0 - 54.0 fl    MPV 11.3 6.0 - 12.0 fL    Platelets 213 130 - 400 10*3/mm3    Neutrophil % 72.9 37.0 - 80.0 %    Lymphocyte % 17.7 10.0 - 50.0 %    Monocyte % 5.9 0.0 - 12.0 %    Eosinophil % 1.6 0.0 - 7.0 %    Basophil % 1.0 0.0 - 2.5 %    Immature Grans % 0.9 (H) 0.0 - 0.6 %    Neutrophils, Absolute 6.69 2.00 - 6.90 10*3/mm3    Lymphocytes, Absolute 1.62 0.60 - 3.40 10*3/mm3    Monocytes, Absolute 0.54 0.00 - 0.90 10*3/mm3    Eosinophils, Absolute 0.15 0.00 - 0.70 10*3/mm3    Basophils, Absolute 0.09 0.00 - 0.20 10*3/mm3    Immature Grans, Absolute 0.08 (H) 0.00 - 0.06  10*3/mm3    nRBC 0.0 0.0 - 0.0 /100 WBC   Renal Function Panel   Result Value Ref Range    Glucose 350 (H) 74 - 98 mg/dL    BUN 41 (H) 7 - 20 mg/dL    Creatinine 3.30 (H) 0.60 - 1.30 mg/dL    Sodium 138 137 - 145 mmol/L    Potassium 4.5 3.5 - 5.1 mmol/L    Chloride 107 98 - 107 mmol/L    CO2 19.0 (L) 26.0 - 30.0 mmol/L    Calcium 8.4 8.4 - 10.2 mg/dL    Albumin 3.00 (L) 3.50 - 5.00 g/dL    Phosphorus 5.0 (H) 2.5 - 4.5 mg/dL    Anion Gap 16.5 mmol/L    BUN/Creatinine Ratio 12.4 7.1 - 23.5    eGFR Non African Amer 13 (L) >60 mL/min/1.73   Hepatitis Panel, Acute   Result Value Ref Range    Hep A IgM Negative Negative    Hepatitis B Surface Ag Negative Negative    Hep B Core IgM Negative Negative    Hep C Virus Ab <0.1 0.0 - 0.9 s/co ratio   Hepatitis B Surface Antibody   Result Value Ref Range    Hep B S Ab Non Reactive    CBC Auto Differential   Result Value Ref Range    WBC 9.44 4.80 - 10.80 10*3/mm3    RBC 2.73 (L) 4.20 - 5.40 10*6/mm3    Hemoglobin 8.2 (L) 12.0 - 16.0 g/dL    Hematocrit 26.4 (L) 37.0 - 47.0 %    MCV 96.7 81.0 - 99.0 fL    MCH 30.0 27.0 - 31.0 pg    MCHC 31.1 30.0 - 37.0 g/dL    RDW 14.7 (H) 11.5 - 14.5 %    RDW-SD 51.3 37.0 - 54.0 fl    MPV 12.2 (H) 6.0 - 12.0 fL    Platelets 195 130 - 400 10*3/mm3    Neutrophil % 62.6 37.0 - 80.0 %    Lymphocyte % 23.0 10.0 - 50.0 %    Monocyte % 8.8 0.0 - 12.0 %    Eosinophil % 4.2 0.0 - 7.0 %    Basophil % 1.0 0.0 - 2.5 %    Immature Grans % 0.4 0.0 - 0.6 %    Neutrophils, Absolute 5.91 2.00 - 6.90 10*3/mm3    Lymphocytes, Absolute 2.17 0.60 - 3.40 10*3/mm3    Monocytes, Absolute 0.83 0.00 - 0.90 10*3/mm3    Eosinophils, Absolute 0.40 0.00 - 0.70 10*3/mm3    Basophils, Absolute 0.09 0.00 - 0.20 10*3/mm3    Immature Grans, Absolute 0.04 0.00 - 0.06 10*3/mm3    nRBC 0.0 0.0 - 0.0 /100 WBC   Renal Function Panel   Result Value Ref Range    Glucose 189 (H) 74 - 98 mg/dL    BUN 25 (H) 7 - 20 mg/dL    Creatinine 2.50 (H) 0.60 - 1.30 mg/dL    Sodium 137 137 - 145  mmol/L    Potassium 4.1 3.5 - 5.1 mmol/L    Chloride 104 98 - 107 mmol/L    CO2 22.0 (L) 26.0 - 30.0 mmol/L    Calcium 8.2 (L) 8.4 - 10.2 mg/dL    Albumin 2.90 (L) 3.50 - 5.00 g/dL    Phosphorus 3.5 2.5 - 4.5 mg/dL    Anion Gap 15.1 mmol/L    BUN/Creatinine Ratio 10.0 7.1 - 23.5    eGFR Non African Amer 19 (L) >60 mL/min/1.73   CBC Auto Differential   Result Value Ref Range    WBC 7.22 4.80 - 10.80 10*3/mm3    RBC 2.61 (L) 4.20 - 5.40 10*6/mm3    Hemoglobin 7.9 (C) 12.0 - 16.0 g/dL    Hematocrit 25.7 (L) 37.0 - 47.0 %    MCV 98.5 81.0 - 99.0 fL    MCH 30.3 27.0 - 31.0 pg    MCHC 30.7 30.0 - 37.0 g/dL    RDW 14.5 11.5 - 14.5 %    RDW-SD 51.8 37.0 - 54.0 fl    MPV 11.6 6.0 - 12.0 fL    Platelets 165 130 - 400 10*3/mm3    Neutrophil % 59.4 37.0 - 80.0 %    Lymphocyte % 24.4 10.0 - 50.0 %    Monocyte % 10.2 0.0 - 12.0 %    Eosinophil % 4.4 0.0 - 7.0 %    Basophil % 1.0 0.0 - 2.5 %    Immature Grans % 0.6 0.0 - 0.6 %    Neutrophils, Absolute 4.29 2.00 - 6.90 10*3/mm3    Lymphocytes, Absolute 1.76 0.60 - 3.40 10*3/mm3    Monocytes, Absolute 0.74 0.00 - 0.90 10*3/mm3    Eosinophils, Absolute 0.32 0.00 - 0.70 10*3/mm3    Basophils, Absolute 0.07 0.00 - 0.20 10*3/mm3    Immature Grans, Absolute 0.04 0.00 - 0.06 10*3/mm3    nRBC 0.0 0.0 - 0.0 /100 WBC   Renal Function Panel   Result Value Ref Range    Glucose 231 (H) 74 - 98 mg/dL    BUN 17 7 - 20 mg/dL    Creatinine 2.50 (H) 0.60 - 1.30 mg/dL    Sodium 137 137 - 145 mmol/L    Potassium 4.3 3.5 - 5.1 mmol/L    Chloride 102 98 - 107 mmol/L    CO2 28.0 26.0 - 30.0 mmol/L    Calcium 7.9 (L) 8.4 - 10.2 mg/dL    Albumin 2.70 (L) 3.50 - 5.00 g/dL    Phosphorus 3.9 2.5 - 4.5 mg/dL    Anion Gap 11.3 mmol/L    BUN/Creatinine Ratio 6.8 (L) 7.1 - 23.5    eGFR Non African Amer 19 (L) >60 mL/min/1.73   CBC Auto Differential   Result Value Ref Range    WBC 7.56 4.80 - 10.80 10*3/mm3    RBC 2.63 (L) 4.20 - 5.40 10*6/mm3    Hemoglobin 7.8 (C) 12.0 - 16.0 g/dL    Hematocrit 25.7 (L) 37.0 -  47.0 %    MCV 97.7 81.0 - 99.0 fL    MCH 29.7 27.0 - 31.0 pg    MCHC 30.4 30.0 - 37.0 g/dL    RDW 14.1 11.5 - 14.5 %    RDW-SD 50.4 37.0 - 54.0 fl    MPV 12.4 (H) 6.0 - 12.0 fL    Platelets 167 130 - 400 10*3/mm3    Neutrophil % 61.5 37.0 - 80.0 %    Lymphocyte % 22.5 10.0 - 50.0 %    Monocyte % 9.1 0.0 - 12.0 %    Eosinophil % 4.9 0.0 - 7.0 %    Basophil % 0.8 0.0 - 2.5 %    Immature Grans % 1.2 (H) 0.0 - 0.6 %    Neutrophils, Absolute 4.65 2.00 - 6.90 10*3/mm3    Lymphocytes, Absolute 1.70 0.60 - 3.40 10*3/mm3    Monocytes, Absolute 0.69 0.00 - 0.90 10*3/mm3    Eosinophils, Absolute 0.37 0.00 - 0.70 10*3/mm3    Basophils, Absolute 0.06 0.00 - 0.20 10*3/mm3    Immature Grans, Absolute 0.09 (H) 0.00 - 0.06 10*3/mm3    nRBC 0.0 0.0 - 0.0 /100 WBC   Renal Function Panel   Result Value Ref Range    Glucose 186 (H) 74 - 98 mg/dL    BUN 27 (H) 7 - 20 mg/dL    Creatinine 3.00 (H) 0.60 - 1.30 mg/dL    Sodium 136 (L) 137 - 145 mmol/L    Potassium 4.4 3.5 - 5.1 mmol/L    Chloride 101 98 - 107 mmol/L    CO2 28.0 26.0 - 30.0 mmol/L    Calcium 8.2 (L) 8.4 - 10.2 mg/dL    Albumin 2.90 (L) 3.50 - 5.00 g/dL    Phosphorus 4.2 2.5 - 4.5 mg/dL    Anion Gap 11.4 mmol/L    BUN/Creatinine Ratio 9.0 7.1 - 23.5    eGFR Non African Amer 15 (L) >60 mL/min/1.73   POC Glucose Fingerstick   Result Value Ref Range    Glucose 106 70 - 130 mg/dL   POC Glucose Fingerstick   Result Value Ref Range    Glucose 186 (H) 70 - 130 mg/dL   POC Glucose Fingerstick   Result Value Ref Range    Glucose 244 (H) 70 - 130 mg/dL   POC Glucose Fingerstick   Result Value Ref Range    Glucose 166 (H) 70 - 130 mg/dL   POC Glucose Fingerstick   Result Value Ref Range    Glucose 118 70 - 130 mg/dL   POC Glucose Fingerstick   Result Value Ref Range    Glucose 129 70 - 130 mg/dL   POC Glucose Fingerstick   Result Value Ref Range    Glucose 203 (H) 70 - 130 mg/dL   POC Glucose Fingerstick   Result Value Ref Range    Glucose 117 70 - 130 mg/dL   POC Glucose Fingerstick    Result Value Ref Range    Glucose 286 (H) 70 - 130 mg/dL   POC Glucose Fingerstick   Result Value Ref Range    Glucose 211 (H) 70 - 130 mg/dL   POC Glucose Fingerstick   Result Value Ref Range    Glucose 83 70 - 130 mg/dL   POC Glucose Fingerstick   Result Value Ref Range    Glucose 86 70 - 130 mg/dL   POC Glucose Fingerstick   Result Value Ref Range    Glucose 359 (H) 70 - 130 mg/dL   POC Glucose Fingerstick   Result Value Ref Range    Glucose 106 70 - 130 mg/dL   POC Glucose Fingerstick   Result Value Ref Range    Glucose 77 70 - 130 mg/dL   POC Glucose Fingerstick   Result Value Ref Range    Glucose 122 70 - 130 mg/dL   POC Glucose Fingerstick   Result Value Ref Range    Glucose 187 (H) 70 - 130 mg/dL   POC Glucose Fingerstick   Result Value Ref Range    Glucose 176 (H) 70 - 130 mg/dL   POC Glucose Fingerstick   Result Value Ref Range    Glucose 87 70 - 130 mg/dL   POC Glucose Fingerstick   Result Value Ref Range    Glucose 136 (H) 70 - 130 mg/dL   POC Glucose Fingerstick   Result Value Ref Range    Glucose 245 (H) 70 - 130 mg/dL   POC Glucose Fingerstick   Result Value Ref Range    Glucose 278 (H) 70 - 130 mg/dL   POC Glucose Fingerstick   Result Value Ref Range    Glucose 97 70 - 130 mg/dL   POC Glucose Fingerstick   Result Value Ref Range    Glucose 137 (H) 70 - 130 mg/dL   POC Glucose Fingerstick   Result Value Ref Range    Glucose 204 (H) 70 - 130 mg/dL   POC Glucose Fingerstick   Result Value Ref Range    Glucose 171 (H) 70 - 130 mg/dL   Adult Transthoracic Echo Complete   Result Value Ref Range    BSA 1.7 m^2    IVSd 1.3 cm    IVSs 1.1 cm    LVIDd 4.8 cm    LVIDs 2.6 cm    LVPWd 1.2 cm     CV ECHO WHITLEY - LVPWS 1.4 cm    IVS/LVPW 0.73     FS 45.2 %    EDV(Teich) 109.3 ml    ESV(Teich) 25.8 ml    EF(Teich) 76.4 %    EDV(cubed) 112.9 ml    ESV(cubed) 18.6 ml    EF(cubed) 83.5 %    % IVS thick 57.9 %    % LVPW thick 53.8 %    LV mass(C)d 129.4 grams    LV mass(C)dI 74.3 grams/m^2    LV mass(C)s 99.2 grams     LV mass(C)sI 57.0 grams/m^2    SV(Teich) 83.5 ml    SI(Teich) 48.0 ml/m^2    SV(cubed) 94.3 ml    SI(cubed) 54.2 ml/m^2    Ao root diam 2.5 cm    Ao root area 4.9 cm^2    LA dimension 3.7 cm    LA/Ao 1.5     LVOT diam 1.6 cm    LVOT area 1.9 cm^2    LVOT area(traced) 2.0 cm^2    LVLd ap4 7.0 cm    EDV(MOD-sp4) 50.0 ml    LVLs ap4 5.8 cm    ESV(MOD-sp4) 22.0 ml    EF(MOD-sp4) 88 %    SV(MOD-sp4) 28.0 ml    SI(MOD-sp4) 16.1 ml/m^2    Ao root area (BSA corrected) 1.4     CONTRAST EF 4CH 56.0 ml/m^2    LV Diastolic corrected for BSA 28.7 ml/m^2    LV Systolic corrected for BSA 12.6 ml/m^2    MV E max rena 157.0 cm/sec    MV A max rena 157.7 cm/sec    MV E/A 1.0     MV V2 max 155.6 cm/sec    MV max PG 9.7 mmHg    MV V2 mean 103.7 cm/sec    MV mean PG 5.0 mmHg    MV V2 VTI 50.0 cm    MVA(VTI) 1.5 cm^2    MV P1/2t max rena 155.6 cm/sec    MV P1/2t 80.8 msec    MVA(P1/2t) 2.7 cm^2    MV dec slope 564.2 cm/sec^2    Ao pk rena 199.5 cm/sec    Ao max PG 15.9 mmHg    Ao max PG (full) 7.9 mmHg    Ao V2 mean 138.8 cm/sec    Ao mean PG 8.6 mmHg    Ao mean PG (full) 4.7 mmHg    Ao V2 VTI 52.8 cm    NADER(I,A) 1.4 cm^2    NADER(I,D) 1.4 cm^2    NADER(V,A) 1.4 cm^2    NADER(V,D) 1.4 cm^2    Ao acc slope 1450 cm/sec^2    Ao acc time 0.1 sec    LV V1 max PG 8.0 mmHg    LV V1 mean PG 3.9 mmHg    LV V1 max 141.2 cm/sec    LV V1 mean 92.4 cm/sec    LV V1 VTI 39.0 cm    SV(Ao) 260.4 ml    SI(Ao) 149.7 ml/m^2    SV(LVOT) 75.7 ml    SI(LVOT) 43.5 ml/m^2    PA V2 max 72.1 cm/sec    PA max PG 2.1 mmHg    PA V2 mean 48.3 cm/sec    PA mean PG 1.1 mmHg    PA V2 VTI 18.7 cm    PA acc slope 356.4 cm/sec^2    PA acc time 0.21 sec    TR max rena 229.4 cm/sec    PA pr(Accel) -14.5 mmHg    MVA P1/2T LCG 1.4 cm^2     CV ECHO WHITLEY - BZI_BMI 27.6 kilograms/m^2     CV ECHO WHITLEY - BSA(HAYCOCK) 1.8 m^2     CV ECHO WHITLEY - BZI_METRIC_WEIGHT 70.8 kg     CV ECHO WHITLEY - BZI_METRIC_HEIGHT 160.0 cm    LA Volume Index 34.0 mL/m2    Echo EF Estimated 90 %    RAP  systole 8 mmHg    RVSP(TR) 42 mmHg     Problem List Items Addressed This Visit        Cardiovascular and Mediastinum    Essential hypertension     bp is good- continue current medications, monitor   Some orthostatic syncope with first dialysis          Relevant Medications    furosemide (LASIX) 20 MG tablet    Mixed hyperlipidemia     Follow a low fat diet and continue lipitor             Endocrine    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 - Primary     utd eye exam - Dr Pope   Blood sugar and 90 day average sugar reviewed  Results for orders placed or performed in visit on 09/01/17   POC Glucose Fingerstick   Result Value Ref Range    Glucose 141 (A) 70 - 130 mg/dL   POC Glycosylated Hemoglobin (Hb A1C)   Result Value Ref Range    Hemoglobin A1C 6.7 %   average sugar is good overall  Neuropathy stable without callus or ulcer  Continue toujeo and humalog sliding scale  Samples provided along with free humalog coupon   F/u 3-4  Months or sooner as needed            Relevant Medications    Insulin Lispro (HUMALOG) 100 UNIT/ML solution pen-injector    Other Relevant Orders    POC Glucose Fingerstick (Completed)    POC Glycosylated Hemoglobin (Hb A1C) (Completed)    Acquired hypothyroidism     Continue thyroid supplement   Check lab work at next visit            Genitourinary    Chronic kidney disease, stage V     On hemodialysis          Relevant Medications    furosemide (LASIX) 20 MG tablet        Return in about 3 months (around 12/1/2017) for Recheck 30 min .    Kathrin Anderson MA

## 2017-09-01 NOTE — ASSESSMENT & PLAN NOTE
utd eye exam - Dr Pope   Blood sugar and 90 day average sugar reviewed  Results for orders placed or performed in visit on 09/01/17   POC Glucose Fingerstick   Result Value Ref Range    Glucose 141 (A) 70 - 130 mg/dL   POC Glycosylated Hemoglobin (Hb A1C)   Result Value Ref Range    Hemoglobin A1C 6.7 %   average sugar is good overall  Neuropathy stable without callus or ulcer  Continue toujeo and humalog sliding scale  Samples provided along with free humalog coupon   F/u 3-4  Months or sooner as needed

## 2017-09-05 RX ORDER — FUROSEMIDE 20 MG/1
TABLET ORAL
Qty: 180 TABLET | Refills: 1 | Status: SHIPPED | OUTPATIENT
Start: 2017-09-05 | End: 2018-01-01 | Stop reason: HOSPADM

## 2017-09-12 ENCOUNTER — HOSPITAL ENCOUNTER (EMERGENCY)
Facility: HOSPITAL | Age: 77
Discharge: HOME | End: 2017-09-12
Attending: EMERGENCY MEDICINE | Admitting: EMERGENCY MEDICINE

## 2017-09-12 VITALS
TEMPERATURE: 98.7 F | DIASTOLIC BLOOD PRESSURE: 83 MMHG | WEIGHT: 135 LBS | OXYGEN SATURATION: 98 % | BODY MASS INDEX: 24.84 KG/M2 | SYSTOLIC BLOOD PRESSURE: 202 MMHG | HEIGHT: 62 IN | RESPIRATION RATE: 18 BRPM | HEART RATE: 61 BPM

## 2017-09-12 DIAGNOSIS — E16.2 HYPOGLYCEMIA: Primary | ICD-10-CM

## 2017-09-12 LAB
GLUCOSE BLDC GLUCOMTR-MCNC: 101 MG/DL (ref 70–130)
GLUCOSE BLDC GLUCOMTR-MCNC: 139 MG/DL (ref 70–130)

## 2017-09-12 PROCEDURE — 99283 EMERGENCY DEPT VISIT LOW MDM: CPT

## 2017-09-12 PROCEDURE — 82962 GLUCOSE BLOOD TEST: CPT

## 2017-09-18 ENCOUNTER — HOSPITAL (OUTPATIENT)
Dept: OTHER | Age: 77
End: 2017-09-18
Attending: PAIN MEDICINE

## 2017-10-02 ENCOUNTER — OUTSIDE FACILITY SERVICE (OUTPATIENT)
Dept: INTERNAL MEDICINE | Facility: HOSPITAL | Age: 77
End: 2017-10-02

## 2017-10-02 PROCEDURE — G0180 MD CERTIFICATION HHA PATIENT: HCPCS | Performed by: INTERNAL MEDICINE

## 2017-10-16 NOTE — PROGRESS NOTES
Subjective   Patient ID: Sigrid Casarez is a 77 y.o. right hand dominant female is here today for follow-up for fracture and hip surgery recovery.  Follow-up of the Left Hip       History of Present Illness    Pain controlled: [] no   [x] yes   Medication refill requested: [x] no   [] yes    Patient compliant with instructions: [] no   [x] yes   Other: Patient notes she is doing better and feeling well. She does note some discomfort when walking. Overall this discomfort is just the first few steps and then improves.  Also her overall pain is decreased with treatments.  She is very capable ambulating with a cane at this point.  She states that her hip incision has healed fully.  Patient notes since previous visit she is on oxygen and dialysis.       Past Medical History:   Diagnosis Date   • Anemia    • Chronic kidney disease    • Chronic kidney disease    • Community acquired pneumonia    • Dexa Body Composition study lumosacral spine and femur     ostepenic   • Diabetes    • Diabetic nephropathy, type I    • Diabetic peripheral neuropathy type 1    • Disease of thyroid gland    • Fracture    • Hypertension    • Menopause    • Pneumonia         Past Surgical History:   Procedure Laterality Date   • CATARACT EXTRACTION     • CHOLECYSTECTOMY     • HIP CANNULATED SCREW PLACEMENT Left 6/11/2017    Procedure:  LEFT HIP MULTIPLE CANNULATED COMPRESSION SCREWS- FERMORAL NECK  FRACTURE;  Surgeon: Jimmy Sheehan MD;  Location: [facility];  Service:         Family History   Problem Relation Age of Onset   • Diabetes Other    • Heart disease Other    • Heart attack Mother    • Lung cancer Father        Social History     Social History   • Marital status:      Spouse name: N/A   • Number of children: N/A   • Years of education: N/A     Occupational History   • Not on file.     Social History Main Topics   • Smoking status: Former Smoker   • Smokeless tobacco: Not on file   • Alcohol use No   • Drug use: No   • Sexual  "activity: Defer     Other Topics Concern   • Not on file     Social History Narrative       Allergies   Allergen Reactions   • Duloxetine    • Exenatide    • Lisinopril Cough   • Penicillins    • Phentermine        Review of Systems   Constitutional: Negative for fever.   HENT: Negative for voice change.    Eyes: Negative for visual disturbance.   Respiratory: Negative for shortness of breath.    Cardiovascular: Negative for chest pain.   Gastrointestinal: Negative for abdominal distention and abdominal pain.   Genitourinary: Negative for dysuria.   Musculoskeletal: Positive for arthralgias. Negative for gait problem and joint swelling.   Skin: Negative for rash.   Neurological: Negative for speech difficulty.   Hematological: Does not bruise/bleed easily.   Psychiatric/Behavioral: Negative for confusion.   All other systems reviewed and are negative.      Objective   Resp 16  Ht 62\" (157.5 cm)  Wt 140 lb (63.5 kg)  BMI 25.61 kg/m2     Signs of infection: [x] no                  [] yes   Swelling: [x] no                  [] yes   Skin wound: [] healing well   [x] healed well   [x] skin intact   Motor exam intact: [] no                  [x] yes   Neurovascular exam intact: [] no                  [x] yes   Signs of compartment syndrome: [x] no                  [] yes   Signs of DVT: [x] no                  [] yes   Other:      Physical Exam   Constitutional: She appears well-developed. No distress.   Neurological: She is alert.   Skin: Skin is warm and dry. No rash noted. No erythema.   Psychiatric: She has a normal mood and affect. Her speech is normal.   Vitals reviewed.    Left Hip Exam     Tenderness   The patient is experiencing no tenderness (and no pain with active and passive hip flexion and rotation.).         Range of Motion   The patient has normal left hip ROM.    Muscle Strength   The patient has normal left hip strength.     Tests   MAXIMUS: negative    Other   Erythema: absent        Extremity DVT " signs are Negative on physical exam with negative Shavon sign, with no calf pain, with no palpable cords, with no increased pain with passive stretch/extension and with no skin tone change   Neurologic Exam     Mental Status   Attention: normal.   Speech: speech is normal   Level of consciousness: alert  Knowledge: good.     Motor Exam   Overall muscle tone: normal    Gait, Coordination, and Reflexes     Gait  Gait: (Stable cane assist ambulation.)    Left Hip Exam     Range of Motion   Normal left hip ROM    Muscle Strength   Normal left hip strength        Assessment/Plan   Independent Review of Radiographic Studies:    Indication to evaluate fracture healing, and compared with prior imaging, shows interim fracture healing with good maintained reduction and alignment and intact position of fracture fixation hardware.  Laboratory and Other Studies:  No new results reviewed today.   Medical Decision Making:    No complications of procedure and treatments.  Excellent progress with hip fracture recovery.  Doing better medically with respiratory and renal treatments.  May resume routine activity and exercise as tolerated.     Procedures  [x] No procedures were performed in office today.     Sigrid was seen today for follow-up.    Diagnoses and all orders for this visit:    Closed fracture of neck of left femur with routine healing, subsequent encounter    Status post hip surgery       Splint/cast applied: [x]no []SAC []LAC []SLC []LLC []PTB []Cyl Cast []Splint    Brace: [x]not provided        []pt provided with:   Physical therapy: []not at this time    [x] home-based    [] outpatient referral   Ultrasound: [x]not ordered         []order given to patient   Labs: [x]not ordered         []order given to patient   Weight Bearing status: [x]Full []WBAT []PWB []NWB []Other   Prescriptions: [x]None given today   []As Noted   Imaging at next appt: [x]Yes  []No        Pelvis and Left hip   Additional instructions: Patient  agreeable to return sooner for any new concern.     Regular exercise as tolerated  Orthopedic activities reviewed and patient expressed appreciation  Discussion of orthopedic goals  Guided on proper techniques for mobility, strength, agility and/or conditioning exercises  Reviewed protected ambulation, safety and fall prevention.    Recommendations/Plan:  Exercise, medications, injections, other patient advice, and return appointment as noted.  Brace: No brace was given at today's visit  Referral: No referrals made at today's visit  Test/Studies: No additional studies ordered.  Surgery: No surgery proposed at this visit.  Work/Activity Status: May perform usual activities as tolerated and no strenuous activity.  Patient is encouraged to call or return for any issues or concerns.    Return in about 3 months (around 1/16/2018) for XOA left hip, Recheck.  Patient agreeable to call or return sooner for any concerns.     Scribed for Jimmy Sheehan MD by Sofya Huitron R.N.. 10/16/2017  9:07 AM

## 2017-10-25 ENCOUNTER — CHARTING TRANS (OUTPATIENT)
Dept: OTHER | Age: 77
End: 2017-10-25

## 2017-10-25 ASSESSMENT — PAIN SCALES - GENERAL: PAINLEVEL_OUTOF10: 0

## 2017-10-27 ENCOUNTER — CHARTING TRANS (OUTPATIENT)
Dept: OTHER | Age: 77
End: 2017-10-27

## 2017-10-27 ENCOUNTER — LAB SERVICES (OUTPATIENT)
Dept: OTHER | Age: 77
End: 2017-10-27

## 2017-11-01 ENCOUNTER — CHARTING TRANS (OUTPATIENT)
Dept: OTHER | Age: 77
End: 2017-11-01

## 2017-11-01 LAB
ALBUMIN SERPL-MCNC: 4 G/DL (ref 3.6–5.1)
ALBUMIN/GLOB SERPL: 1.1 (ref 1–2.4)
ALP SERPL-CCNC: 58 UNITS/L (ref 45–117)
ALT SERPL-CCNC: 20 UNITS/L
ANION GAP SERPL CALC-SCNC: 11 MMOL/L (ref 10–20)
AST SERPL-CCNC: 22 UNITS/L
BASOPHILS # BLD: 0 K/MCL (ref 0–0.3)
BASOPHILS NFR BLD: 1 %
BILIRUB SERPL-MCNC: 0.3 MG/DL (ref 0.2–1)
BUN SERPL-MCNC: 14 MG/DL (ref 6–20)
BUN/CREAT SERPL: 19 (ref 7–25)
CALCIUM SERPL-MCNC: 9.5 MG/DL (ref 8.4–10.2)
CHLORIDE SERPL-SCNC: 107 MMOL/L (ref 98–107)
CHOLEST SERPL-MCNC: 166 MG/DL
CHOLEST/HDLC SERPL: 2.4
CO2 SERPL-SCNC: 28 MMOL/L (ref 21–32)
CREAT SERPL-MCNC: 0.73 MG/DL (ref 0.51–0.95)
DIFFERENTIAL METHOD BLD: ABNORMAL
EOSINOPHIL # BLD: 0.1 K/MCL (ref 0.1–0.5)
EOSINOPHIL NFR BLD: 1 %
ERYTHROCYTE [DISTWIDTH] IN BLOOD: 13.6 % (ref 11–15)
GLOBULIN SER-MCNC: 3.6 G/DL (ref 2–4)
GLUCOSE SERPL-MCNC: 84 MG/DL (ref 65–99)
HDLC SERPL-MCNC: 69 MG/DL
HEMATOCRIT: 42.4 % (ref 36–46.5)
HEMOGLOBIN: 14 G/DL (ref 12–15.5)
LDLC SERPL CALC-MCNC: 73 MG/DL
LENGTH OF FAST TIME PATIENT: ABNORMAL HRS
LENGTH OF FAST TIME PATIENT: ABNORMAL HRS
LYMPHOCYTES # BLD: 1.6 K/MCL (ref 1–4)
LYMPHOCYTES NFR BLD: 41 %
MEAN CORPUSCULAR HEMOGLOBIN: 31.5 PG (ref 26–34)
MEAN CORPUSCULAR HGB CONC: 33 G/DL (ref 32–36.5)
MEAN CORPUSCULAR VOLUME: 95.5 FL (ref 78–100)
MONOCYTES # BLD: 0.4 K/MCL (ref 0.3–0.9)
MONOCYTES NFR BLD: 9 %
NEUTROPHILS # BLD: 1.9 K/MCL (ref 1.8–7.7)
NEUTROPHILS NFR BLD: 48 %
NONHDLC SERPL-MCNC: 97 MG/DL
PLATELET COUNT: 234 K/MCL (ref 140–450)
POTASSIUM SERPL-SCNC: 4.3 MMOL/L (ref 3.4–5.1)
RED CELL COUNT: 4.44 MIL/MCL (ref 4–5.2)
SODIUM SERPL-SCNC: 142 MMOL/L (ref 135–145)
TOTAL PROTEIN: 7.6 G/DL (ref 6.4–8.2)
TRIGL SERPL-MCNC: 119 MG/DL
WHITE BLOOD COUNT: 4 K/MCL (ref 4.2–11)

## 2017-11-08 ENCOUNTER — CHARTING TRANS (OUTPATIENT)
Dept: OTHER | Age: 77
End: 2017-11-08

## 2017-11-09 ENCOUNTER — CHARTING TRANS (OUTPATIENT)
Dept: OTHER | Age: 77
End: 2017-11-09

## 2017-11-09 ASSESSMENT — PAIN SCALES - GENERAL: PAINLEVEL_OUTOF10: 0

## 2017-11-10 ENCOUNTER — CHARTING TRANS (OUTPATIENT)
Dept: OTHER | Age: 77
End: 2017-11-10

## 2017-11-13 ENCOUNTER — HOSPITAL (OUTPATIENT)
Dept: OTHER | Age: 77
End: 2017-11-13
Attending: PAIN MEDICINE

## 2017-11-14 ENCOUNTER — CHARTING TRANS (OUTPATIENT)
Dept: OTHER | Age: 77
End: 2017-11-14

## 2018-01-01 ENCOUNTER — EXTERNAL RECORD (OUTPATIENT)
Dept: HEALTH INFORMATION MANAGEMENT | Facility: OTHER | Age: 78
End: 2018-01-01

## 2018-01-01 ENCOUNTER — TELEPHONE (OUTPATIENT)
Dept: INTERNAL MEDICINE | Facility: CLINIC | Age: 78
End: 2018-01-01

## 2018-01-01 ENCOUNTER — APPOINTMENT (OUTPATIENT)
Dept: CT IMAGING | Facility: HOSPITAL | Age: 78
End: 2018-01-01

## 2018-01-01 ENCOUNTER — HOSPITAL ENCOUNTER (EMERGENCY)
Facility: HOSPITAL | Age: 78
End: 2018-01-01

## 2018-01-01 ENCOUNTER — APPOINTMENT (OUTPATIENT)
Dept: GENERAL RADIOLOGY | Facility: HOSPITAL | Age: 78
End: 2018-01-01

## 2018-01-01 ENCOUNTER — HOSPITAL ENCOUNTER (EMERGENCY)
Facility: HOSPITAL | Age: 78
Discharge: HOME | End: 2018-04-15
Attending: EMERGENCY MEDICINE | Admitting: EMERGENCY MEDICINE

## 2018-01-01 ENCOUNTER — APPOINTMENT (OUTPATIENT)
Dept: GENERAL RADIOLOGY | Facility: HOSPITAL | Age: 78
End: 2018-01-01
Attending: STUDENT IN AN ORGANIZED HEALTH CARE EDUCATION/TRAINING PROGRAM

## 2018-01-01 ENCOUNTER — OUTSIDE FACILITY SERVICE (OUTPATIENT)
Dept: PULMONOLOGY | Facility: CLINIC | Age: 78
End: 2018-01-01

## 2018-01-01 ENCOUNTER — APPOINTMENT (OUTPATIENT)
Dept: GENERAL RADIOLOGY | Facility: HOSPITAL | Age: 78
End: 2018-01-01
Attending: ORTHOPAEDIC SURGERY

## 2018-01-01 ENCOUNTER — HOSPITAL ENCOUNTER (EMERGENCY)
Facility: HOSPITAL | Age: 78
Discharge: HOME | End: 2018-07-14
Attending: EMERGENCY MEDICINE | Admitting: EMERGENCY MEDICINE

## 2018-01-01 ENCOUNTER — OFFICE VISIT (OUTPATIENT)
Dept: ORTHOPEDIC SURGERY | Facility: CLINIC | Age: 78
End: 2018-01-01

## 2018-01-01 ENCOUNTER — HOSPITAL ENCOUNTER (EMERGENCY)
Facility: HOSPITAL | Age: 78
Discharge: HOME | End: 2018-07-06
Attending: EMERGENCY MEDICINE | Admitting: EMERGENCY MEDICINE

## 2018-01-01 ENCOUNTER — APPOINTMENT (OUTPATIENT)
Dept: SLEEP MEDICINE | Facility: HOSPITAL | Age: 78
End: 2018-01-01
Attending: INTERNAL MEDICINE

## 2018-01-01 ENCOUNTER — APPOINTMENT (OUTPATIENT)
Dept: CARDIOLOGY | Facility: HOSPITAL | Age: 78
End: 2018-01-01
Attending: INTERNAL MEDICINE

## 2018-01-01 ENCOUNTER — OFFICE VISIT (OUTPATIENT)
Dept: ENDOCRINOLOGY | Facility: CLINIC | Age: 78
End: 2018-01-01

## 2018-01-01 ENCOUNTER — HOSPITAL ENCOUNTER (INPATIENT)
Facility: HOSPITAL | Age: 78
LOS: 9 days | Discharge: NURSING, REHAB OR LONG-TERM CARE | End: 2018-10-04
Attending: EMERGENCY MEDICINE | Admitting: INTERNAL MEDICINE

## 2018-01-01 ENCOUNTER — HOSPITAL ENCOUNTER (EMERGENCY)
Facility: HOSPITAL | Age: 78
Discharge: HOME | End: 2018-05-22
Attending: STUDENT IN AN ORGANIZED HEALTH CARE EDUCATION/TRAINING PROGRAM | Admitting: STUDENT IN AN ORGANIZED HEALTH CARE EDUCATION/TRAINING PROGRAM

## 2018-01-01 ENCOUNTER — OFFICE VISIT (OUTPATIENT)
Dept: PULMONOLOGY | Facility: CLINIC | Age: 78
End: 2018-01-01

## 2018-01-01 ENCOUNTER — HOSPITAL ENCOUNTER (INPATIENT)
Facility: HOSPITAL | Age: 78
LOS: 2 days | Discharge: HOME | End: 2018-07-19
Attending: EMERGENCY MEDICINE | Admitting: FAMILY MEDICINE

## 2018-01-01 ENCOUNTER — HOSPITAL ENCOUNTER (INPATIENT)
Facility: HOSPITAL | Age: 78
LOS: 1 days | Discharge: HOME | End: 2018-01-05
Attending: STUDENT IN AN ORGANIZED HEALTH CARE EDUCATION/TRAINING PROGRAM | Admitting: INTERNAL MEDICINE

## 2018-01-01 VITALS
RESPIRATION RATE: 18 BRPM | WEIGHT: 134 LBS | OXYGEN SATURATION: 98 % | SYSTOLIC BLOOD PRESSURE: 80 MMHG | DIASTOLIC BLOOD PRESSURE: 40 MMHG | BODY MASS INDEX: 23.74 KG/M2 | HEIGHT: 63 IN | HEART RATE: 70 BPM

## 2018-01-01 VITALS
OXYGEN SATURATION: 93 % | DIASTOLIC BLOOD PRESSURE: 60 MMHG | HEART RATE: 71 BPM | WEIGHT: 130 LBS | SYSTOLIC BLOOD PRESSURE: 170 MMHG | BODY MASS INDEX: 23.04 KG/M2 | HEIGHT: 63 IN | RESPIRATION RATE: 16 BRPM

## 2018-01-01 VITALS
OXYGEN SATURATION: 95 % | TEMPERATURE: 98.1 F | SYSTOLIC BLOOD PRESSURE: 127 MMHG | BODY MASS INDEX: 22.68 KG/M2 | HEART RATE: 77 BPM | DIASTOLIC BLOOD PRESSURE: 75 MMHG | WEIGHT: 128 LBS | HEIGHT: 63 IN | RESPIRATION RATE: 16 BRPM

## 2018-01-01 VITALS
DIASTOLIC BLOOD PRESSURE: 68 MMHG | BODY MASS INDEX: 23.74 KG/M2 | HEIGHT: 63 IN | WEIGHT: 134 LBS | RESPIRATION RATE: 18 BRPM | OXYGEN SATURATION: 95 % | HEART RATE: 73 BPM | SYSTOLIC BLOOD PRESSURE: 118 MMHG

## 2018-01-01 VITALS
WEIGHT: 130.4 LBS | HEIGHT: 63 IN | RESPIRATION RATE: 18 BRPM | BODY MASS INDEX: 23.11 KG/M2 | SYSTOLIC BLOOD PRESSURE: 111 MMHG | HEART RATE: 70 BPM | DIASTOLIC BLOOD PRESSURE: 45 MMHG | TEMPERATURE: 97.4 F | OXYGEN SATURATION: 95 %

## 2018-01-01 VITALS
DIASTOLIC BLOOD PRESSURE: 64 MMHG | HEIGHT: 63 IN | HEART RATE: 71 BPM | SYSTOLIC BLOOD PRESSURE: 170 MMHG | WEIGHT: 130 LBS | OXYGEN SATURATION: 97 % | BODY MASS INDEX: 23.04 KG/M2 | RESPIRATION RATE: 18 BRPM | TEMPERATURE: 97.9 F

## 2018-01-01 VITALS
HEIGHT: 64 IN | BODY MASS INDEX: 23.61 KG/M2 | OXYGEN SATURATION: 98 % | TEMPERATURE: 97.4 F | SYSTOLIC BLOOD PRESSURE: 155 MMHG | WEIGHT: 138.3 LBS | RESPIRATION RATE: 14 BRPM | HEART RATE: 79 BPM | DIASTOLIC BLOOD PRESSURE: 49 MMHG

## 2018-01-01 VITALS
WEIGHT: 133.31 LBS | RESPIRATION RATE: 17 BRPM | SYSTOLIC BLOOD PRESSURE: 163 MMHG | HEART RATE: 73 BPM | BODY MASS INDEX: 23.62 KG/M2 | HEIGHT: 63 IN | OXYGEN SATURATION: 97 % | TEMPERATURE: 98.6 F | DIASTOLIC BLOOD PRESSURE: 58 MMHG

## 2018-01-01 VITALS
OXYGEN SATURATION: 93 % | HEART RATE: 76 BPM | RESPIRATION RATE: 18 BRPM | SYSTOLIC BLOOD PRESSURE: 151 MMHG | BODY MASS INDEX: 22.5 KG/M2 | WEIGHT: 127 LBS | HEIGHT: 63 IN | TEMPERATURE: 98.7 F | DIASTOLIC BLOOD PRESSURE: 58 MMHG

## 2018-01-01 VITALS
BODY MASS INDEX: 23.21 KG/M2 | HEART RATE: 72 BPM | DIASTOLIC BLOOD PRESSURE: 70 MMHG | OXYGEN SATURATION: 95 % | SYSTOLIC BLOOD PRESSURE: 168 MMHG | HEIGHT: 63 IN | WEIGHT: 131 LBS

## 2018-01-01 VITALS
OXYGEN SATURATION: 94 % | TEMPERATURE: 98.1 F | DIASTOLIC BLOOD PRESSURE: 48 MMHG | RESPIRATION RATE: 16 BRPM | HEIGHT: 63 IN | SYSTOLIC BLOOD PRESSURE: 144 MMHG | HEART RATE: 77 BPM | BODY MASS INDEX: 22.71 KG/M2 | WEIGHT: 128.2 LBS

## 2018-01-01 VITALS — HEIGHT: 63 IN | WEIGHT: 133 LBS | BODY MASS INDEX: 23.57 KG/M2 | RESPIRATION RATE: 18 BRPM

## 2018-01-01 VITALS
DIASTOLIC BLOOD PRESSURE: 60 MMHG | OXYGEN SATURATION: 96 % | SYSTOLIC BLOOD PRESSURE: 106 MMHG | WEIGHT: 130 LBS | HEIGHT: 63 IN | BODY MASS INDEX: 23.04 KG/M2 | HEART RATE: 60 BPM

## 2018-01-01 VITALS
DIASTOLIC BLOOD PRESSURE: 51 MMHG | HEART RATE: 70 BPM | HEIGHT: 63 IN | TEMPERATURE: 98.4 F | RESPIRATION RATE: 18 BRPM | SYSTOLIC BLOOD PRESSURE: 154 MMHG | OXYGEN SATURATION: 96 %

## 2018-01-01 VITALS — WEIGHT: 133 LBS | BODY MASS INDEX: 23.57 KG/M2 | RESPIRATION RATE: 18 BRPM | HEIGHT: 63 IN

## 2018-01-01 DIAGNOSIS — S72.002D CLOSED FRACTURE OF NECK OF LEFT FEMUR WITH ROUTINE HEALING, SUBSEQUENT ENCOUNTER: Primary | ICD-10-CM

## 2018-01-01 DIAGNOSIS — R11.10 VOMITING AND DIARRHEA: ICD-10-CM

## 2018-01-01 DIAGNOSIS — J44.1 COPD EXACERBATION (HCC): Primary | ICD-10-CM

## 2018-01-01 DIAGNOSIS — Z98.890 STATUS POST HIP SURGERY: ICD-10-CM

## 2018-01-01 DIAGNOSIS — J96.02 ACUTE RESPIRATORY FAILURE WITH HYPOXIA AND HYPERCAPNIA (HCC): ICD-10-CM

## 2018-01-01 DIAGNOSIS — E55.9 VITAMIN D DEFICIENCY: ICD-10-CM

## 2018-01-01 DIAGNOSIS — N18.9 ACUTE RENAL FAILURE SUPERIMPOSED ON CHRONIC KIDNEY DISEASE, ON CHRONIC DIALYSIS, UNSPECIFIED ACUTE RENAL FAILURE TYPE (HCC): ICD-10-CM

## 2018-01-01 DIAGNOSIS — R07.89 CHEST PAIN, ATYPICAL: Primary | ICD-10-CM

## 2018-01-01 DIAGNOSIS — M16.52 POST-TRAUMATIC OSTEOARTHRITIS OF LEFT HIP: ICD-10-CM

## 2018-01-01 DIAGNOSIS — E78.2 MIXED HYPERLIPIDEMIA: ICD-10-CM

## 2018-01-01 DIAGNOSIS — J44.9 CHRONIC OBSTRUCTIVE PULMONARY DISEASE, UNSPECIFIED COPD TYPE (HCC): ICD-10-CM

## 2018-01-01 DIAGNOSIS — Z87.891 PERSONAL HISTORY OF TOBACCO USE, PRESENTING HAZARDS TO HEALTH: ICD-10-CM

## 2018-01-01 DIAGNOSIS — S05.00XA CORNEAL ABRASION, UNSPECIFIED LATERALITY, INITIAL ENCOUNTER: Primary | ICD-10-CM

## 2018-01-01 DIAGNOSIS — R09.02 HYPOXIA: ICD-10-CM

## 2018-01-01 DIAGNOSIS — R06.02 SOB (SHORTNESS OF BREATH): Primary | ICD-10-CM

## 2018-01-01 DIAGNOSIS — R11.0 NAUSEA: ICD-10-CM

## 2018-01-01 DIAGNOSIS — M16.12 PRIMARY OSTEOARTHRITIS OF LEFT HIP: ICD-10-CM

## 2018-01-01 DIAGNOSIS — R19.7 VOMITING AND DIARRHEA: ICD-10-CM

## 2018-01-01 DIAGNOSIS — R53.1 GENERALIZED WEAKNESS: Primary | ICD-10-CM

## 2018-01-01 DIAGNOSIS — N17.9 ACUTE RENAL FAILURE SUPERIMPOSED ON CHRONIC KIDNEY DISEASE, ON CHRONIC DIALYSIS, UNSPECIFIED ACUTE RENAL FAILURE TYPE (HCC): ICD-10-CM

## 2018-01-01 DIAGNOSIS — R73.9 HYPERGLYCEMIA: ICD-10-CM

## 2018-01-01 DIAGNOSIS — I10 ESSENTIAL HYPERTENSION: ICD-10-CM

## 2018-01-01 DIAGNOSIS — Z74.09 IMPAIRED FUNCTIONAL MOBILITY, BALANCE, GAIT, AND ENDURANCE: ICD-10-CM

## 2018-01-01 DIAGNOSIS — N18.5 CHRONIC KIDNEY DISEASE, STAGE V (HCC): ICD-10-CM

## 2018-01-01 DIAGNOSIS — E10.21 TYPE 1 DIABETES MELLITUS WITH NEPHROPATHY (HCC): Primary | ICD-10-CM

## 2018-01-01 DIAGNOSIS — E11.21 TYPE 2 DIABETES MELLITUS WITH NEPHROPATHY (HCC): Primary | ICD-10-CM

## 2018-01-01 DIAGNOSIS — E03.9 ACQUIRED HYPOTHYROIDISM: ICD-10-CM

## 2018-01-01 DIAGNOSIS — R55 SYNCOPE, UNSPECIFIED SYNCOPE TYPE: ICD-10-CM

## 2018-01-01 DIAGNOSIS — I49.5 SICK SINUS SYNDROME (HCC): ICD-10-CM

## 2018-01-01 DIAGNOSIS — J96.01 ACUTE RESPIRATORY FAILURE WITH HYPOXIA AND HYPERCAPNIA (HCC): ICD-10-CM

## 2018-01-01 DIAGNOSIS — E11.42 DIABETIC PERIPHERAL NEUROPATHY (HCC): ICD-10-CM

## 2018-01-01 DIAGNOSIS — Z09 FOLLOW UP: Primary | ICD-10-CM

## 2018-01-01 DIAGNOSIS — J43.9 PULMONARY EMPHYSEMA, UNSPECIFIED EMPHYSEMA TYPE (HCC): ICD-10-CM

## 2018-01-01 DIAGNOSIS — R56.9 SEIZURE (HCC): Primary | ICD-10-CM

## 2018-01-01 DIAGNOSIS — I95.9 HYPOTENSION, UNSPECIFIED HYPOTENSION TYPE: ICD-10-CM

## 2018-01-01 DIAGNOSIS — E10.21 TYPE 1 DIABETES MELLITUS WITH NEPHROPATHY (HCC): ICD-10-CM

## 2018-01-01 DIAGNOSIS — Z78.9 IMPAIRED MOBILITY AND ADLS: ICD-10-CM

## 2018-01-01 DIAGNOSIS — E10.3593 PROLIFERATIVE DIABETIC RETINOPATHY OF BOTH EYES WITHOUT MACULAR EDEMA ASSOCIATED WITH TYPE 1 DIABETES MELLITUS (HCC): ICD-10-CM

## 2018-01-01 DIAGNOSIS — M16.11 PRIMARY OSTEOARTHRITIS OF RIGHT HIP: Primary | ICD-10-CM

## 2018-01-01 DIAGNOSIS — R00.1 SYMPTOMATIC BRADYCARDIA: Primary | ICD-10-CM

## 2018-01-01 DIAGNOSIS — Z99.2 ACUTE RENAL FAILURE SUPERIMPOSED ON CHRONIC KIDNEY DISEASE, ON CHRONIC DIALYSIS, UNSPECIFIED ACUTE RENAL FAILURE TYPE (HCC): ICD-10-CM

## 2018-01-01 DIAGNOSIS — Z74.09 IMPAIRED MOBILITY AND ADLS: ICD-10-CM

## 2018-01-01 DIAGNOSIS — R06.02 SHORTNESS OF BREATH: Primary | ICD-10-CM

## 2018-01-01 DIAGNOSIS — J06.9 UPPER RESPIRATORY TRACT INFECTION, UNSPECIFIED TYPE: ICD-10-CM

## 2018-01-01 DIAGNOSIS — J40 BRONCHITIS: ICD-10-CM

## 2018-01-01 DIAGNOSIS — J02.9 PHARYNGITIS, UNSPECIFIED ETIOLOGY: Primary | ICD-10-CM

## 2018-01-01 DIAGNOSIS — J44.1 COPD WITH ACUTE EXACERBATION (HCC): ICD-10-CM

## 2018-01-01 LAB
ABO + RH BLD: NORMAL
ABO GROUP BLD: NORMAL
ACINETOBACTER SCREEN CX: NO GROWTH
ACINETOBACTER SCREEN CX: NORMAL
ADV 40+41 DNA STL QL NAA+NON-PROBE: NOT DETECTED
ALBUMIN SERPL-MCNC: 2.6 G/DL (ref 3.5–5)
ALBUMIN SERPL-MCNC: 2.8 G/DL (ref 3.5–5)
ALBUMIN SERPL-MCNC: 3.3 G/DL (ref 3.5–5)
ALBUMIN SERPL-MCNC: 3.5 G/DL (ref 3.5–5)
ALBUMIN SERPL-MCNC: 3.7 G/DL (ref 3.5–5)
ALBUMIN SERPL-MCNC: 3.9 G/DL (ref 3.5–5)
ALBUMIN SERPL-MCNC: 3.95 G/DL (ref 3.2–4.8)
ALBUMIN SERPL-MCNC: 4.2 G/DL (ref 3.5–5)
ALBUMIN SERPL-MCNC: 4.2 G/DL (ref 3.5–5)
ALBUMIN/GLOB SERPL: 1 G/DL (ref 1–2)
ALBUMIN/GLOB SERPL: 1.1 G/DL (ref 1–2)
ALBUMIN/GLOB SERPL: 1.2 G/DL (ref 1–2)
ALBUMIN/GLOB SERPL: 1.2 G/DL (ref 1–2)
ALBUMIN/GLOB SERPL: 1.4 G/DL (ref 1–2)
ALBUMIN/GLOB SERPL: 1.5 G/DL (ref 1.5–2.5)
ALBUMIN/GLOB SERPL: 1.6 G/DL (ref 1–2)
ALBUMIN/GLOB SERPL: 1.6 G/DL (ref 1–2)
ALP SERPL-CCNC: 107 U/L (ref 38–126)
ALP SERPL-CCNC: 111 U/L (ref 38–126)
ALP SERPL-CCNC: 111 U/L (ref 38–126)
ALP SERPL-CCNC: 112 U/L (ref 38–126)
ALP SERPL-CCNC: 137 U/L (ref 38–126)
ALP SERPL-CCNC: 74 U/L (ref 38–126)
ALP SERPL-CCNC: 81 U/L (ref 38–126)
ALP SERPL-CCNC: 91 U/L (ref 38–126)
ALP SERPL-CCNC: 96 U/L (ref 25–100)
ALP SERPL-CCNC: 98 U/L (ref 38–126)
ALT SERPL W P-5'-P-CCNC: 17 U/L (ref 7–40)
ALT SERPL W P-5'-P-CCNC: 20 U/L (ref 13–69)
ALT SERPL W P-5'-P-CCNC: 20 U/L (ref 13–69)
ALT SERPL W P-5'-P-CCNC: 24 U/L (ref 13–69)
ALT SERPL W P-5'-P-CCNC: 27 U/L (ref 13–69)
ALT SERPL W P-5'-P-CCNC: 29 U/L (ref 13–69)
ALT SERPL W P-5'-P-CCNC: 32 U/L (ref 13–69)
ALT SERPL W P-5'-P-CCNC: 33 U/L (ref 13–69)
ALT SERPL W P-5'-P-CCNC: 33 U/L (ref 13–69)
ALT SERPL W P-5'-P-CCNC: 39 U/L (ref 13–69)
ANION GAP SERPL CALCULATED.3IONS-SCNC: 11 MMOL/L (ref 10–20)
ANION GAP SERPL CALCULATED.3IONS-SCNC: 11 MMOL/L (ref 3–11)
ANION GAP SERPL CALCULATED.3IONS-SCNC: 11.3 MMOL/L (ref 10–20)
ANION GAP SERPL CALCULATED.3IONS-SCNC: 11.8 MMOL/L (ref 10–20)
ANION GAP SERPL CALCULATED.3IONS-SCNC: 11.8 MMOL/L (ref 10–20)
ANION GAP SERPL CALCULATED.3IONS-SCNC: 12.4 MMOL/L
ANION GAP SERPL CALCULATED.3IONS-SCNC: 12.6 MMOL/L
ANION GAP SERPL CALCULATED.3IONS-SCNC: 12.8 MMOL/L (ref 10–20)
ANION GAP SERPL CALCULATED.3IONS-SCNC: 12.9 MMOL/L (ref 10–20)
ANION GAP SERPL CALCULATED.3IONS-SCNC: 13.3 MMOL/L (ref 10–20)
ANION GAP SERPL CALCULATED.3IONS-SCNC: 13.3 MMOL/L (ref 10–20)
ANION GAP SERPL CALCULATED.3IONS-SCNC: 14.2 MMOL/L (ref 10–20)
ANION GAP SERPL CALCULATED.3IONS-SCNC: 15.5 MMOL/L (ref 10–20)
ANION GAP SERPL CALCULATED.3IONS-SCNC: 16.5 MMOL/L (ref 10–20)
ANION GAP SERPL CALCULATED.3IONS-SCNC: 17 MMOL/L
ANION GAP SERPL CALCULATED.3IONS-SCNC: 22.6 MMOL/L (ref 10–20)
ANION GAP SERPL CALCULATED.3IONS-SCNC: 24.6 MMOL/L (ref 10–20)
ANION GAP SERPL CALCULATED.3IONS-SCNC: 25.6 MMOL/L (ref 10–20)
ANION GAP SERPL CALCULATED.3IONS-SCNC: 27.6 MMOL/L (ref 10–20)
ANISOCYTOSIS BLD QL: NORMAL
ANISOCYTOSIS BLD QL: NORMAL
ARTERIAL PATENCY WRIST A: ABNORMAL
ARTERIAL PATENCY WRIST A: ABNORMAL
ARTERIAL PATENCY WRIST A: POSITIVE
ARTICHOKE IGE QN: 45 MG/DL (ref 0–130)
AST SERPL-CCNC: 19 U/L (ref 0–33)
AST SERPL-CCNC: 19 U/L (ref 15–46)
AST SERPL-CCNC: 23 U/L (ref 15–46)
AST SERPL-CCNC: 24 U/L (ref 15–46)
AST SERPL-CCNC: 27 U/L (ref 15–46)
AST SERPL-CCNC: 27 U/L (ref 15–46)
AST SERPL-CCNC: 30 U/L (ref 15–46)
AST SERPL-CCNC: 33 U/L (ref 15–46)
AST SERPL-CCNC: 34 U/L (ref 15–46)
AST SERPL-CCNC: 59 U/L (ref 15–46)
ASTRO TYP 1-8 RNA STL QL NAA+NON-PROBE: NOT DETECTED
ATMOSPHERIC PRESS: 733 MMHG
ATMOSPHERIC PRESS: 735 MMHG
ATMOSPHERIC PRESS: 736 MMHG
ATMOSPHERIC PRESS: 738 MMHG
ATMOSPHERIC PRESS: 738 MMHG
ATMOSPHERIC PRESS: 740 MMHG
BACTERIA SPEC AEROBE CULT: NORMAL
BACTERIA UR QL AUTO: ABNORMAL /HPF
BASE EXCESS BLDA CALC-SCNC: -0.3 MMOL/L (ref 0–2)
BASE EXCESS BLDA CALC-SCNC: -0.8 MMOL/L (ref 0–2)
BASE EXCESS BLDA CALC-SCNC: -1 MMOL/L (ref 0–2)
BASE EXCESS BLDA CALC-SCNC: -1.7 MMOL/L (ref 0–2)
BASE EXCESS BLDA CALC-SCNC: -12.7 MMOL/L (ref 0–2)
BASE EXCESS BLDA CALC-SCNC: -3.8 MMOL/L (ref 0–2)
BASE EXCESS BLDA CALC-SCNC: 1 MMOL/L
BASE EXCESS BLDA CALC-SCNC: 1.1 MMOL/L (ref 0–2)
BASE EXCESS BLDA CALC-SCNC: 2.9 MMOL/L (ref 0–2)
BASE EXCESS BLDA CALC-SCNC: 3.3 MMOL/L (ref 0–2)
BASE EXCESS BLDA CALC-SCNC: 3.7 MMOL/L (ref 0–2)
BASOPHILS # BLD AUTO: 0.01 10*3/MM3 (ref 0–0.2)
BASOPHILS # BLD AUTO: 0.02 10*3/MM3 (ref 0–0.2)
BASOPHILS # BLD AUTO: 0.02 10*3/MM3 (ref 0–0.2)
BASOPHILS # BLD AUTO: 0.05 10*3/MM3 (ref 0–0.2)
BASOPHILS # BLD AUTO: 0.06 10*3/MM3 (ref 0–0.2)
BASOPHILS # BLD AUTO: 0.07 10*3/MM3 (ref 0–0.2)
BASOPHILS # BLD AUTO: 0.08 10*3/MM3 (ref 0–0.2)
BASOPHILS # BLD AUTO: 0.09 10*3/MM3 (ref 0–0.2)
BASOPHILS # BLD AUTO: 0.1 10*3/MM3 (ref 0–0.2)
BASOPHILS NFR BLD AUTO: 0.1 % (ref 0–2.5)
BASOPHILS NFR BLD AUTO: 0.2 % (ref 0–2.5)
BASOPHILS NFR BLD AUTO: 0.2 % (ref 0–2.5)
BASOPHILS NFR BLD AUTO: 0.4 % (ref 0–2.5)
BASOPHILS NFR BLD AUTO: 0.6 % (ref 0–2.5)
BASOPHILS NFR BLD AUTO: 0.8 % (ref 0–2.5)
BASOPHILS NFR BLD AUTO: 0.9 % (ref 0–2.5)
BASOPHILS NFR BLD AUTO: 1 % (ref 0–2.5)
BASOPHILS NFR BLD AUTO: 1.1 % (ref 0–2.5)
BASOPHILS NFR BLD AUTO: 1.1 % (ref 0–2.5)
BASOPHILS NFR BLD AUTO: 1.4 % (ref 0–2.5)
BDY SITE: ABNORMAL
BH BB BLOOD EXPIRATION DATE: NORMAL
BH BB BLOOD TYPE BARCODE: 6200
BH BB DISPENSE STATUS: NORMAL
BH BB PRODUCT CODE: NORMAL
BH BB UNIT NUMBER: NORMAL
BILIRUB SERPL-MCNC: 0.2 MG/DL (ref 0.2–1.3)
BILIRUB SERPL-MCNC: 0.4 MG/DL (ref 0.2–1.3)
BILIRUB SERPL-MCNC: 0.5 MG/DL (ref 0.2–1.3)
BILIRUB SERPL-MCNC: 0.5 MG/DL (ref 0.3–1.2)
BILIRUB SERPL-MCNC: 0.6 MG/DL (ref 0.2–1.3)
BILIRUB SERPL-MCNC: 0.7 MG/DL (ref 0.2–1.3)
BILIRUB SERPL-MCNC: 0.9 MG/DL (ref 0.2–1.3)
BILIRUB UR QL STRIP: NEGATIVE
BLD GP AB SCN SERPL QL: NEGATIVE
BUN BLD-MCNC: 101 MG/DL (ref 7–20)
BUN BLD-MCNC: 26 MG/DL (ref 7–20)
BUN BLD-MCNC: 26 MG/DL (ref 7–20)
BUN BLD-MCNC: 28 MG/DL (ref 7–20)
BUN BLD-MCNC: 31 MG/DL (ref 7–20)
BUN BLD-MCNC: 33 MG/DL (ref 9–23)
BUN BLD-MCNC: 34 MG/DL (ref 7–20)
BUN BLD-MCNC: 35 MG/DL (ref 7–20)
BUN BLD-MCNC: 37 MG/DL (ref 7–20)
BUN BLD-MCNC: 40 MG/DL (ref 7–20)
BUN BLD-MCNC: 40 MG/DL (ref 7–20)
BUN BLD-MCNC: 46 MG/DL (ref 7–20)
BUN BLD-MCNC: 49 MG/DL (ref 7–20)
BUN BLD-MCNC: 51 MG/DL (ref 7–20)
BUN BLD-MCNC: 51 MG/DL (ref 7–20)
BUN BLD-MCNC: 54 MG/DL (ref 7–20)
BUN BLD-MCNC: 58 MG/DL (ref 7–20)
BUN BLD-MCNC: 66 MG/DL (ref 7–20)
BUN BLD-MCNC: 77 MG/DL (ref 7–20)
BUN/CREAT SERPL: 10 (ref 7.1–23.5)
BUN/CREAT SERPL: 10.6 (ref 7.1–23.5)
BUN/CREAT SERPL: 13.5 (ref 7.1–23.5)
BUN/CREAT SERPL: 5.9 (ref 7.1–23.5)
BUN/CREAT SERPL: 6.5 (ref 7.1–23.5)
BUN/CREAT SERPL: 6.7 (ref 7.1–23.5)
BUN/CREAT SERPL: 6.7 (ref 7.1–23.5)
BUN/CREAT SERPL: 6.8 (ref 7.1–23.5)
BUN/CREAT SERPL: 6.8 (ref 7–25)
BUN/CREAT SERPL: 6.9 (ref 7.1–23.5)
BUN/CREAT SERPL: 7 (ref 7.1–23.5)
BUN/CREAT SERPL: 7.1 (ref 7.1–23.5)
BUN/CREAT SERPL: 7.7 (ref 7.1–23.5)
BUN/CREAT SERPL: 8 (ref 7.1–23.5)
BUN/CREAT SERPL: 8.3 (ref 7.1–23.5)
BUN/CREAT SERPL: 8.3 (ref 7.1–23.5)
BUN/CREAT SERPL: 8.9 (ref 7.1–23.5)
BUN/CREAT SERPL: 9.1 (ref 7.1–23.5)
BUN/CREAT SERPL: 9.7 (ref 7.1–23.5)
C CAYETANENSIS DNA STL QL NAA+NON-PROBE: NOT DETECTED
C DIFF TOX GENS STL QL NAA+PROBE: NOT DETECTED
CALCIUM SPEC-SCNC: 7.7 MG/DL (ref 8.4–10.2)
CALCIUM SPEC-SCNC: 7.9 MG/DL (ref 8.4–10.2)
CALCIUM SPEC-SCNC: 8.1 MG/DL (ref 8.4–10.2)
CALCIUM SPEC-SCNC: 8.3 MG/DL (ref 8.4–10.2)
CALCIUM SPEC-SCNC: 8.4 MG/DL (ref 8.7–10.4)
CALCIUM SPEC-SCNC: 8.7 MG/DL (ref 8.4–10.2)
CALCIUM SPEC-SCNC: 8.7 MG/DL (ref 8.4–10.2)
CALCIUM SPEC-SCNC: 8.8 MG/DL (ref 8.4–10.2)
CALCIUM SPEC-SCNC: 8.9 MG/DL (ref 8.4–10.2)
CALCIUM SPEC-SCNC: 8.9 MG/DL (ref 8.4–10.2)
CALCIUM SPEC-SCNC: 9.1 MG/DL (ref 8.4–10.2)
CALCIUM SPEC-SCNC: 9.3 MG/DL (ref 8.4–10.2)
CAMPY SP DNA.DIARRHEA STL QL NAA+PROBE: NOT DETECTED
CHLORIDE SERPL-SCNC: 100 MMOL/L (ref 98–107)
CHLORIDE SERPL-SCNC: 101 MMOL/L (ref 98–107)
CHLORIDE SERPL-SCNC: 102 MMOL/L (ref 98–107)
CHLORIDE SERPL-SCNC: 103 MMOL/L (ref 98–107)
CHLORIDE SERPL-SCNC: 104 MMOL/L (ref 98–107)
CHLORIDE SERPL-SCNC: 87 MMOL/L (ref 98–107)
CHLORIDE SERPL-SCNC: 88 MMOL/L (ref 98–107)
CHLORIDE SERPL-SCNC: 89 MMOL/L (ref 98–107)
CHLORIDE SERPL-SCNC: 94 MMOL/L (ref 98–107)
CHLORIDE SERPL-SCNC: 97 MMOL/L (ref 99–109)
CHLORIDE SERPL-SCNC: 98 MMOL/L (ref 98–107)
CHLORIDE SERPL-SCNC: 98 MMOL/L (ref 98–107)
CHLORIDE SERPL-SCNC: 99 MMOL/L (ref 98–107)
CHOLEST SERPL-MCNC: 154 MG/DL (ref 0–200)
CLARITY UR: CLEAR
CO2 SERPL-SCNC: 14 MMOL/L (ref 26–30)
CO2 SERPL-SCNC: 15 MMOL/L (ref 26–30)
CO2 SERPL-SCNC: 20 MMOL/L (ref 26–30)
CO2 SERPL-SCNC: 20 MMOL/L (ref 26–30)
CO2 SERPL-SCNC: 21 MMOL/L (ref 26–30)
CO2 SERPL-SCNC: 21 MMOL/L (ref 26–30)
CO2 SERPL-SCNC: 22 MMOL/L (ref 26–30)
CO2 SERPL-SCNC: 22 MMOL/L (ref 26–30)
CO2 SERPL-SCNC: 23 MMOL/L (ref 26–30)
CO2 SERPL-SCNC: 23 MMOL/L (ref 26–30)
CO2 SERPL-SCNC: 24 MMOL/L (ref 26–30)
CO2 SERPL-SCNC: 25 MMOL/L (ref 26–30)
CO2 SERPL-SCNC: 25 MMOL/L (ref 26–30)
CO2 SERPL-SCNC: 26 MMOL/L (ref 26–30)
CO2 SERPL-SCNC: 26 MMOL/L (ref 26–30)
CO2 SERPL-SCNC: 28 MMOL/L (ref 20–31)
CO2 SERPL-SCNC: 28 MMOL/L (ref 26–30)
CO2 SERPL-SCNC: 29 MMOL/L (ref 26–30)
CO2 SERPL-SCNC: 32 MMOL/L (ref 26–30)
COHGB MFR BLD: 0.8 % (ref 0–2)
COHGB MFR BLD: 1.2 % (ref 0–2)
COHGB MFR BLD: 1.3 % (ref 0–2)
COHGB MFR BLD: 1.4 % (ref 0–2)
COHGB MFR BLD: 1.5 % (ref 0–2)
COLOR UR: YELLOW
CREAT BLD-MCNC: 12.1 MG/DL (ref 0.6–1.3)
CREAT BLD-MCNC: 3.5 MG/DL (ref 0.6–1.3)
CREAT BLD-MCNC: 3.7 MG/DL (ref 0.6–1.3)
CREAT BLD-MCNC: 4.1 MG/DL (ref 0.6–1.3)
CREAT BLD-MCNC: 4.3 MG/DL (ref 0.6–1.3)
CREAT BLD-MCNC: 4.4 MG/DL (ref 0.6–1.3)
CREAT BLD-MCNC: 4.4 MG/DL (ref 0.6–1.3)
CREAT BLD-MCNC: 4.6 MG/DL (ref 0.6–1.3)
CREAT BLD-MCNC: 4.8 MG/DL (ref 0.6–1.3)
CREAT BLD-MCNC: 4.85 MG/DL (ref 0.6–1.3)
CREAT BLD-MCNC: 5.2 MG/DL (ref 0.6–1.3)
CREAT BLD-MCNC: 5.2 MG/DL (ref 0.6–1.3)
CREAT BLD-MCNC: 5.7 MG/DL (ref 0.6–1.3)
CREAT BLD-MCNC: 6 MG/DL (ref 0.6–1.3)
CREAT BLD-MCNC: 6.6 MG/DL (ref 0.6–1.3)
CREAT BLD-MCNC: 6.9 MG/DL (ref 0.6–1.3)
CREAT BLD-MCNC: 6.9 MG/DL (ref 0.6–1.3)
CREAT BLD-MCNC: 7.4 MG/DL (ref 0.6–1.3)
CREAT BLD-MCNC: 8 MG/DL (ref 0.6–1.3)
CROSSMATCH INTERPRETATION: NORMAL
CRYPTOSP STL CULT: NOT DETECTED
D-LACTATE SERPL-SCNC: 1.2 MMOL/L (ref 0.5–2)
D-LACTATE SERPL-SCNC: 2 MMOL/L (ref 0.5–2)
DEPRECATED RDW RBC AUTO: 53 FL (ref 37–54)
DEPRECATED RDW RBC AUTO: 53.4 FL (ref 37–54)
DEPRECATED RDW RBC AUTO: 53.5 FL (ref 37–54)
DEPRECATED RDW RBC AUTO: 53.5 FL (ref 37–54)
DEPRECATED RDW RBC AUTO: 54.1 FL (ref 37–54)
DEPRECATED RDW RBC AUTO: 54.1 FL (ref 37–54)
DEPRECATED RDW RBC AUTO: 54.5 FL (ref 37–54)
DEPRECATED RDW RBC AUTO: 54.6 FL (ref 37–54)
DEPRECATED RDW RBC AUTO: 54.9 FL (ref 37–54)
DEPRECATED RDW RBC AUTO: 55.1 FL (ref 37–54)
DEPRECATED RDW RBC AUTO: 55.1 FL (ref 37–54)
DEPRECATED RDW RBC AUTO: 55.4 FL (ref 37–54)
DEPRECATED RDW RBC AUTO: 55.5 FL (ref 37–54)
DEPRECATED RDW RBC AUTO: 57.1 FL (ref 37–54)
DEPRECATED RDW RBC AUTO: 58 FL (ref 37–54)
DEPRECATED RDW RBC AUTO: 59.4 FL (ref 37–54)
DEPRECATED RDW RBC AUTO: 62.7 FL (ref 37–54)
E COLI DNA SPEC QL NAA+PROBE: NOT DETECTED
E HISTOLYT AG STL-ACNC: NOT DETECTED
EAEC PAA PLAS AGGR+AATA ST NAA+NON-PRB: NOT DETECTED
EC STX1 + STX2 GENES STL NAA+PROBE: NOT DETECTED
EOSINOPHIL # BLD AUTO: 0.01 10*3/MM3 (ref 0–0.7)
EOSINOPHIL # BLD AUTO: 0.02 10*3/MM3 (ref 0–0.7)
EOSINOPHIL # BLD AUTO: 0.02 10*3/MM3 (ref 0–0.7)
EOSINOPHIL # BLD AUTO: 0.16 10*3/MM3 (ref 0–0.7)
EOSINOPHIL # BLD AUTO: 0.16 10*3/MM3 (ref 0–0.7)
EOSINOPHIL # BLD AUTO: 0.17 10*3/MM3 (ref 0–0.7)
EOSINOPHIL # BLD AUTO: 0.18 10*3/MM3 (ref 0–0.7)
EOSINOPHIL # BLD AUTO: 0.2 10*3/MM3 (ref 0–0.7)
EOSINOPHIL # BLD AUTO: 0.26 10*3/MM3 (ref 0–0.7)
EOSINOPHIL # BLD AUTO: 0.27 10*3/MM3 (ref 0–0.7)
EOSINOPHIL # BLD AUTO: 0.27 10*3/MM3 (ref 0–0.7)
EOSINOPHIL NFR BLD AUTO: 0.1 % (ref 0–7)
EOSINOPHIL NFR BLD AUTO: 0.1 % (ref 0–7)
EOSINOPHIL NFR BLD AUTO: 0.3 % (ref 0–7)
EOSINOPHIL NFR BLD AUTO: 1.8 % (ref 0–7)
EOSINOPHIL NFR BLD AUTO: 2.1 % (ref 0–7)
EOSINOPHIL NFR BLD AUTO: 2.2 % (ref 0–7)
EOSINOPHIL NFR BLD AUTO: 2.4 % (ref 0–7)
EOSINOPHIL NFR BLD AUTO: 3 % (ref 0–7)
EOSINOPHIL NFR BLD AUTO: 3.2 % (ref 0–7)
EOSINOPHIL NFR BLD AUTO: 3.3 % (ref 0–7)
EOSINOPHIL NFR BLD AUTO: 4.5 % (ref 0–7)
EPEC EAE GENE STL QL NAA+NON-PROBE: NOT DETECTED
ERYTHROCYTE [DISTWIDTH] IN BLOOD BY AUTOMATED COUNT: 14.6 % (ref 11.5–14.5)
ERYTHROCYTE [DISTWIDTH] IN BLOOD BY AUTOMATED COUNT: 14.7 % (ref 11.5–14.5)
ERYTHROCYTE [DISTWIDTH] IN BLOOD BY AUTOMATED COUNT: 15 % (ref 11.5–14.5)
ERYTHROCYTE [DISTWIDTH] IN BLOOD BY AUTOMATED COUNT: 15 % (ref 11.5–14.5)
ERYTHROCYTE [DISTWIDTH] IN BLOOD BY AUTOMATED COUNT: 15.1 % (ref 11.5–14.5)
ERYTHROCYTE [DISTWIDTH] IN BLOOD BY AUTOMATED COUNT: 15.2 % (ref 11.5–14.5)
ERYTHROCYTE [DISTWIDTH] IN BLOOD BY AUTOMATED COUNT: 15.3 % (ref 11.5–14.5)
ERYTHROCYTE [DISTWIDTH] IN BLOOD BY AUTOMATED COUNT: 15.4 % (ref 11.5–14.5)
ERYTHROCYTE [DISTWIDTH] IN BLOOD BY AUTOMATED COUNT: 15.6 % (ref 11.5–14.5)
ERYTHROCYTE [DISTWIDTH] IN BLOOD BY AUTOMATED COUNT: 15.7 % (ref 11.5–14.5)
ERYTHROCYTE [DISTWIDTH] IN BLOOD BY AUTOMATED COUNT: 15.8 % (ref 11.5–14.5)
ERYTHROCYTE [SEDIMENTATION RATE] IN BLOOD: 16 MM/HR (ref 0–20)
ETEC LTA+ST1A+ST1B TOX ST NAA+NON-PROBE: NOT DETECTED
G LAMBLIA DNA SPEC QL NAA+PROBE: NOT DETECTED
GFR SERPL CREATININE-BSD FRML MDRD: 10 ML/MIN/1.73
GFR SERPL CREATININE-BSD FRML MDRD: 12 ML/MIN/1.73
GFR SERPL CREATININE-BSD FRML MDRD: 13 ML/MIN/1.73
GFR SERPL CREATININE-BSD FRML MDRD: 3 ML/MIN/1.73
GFR SERPL CREATININE-BSD FRML MDRD: 5 ML/MIN/1.73
GFR SERPL CREATININE-BSD FRML MDRD: 5 ML/MIN/1.73
GFR SERPL CREATININE-BSD FRML MDRD: 6 ML/MIN/1.73
GFR SERPL CREATININE-BSD FRML MDRD: 7 ML/MIN/1.73
GFR SERPL CREATININE-BSD FRML MDRD: 7 ML/MIN/1.73
GFR SERPL CREATININE-BSD FRML MDRD: 8 ML/MIN/1.73
GFR SERPL CREATININE-BSD FRML MDRD: 8 ML/MIN/1.73
GFR SERPL CREATININE-BSD FRML MDRD: 9 ML/MIN/1.73
GFR SERPL CREATININE-BSD FRML MDRD: ABNORMAL ML/MIN/1.73
GLOBULIN UR ELPH-MCNC: 2.4 GM/DL
GLOBULIN UR ELPH-MCNC: 2.4 GM/DL
GLOBULIN UR ELPH-MCNC: 2.6 GM/DL
GLOBULIN UR ELPH-MCNC: 2.6 GM/DL
GLOBULIN UR ELPH-MCNC: 2.7 GM/DL
GLOBULIN UR ELPH-MCNC: 2.7 GM/DL
GLOBULIN UR ELPH-MCNC: 2.9 GM/DL
GLOBULIN UR ELPH-MCNC: 2.9 GM/DL
GLOBULIN UR ELPH-MCNC: 3.1 GM/DL
GLOBULIN UR ELPH-MCNC: 3.1 GM/DL
GLUCOSE BLD-MCNC: 125 MG/DL (ref 74–98)
GLUCOSE BLD-MCNC: 131 MG/DL (ref 74–98)
GLUCOSE BLD-MCNC: 167 MG/DL (ref 74–98)
GLUCOSE BLD-MCNC: 170 MG/DL (ref 74–98)
GLUCOSE BLD-MCNC: 173 MG/DL (ref 74–98)
GLUCOSE BLD-MCNC: 177 MG/DL (ref 74–98)
GLUCOSE BLD-MCNC: 184 MG/DL (ref 74–98)
GLUCOSE BLD-MCNC: 187 MG/DL (ref 74–98)
GLUCOSE BLD-MCNC: 239 MG/DL (ref 74–98)
GLUCOSE BLD-MCNC: 246 MG/DL (ref 70–100)
GLUCOSE BLD-MCNC: 254 MG/DL (ref 74–98)
GLUCOSE BLD-MCNC: 26 MG/DL (ref 74–98)
GLUCOSE BLD-MCNC: 299 MG/DL (ref 74–98)
GLUCOSE BLD-MCNC: 321 MG/DL (ref 74–98)
GLUCOSE BLD-MCNC: 337 MG/DL (ref 74–98)
GLUCOSE BLD-MCNC: 60 MG/DL (ref 74–98)
GLUCOSE BLD-MCNC: 72 MG/DL (ref 74–98)
GLUCOSE BLD-MCNC: 840 MG/DL (ref 74–98)
GLUCOSE BLD-MCNC: 87 MG/DL (ref 74–98)
GLUCOSE BLD-MCNC: 988 MG/DL (ref 74–98)
GLUCOSE BLDC GLUCOMTR-MCNC: 102 MG/DL (ref 70–130)
GLUCOSE BLDC GLUCOMTR-MCNC: 105 MG/DL (ref 70–130)
GLUCOSE BLDC GLUCOMTR-MCNC: 105 MG/DL (ref 70–130)
GLUCOSE BLDC GLUCOMTR-MCNC: 106 MG/DL (ref 70–130)
GLUCOSE BLDC GLUCOMTR-MCNC: 114 MG/DL (ref 70–130)
GLUCOSE BLDC GLUCOMTR-MCNC: 118 MG/DL (ref 70–130)
GLUCOSE BLDC GLUCOMTR-MCNC: 121 MG/DL (ref 70–130)
GLUCOSE BLDC GLUCOMTR-MCNC: 122 MG/DL (ref 70–130)
GLUCOSE BLDC GLUCOMTR-MCNC: 123 MG/DL (ref 70–130)
GLUCOSE BLDC GLUCOMTR-MCNC: 127 MG/DL (ref 70–130)
GLUCOSE BLDC GLUCOMTR-MCNC: 129 MG/DL (ref 70–130)
GLUCOSE BLDC GLUCOMTR-MCNC: 130 MG/DL (ref 70–130)
GLUCOSE BLDC GLUCOMTR-MCNC: 132 MG/DL (ref 70–130)
GLUCOSE BLDC GLUCOMTR-MCNC: 133 MG/DL (ref 70–130)
GLUCOSE BLDC GLUCOMTR-MCNC: 134 MG/DL (ref 70–130)
GLUCOSE BLDC GLUCOMTR-MCNC: 138 MG/DL (ref 70–130)
GLUCOSE BLDC GLUCOMTR-MCNC: 139 MG/DL (ref 70–130)
GLUCOSE BLDC GLUCOMTR-MCNC: 142 MG/DL (ref 70–130)
GLUCOSE BLDC GLUCOMTR-MCNC: 143 MG/DL (ref 70–130)
GLUCOSE BLDC GLUCOMTR-MCNC: 143 MG/DL (ref 70–130)
GLUCOSE BLDC GLUCOMTR-MCNC: 145 MG/DL (ref 70–130)
GLUCOSE BLDC GLUCOMTR-MCNC: 151 MG/DL (ref 70–130)
GLUCOSE BLDC GLUCOMTR-MCNC: 152 MG/DL (ref 70–130)
GLUCOSE BLDC GLUCOMTR-MCNC: 152 MG/DL (ref 70–130)
GLUCOSE BLDC GLUCOMTR-MCNC: 155 MG/DL (ref 70–130)
GLUCOSE BLDC GLUCOMTR-MCNC: 155 MG/DL (ref 70–130)
GLUCOSE BLDC GLUCOMTR-MCNC: 164 MG/DL (ref 70–130)
GLUCOSE BLDC GLUCOMTR-MCNC: 165 MG/DL (ref 70–130)
GLUCOSE BLDC GLUCOMTR-MCNC: 166 MG/DL (ref 70–130)
GLUCOSE BLDC GLUCOMTR-MCNC: 169 MG/DL (ref 70–130)
GLUCOSE BLDC GLUCOMTR-MCNC: 174 MG/DL (ref 70–130)
GLUCOSE BLDC GLUCOMTR-MCNC: 175 MG/DL (ref 70–130)
GLUCOSE BLDC GLUCOMTR-MCNC: 177 MG/DL (ref 70–130)
GLUCOSE BLDC GLUCOMTR-MCNC: 179 MG/DL (ref 70–130)
GLUCOSE BLDC GLUCOMTR-MCNC: 185 MG/DL (ref 70–130)
GLUCOSE BLDC GLUCOMTR-MCNC: 189 MG/DL (ref 70–130)
GLUCOSE BLDC GLUCOMTR-MCNC: 190 MG/DL (ref 70–130)
GLUCOSE BLDC GLUCOMTR-MCNC: 197 MG/DL (ref 70–130)
GLUCOSE BLDC GLUCOMTR-MCNC: 205 MG/DL (ref 70–130)
GLUCOSE BLDC GLUCOMTR-MCNC: 210 MG/DL (ref 70–130)
GLUCOSE BLDC GLUCOMTR-MCNC: 216 MG/DL (ref 70–130)
GLUCOSE BLDC GLUCOMTR-MCNC: 225 MG/DL (ref 70–130)
GLUCOSE BLDC GLUCOMTR-MCNC: 226 MG/DL (ref 70–130)
GLUCOSE BLDC GLUCOMTR-MCNC: 23 MG/DL (ref 70–130)
GLUCOSE BLDC GLUCOMTR-MCNC: 230 MG/DL (ref 70–130)
GLUCOSE BLDC GLUCOMTR-MCNC: 235 MG/DL (ref 70–130)
GLUCOSE BLDC GLUCOMTR-MCNC: 238 MG/DL (ref 70–130)
GLUCOSE BLDC GLUCOMTR-MCNC: 239 MG/DL (ref 70–130)
GLUCOSE BLDC GLUCOMTR-MCNC: 244 MG/DL (ref 70–130)
GLUCOSE BLDC GLUCOMTR-MCNC: 244 MG/DL (ref 70–130)
GLUCOSE BLDC GLUCOMTR-MCNC: 251 MG/DL (ref 70–130)
GLUCOSE BLDC GLUCOMTR-MCNC: 270 MG/DL (ref 70–130)
GLUCOSE BLDC GLUCOMTR-MCNC: 273 MG/DL (ref 70–130)
GLUCOSE BLDC GLUCOMTR-MCNC: 275 MG/DL (ref 70–130)
GLUCOSE BLDC GLUCOMTR-MCNC: 28 MG/DL (ref 70–130)
GLUCOSE BLDC GLUCOMTR-MCNC: 28 MG/DL (ref 70–130)
GLUCOSE BLDC GLUCOMTR-MCNC: 282 MG/DL (ref 70–130)
GLUCOSE BLDC GLUCOMTR-MCNC: 286 MG/DL (ref 70–130)
GLUCOSE BLDC GLUCOMTR-MCNC: 286 MG/DL (ref 70–130)
GLUCOSE BLDC GLUCOMTR-MCNC: 288 MG/DL (ref 70–130)
GLUCOSE BLDC GLUCOMTR-MCNC: 293 MG/DL (ref 70–130)
GLUCOSE BLDC GLUCOMTR-MCNC: 30 MG/DL (ref 70–130)
GLUCOSE BLDC GLUCOMTR-MCNC: 301 MG/DL (ref 70–130)
GLUCOSE BLDC GLUCOMTR-MCNC: 307 MG/DL (ref 70–130)
GLUCOSE BLDC GLUCOMTR-MCNC: 312 MG/DL (ref 70–130)
GLUCOSE BLDC GLUCOMTR-MCNC: 315 MG/DL (ref 70–130)
GLUCOSE BLDC GLUCOMTR-MCNC: 318 MG/DL (ref 70–130)
GLUCOSE BLDC GLUCOMTR-MCNC: 320 MG/DL (ref 70–130)
GLUCOSE BLDC GLUCOMTR-MCNC: 324 MG/DL (ref 70–130)
GLUCOSE BLDC GLUCOMTR-MCNC: 329 MG/DL (ref 70–130)
GLUCOSE BLDC GLUCOMTR-MCNC: 37 MG/DL (ref 70–130)
GLUCOSE BLDC GLUCOMTR-MCNC: 370 MG/DL (ref 70–130)
GLUCOSE BLDC GLUCOMTR-MCNC: 378 MG/DL (ref 70–130)
GLUCOSE BLDC GLUCOMTR-MCNC: 39 MG/DL (ref 70–130)
GLUCOSE BLDC GLUCOMTR-MCNC: 396 MG/DL (ref 70–130)
GLUCOSE BLDC GLUCOMTR-MCNC: 416 MG/DL (ref 70–130)
GLUCOSE BLDC GLUCOMTR-MCNC: 416 MG/DL (ref 70–130)
GLUCOSE BLDC GLUCOMTR-MCNC: 419 MG/DL (ref 70–130)
GLUCOSE BLDC GLUCOMTR-MCNC: 441 MG/DL (ref 70–130)
GLUCOSE BLDC GLUCOMTR-MCNC: 483 MG/DL (ref 70–130)
GLUCOSE BLDC GLUCOMTR-MCNC: 498 MG/DL (ref 70–130)
GLUCOSE BLDC GLUCOMTR-MCNC: 53 MG/DL (ref 70–130)
GLUCOSE BLDC GLUCOMTR-MCNC: 62 MG/DL (ref 70–130)
GLUCOSE BLDC GLUCOMTR-MCNC: 63 MG/DL (ref 70–130)
GLUCOSE BLDC GLUCOMTR-MCNC: 65 MG/DL (ref 70–130)
GLUCOSE BLDC GLUCOMTR-MCNC: 69 MG/DL (ref 70–130)
GLUCOSE BLDC GLUCOMTR-MCNC: 69 MG/DL (ref 70–130)
GLUCOSE BLDC GLUCOMTR-MCNC: 72 MG/DL (ref 70–130)
GLUCOSE BLDC GLUCOMTR-MCNC: 82 MG/DL (ref 70–130)
GLUCOSE BLDC GLUCOMTR-MCNC: 89 MG/DL (ref 70–130)
GLUCOSE BLDC GLUCOMTR-MCNC: 91 MG/DL (ref 70–130)
GLUCOSE BLDC GLUCOMTR-MCNC: 95 MG/DL (ref 70–130)
GLUCOSE BLDC GLUCOMTR-MCNC: >599 MG/DL (ref 70–130)
GLUCOSE UR STRIP-MCNC: ABNORMAL MG/DL
HBA1C MFR BLD: 7.6 % (ref 3–6)
HBA1C MFR BLD: 8.1 %
HBA1C MFR BLD: 9.3 %
HBV SURFACE AG SERPL QL IA: NEGATIVE
HBV SURFACE AG SERPL QL IA: NEGATIVE
HCO3 BLDA-SCNC: 14.5 MMOL/L (ref 22–28)
HCO3 BLDA-SCNC: 20.9 MMOL/L (ref 22–28)
HCO3 BLDA-SCNC: 22.9 MMOL/L (ref 22–28)
HCO3 BLDA-SCNC: 23.8 MMOL/L (ref 22–28)
HCO3 BLDA-SCNC: 24.3 MMOL/L (ref 22–28)
HCO3 BLDA-SCNC: 24.4 MMOL/L (ref 22–28)
HCO3 BLDA-SCNC: 24.6 MMOL/L (ref 22–28)
HCO3 BLDA-SCNC: 25.8 MMOL/L (ref 22–28)
HCO3 BLDA-SCNC: 26.5 MMOL/L (ref 22–28)
HCO3 BLDA-SCNC: 27.7 MMOL/L (ref 22–28)
HCO3 BLDA-SCNC: 28.5 MMOL/L (ref 22–28)
HCT VFR BLD AUTO: 23.7 % (ref 37–47)
HCT VFR BLD AUTO: 23.8 % (ref 37–47)
HCT VFR BLD AUTO: 28.6 % (ref 37–47)
HCT VFR BLD AUTO: 30.3 % (ref 37–47)
HCT VFR BLD AUTO: 30.8 % (ref 37–47)
HCT VFR BLD AUTO: 30.8 % (ref 37–47)
HCT VFR BLD AUTO: 31.9 % (ref 37–47)
HCT VFR BLD AUTO: 31.9 % (ref 37–47)
HCT VFR BLD AUTO: 32.3 % (ref 37–47)
HCT VFR BLD AUTO: 32.6 % (ref 37–47)
HCT VFR BLD AUTO: 32.8 % (ref 37–47)
HCT VFR BLD AUTO: 33 % (ref 37–47)
HCT VFR BLD AUTO: 33.4 % (ref 37–47)
HCT VFR BLD AUTO: 34.9 % (ref 37–47)
HCT VFR BLD AUTO: 35.5 % (ref 37–47)
HCT VFR BLD AUTO: 36.5 % (ref 37–47)
HCT VFR BLD AUTO: 41.5 % (ref 37–47)
HCT VFR BLD CALC: 31.1 %
HCT VFR BLD CALC: 32.3 %
HCT VFR BLD CALC: 32.9 %
HCT VFR BLD CALC: 33.8 %
HCT VFR BLD CALC: 34 %
HCT VFR BLD CALC: 34.1 %
HCT VFR BLD CALC: 35.3 %
HCT VFR BLD CALC: 36.4 %
HCT VFR BLD CALC: 38.8 %
HDLC SERPL-MCNC: 96 MG/DL (ref 40–60)
HGB BLD-MCNC: 10.2 G/DL (ref 12–16)
HGB BLD-MCNC: 10.2 G/DL (ref 12–16)
HGB BLD-MCNC: 10.5 G/DL (ref 12–16)
HGB BLD-MCNC: 10.6 G/DL (ref 12–16)
HGB BLD-MCNC: 10.7 G/DL (ref 12–16)
HGB BLD-MCNC: 10.8 G/DL (ref 12–16)
HGB BLD-MCNC: 11.1 G/DL (ref 12–16)
HGB BLD-MCNC: 11.2 G/DL (ref 12–16)
HGB BLD-MCNC: 11.2 G/DL (ref 12–16)
HGB BLD-MCNC: 11.4 G/DL (ref 12–16)
HGB BLD-MCNC: 11.5 G/DL (ref 12–16)
HGB BLD-MCNC: 12.4 G/DL (ref 12–16)
HGB BLD-MCNC: 7.6 G/DL (ref 12–16)
HGB BLD-MCNC: 7.7 G/DL (ref 12–16)
HGB BLD-MCNC: 9.1 G/DL (ref 12–16)
HGB BLD-MCNC: 9.9 G/DL (ref 12–16)
HGB BLD-MCNC: 9.9 G/DL (ref 12–16)
HGB BLDA-MCNC: 10.1 G/DL (ref 12–18)
HGB BLDA-MCNC: 10.5 G/DL (ref 12–18)
HGB BLDA-MCNC: 10.7 G/DL (ref 12–18)
HGB BLDA-MCNC: 11 G/DL (ref 12–18)
HGB BLDA-MCNC: 11 G/DL (ref 12–18)
HGB BLDA-MCNC: 11.1 G/DL (ref 12–18)
HGB BLDA-MCNC: 11.1 G/DL (ref 12–18)
HGB BLDA-MCNC: 11.5 G/DL (ref 12–18)
HGB BLDA-MCNC: 11.9 G/DL (ref 12–18)
HGB BLDA-MCNC: 12.7 G/DL (ref 12–18)
HGB BLDA-MCNC: 8.2 G/DL (ref 12–18)
HGB UR QL STRIP.AUTO: ABNORMAL
HOLD SPECIMEN: NORMAL
HOROWITZ INDEX BLD+IHG-RTO: 100 %
HOROWITZ INDEX BLD+IHG-RTO: 21 %
HOROWITZ INDEX BLD+IHG-RTO: 21 %
HOROWITZ INDEX BLD+IHG-RTO: 30 %
HOROWITZ INDEX BLD+IHG-RTO: 35 %
HOROWITZ INDEX BLD+IHG-RTO: 36 %
HYALINE CASTS UR QL AUTO: ABNORMAL /LPF
IMM GRANULOCYTES # BLD: 0.01 10*3/MM3 (ref 0–0.06)
IMM GRANULOCYTES # BLD: 0.02 10*3/MM3 (ref 0–0.06)
IMM GRANULOCYTES # BLD: 0.02 10*3/MM3 (ref 0–0.06)
IMM GRANULOCYTES # BLD: 0.03 10*3/MM3 (ref 0–0.06)
IMM GRANULOCYTES # BLD: 0.09 10*3/MM3 (ref 0–0.06)
IMM GRANULOCYTES # BLD: 0.11 10*3/MM3 (ref 0–0.06)
IMM GRANULOCYTES # BLD: 0.11 10*3/MM3 (ref 0–0.06)
IMM GRANULOCYTES # BLD: 0.15 10*3/MM3 (ref 0–0.06)
IMM GRANULOCYTES # BLD: 0.19 10*3/MM3 (ref 0–0.06)
IMM GRANULOCYTES NFR BLD: 0.2 % (ref 0–0.6)
IMM GRANULOCYTES NFR BLD: 0.2 % (ref 0–0.6)
IMM GRANULOCYTES NFR BLD: 0.3 % (ref 0–0.6)
IMM GRANULOCYTES NFR BLD: 0.3 % (ref 0–0.6)
IMM GRANULOCYTES NFR BLD: 0.4 % (ref 0–0.6)
IMM GRANULOCYTES NFR BLD: 0.5 % (ref 0–0.6)
IMM GRANULOCYTES NFR BLD: 0.8 % (ref 0–0.6)
IMM GRANULOCYTES NFR BLD: 1.1 % (ref 0–0.6)
IMM GRANULOCYTES NFR BLD: 1.3 % (ref 0–0.6)
IMM GRANULOCYTES NFR BLD: 1.7 % (ref 0–0.6)
IMM GRANULOCYTES NFR BLD: 1.8 % (ref 0–0.6)
INR PPP: 1.03 (ref 0.9–1.1)
KETONES UR QL STRIP: ABNORMAL
LEUKOCYTE ESTERASE UR QL STRIP.AUTO: NEGATIVE
LIPASE SERPL-CCNC: 112 U/L (ref 23–300)
LIPASE SERPL-CCNC: 1169 U/L (ref 23–300)
LIPASE SERPL-CCNC: 489 U/L (ref 23–300)
LYMPHOCYTES # BLD AUTO: 0.43 10*3/MM3 (ref 0.6–3.4)
LYMPHOCYTES # BLD AUTO: 0.48 10*3/MM3 (ref 0.6–3.4)
LYMPHOCYTES # BLD AUTO: 0.84 10*3/MM3 (ref 0.6–3.4)
LYMPHOCYTES # BLD AUTO: 0.87 10*3/MM3 (ref 0.6–3.4)
LYMPHOCYTES # BLD AUTO: 1.19 10*3/MM3 (ref 0.6–3.4)
LYMPHOCYTES # BLD AUTO: 1.24 10*3/MM3 (ref 0.6–3.4)
LYMPHOCYTES # BLD AUTO: 1.24 10*3/MM3 (ref 0.6–3.4)
LYMPHOCYTES # BLD AUTO: 1.39 10*3/MM3 (ref 0.6–3.4)
LYMPHOCYTES # BLD AUTO: 1.56 10*3/MM3 (ref 0.6–3.4)
LYMPHOCYTES # BLD AUTO: 1.59 10*3/MM3 (ref 0.6–3.4)
LYMPHOCYTES # BLD AUTO: 1.61 10*3/MM3 (ref 0.6–3.4)
LYMPHOCYTES NFR BLD AUTO: 11.3 % (ref 10–50)
LYMPHOCYTES NFR BLD AUTO: 11.4 % (ref 10–50)
LYMPHOCYTES NFR BLD AUTO: 13.9 % (ref 10–50)
LYMPHOCYTES NFR BLD AUTO: 14.6 % (ref 10–50)
LYMPHOCYTES NFR BLD AUTO: 17.3 % (ref 10–50)
LYMPHOCYTES NFR BLD AUTO: 18.2 % (ref 10–50)
LYMPHOCYTES NFR BLD AUTO: 23 % (ref 10–50)
LYMPHOCYTES NFR BLD AUTO: 27.9 % (ref 10–50)
LYMPHOCYTES NFR BLD AUTO: 3.5 % (ref 10–50)
LYMPHOCYTES NFR BLD AUTO: 6.9 % (ref 10–50)
LYMPHOCYTES NFR BLD AUTO: 9.3 % (ref 10–50)
Lab: ABNORMAL
Lab: NORMAL
MACROCYTES BLD QL SMEAR: NORMAL
MACROCYTES BLD QL SMEAR: NORMAL
MAGNESIUM SERPL-MCNC: 2.2 MG/DL (ref 1.6–2.3)
MAGNESIUM SERPL-MCNC: 2.2 MG/DL (ref 1.6–2.3)
MAGNESIUM SERPL-MCNC: 2.3 MG/DL (ref 1.6–2.3)
MAGNESIUM SERPL-MCNC: 2.5 MG/DL (ref 1.6–2.3)
MAGNESIUM SERPL-MCNC: 2.7 MG/DL (ref 1.6–2.3)
MAXIMAL PREDICTED HEART RATE: 140 BPM
MCH RBC QN AUTO: 30.1 PG (ref 27–31)
MCH RBC QN AUTO: 30.2 PG (ref 27–31)
MCH RBC QN AUTO: 30.6 PG (ref 27–31)
MCH RBC QN AUTO: 30.7 PG (ref 27–31)
MCH RBC QN AUTO: 30.9 PG (ref 27–31)
MCH RBC QN AUTO: 31.6 PG (ref 27–31)
MCH RBC QN AUTO: 31.7 PG (ref 27–31)
MCH RBC QN AUTO: 31.8 PG (ref 27–31)
MCH RBC QN AUTO: 32 PG (ref 27–31)
MCH RBC QN AUTO: 32.2 PG (ref 27–31)
MCH RBC QN AUTO: 32.2 PG (ref 27–31)
MCH RBC QN AUTO: 32.4 PG (ref 27–31)
MCH RBC QN AUTO: 32.5 PG (ref 27–31)
MCH RBC QN AUTO: 34.3 PG (ref 27–31)
MCH RBC QN AUTO: 34.4 PG (ref 27–31)
MCH RBC QN AUTO: 34.8 PG (ref 27–31)
MCH RBC QN AUTO: 34.9 PG (ref 27–31)
MCHC RBC AUTO-ENTMCNC: 29.9 G/DL (ref 30–37)
MCHC RBC AUTO-ENTMCNC: 31.5 G/DL (ref 30–37)
MCHC RBC AUTO-ENTMCNC: 31.5 G/DL (ref 30–37)
MCHC RBC AUTO-ENTMCNC: 31.8 G/DL (ref 30–37)
MCHC RBC AUTO-ENTMCNC: 32 G/DL (ref 30–37)
MCHC RBC AUTO-ENTMCNC: 32 G/DL (ref 30–37)
MCHC RBC AUTO-ENTMCNC: 32.1 G/DL (ref 30–37)
MCHC RBC AUTO-ENTMCNC: 32.3 G/DL (ref 30–37)
MCHC RBC AUTO-ENTMCNC: 32.4 G/DL (ref 30–37)
MCHC RBC AUTO-ENTMCNC: 32.4 G/DL (ref 30–37)
MCHC RBC AUTO-ENTMCNC: 33.1 G/DL (ref 30–37)
MCHC RBC AUTO-ENTMCNC: 34.1 G/DL (ref 30–37)
MCHC RBC AUTO-ENTMCNC: 34.4 G/DL (ref 30–37)
MCHC RBC AUTO-ENTMCNC: 34.7 G/DL (ref 30–37)
MCV RBC AUTO: 100 FL (ref 81–99)
MCV RBC AUTO: 100.3 FL (ref 81–99)
MCV RBC AUTO: 100.3 FL (ref 81–99)
MCV RBC AUTO: 100.6 FL (ref 81–99)
MCV RBC AUTO: 100.9 FL (ref 81–99)
MCV RBC AUTO: 102.1 FL (ref 81–99)
MCV RBC AUTO: 102.8 FL (ref 81–99)
MCV RBC AUTO: 105.8 FL (ref 81–99)
MCV RBC AUTO: 108.6 FL (ref 81–99)
MCV RBC AUTO: 93.1 FL (ref 81–99)
MCV RBC AUTO: 94 FL (ref 81–99)
MCV RBC AUTO: 94.4 FL (ref 81–99)
MCV RBC AUTO: 94.7 FL (ref 81–99)
MCV RBC AUTO: 95.4 FL (ref 81–99)
MCV RBC AUTO: 97.9 FL (ref 81–99)
MCV RBC AUTO: 99.1 FL (ref 81–99)
MCV RBC AUTO: 99.7 FL (ref 81–99)
METHGB BLD QL: 0.4 % (ref 0–1.5)
METHGB BLD QL: 0.6 % (ref 0–1.5)
METHGB BLD QL: 0.8 % (ref 0–1.5)
METHGB BLD QL: 0.9 % (ref 0–1.5)
METHGB BLD QL: 1.1 % (ref 0–1.5)
METHGB BLD QL: 1.4 %
METHGB BLD QL: <0 % (ref 0–1.5)
MODALITY: ABNORMAL
MONOCYTES # BLD AUTO: 0.1 10*3/MM3 (ref 0–0.9)
MONOCYTES # BLD AUTO: 0.39 10*3/MM3 (ref 0–0.9)
MONOCYTES # BLD AUTO: 0.44 10*3/MM3 (ref 0–0.9)
MONOCYTES # BLD AUTO: 0.53 10*3/MM3 (ref 0–0.9)
MONOCYTES # BLD AUTO: 0.62 10*3/MM3 (ref 0–0.9)
MONOCYTES # BLD AUTO: 0.71 10*3/MM3 (ref 0–0.9)
MONOCYTES # BLD AUTO: 0.83 10*3/MM3 (ref 0–0.9)
MONOCYTES # BLD AUTO: 0.96 10*3/MM3 (ref 0–0.9)
MONOCYTES # BLD AUTO: 1 10*3/MM3 (ref 0–0.9)
MONOCYTES # BLD AUTO: 1.13 10*3/MM3 (ref 0–0.9)
MONOCYTES # BLD AUTO: 1.4 10*3/MM3 (ref 0–0.9)
MONOCYTES NFR BLD AUTO: 1.6 % (ref 0–12)
MONOCYTES NFR BLD AUTO: 10.1 % (ref 0–12)
MONOCYTES NFR BLD AUTO: 10.3 % (ref 0–12)
MONOCYTES NFR BLD AUTO: 11.5 % (ref 0–12)
MONOCYTES NFR BLD AUTO: 15.6 % (ref 0–12)
MONOCYTES NFR BLD AUTO: 5.1 % (ref 0–12)
MONOCYTES NFR BLD AUTO: 6.1 % (ref 0–12)
MONOCYTES NFR BLD AUTO: 7 % (ref 0–12)
MONOCYTES NFR BLD AUTO: 7.1 % (ref 0–12)
MONOCYTES NFR BLD AUTO: 8.7 % (ref 0–12)
MONOCYTES NFR BLD AUTO: 9.9 % (ref 0–12)
MRSA SPEC QL CULT: NORMAL
MRSA SPEC QL CULT: NORMAL
NEUTROPHILS # BLD AUTO: 11.29 10*3/MM3 (ref 2–6.9)
NEUTROPHILS # BLD AUTO: 12.17 10*3/MM3 (ref 2–6.9)
NEUTROPHILS # BLD AUTO: 2.48 10*3/MM3 (ref 2–6.9)
NEUTROPHILS # BLD AUTO: 3.3 10*3/MM3 (ref 2–6.9)
NEUTROPHILS # BLD AUTO: 5.45 10*3/MM3 (ref 2–6.9)
NEUTROPHILS # BLD AUTO: 5.59 10*3/MM3 (ref 2–6.9)
NEUTROPHILS # BLD AUTO: 5.86 10*3/MM3 (ref 2–6.9)
NEUTROPHILS # BLD AUTO: 6.18 10*3/MM3 (ref 2–6.9)
NEUTROPHILS # BLD AUTO: 6.18 10*3/MM3 (ref 2–6.9)
NEUTROPHILS # BLD AUTO: 6.52 10*3/MM3 (ref 2–6.9)
NEUTROPHILS # BLD AUTO: 8.15 10*3/MM3 (ref 2–6.9)
NEUTROPHILS NFR BLD AUTO: 55.8 % (ref 37–80)
NEUTROPHILS NFR BLD AUTO: 61.2 % (ref 37–80)
NEUTROPHILS NFR BLD AUTO: 67.9 % (ref 37–80)
NEUTROPHILS NFR BLD AUTO: 70.8 % (ref 37–80)
NEUTROPHILS NFR BLD AUTO: 72.2 % (ref 37–80)
NEUTROPHILS NFR BLD AUTO: 72.4 % (ref 37–80)
NEUTROPHILS NFR BLD AUTO: 72.5 % (ref 37–80)
NEUTROPHILS NFR BLD AUTO: 80 % (ref 37–80)
NEUTROPHILS NFR BLD AUTO: 80.1 % (ref 37–80)
NEUTROPHILS NFR BLD AUTO: 88.2 % (ref 37–80)
NEUTROPHILS NFR BLD AUTO: 89.2 % (ref 37–80)
NITRITE UR QL STRIP: NEGATIVE
NOROVIRUS GI+II RNA STL QL NAA+NON-PROBE: NOT DETECTED
NRBC BLD MANUAL-RTO: 0 /100 WBC (ref 0–0)
NT-PROBNP SERPL-MCNC: 9960 PG/ML (ref 0–450)
NT-PROBNP SERPL-MCNC: ABNORMAL PG/ML (ref 0–450)
NT-PROBNP SERPL-MCNC: ABNORMAL PG/ML (ref 0–450)
OSMOLALITY SERPL: 335 MOSMOL/KG (ref 280–301)
OXYHGB MFR BLDV: 87.2 % (ref 94–99)
OXYHGB MFR BLDV: 91.9 % (ref 94–99)
OXYHGB MFR BLDV: 94.3 % (ref 94–99)
OXYHGB MFR BLDV: 94.7 % (ref 94–99)
OXYHGB MFR BLDV: 96.2 % (ref 94–99)
OXYHGB MFR BLDV: 96.9 % (ref 94–99)
OXYHGB MFR BLDV: 97.1 % (ref 94–99)
OXYHGB MFR BLDV: 97.2 % (ref 94–99)
OXYHGB MFR BLDV: 97.3 % (ref 94–99)
OXYHGB MFR BLDV: 97.6 % (ref 94–99)
OXYHGB MFR BLDV: 97.6 % (ref 94–99)
P SHIGELLOIDES DNA STL QL NAA+NON-PROBE: NOT DETECTED
PCO2 BLDA: 34.5 MM HG (ref 35–45)
PCO2 BLDA: 35.6 MM HG (ref 35–45)
PCO2 BLDA: 36.1 MM HG (ref 35–45)
PCO2 BLDA: 36.7 MM HG (ref 35–45)
PCO2 BLDA: 37 MM HG (ref 35–45)
PCO2 BLDA: 37.6 MM HG (ref 35–45)
PCO2 BLDA: 38.4 MM HG (ref 35–45)
PCO2 BLDA: 39.6 MM HG (ref 35–45)
PCO2 BLDA: 41.8 MM HG (ref 35–45)
PCO2 BLDA: 42 MM HG (ref 35–45)
PCO2 BLDA: 45.3 MM HG (ref 35–45)
PCO2 TEMP ADJ BLD: ABNORMAL MM HG (ref 35–45)
PEEP RESPIRATORY: 5 CM[H2O]
PH BLDA: 7.2 PH UNITS (ref 7.3–7.5)
PH BLDA: 7.37 PH UNITS (ref 7.3–7.5)
PH BLDA: 7.38 PH UNITS (ref 7.3–7.5)
PH BLDA: 7.4 PH UNITS (ref 7.3–7.5)
PH BLDA: 7.41 PH UNITS (ref 7.3–7.5)
PH BLDA: 7.41 PH UNITS (ref 7.3–7.5)
PH BLDA: 7.46 PH UNITS (ref 7.3–7.5)
PH BLDA: 7.47 PH UNITS (ref 7.3–7.5)
PH BLDA: 7.47 PH UNITS (ref 7.3–7.5)
PH UR STRIP.AUTO: 6 [PH] (ref 5–8)
PH, TEMP CORRECTED: ABNORMAL PH UNITS
PHENYTOIN FREE SERPL-MCNC: 3.7 UG/ML (ref 1–2)
PHOSPHATE SERPL-MCNC: 3.4 MG/DL (ref 2.5–4.5)
PHOSPHATE SERPL-MCNC: 4.2 MG/DL (ref 2.5–4.5)
PHOSPHATE SERPL-MCNC: 4.2 MG/DL (ref 2.5–4.5)
PHOSPHATE SERPL-MCNC: 4.8 MG/DL (ref 2.5–4.5)
PHOSPHATE SERPL-MCNC: 5.3 MG/DL (ref 2.5–4.5)
PLAT MORPH BLD: NORMAL
PLATELET # BLD AUTO: 134 10*3/MM3 (ref 130–400)
PLATELET # BLD AUTO: 145 10*3/MM3 (ref 130–400)
PLATELET # BLD AUTO: 148 10*3/MM3 (ref 130–400)
PLATELET # BLD AUTO: 152 10*3/MM3 (ref 130–400)
PLATELET # BLD AUTO: 156 10*3/MM3 (ref 130–400)
PLATELET # BLD AUTO: 164 10*3/MM3 (ref 130–400)
PLATELET # BLD AUTO: 177 10*3/MM3 (ref 130–400)
PLATELET # BLD AUTO: 192 10*3/MM3 (ref 130–400)
PLATELET # BLD AUTO: 195 10*3/MM3 (ref 130–400)
PLATELET # BLD AUTO: 209 10*3/MM3 (ref 130–400)
PLATELET # BLD AUTO: 213 10*3/MM3 (ref 130–400)
PLATELET # BLD AUTO: 214 10*3/MM3 (ref 130–400)
PLATELET # BLD AUTO: 218 10*3/MM3 (ref 130–400)
PLATELET # BLD AUTO: 226 10*3/MM3 (ref 130–400)
PLATELET # BLD AUTO: 243 10*3/MM3 (ref 130–400)
PLATELET # BLD AUTO: 245 10*3/MM3 (ref 130–400)
PLATELET # BLD AUTO: 263 10*3/MM3 (ref 130–400)
PMV BLD AUTO: 10.3 FL (ref 6–12)
PMV BLD AUTO: 10.7 FL (ref 6–12)
PMV BLD AUTO: 10.8 FL (ref 6–12)
PMV BLD AUTO: 10.9 FL (ref 6–12)
PMV BLD AUTO: 11.1 FL (ref 6–12)
PMV BLD AUTO: 11.2 FL (ref 6–12)
PMV BLD AUTO: 11.6 FL (ref 6–12)
PMV BLD AUTO: 11.6 FL (ref 6–12)
PMV BLD AUTO: 11.7 FL (ref 6–12)
PMV BLD AUTO: 11.8 FL (ref 6–12)
PMV BLD AUTO: 11.8 FL (ref 6–12)
PMV BLD AUTO: 12 FL (ref 6–12)
PMV BLD AUTO: 12 FL (ref 6–12)
PMV BLD AUTO: 12.3 FL (ref 6–12)
PMV BLD AUTO: 12.4 FL (ref 6–12)
PMV BLD AUTO: 13 FL (ref 6–12)
PMV BLD AUTO: 13.2 FL (ref 6–12)
PO2 BLDA: 104 MM HG (ref 75–100)
PO2 BLDA: 106 MM HG (ref 75–100)
PO2 BLDA: 116 MM HG (ref 75–100)
PO2 BLDA: 118 MM HG (ref 75–100)
PO2 BLDA: 140 MM HG (ref 75–100)
PO2 BLDA: 141 MM HG (ref 75–100)
PO2 BLDA: 160 MM HG (ref 75–100)
PO2 BLDA: 58.8 MM HG (ref 75–100)
PO2 BLDA: 67.5 MM HG (ref 75–100)
PO2 BLDA: 69.4 MM HG (ref 75–100)
PO2 BLDA: 71.7 MM HG (ref 75–100)
PO2 TEMP ADJ BLD: ABNORMAL MM HG (ref 83–108)
POTASSIUM BLD-SCNC: 3.3 MMOL/L (ref 3.5–5.1)
POTASSIUM BLD-SCNC: 3.6 MMOL/L (ref 3.5–5.1)
POTASSIUM BLD-SCNC: 3.8 MMOL/L (ref 3.5–5.1)
POTASSIUM BLD-SCNC: 3.9 MMOL/L (ref 3.5–5.1)
POTASSIUM BLD-SCNC: 4 MMOL/L (ref 3.5–5.1)
POTASSIUM BLD-SCNC: 4 MMOL/L (ref 3.5–5.1)
POTASSIUM BLD-SCNC: 4.2 MMOL/L (ref 3.5–5.1)
POTASSIUM BLD-SCNC: 4.4 MMOL/L (ref 3.5–5.1)
POTASSIUM BLD-SCNC: 4.5 MMOL/L (ref 3.5–5.1)
POTASSIUM BLD-SCNC: 4.5 MMOL/L (ref 3.5–5.1)
POTASSIUM BLD-SCNC: 4.6 MMOL/L (ref 3.5–5.1)
POTASSIUM BLD-SCNC: 4.6 MMOL/L (ref 3.5–5.1)
POTASSIUM BLD-SCNC: 4.6 MMOL/L (ref 3.5–5.5)
PROT SERPL-MCNC: 5 G/DL (ref 6.3–8.2)
PROT SERPL-MCNC: 5.3 G/DL (ref 6.3–8.2)
PROT SERPL-MCNC: 5.4 G/DL (ref 6.3–8.2)
PROT SERPL-MCNC: 6.2 G/DL (ref 6.3–8.2)
PROT SERPL-MCNC: 6.3 G/DL (ref 6.3–8.2)
PROT SERPL-MCNC: 6.4 G/DL (ref 6.3–8.2)
PROT SERPL-MCNC: 6.5 G/DL (ref 5.7–8.2)
PROT SERPL-MCNC: 6.8 G/DL (ref 6.3–8.2)
PROT SERPL-MCNC: 6.9 G/DL (ref 6.3–8.2)
PROT SERPL-MCNC: 7.3 G/DL (ref 6.3–8.2)
PROT UR QL STRIP: ABNORMAL
PROTHROMBIN TIME: 11.5 SECONDS (ref 9.3–12.1)
PSV: 10 CMH2O
RBC # BLD AUTO: 2.52 10*6/MM3 (ref 4.2–5.4)
RBC # BLD AUTO: 2.52 10*6/MM3 (ref 4.2–5.4)
RBC # BLD AUTO: 3.02 10*6/MM3 (ref 4.2–5.4)
RBC # BLD AUTO: 3.02 10*6/MM3 (ref 4.2–5.4)
RBC # BLD AUTO: 3.09 10*6/MM3 (ref 4.2–5.4)
RBC # BLD AUTO: 3.12 10*6/MM3 (ref 4.2–5.4)
RBC # BLD AUTO: 3.17 10*6/MM3 (ref 4.2–5.4)
RBC # BLD AUTO: 3.22 10*6/MM3 (ref 4.2–5.4)
RBC # BLD AUTO: 3.23 10*6/MM3 (ref 4.2–5.4)
RBC # BLD AUTO: 3.23 10*6/MM3 (ref 4.2–5.4)
RBC # BLD AUTO: 3.27 10*6/MM3 (ref 4.2–5.4)
RBC # BLD AUTO: 3.35 10*6/MM3 (ref 4.2–5.4)
RBC # BLD AUTO: 3.48 10*6/MM3 (ref 4.2–5.4)
RBC # BLD AUTO: 3.5 10*6/MM3 (ref 4.2–5.4)
RBC # BLD AUTO: 3.52 10*6/MM3 (ref 4.2–5.4)
RBC # BLD AUTO: 3.55 10*6/MM3 (ref 4.2–5.4)
RBC # BLD AUTO: 3.82 10*6/MM3 (ref 4.2–5.4)
RBC # UR: ABNORMAL /HPF
RBC MORPH BLD: NORMAL
REF LAB TEST METHOD: ABNORMAL
RH BLD: POSITIVE
RV RNA STL NAA+PROBE: NOT DETECTED
SALMONELLA DNA SPEC QL NAA+PROBE: NOT DETECTED
SAO2 % BLDCOA: 88.8 %
SAO2 % BLDCOA: 94.1 % (ref 94–100)
SAO2 % BLDCOA: 95.4 % (ref 94–100)
SAO2 % BLDCOA: 96.8 % (ref 94–100)
SAO2 % BLDCOA: 98.5 % (ref 94–100)
SAO2 % BLDCOA: 98.9 % (ref 94–100)
SAO2 % BLDCOA: 98.9 % (ref 94–100)
SAO2 % BLDCOA: 99 % (ref 94–100)
SAO2 % BLDCOA: 99.5 % (ref 94–100)
SAO2 % BLDCOA: 99.6 % (ref 94–100)
SAO2 % BLDCOA: >99.6 % (ref 94–100)
SAPO I+II+IV+V RNA STL QL NAA+NON-PROBE: NOT DETECTED
SET MECH RESP RATE: 16
SHIGELLA SP+EIEC IPAH STL QL NAA+PROBE: NOT DETECTED
SMALL PLATELETS BLD QL SMEAR: ADEQUATE
SMALL PLATELETS BLD QL SMEAR: ADEQUATE
SODIUM BLD-SCNC: 124 MMOL/L (ref 137–145)
SODIUM BLD-SCNC: 127 MMOL/L (ref 137–145)
SODIUM BLD-SCNC: 129 MMOL/L (ref 137–145)
SODIUM BLD-SCNC: 129 MMOL/L (ref 137–145)
SODIUM BLD-SCNC: 130 MMOL/L (ref 137–145)
SODIUM BLD-SCNC: 131 MMOL/L (ref 137–145)
SODIUM BLD-SCNC: 131 MMOL/L (ref 137–145)
SODIUM BLD-SCNC: 133 MMOL/L (ref 137–145)
SODIUM BLD-SCNC: 134 MMOL/L (ref 137–145)
SODIUM BLD-SCNC: 134 MMOL/L (ref 137–145)
SODIUM BLD-SCNC: 135 MMOL/L (ref 137–145)
SODIUM BLD-SCNC: 136 MMOL/L (ref 132–146)
SODIUM BLD-SCNC: 137 MMOL/L (ref 137–145)
SODIUM BLD-SCNC: 139 MMOL/L (ref 137–145)
SODIUM BLD-SCNC: 139 MMOL/L (ref 137–145)
SODIUM BLD-SCNC: 141 MMOL/L (ref 137–145)
SODIUM BLD-SCNC: 142 MMOL/L (ref 137–145)
SP GR UR STRIP: 1.01 (ref 1–1.03)
SQUAMOUS #/AREA URNS HPF: ABNORMAL /HPF
STRESS TARGET HR: 119 BPM
T4 FREE SERPL-MCNC: 1.03 NG/DL (ref 0.89–1.76)
T4 FREE SERPL-MCNC: 2.97 NG/DL (ref 0.78–2.19)
TRIGL SERPL-MCNC: 115 MG/DL (ref 0–150)
TROPONIN I SERPL-MCNC: 0.01 NG/ML (ref 0–0.05)
TROPONIN I SERPL-MCNC: 0.08 NG/ML (ref 0–0.03)
TROPONIN I SERPL-MCNC: 0.11 NG/ML (ref 0–0.03)
TROPONIN I SERPL-MCNC: <0.012 NG/ML (ref 0–0.03)
TSH SERPL DL<=0.05 MIU/L-ACNC: 0.13 MIU/ML (ref 0.47–4.68)
TSH SERPL DL<=0.05 MIU/L-ACNC: 2.51 MIU/ML (ref 0.35–5.35)
TSH SERPL DL<=0.05 MIU/L-ACNC: 4.38 MIU/ML (ref 0.47–4.68)
UNIT  ABO: NORMAL
UNIT  RH: NORMAL
UROBILINOGEN UR QL STRIP: ABNORMAL
V CHOLERAE DNA SPEC QL NAA+PROBE: NOT DETECTED
VENTILATOR MODE: ABNORMAL
VENTILATOR MODE: AC
VIBRIO DNA SPEC NAA+PROBE: NOT DETECTED
VRE SPEC QL CULT: NORMAL
VRE SPEC QL CULT: NORMAL
VT ON VENT VENT: 450 ML
WBC MORPH BLD: NORMAL
WBC NRBC COR # BLD: 11.23 10*3/MM3 (ref 4.8–10.8)
WBC NRBC COR # BLD: 13.79 10*3/MM3 (ref 4.8–10.8)
WBC NRBC COR # BLD: 14.13 10*3/MM3 (ref 4.8–10.8)
WBC NRBC COR # BLD: 4.44 10*3/MM3 (ref 4.8–10.8)
WBC NRBC COR # BLD: 5.39 10*3/MM3 (ref 4.8–10.8)
WBC NRBC COR # BLD: 5.97 10*3/MM3 (ref 4.8–10.8)
WBC NRBC COR # BLD: 6.26 10*3/MM3 (ref 4.8–10.8)
WBC NRBC COR # BLD: 6.61 10*3/MM3 (ref 4.8–10.8)
WBC NRBC COR # BLD: 7.71 10*3/MM3 (ref 4.8–10.8)
WBC NRBC COR # BLD: 7.73 10*3/MM3 (ref 4.8–10.8)
WBC NRBC COR # BLD: 8.04 10*3/MM3 (ref 4.8–10.8)
WBC NRBC COR # BLD: 8.13 10*3/MM3 (ref 4.8–10.8)
WBC NRBC COR # BLD: 8.73 10*3/MM3 (ref 4.8–10.8)
WBC NRBC COR # BLD: 9 10*3/MM3 (ref 4.8–10.8)
WBC NRBC COR # BLD: 9.23 10*3/MM3 (ref 4.8–10.8)
WBC NRBC COR # BLD: 9.34 10*3/MM3 (ref 4.8–10.8)
WBC NRBC COR # BLD: 9.79 10*3/MM3 (ref 4.8–10.8)
WBC UR QL AUTO: ABNORMAL /HPF
WHOLE BLOOD HOLD SPECIMEN: NORMAL
YERSINIA STL CULT: NOT DETECTED

## 2018-01-01 PROCEDURE — 82805 BLOOD GASES W/O2 SATURATION: CPT

## 2018-01-01 PROCEDURE — 99233 SBSQ HOSP IP/OBS HIGH 50: CPT | Performed by: INTERNAL MEDICINE

## 2018-01-01 PROCEDURE — G0378 HOSPITAL OBSERVATION PER HR: HCPCS

## 2018-01-01 PROCEDURE — 93005 ELECTROCARDIOGRAM TRACING: CPT

## 2018-01-01 PROCEDURE — 63710000001 INSULIN DETEMIR PER 5 UNITS: Performed by: INTERNAL MEDICINE

## 2018-01-01 PROCEDURE — 94799 UNLISTED PULMONARY SVC/PX: CPT

## 2018-01-01 PROCEDURE — 82962 GLUCOSE BLOOD TEST: CPT

## 2018-01-01 PROCEDURE — 80048 BASIC METABOLIC PNL TOTAL CA: CPT | Performed by: FAMILY MEDICINE

## 2018-01-01 PROCEDURE — 94003 VENT MGMT INPAT SUBQ DAY: CPT

## 2018-01-01 PROCEDURE — 83050 HGB METHEMOGLOBIN QUAN: CPT

## 2018-01-01 PROCEDURE — 25010000002 LORAZEPAM PER 2 MG

## 2018-01-01 PROCEDURE — 80053 COMPREHEN METABOLIC PANEL: CPT | Performed by: INTERNAL MEDICINE

## 2018-01-01 PROCEDURE — 95816 EEG AWAKE AND DROWSY: CPT | Performed by: PSYCHIATRY & NEUROLOGY

## 2018-01-01 PROCEDURE — 83605 ASSAY OF LACTIC ACID: CPT | Performed by: EMERGENCY MEDICINE

## 2018-01-01 PROCEDURE — 85025 COMPLETE CBC W/AUTO DIFF WBC: CPT | Performed by: FAMILY MEDICINE

## 2018-01-01 PROCEDURE — 5A1D70Z PERFORMANCE OF URINARY FILTRATION, INTERMITTENT, LESS THAN 6 HOURS PER DAY: ICD-10-PCS | Performed by: INTERNAL MEDICINE

## 2018-01-01 PROCEDURE — 99285 EMERGENCY DEPT VISIT HI MDM: CPT

## 2018-01-01 PROCEDURE — 85027 COMPLETE CBC AUTOMATED: CPT | Performed by: INTERNAL MEDICINE

## 2018-01-01 PROCEDURE — 82375 ASSAY CARBOXYHB QUANT: CPT

## 2018-01-01 PROCEDURE — 25010000003 PHENYTOIN PER 50 MG: Performed by: INTERNAL MEDICINE

## 2018-01-01 PROCEDURE — 99214 OFFICE O/P EST MOD 30 MIN: CPT | Performed by: NURSE PRACTITIONER

## 2018-01-01 PROCEDURE — 0JH606Z INSERTION OF PACEMAKER, DUAL CHAMBER INTO CHEST SUBCUTANEOUS TISSUE AND FASCIA, OPEN APPROACH: ICD-10-PCS | Performed by: INTERNAL MEDICINE

## 2018-01-01 PROCEDURE — 25010000002 PROPOFOL 1000 MG/ML EMULSION: Performed by: EMERGENCY MEDICINE

## 2018-01-01 PROCEDURE — 99238 HOSP IP/OBS DSCHRG MGMT 30/<: CPT | Performed by: INTERNAL MEDICINE

## 2018-01-01 PROCEDURE — 90935 HEMODIALYSIS ONE EVALUATION: CPT

## 2018-01-01 PROCEDURE — 25010000002 HEPARIN (PORCINE) PER 1000 UNITS: Performed by: FAMILY MEDICINE

## 2018-01-01 PROCEDURE — 36600 WITHDRAWAL OF ARTERIAL BLOOD: CPT

## 2018-01-01 PROCEDURE — 36592 COLLECT BLOOD FROM PICC: CPT

## 2018-01-01 PROCEDURE — 86901 BLOOD TYPING SEROLOGIC RH(D): CPT | Performed by: INTERNAL MEDICINE

## 2018-01-01 PROCEDURE — 25010000002 CEFTRIAXONE: Performed by: FAMILY MEDICINE

## 2018-01-01 PROCEDURE — 71046 X-RAY EXAM CHEST 2 VIEWS: CPT

## 2018-01-01 PROCEDURE — 25010000002 HEPARIN (PORCINE) PER 1000 UNITS: Performed by: INTERNAL MEDICINE

## 2018-01-01 PROCEDURE — 83690 ASSAY OF LIPASE: CPT | Performed by: FAMILY MEDICINE

## 2018-01-01 PROCEDURE — 84100 ASSAY OF PHOSPHORUS: CPT | Performed by: INTERNAL MEDICINE

## 2018-01-01 PROCEDURE — 02HL3JZ INSERTION OF PACEMAKER LEAD INTO LEFT VENTRICLE, PERCUTANEOUS APPROACH: ICD-10-PCS | Performed by: INTERNAL MEDICINE

## 2018-01-01 PROCEDURE — 85025 COMPLETE CBC W/AUTO DIFF WBC: CPT | Performed by: EMERGENCY MEDICINE

## 2018-01-01 PROCEDURE — 71045 X-RAY EXAM CHEST 1 VIEW: CPT

## 2018-01-01 PROCEDURE — 63710000001 INSULIN DETEMIR PER 5 UNITS: Performed by: HOSPITALIST

## 2018-01-01 PROCEDURE — 99204 OFFICE O/P NEW MOD 45 MIN: CPT | Performed by: INTERNAL MEDICINE

## 2018-01-01 PROCEDURE — 74176 CT ABD & PELVIS W/O CONTRAST: CPT

## 2018-01-01 PROCEDURE — 25010000002 ONDANSETRON PER 1 MG: Performed by: FAMILY MEDICINE

## 2018-01-01 PROCEDURE — 84484 ASSAY OF TROPONIN QUANT: CPT | Performed by: INTERNAL MEDICINE

## 2018-01-01 PROCEDURE — 99213 OFFICE O/P EST LOW 20 MIN: CPT | Performed by: PHYSICIAN ASSISTANT

## 2018-01-01 PROCEDURE — 99239 HOSP IP/OBS DSCHRG MGMT >30: CPT | Performed by: FAMILY MEDICINE

## 2018-01-01 PROCEDURE — 95816 EEG AWAKE AND DROWSY: CPT

## 2018-01-01 PROCEDURE — 83880 ASSAY OF NATRIURETIC PEPTIDE: CPT | Performed by: PHYSICIAN ASSISTANT

## 2018-01-01 PROCEDURE — 63710000001 INSULIN REGULAR HUMAN PER 5 UNITS: Performed by: INTERNAL MEDICINE

## 2018-01-01 PROCEDURE — 99223 1ST HOSP IP/OBS HIGH 75: CPT | Performed by: INTERNAL MEDICINE

## 2018-01-01 PROCEDURE — 95819 EEG AWAKE AND ASLEEP: CPT | Performed by: PSYCHIATRY & NEUROLOGY

## 2018-01-01 PROCEDURE — 97161 PT EVAL LOW COMPLEX 20 MIN: CPT

## 2018-01-01 PROCEDURE — 85007 BL SMEAR W/DIFF WBC COUNT: CPT | Performed by: FAMILY MEDICINE

## 2018-01-01 PROCEDURE — 93005 ELECTROCARDIOGRAM TRACING: CPT | Performed by: PHYSICIAN ASSISTANT

## 2018-01-01 PROCEDURE — 99284 EMERGENCY DEPT VISIT MOD MDM: CPT

## 2018-01-01 PROCEDURE — 85025 COMPLETE CBC W/AUTO DIFF WBC: CPT | Performed by: INTERNAL MEDICINE

## 2018-01-01 PROCEDURE — 99214 OFFICE O/P EST MOD 30 MIN: CPT | Performed by: INTERNAL MEDICINE

## 2018-01-01 PROCEDURE — 84100 ASSAY OF PHOSPHORUS: CPT | Performed by: EMERGENCY MEDICINE

## 2018-01-01 PROCEDURE — 87999 UNLISTED MICROBIOLOGY PX: CPT | Performed by: FAMILY MEDICINE

## 2018-01-01 PROCEDURE — 83880 ASSAY OF NATRIURETIC PEPTIDE: CPT | Performed by: EMERGENCY MEDICINE

## 2018-01-01 PROCEDURE — 25010000002 METHYLPREDNISOLONE PER 125 MG: Performed by: FAMILY MEDICINE

## 2018-01-01 PROCEDURE — 63710000001 INSULIN ASPART PER 5 UNITS: Performed by: INTERNAL MEDICINE

## 2018-01-01 PROCEDURE — 94618 PULMONARY STRESS TESTING: CPT | Performed by: NURSE PRACTITIONER

## 2018-01-01 PROCEDURE — 93005 ELECTROCARDIOGRAM TRACING: CPT | Performed by: INTERNAL MEDICINE

## 2018-01-01 PROCEDURE — 99222 1ST HOSP IP/OBS MODERATE 55: CPT | Performed by: FAMILY MEDICINE

## 2018-01-01 PROCEDURE — C1898 LEAD, PMKR, OTHER THAN TRANS: HCPCS | Performed by: INTERNAL MEDICINE

## 2018-01-01 PROCEDURE — 36430 TRANSFUSION BLD/BLD COMPNT: CPT

## 2018-01-01 PROCEDURE — 80186 ASSAY OF PHENYTOIN FREE: CPT | Performed by: INTERNAL MEDICINE

## 2018-01-01 PROCEDURE — 99232 SBSQ HOSP IP/OBS MODERATE 35: CPT | Performed by: INTERNAL MEDICINE

## 2018-01-01 PROCEDURE — 84443 ASSAY THYROID STIM HORMONE: CPT | Performed by: INTERNAL MEDICINE

## 2018-01-01 PROCEDURE — 84484 ASSAY OF TROPONIN QUANT: CPT | Performed by: FAMILY MEDICINE

## 2018-01-01 PROCEDURE — 99226 PR SBSQ OBSERVATION CARE/DAY 35 MINUTES: CPT | Performed by: INTERNAL MEDICINE

## 2018-01-01 PROCEDURE — 31500 INSERT EMERGENCY AIRWAY: CPT

## 2018-01-01 PROCEDURE — 84443 ASSAY THYROID STIM HORMONE: CPT | Performed by: FAMILY MEDICINE

## 2018-01-01 PROCEDURE — 86920 COMPATIBILITY TEST SPIN: CPT

## 2018-01-01 PROCEDURE — 87081 CULTURE SCREEN ONLY: CPT | Performed by: INTERNAL MEDICINE

## 2018-01-01 PROCEDURE — 80048 BASIC METABOLIC PNL TOTAL CA: CPT | Performed by: INTERNAL MEDICINE

## 2018-01-01 PROCEDURE — 25010000002 CEFTRIAXONE PER 250 MG: Performed by: EMERGENCY MEDICINE

## 2018-01-01 PROCEDURE — 84439 ASSAY OF FREE THYROXINE: CPT | Performed by: INTERNAL MEDICINE

## 2018-01-01 PROCEDURE — 80053 COMPREHEN METABOLIC PANEL: CPT | Performed by: EMERGENCY MEDICINE

## 2018-01-01 PROCEDURE — 36591 DRAW BLOOD OFF VENOUS DEVICE: CPT

## 2018-01-01 PROCEDURE — 36556 INSERT NON-TUNNEL CV CATH: CPT | Performed by: INTERNAL MEDICINE

## 2018-01-01 PROCEDURE — 85007 BL SMEAR W/DIFF WBC COUNT: CPT | Performed by: EMERGENCY MEDICINE

## 2018-01-01 PROCEDURE — 85651 RBC SED RATE NONAUTOMATED: CPT | Performed by: INTERNAL MEDICINE

## 2018-01-01 PROCEDURE — 93005 ELECTROCARDIOGRAM TRACING: CPT | Performed by: STUDENT IN AN ORGANIZED HEALTH CARE EDUCATION/TRAINING PROGRAM

## 2018-01-01 PROCEDURE — 83930 ASSAY OF BLOOD OSMOLALITY: CPT | Performed by: INTERNAL MEDICINE

## 2018-01-01 PROCEDURE — 85025 COMPLETE CBC W/AUTO DIFF WBC: CPT | Performed by: STUDENT IN AN ORGANIZED HEALTH CARE EDUCATION/TRAINING PROGRAM

## 2018-01-01 PROCEDURE — 84484 ASSAY OF TROPONIN QUANT: CPT | Performed by: STUDENT IN AN ORGANIZED HEALTH CARE EDUCATION/TRAINING PROGRAM

## 2018-01-01 PROCEDURE — 94002 VENT MGMT INPAT INIT DAY: CPT

## 2018-01-01 PROCEDURE — 97165 OT EVAL LOW COMPLEX 30 MIN: CPT

## 2018-01-01 PROCEDURE — 99239 HOSP IP/OBS DSCHRG MGMT >30: CPT | Performed by: INTERNAL MEDICINE

## 2018-01-01 PROCEDURE — 63710000001 INSULIN REGULAR HUMAN PER 5 UNITS: Performed by: HOSPITALIST

## 2018-01-01 PROCEDURE — 3E0102A INTRODUCTION OF ANTI-INFECTIVE ENVELOPE INTO SUBCUTANEOUS TISSUE, OPEN APPROACH: ICD-10-PCS | Performed by: INTERNAL MEDICINE

## 2018-01-01 PROCEDURE — 82947 ASSAY GLUCOSE BLOOD QUANT: CPT | Performed by: INTERNAL MEDICINE

## 2018-01-01 PROCEDURE — 83735 ASSAY OF MAGNESIUM: CPT | Performed by: INTERNAL MEDICINE

## 2018-01-01 PROCEDURE — 83690 ASSAY OF LIPASE: CPT | Performed by: EMERGENCY MEDICINE

## 2018-01-01 PROCEDURE — C1894 INTRO/SHEATH, NON-LASER: HCPCS | Performed by: INTERNAL MEDICINE

## 2018-01-01 PROCEDURE — 25010000002 METHYLPREDNISOLONE PER 125 MG: Performed by: INTERNAL MEDICINE

## 2018-01-01 PROCEDURE — 63710000001 INSULIN REGULAR HUMAN PER 5 UNITS: Performed by: FAMILY MEDICINE

## 2018-01-01 PROCEDURE — 99232 SBSQ HOSP IP/OBS MODERATE 35: CPT | Performed by: NURSE PRACTITIONER

## 2018-01-01 PROCEDURE — 83880 ASSAY OF NATRIURETIC PEPTIDE: CPT | Performed by: STUDENT IN AN ORGANIZED HEALTH CARE EDUCATION/TRAINING PROGRAM

## 2018-01-01 PROCEDURE — 83735 ASSAY OF MAGNESIUM: CPT | Performed by: EMERGENCY MEDICINE

## 2018-01-01 PROCEDURE — 93005 ELECTROCARDIOGRAM TRACING: CPT | Performed by: EMERGENCY MEDICINE

## 2018-01-01 PROCEDURE — 83605 ASSAY OF LACTIC ACID: CPT | Performed by: PHYSICIAN ASSISTANT

## 2018-01-01 PROCEDURE — 80053 COMPREHEN METABOLIC PANEL: CPT | Performed by: FAMILY MEDICINE

## 2018-01-01 PROCEDURE — 94729 DIFFUSING CAPACITY: CPT | Performed by: INTERNAL MEDICINE

## 2018-01-01 PROCEDURE — 85025 COMPLETE CBC W/AUTO DIFF WBC: CPT | Performed by: PHYSICIAN ASSISTANT

## 2018-01-01 PROCEDURE — 25010000002 METHYLPREDNISOLONE PER 40 MG: Performed by: FAMILY MEDICINE

## 2018-01-01 PROCEDURE — 80053 COMPREHEN METABOLIC PANEL: CPT | Performed by: STUDENT IN AN ORGANIZED HEALTH CARE EDUCATION/TRAINING PROGRAM

## 2018-01-01 PROCEDURE — 83036 HEMOGLOBIN GLYCOSYLATED A1C: CPT | Performed by: INTERNAL MEDICINE

## 2018-01-01 PROCEDURE — 25010000002 MORPHINE SULFATE (PF) 2 MG/ML SOLUTION: Performed by: INTERNAL MEDICINE

## 2018-01-01 PROCEDURE — 84484 ASSAY OF TROPONIN QUANT: CPT | Performed by: EMERGENCY MEDICINE

## 2018-01-01 PROCEDURE — 99220 PR INITIAL OBSERVATION CARE/DAY 70 MINUTES: CPT | Performed by: INTERNAL MEDICINE

## 2018-01-01 PROCEDURE — 93306 TTE W/DOPPLER COMPLETE: CPT

## 2018-01-01 PROCEDURE — 85610 PROTHROMBIN TIME: CPT | Performed by: INTERNAL MEDICINE

## 2018-01-01 PROCEDURE — 25010000002 ONDANSETRON PER 1 MG: Performed by: PHYSICIAN ASSISTANT

## 2018-01-01 PROCEDURE — 94770: CPT

## 2018-01-01 PROCEDURE — 80061 LIPID PANEL: CPT | Performed by: INTERNAL MEDICINE

## 2018-01-01 PROCEDURE — 82947 ASSAY GLUCOSE BLOOD QUANT: CPT | Performed by: FAMILY MEDICINE

## 2018-01-01 PROCEDURE — 86900 BLOOD TYPING SEROLOGIC ABO: CPT | Performed by: INTERNAL MEDICINE

## 2018-01-01 PROCEDURE — 70450 CT HEAD/BRAIN W/O DYE: CPT

## 2018-01-01 PROCEDURE — 25010000003 PHENYTOIN PER 50 MG: Performed by: EMERGENCY MEDICINE

## 2018-01-01 PROCEDURE — 0BH17EZ INSERTION OF ENDOTRACHEAL AIRWAY INTO TRACHEA, VIA NATURAL OR ARTIFICIAL OPENING: ICD-10-PCS | Performed by: EMERGENCY MEDICINE

## 2018-01-01 PROCEDURE — 99233 SBSQ HOSP IP/OBS HIGH 50: CPT | Performed by: PSYCHIATRY & NEUROLOGY

## 2018-01-01 PROCEDURE — 25010000002 ONDANSETRON PER 1 MG: Performed by: STUDENT IN AN ORGANIZED HEALTH CARE EDUCATION/TRAINING PROGRAM

## 2018-01-01 PROCEDURE — 63710000001 INSULIN REGULAR HUMAN PER 5 UNITS: Performed by: PHYSICIAN ASSISTANT

## 2018-01-01 PROCEDURE — 63710000001 INSULIN ASPART PER 5 UNITS: Performed by: FAMILY MEDICINE

## 2018-01-01 PROCEDURE — 99221 1ST HOSP IP/OBS SF/LOW 40: CPT | Performed by: PSYCHIATRY & NEUROLOGY

## 2018-01-01 PROCEDURE — 25010000002 LORAZEPAM PER 2 MG: Performed by: PSYCHIATRY & NEUROLOGY

## 2018-01-01 PROCEDURE — 76937 US GUIDE VASCULAR ACCESS: CPT | Performed by: INTERNAL MEDICINE

## 2018-01-01 PROCEDURE — 25010000002 METHYLPREDNISOLONE PER 40 MG: Performed by: EMERGENCY MEDICINE

## 2018-01-01 PROCEDURE — 63710000001 INSULIN REGULAR HUMAN PER 5 UNITS: Performed by: EMERGENCY MEDICINE

## 2018-01-01 PROCEDURE — 25010000002 FENTANYL CITRATE (PF) 100 MCG/2ML SOLUTION: Performed by: INTERNAL MEDICINE

## 2018-01-01 PROCEDURE — 25010000002 MIDAZOLAM 50 MG/10ML SOLUTION 10 ML VIAL: Performed by: EMERGENCY MEDICINE

## 2018-01-01 PROCEDURE — 25010000002 AZITHROMYCIN: Performed by: FAMILY MEDICINE

## 2018-01-01 PROCEDURE — 80053 COMPREHEN METABOLIC PANEL: CPT | Performed by: PHYSICIAN ASSISTANT

## 2018-01-01 PROCEDURE — 99232 SBSQ HOSP IP/OBS MODERATE 35: CPT | Performed by: FAMILY MEDICINE

## 2018-01-01 PROCEDURE — 05HN33Z INSERTION OF INFUSION DEVICE INTO LEFT INTERNAL JUGULAR VEIN, PERCUTANEOUS APPROACH: ICD-10-PCS | Performed by: INTERNAL MEDICINE

## 2018-01-01 PROCEDURE — 99283 EMERGENCY DEPT VISIT LOW MDM: CPT

## 2018-01-01 PROCEDURE — 25010000002 CALCIUM GLUCONATE PER 10 ML: Performed by: EMERGENCY MEDICINE

## 2018-01-01 PROCEDURE — 84484 ASSAY OF TROPONIN QUANT: CPT | Performed by: PHYSICIAN ASSISTANT

## 2018-01-01 PROCEDURE — 94640 AIRWAY INHALATION TREATMENT: CPT

## 2018-01-01 PROCEDURE — 25010000002 ONDANSETRON PER 1 MG: Performed by: INTERNAL MEDICINE

## 2018-01-01 PROCEDURE — 63710000001 INSULIN DETEMIR PER 5 UNITS: Performed by: FAMILY MEDICINE

## 2018-01-01 PROCEDURE — 87040 BLOOD CULTURE FOR BACTERIA: CPT | Performed by: PHYSICIAN ASSISTANT

## 2018-01-01 PROCEDURE — 99291 CRITICAL CARE FIRST HOUR: CPT

## 2018-01-01 PROCEDURE — 99231 SBSQ HOSP IP/OBS SF/LOW 25: CPT | Performed by: NURSE PRACTITIONER

## 2018-01-01 PROCEDURE — 81001 URINALYSIS AUTO W/SCOPE: CPT | Performed by: EMERGENCY MEDICINE

## 2018-01-01 PROCEDURE — 25010000002 MIDAZOLAM PER 1 MG: Performed by: INTERNAL MEDICINE

## 2018-01-01 PROCEDURE — 94060 EVALUATION OF WHEEZING: CPT | Performed by: INTERNAL MEDICINE

## 2018-01-01 PROCEDURE — 71250 CT THORAX DX C-: CPT

## 2018-01-01 PROCEDURE — 86900 BLOOD TYPING SEROLOGIC ABO: CPT

## 2018-01-01 PROCEDURE — 87340 HEPATITIS B SURFACE AG IA: CPT | Performed by: INTERNAL MEDICINE

## 2018-01-01 PROCEDURE — 95819 EEG AWAKE AND ASLEEP: CPT

## 2018-01-01 PROCEDURE — 33208 INSRT HEART PM ATRIAL & VENT: CPT | Performed by: INTERNAL MEDICINE

## 2018-01-01 PROCEDURE — 96365 THER/PROPH/DIAG IV INF INIT: CPT

## 2018-01-01 PROCEDURE — 96375 TX/PRO/DX INJ NEW DRUG ADDON: CPT

## 2018-01-01 PROCEDURE — 74018 RADEX ABDOMEN 1 VIEW: CPT

## 2018-01-01 PROCEDURE — P9016 RBC LEUKOCYTES REDUCED: HCPCS

## 2018-01-01 PROCEDURE — 94726 PLETHYSMOGRAPHY LUNG VOLUMES: CPT | Performed by: INTERNAL MEDICINE

## 2018-01-01 PROCEDURE — 86850 RBC ANTIBODY SCREEN: CPT | Performed by: INTERNAL MEDICINE

## 2018-01-01 PROCEDURE — 87040 BLOOD CULTURE FOR BACTERIA: CPT | Performed by: EMERGENCY MEDICINE

## 2018-01-01 PROCEDURE — C1785 PMKR, DUAL, RATE-RESP: HCPCS | Performed by: INTERNAL MEDICINE

## 2018-01-01 PROCEDURE — 80069 RENAL FUNCTION PANEL: CPT | Performed by: INTERNAL MEDICINE

## 2018-01-01 PROCEDURE — 5A1955Z RESPIRATORY VENTILATION, GREATER THAN 96 CONSECUTIVE HOURS: ICD-10-PCS | Performed by: EMERGENCY MEDICINE

## 2018-01-01 PROCEDURE — 83735 ASSAY OF MAGNESIUM: CPT | Performed by: FAMILY MEDICINE

## 2018-01-01 DEVICE — GEN PM ASSURITY DR RF PM2240 MERLIN: Type: IMPLANTABLE DEVICE | Status: FUNCTIONAL

## 2018-01-01 DEVICE — LD PM TENDRIL STS 6F52CM 2088TC52: Type: IMPLANTABLE DEVICE | Status: FUNCTIONAL

## 2018-01-01 DEVICE — LD PM TENDRIL STS 6F46CM 2088TC46: Type: IMPLANTABLE DEVICE | Status: FUNCTIONAL

## 2018-01-01 RX ORDER — AMLODIPINE BESYLATE 5 MG/1
5 TABLET ORAL DAILY
Status: DISCONTINUED | OUTPATIENT
Start: 2018-01-01 | End: 2018-01-01

## 2018-01-01 RX ORDER — ETOMIDATE 2 MG/ML
20 INJECTION INTRAVENOUS ONCE
Status: COMPLETED | OUTPATIENT
Start: 2018-01-01 | End: 2018-01-01

## 2018-01-01 RX ORDER — SODIUM CHLORIDE 0.9 % (FLUSH) 0.9 %
1-10 SYRINGE (ML) INJECTION AS NEEDED
Status: DISCONTINUED | OUTPATIENT
Start: 2018-01-01 | End: 2018-01-01 | Stop reason: HOSPADM

## 2018-01-01 RX ORDER — DOXYCYCLINE 100 MG/1
100 CAPSULE ORAL 2 TIMES DAILY
Qty: 20 CAPSULE | Refills: 0 | Status: SHIPPED | OUTPATIENT
Start: 2018-01-01 | End: 2018-01-01 | Stop reason: HOSPADM

## 2018-01-01 RX ORDER — CARVEDILOL 12.5 MG/1
12.5 TABLET ORAL EVERY 12 HOURS SCHEDULED
Status: DISCONTINUED | OUTPATIENT
Start: 2018-01-01 | End: 2018-01-01 | Stop reason: HOSPADM

## 2018-01-01 RX ORDER — BLOOD-GLUCOSE METER
KIT MISCELLANEOUS
Qty: 1 EACH | Refills: 0 | Status: SHIPPED | OUTPATIENT
Start: 2018-01-01

## 2018-01-01 RX ORDER — NICOTINE POLACRILEX 4 MG
1 LOZENGE BUCCAL
Status: DISCONTINUED | OUTPATIENT
Start: 2018-01-01 | End: 2018-01-01 | Stop reason: HOSPADM

## 2018-01-01 RX ORDER — DEXTROSE MONOHYDRATE 25 G/50ML
25 INJECTION, SOLUTION INTRAVENOUS
Status: DISCONTINUED | OUTPATIENT
Start: 2018-01-01 | End: 2018-01-01 | Stop reason: HOSPADM

## 2018-01-01 RX ORDER — LEVOTHYROXINE SODIUM 0.1 MG/1
TABLET ORAL
COMMUNITY
Start: 2017-01-01 | End: 2018-01-01 | Stop reason: DRUGHIGH

## 2018-01-01 RX ORDER — LEVOTHYROXINE SODIUM 0.07 MG/1
175 TABLET ORAL DAILY
Qty: 30 TABLET | Refills: 0
Start: 2018-01-01 | End: 2018-01-01 | Stop reason: DRUGHIGH

## 2018-01-01 RX ORDER — PHENYTOIN SODIUM 50 MG/ML
100 INJECTION, SOLUTION INTRAMUSCULAR; INTRAVENOUS EVERY 8 HOURS
Status: DISCONTINUED | OUTPATIENT
Start: 2018-01-01 | End: 2018-01-01

## 2018-01-01 RX ORDER — SEVELAMER HYDROCHLORIDE 800 MG/1
800 TABLET, FILM COATED ORAL
Status: DISCONTINUED | OUTPATIENT
Start: 2018-01-01 | End: 2018-01-01 | Stop reason: HOSPADM

## 2018-01-01 RX ORDER — ACETAMINOPHEN 325 MG/1
650 TABLET ORAL EVERY 4 HOURS PRN
Status: DISCONTINUED | OUTPATIENT
Start: 2018-01-01 | End: 2018-01-01 | Stop reason: SDUPTHER

## 2018-01-01 RX ORDER — ASPIRIN 81 MG/1
81 TABLET ORAL DAILY
Status: DISCONTINUED | OUTPATIENT
Start: 2018-01-01 | End: 2018-01-01 | Stop reason: HOSPADM

## 2018-01-01 RX ORDER — LABETALOL HYDROCHLORIDE 5 MG/ML
10 INJECTION, SOLUTION INTRAVENOUS ONCE
Status: COMPLETED | OUTPATIENT
Start: 2018-01-01 | End: 2018-01-01

## 2018-01-01 RX ORDER — PREDNISONE 10 MG/1
TABLET ORAL
Qty: 10 TABLET | Refills: 0 | Status: ON HOLD | OUTPATIENT
Start: 2018-01-01 | End: 2018-01-01 | Stop reason: ALTCHOICE

## 2018-01-01 RX ORDER — AMLODIPINE BESYLATE 5 MG/1
5 TABLET ORAL 2 TIMES DAILY
Status: DISCONTINUED | OUTPATIENT
Start: 2018-01-01 | End: 2018-01-01 | Stop reason: HOSPADM

## 2018-01-01 RX ORDER — FOLIC ACID/VIT B COMPLEX AND C 0.8 MG
1 TABLET ORAL DAILY
Status: DISCONTINUED | OUTPATIENT
Start: 2018-01-01 | End: 2018-01-01 | Stop reason: HOSPADM

## 2018-01-01 RX ORDER — ATORVASTATIN CALCIUM 10 MG/1
10 TABLET, FILM COATED ORAL DAILY
Status: DISCONTINUED | OUTPATIENT
Start: 2018-01-01 | End: 2018-01-01 | Stop reason: HOSPADM

## 2018-01-01 RX ORDER — SODIUM CHLORIDE 0.9 % (FLUSH) 0.9 %
10 SYRINGE (ML) INJECTION AS NEEDED
Status: DISCONTINUED | OUTPATIENT
Start: 2018-01-01 | End: 2018-01-01

## 2018-01-01 RX ORDER — HEPARIN SODIUM 5000 [USP'U]/ML
5000 INJECTION, SOLUTION INTRAVENOUS; SUBCUTANEOUS EVERY 12 HOURS
Status: DISCONTINUED | OUTPATIENT
Start: 2018-01-01 | End: 2018-01-01 | Stop reason: HOSPADM

## 2018-01-01 RX ORDER — ONDANSETRON 2 MG/ML
4 INJECTION INTRAMUSCULAR; INTRAVENOUS EVERY 6 HOURS PRN
Status: DISCONTINUED | OUTPATIENT
Start: 2018-01-01 | End: 2018-01-01 | Stop reason: HOSPADM

## 2018-01-01 RX ORDER — FAMOTIDINE 10 MG/ML
20 INJECTION, SOLUTION INTRAVENOUS DAILY
Status: DISCONTINUED | OUTPATIENT
Start: 2018-01-01 | End: 2018-01-01 | Stop reason: HOSPADM

## 2018-01-01 RX ORDER — SEVELAMER CARBONATE 800 MG/1
800 TABLET, FILM COATED ORAL 2 TIMES DAILY PRN
COMMUNITY
End: 2018-01-01 | Stop reason: HOSPADM

## 2018-01-01 RX ORDER — CARVEDILOL 6.25 MG/1
3.12 TABLET ORAL EVERY 12 HOURS SCHEDULED
Status: DISCONTINUED | OUTPATIENT
Start: 2018-01-01 | End: 2018-01-01

## 2018-01-01 RX ORDER — GUAIFENESIN 600 MG/1
600 TABLET, EXTENDED RELEASE ORAL EVERY 12 HOURS
Qty: 14 TABLET | Refills: 0 | Status: ON HOLD | OUTPATIENT
Start: 2018-01-01 | End: 2018-01-01 | Stop reason: ALTCHOICE

## 2018-01-01 RX ORDER — BENZONATATE 200 MG/1
200 CAPSULE ORAL 3 TIMES DAILY PRN
Qty: 21 CAPSULE | Refills: 0 | Status: SHIPPED | OUTPATIENT
Start: 2018-01-01 | End: 2018-01-01

## 2018-01-01 RX ORDER — ATORVASTATIN CALCIUM 10 MG/1
10 TABLET, FILM COATED ORAL DAILY
Start: 2018-01-01

## 2018-01-01 RX ORDER — METHYLPREDNISOLONE SODIUM SUCCINATE 125 MG/2ML
80 INJECTION, POWDER, LYOPHILIZED, FOR SOLUTION INTRAMUSCULAR; INTRAVENOUS EVERY 8 HOURS
Status: DISCONTINUED | OUTPATIENT
Start: 2018-01-01 | End: 2018-01-01

## 2018-01-01 RX ORDER — PHENYTOIN SODIUM 50 MG/ML
100 INJECTION, SOLUTION INTRAMUSCULAR; INTRAVENOUS EVERY 6 HOURS
Start: 2018-01-01

## 2018-01-01 RX ORDER — CARVEDILOL 25 MG/1
25 TABLET ORAL 2 TIMES DAILY WITH MEALS
Status: DISCONTINUED | OUTPATIENT
Start: 2018-01-01 | End: 2018-01-01 | Stop reason: HOSPADM

## 2018-01-01 RX ORDER — IPRATROPIUM BROMIDE AND ALBUTEROL SULFATE 2.5; .5 MG/3ML; MG/3ML
3 SOLUTION RESPIRATORY (INHALATION)
Qty: 360 ML
Start: 2018-01-01

## 2018-01-01 RX ORDER — CALCIUM GLUCONATE 94 MG/ML
1 INJECTION, SOLUTION INTRAVENOUS ONCE
Status: COMPLETED | OUTPATIENT
Start: 2018-01-01 | End: 2018-01-01

## 2018-01-01 RX ORDER — FAMOTIDINE 10 MG/ML
20 INJECTION, SOLUTION INTRAVENOUS ONCE
Status: DISCONTINUED | OUTPATIENT
Start: 2018-01-01 | End: 2018-01-01

## 2018-01-01 RX ORDER — SODIUM CHLORIDE 0.9 % (FLUSH) 0.9 %
30 SYRINGE (ML) INJECTION ONCE AS NEEDED
Status: DISCONTINUED | OUTPATIENT
Start: 2018-01-01 | End: 2018-01-01

## 2018-01-01 RX ORDER — ONDANSETRON 4 MG/1
4 TABLET, FILM COATED ORAL EVERY 6 HOURS PRN
Status: DISCONTINUED | OUTPATIENT
Start: 2018-01-01 | End: 2018-01-01 | Stop reason: HOSPADM

## 2018-01-01 RX ORDER — TETRACAINE HYDROCHLORIDE 5 MG/ML
2 SOLUTION OPHTHALMIC ONCE
Status: COMPLETED | OUTPATIENT
Start: 2018-01-01 | End: 2018-01-01

## 2018-01-01 RX ORDER — CARVEDILOL 12.5 MG/1
12.5 TABLET ORAL 2 TIMES DAILY WITH MEALS
Qty: 60 TABLET | Refills: 0 | Status: ON HOLD | OUTPATIENT
Start: 2018-01-01 | End: 2018-01-01 | Stop reason: ALTCHOICE

## 2018-01-01 RX ORDER — SODIUM CHLORIDE 0.9 % (FLUSH) 0.9 %
1-10 SYRINGE (ML) INJECTION AS NEEDED
Status: DISCONTINUED | OUTPATIENT
Start: 2018-01-01 | End: 2018-01-01 | Stop reason: SDUPTHER

## 2018-01-01 RX ORDER — IPRATROPIUM BROMIDE AND ALBUTEROL SULFATE 2.5; .5 MG/3ML; MG/3ML
3 SOLUTION RESPIRATORY (INHALATION) ONCE
Status: COMPLETED | OUTPATIENT
Start: 2018-01-01 | End: 2018-01-01

## 2018-01-01 RX ORDER — CEFUROXIME AXETIL 500 MG/1
500 TABLET ORAL 2 TIMES DAILY
Qty: 20 TABLET | Refills: 0 | Status: ON HOLD | OUTPATIENT
Start: 2018-01-01 | End: 2018-01-01

## 2018-01-01 RX ORDER — SEVELAMER HYDROCHLORIDE 800 MG/1
800 TABLET, FILM COATED ORAL
Status: DISCONTINUED | OUTPATIENT
Start: 2018-01-01 | End: 2018-01-01

## 2018-01-01 RX ORDER — ONDANSETRON 2 MG/ML
4 INJECTION INTRAMUSCULAR; INTRAVENOUS EVERY 6 HOURS PRN
Status: DISCONTINUED | OUTPATIENT
Start: 2018-01-01 | End: 2018-01-01 | Stop reason: SDUPTHER

## 2018-01-01 RX ORDER — ZOLPIDEM TARTRATE 5 MG/1
5 TABLET ORAL NIGHTLY PRN
Status: DISCONTINUED | OUTPATIENT
Start: 2018-01-01 | End: 2018-01-01 | Stop reason: HOSPADM

## 2018-01-01 RX ORDER — ONDANSETRON 2 MG/ML
4 INJECTION INTRAMUSCULAR; INTRAVENOUS EVERY 6 HOURS PRN
Status: DISCONTINUED | OUTPATIENT
Start: 2018-01-01 | End: 2018-01-01

## 2018-01-01 RX ORDER — ACETAMINOPHEN 325 MG/1
650 TABLET ORAL EVERY 4 HOURS PRN
Status: DISCONTINUED | OUTPATIENT
Start: 2018-01-01 | End: 2018-01-01

## 2018-01-01 RX ORDER — LIDOCAINE AND PRILOCAINE 25; 25 MG/G; MG/G
CREAM TOPICAL ONCE
Status: COMPLETED | OUTPATIENT
Start: 2018-01-01 | End: 2018-01-01

## 2018-01-01 RX ORDER — METRONIDAZOLE 500 MG/1
500 TABLET ORAL 3 TIMES DAILY
Qty: 15 TABLET | Refills: 0 | Status: SHIPPED | OUTPATIENT
Start: 2018-01-01 | End: 2018-01-01

## 2018-01-01 RX ORDER — ASPIRIN 81 MG/1
81 TABLET ORAL DAILY
Start: 2018-01-01

## 2018-01-01 RX ORDER — DOXYCYCLINE 100 MG/1
100 CAPSULE ORAL ONCE
Status: COMPLETED | OUTPATIENT
Start: 2018-01-01 | End: 2018-01-01

## 2018-01-01 RX ORDER — SODIUM CHLORIDE 9 MG/ML
50 INJECTION, SOLUTION INTRAVENOUS CONTINUOUS
Status: DISCONTINUED | OUTPATIENT
Start: 2018-01-01 | End: 2018-01-01

## 2018-01-01 RX ORDER — METHYLPREDNISOLONE SODIUM SUCCINATE 125 MG/2ML
125 INJECTION, POWDER, LYOPHILIZED, FOR SOLUTION INTRAMUSCULAR; INTRAVENOUS ONCE
Status: COMPLETED | OUTPATIENT
Start: 2018-01-01 | End: 2018-01-01

## 2018-01-01 RX ORDER — DEXTROSE MONOHYDRATE 50 MG/ML
50 INJECTION, SOLUTION INTRAVENOUS CONTINUOUS
Status: ACTIVE | OUTPATIENT
Start: 2018-01-01 | End: 2018-01-01

## 2018-01-01 RX ORDER — LEVOTHYROXINE SODIUM 0.1 MG/1
100 TABLET ORAL EVERY MORNING
Status: DISCONTINUED | OUTPATIENT
Start: 2018-01-01 | End: 2018-01-01 | Stop reason: HOSPADM

## 2018-01-01 RX ORDER — BUDESONIDE 0.5 MG/2ML
0.5 INHALANT ORAL
Status: DISCONTINUED | OUTPATIENT
Start: 2018-01-01 | End: 2018-01-01 | Stop reason: HOSPADM

## 2018-01-01 RX ORDER — LEVOTHYROXINE SODIUM 0.1 MG/1
100 TABLET ORAL DAILY
COMMUNITY

## 2018-01-01 RX ORDER — CARVEDILOL 12.5 MG/1
12.5 TABLET ORAL 2 TIMES DAILY WITH MEALS
Status: DISCONTINUED | OUTPATIENT
Start: 2018-01-01 | End: 2018-01-01

## 2018-01-01 RX ORDER — SODIUM CHLORIDE 0.9 % (FLUSH) 0.9 %
10 SYRINGE (ML) INJECTION AS NEEDED
Status: DISCONTINUED | OUTPATIENT
Start: 2018-01-01 | End: 2018-01-01 | Stop reason: HOSPADM

## 2018-01-01 RX ORDER — MORPHINE SULFATE 2 MG/ML
1 INJECTION, SOLUTION INTRAMUSCULAR; INTRAVENOUS EVERY 4 HOURS PRN
Status: DISCONTINUED | OUTPATIENT
Start: 2018-01-01 | End: 2018-01-01 | Stop reason: HOSPADM

## 2018-01-01 RX ORDER — METHYLPREDNISOLONE SODIUM SUCCINATE 40 MG/ML
80 INJECTION, POWDER, LYOPHILIZED, FOR SOLUTION INTRAMUSCULAR; INTRAVENOUS ONCE
Status: COMPLETED | OUTPATIENT
Start: 2018-01-01 | End: 2018-01-01

## 2018-01-01 RX ORDER — LORAZEPAM 2 MG/ML
2 INJECTION INTRAMUSCULAR ONCE
Status: COMPLETED | OUTPATIENT
Start: 2018-01-01 | End: 2018-01-01

## 2018-01-01 RX ORDER — LIDOCAINE HYDROCHLORIDE 10 MG/ML
INJECTION, SOLUTION INFILTRATION; PERINEURAL AS NEEDED
Status: DISCONTINUED | OUTPATIENT
Start: 2018-01-01 | End: 2018-01-01 | Stop reason: HOSPADM

## 2018-01-01 RX ORDER — HEPARIN SODIUM 5000 [USP'U]/ML
5000 INJECTION, SOLUTION INTRAVENOUS; SUBCUTANEOUS EVERY 8 HOURS SCHEDULED
Status: DISCONTINUED | OUTPATIENT
Start: 2018-01-01 | End: 2018-01-01 | Stop reason: HOSPADM

## 2018-01-01 RX ORDER — ONDANSETRON 4 MG/1
4 TABLET, ORALLY DISINTEGRATING ORAL EVERY 8 HOURS PRN
COMMUNITY
End: 2018-01-01 | Stop reason: HOSPADM

## 2018-01-01 RX ORDER — CHLORHEXIDINE GLUCONATE 0.12 MG/ML
15 RINSE ORAL EVERY 12 HOURS SCHEDULED
Status: DISCONTINUED | OUTPATIENT
Start: 2018-01-01 | End: 2018-01-01 | Stop reason: HOSPADM

## 2018-01-01 RX ORDER — BISACODYL 10 MG
10 SUPPOSITORY, RECTAL RECTAL DAILY PRN
Status: DISCONTINUED | OUTPATIENT
Start: 2018-01-01 | End: 2018-01-01 | Stop reason: HOSPADM

## 2018-01-01 RX ORDER — SODIUM CHLORIDE 9 MG/ML
125 INJECTION, SOLUTION INTRAVENOUS CONTINUOUS
Status: DISCONTINUED | OUTPATIENT
Start: 2018-01-01 | End: 2018-01-01

## 2018-01-01 RX ORDER — FLUMAZENIL 0.1 MG/ML
0.2 INJECTION INTRAVENOUS ONCE
Status: COMPLETED | OUTPATIENT
Start: 2018-01-01 | End: 2018-01-01

## 2018-01-01 RX ORDER — DEXTROSE MONOHYDRATE 50 MG/ML
50 INJECTION, SOLUTION INTRAVENOUS CONTINUOUS
Status: DISCONTINUED | OUTPATIENT
Start: 2018-01-01 | End: 2018-01-01

## 2018-01-01 RX ORDER — ONDANSETRON 4 MG/1
4 TABLET, ORALLY DISINTEGRATING ORAL EVERY 6 HOURS PRN
Status: DISCONTINUED | OUTPATIENT
Start: 2018-01-01 | End: 2018-01-01 | Stop reason: HOSPADM

## 2018-01-01 RX ORDER — METHYLPREDNISOLONE SODIUM SUCCINATE 40 MG/ML
40 INJECTION, POWDER, LYOPHILIZED, FOR SOLUTION INTRAMUSCULAR; INTRAVENOUS EVERY 8 HOURS
Status: DISCONTINUED | OUTPATIENT
Start: 2018-01-01 | End: 2018-01-01 | Stop reason: HOSPADM

## 2018-01-01 RX ORDER — ROCURONIUM BROMIDE 10 MG/ML
80 INJECTION, SOLUTION INTRAVENOUS ONCE
Status: COMPLETED | OUTPATIENT
Start: 2018-01-01 | End: 2018-01-01

## 2018-01-01 RX ORDER — LOSARTAN POTASSIUM 50 MG/1
50 TABLET ORAL DAILY
Status: DISCONTINUED | OUTPATIENT
Start: 2018-01-01 | End: 2018-01-01 | Stop reason: HOSPADM

## 2018-01-01 RX ORDER — ONDANSETRON 2 MG/ML
4 INJECTION INTRAMUSCULAR; INTRAVENOUS ONCE
Status: COMPLETED | OUTPATIENT
Start: 2018-01-01 | End: 2018-01-01

## 2018-01-01 RX ORDER — SEVELAMER CARBONATE 800 MG/1
1600 TABLET, FILM COATED ORAL
COMMUNITY

## 2018-01-01 RX ORDER — CIPROFLOXACIN HYDROCHLORIDE 3.5 MG/ML
1 SOLUTION/ DROPS TOPICAL ONCE
Status: COMPLETED | OUTPATIENT
Start: 2018-01-01 | End: 2018-01-01

## 2018-01-01 RX ORDER — ONDANSETRON 2 MG/ML
8 INJECTION INTRAMUSCULAR; INTRAVENOUS ONCE
Status: COMPLETED | OUTPATIENT
Start: 2018-01-01 | End: 2018-01-01

## 2018-01-01 RX ORDER — DIPHENHYDRAMINE HYDROCHLORIDE 50 MG/ML
50 INJECTION INTRAMUSCULAR; INTRAVENOUS ONCE
Status: DISCONTINUED | OUTPATIENT
Start: 2018-01-01 | End: 2018-01-01

## 2018-01-01 RX ORDER — FENTANYL CITRATE 50 UG/ML
INJECTION, SOLUTION INTRAMUSCULAR; INTRAVENOUS AS NEEDED
Status: DISCONTINUED | OUTPATIENT
Start: 2018-01-01 | End: 2018-01-01 | Stop reason: HOSPADM

## 2018-01-01 RX ORDER — ASPIRIN 325 MG
325 TABLET ORAL ONCE
Status: COMPLETED | OUTPATIENT
Start: 2018-01-01 | End: 2018-01-01

## 2018-01-01 RX ORDER — BENZONATATE 100 MG/1
200 CAPSULE ORAL 3 TIMES DAILY PRN
Status: DISCONTINUED | OUTPATIENT
Start: 2018-01-01 | End: 2018-01-01 | Stop reason: HOSPADM

## 2018-01-01 RX ORDER — AMLODIPINE BESYLATE 5 MG/1
5 TABLET ORAL
Start: 2018-01-01

## 2018-01-01 RX ORDER — SODIUM CHLORIDE 9 MG/ML
10 INJECTION, SOLUTION INTRAVENOUS CONTINUOUS
Status: DISCONTINUED | OUTPATIENT
Start: 2018-01-01 | End: 2018-01-01 | Stop reason: HOSPADM

## 2018-01-01 RX ORDER — PHENYTOIN SODIUM 50 MG/ML
100 INJECTION, SOLUTION INTRAMUSCULAR; INTRAVENOUS EVERY 6 HOURS
Status: DISCONTINUED | OUTPATIENT
Start: 2018-01-01 | End: 2018-01-01 | Stop reason: HOSPADM

## 2018-01-01 RX ORDER — ONDANSETRON HYDROCHLORIDE 8 MG/1
8 TABLET, FILM COATED ORAL EVERY 8 HOURS PRN
COMMUNITY

## 2018-01-01 RX ORDER — HYDROCODONE BITARTRATE AND ACETAMINOPHEN 5; 325 MG/1; MG/1
1 TABLET ORAL EVERY 4 HOURS PRN
Status: DISCONTINUED | OUTPATIENT
Start: 2018-01-01 | End: 2018-01-01

## 2018-01-01 RX ORDER — AMLODIPINE BESYLATE 5 MG/1
5 TABLET ORAL
Status: DISCONTINUED | OUTPATIENT
Start: 2018-01-01 | End: 2018-01-01 | Stop reason: HOSPADM

## 2018-01-01 RX ORDER — GUAIFENESIN 600 MG/1
600 TABLET, EXTENDED RELEASE ORAL EVERY 12 HOURS
Status: DISCONTINUED | OUTPATIENT
Start: 2018-01-01 | End: 2018-01-01 | Stop reason: HOSPADM

## 2018-01-01 RX ORDER — INSULIN GLARGINE 300 U/ML
5 INJECTION, SOLUTION SUBCUTANEOUS NIGHTLY
Qty: 1 PEN | Refills: 0 | Status: ON HOLD
Start: 2018-01-01 | End: 2018-01-01 | Stop reason: ALTCHOICE

## 2018-01-01 RX ORDER — ACETAMINOPHEN 325 MG/1
650 TABLET ORAL EVERY 4 HOURS PRN
Status: DISCONTINUED | OUTPATIENT
Start: 2018-01-01 | End: 2018-01-01 | Stop reason: HOSPADM

## 2018-01-01 RX ORDER — HYDROCODONE BITARTRATE AND ACETAMINOPHEN 5; 325 MG/1; MG/1
1 TABLET ORAL EVERY 4 HOURS PRN
Status: DISCONTINUED | OUTPATIENT
Start: 2018-01-01 | End: 2018-01-01 | Stop reason: SDUPTHER

## 2018-01-01 RX ORDER — LORAZEPAM 2 MG/ML
INJECTION INTRAMUSCULAR
Status: DISPENSED
Start: 2018-01-01 | End: 2018-01-01

## 2018-01-01 RX ORDER — MIDAZOLAM HYDROCHLORIDE 1 MG/ML
INJECTION INTRAMUSCULAR; INTRAVENOUS AS NEEDED
Status: DISCONTINUED | OUTPATIENT
Start: 2018-01-01 | End: 2018-01-01 | Stop reason: HOSPADM

## 2018-01-01 RX ORDER — VALSARTAN 320 MG/1
160 TABLET ORAL
Qty: 30 TABLET | Refills: 0 | Status: SHIPPED | OUTPATIENT
Start: 2018-01-01 | End: 2018-01-01 | Stop reason: HOSPADM

## 2018-01-01 RX ORDER — BENZONATATE 100 MG/1
100 CAPSULE ORAL 3 TIMES DAILY PRN
Qty: 20 CAPSULE | Refills: 0 | Status: SHIPPED | OUTPATIENT
Start: 2018-01-01 | End: 2018-01-01

## 2018-01-01 RX ORDER — CIPROFLOXACIN HYDROCHLORIDE 3.5 MG/ML
1 SOLUTION/ DROPS TOPICAL EVERY 4 HOURS
Qty: 2.5 ML | Refills: 0 | Status: SHIPPED | OUTPATIENT
Start: 2018-01-01 | End: 2018-01-01 | Stop reason: HOSPADM

## 2018-01-01 RX ORDER — IPRATROPIUM BROMIDE AND ALBUTEROL SULFATE 2.5; .5 MG/3ML; MG/3ML
3 SOLUTION RESPIRATORY (INHALATION)
Status: DISCONTINUED | OUTPATIENT
Start: 2018-01-01 | End: 2018-01-01 | Stop reason: HOSPADM

## 2018-01-01 RX ORDER — IPRATROPIUM BROMIDE AND ALBUTEROL SULFATE 2.5; .5 MG/3ML; MG/3ML
3 SOLUTION RESPIRATORY (INHALATION) EVERY 4 HOURS PRN
Status: DISCONTINUED | OUTPATIENT
Start: 2018-01-01 | End: 2018-01-01 | Stop reason: HOSPADM

## 2018-01-01 RX ORDER — CARVEDILOL 12.5 MG/1
12.5 TABLET ORAL EVERY 12 HOURS SCHEDULED
Start: 2018-01-01

## 2018-01-01 RX ORDER — WHITE PETROLATUM 450 MG/G
1 STICK TOPICAL
Status: DISCONTINUED | OUTPATIENT
Start: 2018-01-01 | End: 2018-01-01 | Stop reason: HOSPADM

## 2018-01-01 RX ORDER — VALSARTAN 80 MG/1
320 TABLET ORAL
Status: DISCONTINUED | OUTPATIENT
Start: 2018-01-01 | End: 2018-01-01 | Stop reason: HOSPADM

## 2018-01-01 RX ORDER — LIDOCAINE AND PRILOCAINE 25; 25 MG/G; MG/G
CREAM TOPICAL AS NEEDED
Status: DISCONTINUED | OUTPATIENT
Start: 2018-01-01 | End: 2018-01-01 | Stop reason: HOSPADM

## 2018-01-01 RX ORDER — LABETALOL HYDROCHLORIDE 5 MG/ML
10 INJECTION, SOLUTION INTRAVENOUS EVERY 4 HOURS PRN
Status: DISCONTINUED | OUTPATIENT
Start: 2018-01-01 | End: 2018-01-01 | Stop reason: HOSPADM

## 2018-01-01 RX ORDER — SODIUM CHLORIDE 9 MG/ML
30 INJECTION, SOLUTION INTRAVENOUS CONTINUOUS PRN
Status: DISCONTINUED | OUTPATIENT
Start: 2018-01-01 | End: 2018-01-01

## 2018-01-01 RX ORDER — POTASSIUM CHLORIDE 1.5 G/1.77G
20 POWDER, FOR SOLUTION ORAL ONCE
Status: COMPLETED | OUTPATIENT
Start: 2018-01-01 | End: 2018-01-01

## 2018-01-01 RX ORDER — AZITHROMYCIN 250 MG/1
250 TABLET, FILM COATED ORAL DAILY
Qty: 6 TABLET | Refills: 0 | Status: SHIPPED | OUTPATIENT
Start: 2018-01-01 | End: 2018-01-01

## 2018-01-01 RX ORDER — FAMOTIDINE 10 MG/ML
20 INJECTION, SOLUTION INTRAVENOUS DAILY
Start: 2018-01-01

## 2018-01-01 RX ORDER — CITALOPRAM 20 MG/1
10 TABLET ORAL NIGHTLY
Status: DISCONTINUED | OUTPATIENT
Start: 2018-01-01 | End: 2018-01-01 | Stop reason: HOSPADM

## 2018-01-01 RX ORDER — CEFUROXIME AXETIL 250 MG/1
500 TABLET ORAL 2 TIMES DAILY
Qty: 10 TABLET | Refills: 0 | Status: SHIPPED | OUTPATIENT
Start: 2018-01-01 | End: 2018-01-01

## 2018-01-01 RX ORDER — LORAZEPAM 2 MG/ML
INJECTION INTRAMUSCULAR
Status: COMPLETED
Start: 2018-01-01 | End: 2018-01-01

## 2018-01-01 RX ADMIN — Medication 1 TABLET: at 09:09

## 2018-01-01 RX ADMIN — VITAMIN D, TAB 1000IU (100/BT) 1000 UNITS: 25 TAB at 09:55

## 2018-01-01 RX ADMIN — CALCIUM GLUCONATE 1 G: 98 INJECTION, SOLUTION INTRAVENOUS at 01:26

## 2018-01-01 RX ADMIN — BUDESONIDE 0.5 MG: 0.5 INHALANT RESPIRATORY (INHALATION) at 07:04

## 2018-01-01 RX ADMIN — INSULIN DETEMIR 10 UNITS: 100 INJECTION, SOLUTION SUBCUTANEOUS at 22:02

## 2018-01-01 RX ADMIN — RENAGEL 800 MG: 800 TABLET ORAL at 18:17

## 2018-01-01 RX ADMIN — WHITE PETROLATUM 1 EACH: 450 STICK TOPICAL at 10:00

## 2018-01-01 RX ADMIN — HUMAN INSULIN 2 UNITS: 100 INJECTION, SOLUTION SUBCUTANEOUS at 12:00

## 2018-01-01 RX ADMIN — HEPARIN SODIUM 5000 UNITS: 5000 INJECTION, SOLUTION INTRAVENOUS; SUBCUTANEOUS at 06:28

## 2018-01-01 RX ADMIN — INSULIN DETEMIR 10 UNITS: 100 INJECTION, SOLUTION SUBCUTANEOUS at 21:37

## 2018-01-01 RX ADMIN — PHENYTOIN SODIUM 100 MG: 50 INJECTION INTRAMUSCULAR; INTRAVENOUS at 15:18

## 2018-01-01 RX ADMIN — ONDANSETRON 4 MG: 4 TABLET, FILM COATED ORAL at 02:38

## 2018-01-01 RX ADMIN — METHYLPREDNISOLONE SODIUM SUCCINATE 80 MG: 125 INJECTION, POWDER, FOR SOLUTION INTRAMUSCULAR; INTRAVENOUS at 00:15

## 2018-01-01 RX ADMIN — PHENYTOIN SODIUM 100 MG: 50 INJECTION INTRAMUSCULAR; INTRAVENOUS at 03:36

## 2018-01-01 RX ADMIN — DEXTROSE MONOHYDRATE 25 G: 25 INJECTION, SOLUTION INTRAVENOUS at 09:55

## 2018-01-01 RX ADMIN — WHITE PETROLATUM 1 EACH: 450 STICK TOPICAL at 06:41

## 2018-01-01 RX ADMIN — HEPARIN SODIUM 5000 UNITS: 5000 INJECTION, SOLUTION INTRAVENOUS; SUBCUTANEOUS at 05:57

## 2018-01-01 RX ADMIN — HUMAN INSULIN 6 UNITS: 100 INJECTION, SOLUTION SUBCUTANEOUS at 06:38

## 2018-01-01 RX ADMIN — IPRATROPIUM BROMIDE AND ALBUTEROL SULFATE 3 ML: .5; 3 SOLUTION RESPIRATORY (INHALATION) at 19:00

## 2018-01-01 RX ADMIN — IPRATROPIUM BROMIDE AND ALBUTEROL SULFATE 3 ML: .5; 3 SOLUTION RESPIRATORY (INHALATION) at 00:46

## 2018-01-01 RX ADMIN — FAMOTIDINE 20 MG: 10 INJECTION, SOLUTION INTRAVENOUS at 10:24

## 2018-01-01 RX ADMIN — CHLORHEXIDINE GLUCONATE 0.12% ORAL RINSE 15 ML: 1.2 LIQUID ORAL at 21:54

## 2018-01-01 RX ADMIN — LIDOCAINE AND PRILOCAINE: 25; 25 CREAM TOPICAL at 14:00

## 2018-01-01 RX ADMIN — AMLODIPINE BESYLATE 5 MG: 5 TABLET ORAL at 10:25

## 2018-01-01 RX ADMIN — DEXTROSE MONOHYDRATE 25 G: 25 INJECTION, SOLUTION INTRAVENOUS at 06:44

## 2018-01-01 RX ADMIN — INSULIN DETEMIR 15 UNITS: 100 INJECTION, SOLUTION SUBCUTANEOUS at 21:39

## 2018-01-01 RX ADMIN — VITAMIN D, TAB 1000IU (100/BT) 1000 UNITS: 25 TAB at 09:10

## 2018-01-01 RX ADMIN — HEPARIN SODIUM 5000 UNITS: 5000 INJECTION, SOLUTION INTRAVENOUS; SUBCUTANEOUS at 21:46

## 2018-01-01 RX ADMIN — IPRATROPIUM BROMIDE AND ALBUTEROL SULFATE 3 ML: .5; 3 SOLUTION RESPIRATORY (INHALATION) at 13:21

## 2018-01-01 RX ADMIN — IPRATROPIUM BROMIDE AND ALBUTEROL SULFATE 3 ML: .5; 3 SOLUTION RESPIRATORY (INHALATION) at 19:11

## 2018-01-01 RX ADMIN — LOSARTAN POTASSIUM 50 MG: 50 TABLET, FILM COATED ORAL at 08:48

## 2018-01-01 RX ADMIN — IPRATROPIUM BROMIDE AND ALBUTEROL SULFATE 3 ML: .5; 3 SOLUTION RESPIRATORY (INHALATION) at 13:50

## 2018-01-01 RX ADMIN — AZITHROMYCIN 500 MG: 500 INJECTION, POWDER, LYOPHILIZED, FOR SOLUTION INTRAVENOUS at 19:37

## 2018-01-01 RX ADMIN — CEFTRIAXONE 1 G: 1 INJECTION, POWDER, FOR SOLUTION INTRAMUSCULAR; INTRAVENOUS at 16:59

## 2018-01-01 RX ADMIN — IPRATROPIUM BROMIDE AND ALBUTEROL SULFATE 3 ML: .5; 3 SOLUTION RESPIRATORY (INHALATION) at 18:52

## 2018-01-01 RX ADMIN — Medication 1 TABLET: at 09:14

## 2018-01-01 RX ADMIN — ASPIRIN 81 MG: 81 TABLET, COATED ORAL at 09:02

## 2018-01-01 RX ADMIN — BUDESONIDE 0.5 MG: 0.5 INHALANT RESPIRATORY (INHALATION) at 19:00

## 2018-01-01 RX ADMIN — IPRATROPIUM BROMIDE AND ALBUTEROL SULFATE 3 ML: .5; 3 SOLUTION RESPIRATORY (INHALATION) at 00:44

## 2018-01-01 RX ADMIN — FAMOTIDINE 20 MG: 10 INJECTION INTRAVENOUS at 13:07

## 2018-01-01 RX ADMIN — METHYLPREDNISOLONE SODIUM SUCCINATE 80 MG: 125 INJECTION, POWDER, FOR SOLUTION INTRAMUSCULAR; INTRAVENOUS at 08:53

## 2018-01-01 RX ADMIN — ATORVASTATIN CALCIUM 10 MG: 10 TABLET, FILM COATED ORAL at 09:13

## 2018-01-01 RX ADMIN — Medication 1 TABLET: at 08:54

## 2018-01-01 RX ADMIN — BUDESONIDE 0.5 MG: 0.5 INHALANT RESPIRATORY (INHALATION) at 18:59

## 2018-01-01 RX ADMIN — IPRATROPIUM BROMIDE AND ALBUTEROL SULFATE 3 ML: .5; 3 SOLUTION RESPIRATORY (INHALATION) at 12:42

## 2018-01-01 RX ADMIN — ASPIRIN 81 MG: 81 TABLET, COATED ORAL at 08:52

## 2018-01-01 RX ADMIN — PHENYTOIN SODIUM 100 MG: 50 INJECTION INTRAMUSCULAR; INTRAVENOUS at 09:00

## 2018-01-01 RX ADMIN — CARVEDILOL 12.5 MG: 12.5 TABLET, FILM COATED ORAL at 08:53

## 2018-01-01 RX ADMIN — CITALOPRAM HYDROBROMIDE 10 MG: 20 TABLET, FILM COATED ORAL at 21:40

## 2018-01-01 RX ADMIN — IPRATROPIUM BROMIDE AND ALBUTEROL SULFATE 3 ML: .5; 3 SOLUTION RESPIRATORY (INHALATION) at 06:58

## 2018-01-01 RX ADMIN — HEPARIN SODIUM 5000 UNITS: 5000 INJECTION, SOLUTION INTRAVENOUS; SUBCUTANEOUS at 06:20

## 2018-01-01 RX ADMIN — IPRATROPIUM BROMIDE AND ALBUTEROL SULFATE 3 ML: .5; 3 SOLUTION RESPIRATORY (INHALATION) at 00:00

## 2018-01-01 RX ADMIN — VALSARTAN 320 MG: 80 TABLET ORAL at 08:31

## 2018-01-01 RX ADMIN — RENAGEL 800 MG: 800 TABLET ORAL at 09:55

## 2018-01-01 RX ADMIN — RENAGEL 800 MG: 800 TABLET ORAL at 18:21

## 2018-01-01 RX ADMIN — PHENYTOIN SODIUM 100 MG: 50 INJECTION INTRAMUSCULAR; INTRAVENOUS at 03:31

## 2018-01-01 RX ADMIN — CIPROFLOXACIN HYDROCHLORIDE 1 DROP: 3 SOLUTION/ DROPS OPHTHALMIC at 22:26

## 2018-01-01 RX ADMIN — LEVOTHYROXINE SODIUM 100 MCG: 100 TABLET ORAL at 06:38

## 2018-01-01 RX ADMIN — IPRATROPIUM BROMIDE AND ALBUTEROL SULFATE 3 ML: .5; 3 SOLUTION RESPIRATORY (INHALATION) at 12:47

## 2018-01-01 RX ADMIN — DEXTROSE MONOHYDRATE 50 ML/HR: 50 INJECTION, SOLUTION INTRAVENOUS at 07:29

## 2018-01-01 RX ADMIN — HUMAN INSULIN 5 UNITS: 100 INJECTION, SOLUTION SUBCUTANEOUS at 06:40

## 2018-01-01 RX ADMIN — HEPARIN SODIUM 5000 UNITS: 5000 INJECTION, SOLUTION INTRAVENOUS; SUBCUTANEOUS at 21:37

## 2018-01-01 RX ADMIN — PROPOFOL 50 MCG/KG/MIN: 10 INJECTION, EMULSION INTRAVENOUS at 03:29

## 2018-01-01 RX ADMIN — LABETALOL 20 MG/4 ML (5 MG/ML) INTRAVENOUS SYRINGE 10 MG: at 02:02

## 2018-01-01 RX ADMIN — PROPOFOL 50 MCG/KG/MIN: 10 INJECTION, EMULSION INTRAVENOUS at 22:15

## 2018-01-01 RX ADMIN — FLUORESCEIN SODIUM 1 STRIP: 0.6 STRIP OPHTHALMIC at 22:29

## 2018-01-01 RX ADMIN — CHLORHEXIDINE GLUCONATE 0.12% ORAL RINSE 15 ML: 1.2 LIQUID ORAL at 08:40

## 2018-01-01 RX ADMIN — ASPIRIN 81 MG: 81 TABLET, COATED ORAL at 08:54

## 2018-01-01 RX ADMIN — BUDESONIDE 0.5 MG: 0.5 INHALANT RESPIRATORY (INHALATION) at 19:16

## 2018-01-01 RX ADMIN — ASPIRIN 81 MG: 81 TABLET, COATED ORAL at 13:08

## 2018-01-01 RX ADMIN — INSULIN DETEMIR 3 UNITS: 100 INJECTION, SOLUTION SUBCUTANEOUS at 21:42

## 2018-01-01 RX ADMIN — MORPHINE SULFATE 1 MG: 2 INJECTION, SOLUTION INTRAMUSCULAR; INTRAVENOUS at 01:02

## 2018-01-01 RX ADMIN — HUMAN INSULIN 4 UNITS: 100 INJECTION, SOLUTION SUBCUTANEOUS at 00:56

## 2018-01-01 RX ADMIN — LOSARTAN POTASSIUM 50 MG: 50 TABLET, FILM COATED ORAL at 08:52

## 2018-01-01 RX ADMIN — Medication 1 TABLET: at 08:41

## 2018-01-01 RX ADMIN — CHLORHEXIDINE GLUCONATE 0.12% ORAL RINSE 15 ML: 1.2 LIQUID ORAL at 20:57

## 2018-01-01 RX ADMIN — Medication 1 TABLET: at 08:59

## 2018-01-01 RX ADMIN — PHENYTOIN SODIUM 100 MG: 50 INJECTION INTRAMUSCULAR; INTRAVENOUS at 08:36

## 2018-01-01 RX ADMIN — BUDESONIDE 0.5 MG: 0.5 INHALANT RESPIRATORY (INHALATION) at 18:51

## 2018-01-01 RX ADMIN — LEVOTHYROXINE SODIUM 175 MCG: 50 TABLET ORAL at 05:25

## 2018-01-01 RX ADMIN — VITAMIN D, TAB 1000IU (100/BT) 1000 UNITS: 25 TAB at 09:02

## 2018-01-01 RX ADMIN — VITAMIN D, TAB 1000IU (100/BT) 1000 UNITS: 25 TAB at 08:32

## 2018-01-01 RX ADMIN — ATORVASTATIN CALCIUM 10 MG: 10 TABLET, FILM COATED ORAL at 08:41

## 2018-01-01 RX ADMIN — FAMOTIDINE 20 MG: 10 INJECTION, SOLUTION INTRAVENOUS at 08:53

## 2018-01-01 RX ADMIN — LOSARTAN POTASSIUM 50 MG: 50 TABLET, FILM COATED ORAL at 08:40

## 2018-01-01 RX ADMIN — CHLORHEXIDINE GLUCONATE 0.12% ORAL RINSE 15 ML: 1.2 LIQUID ORAL at 08:54

## 2018-01-01 RX ADMIN — RENAGEL 800 MG: 800 TABLET ORAL at 14:45

## 2018-01-01 RX ADMIN — HEPARIN SODIUM 5000 UNITS: 5000 INJECTION, SOLUTION INTRAVENOUS; SUBCUTANEOUS at 21:14

## 2018-01-01 RX ADMIN — HEPARIN SODIUM 5000 UNITS: 5000 INJECTION, SOLUTION INTRAVENOUS; SUBCUTANEOUS at 05:40

## 2018-01-01 RX ADMIN — BUDESONIDE 0.5 MG: 0.5 INHALANT RESPIRATORY (INHALATION) at 07:14

## 2018-01-01 RX ADMIN — PROPOFOL 50 MCG/KG/MIN: 10 INJECTION, EMULSION INTRAVENOUS at 21:14

## 2018-01-01 RX ADMIN — CHLORHEXIDINE GLUCONATE 0.12% ORAL RINSE 15 ML: 1.2 LIQUID ORAL at 08:48

## 2018-01-01 RX ADMIN — PHENYTOIN SODIUM 100 MG: 50 INJECTION INTRAMUSCULAR; INTRAVENOUS at 03:42

## 2018-01-01 RX ADMIN — GUAIFENESIN 600 MG: 600 TABLET, EXTENDED RELEASE ORAL at 08:55

## 2018-01-01 RX ADMIN — IPRATROPIUM BROMIDE AND ALBUTEROL SULFATE 3 ML: .5; 3 SOLUTION RESPIRATORY (INHALATION) at 07:12

## 2018-01-01 RX ADMIN — SODIUM CHLORIDE 250 ML: 9 INJECTION, SOLUTION INTRAVENOUS at 12:31

## 2018-01-01 RX ADMIN — HEPARIN SODIUM 5000 UNITS: 5000 INJECTION, SOLUTION INTRAVENOUS; SUBCUTANEOUS at 05:39

## 2018-01-01 RX ADMIN — SODIUM CHLORIDE 125 ML/HR: 9 INJECTION, SOLUTION INTRAVENOUS at 08:44

## 2018-01-01 RX ADMIN — INSULIN ASPART 2 UNITS: 100 INJECTION, SOLUTION INTRAVENOUS; SUBCUTANEOUS at 06:37

## 2018-01-01 RX ADMIN — ONDANSETRON 4 MG: 2 INJECTION, SOLUTION INTRAMUSCULAR; INTRAVENOUS at 13:51

## 2018-01-01 RX ADMIN — PHENYTOIN SODIUM 100 MG: 50 INJECTION INTRAMUSCULAR; INTRAVENOUS at 04:10

## 2018-01-01 RX ADMIN — CARVEDILOL 12.5 MG: 12.5 TABLET, FILM COATED ORAL at 20:54

## 2018-01-01 RX ADMIN — DEXTROSE MONOHYDRATE 25 G: 25 INJECTION, SOLUTION INTRAVENOUS at 17:25

## 2018-01-01 RX ADMIN — LEVOTHYROXINE SODIUM 100 MCG: 100 TABLET ORAL at 06:00

## 2018-01-01 RX ADMIN — HUMAN INSULIN 3 UNITS: 100 INJECTION, SOLUTION SUBCUTANEOUS at 18:15

## 2018-01-01 RX ADMIN — LORAZEPAM 2 MG: 2 INJECTION INTRAMUSCULAR; INTRAVENOUS at 00:57

## 2018-01-01 RX ADMIN — ATORVASTATIN CALCIUM 10 MG: 10 TABLET, FILM COATED ORAL at 08:54

## 2018-01-01 RX ADMIN — ATORVASTATIN CALCIUM 10 MG: 10 TABLET, FILM COATED ORAL at 09:55

## 2018-01-01 RX ADMIN — INSULIN DETEMIR 10 UNITS: 100 INJECTION, SOLUTION SUBCUTANEOUS at 21:20

## 2018-01-01 RX ADMIN — RENAGEL 800 MG: 800 TABLET ORAL at 14:57

## 2018-01-01 RX ADMIN — BUDESONIDE 0.5 MG: 0.5 INHALANT RESPIRATORY (INHALATION) at 07:00

## 2018-01-01 RX ADMIN — Medication 1 TABLET: at 10:24

## 2018-01-01 RX ADMIN — PHENYTOIN SODIUM 100 MG: 50 INJECTION INTRAMUSCULAR; INTRAVENOUS at 18:51

## 2018-01-01 RX ADMIN — HEPARIN SODIUM 5000 UNITS: 5000 INJECTION, SOLUTION INTRAVENOUS; SUBCUTANEOUS at 16:06

## 2018-01-01 RX ADMIN — CHLORHEXIDINE GLUCONATE 0.12% ORAL RINSE 15 ML: 1.2 LIQUID ORAL at 21:40

## 2018-01-01 RX ADMIN — ASPIRIN 81 MG: 81 TABLET, COATED ORAL at 09:55

## 2018-01-01 RX ADMIN — ASPIRIN 81 MG: 81 TABLET, COATED ORAL at 08:48

## 2018-01-01 RX ADMIN — HEPARIN SODIUM 5000 UNITS: 5000 INJECTION, SOLUTION INTRAVENOUS; SUBCUTANEOUS at 14:45

## 2018-01-01 RX ADMIN — BUDESONIDE 0.5 MG: 0.5 INHALANT RESPIRATORY (INHALATION) at 07:02

## 2018-01-01 RX ADMIN — ATORVASTATIN CALCIUM 10 MG: 10 TABLET, FILM COATED ORAL at 08:31

## 2018-01-01 RX ADMIN — LEVOTHYROXINE SODIUM 100 MCG: 100 TABLET ORAL at 06:37

## 2018-01-01 RX ADMIN — Medication 1 TABLET: at 08:32

## 2018-01-01 RX ADMIN — DEXTROSE MONOHYDRATE 25 G: 25 INJECTION, SOLUTION INTRAVENOUS at 06:09

## 2018-01-01 RX ADMIN — VITAMIN D, TAB 1000IU (100/BT) 1000 UNITS: 25 TAB at 08:52

## 2018-01-01 RX ADMIN — HEPARIN SODIUM 5000 UNITS: 5000 INJECTION, SOLUTION INTRAVENOUS; SUBCUTANEOUS at 21:00

## 2018-01-01 RX ADMIN — CHLORHEXIDINE GLUCONATE 0.12% ORAL RINSE 15 ML: 1.2 LIQUID ORAL at 20:54

## 2018-01-01 RX ADMIN — HUMAN INSULIN 4 UNITS: 100 INJECTION, SOLUTION SUBCUTANEOUS at 00:32

## 2018-01-01 RX ADMIN — LIDOCAINE AND PRILOCAINE 1 APPLICATION: 25; 25 CREAM TOPICAL at 17:40

## 2018-01-01 RX ADMIN — CARVEDILOL 12.5 MG: 12.5 TABLET, FILM COATED ORAL at 08:35

## 2018-01-01 RX ADMIN — VITAMIN D, TAB 1000IU (100/BT) 1000 UNITS: 25 TAB at 08:51

## 2018-01-01 RX ADMIN — INSULIN ASPART 6 UNITS: 100 INJECTION, SOLUTION INTRAVENOUS; SUBCUTANEOUS at 21:40

## 2018-01-01 RX ADMIN — INSULIN DETEMIR 10 UNITS: 100 INJECTION, SOLUTION SUBCUTANEOUS at 08:42

## 2018-01-01 RX ADMIN — INSULIN DETEMIR 10 UNITS: 100 INJECTION, SOLUTION SUBCUTANEOUS at 20:53

## 2018-01-01 RX ADMIN — IPRATROPIUM BROMIDE AND ALBUTEROL SULFATE 3 ML: .5; 3 SOLUTION RESPIRATORY (INHALATION) at 01:24

## 2018-01-01 RX ADMIN — SODIUM CHLORIDE 30 ML/HR: 9 INJECTION, SOLUTION INTRAVENOUS at 03:17

## 2018-01-01 RX ADMIN — AMLODIPINE BESYLATE 5 MG: 5 TABLET ORAL at 08:48

## 2018-01-01 RX ADMIN — PHENYTOIN SODIUM 100 MG: 50 INJECTION INTRAMUSCULAR; INTRAVENOUS at 03:29

## 2018-01-01 RX ADMIN — HEPARIN SODIUM 5000 UNITS: 5000 INJECTION, SOLUTION INTRAVENOUS; SUBCUTANEOUS at 06:38

## 2018-01-01 RX ADMIN — INSULIN DETEMIR 10 UNITS: 100 INJECTION, SOLUTION SUBCUTANEOUS at 18:36

## 2018-01-01 RX ADMIN — IPRATROPIUM BROMIDE AND ALBUTEROL SULFATE 3 ML: .5; 3 SOLUTION RESPIRATORY (INHALATION) at 06:39

## 2018-01-01 RX ADMIN — HEPARIN SODIUM 5000 UNITS: 5000 INJECTION, SOLUTION INTRAVENOUS; SUBCUTANEOUS at 05:16

## 2018-01-01 RX ADMIN — AMLODIPINE BESYLATE 5 MG: 5 TABLET ORAL at 09:30

## 2018-01-01 RX ADMIN — HEPARIN SODIUM 5000 UNITS: 5000 INJECTION, SOLUTION INTRAVENOUS; SUBCUTANEOUS at 15:18

## 2018-01-01 RX ADMIN — HUMAN INSULIN 10 UNITS: 100 INJECTION, SOLUTION SUBCUTANEOUS at 09:17

## 2018-01-01 RX ADMIN — RENAGEL 800 MG: 800 TABLET ORAL at 09:09

## 2018-01-01 RX ADMIN — HUMAN INSULIN 3 UNITS: 100 INJECTION, SOLUTION SUBCUTANEOUS at 06:20

## 2018-01-01 RX ADMIN — VITAMIN D, TAB 1000IU (100/BT) 1000 UNITS: 25 TAB at 08:35

## 2018-01-01 RX ADMIN — FAMOTIDINE 20 MG: 10 INJECTION, SOLUTION INTRAVENOUS at 08:59

## 2018-01-01 RX ADMIN — RENAGEL 800 MG: 800 TABLET ORAL at 08:41

## 2018-01-01 RX ADMIN — IPRATROPIUM BROMIDE AND ALBUTEROL SULFATE 3 ML: .5; 3 SOLUTION RESPIRATORY (INHALATION) at 07:00

## 2018-01-01 RX ADMIN — PROPOFOL 50 MCG/KG/MIN: 10 INJECTION, EMULSION INTRAVENOUS at 18:16

## 2018-01-01 RX ADMIN — Medication 1 TABLET: at 09:55

## 2018-01-01 RX ADMIN — IPRATROPIUM BROMIDE AND ALBUTEROL SULFATE 3 ML: .5; 3 SOLUTION RESPIRATORY (INHALATION) at 13:15

## 2018-01-01 RX ADMIN — IPRATROPIUM BROMIDE AND ALBUTEROL SULFATE 3 ML: .5; 3 SOLUTION RESPIRATORY (INHALATION) at 18:59

## 2018-01-01 RX ADMIN — CARVEDILOL 12.5 MG: 12.5 TABLET, FILM COATED ORAL at 21:54

## 2018-01-01 RX ADMIN — FLUMAZENIL 0.2 MG: 0.1 INJECTION, SOLUTION INTRAVENOUS at 15:02

## 2018-01-01 RX ADMIN — CHLORHEXIDINE GLUCONATE 0.12% ORAL RINSE 15 ML: 1.2 LIQUID ORAL at 09:09

## 2018-01-01 RX ADMIN — DEXTROSE MONOHYDRATE 25 G: 25 INJECTION, SOLUTION INTRAVENOUS at 05:44

## 2018-01-01 RX ADMIN — RENAGEL 800 MG: 800 TABLET ORAL at 17:43

## 2018-01-01 RX ADMIN — IPRATROPIUM BROMIDE AND ALBUTEROL SULFATE 3 ML: .5; 3 SOLUTION RESPIRATORY (INHALATION) at 19:04

## 2018-01-01 RX ADMIN — ASPIRIN 81 MG: 81 TABLET, COATED ORAL at 08:32

## 2018-01-01 RX ADMIN — IPRATROPIUM BROMIDE AND ALBUTEROL SULFATE 3 ML: .5; 3 SOLUTION RESPIRATORY (INHALATION) at 07:14

## 2018-01-01 RX ADMIN — LOSARTAN POTASSIUM 50 MG: 50 TABLET, FILM COATED ORAL at 09:55

## 2018-01-01 RX ADMIN — CARVEDILOL 25 MG: 25 TABLET, FILM COATED ORAL at 08:32

## 2018-01-01 RX ADMIN — IPRATROPIUM BROMIDE AND ALBUTEROL SULFATE 3 ML: .5; 3 SOLUTION RESPIRATORY (INHALATION) at 00:41

## 2018-01-01 RX ADMIN — GUAIFENESIN 600 MG: 600 TABLET, EXTENDED RELEASE ORAL at 20:38

## 2018-01-01 RX ADMIN — PROPOFOL 50 MCG/KG/MIN: 10 INJECTION, EMULSION INTRAVENOUS at 18:39

## 2018-01-01 RX ADMIN — SODIUM CHLORIDE 20 UNITS/HR: 9 INJECTION, SOLUTION INTRAVENOUS at 09:00

## 2018-01-01 RX ADMIN — IPRATROPIUM BROMIDE AND ALBUTEROL SULFATE 3 ML: .5; 3 SOLUTION RESPIRATORY (INHALATION) at 00:36

## 2018-01-01 RX ADMIN — HEPARIN SODIUM 5000 UNITS: 5000 INJECTION, SOLUTION INTRAVENOUS; SUBCUTANEOUS at 06:40

## 2018-01-01 RX ADMIN — PHENYTOIN SODIUM 100 MG: 50 INJECTION INTRAMUSCULAR; INTRAVENOUS at 08:53

## 2018-01-01 RX ADMIN — BUDESONIDE 0.5 MG: 0.5 INHALANT RESPIRATORY (INHALATION) at 19:08

## 2018-01-01 RX ADMIN — ROCURONIUM BROMIDE 80 MG: 10 INJECTION INTRAVENOUS at 01:21

## 2018-01-01 RX ADMIN — ATORVASTATIN CALCIUM 10 MG: 10 TABLET, FILM COATED ORAL at 09:10

## 2018-01-01 RX ADMIN — CARVEDILOL 3.12 MG: 6.25 TABLET, FILM COATED ORAL at 20:57

## 2018-01-01 RX ADMIN — CHLORHEXIDINE GLUCONATE 0.12% ORAL RINSE 15 ML: 1.2 LIQUID ORAL at 10:24

## 2018-01-01 RX ADMIN — IPRATROPIUM BROMIDE AND ALBUTEROL SULFATE 3 ML: .5; 3 SOLUTION RESPIRATORY (INHALATION) at 13:27

## 2018-01-01 RX ADMIN — FLUMAZENIL 0.2 MG: 0.1 INJECTION, SOLUTION INTRAVENOUS at 14:45

## 2018-01-01 RX ADMIN — BUDESONIDE 0.5 MG: 0.5 INHALANT RESPIRATORY (INHALATION) at 07:12

## 2018-01-01 RX ADMIN — LEVOTHYROXINE SODIUM 100 MCG: 100 TABLET ORAL at 06:03

## 2018-01-01 RX ADMIN — Medication 1 TABLET: at 08:51

## 2018-01-01 RX ADMIN — HUMAN INSULIN 8 UNITS: 100 INJECTION, SOLUTION SUBCUTANEOUS at 00:14

## 2018-01-01 RX ADMIN — CARVEDILOL 12.5 MG: 12.5 TABLET, FILM COATED ORAL at 21:23

## 2018-01-01 RX ADMIN — HUMAN INSULIN 6 UNITS: 100 INJECTION, SOLUTION SUBCUTANEOUS at 23:37

## 2018-01-01 RX ADMIN — RENAGEL 800 MG: 800 TABLET ORAL at 17:27

## 2018-01-01 RX ADMIN — HUMAN INSULIN 2 UNITS: 100 INJECTION, SOLUTION SUBCUTANEOUS at 17:34

## 2018-01-01 RX ADMIN — METHYLPREDNISOLONE SODIUM SUCCINATE 125 MG: 125 INJECTION, POWDER, FOR SOLUTION INTRAMUSCULAR; INTRAVENOUS at 17:01

## 2018-01-01 RX ADMIN — HUMAN INSULIN 2 UNITS: 100 INJECTION, SOLUTION SUBCUTANEOUS at 06:03

## 2018-01-01 RX ADMIN — ATORVASTATIN CALCIUM 10 MG: 10 TABLET, FILM COATED ORAL at 09:30

## 2018-01-01 RX ADMIN — IPRATROPIUM BROMIDE AND ALBUTEROL SULFATE 3 ML: .5; 3 SOLUTION RESPIRATORY (INHALATION) at 19:14

## 2018-01-01 RX ADMIN — INSULIN DETEMIR 10 UNITS: 100 INJECTION, SOLUTION SUBCUTANEOUS at 21:25

## 2018-01-01 RX ADMIN — ASPIRIN 81 MG: 81 TABLET, COATED ORAL at 08:42

## 2018-01-01 RX ADMIN — PHENYTOIN SODIUM 100 MG: 50 INJECTION INTRAMUSCULAR; INTRAVENOUS at 16:05

## 2018-01-01 RX ADMIN — HUMAN INSULIN 4 UNITS: 100 INJECTION, SOLUTION SUBCUTANEOUS at 11:43

## 2018-01-01 RX ADMIN — BUDESONIDE 0.5 MG: 0.5 INHALANT RESPIRATORY (INHALATION) at 19:18

## 2018-01-01 RX ADMIN — BUDESONIDE 0.5 MG: 0.5 INHALANT RESPIRATORY (INHALATION) at 07:01

## 2018-01-01 RX ADMIN — DOXYCYCLINE 100 MG: 100 CAPSULE ORAL at 13:54

## 2018-01-01 RX ADMIN — IPRATROPIUM BROMIDE AND ALBUTEROL SULFATE 3 ML: .5; 3 SOLUTION RESPIRATORY (INHALATION) at 01:11

## 2018-01-01 RX ADMIN — IPRATROPIUM BROMIDE AND ALBUTEROL SULFATE 3 ML: .5; 3 SOLUTION RESPIRATORY (INHALATION) at 19:18

## 2018-01-01 RX ADMIN — PHENYTOIN SODIUM 100 MG: 50 INJECTION INTRAMUSCULAR; INTRAVENOUS at 20:54

## 2018-01-01 RX ADMIN — AMLODIPINE BESYLATE 5 MG: 5 TABLET ORAL at 08:41

## 2018-01-01 RX ADMIN — VITAMIN D, TAB 1000IU (100/BT) 1000 UNITS: 25 TAB at 09:13

## 2018-01-01 RX ADMIN — AMLODIPINE BESYLATE 5 MG: 5 TABLET ORAL at 09:02

## 2018-01-01 RX ADMIN — DEXTROSE MONOHYDRATE 25 G: 25 INJECTION, SOLUTION INTRAVENOUS at 00:19

## 2018-01-01 RX ADMIN — HEPARIN SODIUM 5000 UNITS: 5000 INJECTION, SOLUTION INTRAVENOUS; SUBCUTANEOUS at 21:25

## 2018-01-01 RX ADMIN — CARVEDILOL 25 MG: 25 TABLET, FILM COATED ORAL at 17:01

## 2018-01-01 RX ADMIN — CHLORHEXIDINE GLUCONATE 0.12% ORAL RINSE 15 ML: 1.2 LIQUID ORAL at 21:06

## 2018-01-01 RX ADMIN — HUMAN INSULIN 5 UNITS: 100 INJECTION, SOLUTION SUBCUTANEOUS at 13:52

## 2018-01-01 RX ADMIN — SODIUM CHLORIDE 50 ML/HR: 9 INJECTION, SOLUTION INTRAVENOUS at 13:52

## 2018-01-01 RX ADMIN — CHLORHEXIDINE GLUCONATE 0.12% ORAL RINSE 15 ML: 1.2 LIQUID ORAL at 21:23

## 2018-01-01 RX ADMIN — PHENYTOIN SODIUM 100 MG: 50 INJECTION INTRAMUSCULAR; INTRAVENOUS at 02:23

## 2018-01-01 RX ADMIN — CITALOPRAM HYDROBROMIDE 10 MG: 20 TABLET, FILM COATED ORAL at 22:01

## 2018-01-01 RX ADMIN — IPRATROPIUM BROMIDE AND ALBUTEROL SULFATE 3 ML: .5; 3 SOLUTION RESPIRATORY (INHALATION) at 07:04

## 2018-01-01 RX ADMIN — BUDESONIDE 0.5 MG: 0.5 INHALANT RESPIRATORY (INHALATION) at 06:59

## 2018-01-01 RX ADMIN — PHENYTOIN SODIUM 100 MG: 50 INJECTION INTRAMUSCULAR; INTRAVENOUS at 15:19

## 2018-01-01 RX ADMIN — VITAMIN D, TAB 1000IU (100/BT) 1000 UNITS: 25 TAB at 14:56

## 2018-01-01 RX ADMIN — ETOMIDATE 20 MG: 20 INJECTION, SOLUTION INTRAVENOUS at 01:20

## 2018-01-01 RX ADMIN — IPRATROPIUM BROMIDE AND ALBUTEROL SULFATE 3 ML: .5; 3 SOLUTION RESPIRATORY (INHALATION) at 08:03

## 2018-01-01 RX ADMIN — ATORVASTATIN CALCIUM 10 MG: 10 TABLET, FILM COATED ORAL at 09:02

## 2018-01-01 RX ADMIN — BUDESONIDE 0.5 MG: 0.5 INHALANT RESPIRATORY (INHALATION) at 19:04

## 2018-01-01 RX ADMIN — IPRATROPIUM BROMIDE AND ALBUTEROL SULFATE 3 ML: .5; 3 SOLUTION RESPIRATORY (INHALATION) at 18:51

## 2018-01-01 RX ADMIN — IPRATROPIUM BROMIDE AND ALBUTEROL SULFATE 3 ML: .5; 3 SOLUTION RESPIRATORY (INHALATION) at 06:50

## 2018-01-01 RX ADMIN — RENAGEL 800 MG: 800 TABLET ORAL at 08:59

## 2018-01-01 RX ADMIN — PHENYTOIN SODIUM 100 MG: 50 INJECTION INTRAMUSCULAR; INTRAVENOUS at 11:32

## 2018-01-01 RX ADMIN — VITAMIN D, TAB 1000IU (100/BT) 1000 UNITS: 25 TAB at 09:30

## 2018-01-01 RX ADMIN — LOSARTAN POTASSIUM 50 MG: 50 TABLET, FILM COATED ORAL at 09:10

## 2018-01-01 RX ADMIN — HEPARIN SODIUM 5000 UNITS: 5000 INJECTION, SOLUTION INTRAVENOUS; SUBCUTANEOUS at 21:19

## 2018-01-01 RX ADMIN — FAMOTIDINE 20 MG: 10 INJECTION, SOLUTION INTRAVENOUS at 09:55

## 2018-01-01 RX ADMIN — CARVEDILOL 3.12 MG: 6.25 TABLET, FILM COATED ORAL at 15:08

## 2018-01-01 RX ADMIN — CITALOPRAM HYDROBROMIDE 10 MG: 20 TABLET, FILM COATED ORAL at 21:27

## 2018-01-01 RX ADMIN — CARVEDILOL 12.5 MG: 12.5 TABLET, FILM COATED ORAL at 10:25

## 2018-01-01 RX ADMIN — CHLORHEXIDINE GLUCONATE 0.12% ORAL RINSE 15 ML: 1.2 LIQUID ORAL at 08:35

## 2018-01-01 RX ADMIN — GUAIFENESIN 600 MG: 600 TABLET, EXTENDED RELEASE ORAL at 20:56

## 2018-01-01 RX ADMIN — AMLODIPINE BESYLATE 5 MG: 5 TABLET ORAL at 15:13

## 2018-01-01 RX ADMIN — CHLORHEXIDINE GLUCONATE 0.12% ORAL RINSE 15 ML: 1.2 LIQUID ORAL at 21:20

## 2018-01-01 RX ADMIN — CARVEDILOL 12.5 MG: 12.5 TABLET, FILM COATED ORAL at 09:13

## 2018-01-01 RX ADMIN — IPRATROPIUM BROMIDE AND ALBUTEROL SULFATE 3 ML: .5; 3 SOLUTION RESPIRATORY (INHALATION) at 07:15

## 2018-01-01 RX ADMIN — LEVOTHYROXINE SODIUM 100 MCG: 100 TABLET ORAL at 06:40

## 2018-01-01 RX ADMIN — PHENYTOIN SODIUM 100 MG: 50 INJECTION INTRAMUSCULAR; INTRAVENOUS at 21:54

## 2018-01-01 RX ADMIN — FAMOTIDINE 20 MG: 10 INJECTION, SOLUTION INTRAVENOUS at 09:12

## 2018-01-01 RX ADMIN — PHENYTOIN SODIUM 100 MG: 50 INJECTION INTRAMUSCULAR; INTRAVENOUS at 15:06

## 2018-01-01 RX ADMIN — Medication 1 TABLET: at 08:48

## 2018-01-01 RX ADMIN — ASPIRIN 81 MG: 81 TABLET, COATED ORAL at 09:10

## 2018-01-01 RX ADMIN — CARVEDILOL 12.5 MG: 12.5 TABLET, FILM COATED ORAL at 08:48

## 2018-01-01 RX ADMIN — HUMAN INSULIN 6 UNITS: 100 INJECTION, SOLUTION SUBCUTANEOUS at 16:48

## 2018-01-01 RX ADMIN — HEPARIN SODIUM 5000 UNITS: 5000 INJECTION, SOLUTION INTRAVENOUS; SUBCUTANEOUS at 06:37

## 2018-01-01 RX ADMIN — ATORVASTATIN CALCIUM 10 MG: 10 TABLET, FILM COATED ORAL at 08:52

## 2018-01-01 RX ADMIN — CEFTRIAXONE 1 G: 1 INJECTION, SOLUTION INTRAVENOUS at 13:46

## 2018-01-01 RX ADMIN — VITAMIN D, TAB 1000IU (100/BT) 1000 UNITS: 25 TAB at 08:48

## 2018-01-01 RX ADMIN — ASPIRIN 81 MG: 81 TABLET, COATED ORAL at 10:25

## 2018-01-01 RX ADMIN — INSULIN DETEMIR 10 UNITS: 100 INJECTION, SOLUTION SUBCUTANEOUS at 08:58

## 2018-01-01 RX ADMIN — IPRATROPIUM BROMIDE AND ALBUTEROL SULFATE 3 ML: .5; 3 SOLUTION RESPIRATORY (INHALATION) at 01:00

## 2018-01-01 RX ADMIN — IPRATROPIUM BROMIDE AND ALBUTEROL SULFATE 3 ML: .5; 3 SOLUTION RESPIRATORY (INHALATION) at 06:47

## 2018-01-01 RX ADMIN — HEPARIN SODIUM 5000 UNITS: 5000 INJECTION, SOLUTION INTRAVENOUS; SUBCUTANEOUS at 15:33

## 2018-01-01 RX ADMIN — PHENYTOIN SODIUM 100 MG: 50 INJECTION INTRAMUSCULAR; INTRAVENOUS at 16:28

## 2018-01-01 RX ADMIN — IPRATROPIUM BROMIDE AND ALBUTEROL SULFATE 3 ML: .5; 3 SOLUTION RESPIRATORY (INHALATION) at 13:29

## 2018-01-01 RX ADMIN — LEVOTHYROXINE SODIUM 100 MCG: 100 TABLET ORAL at 06:09

## 2018-01-01 RX ADMIN — GUAIFENESIN 600 MG: 600 TABLET, EXTENDED RELEASE ORAL at 13:08

## 2018-01-01 RX ADMIN — AMLODIPINE BESYLATE 5 MG: 5 TABLET ORAL at 09:13

## 2018-01-01 RX ADMIN — LEVOTHYROXINE SODIUM 175 MCG: 50 TABLET ORAL at 06:06

## 2018-01-01 RX ADMIN — DEXTROSE MONOHYDRATE 25 G: 25 INJECTION, SOLUTION INTRAVENOUS at 14:55

## 2018-01-01 RX ADMIN — MORPHINE SULFATE 1 MG: 2 INJECTION, SOLUTION INTRAMUSCULAR; INTRAVENOUS at 16:00

## 2018-01-01 RX ADMIN — SODIUM CHLORIDE 30 ML/HR: 9 INJECTION, SOLUTION INTRAVENOUS at 05:57

## 2018-01-01 RX ADMIN — PHENYTOIN SODIUM 100 MG: 50 INJECTION INTRAMUSCULAR; INTRAVENOUS at 21:23

## 2018-01-01 RX ADMIN — LIDOCAINE AND PRILOCAINE 1 APPLICATION: 25; 25 CREAM TOPICAL at 09:15

## 2018-01-01 RX ADMIN — IPRATROPIUM BROMIDE AND ALBUTEROL SULFATE 3 ML: .5; 3 SOLUTION RESPIRATORY (INHALATION) at 07:01

## 2018-01-01 RX ADMIN — IPRATROPIUM BROMIDE AND ALBUTEROL SULFATE 3 ML: .5; 3 SOLUTION RESPIRATORY (INHALATION) at 07:53

## 2018-01-01 RX ADMIN — HEPARIN SODIUM 5000 UNITS: 5000 INJECTION, SOLUTION INTRAVENOUS; SUBCUTANEOUS at 21:05

## 2018-01-01 RX ADMIN — PHENYTOIN SODIUM 100 MG: 50 INJECTION INTRAMUSCULAR; INTRAVENOUS at 15:53

## 2018-01-01 RX ADMIN — TETRACAINE HYDROCHLORIDE 2 DROP: 5 SOLUTION OPHTHALMIC at 22:29

## 2018-01-01 RX ADMIN — INSULIN DETEMIR 10 UNITS: 100 INJECTION, SOLUTION SUBCUTANEOUS at 21:15

## 2018-01-01 RX ADMIN — HUMAN INSULIN 2 UNITS: 100 INJECTION, SOLUTION SUBCUTANEOUS at 13:03

## 2018-01-01 RX ADMIN — FAMOTIDINE 20 MG: 10 INJECTION, SOLUTION INTRAVENOUS at 08:41

## 2018-01-01 RX ADMIN — PHENYTOIN SODIUM 100 MG: 50 INJECTION INTRAMUSCULAR; INTRAVENOUS at 08:41

## 2018-01-01 RX ADMIN — Medication 10 ML: at 08:55

## 2018-01-01 RX ADMIN — HEPARIN SODIUM 5000 UNITS: 5000 INJECTION, SOLUTION INTRAVENOUS; SUBCUTANEOUS at 14:57

## 2018-01-01 RX ADMIN — INSULIN ASPART 7 UNITS: 100 INJECTION, SOLUTION INTRAVENOUS; SUBCUTANEOUS at 17:00

## 2018-01-01 RX ADMIN — INSULIN ASPART 5 UNITS: 100 INJECTION, SOLUTION INTRAVENOUS; SUBCUTANEOUS at 18:22

## 2018-01-01 RX ADMIN — PHENYTOIN SODIUM 100 MG: 50 INJECTION INTRAMUSCULAR; INTRAVENOUS at 10:24

## 2018-01-01 RX ADMIN — INSULIN DETEMIR 10 UNITS: 100 INJECTION, SOLUTION SUBCUTANEOUS at 09:10

## 2018-01-01 RX ADMIN — PROPOFOL 50 MCG/KG/MIN: 10 INJECTION, EMULSION INTRAVENOUS at 23:33

## 2018-01-01 RX ADMIN — METHYLPREDNISOLONE SODIUM SUCCINATE 40 MG: 40 INJECTION, POWDER, FOR SOLUTION INTRAMUSCULAR; INTRAVENOUS at 00:45

## 2018-01-01 RX ADMIN — RENAGEL 800 MG: 800 TABLET ORAL at 18:51

## 2018-01-01 RX ADMIN — CARVEDILOL 25 MG: 25 TABLET, FILM COATED ORAL at 17:57

## 2018-01-01 RX ADMIN — AMLODIPINE BESYLATE 5 MG: 5 TABLET ORAL at 08:52

## 2018-01-01 RX ADMIN — ONDANSETRON 4 MG: 2 INJECTION INTRAMUSCULAR; INTRAVENOUS at 16:06

## 2018-01-01 RX ADMIN — ONDANSETRON 4 MG: 2 INJECTION, SOLUTION INTRAMUSCULAR; INTRAVENOUS at 19:50

## 2018-01-01 RX ADMIN — ATORVASTATIN CALCIUM 10 MG: 10 TABLET, FILM COATED ORAL at 08:48

## 2018-01-01 RX ADMIN — PROPOFOL 50 MCG/KG/MIN: 10 INJECTION, EMULSION INTRAVENOUS at 09:56

## 2018-01-01 RX ADMIN — BUDESONIDE 0.5 MG: 0.5 INHALANT RESPIRATORY (INHALATION) at 13:15

## 2018-01-01 RX ADMIN — INSULIN ASPART 3 UNITS: 100 INJECTION, SOLUTION INTRAVENOUS; SUBCUTANEOUS at 11:54

## 2018-01-01 RX ADMIN — INSULIN ASPART 10 UNITS: 100 INJECTION, SOLUTION INTRAVENOUS; SUBCUTANEOUS at 21:36

## 2018-01-01 RX ADMIN — SODIUM CHLORIDE 125 ML/HR: 9 INJECTION, SOLUTION INTRAVENOUS at 12:00

## 2018-01-01 RX ADMIN — PHENYTOIN SODIUM 100 MG: 50 INJECTION INTRAMUSCULAR; INTRAVENOUS at 04:00

## 2018-01-01 RX ADMIN — FAMOTIDINE 20 MG: 10 INJECTION, SOLUTION INTRAVENOUS at 09:10

## 2018-01-01 RX ADMIN — BUDESONIDE 0.5 MG: 0.5 INHALANT RESPIRATORY (INHALATION) at 19:11

## 2018-01-01 RX ADMIN — IPRATROPIUM BROMIDE AND ALBUTEROL SULFATE 3 ML: .5; 3 SOLUTION RESPIRATORY (INHALATION) at 12:30

## 2018-01-01 RX ADMIN — PROPOFOL 35 MCG/KG/MIN: 10 INJECTION, EMULSION INTRAVENOUS at 14:10

## 2018-01-01 RX ADMIN — ASPIRIN 81 MG: 81 TABLET, COATED ORAL at 08:35

## 2018-01-01 RX ADMIN — Medication 1 TABLET: at 14:57

## 2018-01-01 RX ADMIN — PHENYTOIN SODIUM 100 MG: 50 INJECTION INTRAMUSCULAR; INTRAVENOUS at 03:08

## 2018-01-01 RX ADMIN — LIDOCAINE AND PRILOCAINE: 25; 25 CREAM TOPICAL at 08:43

## 2018-01-01 RX ADMIN — HEPARIN SODIUM 5000 UNITS: 5000 INJECTION, SOLUTION INTRAVENOUS; SUBCUTANEOUS at 15:09

## 2018-01-01 RX ADMIN — DEXTROSE MONOHYDRATE 50 ML/HR: 50 INJECTION, SOLUTION INTRAVENOUS at 19:24

## 2018-01-01 RX ADMIN — CARVEDILOL 25 MG: 25 TABLET, FILM COATED ORAL at 09:29

## 2018-01-01 RX ADMIN — PHENYTOIN SODIUM 1000 MG: 50 INJECTION INTRAMUSCULAR; INTRAVENOUS at 01:50

## 2018-01-01 RX ADMIN — IPRATROPIUM BROMIDE AND ALBUTEROL SULFATE 3 ML: .5; 3 SOLUTION RESPIRATORY (INHALATION) at 00:04

## 2018-01-01 RX ADMIN — LEVOTHYROXINE SODIUM 100 MCG: 100 TABLET ORAL at 06:28

## 2018-01-01 RX ADMIN — LOSARTAN POTASSIUM 50 MG: 50 TABLET, FILM COATED ORAL at 09:00

## 2018-01-01 RX ADMIN — CARVEDILOL 12.5 MG: 12.5 TABLET, FILM COATED ORAL at 09:01

## 2018-01-01 RX ADMIN — BUDESONIDE 0.5 MG: 0.5 INHALANT RESPIRATORY (INHALATION) at 19:14

## 2018-01-01 RX ADMIN — RENAGEL 800 MG: 800 TABLET ORAL at 17:00

## 2018-01-01 RX ADMIN — LEVOTHYROXINE SODIUM 100 MCG: 100 TABLET ORAL at 05:40

## 2018-01-01 RX ADMIN — LORAZEPAM 2 MG: 2 INJECTION INTRAMUSCULAR; INTRAVENOUS at 16:06

## 2018-01-01 RX ADMIN — CARVEDILOL 3.12 MG: 6.25 TABLET, FILM COATED ORAL at 09:10

## 2018-01-01 RX ADMIN — ACETAMINOPHEN 650 MG: 325 TABLET, FILM COATED ORAL at 09:32

## 2018-01-01 RX ADMIN — DEXTROSE MONOHYDRATE 25 G: 25 INJECTION, SOLUTION INTRAVENOUS at 05:16

## 2018-01-01 RX ADMIN — LIDOCAINE AND PRILOCAINE: 25; 25 CREAM TOPICAL at 08:00

## 2018-01-01 RX ADMIN — HEPARIN SODIUM 5000 UNITS: 5000 INJECTION, SOLUTION INTRAVENOUS; SUBCUTANEOUS at 21:40

## 2018-01-01 RX ADMIN — PHENYTOIN SODIUM 100 MG: 50 INJECTION INTRAMUSCULAR; INTRAVENOUS at 21:40

## 2018-01-01 RX ADMIN — ASPIRIN 81 MG: 81 TABLET, COATED ORAL at 09:29

## 2018-01-01 RX ADMIN — IPRATROPIUM BROMIDE AND ALBUTEROL SULFATE 3 ML: .5; 3 SOLUTION RESPIRATORY (INHALATION) at 01:12

## 2018-01-01 RX ADMIN — WHITE PETROLATUM: 450 STICK TOPICAL at 06:40

## 2018-01-01 RX ADMIN — HUMAN INSULIN 2 UNITS: 100 INJECTION, SOLUTION SUBCUTANEOUS at 01:04

## 2018-01-01 RX ADMIN — SODIUM CHLORIDE 4 UNITS/HR: 9 INJECTION, SOLUTION INTRAVENOUS at 02:13

## 2018-01-01 RX ADMIN — Medication 10 ML: at 21:44

## 2018-01-01 RX ADMIN — BUDESONIDE 0.5 MG: 0.5 INHALANT RESPIRATORY (INHALATION) at 06:47

## 2018-01-01 RX ADMIN — AMLODIPINE BESYLATE 5 MG: 5 TABLET ORAL at 08:36

## 2018-01-01 RX ADMIN — PHENYTOIN SODIUM 100 MG: 50 INJECTION INTRAMUSCULAR; INTRAVENOUS at 09:10

## 2018-01-01 RX ADMIN — LEVOTHYROXINE SODIUM 100 MCG: 100 TABLET ORAL at 14:57

## 2018-01-01 RX ADMIN — METHYLPREDNISOLONE SODIUM SUCCINATE 80 MG: 40 INJECTION, POWDER, FOR SOLUTION INTRAMUSCULAR; INTRAVENOUS at 13:54

## 2018-01-01 RX ADMIN — VALSARTAN 320 MG: 80 TABLET ORAL at 09:29

## 2018-01-01 RX ADMIN — INSULIN ASPART 2 UNITS: 100 INJECTION, SOLUTION INTRAVENOUS; SUBCUTANEOUS at 17:57

## 2018-01-01 RX ADMIN — PROPOFOL 5 MCG/KG/MIN: 10 INJECTION, EMULSION INTRAVENOUS at 04:04

## 2018-01-01 RX ADMIN — PROPOFOL 50 MCG/KG/MIN: 10 INJECTION, EMULSION INTRAVENOUS at 03:31

## 2018-01-01 RX ADMIN — PHENYTOIN SODIUM 100 MG: 50 INJECTION INTRAMUSCULAR; INTRAVENOUS at 21:05

## 2018-01-01 RX ADMIN — METHYLPREDNISOLONE SODIUM SUCCINATE 40 MG: 40 INJECTION, POWDER, FOR SOLUTION INTRAMUSCULAR; INTRAVENOUS at 13:07

## 2018-01-01 RX ADMIN — IPRATROPIUM BROMIDE AND ALBUTEROL SULFATE 3 ML: .5; 3 SOLUTION RESPIRATORY (INHALATION) at 19:16

## 2018-01-01 RX ADMIN — IPRATROPIUM BROMIDE AND ALBUTEROL SULFATE 3 ML: .5; 3 SOLUTION RESPIRATORY (INHALATION) at 19:08

## 2018-01-01 RX ADMIN — CARVEDILOL 25 MG: 25 TABLET, FILM COATED ORAL at 08:51

## 2018-01-01 RX ADMIN — INSULIN ASPART 4 UNITS: 100 INJECTION, SOLUTION INTRAVENOUS; SUBCUTANEOUS at 11:22

## 2018-01-01 RX ADMIN — HEPARIN SODIUM 5000 UNITS: 5000 INJECTION, SOLUTION INTRAVENOUS; SUBCUTANEOUS at 15:19

## 2018-01-01 RX ADMIN — HEPARIN SODIUM 5000 UNITS: 5000 INJECTION, SOLUTION INTRAVENOUS; SUBCUTANEOUS at 06:03

## 2018-01-01 RX ADMIN — DEXTROSE MONOHYDRATE 25 G: 25 INJECTION, SOLUTION INTRAVENOUS at 06:01

## 2018-01-01 RX ADMIN — IPRATROPIUM BROMIDE AND ALBUTEROL SULFATE 3 ML: .5; 3 SOLUTION RESPIRATORY (INHALATION) at 07:02

## 2018-01-01 RX ADMIN — ASPIRIN 325 MG ORAL TABLET 325 MG: 325 PILL ORAL at 16:30

## 2018-01-01 RX ADMIN — HEPARIN SODIUM 5000 UNITS: 5000 INJECTION, SOLUTION INTRAVENOUS; SUBCUTANEOUS at 15:08

## 2018-01-01 RX ADMIN — RENAGEL 800 MG: 800 TABLET ORAL at 17:45

## 2018-01-01 RX ADMIN — IPRATROPIUM BROMIDE AND ALBUTEROL SULFATE 3 ML: .5; 3 SOLUTION RESPIRATORY (INHALATION) at 19:15

## 2018-01-01 RX ADMIN — HUMAN INSULIN 7 UNITS: 100 INJECTION, SOLUTION SUBCUTANEOUS at 01:43

## 2018-01-01 RX ADMIN — HEPARIN SODIUM 5000 UNITS: 5000 INJECTION, SOLUTION INTRAVENOUS; SUBCUTANEOUS at 21:54

## 2018-01-01 RX ADMIN — LOSARTAN POTASSIUM 50 MG: 50 TABLET, FILM COATED ORAL at 08:35

## 2018-01-01 RX ADMIN — PHENYTOIN SODIUM 100 MG: 50 INJECTION INTRAMUSCULAR; INTRAVENOUS at 03:17

## 2018-01-01 RX ADMIN — Medication 1 TABLET: at 09:30

## 2018-01-01 RX ADMIN — LOSARTAN POTASSIUM 50 MG: 50 TABLET, FILM COATED ORAL at 09:13

## 2018-01-01 RX ADMIN — CARVEDILOL 12.5 MG: 12.5 TABLET, FILM COATED ORAL at 08:41

## 2018-01-01 RX ADMIN — RENAGEL 800 MG: 800 TABLET ORAL at 12:39

## 2018-01-01 RX ADMIN — ATORVASTATIN CALCIUM 10 MG: 10 TABLET, FILM COATED ORAL at 13:08

## 2018-01-01 RX ADMIN — CARVEDILOL 12.5 MG: 12.5 TABLET, FILM COATED ORAL at 21:41

## 2018-01-01 RX ADMIN — FAMOTIDINE 20 MG: 10 INJECTION, SOLUTION INTRAVENOUS at 08:38

## 2018-01-01 RX ADMIN — FAMOTIDINE 20 MG: 10 INJECTION INTRAVENOUS at 08:54

## 2018-01-01 RX ADMIN — PROPOFOL 50 MCG/KG/MIN: 10 INJECTION, EMULSION INTRAVENOUS at 08:37

## 2018-01-01 RX ADMIN — LEVOTHYROXINE SODIUM 175 MCG: 50 TABLET ORAL at 07:52

## 2018-01-01 RX ADMIN — AMLODIPINE BESYLATE 5 MG: 5 TABLET ORAL at 08:51

## 2018-01-01 RX ADMIN — AMLODIPINE BESYLATE 5 MG: 5 TABLET ORAL at 08:33

## 2018-01-01 RX ADMIN — HUMAN INSULIN 5 UNITS: 100 INJECTION, SOLUTION SUBCUTANEOUS at 00:36

## 2018-01-01 RX ADMIN — Medication 1 TABLET: at 08:37

## 2018-01-01 RX ADMIN — INSULIN DETEMIR 10 UNITS: 100 INJECTION, SOLUTION SUBCUTANEOUS at 21:28

## 2018-01-01 RX ADMIN — HEPARIN SODIUM 5000 UNITS: 5000 INJECTION, SOLUTION INTRAVENOUS; SUBCUTANEOUS at 06:39

## 2018-01-01 RX ADMIN — PHENYTOIN SODIUM 100 MG: 50 INJECTION INTRAMUSCULAR; INTRAVENOUS at 08:48

## 2018-01-01 RX ADMIN — HEPARIN SODIUM 5000 UNITS: 5000 INJECTION, SOLUTION INTRAVENOUS; SUBCUTANEOUS at 21:23

## 2018-01-01 RX ADMIN — RENAGEL 800 MG: 800 TABLET ORAL at 09:13

## 2018-01-01 RX ADMIN — ASPIRIN 81 MG: 81 TABLET, COATED ORAL at 08:51

## 2018-01-01 RX ADMIN — CHLORHEXIDINE GLUCONATE 0.12% ORAL RINSE 15 ML: 1.2 LIQUID ORAL at 09:14

## 2018-01-01 RX ADMIN — BUDESONIDE 0.5 MG: 0.5 INHALANT RESPIRATORY (INHALATION) at 19:15

## 2018-01-01 RX ADMIN — PROPOFOL 50 MCG/KG/MIN: 10 INJECTION, EMULSION INTRAVENOUS at 00:56

## 2018-01-01 RX ADMIN — VALSARTAN 320 MG: 80 TABLET ORAL at 08:51

## 2018-01-01 RX ADMIN — LORAZEPAM 2 MG: 2 INJECTION INTRAMUSCULAR at 00:57

## 2018-01-01 RX ADMIN — PROPOFOL 45 MCG/KG/MIN: 10 INJECTION, EMULSION INTRAVENOUS at 08:59

## 2018-01-01 RX ADMIN — LEVOTHYROXINE SODIUM 100 MCG: 100 TABLET ORAL at 06:20

## 2018-01-01 RX ADMIN — PHENYTOIN SODIUM 100 MG: 50 INJECTION INTRAMUSCULAR; INTRAVENOUS at 15:08

## 2018-01-01 RX ADMIN — HEPARIN SODIUM 5000 UNITS: 5000 INJECTION, SOLUTION INTRAVENOUS; SUBCUTANEOUS at 06:08

## 2018-01-01 RX ADMIN — BUDESONIDE 0.5 MG: 0.5 INHALANT RESPIRATORY (INHALATION) at 07:15

## 2018-01-01 RX ADMIN — METRONIDAZOLE 500 MG: 500 INJECTION, SOLUTION INTRAVENOUS at 15:33

## 2018-01-01 RX ADMIN — LOSARTAN POTASSIUM 50 MG: 50 TABLET, FILM COATED ORAL at 10:25

## 2018-01-01 RX ADMIN — PHENYTOIN SODIUM 100 MG: 50 INJECTION INTRAMUSCULAR; INTRAVENOUS at 21:20

## 2018-01-01 RX ADMIN — BUDESONIDE 0.5 MG: 0.5 INHALANT RESPIRATORY (INHALATION) at 06:39

## 2018-01-01 RX ADMIN — HEPARIN SODIUM 5000 UNITS: 5000 INJECTION, SOLUTION INTRAVENOUS; SUBCUTANEOUS at 01:02

## 2018-01-01 RX ADMIN — ASPIRIN 81 MG: 81 TABLET, COATED ORAL at 09:14

## 2018-01-01 RX ADMIN — HEPARIN SODIUM 5000 UNITS: 5000 INJECTION, SOLUTION INTRAVENOUS; SUBCUTANEOUS at 13:45

## 2018-01-01 RX ADMIN — HEPARIN SODIUM 5000 UNITS: 5000 INJECTION, SOLUTION INTRAVENOUS; SUBCUTANEOUS at 16:28

## 2018-01-01 RX ADMIN — INSULIN DETEMIR 10 UNITS: 100 INJECTION, SOLUTION SUBCUTANEOUS at 09:57

## 2018-01-01 RX ADMIN — CHLORHEXIDINE GLUCONATE 0.12% ORAL RINSE 15 ML: 1.2 LIQUID ORAL at 15:08

## 2018-01-01 RX ADMIN — CARVEDILOL 12.5 MG: 12.5 TABLET, FILM COATED ORAL at 21:06

## 2018-01-01 RX ADMIN — CARVEDILOL 12.5 MG: 12.5 TABLET, FILM COATED ORAL at 21:46

## 2018-01-01 RX ADMIN — BUDESONIDE 0.5 MG: 0.5 INHALANT RESPIRATORY (INHALATION) at 07:53

## 2018-01-01 RX ADMIN — FAMOTIDINE 20 MG: 10 INJECTION, SOLUTION INTRAVENOUS at 08:36

## 2018-01-01 RX ADMIN — RENAGEL 800 MG: 800 TABLET ORAL at 13:08

## 2018-01-01 RX ADMIN — PHENYTOIN SODIUM 100 MG: 50 INJECTION INTRAMUSCULAR; INTRAVENOUS at 21:46

## 2018-01-01 RX ADMIN — PROPOFOL 50 MCG/KG/MIN: 10 INJECTION, EMULSION INTRAVENOUS at 13:41

## 2018-01-01 RX ADMIN — LABETALOL 20 MG/4 ML (5 MG/ML) INTRAVENOUS SYRINGE 10 MG: at 05:41

## 2018-01-01 RX ADMIN — PHENYTOIN SODIUM 100 MG: 50 INJECTION INTRAMUSCULAR; INTRAVENOUS at 09:12

## 2018-01-01 RX ADMIN — PHENYTOIN SODIUM 100 MG: 50 INJECTION INTRAMUSCULAR; INTRAVENOUS at 11:53

## 2018-01-01 RX ADMIN — VITAMIN D, TAB 1000IU (100/BT) 1000 UNITS: 25 TAB at 08:41

## 2018-01-01 RX ADMIN — RENAGEL 800 MG: 800 TABLET ORAL at 08:54

## 2018-01-01 RX ADMIN — INSULIN ASPART 7 UNITS: 100 INJECTION, SOLUTION INTRAVENOUS; SUBCUTANEOUS at 22:00

## 2018-01-01 RX ADMIN — ATORVASTATIN CALCIUM 10 MG: 10 TABLET, FILM COATED ORAL at 08:35

## 2018-01-01 RX ADMIN — MIDAZOLAM 1 MG/HR: 5 INJECTION INTRAMUSCULAR; INTRAVENOUS at 01:50

## 2018-01-01 RX ADMIN — ONDANSETRON 8 MG: 2 INJECTION INTRAMUSCULAR; INTRAVENOUS at 07:41

## 2018-01-01 RX ADMIN — CHLORHEXIDINE GLUCONATE 0.12% ORAL RINSE 15 ML: 1.2 LIQUID ORAL at 08:59

## 2018-01-01 RX ADMIN — METRONIDAZOLE 500 MG: 500 INJECTION, SOLUTION INTRAVENOUS at 13:09

## 2018-01-01 RX ADMIN — CHLORHEXIDINE GLUCONATE 0.12% ORAL RINSE 15 ML: 1.2 LIQUID ORAL at 21:46

## 2018-01-01 RX ADMIN — ATORVASTATIN CALCIUM 10 MG: 10 TABLET, FILM COATED ORAL at 08:51

## 2018-01-01 RX ADMIN — VITAMIN D, TAB 1000IU (100/BT) 1000 UNITS: 25 TAB at 10:25

## 2018-01-01 RX ADMIN — INSULIN ASPART 4 UNITS: 100 INJECTION, SOLUTION INTRAVENOUS; SUBCUTANEOUS at 06:30

## 2018-01-01 RX ADMIN — RENAGEL 800 MG: 800 TABLET ORAL at 08:51

## 2018-01-01 RX ADMIN — PHENYTOIN SODIUM 100 MG: 50 INJECTION INTRAMUSCULAR; INTRAVENOUS at 18:17

## 2018-01-01 RX ADMIN — BUDESONIDE 0.5 MG: 0.5 INHALANT RESPIRATORY (INHALATION) at 06:50

## 2018-01-01 RX ADMIN — FAMOTIDINE 20 MG: 10 INJECTION, SOLUTION INTRAVENOUS at 08:48

## 2018-01-01 RX ADMIN — RENAGEL 800 MG: 800 TABLET ORAL at 15:08

## 2018-01-01 RX ADMIN — IPRATROPIUM BROMIDE AND ALBUTEROL SULFATE 3 ML: .5; 3 SOLUTION RESPIRATORY (INHALATION) at 13:05

## 2018-01-01 RX ADMIN — ATORVASTATIN CALCIUM 10 MG: 10 TABLET, FILM COATED ORAL at 10:25

## 2018-01-01 RX ADMIN — RENAGEL 800 MG: 800 TABLET ORAL at 13:02

## 2018-01-01 RX ADMIN — DEXTROSE MONOHYDRATE 50 ML/HR: 50 INJECTION, SOLUTION INTRAVENOUS at 06:43

## 2018-01-01 RX ADMIN — HEPARIN SODIUM 5000 UNITS: 5000 INJECTION, SOLUTION INTRAVENOUS; SUBCUTANEOUS at 13:01

## 2018-01-01 RX ADMIN — INSULIN DETEMIR 10 UNITS: 100 INJECTION, SOLUTION SUBCUTANEOUS at 21:54

## 2018-01-01 RX ADMIN — HUMAN INSULIN 10 UNITS: 100 INJECTION, SOLUTION SUBCUTANEOUS at 05:42

## 2018-01-01 RX ADMIN — CARVEDILOL 12.5 MG: 12.5 TABLET, FILM COATED ORAL at 21:20

## 2018-01-01 RX ADMIN — Medication 1 TABLET: at 13:08

## 2018-01-01 RX ADMIN — PROPOFOL 50 MCG/KG/MIN: 10 INJECTION, EMULSION INTRAVENOUS at 15:09

## 2018-01-01 RX ADMIN — CARVEDILOL 25 MG: 25 TABLET, FILM COATED ORAL at 17:04

## 2018-01-01 RX ADMIN — BUDESONIDE 0.5 MG: 0.5 INHALANT RESPIRATORY (INHALATION) at 08:03

## 2018-01-01 RX ADMIN — AMLODIPINE BESYLATE 5 MG: 5 TABLET ORAL at 21:40

## 2018-01-01 RX ADMIN — ACETAMINOPHEN 650 MG: 325 TABLET, FILM COATED ORAL at 02:38

## 2018-01-01 RX ADMIN — POTASSIUM CHLORIDE 20 MEQ: 1.5 POWDER, FOR SOLUTION ORAL at 09:55

## 2018-01-02 PROBLEM — J96.21 ACUTE ON CHRONIC RESPIRATORY FAILURE WITH HYPOXIA (HCC): Status: ACTIVE | Noted: 2018-01-01

## 2018-01-02 PROBLEM — R00.1 SYMPTOMATIC BRADYCARDIA: Status: ACTIVE | Noted: 2018-01-01

## 2018-01-02 PROBLEM — Z99.2 END STAGE RENAL DISEASE ON DIALYSIS (HCC): Status: ACTIVE | Noted: 2018-01-01

## 2018-01-02 PROBLEM — N18.6 END STAGE RENAL DISEASE ON DIALYSIS (HCC): Status: ACTIVE | Noted: 2018-01-01

## 2018-01-02 NOTE — CONSULTS
Referring Provider: Dr. Jordan Regalado.  Reason for Consultation: Bradycardia symptomatic.    Patient Care Team:  Tiburcio Hernandez MD as PCP - General        History of present illness:      Elderly lady with multiple medical problems who is coming in with increasing shortness of breath for the last 2 months.  Patient also has been waking up in the middle of the night with severe shortness of breath.  She feels like she is doing okay will have to and the shortness of breath with a all of a sudden.  This morning she was in the emergency room when she started getting nauseous and lectures on a pass out when she was noted to have a heart rate in the 20s and high teens.  Patient almost had a seizure-like episode when this happened.    Patient has not been walking or doing much of activity.  Has passed out once of the dialysis in the last 4 months.  Has no energy to do any form of activity.  Has had trouble with her blood pressures being high all the time.    Review of Systems   Pertinent items are noted in HPI  Review of Systems      History  #1 Baseline EKG sinus rhythm HI interval of 172 ms rate of 75 beats a minute no acute ST segment changes.    #2 LV function assessment EF more than 70% echocardiac exam 10/17 with mild to moderate LVH.    #3 CAD workup-Lexiscan cardiac stress test 1/16 negative for inducible ischemia.    #4 Peripheral vascular disease-right femoral pulse extremely weak with bruit.  Pedal pulses of the right leg very feeble.    #5 diabetes mellitus since the mid 70s with poor glycemic control.    #6 sleep apnea syndrome high probability supposed to see Dr. Meehan she would follow through.    #7 nicotine abuse quit 10 years ago.    #8 end-stage renal disease on dialysis therapy for the last 4 months.    #9 Post menopausal status     #10 apparatus but    Personal history:    Used to be a smoker quit about 10 years ago no history of CONSUMPTION OR DRUG ABUSE FUNCTION STATUS THE PATIENT IS BEEN VERY  LIMITED.    FAMILY HISTORY HAS ONE SISTER AND 2 BROTHERS ARE NOT ANY CARDIAC ISSUES.  PARENTS HAD CARDIAC ISSUES BUT    VISION IS BEEN GOOD HEARING IS DIMINISHED.  HAS SEVERE SHORTNESS OF BREATH ON MINIMAL ACTIVITY.  HAS BEEN PASSING ORDERING FREQUENTLY.  HAS NOT HAD ANY STROKES OR WEAKNESS JOINT SWELLING RESPIRATORY SYMPTOMS ARE NEGATIVE.    Past Surgical History:   Procedure Laterality Date   • CATARACT EXTRACTION     • CHOLECYSTECTOMY     • HIP CANNULATED SCREW PLACEMENT Left 6/11/2017    Procedure:  LEFT HIP MULTIPLE CANNULATED COMPRESSION SCREWS- FERMORAL NECK  FRACTURE;  Surgeon: Jimmy Sheehan MD;  Location: [facility];  Service:    , Family History   Problem Relation Age of Onset   • Diabetes Other    • Heart disease Other    • Heart attack Mother    • Lung cancer Father    , Social History   Substance Use Topics   • Smoking status: Former Smoker   • Smokeless tobacco: Never Used   • Alcohol use No   , Prescriptions Prior to Admission   Medication Sig Dispense Refill Last Dose   • amLODIPine (NORVASC) 5 MG tablet Take 1 tablet by mouth Daily.   1/2/2018 at Unknown time   • aspirin 81 MG EC tablet Take 1 tablet by mouth daily.   1/2/2018 at Unknown time   • atorvastatin (LIPITOR) 10 MG tablet Take 1 tablet by mouth Daily. 30 tablet 0 1/2/2018 at Unknown time   • B Complex-C-Folic Acid (AR-CLEOPATRA RX) 1 MG tablet Take 1 tablet by mouth Daily.   1/2/2018 at Unknown time   • carvedilol (COREG) 25 MG tablet Take 1 tablet by mouth 2 (two) times a day.   1/2/2018 at Unknown time   • Cholecalciferol (VITAMIN D) 1000 UNITS tablet Take 1,000 Units by mouth 2 (Two) Times a Day.   1/2/2018 at Unknown time   • citalopram (CeleXA) 10 MG tablet Take 1 tablet by mouth Daily. (Patient taking differently: Take 10 mg by mouth Every Night.) 30 tablet 0 1/1/2018 at Unknown time   • glucose blood (FREESTYLE LITE) test strip 3 (three) times a day.   1/2/2018 at Unknown time   • Insulin Lispro (HUMALOG) 100 UNIT/ML solution  pen-injector 4-10 units tid before meals 5 pen 0 1/2/2018 at Unknown time   • Insulin Pen Needle (BD ULTRA-FINE PEN NEEDLES) 29G X 12.7MM misc Use 3 times daily   1/2/2018 at Unknown time   • levothyroxine (SYNTHROID, LEVOTHROID) 75 MCG tablet Take 1 tablet by mouth daily. (Patient taking differently: Take 175 mcg by mouth Daily.) 30 tablet 5 1/2/2018 at Unknown time   • lidocaine 3 % cream cream Apply 1 application topically 3 (Three) Times a Week.   Past Week at Unknown time   • lidocaine-prilocaine (EMLA) 2.5-2.5 % cream Apply 1 application topically 3 (Three) Times a Week.   Past Week at Unknown time   • RELION PEN NEEDLE 31G/8MM 31G X 8 MM misc    1/2/2018 at Unknown time   • TOUJEO SOLOSTAR 300 UNIT/ML solution pen-injector 10 Units Every Night.   1/1/2018 at Unknown time   • valsartan (DIOVAN) 320 MG tablet Take 1 tablet by mouth Daily. 30 tablet 0 1/2/2018 at Unknown time   • b complex-vitamin c-folic acid (NEPHRO-CLEOPATRA) 0.8 MG tablet tablet Take 1 tablet by mouth Daily. (Patient not taking: Reported on 1/2/2018) 30 tablet 0 Not Taking at Unknown time   • ferrous sulfate 325 (65 FE) MG tablet Take 1 tablet by mouth 1 (One) Time Per Week. With meals   12/31/2017   • furosemide (LASIX) 20 MG tablet Take 2 tablets PO daily (Patient not taking: Reported on 1/2/2018) 180 tablet 1 Not Taking at Unknown time   • ondansetron ODT (ZOFRAN-ODT) 4 MG disintegrating tablet    More than a month at Unknown time   , Scheduled Meds:    LORazepam      , Continuous Infusions:   , PRN Meds:  sodium chloride, Allergies:  Duloxetine; Exenatide; Lisinopril; Penicillins; and Phentermine     Objective     Vital Sign Min/Max for last 24 hours  Temp  Min: 97.9 °F (36.6 °C)  Max: 97.9 °F (36.6 °C)   BP  Min: 134/65  Max: 205/144   Pulse  Min: 21  Max: 83   Resp  Min: 16  Max: 22   SpO2  Min: 81 %  Max: 96 %   Flow (L/min)  Min: 4  Max: 15   Weight  Min: 63.5 kg (140 lb)  Max: 63.5 kg (140 lb)     Flowsheet Rows         First Filed Value  "   Admission Height  160 cm (63\") Documented at 01/02/2018 0719    Admission Weight  63.5 kg (140 lb) Documented at 01/02/2018 0719               Physical Exam:     General Appearance:    Alert, cooperative, in no acute distress   Head:    Normocephalic, without obvious abnormality, atraumatic   Eyes:            Lids and lashes normal, conjunctivae and sclerae normal, no   icterus, no pallor, corneas clear, PERRLA   Ears:    Ears appear intact with no abnormalities noted   Throat:   No oral lesions, no thrush, oral mucosa moist   Neck:   No adenopathy, supple, trachea midline, no thyromegaly, no     carotid bruit, no JVD   Back:     No kyphosis present, no scoliosis present, no skin lesions,       erythema or scars, no tenderness to percussion or                   palpation,   range of motion normal   Lungs:     Clear to auscultation,respirations regular, even and                   unlabored    Heart:    Regular rhythm and normal rate, normal S1 and S2, no            murmur, no gallop, no rub, no click   Breast Exam:    Deferred   Abdomen:     Normal bowel sounds, no masses, no organomegaly, soft        non-tender, non-distended, no guarding, no rebound                 tenderness   Genitalia:    Deferred   Extremities:   Moves all extremities well, no edema, no cyanosis, no              redness   Pulses:   Rt Common femoral pulse extremely weak.  Right lower extremity pulses are not palpable.  Soft.  The left femoral artery.     Skin:   No bleeding, bruising or rash   Lymph nodes:   No palpable adenopathy   Neurologic:   Cranial nerves 2 - 12 grossly intact, sensation intact, DTR        present and equal bilaterally       Results Review:   I reviewed the patient's new clinical results.    Rhythm strips from the ER patient's heart rate is in the 20s pauses up to 3.5 seconds .  Also nonsustained SVT documented with rates in the 120s.    LAB DATA :           WBC   Date Value Ref Range Status   01/02/2018 7.71 4.80 - " 10.80 10*3/mm3 Final     RBC   Date Value Ref Range Status   01/02/2018 3.02 (L) 4.20 - 5.40 10*6/mm3 Final     Hemoglobin   Date Value Ref Range Status   01/02/2018 9.1 (L) 12.0 - 16.0 g/dL Final     Hematocrit   Date Value Ref Range Status   01/02/2018 28.6 (L) 37.0 - 47.0 % Final     MCV   Date Value Ref Range Status   01/02/2018 94.7 81.0 - 99.0 fL Final     MCH   Date Value Ref Range Status   01/02/2018 30.1 27.0 - 31.0 pg Final     MCHC   Date Value Ref Range Status   01/02/2018 31.8 30.0 - 37.0 g/dL Final     RDW   Date Value Ref Range Status   01/02/2018 15.6 (H) 11.5 - 14.5 % Final     RDW-SD   Date Value Ref Range Status   01/02/2018 54.5 (H) 37.0 - 54.0 fl Final     MPV   Date Value Ref Range Status   01/02/2018 11.2 6.0 - 12.0 fL Final     Platelets   Date Value Ref Range Status   01/02/2018 214 130 - 400 10*3/mm3 Final     Neutrophil %   Date Value Ref Range Status   01/02/2018 80.1 (H) 37.0 - 80.0 % Final     Lymphocyte %   Date Value Ref Range Status   01/02/2018 11.3 10.0 - 50.0 % Final     Monocyte %   Date Value Ref Range Status   01/02/2018 5.1 0.0 - 12.0 % Final     Eosinophil %   Date Value Ref Range Status   01/02/2018 2.2 0.0 - 7.0 % Final     Basophil %   Date Value Ref Range Status   01/02/2018 0.9 0.0 - 2.5 % Final     Immature Grans %   Date Value Ref Range Status   01/02/2018 0.4 0.0 - 0.6 % Final     Neutrophils, Absolute   Date Value Ref Range Status   01/02/2018 6.18 2.00 - 6.90 10*3/mm3 Final     Lymphocytes, Absolute   Date Value Ref Range Status   01/02/2018 0.87 0.60 - 3.40 10*3/mm3 Final     Monocytes, Absolute   Date Value Ref Range Status   01/02/2018 0.39 0.00 - 0.90 10*3/mm3 Final     Eosinophils, Absolute   Date Value Ref Range Status   01/02/2018 0.17 0.00 - 0.70 10*3/mm3 Final     Basophils, Absolute   Date Value Ref Range Status   01/02/2018 0.07 0.00 - 0.20 10*3/mm3 Final     Immature Grans, Absolute   Date Value Ref Range Status   01/02/2018 0.03 0.00 - 0.06 10*3/mm3  Final     nRBC   Date Value Ref Range Status   01/02/2018 0.0 0.0 - 0.0 /100 WBC Final       Lab Results   Component Value Date    GLUCOSE 239 (H) 01/02/2018    BUN 58 (H) 01/02/2018    CREATININE 6.00 (H) 01/02/2018    EGFRIFNONA 7 (L) 01/02/2018    BCR 9.7 01/02/2018    CO2 26.0 01/02/2018    CALCIUM 9.3 01/02/2018    ALBUMIN 4.20 01/02/2018    LABIL2 1.6 01/02/2018    AST 27 01/02/2018    ALT 39 01/02/2018       Lab Results   Component Value Date    CKTOTAL 59 01/27/2016    TROPONINI <0.012 01/02/2018       No results found for: DDIMER    No results found for: SITE, ALLENTEST, PHART, NBM8YFL, PO2ART, ZIE5AMO, BASEEXCESS, R6FWDEHW, HGBBG, HCTABG, OXYHEMOGLOBI, METHHGBN, CARBOXYHGB, CO2CT, BAROMETRIC, MODALITY, FIO2  Lab Results   Component Value Date    HGBA1C 6.7 09/01/2017         No results found for: LIPASE    IMAGING DATA:     Xr Chest 1 View    Result Date: 1/2/2018  Narrative: PROCEDURE: XR CHEST 1 VW-  HISTORY: SOA  triage protocol  COMPARISON: August 16, 2017.  FINDINGS: The heart is enlarged. The mediastinum is unremarkable. There is interstitial pulmonary edema with small bilateral pleural effusions and bibasilar atelectasis. There is no pneumothorax.  There are no acute osseous abnormalities.         Impression: Cardiomegaly with interstitial pulmonary edema and small bilateral pleural effusions.  Continued followup is recommended.  This report was finalized on 1/2/2018 8:35 AM by Mariel Solano M.D..        DIAGNOSIS   #1 is symptomatic bradycardia: Patient has presented with recurrent episodes of dizziness and near syncopal episodes and shortness of breath.  Has a documented episode of severe bradycardia with pauses of over 3 seconds.  She has been on beta blocker therapy for her blood pressures it is well known that patients who do this on medications that are essential end of doing it without the medications 80% of the times.    #2 paroxysmal atrial fibrillation: Patient has nonsustained SVTs  there are been also noted.  This most probably is atrial fibrillation.  Will continue to monitor this we'll check a thyroid function at this time.  If indeed this is also true patient will need to be on rate limiting medications.  At which point will definitely need a permanent pacemaker to help with the bradycardia so that the A. fib could be controlled.  A lot of her symptoms that wakes her up in the middle of the night of more suggestive of paroxysmal atrial fibrillation.    #3 peripheral vascular disease: Has significant loss of pulses of the right lower extremity.  May need further evaluation for the same on outpatient basis or if she gets symptomatic.    #4 coronary artery disease: Has a high risk for the same nevertheless there is enzymes have been negative will get 1 more set of enzymes at this time.    #5 hypertension: Has had trouble with her blood pressure control.  This is related to vascular issues with respect to the renal arteries is not very clear.  ZE[facility] Edge indeed angiography for the right lower ex 20s pursued would assess the renal arteries at the same time.  At this time she will need medications with negative inotropic/rate control controlling medications also.  This would be another reason for which she might benefit with the device therapy.    #6?  Lower respiratory tract infection: Has bronchial breath sounds in the left base.  Further workup will be as per the hospitalist service.        Active Problems:    * No active hospital problems. *          I discussed the patients findings and my recommendations with patient    Bridger Swift MD  01/02/18  1:22 PM      Please note that portions of this note may have been completed with a voice recognition program. Efforts were made to edit the dictations, but occasionally words are mistranscribed.

## 2018-01-02 NOTE — ED NOTES
Attempted to call report at this time. Informed primary RN would call back for report     Merlene Pereira RN  01/02/18 [id number]

## 2018-01-02 NOTE — ED NOTES
House supervisor was called at 0856 to arrange admission. She states no beds available at this time, will keep updated.      Kevin Laws  01/02/18 [id number]

## 2018-01-02 NOTE — H&P
[facility]   HISTORY AND PHYSICAL      Name:  Sigrid Casarez   Age:  77 y.o.  Sex:  female  :  1940  MRN:  [id number]   Visit Number:  [id number]  Admission Date:  2018  Date Of Service:  18  Primary Care Physician:  Tiburcio Hernandez MD    Chief Complaint:     Episodes of shortness of breath and generalized weakness.    History Of Presenting Illness:      This is a 77-year-old female with history of end-stage renal disease on hemodialysis, diabetes mellitus type 2,essential hypertension presented to the emergency room with history of episodes of intermittent shortness of breath going on for the last several weeks.  She also has history of some dizziness but denies any loss of consciousness at home.  She has been started on dialysis in the last couple of months.  She states that she did miss dialysis day before yesterday and her next dialysis is supposed to be today.  She denies any fevers, abdominal pain, nausea or vomiting.    Patient was evaluated in the emergency room.  She was initially noted to have hypoxia with oxygen saturation in the 80s but subsequently improved with nasal cannula.  However, while she was in the emergency room she did drop her heart rate in the 20s and apparently was briefly unconscious according to the son who is at the bedside.  She spontaneously improved with her heart rate and is currently in the 70s.  She was subsequently seen by Dr. Swift who reviewed her telemetry and felt that the patient likely has sick sinus syndrome with episodes of tachycardia causing her symptoms.  She was subsequently admitted to the ICU for further evaluation and management.  She is currently sitting up on the bed and is comfortable at rest.    Review Of Systems:     General ROS: Patient denies any fevers, chills or loss of consciousness. Complains of generalized weakness.  Psychological ROS: No history of any hallucinations and delusions.  Ophthalmic ROS: No  history of any diplopia or transient loss of vision.  ENT ROS: No history of sore throat, nasal congestion or ear pain.   Allergy and Immunology ROS: No history of rash or itching.  Hematological and Lymphatic ROS: No history of neck swelling or easy bleeding.  Endocrine ROS: No history of any recent unintentional weight gain or loss.  Respiratory ROS: Episodes of shortness of breath lasting a few seconds to minutes.  Cardiovascular ROS: No history of chest pain or palpitations  Gastrointestinal ROS: No history of nausea and vomiting. Denies any abdominal pain. No diarrhea.  Genito-Urinary ROS: No history of dysuria or hematuria.  Musculoskeletal ROS: No muscle pain. No calf pain.   Neurological ROS: No history of any focal weakness.  Complains of dizziness. Denies any numbness.  Dermatological ROS: No history of any redness or pruritis.     Past Medical History:    Past Medical History:   Diagnosis Date   • Anemia    • Chronic kidney disease    • Chronic kidney disease    • Community acquired pneumonia    • Dexa Body Composition study lumosacral spine and femur     ostepenic   • Diabetes    • Diabetic nephropathy, type I    • Diabetic peripheral neuropathy type 1    • Disease of thyroid gland    • Fracture    • Hypertension    • Menopause    • Pneumonia      Past Surgical history:    Past Surgical History:   Procedure Laterality Date   • CATARACT EXTRACTION     • CHOLECYSTECTOMY     • HIP CANNULATED SCREW PLACEMENT Left 6/11/2017    Procedure:  LEFT HIP MULTIPLE CANNULATED COMPRESSION SCREWS- FERMORAL NECK  FRACTURE;  Surgeon: Jimmy Sheehan MD;  Location: [facility];  Service:      Social History:    Social History     Social History   • Marital status:      Spouse name: N/A   • Number of children: N/A   • Years of education: N/A     Occupational History   • Not on file.     Social History Main Topics   • Smoking status: Former Smoker   • Smokeless tobacco: Never Used   • Alcohol use No   • Drug use: No    • Sexual activity: Defer     Other Topics Concern   • Not on file     Social History Narrative     Family History:    Family History   Problem Relation Age of Onset   • Diabetes Other    • Heart disease Other    • Heart attack Mother    • Lung cancer Father      Allergies:      Duloxetine; Exenatide; Lisinopril; Penicillins; and Phentermine    Home Medications:    Prior to Admission Medications     Prescriptions Last Dose Informant Patient Reported? Taking?    amLODIPine (NORVASC) 5 MG tablet 1/2/2018  Yes Yes    Take 1 tablet by mouth Daily.    aspirin 81 MG EC tablet 1/2/2018  Yes Yes    Take 1 tablet by mouth daily.    atorvastatin (LIPITOR) 10 MG tablet 1/2/2018  No Yes    Take 1 tablet by mouth Daily.    B Complex-C-Folic Acid (AR-CLEOPATRA RX) 1 MG tablet 1/2/2018  Yes Yes    Take 1 tablet by mouth Daily.    carvedilol (COREG) 25 MG tablet 1/2/2018  Yes Yes    Take 1 tablet by mouth 2 (two) times a day.    Cholecalciferol (VITAMIN D) 1000 UNITS tablet 1/2/2018 Self Yes Yes    Take 1,000 Units by mouth 2 (Two) Times a Day.    citalopram (CeleXA) 10 MG tablet 1/1/2018  No Yes    Take 1 tablet by mouth Daily.    Patient taking differently:  Take 10 mg by mouth Every Night.    glucose blood (FREESTYLE LITE) test strip 1/2/2018  Yes Yes    3 (three) times a day.    Insulin Lispro (HUMALOG) 100 UNIT/ML solution pen-injector 1/2/2018  No Yes    4-10 units tid before meals    Insulin Pen Needle (BD ULTRA-FINE PEN NEEDLES) 29G X 12.7MM misc 1/2/2018  Yes Yes    Use 3 times daily    levothyroxine (SYNTHROID, LEVOTHROID) 75 MCG tablet 1/2/2018  No Yes    Take 1 tablet by mouth daily.    Patient taking differently:  Take 175 mcg by mouth Daily.    lidocaine 3 % cream cream Past Week  Yes Yes    Apply 1 application topically 3 (Three) Times a Week.    lidocaine-prilocaine (EMLA) 2.5-2.5 % cream Past Week  Yes Yes    Apply 1 application topically 3 (Three) Times a Week.    RELION PEN NEEDLE 31G/8MM 31G X 8 MM misc 1/2/2018   Yes Yes    TOUJEO SOLOSTAR 300 UNIT/ML solution pen-injector 1/1/2018  Yes Yes    10 Units Every Night.    valsartan (DIOVAN) 320 MG tablet 1/2/2018  No Yes    Take 1 tablet by mouth Daily.    b complex-vitamin c-folic acid (NEPHRO-CLEOPATRA) 0.8 MG tablet tablet Not Taking  No No    Take 1 tablet by mouth Daily.    Patient not taking:  Reported on 1/2/2018    ferrous sulfate 325 (65 FE) MG tablet 12/31/2017 Self Yes No    Take 1 tablet by mouth 1 (One) Time Per Week. With meals    furosemide (LASIX) 20 MG tablet Not Taking  No No    Take 2 tablets PO daily    Patient not taking:  Reported on 1/2/2018    ondansetron ODT (ZOFRAN-ODT) 4 MG disintegrating tablet More than a month  Yes No         ED Medications:    Medications   LORazepam (ATIVAN) 2 MG/ML injection  - ADS Override Pull (  Not Given 1/2/18 1123)   sodium chloride 0.9 % flush 10 mL (not administered)   ondansetron (ZOFRAN) injection 8 mg (8 mg Intravenous Given 1/2/18 0741)     Vital Signs:    Temp:  [97.9 °F (36.6 °C)] 97.9 °F (36.6 °C)  Heart Rate:  [21-83] 71  Resp:  [16-22] 17  BP: (134-205)/() 151/51    Last 3 weights    01/02/18  0719   Weight: 63.5 kg (140 lb)       Body mass index is 24.8 kg/(m^2).    Physical Exam:    General Appearance:  Alert and cooperative, not in any acute distress.   Head:  Atraumatic and normocephalic, without obvious abnormality.   Eyes:          PERRLA, conjunctivae and sclerae normal, no Icterus. No pallor. Extra-occular movements are within normal limits.   Ears:  Ears appear intact with no abnormalities noted.   Throat: No oral lesions, no thrush, oral mucosa moist.   Neck: Supple, trachea midline, no thyromegaly, no carotid bruit.   Back:   No kyphoscoliosis present. No tenderness to palpation,   range of motion normal.   Lungs:   Chest shape is normal. Breath sounds heard bilaterally equally.  No wheezing. She does have Velcro type crackles in the lung bases especially in the back.  No Pleural rub or bronchial  breathing.   Heart:  Normal S1 and S2, 2/6 systolic murmur, no gallop, no rub. No JVD.   Abdomen:   Normal bowel sounds, no masses, no organomegaly. Soft, non-tender, non-distended, no guarding, no rebound tenderness.   Extremities: Moves all extremities well, 1+ edema, no cyanosis, no clubbing.  AV fistula with the bruit noted in the right arm.   Pulses: Pulses palpable and equal bilaterally.   Skin: No bleeding, bruising or rash.   Neurologic: Alert and oriented x 3. Moves all four limbs equally. No tremors. No facial asymetry.     Laboratory data:    I have reviewed the labs done in the emergency room.      Results from last 7 days  Lab Units 01/02/18  0725   SODIUM mmol/L 142   POTASSIUM mmol/L 4.0   CHLORIDE mmol/L 103   CO2 mmol/L 26.0   BUN mg/dL 58*   CREATININE mg/dL 6.00*   CALCIUM mg/dL 9.3   BILIRUBIN mg/dL 0.7   ALK PHOS U/L 98   ALT (SGPT) U/L 39   AST (SGOT) U/L 27   GLUCOSE mg/dL 239*       Results from last 7 days  Lab Units 01/02/18  0725   WBC 10*3/mm3 7.71   HEMOGLOBIN g/dL 9.1*   HEMATOCRIT % 28.6*   PLATELETS 10*3/mm3 214           Results from last 7 days  Lab Units 01/02/18  0725   TROPONIN I ng/mL <0.012       Results from last 7 days  Lab Units 01/02/18  0725   PROBNP pg/mL 9960.0*     EKG:      EKG done in the emergency room was reviewed by me.  It shows sinus rhythm at 75 bpm.  Normal axis.  Inverted T waves noted in V1 and V2.  No abnormal Q waves or other significant ST-T changes were noted.    Radiology:    Imaging Results (last 72 hours)     Procedure Component Value Units Date/Time    XR Chest 1 View [[id number]] Collected:  01/02/18 0834     Updated:  01/02/18 0837    Narrative:       PROCEDURE: XR CHEST 1 VW-     HISTORY: [facility]  triage protocol     COMPARISON: August 16, 2017.     FINDINGS: The heart is enlarged. The mediastinum is unremarkable. There  is interstitial pulmonary edema with small bilateral pleural effusions  and bibasilar atelectasis. There is no pneumothorax.  There  are no acute  osseous abnormalities.           Impression:       Cardiomegaly with interstitial pulmonary edema and small  bilateral pleural effusions.     Continued followup is recommended.     This report was finalized on 1/2/2018 8:35 AM by Mariel Solano M.D..        Assessment:    1.  Symptomatic bradycardia, present on admission.  2.  Paroxysmal atrial fibrillation.  3.  Essential hypertension.  4.  Coronary artery disease on medical therapy.  5.  Diabetes mellitus type 2 with nephropathy.  6.  Acquired hypothyroidism.  7.  End-stage renal disease on hemodialysis.    Plan:    Patient is currently admitted to the ICU due to her significant bradycardia.  She has been seen by Dr. Swift who did confirm the bradycardia and also noted episode of nonsustained SVT.  He has recommended to continue her home medications including the carvedilol at this time.  Patient also has bilateral basal crackles and I will order he high resolution CT scan of the chest to further evaluate for any interstitial lung disease versus infectious etiology.  At this time, I will hold off on antibiotic therapy.  She will be placed on subcutaneous insulin protocol for coverage.    She does have end-stage renal disease and I will consult Dr. Mejia to continue her hemodialysis.  I have discussed the patient's condition with her grandson who is at the bedside.  I have also discussed the patient's condition with Dr. Swift.  Patient may eventually require pacemaker.  Further recommendations depend upon her clinical course.    Jordan Ribera MD  01/02/18  1:23 PM    Dictated utilizing Dragon dictation.

## 2018-01-02 NOTE — CONSULTS
[facility]      Nephrology Consultation    Referring Provider:   No ref. provider found    Reason for Consultation:    ESRD and associated problems       Subjective     Chief complaint   Chief Complaint   Patient presents with   • Shortness of Breath       History of present illness:    Patient is a 7-year-old white female with multiple medical problems as listed below the past medical history.  She is complaining of some dizziness episodes off and on for the past few weeks.   mentioned that she had episodes where she is unresponsive.  She has not missed her dialysis.  Today with all these episodes she had another one early this morning and  got concerned called the EMS and was brought into the emergency room.  In the ER patient was noted to have a heart rate of 20.  H&P done by Dr. Ribera was reviewed by me.    I have reviewed labs/imaging/records from this hospitalization, including ER staff and admitting/attending physicians H/P's and progress notes to establish a comprehensive understanding of this patient's clinical hospital course, as well as to establish plan of care appropriately.   Past Medical History:   Diagnosis Date   • Anemia    • Chronic kidney disease    • Chronic kidney disease    • Community acquired pneumonia    • Dexa Body Composition study lumosacral spine and femur     ostepenic   • Diabetes    • Diabetic nephropathy, type I    • Diabetic peripheral neuropathy type 1    • Disease of thyroid gland    • Fracture    • Hypertension    • Menopause    • Pneumonia        Past Surgical History:   Procedure Laterality Date   • CATARACT EXTRACTION     • CHOLECYSTECTOMY     • HIP CANNULATED SCREW PLACEMENT Left 6/11/2017    Procedure:  LEFT HIP MULTIPLE CANNULATED COMPRESSION SCREWS- FERMORAL NECK  FRACTURE;  Surgeon: Jimmy Sheehan MD;  Location: [facility];  Service:        Family History   Problem Relation Age of Onset   • Diabetes Other    • Heart disease Other    •  Heart attack Mother    • Lung cancer Father           negative h/o ESRD     Social History   Substance Use Topics   • Smoking status: Former Smoker   • Smokeless tobacco: Never Used   • Alcohol use No     Prescriptions Prior to Admission   Medication Sig Dispense Refill Last Dose   • amLODIPine (NORVASC) 5 MG tablet Take 1 tablet by mouth Daily.   1/2/2018 at Unknown time   • aspirin 81 MG EC tablet Take 1 tablet by mouth daily.   1/2/2018 at Unknown time   • atorvastatin (LIPITOR) 10 MG tablet Take 1 tablet by mouth Daily. 30 tablet 0 1/2/2018 at Unknown time   • B Complex-C-Folic Acid (AR-CLEOPATRA RX) 1 MG tablet Take 1 tablet by mouth Daily.   1/2/2018 at Unknown time   • carvedilol (COREG) 25 MG tablet Take 1 tablet by mouth 2 (two) times a day.   1/2/2018 at Unknown time   • Cholecalciferol (VITAMIN D) 1000 UNITS tablet Take 1,000 Units by mouth 2 (Two) Times a Day.   1/2/2018 at Unknown time   • citalopram (CeleXA) 10 MG tablet Take 1 tablet by mouth Daily. (Patient taking differently: Take 10 mg by mouth Every Night.) 30 tablet 0 1/1/2018 at Unknown time   • glucose blood (FREESTYLE LITE) test strip 3 (three) times a day.   1/2/2018 at Unknown time   • Insulin Lispro (HUMALOG) 100 UNIT/ML solution pen-injector 4-10 units tid before meals 5 pen 0 1/2/2018 at Unknown time   • Insulin Pen Needle (BD ULTRA-FINE PEN NEEDLES) 29G X 12.7MM misc Use 3 times daily   1/2/2018 at Unknown time   • levothyroxine (SYNTHROID, LEVOTHROID) 75 MCG tablet Take 1 tablet by mouth daily. (Patient taking differently: Take 175 mcg by mouth Daily.) 30 tablet 5 1/2/2018 at Unknown time   • lidocaine 3 % cream cream Apply 1 application topically 3 (Three) Times a Week.   Past Week at Unknown time   • lidocaine-prilocaine (EMLA) 2.5-2.5 % cream Apply 1 application topically 3 (Three) Times a Week.   Past Week at Unknown time   • RELION PEN NEEDLE 31G/8MM 31G X 8 MM misc    1/2/2018 at Unknown time   • TOUJEO SOLOSTAR 300 UNIT/ML solution  pen-injector 10 Units Every Night.   1/1/2018 at Unknown time   • valsartan (DIOVAN) 320 MG tablet Take 1 tablet by mouth Daily. 30 tablet 0 1/2/2018 at Unknown time   • b complex-vitamin c-folic acid (NEPHRO-CLEOPATRA) 0.8 MG tablet tablet Take 1 tablet by mouth Daily. (Patient not taking: Reported on 1/2/2018) 30 tablet 0 Not Taking at Unknown time   • ferrous sulfate 325 (65 FE) MG tablet Take 1 tablet by mouth 1 (One) Time Per Week. With meals   12/31/2017   • furosemide (LASIX) 20 MG tablet Take 2 tablets PO daily (Patient not taking: Reported on 1/2/2018) 180 tablet 1 Not Taking at Unknown time   • ondansetron ODT (ZOFRAN-ODT) 4 MG disintegrating tablet    More than a month at Unknown time     Allergies:  Duloxetine; Exenatide; Lisinopril; Penicillins; and Phentermine    Review of Systems  Constitutional: Negative for fever and chills, no diaphoresis, Positive for fatigue and denies any unexpected weight change.   HENT: Negative for congestion and hearing loss.   Eyes: Negative for redness and visual disturbance.   Respiratory: Positive for shortness of breath, especially when she had these episodes. Negative for chest pain . Negative for cough and chest tightness.   Cardiovascular: Negative for chest pain and palpitations.   Gastrointestinal: Negative for abdominal distention, abdominal pain and blood in stool. Denies any constipation or diarrhea.  Endocrine: Negative for cold or heat intolerance.   Genitourinary: Negative for difficulty urinating, dysuria and frequency.   Musculoskeletal: Positive for arthralgias, back pain and denies any myalgias.   Skin: Negative for color change, rash and wound.   Neurological: Negative for syncope, new onset weakness or numbness of any extremities. Denies any headaches.   Hematological: Negative for adenopathy. Does not bruise/bleed easily.   Psychiatric/Behavioral: Negative for confusion. The patient is not nervous/anxious.     Objective     Vital Signs  /54  Pulse 80  " Temp 98.2 °F (36.8 °C) (Oral)   Resp 20  Ht 160 cm (63\")  Wt 62.2 kg (137 lb 1.6 oz)  LMP  (LMP Unknown)  SpO2 90% Comment: increased Oxygen to 5L  Breastfeeding? No  BMI 24.29 kg/m2            No intake or output data in the 24 hours ending 01/02/18 1726    Physical Exam:     General Appearance:   Alert, cooperative, in no acute distress.     Head:   Normocephalic, without obvious abnormality, atraumatic.     Eyes:      Normal, conjunctivae and sclerae, no icterus, no pallor, corneas clear, PERRLA        Throat:   Oral mucosa dry      Neck:  No adenopathy, supple, trachea midline, no thyromegaly, no carotid bruit, no JVD      Back:   No CVA tenderness on Percussion.     Lungs:    Clear to auscultation and fair air movement noted.      Heart:   Regular rhythm and normal rate, normal S1 and S2.       Abdomen:   Normal bowel sounds, no masses, no organomegaly, soft non-tender, non-distended, no guarding, no rebound tenderness        Extremities:  Moves all extremities, no edema, no cyanosis, no redness.     Pulses:  Pulses palpable and equal bilaterally but weak.     Skin:  No bleeding, bruising or rash        Neurologic:  Cranial nerves grossly intact, move all extremities               Lab Results (last 7 days)     Procedure Component Value Units Date/Time    POC Glucose Once [[id number]]  (Abnormal) Collected:  01/02/18 0721    Specimen:  Blood Updated:  01/02/18 0725     Glucose 239 (H) mg/dL       Serial Number: [id number]:  [id number]       CBC & Differential [[id number]] Collected:  01/02/18 0725    Specimen:  Blood Updated:  01/02/18 0730    Narrative:       The following orders were created for panel order CBC & Differential.  Procedure                               Abnormality         Status                     ---------                               -----------         ------                     CBC Auto Differential[[id number]]        Abnormal            Final result                 Please view " results for these tests on the individual orders.    CBC Auto Differential [[id number]]  (Abnormal) Collected:  01/02/18 0725    Specimen:  Blood Updated:  01/02/18 0730     WBC 7.71 10*3/mm3      RBC 3.02 (L) 10*6/mm3      Hemoglobin 9.1 (L) g/dL      Hematocrit 28.6 (L) %      MCV 94.7 fL      MCH 30.1 pg      MCHC 31.8 g/dL      RDW 15.6 (H) %      RDW-SD 54.5 (H) fl      MPV 11.2 fL      Platelets 214 10*3/mm3      Neutrophil % 80.1 (H) %      Lymphocyte % 11.3 %      Monocyte % 5.1 %      Eosinophil % 2.2 %      Basophil % 0.9 %      Immature Grans % 0.4 %      Neutrophils, Absolute 6.18 10*3/mm3      Lymphocytes, Absolute 0.87 10*3/mm3      Monocytes, Absolute 0.39 10*3/mm3      Eosinophils, Absolute 0.17 10*3/mm3      Basophils, Absolute 0.07 10*3/mm3      Immature Grans, Absolute 0.03 10*3/mm3      nRBC 0.0 /100 WBC     Comprehensive Metabolic Panel [[id number]]  (Abnormal) Collected:  01/02/18 0725    Specimen:  Blood Updated:  01/02/18 0755     Glucose 239 (H) mg/dL       Glucose >180, Hemoglobin A1C recommended.        BUN 58 (H) mg/dL      Creatinine 6.00 (H) mg/dL      Sodium 142 mmol/L      Potassium 4.0 mmol/L      Chloride 103 mmol/L      CO2 26.0 mmol/L      Calcium 9.3 mg/dL      Total Protein 6.9 g/dL      Albumin 4.20 g/dL      ALT (SGPT) 39 U/L      AST (SGOT) 27 U/L      Alkaline Phosphatase 98 U/L      Total Bilirubin 0.7 mg/dL      eGFR Non African Amer 7 (L) mL/min/1.73      Globulin 2.7 gm/dL      A/G Ratio 1.6 g/dL      BUN/Creatinine Ratio 9.7     Anion Gap 17.0 mmol/L     Narrative:       The MDRD GFR formula is only valid for adults with stable renal function between ages 18 and 70.    Troponin [[id number]]  (Normal) Collected:  01/02/18 0725    Specimen:  Blood Updated:  01/02/18 0755     Troponin I <0.012 ng/mL     Narrative:       Normal Patient Upper Reference Limit (URL) (99th Percentile)=0.03 ng/mL   Non-AMI Illness Reference Limit=0.03-0.11 ng/mL   AMI Confirmation=0.12 ng/mL and  above    BNP [[id number]]  (Abnormal) Collected:  01/02/18 0725    Specimen:  Blood Updated:  01/02/18 0800     proBNP 9960.0 (C) pg/mL     Light Blue Top [[id number]] Collected:  01/02/18 0725    Specimen:  Blood Updated:  01/02/18 0831     Extra Tube hold for add-on      Auto resulted       Lavender Top [[id number]] Collected:  01/02/18 0725    Specimen:  Blood Updated:  01/02/18 0831     Extra Tube hold for add-on      Auto resulted       Gold Top - SST [[id number]] Collected:  01/02/18 0725    Specimen:  Blood Updated:  01/02/18 0831     Extra Tube Hold for add-ons.      Auto resulted.       Green Top (No Gel) [[id number]] Collected:  01/02/18 0725    Specimen:  Blood Updated:  01/02/18 0831     Extra Tube Hold for add-ons.      Auto resulted.       Dickinson Draw [[id number]] Collected:  01/02/18 0725    Specimen:  Blood Updated:  01/02/18 0831    Narrative:       The following orders were created for panel order Dickinson Draw.  Procedure                               Abnormality         Status                     ---------                               -----------         ------                     Light Blue Top[[id number]]                                   Final result               Lavender Top[[id number]]                                     Final result               Gold Top - SST[[id number]]                                   Final result               Green Top (No Gel)[[id number]]                               Final result                 Please view results for these tests on the individual orders.    MRSA Screen Culture - Swab, Nares [[id number]] Collected:  01/02/18 1235    Specimen:  Swab from Nares Updated:  01/02/18 1241    VRE Culture - Swab, Per Rectum [[id number]] Collected:  01/02/18 1235    Specimen:  Swab from Per Rectum Updated:  01/02/18 1241    Acinetobacter Screen - Swab, Axilla, Left [[id number]] Collected:  01/02/18 1235    Specimen:  Swab from Axilla, Left Updated:  01/02/18 1241    Blood Gas,  Arterial With Co-Ox [[id number]]  (Abnormal) Collected:  01/02/18 0749    Specimen:  Arterial Blood Updated:  01/02/18 1350     Site Arterial: left brachial     Winston's Test Positive     pH, Arterial 7.399 pH units      pCO2, Arterial 41.8 mm Hg      pO2, Arterial 58.8 (L) mm Hg      HCO3, Arterial 25.8 mmol/L      Base Excess, Arterial 1.0 mmol/L      O2 Saturation, Arterial 88.8 %      Hemoglobin, Blood Gas 8.2 (L) g/dL      Oxyhemoglobin 87.2 (L) %      Methemoglobin 1.40 %      Modality Cannula - Nasal     FIO2 36 %     POC Glucose Once [[id number]]  (Abnormal) Collected:  01/02/18 1232    Specimen:  Blood Updated:  01/02/18 1405     Glucose 165 (H) mg/dL       Serial Number: [id number]:  [id number]       Troponin [[id number]]  (Normal) Collected:  01/02/18 1350    Specimen:  Blood Updated:  01/02/18 1421     Troponin I <0.012 ng/mL     Narrative:       Normal Patient Upper Reference Limit (URL) (99th Percentile)=0.03 ng/mL   Non-AMI Illness Reference Limit=0.03-0.11 ng/mL   AMI Confirmation=0.12 ng/mL and above    T4, Free [[id number]]  (Abnormal) Collected:  01/02/18 1354    Specimen:  Blood Updated:  01/02/18 1515     Free T4 2.97 (H) ng/dL     TSH [[id number]]  (Abnormal) Collected:  01/02/18 1354    Specimen:  Blood Updated:  01/02/18 1515     TSH 0.134 (L) mIU/mL         Imaging Results (last 72 hours)     Procedure Component Value Units Date/Time    XR Chest 1 View [[id number]] Collected:  01/02/18 0834     Updated:  01/02/18 0837    Narrative:       PROCEDURE: XR CHEST 1 VW-     HISTORY: SOA  triage protocol     COMPARISON: August 16, 2017.     FINDINGS: The heart is enlarged. The mediastinum is unremarkable. There  is interstitial pulmonary edema with small bilateral pleural effusions  and bibasilar atelectasis. There is no pneumothorax.  There are no acute  osseous abnormalities.           Impression:       Cardiomegaly with interstitial pulmonary edema and small  bilateral pleural effusions.      Continued followup is recommended.     This report was finalized on 1/2/2018 8:35 AM by Mariel Solano M.D..    CT Chest Hi Resolution [[id number]] Updated:  01/02/18 1648              amLODIPine 5 mg Oral Daily   aspirin 81 mg Oral Daily   atorvastatin 10 mg Oral Daily   b complex-vitamin c-folic acid 1 tablet Oral Daily   carvedilol 25 mg Oral BID With Meals   citalopram 10 mg Oral Nightly   heparin (porcine) 5,000 Units Subcutaneous Q12H   insulin aspart 0-7 Units Subcutaneous 4x Daily AC & at Bedtime   insulin detemir 10 Units Subcutaneous Nightly   [START ON 1/3/2018] levothyroxine 175 mcg Oral Q AM   LORazepam      valsartan 320 mg Oral Q24H   Vitamin D 1,000 Units Oral Daily          Assessment/Plan     1.   End stage renal disease on dialysis  2.   Symptomatic bradycardia  3.   Chronic anemia  4.   Essential hypertension  5.   Acquired hypothyroidism  6.   Bilateral nondiabetic proliferative retinopathy  7.   Type 2 diabetes mellitus with nephropathy  8.   Acute on chronic respiratory failure with hypoxia  9.   Mixed hyperlipidemia    Plan:    · Patient is due for her dialysis today she does not have any symptoms will hold off for and plan on doing dialysis in the morning.  · She has a tachybradycardia syndrome, Dr. Swift has evaluated the patient and likely will start the process of permanent pacemaker.  · Anemia she is on outpatient long-acting erythropoietin therapy will be continued.  · Home medications reviewed and will be continued.  · Details were discussed with the patient as well as family in the room.    · Details were also discussed with the hospitalist service.   · Further recommendations will depend on clinical course of the patient during the current hospitalization.    · I also discussed the details with the nursing staff.    Rest as ordered.    In closing, I sincerely appreciate opportunity to participate in care of this patient. If I can be of any further assistance with the management  of this patient, please don’t hesitate to contact me.    Deric Mejia MD  01/02/18  5:26 PM    EMR Dragon/Transcription disclaimer:   Much of this encounter note is an electronic transcription/translation of spoken language to printed text. The electronic translation of spoken language may permit erroneous, or at times, nonsensical words or phrases to be inadvertently transcribed; Although I have reviewed the note for such errors, some may still exist.

## 2018-01-02 NOTE — ED PROVIDER NOTES
Subjective   HPI Comments: 77-year-old female that presents via EMS for acute onset shortness of breath.  The patient is an end-stage renal disease patient who is dialysis dependent.  She is due for dialysis in 90 minutes.  She reports that over the past several days she has had intermittent episodes where she felt like she cannot take a deep breath.  These episodes last from minutes up to an hour or 2.  They normally do spontaneously resolve.  Patient does wear oxygen via nasal cannula 24 hours a day at home.  Nothing really brings these episodes on.  Patient denies fever, chills, sweats, nausea, vomiting or diarrhea.        Review of Systems   All other systems reviewed and are negative.      Past Medical History:   Diagnosis Date   • Anemia    • Chronic kidney disease    • Chronic kidney disease    • Community acquired pneumonia    • Dexa Body Composition study lumosacral spine and femur     ostepenic   • Diabetes    • Diabetic nephropathy, type I    • Diabetic peripheral neuropathy type 1    • Disease of thyroid gland    • Fracture    • Hypertension    • Menopause    • Pneumonia        Allergies   Allergen Reactions   • Duloxetine    • Exenatide    • Lisinopril Cough   • Penicillins    • Phentermine        Past Surgical History:   Procedure Laterality Date   • CATARACT EXTRACTION     • CHOLECYSTECTOMY     • HIP CANNULATED SCREW PLACEMENT Left 6/11/2017    Procedure:  LEFT HIP MULTIPLE CANNULATED COMPRESSION SCREWS- FERMORAL NECK  FRACTURE;  Surgeon: Jimmy Sheehan MD;  Location: [facility];  Service:        Family History   Problem Relation Age of Onset   • Diabetes Other    • Heart disease Other    • Heart attack Mother    • Lung cancer Father        Social History     Social History   • Marital status:      Spouse name: N/A   • Number of children: N/A   • Years of education: N/A     Social History Main Topics   • Smoking status: Former Smoker   • Smokeless tobacco: None   • Alcohol use No   • Drug  use: No   • Sexual activity: Defer     Other Topics Concern   • None     Social History Narrative           Objective   Physical Exam   Nursing note and vitals reviewed.    GEN: Appears anxious and short of breath  Head: Normocephalic, atraumatic  Eyes: Pupils equal round reactive to light  ENT: Posterior pharynx normal in appearance, oral mucosa is moist  Chest: Nontender to palpation  Cardiovascular:  Regular rate, no murmurs audible  Lungs: Tachypneic in the low 20s, Diminished breath sounds bilaterally, rales audible bilateral lower lung fields   Abdomen: Soft, nontender, nondistended, no peritoneal signs  Extremities: No edema, normal appearance  Neuro: GCS 15  Psych: Mood and affect are appropriate    Procedures         ED Course  ED Course   Comment By Time   EKG shows normal sinus rhythm with a rate of 75.  No significant ST segments.  Intervals are normal.  This is a normal EKG. Thanh Hensley MD 01/02 0741   Witnessed event while rechecking patient where her heart rate dropped into the 20s.  At this time she became symptomatic and she felt like she could not breathe and became nauseous. Thanh Hensley MD 01/02 0850   Dr. Swift agreed to see the patient.  I believe she may likely need a pacemaker. Thanh Hensley MD 01/02 0830   Hospitalist paged Thanh Hensley MD 01/02 0832   Dr. Ribera agreed for admission. Thanh Hensley MD 01/02 0891                  MDM  Number of Diagnoses or Management Options  Hypoxia:   Nausea:   Symptomatic bradycardia:   Syncope, unspecified syncope type:   Diagnosis management comments: Differential diagnosis for this patient would include COPD, asthma, bronchitis, pneumonia, congestive heart failure, pulmonary embolus, acute coronary syndrome, anxiety, or other concerns.    XR Chest 1 View (Final result) Result time: 01/02/18 08:35:20    Procedure changed from XR Chest 2 View        Final result by Mariel Solano MD (01/02/18 08:35:20)    Impression:    Cardiomegaly  with interstitial pulmonary edema and small  bilateral pleural effusions.     Continued followup is recommended.     This report was finalized on 1/2/2018 8:35 AM by Mariel Solano M.D..    Narrative:    PROCEDURE: XR CHEST 1 VW-     HISTORY: SOA  triage protocol     COMPARISON: August 16, 2017.     FINDINGS: The heart is enlarged. The mediastinum is unremarkable. There  is interstitial pulmonary edema with small bilateral pleural effusions  and bibasilar atelectasis. There is no pneumothorax.  There are no acute  osseous abnormalities.          Transcutaneous pacing pads were placed after the witnessed event with the patient had bradycardia down into the 20s.  Patient did not require pacing as this event was brief      Greater than 35 minutes critical care time was spent by the attending physician excluding separately billable procedures.       Amount and/or Complexity of Data Reviewed  Clinical lab tests: ordered and reviewed  Discussion of test results with the performing providers: yes  Decide to obtain previous medical records or to obtain history from someone other than the patient: yes  Obtain history from someone other than the patient: yes  Review and summarize past medical records: yes  Discuss the patient with other providers: yes  Independent visualization of images, tracings, or specimens: yes        Final diagnoses:   Symptomatic bradycardia   Hypoxia   Syncope, unspecified syncope type   Nausea            Thanh Hensley MD  01/02/18 0856

## 2018-01-02 NOTE — ED NOTES
Defib pads placed on pt at this time, monitoring pt with Pearl monitor as well as spacelabs.     Merlene Pereira RN  01/02/18 6063

## 2018-01-03 PROBLEM — I49.5 SICK SINUS SYNDROME (HCC): Status: ACTIVE | Noted: 2018-01-01

## 2018-01-03 NOTE — PLAN OF CARE
Problem: Cardiac Output, Decreased (Adult)  Goal: Adequate Cardiac Output/Effective Tissue Perfusion  Outcome: Ongoing (interventions implemented as appropriate)   01/03/18 0318   Cardiac Output, Decreased (Adult)   Adequate Cardiac Output/Effective Tissue Perfusion (Pacemaker to be place today.)       Problem: Skin Integrity Impairment, Risk/Actual (Adult)  Goal: Identify Related Risk Factors and Signs and Symptoms  Outcome: Outcome(s) achieved Date Met: 01/03/18 01/03/18 0318   Skin Integrity Impairment, Risk/Actual   Skin Integrity Impairment, Risk/Actual: Related Risk Factors age extremes;edema;fluid/nutrition status;metabolic imbalance;tissue perfusion impaired   Signs and Symptoms (Skin Integrity Impairment) edema  (Skin protection provided.)     Goal: Skin Integrity/Wound Healing  Outcome: Ongoing (interventions implemented as appropriate)   01/03/18 0318   Skin Integrity Impairment, Risk/Actual (Adult)   Skin Integrity/Wound Healing (No breakdown noted . pt moves self in bed.)       Problem: Fall Risk (Adult)  Goal: Absence of Falls  Outcome: Ongoing (interventions implemented as appropriate)   01/03/18 0318   Fall Risk (Adult)   Absence of Falls (No Falls this shift.)

## 2018-01-03 NOTE — PLAN OF CARE
Problem: Patient Care Overview (Adult)  Goal: Plan of Care Review  Outcome: Ongoing (interventions implemented as appropriate)   01/02/18 1800   Coping/Psychosocial Response Interventions   Plan Of Care Reviewed With patient;spouse   Patient Care Overview   Progress improving   Outcome Evaluation   Outcome Summary/Follow up Plan VSS, less SOA at this time, denies N/V     Goal: Adult Individualization and Mutuality  Outcome: Ongoing (interventions implemented as appropriate)      Problem: Cardiac Output, Decreased (Adult)  Goal: Identify Related Risk Factors and Signs and Symptoms  Outcome: Outcome(s) achieved Date Met: 01/02/18    Goal: Adequate Cardiac Output/Effective Tissue Perfusion  Outcome: Ongoing (interventions implemented as appropriate)      Problem: Skin Integrity Impairment, Risk/Actual (Adult)  Goal: Identify Related Risk Factors and Signs and Symptoms  Outcome: Ongoing (interventions implemented as appropriate)    Goal: Skin Integrity/Wound Healing  Outcome: Ongoing (interventions implemented as appropriate)      Problem: Fall Risk (Adult)  Goal: Identify Related Risk Factors and Signs and Symptoms  Outcome: Outcome(s) achieved Date Met: 01/02/18    Goal: Absence of Falls  Outcome: Ongoing (interventions implemented as appropriate)

## 2018-01-03 NOTE — PROGRESS NOTES
Glucose 20-30s. Amp D50 ordered. She is NPO for procedure today. She did get levemir last night as scheduled. Start low dose dextrose drip for now. Continue to monitor and treat accordingly.

## 2018-01-03 NOTE — PROGRESS NOTES
Discharge Planning Assessment   [place]     Patient Name: Sigrid Casarez  MRN: [id number]  Today's Date: 1/3/2018    Admit Date: 1/2/2018          Discharge Needs Assessment       01/03/18 1018    Living Environment    Lives With spouse    Living Arrangements house    Home Accessibility bed and bath on same level;stairs to enter home;stairs within home    Number of Stairs to Enter Home 2    Type of Financial/Environmental Concern none    Transportation Available car    Living Environment    Provides Primary Care For no one    Quality Of Family Relationships unable to assess    Able to Return to Prior Living Arrangements yes    Discharge Needs Assessment    Concerns To Be Addressed no discharge needs identified;denies needs/concerns at this time    Readmission Within The Last 30 Days no previous admission in last 30 days    Anticipated Changes Related to Illness none    Equipment Currently Used at Home cane, quad;oxygen   2 liters continuously    Discharge Disposition still a patient            Discharge Plan       01/03/18 1021    Case Management/Social Work Plan    Plan Discharge Planning    Additional Comments SW met with pt at bedside while in ICU for dcp. Pt states she lives in a house with her  and she is independent with ADL's. Pt does use a quad cane sometimes to ambulate. She also uses oxygen continuously at 2 liters provided by [employer]. She has used [facility] in the past. She does have a primary PCP. She is currently on a TTS dialysis schedule. She denies any discharge needs at this time and is scheduled to get a pacemaker later today. CM will continue to follow for discharge needs.        Discharge Placement     No information found                Demographic Summary     None            Functional Status       01/03/18 1017    Functional Status Prior    Ambulation 1-->assistive equipment    Transferring 1-->assistive equipment    Toileting 0-->independent    Bathing 0-->independent     Dressing 0-->independent    Eating 0-->independent    Communication 0-->understands/communicates without difficulty    Swallowing 0-->swallows foods/liquids without difficulty    IADL    Medications independent    Meal Preparation independent    Housekeeping independent    Laundry independent    Shopping independent    Oral Care independent    Activity Tolerance    Current Activity Limitations none    Usual Activity Tolerance moderate    Current Activity Tolerance moderate    Cognitive/Perceptual/Developmental    Current Mental Status/Cognitive Functioning no deficits noted    Recent Changes in Mental Status/Cognitive Functioning no changes;unable to assess    Employment/Financial    Financial Concerns none            Psychosocial     None            Abuse/Neglect     None            Legal     None            Substance Abuse     None            Patient Forms     None          BRADLEY Borrego, SUE  01/03/18  10:26 AM

## 2018-01-03 NOTE — PROGRESS NOTES
[facility]    Name:  Sigrid Casarez   Age:  77 y.o.  Sex:  female  :  1940  MRN:  [id number]   Visit Number:  [id number]  Admission Date:  2018  Date Of Service:  18  Primary Care Physician:  Tiburcio Hernandez MD     LOS: 0 days :  Patient Care Team:  Tiburcio Hernandez MD as PCP - General:    Chief Complaint:      Headache and shortness of breath.    Subjective / Interval History:     Patient is currently lying down on the bed and complaining of left-sided occipital headache.  She denies any nausea or vomiting.  She did have significant hypoglycemia this morning with fingerstick glucose in the 20s requiring dextrose followed by infusion of 5% dextrose saline.  She has been scheduled for pacemaker placement this morning and this has been done in the afternoon.  Patient is also scheduled for dialysis today.  Because of for bilateral basal Velcro type crackles she did undergo a high-resolution CT scan of the chest yesterday which showed emphysema with bilateral pleural effusions.    Review of Systems:     General ROS: Patient denies any fevers, chills or loss of consciousness.  Respiratory ROS: Complains of cough and shortness of breath.  Cardiovascular ROS: Denies chest pain or palpitations. No history of exertional chest pain.  Gastrointestinal ROS: Denies nausea and vomiting. Denies any abdominal pain. No diarrhea.  Neurological ROS: Denies any focal weakness. No loss of consciousness.  Complains of headache.  Dermatological ROS: Denies any redness or pruritis.    Vital Signs:    Temp:  [96.7 °F (35.9 °C)-98.4 °F (36.9 °C)] 96.7 °F (35.9 °C)  Heart Rate:  [57-84] 66  Resp:  [11-24] 20  BP: (117-172)/(40-99) 150/49    Intake and output:    I/O last 3 completed shifts:  In: 500 [P.O.:480; I.V.:20]  Out: 125 [Urine:125]  I/O this shift:  In: 360 [P.O.:360]  Out: -     Physical Examination:    General Appearance:  Alert and cooperative, in mild distress due to  headache.   Head:  Atraumatic and normocephalic, without obvious abnormality.   Eyes:          PERRLA, conjunctivae and sclerae normal, no Icterus. No pallor. Extra-occular movements are within normal limits.   Neck: Supple, trachea midline, no thyromegaly, no carotid bruit.   Lungs:   Chest shape is normal. Breath sounds heard bilaterally equally.  No wheezing.  Bilateral basal crackles heard. No Pleural rub or bronchial breathing.   Heart:  Normal S1 and S2, 2/6 systolic murmur, no gallop, no rub. No JVD   Abdomen:   Normal bowel sounds, no masses, no organomegaly. Soft, non-tender, non-distended, no guarding, no rebound tenderness.   Extremities: Moves all extremities well, no edema, no cyanosis, no            Clubbing. AV fistula with the bruit noted in the right arm.   Skin: No bleeding, bruising or rash.   Neurologic: Awake, alert and oriented times 3. Moves all 4 extremities equally.   Laboratory results:      Results from last 7 days  Lab Units 01/03/18  0457 01/02/18  0725   SODIUM mmol/L 141 142   POTASSIUM mmol/L 3.6 4.0   CHLORIDE mmol/L 104 103   CO2 mmol/L 28.0 26.0   BUN mg/dL 66* 58*   CREATININE mg/dL 6.60* 6.00*   CALCIUM mg/dL 8.8 9.3   BILIRUBIN mg/dL  --  0.7   ALK PHOS U/L  --  98   ALT (SGPT) U/L  --  39   AST (SGOT) U/L  --  27   GLUCOSE mg/dL 26* 239*       Results from last 7 days  Lab Units 01/03/18  0457 01/02/18  0725   WBC 10*3/mm3 5.39 7.71   HEMOGLOBIN g/dL 7.7* 9.1*   HEMATOCRIT % 23.8* 28.6*   PLATELETS 10*3/mm3 195 214           Results from last 7 days  Lab Units 01/03/18  0457 01/02/18  1350 01/02/18  0725   TROPONIN I ng/mL <0.012 <0.012 <0.012           I have reviewed the patient's laboratory results.    Radiology results:    Imaging Results (last 24 hours)     Procedure Component Value Units Date/Time    CT Chest Hi Resolution [[id number]] Collected:  01/03/18 0812     Updated:  01/03/18 0815    Narrative:       PROCEDURE: CT CHEST HI RESOLUTION-     HISTORY: ? ILD;  R00.1-Bradycardia, unspecified; R09.02-Hypoxemia;  R55-Syncope and collapse; R11.0-Nausea     COMPARISON: CT of the chest dated August 18, 2017.     PROCEDURE: 1 mm axial images were obtained at 10 mm increments of the  chest.      FINDINGS: There is centrilobular emphysema. There is no significant  interlobular septal thickening or honeycombing. There is no  bronchiectasis. There are bilateral pleural effusions and bibasilar  atelectasis. The heart size is mildly enlarged. There is no pericardial  effusion.             Impression:       Centrilobular emphysema with small bilateral pleural  effusions and diffuse bibasilar atelectasis.        82 mGy.cm          This study was performed with techniques to keep radiation doses as low  as reasonably achievable (ALARA). Individualized dose reduction  techniques using automated exposure control or adjustment of mA and/or  kV according to the patient size were employed.         This report was finalized on 1/3/2018 8:13 AM by Mariel Solano M.D..    XR Chest 1 View [[id number]] Collected:  01/03/18 1425     Updated:  01/03/18 1428    Narrative:       PROCEDURE: XR CHEST 1 VW-     HISTORY: S/P [place]; R00.1-Bradycardia, unspecified;  R09.02-Hypoxemia; R55-Syncope and collapse; R11.0-Nausea; I49.5-Sick  sinus syndrome     COMPARISON: January 2, 2018.     FINDINGS: A left subclavian pacemaker is in place with leads in the  right atrium and ventricle. The heart is enlarged. The mediastinum is  unremarkable. There are bilateral pleural effusions and bibasilar  opacities which are unchanged. There is no pneumothorax.  There are no  acute osseous abnormalities.           Impression:       Placement of a left subclavian pacemaker with no evidence of  pneumothorax.     Continued followup is recommended.     This report was finalized on 1/3/2018 2:26 PM by Mariel Solano M.D..        I have reviewed the patient's radiology reports.    Medication Review:     I have  reviewed the patients active and prn medications.     Principal Problem:    Symptomatic bradycardia  Active Problems:    Acute on chronic respiratory failure with hypoxia    Chronic anemia    Essential hypertension    Bilateral nondiabetic proliferative retinopathy    Type 2 diabetes mellitus with nephropathy    End stage renal disease on dialysis    Acquired hypothyroidism    Sick sinus syndrome    Assessment:    1.  Acute on chronic hypoxic respiratory failure, present on admission.  2.  Symptomatic bradycardia secondary to sick sinus syndrome, status post pacemaker placement on 1/3/2018.  3.  Paroxysmal atrial fibrillation.  4.  Bilateral pleural effusions with underlying COPD.  5.  Acute hypoglycemia.  6.  Essential hypertension.  7.  Coronary artery disease on medical therapy.  8.  Diabetes mellitus type 2 with nephropathy.  9.  Acquired hypothyroidism.  10.  End-stage renal disease on hemodialysis on Tuesday, Thursday and Saturday.    Plan:    Patient is complaining of generalized weakness and headache which I think is secondary to the profound hypoglycemia that she had this morning.  We will maintain her on dextrose infusion.  She did undergo placement of the pacemaker today in the left side by Dr. Swift.  She did have hypoxia post procedure requiring higher FiO2 levels to maintain saturations.  She does have underlying emphysema as well as bilateral pleural effusions and also may have some fluid overload due to missed dialysis and contributing to her hypoxia.  She will have dialysis today with ultrafiltration.  She also does have chronic anemia and may benefit from blood transfusions.  We will repeat her hemoglobin tomorrow and transfuse her with packed cells tomorrow again with repeat dialysis.  I have discussed the patient's condition with Dr. Mejia.  I have also discussed the patient's condition with Dr. Swift.    Patient continues to have left temporal headache which she states as an electric shock type  of pain.  There is no local tenderness next to the left eye in the temporal region.  She denies any new visual changes.  We will obtain ESR and I will place her on morphine for pain control.  I will also give her a dose of Solu-Medrol.      Jordan Ribera MD  01/03/18  3:11 PM    Dictated utilizing Dragon dictation.

## 2018-01-03 NOTE — PLAN OF CARE
Problem: Patient Care Overview (Adult)  Goal: Plan of Care Review  Outcome: Ongoing (interventions implemented as appropriate)   01/03/18 5847   Coping/Psychosocial Response Interventions   Plan Of Care Reviewed With patient   Patient Care Overview   Progress improving   Outcome Evaluation   Outcome Summary/Follow up Plan VSS, Pacemaker placed today, less SOA, denies N/V     Goal: Adult Individualization and Mutuality  Outcome: Ongoing (interventions implemented as appropriate)      Problem: Cardiac Output, Decreased (Adult)  Goal: Adequate Cardiac Output/Effective Tissue Perfusion  Outcome: Ongoing (interventions implemented as appropriate)      Problem: Skin Integrity Impairment, Risk/Actual (Adult)  Goal: Skin Integrity/Wound Healing  Outcome: Ongoing (interventions implemented as appropriate)      Problem: Fall Risk (Adult)  Goal: Absence of Falls  Outcome: Ongoing (interventions implemented as appropriate)

## 2018-01-03 NOTE — PROGRESS NOTES
"Nephrology Progress Note.    LOS: 0 days    Patient Care Team:  Tiburcio Hernandez MD as PCP - General    Chief Complaint:    Chief Complaint   Patient presents with   • Shortness of Breath       Subjective     Interval History:     Patient Complaints: none    Patient seen and examined this morning.  Events from last night noted.  Patient denies having any fevers chills.  No nausea or vomiting no abdominal pain.  Denies any chest pain shortness of breath cough or sputum production.  There is no significant edema.   Patient also denies having new onset weakness of numbness of either extremity, she had episode of hypoglycemia and also had headache and it is better with treatment.  History taken from: patient      Review of Systems:    The pertinent  ROS was done and it is noted above, rest  was negative.    Objective     Vital Signs  /99  Pulse 60  Temp 98.4 °F (36.9 °C) (Oral)   Resp 24  Ht 160 cm (63\")  Wt 63.1 kg (139 lb 3.2 oz)  LMP  (LMP Unknown)  SpO2 96%  Breastfeeding? No  BMI 24.66 kg/m2           Intake/Output Summary (Last 24 hours) at 01/03/18 1011  Last data filed at 01/03/18 0212   Gross per 24 hour   Intake              500 ml   Output              125 ml   Net              375 ml       Physical Exam:    General Appearance: alert, oriented x 3, no acute distress,   HEENT: pupils round and reactive to light, oral mucosa dry, extra occular movements intact.  Neck: supple, no JVD, trachea midline  Lungs: Clear to Auscultation, unlabored breathing effort  Heart: RRR, normal S1 and S2, no S3, no rub  Abdomen: soft, non-tender, no palpable bladder, present bowel sounds to auscultation  Extremities: no edema, cyanosis or clubbing.   Neuro: normal speech and mental status, grossly non focal.     Results Review:      Results from last 7 days  Lab Units 01/03/18  0457 01/02/18  0725   SODIUM mmol/L 141 142   POTASSIUM mmol/L 3.6 4.0   CHLORIDE mmol/L 104 103   CO2 mmol/L 28.0 26.0   BUN mg/dL 66* 58* "   CREATININE mg/dL 6.60* 6.00*   CALCIUM mg/dL 8.8 9.3   BILIRUBIN mg/dL  --  0.7   ALK PHOS U/L  --  98   ALT (SGPT) U/L  --  39   AST (SGOT) U/L  --  27   GLUCOSE mg/dL 26* 239*       Estimated Creatinine Clearance: 6.2 mL/min (by C-G formula based on Cr of 6.6).      Results from last 7 days  Lab Units 01/03/18  0457   MAGNESIUM mg/dL 2.7*               Results from last 7 days  Lab Units 01/03/18  0457 01/02/18  0725   WBC 10*3/mm3 5.39 7.71   HEMOGLOBIN g/dL 7.7* 9.1*   PLATELETS 10*3/mm3 195 214               Imaging Results (last 24 hours)     Procedure Component Value Units Date/Time    CT Chest Hi Resolution [[id number]] Collected:  01/03/18 0812     Updated:  01/03/18 0815    Narrative:       PROCEDURE: CT CHEST HI RESOLUTION-     HISTORY: ? ILD; R00.1-Bradycardia, unspecified; R09.02-Hypoxemia;  R55-Syncope and collapse; R11.0-Nausea     COMPARISON: CT of the chest dated August 18, 2017.     PROCEDURE: 1 mm axial images were obtained at 10 mm increments of the  chest.      FINDINGS: There is centrilobular emphysema. There is no significant  interlobular septal thickening or honeycombing. There is no  bronchiectasis. There are bilateral pleural effusions and bibasilar  atelectasis. The heart size is mildly enlarged. There is no pericardial  effusion.             Impression:       Centrilobular emphysema with small bilateral pleural  effusions and diffuse bibasilar atelectasis.        82 mGy.cm          This study was performed with techniques to keep radiation doses as low  as reasonably achievable (ALARA). Individualized dose reduction  techniques using automated exposure control or adjustment of mA and/or  kV according to the patient size were employed.         This report was finalized on 1/3/2018 8:13 AM by Mariel Solano M.D..          amLODIPine 5 mg Oral Daily   aspirin 81 mg Oral Daily   atorvastatin 10 mg Oral Daily   b complex-vitamin c-folic acid 1 tablet Oral Daily   carvedilol 25 mg Oral  BID With Meals   citalopram 10 mg Oral Nightly   heparin (porcine) 5,000 Units Subcutaneous Q12H   insulin aspart 0-7 Units Subcutaneous 4x Daily AC & at Bedtime   insulin detemir 10 Units Subcutaneous Nightly   levothyroxine 175 mcg Oral Q AM   lidocaine-prilocaine  Topical Once   neosporin  in NaCl 0.9% bladder IRRIGATION  Irrigation Q24H   valsartan 320 mg Oral Q24H   vancomycin 1,000 mg Intravenous 60 Min Pre-Op   Vitamin D 1,000 Units Oral Daily       dextrose 50 mL/hr Last Rate: 50 mL/hr (01/03/18 0643)       Medication Review:   Current Facility-Administered Medications   Medication Dose Route Frequency Provider Last Rate Last Dose   • acetaminophen (TYLENOL) tablet 650 mg  650 mg Oral Q4H PRN Jordan Ribera MD   650 mg at 01/03/18 0932   • amLODIPine (NORVASC) tablet 5 mg  5 mg Oral Daily Jordan Ribera MD   5 mg at 01/03/18 0930   • aspirin EC tablet 81 mg  81 mg Oral Daily Jordan Ribera MD   81 mg at 01/03/18 0929   • atorvastatin (LIPITOR) tablet 10 mg  10 mg Oral Daily Jordan Ribera MD   10 mg at 01/03/18 0930   • b complex-vitamin c-folic acid (NEPHRO-CLEOPATRA) tablet 1 tablet  1 tablet Oral Daily Jordan Ribera MD   1 tablet at 01/03/18 0930   • carvedilol (COREG) tablet 25 mg  25 mg Oral BID With Meals Jordan Ribera MD   25 mg at 01/03/18 0929   • citalopram (CeleXA) tablet 10 mg  10 mg Oral Nightly Jordan Ribera MD   10 mg at 01/02/18 2127   • dextrose (D50W) solution 25 g  25 g Intravenous Q15 Min PRN Jordan Ribera MD   25 g at 01/03/18 0601   • dextrose (D5W) 5 % infusion  50 mL/hr Intravenous Continuous Lise Isaac DO 50 mL/hr at 01/03/18 0643 50 mL/hr at 01/03/18 0643   • dextrose (GLUTOSE) oral gel 1 tube  1 tube Oral Q15 Min PRN Jordan Ribera MD       • glucagon (human recombinant) (GLUCAGEN DIAGNOSTIC) injection 1 mg  1 mg Subcutaneous Q15 Min PRN Jordan Ribera MD       • heparin (porcine) 5000 UNIT/ML injection 5,000 Units  5,000 Units Subcutaneous Q12H Jordan Ribera MD   5,000 Units at 01/02/18 1457   •  insulin aspart (novoLOG) injection 0-7 Units  0-7 Units Subcutaneous 4x Daily AC & at Bedtime Jordan Ribera MD   5 Units at 01/02/18 1822   • insulin detemir (LEVEMIR) injection 10 Units  10 Units Subcutaneous Nightly Jordan Ribera MD   10 Units at 01/02/18 2128   • levothyroxine (SYNTHROID, LEVOTHROID) tablet 175 mcg  175 mcg Oral Q AM Jordan Ribera MD   175 mcg at 01/03/18 0606   • lidocaine-prilocaine (EMLA) 2.5-2.5 % cream   Topical Once Deric Mejia MD       • neomycin-polymyxin B (NEOSPORIN-) 1 mL in sodium chloride (NS) 1,000 mL irrigation   Irrigation Q24H Bridger Swift MD       • ondansetron (ZOFRAN) tablet 4 mg  4 mg Oral Q6H PRN Jordan Ribera MD   4 mg at 01/03/18 0238    Or   • ondansetron ODT (ZOFRAN-ODT) disintegrating tablet 4 mg  4 mg Oral Q6H PRN Jordan Ribera MD        Or   • ondansetron (ZOFRAN) injection 4 mg  4 mg Intravenous Q6H PRN Jordan Ribera MD   4 mg at 01/02/18 1606   • sodium chloride 0.9 % flush 1-10 mL  1-10 mL Intravenous PRN Jordan Ribera MD       • sodium chloride 0.9 % flush 1-10 mL  1-10 mL Intravenous PRN Bridger Swift MD       • valsartan (DIOVAN) tablet 320 mg  320 mg Oral Q24H Jordan Ribera MD   320 mg at 01/03/18 0929   • vancomycin 1000 mg in sodium chloride 0.9% 250 mL IVPB  1,000 mg Intravenous 60 Min Pre-Op Bridger Swift MD       • Vitamin D 1,000 Units  1,000 Units Oral Daily Jordan Ribera MD   1,000 Units at 01/03/18 0930       Assessment/Plan   1.   End stage renal disease on dialysis  2.   Symptomatic bradycardia  3.   Chronic anemia  4.   Essential hypertension  5.   Acquired hypothyroidism  6.   Bilateral nondiabetic proliferative retinopathy  7.   Type 2 diabetes mellitus with nephropathy  8.   Acute on chronic respiratory failure with hypoxia  9.   Mixed hyperlipidemia     Plan:     · Continue routine dialysis as ordered  · She has a tachybradycardia syndrome, Dr. Swift has evaluated the patient and the permanent pacemaker is being placed  later today.  · Anemia she is on outpatient long-acting erythropoietin therapy will be continued.  · Home medications reviewed and will be continued.  · Details were discussed with the patient as well as family in the room.    · Details were also discussed with the hospitalist service.   · Further recommendations will depend on clinical course of the patient during the current hospitalization.    · I also discussed the details with the nursing staff.      Details were discussed with the patient as well as family in the room.  Further recommendations will depend on clinical course of the patient during the current hospitalization.      I also discussed the details with the nursing staff.    Rest as ordered.    Deric Mejia MD  01/03/18  10:11 AM      EMR Dragon/Transcription disclaimer:   Much of this encounter note is an electronic transcription/translation of spoken language to printed text. The electronic translation of spoken language may permit erroneous, or at times, nonsensical words or phrases to be inadvertently transcribed; Although I have reviewed the note for such errors, some may still exist.

## 2018-01-04 NOTE — PLAN OF CARE
Problem: Patient Care Overview (Adult)  Goal: Plan of Care Review  Outcome: Ongoing (interventions implemented as appropriate)   01/04/18 3359   Coping/Psychosocial Response Interventions   Plan Of Care Reviewed With patient   Outcome Evaluation   Outcome Summary/Follow up Plan PT eval completed. Patient presents with decreased strength, balance, aerobic capacity and independence. She is expected to benefit from continued skilled PT intervention to improve her overall functional mobility status prior to D/C.

## 2018-01-04 NOTE — PLAN OF CARE
Problem: Patient Care Overview (Adult)  Goal: Plan of Care Review  Outcome: Ongoing (interventions implemented as appropriate)   01/04/18 0755   Coping/Psychosocial Response Interventions   Plan Of Care Reviewed With patient   Patient Care Overview   Progress improving   Outcome Evaluation   Outcome Summary/Follow up Plan ppm working, vss.       Problem: Fall Risk (Adult)  Goal: Identify Related Risk Factors and Signs and Symptoms  Outcome: Ongoing (interventions implemented as appropriate)

## 2018-01-04 NOTE — PLAN OF CARE
Problem: Inpatient Physical Therapy  Goal: Bed Mobility Goal LTG- PT  Outcome: Ongoing (interventions implemented as appropriate)   01/04/18 1613   Bed Mobility PT LTG   Bed Mobility PT LTG, Date Established 01/04/18   Bed Mobility PT LTG, Time to Achieve 2 wks   Bed Mobility PT LTG, Activity Type supine to sit/sit to supine;roll left/roll right   Bed Mobility PT LTG, [place] Level conditional independence   Bed Mobility PT Goal LTG, Assist Device bed rails   Bed Mobility PT LTG, Outcome goal ongoing      01/04/18 1613   Bed Mobility PT LTG   Bed Mobility PT LTG, Date Established 01/04/18   Bed Mobility PT LTG, Time to Achieve 2 wks   Bed Mobility PT LTG, Activity Type supine to sit/sit to supine;roll left/roll right   Bed Mobility PT LTG, [place] Level conditional independence   Bed Mobility PT Goal LTG, Assist Device bed rails   Bed Mobility PT LTG, Outcome goal ongoing     Goal: Transfer Training Goal 1 LTG- PT  Outcome: Ongoing (interventions implemented as appropriate)   01/04/18 1613   Transfer Training PT LTG   Transfer Training PT LTG, Date Established 01/04/18   Transfer Training PT LTG, Time to Achieve 2 wks   Transfer Training PT LTG, Activity Type sit to stand/stand to sit;bed to chair /chair to bed   Transfer Training PT LTG, [place] Level supervision required   Transfer Training PT LTG, Assist Device walker, rolling   Transfer Training PT LTG, Outcome goal ongoing     Goal: Gait Training Goal LTG- PT  Outcome: Ongoing (interventions implemented as appropriate)   01/04/18 1613   Gait Training PT LTG   Gait Training Goal PT LTG, Date Established 01/04/18   Gait Training Goal PT LTG, Time to Achieve 2 wks   Gait Training Goal PT LTG, [place] Level supervision required   Gait Training Goal PT LTG, Assist Device walker, rolling   Gait Training Goal PT LTG, Distance to Achieve 300   Gait Training Goal PT LTG, Additional Goal without LOB.   Gait Training Goal PT LTG, Outcome goal ongoing      Goal: Strength Goal LTG- PT  Outcome: Ongoing (interventions implemented as appropriate)   01/04/18 1613   Strength Goal PT LTG   Strength Goal PT LTG, Date Established 01/04/18   Strength Goal PT LTG, Time to Achieve 2 wks   Strength Goal PT LTG, Measure to Achieve Patient will perform B LE ther ex x 15 reps to improve functional strength for mobility.   Strength Goal PT LTG, Outcome goal ongoing

## 2018-01-04 NOTE — PLAN OF CARE
Problem: Patient Care Overview (Adult)  Goal: Plan of Care Review  Outcome: Ongoing (interventions implemented as appropriate)   01/04/18 1624   Coping/Psychosocial Response Interventions   Plan Of Care Reviewed With patient   Patient Care Overview   Progress no change   Outcome Evaluation   Outcome Summary/Follow up Plan OT eval completed. Patient presents deficits in strength, endurance, balance, mobility and ADL performance. Patient is expected to benefit from OT services to improve overall functional performance and mobility prior to DC.        Problem: Inpatient Occupational Therapy  Goal: Transfer Training Goal 2 LTG- OT  Outcome: Ongoing (interventions implemented as appropriate)   01/04/18 1624   Transfer Training 2 OT LTG   Transfer Training 2 OT LTG, Date Established 01/04/18   Transfer Training 2 OT LTG, Time to Achieve 2 wks   Transfer Training 2 OT LTG, Activity Type sit to stand/stand to sit   Transfer Training 2 OT LTG, [place] Level supervision required   Transfer Training 2 OT LTG, Assist Device walker, rolling   Transfer Training 2 OT LTG, Outcome goal ongoing     Goal: Bathing Goal LTG- OT  Outcome: Ongoing (interventions implemented as appropriate)   01/04/18 1624   Bathing OT LTG   Bathing Goal OT LTG, Date Established 01/04/18   Bathing Goal OT LTG, Time to Achieve 2 wks   Bathing Goal OT LTG, Activity Type upper body bathing;lower body bathing   Bathing Goal OT LTG, [place] Level set up required   Bathing Goal OT LTG, Outcome goal ongoing     Goal: Toileting Goal LTG- OT  Outcome: Ongoing (interventions implemented as appropriate)   01/04/18 1624   Toileting OT LTG   Toileting Goal OT LTG, Date Established 01/04/18   Toileting Goal OT LTG, Time to Achieve 2 wks   Toileting Goal OT LTG, [place] Level supervision required   Toileting Goal OT LTG, Outcome goal ongoing     Goal: Functional Mobility Goal LTG- OT  Outcome: Ongoing (interventions implemented as appropriate)   01/04/18  1624   Functional Mobility OT LTG   Functional Mobility Goal OT LTG, Date Established 01/04/18   Functional Mobility Goal OT LTG, Time to Achieve 2 wks   Functional Mobility Goal OT LTG, [place] Level standby assist   Functional Mobility Goal OT LTG, Assist Device rolling walker   Functional Mobility Goal OT LTG, Distance to Achieve in hallway   Functional Mobility Goal OT LTG, Additional Goal 200   Functional Mobility Goal OT LTG, Outcome goal ongoing

## 2018-01-04 NOTE — PLAN OF CARE
Problem: Patient Care Overview (Adult)  Goal: Plan of Care Review  Outcome: Ongoing (interventions implemented as appropriate)   01/04/18 1554   Coping/Psychosocial Response Interventions   Plan Of Care Reviewed With patient   Outcome Evaluation   Outcome Summary/Follow up Plan pt will f/u with Dr Swift in his clinic in 1 wk     Goal: Adult Individualization and Mutuality  Outcome: Ongoing (interventions implemented as appropriate)   01/04/18 1554   Mutuality/Individual Preferences   What Questions Do You Have About Your Health or Care? none     Goal: Discharge Needs Assessment  Outcome: Ongoing (interventions implemented as appropriate)      Problem: Cardiac Output, Decreased (Adult)  Goal: Adequate Cardiac Output/Effective Tissue Perfusion  Outcome: Ongoing (interventions implemented as appropriate)   01/04/18 1554   Cardiac Output, Decreased (Adult)   Adequate Cardiac Output/Effective Tissue Perfusion (no new problems)       Problem: Skin Integrity Impairment, Risk/Actual (Adult)  Goal: Skin Integrity/Wound Healing  Outcome: Ongoing (interventions implemented as appropriate)   01/04/18 1554   Skin Integrity Impairment, Risk/Actual (Adult)   Skin Integrity/Wound Healing (no new problems to note this shift)       Problem: Fall Risk (Adult)  Goal: Identify Related Risk Factors and Signs and Symptoms  Outcome: Ongoing (interventions implemented as appropriate)   01/04/18 1554   Fall Risk   Fall Risk: Related Risk Factors age-related changes;history of falls  (no falls this shift)     Goal: Absence of Falls  Outcome: Ongoing (interventions implemented as appropriate)   01/04/18 1554   Fall Risk (Adult)   Absence of Falls achieves outcome  (no falls this shift)

## 2018-01-04 NOTE — THERAPY EVALUATION
Acute Care - Physical Therapy Initial Evaluation   [place]     Patient Name: Sigrid Casarez  : 1940  MRN: [id number]  Today's Date: 2018   Onset of Illness/Injury or Date of Surgery Date: 18  Date of Referral to PT: 18  Referring Physician: Bacilio      Admit Date: 2018     Visit Dx:    ICD-10-CM ICD-9-CM   1. Symptomatic bradycardia R00.1 427.89   2. Hypoxia R09.02 799.02   3. Syncope, unspecified syncope type R55 780.2   4. Nausea R11.0 787.02   5. Sick sinus syndrome I49.5 427.81   6. Impaired functional mobility, balance, gait, and endurance Z74.09 V49.89     Patient Active Problem List   Diagnosis   • Chronic anemia   • Depression   • Diabetic peripheral neuropathy   • 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   • Dizziness   • Malaise and fatigue   • Essential hypertension   • Hypoglycemia   • Acquired hypothyroidism   • Diabetic macular edema   • Menopause present   • Mixed hyperlipidemia   • Osteopenia   • Vitamin D deficiency   • Bilateral nondiabetic proliferative retinopathy   • Hip fracture, left   • Status post fall   • Type 2 diabetes mellitus with nephropathy   • Chronic kidney disease, stage IV (severe)   • Erythropoietin deficiency anemia   • Chronic kidney disease, stage V   • Dyspnea on exertion   • Hypoxia   • Symptomatic bradycardia   • Acute on chronic respiratory failure with hypoxia   • End stage renal disease on dialysis   • Sick sinus syndrome     Past Medical History:   Diagnosis Date   • Anemia    • Chronic kidney disease    • Chronic kidney disease    • Community acquired pneumonia    • Dexa Body Composition study lumosacral spine and femur     ostepenic   • Diabetes    • Diabetic nephropathy, type I    • Diabetic peripheral neuropathy type 1    • Disease of thyroid gland    • Fracture    • Hypertension    • Menopause    • Pneumonia      Past Surgical History:   Procedure Laterality Date   • CARDIAC  ELECTROPHYSIOLOGY PROCEDURE N/A 1/3/2018    Procedure: Pacemaker DC new;  Surgeon: Bridger Swift MD;  Location: [facility] CATH INVASIVE LOCATION;  Service:    • CATARACT EXTRACTION     • CHOLECYSTECTOMY     • HIP CANNULATED SCREW PLACEMENT Left 6/11/2017    Procedure:  LEFT HIP MULTIPLE CANNULATED COMPRESSION SCREWS- FERMORAL NECK  FRACTURE;  Surgeon: Jimmy Sheehan MD;  Location: [facility] OR;  Service:           PT ASSESSMENT (last 72 hours)      PT Evaluation       01/04/18 1448 01/03/18 1018    Rehab Evaluation    Document Type evaluation  -LM     Subjective Information agree to therapy  -LM     Patient Effort, Rehab Treatment good  -LM     Symptoms Noted During/After Treatment fatigue  -LM     General Information    Patient Profile Review yes  -LM     Onset of Illness/Injury or Date of Surgery Date 01/02/18  -LM     Referring Physician Bacilio  -LM     General Observations Pt received supine in bed. O2 @ 5 LPM per n/c.  -LM     Pertinent History Of Current Problem ESRD on HD;DMHTN,afib,CAD,hypothyoidism  -LM     Precautions/Limitations fall precautions;oxygen therapy device and L/min  -LM     Prior Level of Function independent:;community mobility   with quad cane  -LM     Equipment Currently Used at Home cane, quad;oxygen;commode;shower chair;rollator  -LM cane, quad;oxygen   2 liters continuously  -AS    Plans/Goals Discussed With patient;agreed upon  -LM     Risks Reviewed patient:;LOB;increased discomfort  -LM     Benefits Reviewed patient:;improve function;increase independence  -LM     Living Environment    Lives With spouse  -LM spouse  -AS    Living Arrangements house  -LM house  -AS    Home Accessibility bed and bath on same level;stairs to enter home  -LM bed and bath on same level;stairs to enter home;stairs within home  -AS    Number of Stairs to Enter Home 3  -LM 2  -AS    Type of Financial/Environmental Concern  none  -AS    Transportation Available  car  -AS    Living Environment Comment  2-story home;stays on first floor  -LM     Clinical Impression    Date of Referral to PT 01/04/18  -LM     PT Diagnosis Generalized weakness  -LM     Patient/Family Goals Statement Return home.  -LM     Criteria for Skilled Therapeutic Interventions Met yes;treatment indicated  -LM     Pathology/Pathophysiology Noted (Describe Specifically for Each System) musculoskeletal;cardiovascular;genitourinary  -LM     Impairments Found (describe specific impairments) aerobic capacity/endurance;gait, locomotion, and balance  -LM     Rehab Potential good, to achieve stated therapy goals  -LM     Vital Signs    Pre SpO2 (%) 97  -LM     O2 Delivery Pre Treatment supplemental O2   5 LPM  -LM     Post SpO2 (%) 93  -LM     O2 Delivery Post Treatment supplemental O2   5 LPM  -LM     Pain Assessment    Pain Assessment No/denies pain  -LM     Cognitive Assessment/Intervention    Current Cognitive/Communication Assessment functional  -LM     Orientation Status oriented x 4  -LM     Follows Commands/Answers Questions able to follow multi-step instructions;needs cueing  -LM     Personal Safety WNL/WFL  -LM     Personal Safety Interventions fall prevention program maintained;gait belt;nonskid shoes/slippers when out of bed  -LM     ROM (Range of Motion)    General ROM upper extremity range of motion deficits identified  -LM     General ROM Detail L UE restricted d/t pacemaker placement.  -LM     MMT (Manual Muscle Testing)    General MMT Assessment no strength deficits identified  -LM     Bed Mobility, Assessment/Treatment    Bed Mobility, Assistive Device bed rails;head of bed elevated  -LM     Bed Mob, Supine to Sit, [place] contact guard assist  -LM     Bed Mob, Sit to Supine, [place] contact guard assist  -LM     Bed Mobility, Impairments strength decreased;impaired balance  -LM     Transfer Assessment/Treatment    Transfers, Sit-Stand [place] contact guard assist;hand held assist  -LM     Transfers, Stand-Sit  [place] contact guard assist;hand held assist  -LM     Transfer, Safety Issues balance decreased during turns;sequencing ability decreased;step length decreased;weight-shifting ability decreased  -LM     Transfer, Impairments strength decreased;impaired balance  -LM     Transfer, Comment trial RW  -LM     Gait Assessment/Treatment    Gait, [place] Level contact guard assist;hand held assist  -LM     Gait, Distance (Feet) 111  -LM     Gait, [place] Issues balance decreased during turns;sequencing ability decreased;step length decreased;weight-shifting ability decreased;supplemental O2  -LM     Gait, Impairments strength decreased;impaired balance  -LM     Positioning and Restraints    Pre-Treatment Position in bed  -LM     Post Treatment Position bed  -LM     In Bed fowlers;call light within reach;encouraged to call for assist  -LM       01/02/18 1312 01/02/18 1259    General Information    Equipment Currently Used at Home  cane, quad  -ASA    Living Environment    Lives With spouse  -ASA     Living Arrangements house  -ASA     Home Accessibility stairs to enter home  -ASA     Number of Stairs to Enter Home 2  -ASA     Stair Railings at Home none  -ASA     Type of Financial/Environmental Concern none  -ASA     Transportation Available car  -ASA       User Key  (r) = Recorded By, (t) = Taken By, (c) = Cosigned By    Initials Name Provider Type    ASA Sharla Edwards, RN Registered Nurse    CLOTILDE Chin, PT Physical Therapist    AS Felicia Acuña           Physical Therapy Education     Title: PT OT SLP Therapies (Done)     Topic: Physical Therapy (Done)     Point: Mobility training (Done)    Learning Progress Summary    Learner Readiness Method Response Comment Documented by Status   Patient Acceptance E VU Purpose of PT/POC.  Trial of RW for safer transfers/ambulation. LM 01/04/18 7046 Done               Point: Home exercise program (Done)    Learning Progress Summary    Learner Readiness  Method Response Comment Documented by Status   Patient Acceptance E VU Purpose of PT/POC.  Trial of RW for safer transfers/ambulation.  01/04/18 1556 Done               Point: Precautions (Done)    Learning Progress Summary    Learner Readiness Method Response Comment Documented by Status   Patient Acceptance E VU Purpose of PT/POC.  Trial of RW for safer transfers/ambulation.  01/04/18 1556 Done                      User Key     Initials Effective Dates Name Provider Type Discipline     10/26/16 -  Shikha Chin, PT Physical Therapist PT                PT Recommendation and Plan  Anticipated Equipment Needs At Discharge: front wheeled walker  Anticipated Discharge Disposition: home with home health, home with assist  Planned Therapy Interventions: balance training, bed mobility training, gait training, home exercise program, patient/family education, strengthening, transfer training  PT Frequency: daily                 Outcome Measures       01/04/18 1448          How much help from another person do you currently need...    Turning from your back to your side while in flat bed without using bedrails? 3  -LM      Moving from lying on back to sitting on the side of a flat bed without bedrails? 3  -LM      Moving to and from a bed to a chair (including a wheelchair)? 3  -LM      Standing up from a chair using your arms (e.g., wheelchair, bedside chair)? 3  -LM      Climbing 3-5 steps with a railing? 2  -LM      To walk in hospital room? 3  -LM      AM-PAC 6 Clicks Score 17  -LM      Functional Assessment    Outcome Measure Options AM-PAC 6 Clicks Basic Mobility (PT)  -LM        User Key  (r) = Recorded By, (t) = Taken By, (c) = Cosigned By    Initials Name Provider Type     Shikha Chin PT Physical Therapist           Time Calculation:         PT Charges       01/04/18 1557          Time Calculation    Start Time 1448  -LM      PT Received On 01/04/18  -      PT Goal Re-Cert Due Date 01/14/18  -         User Key  (r) = Recorded By, (t) = Taken By, (c) = Cosigned By    Initials Name Provider Type     Shikha Chin, PT Physical Therapist          Therapy Charges for Today     Code Description Service Date Service Provider Modifiers Qty    31539962354 HC PT EVAL LOW COMPLEXITY 4 1/4/2018 Shikha Chin, PT GP 1          PT G-Codes  Outcome Measure Options: AM-PAC 6 Clicks Basic Mobility (PT)      Shikha Chin PT  1/4/2018

## 2018-01-04 NOTE — PROGRESS NOTES
"   LOS: 0 days   Patient Care Team:  Tiburcio Hernandez MD as PCP - General      Interval History: Doing significantly better.  Has no new symptoms at this time the blood pressures have stayed high.        Review of Systems:   Pertinent items are noted in HPI.      Objective     Vital Sign Min/Max for last 24 hours  Temp  Min: 96.7 °F (35.9 °C)  Max: 99.8 °F (37.7 °C)   BP  Min: 102/88  Max: 182/62   Pulse  Min: 57  Max: 79   Resp  Min: 14  Max: 20   SpO2  Min: 89 %  Max: 98 %   Flow (L/min)  Min: 2  Max: 10   Weight  Min: 62.3 kg (137 lb 4.8 oz)  Max: 62.3 kg (137 lb 4.8 oz)     Flowsheet Rows         First Filed Value    Admission Height  160 cm (63\") Documented at 01/02/2018 0719    Admission Weight  63.5 kg (140 lb) Documented at 01/02/2018 0719          Physical Exam:     General Appearance:    Alert, cooperative, in no acute distress   Head:    Normocephalic, without obvious abnormality, atraumatic   Eyes:            Lids and lashes normal, conjunctivae and sclerae normal, no   icterus, no pallor, corneas clear, PERRLA   Ears:    Ears appear intact with no abnormalities noted   Throat:   No oral lesions, no thrush, oral mucosa moist   Neck:   No adenopathy, supple, trachea midline, no thyromegaly, no     carotid bruit, no JVD   Back:     No kyphosis present, no scoliosis present, no skin lesions,       erythema or scars, no tenderness to percussion or                   palpation,   range of motion normal   Lungs:   Decreased breath sounds bilaterally right more than left.      Heart:    Regular rhythm and normal rate, normal S1 and S2, no            murmur, no gallop, no rub, no click   Breast Exam:    Deferred   Abdomen:     Normal bowel sounds, no masses, no organomegaly, soft        non-tender, non-distended, no guarding, no rebound                 tenderness   Genitalia:    Deferred   Extremities:   Moves all extremities well, no edema, no cyanosis, no              redness   Pulses:   Pulses palpable and equal " bilaterally   Skin:   No bleeding, bruising or rash   Lymph nodes:   No palpable adenopathy   Neurologic:   Cranial nerves 2 - 12 grossly intact, sensation intact, DTR        present and equal bilaterally        Results Review:     I reviewed the patient's new clinical results.    Results Review:   I reviewed the patient's new clinical results.    EKG: AP is intrinsic ventricular conduction no acute ST segment changes.    LAB DATA :           Laboratory results:      Results from last 7 days  Lab Units 01/04/18  0453 01/03/18  0457 01/02/18  0725   SODIUM mmol/L 134* 141 142   POTASSIUM mmol/L 4.4 3.6 4.0   CHLORIDE mmol/L 100 104 103   CO2 mmol/L 26.0 28.0 26.0   BUN mg/dL 34* 66* 58*   CREATININE mg/dL 4.10* 6.60* 6.00*   CALCIUM mg/dL 8.7 8.8 9.3   BILIRUBIN mg/dL  --   --  0.7   ALK PHOS U/L  --   --  98   ALT (SGPT) U/L  --   --  39   AST (SGOT) U/L  --   --  27   GLUCOSE mg/dL 321* 26* 239*       Results from last 7 days  Lab Units 01/04/18  0453 01/03/18  0457 01/02/18  0725   WBC 10*3/mm3 5.97 5.39 7.71   HEMOGLOBIN g/dL 7.6* 7.7* 9.1*   HEMATOCRIT % 23.7* 23.8* 28.6*   PLATELETS 10*3/mm3 177 195 214                   Results from last 7 days  Lab Units 01/03/18  0457   TROPONIN I ng/mL <0.012               Results from last 7 days  Lab Units 01/02/18  0749   PH, ARTERIAL pH units 7.399   PCO2, ARTERIAL mm Hg 41.8   PO2 ART mm Hg 58.8*   HCO3 ART mmol/L 25.8   BASE EXCESS ART mmol/L 1.0   O2 SATURATION ART % 88.8   HEMOGLOBIN, ARTERIAL g/dL 8.2*   OXYHEMOGLOBIN % 87.2*   METHEMOGLOBIN % 1.40   MODALITY  Cannula - Nasal   FIO2 % 36     Lab Results   Component Value Date    HGBA1C 7.6 (H) 01/02/2018       Results from last 7 days  Lab Units 01/02/18  1354   TSH mIU/mL 0.134*   FREE T4 ng/dL 2.97*           IMAGING DATA:     Xr Chest 1 View    Result Date: 1/3/2018  Narrative: PROCEDURE: XR CHEST 1 VW-  HISTORY: S/P Pacemeker; R00.1-Bradycardia, unspecified; R09.02-Hypoxemia; R55-Syncope and collapse;  R11.0-Nausea; I49.5-Sick sinus syndrome  COMPARISON: January 2, 2018.  FINDINGS: A left subclavian pacemaker is in place with leads in the right atrium and ventricle. The heart is enlarged. The mediastinum is unremarkable. There are bilateral pleural effusions and bibasilar opacities which are unchanged. There is no pneumothorax.  There are no acute osseous abnormalities.         Impression: Placement of a left subclavian pacemaker with no evidence of pneumothorax.  Continued followup is recommended.  This report was finalized on 1/3/2018 2:26 PM by Mariel Solano M.D..    Xr Chest 1 View    Result Date: 1/2/2018  Narrative: PROCEDURE: XR CHEST 1 VW-  HISTORY: SOA  triage protocol  COMPARISON: August 16, 2017.  FINDINGS: The heart is enlarged. The mediastinum is unremarkable. There is interstitial pulmonary edema with small bilateral pleural effusions and bibasilar atelectasis. There is no pneumothorax.  There are no acute osseous abnormalities.         Impression: Cardiomegaly with interstitial pulmonary edema and small bilateral pleural effusions.  Continued followup is recommended.  This report was finalized on 1/2/2018 8:35 AM by Mariel Solano M.D..    Ct Chest Hi Resolution    Result Date: 1/3/2018  Narrative: PROCEDURE: CT CHEST HI RESOLUTION-  HISTORY: ? ILD; R00.1-Bradycardia, unspecified; R09.02-Hypoxemia; R55-Syncope and collapse; R11.0-Nausea  COMPARISON: CT of the chest dated August 18, 2017.  PROCEDURE: 1 mm axial images were obtained at 10 mm increments of the chest.  FINDINGS: There is centrilobular emphysema. There is no significant interlobular septal thickening or honeycombing. There is no bronchiectasis. There are bilateral pleural effusions and bibasilar atelectasis. The heart size is mildly enlarged. There is no pericardial effusion.           Impression: Centrilobular emphysema with small bilateral pleural effusions and diffuse bibasilar atelectasis.   82 mGy.cm     This study was  performed with techniques to keep radiation doses as low as reasonably achievable (ALARA). Individualized dose reduction techniques using automated exposure control or adjustment of mA and/or kV according to the patient size were employed.   This report was finalized on 1/3/2018 8:13 AM by Mariel Solano M.D..            Assessment/Plan    #1 Sick sinus syndrome: Patient is status post permanent pacemaker placement.  The pacer site looks good.  No evidence for hematoma.  Is being paced in the atrium most of the times.  In the ventricle intermittently.  Would continue the present medications for now.  Will evaluate the status of atrial fibrillation and follow-ups and decide on anticoagulation therapy.    #2 anticoagulation therapy: Min to renal failure patients have not been fully substantiated.  Old off on the same total follow-up to evaluate for any recurrent atrial fibrillation.    #3 coronary artery disease: No evidence for any acute coronary event at this time.  No further workup planned.    #4 hypoxemia: Patient continues to be hypoxic has significant effusions bilaterally would consider possible pleural tap especially in the right side.    5 hypertension: The blood pressure seems to be elevated will increase the Norvasc 5 mg by mouth twice a day.  Further titration will be done on outpatient basis.    Patient is okay for discharge from a cardiac standpoint.  She needs to avoid water to the site for a week.  No driving for a week.  Needs to follow-up with me next Wednesday at 1:30 PM at my office.    Principal Problem:    Symptomatic bradycardia  Active Problems:    Sick sinus syndrome    Chronic anemia    Essential hypertension    Acquired hypothyroidism    Bilateral nondiabetic proliferative retinopathy    Type 2 diabetes mellitus with nephropathy    Acute on chronic respiratory failure with hypoxia    End stage renal disease on dialysis              Bridger Swift MD  01/04/18  6:51 AM

## 2018-01-04 NOTE — PROGRESS NOTES
[facility]    Name:  Sigrid Casarez   Age:  77 y.o.  Sex:  female  :  1940  MRN:  [id number]   Visit Number:  [id number]  Admission Date:  2018  Date Of Service:  18  Primary Care Physician:  Tiburcio Hernandez MD     LOS: 0 days :  Patient Care Team:  Tiburcio Hernandez MD as PCP - General:    Chief Complaint:      Headache and shortness of breath.    Subjective / Interval History:     Patient is currently sitting up on the bed and is feeling better.  She still has some headache on the left side but it is significantly better.  She was given a dose of Solu-Medrol yesterday with suspicion for temporal arteritis.  However, her ESR done yesterday is reported as 16 which rules out temporal arteritis.  She did have pacemaker placement yesterday without any complications.  She also had hemodialysis following pacemaker placement.  He has not had any further episodes of hypoglycemia.  She denies any chest pain but does have baseline shortness of breath.  Denies any abdominal pain, nausea or vomiting.  She does have chronic shortness of breath and uses oxygen at home.    Review of Systems:     General ROS: Patient denies any fevers, chills or loss of consciousness.  Respiratory ROS: Complains of cough and shortness of breath.  Cardiovascular ROS: Denies chest pain or palpitations. No history of exertional chest pain.  Gastrointestinal ROS: Denies nausea and vomiting. Denies any abdominal pain. No diarrhea.  Neurological ROS: Denies any focal weakness. No loss of consciousness.  Complains of headache.  Dermatological ROS: Denies any redness or pruritis.    Vital Signs:    Temp:  [97.8 °F (36.6 °C)-99.8 °F (37.7 °C)] 98.1 °F (36.7 °C)  Heart Rate:  [70-79] 73  Resp:  [14-18] 16  BP: ()/(37-88) 172/45    Intake and output:    I/O last 3 completed shifts:  In:  [P.O.:710; I.V.:1348]  Out: 2900 [Urine:200; Other:2700]  I/O this shift:  In: 220 [P.O.:220]  Out:  -     Physical Examination:    General Appearance:  Alert and cooperative, in mild distress due to headache.   Head:  Atraumatic and normocephalic, without obvious abnormality.  No tenderness noted in the left temporal area.   Eyes:          PERRLA, conjunctivae and sclerae normal, no Icterus. No pallor. Extra-occular movements are within normal limits.   Neck: Supple, trachea midline, no thyromegaly, no carotid bruit.   Lungs:   Chest shape is normal. Breath sounds heard bilaterally equally.  No wheezing.  Bilateral basal crackles heard. No Pleural rub or bronchial breathing.   Heart:  Normal S1 and S2, 2/6 systolic murmur, no gallop, no rub. No JVD.  Pacemaker noted on the left upper chest with surgical bandage.  No surrounding erythema or ecchymosis noted.  Minimal tenderness noted at the pacemaker site.     Abdomen:   Normal bowel sounds, no masses, no organomegaly. Soft, non-tender, non-distended, no guarding, no rebound tenderness.   Extremities: Moves all extremities well, no edema, no cyanosis, no            Clubbing. AV fistula with the bruit noted in the right arm.   Skin: No bleeding, bruising or rash.   Neurologic: Awake, alert and oriented times 3. Moves all 4 extremities equally.   Laboratory results:      Results from last 7 days  Lab Units 01/04/18 0453 01/03/18 0457 01/02/18  0725   SODIUM mmol/L 134* 141 142   POTASSIUM mmol/L 4.4 3.6 4.0   CHLORIDE mmol/L 100 104 103   CO2 mmol/L 26.0 28.0 26.0   BUN mg/dL 34* 66* 58*   CREATININE mg/dL 4.10* 6.60* 6.00*   CALCIUM mg/dL 8.7 8.8 9.3   BILIRUBIN mg/dL  --   --  0.7   ALK PHOS U/L  --   --  98   ALT (SGPT) U/L  --   --  39   AST (SGOT) U/L  --   --  27   GLUCOSE mg/dL 321* 26* 239*       Results from last 7 days  Lab Units 01/04/18 0453 01/03/18 0457 01/02/18  0725   WBC 10*3/mm3 5.97 5.39 7.71   HEMOGLOBIN g/dL 7.6* 7.7* 9.1*   HEMATOCRIT % 23.7* 23.8* 28.6*   PLATELETS 10*3/mm3 177 195 214           Results from last 7 days  Lab Units  01/03/18  0457 01/02/18  1350 01/02/18  0725   TROPONIN I ng/mL <0.012 <0.012 <0.012           I have reviewed the patient's laboratory results.    Radiology results:    Imaging Results (last 24 hours)     Procedure Component Value Units Date/Time    XR Chest 1 View [[id number]] Collected:  01/03/18 1425     Updated:  01/03/18 1428    Narrative:       PROCEDURE: XR CHEST 1 VW-     HISTORY: S/P Pacemeker; R00.1-Bradycardia, unspecified;  R09.02-Hypoxemia; R55-Syncope and collapse; R11.0-Nausea; I49.5-Sick  sinus syndrome     COMPARISON: January 2, 2018.     FINDINGS: A left subclavian pacemaker is in place with leads in the  right atrium and ventricle. The heart is enlarged. The mediastinum is  unremarkable. There are bilateral pleural effusions and bibasilar  opacities which are unchanged. There is no pneumothorax.  There are no  acute osseous abnormalities.           Impression:       Placement of a left subclavian pacemaker with no evidence of  pneumothorax.     Continued followup is recommended.     This report was finalized on 1/3/2018 2:26 PM by Mariel Solano M.D..        I have reviewed the patient's radiology reports.    Medication Review:     I have reviewed the patients active and prn medications.     Principal Problem:    Symptomatic bradycardia  Active Problems:    Acute on chronic respiratory failure with hypoxia    Chronic anemia    Essential hypertension    Bilateral nondiabetic proliferative retinopathy    Type 2 diabetes mellitus with nephropathy    End stage renal disease on dialysis    Acquired hypothyroidism    Sick sinus syndrome    Assessment:    1.  Acute on chronic hypoxic respiratory failure, present on admission.  2.  Symptomatic bradycardia secondary to sick sinus syndrome, status post pacemaker placement on 1/3/2018.  3.  Paroxysmal atrial fibrillation.  4.  Bilateral pleural effusions with underlying COPD.  5.  Acute hypoglycemia.  6.  Essential hypertension.  7.  Coronary artery  disease on medical therapy.  8.  Diabetes mellitus type 2 with nephropathy.  9.  Acquired hypothyroidism.  10.  End-stage renal disease on hemodialysis on Tuesday, Thursday and Saturday.  11.  Acute worsening of chronic anemia status post 1 unit of packed red blood cell transfusion.    Plan:    Patient seems to be doing better compared to yesterday.  She is currently in sinus rhythm but occasionally does have atrial pacing.  We will transfuse her with 1 unit of packed red blood cells today during dialysis due to her anemia.  I have discussed this with Dr. Mejia.  He will also increase her ultrafiltration and fluid removal during dialysis today.  This will hopefully improve her bilateral pleural effusions.  Her hypoxia is likely multifactorial secondary to underlying emphysema, volume overload.  Shortness of breath is also likely multifactorial due to emphysema, anemia as well as symptomatic bradycardia.    She will be continued on appropriate cardiac medications including aspirin, carvedilol, amlodipine and valsartan.  She is not on anticoagulation at this time due to her anemia.  We will consult physical and occupational therapy and transfer her to the medical floor with telemetry after dialysis.  I have discussed the patient's condition with Dr. Swift.  She does live with her  and may benefit from home health.      Jordan Ribera MD  01/04/18  12:18 PM    Dictated utilizing Dragon dictation.

## 2018-01-04 NOTE — PROGRESS NOTES
"Adult Nutrition  Assessment/PES    Patient Name:  Sigrid Casarez  YOB: 1940  MRN: [id number]  Admit Date:  1/2/2018    Assessment Date:  1/4/2018    Comments:  Rec #1: Continue current diet order; Encouraging intake. Pt may benefit from adding Renal modification to diet order. Pt receiving 54% PO intake over 7 meals. Nutritional supplement ordered Nepro BID to promote PO intake while pt receives HD. Rec #2: Consider renal MVI with minerals daily. Rec #3: Continue to monitor/replace electrolytes PRN. Consult RD PRN.             Reason for Assessment       01/04/18 1300    Reason for Assessment    Reason For Assessment/Visit diagnosis/disease state    Diagnosis Diagnosis    Cardiac CAD;HTN    Endocrine DM Type 2    Pulmonary/Critical Care Other (comment)   SOA    Renal ESRD;Hemodialysis                Anthropometrics       01/04/18 1304    Anthropometrics    Height Method Stated    Height 160 cm (63\")    Weight Method Bed scale    Weight 62.3 kg (137 lb 5.6 oz)    Ideal Body Weight (IBW)    Ideal Body Weight (IBW), Female 53.12    % Ideal Body Weight 117.53    Body Mass Index (BMI)    BMI (kg/m2) 24.38    BMI Grade 19.1 - 24.9 - normal            Labs/Tests/Procedures/Meds       01/04/18 1304    Labs/Tests/Procedures/Meds    Labs/Tests Review Reviewed;Na+   High: Glu, BUN, Creat, HgbA1C, Mg    Medication Review Reviewed, pertinent;Insulin              Estimated/Assessed Needs       01/04/18 1306    Calculation Measurements    Weight Used For Calculations 62.3 kg (137 lb 5.6 oz)    Height Used for Calculations 1.6 m (5' 3\")    Estimated/Assessed Energy Needs    Energy Need Method [place]    Age 77    RMR ([place] Equation) 1067.12    Activity Factors ([place] Jeor)  Out of bed, ambulatory  1.3    Estimated Kcal Range  ~7674-7186    Estimated/Assessed Protein Needs    Weight Used for Protein Calculation 62.3 kg (137 lb 5.6 oz)    Protein (gm/kg) 1.4    1.2 Gm Protein (gm) --    1.4 Gm " Protein (gm) 87.22    Estimated Protein Range ~75-87 gm    Estimated/Assessed Fluid Needs    Fluid Need Method RDA method   Output + 1000 mL    RDA Method (mL)  --            Nutrition Prescription Ordered       01/04/18 1313    Nutrition Prescription PO    Current PO Diet Regular    Common Modifiers Cardiac            Evaluation of Received Nutrient/Fluid Intake       01/04/18 1306    PO Evaluation    Number of Days PO Intake Evaluated 3 days    Number of Meals 5    % PO Intake 53.6            Problem/Interventions:        Problem 1       01/04/18 1314    Nutrition Diagnoses Problem 1    Problem 1 Impaired Nutrient Utilization    Etiology (related to) Medical Diagnosis    Endocrine DM2    Renal ESRD;Hemodialysis    Signs/Symptoms (evidenced by) Biochemical    Specific Labs Noted Glucose;HgbA1C;Creatinine;BUN                    Intervention Goal       01/04/18 1316    Intervention Goal    General Meet nutritional needs for age/condition;Provide information regarding MNT for treatment/condition    PO Meet estimated needs;Increase intake;PO intake (%)    PO Intake % 75 %    Weight Maintain weight            Nutrition Intervention       01/04/18 1317    Nutrition Intervention    RD/Tech Action Care plan reviewd;Follow Tx progress;Encourage intake;Recommend/ordered    Recommended/Ordered Supplement            Nutrition Prescription       01/04/18 1317    Nutrition Prescription PO    PO Prescription Other (comment);Begin/change supplement   Continue current diet order    Supplement Nepro    Supplement Frequency 2 times a day    New PO Prescription Ordered? Yes    Other Orders    Obtain Weight Daily    Obtain Weight Ordered? No, recommended    Supplement Vitamin mineral supplement    Supplement Ordered? No, recommended            Education/Evaluation       01/04/18 1317    Education    Education Provided education regarding;Education topics    Provided education regarding Healthy eating for diabetes    Education Topics  Cardiac diabetic;Diabetes;CHO counting    Monitor/Evaluation    Monitor Per protocol;I&O;PO intake;Weight;Pertinent labs        Electronically signed by:  Jenni Young RD  01/04/18 1:18 PM

## 2018-01-04 NOTE — THERAPY EVALUATION
Acute Care - Occupational Therapy Initial Evaluation   [facility]     Patient Name: Sigrid Casarez  : 1940  MRN: [id number]  Today's Date: 2018  Onset of Illness/Injury or Date of Surgery Date: 18  Date of Referral to OT: 18  Referring Physician: Dr. Ribera    Admit Date: 2018       ICD-10-CM ICD-9-CM   1. Symptomatic bradycardia R00.1 427.89   2. Hypoxia R09.02 799.02   3. Syncope, unspecified syncope type R55 780.2   4. Nausea R11.0 787.02   5. Sick sinus syndrome I49.5 427.81   6. Impaired functional mobility, balance, gait, and endurance Z74.09 V49.89   7. Impaired mobility and ADLs Z74.09 799.89     Patient Active Problem List   Diagnosis   • Chronic anemia   • Depression   • Diabetic peripheral neuropathy   • 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   • Dizziness   • Malaise and fatigue   • Essential hypertension   • Hypoglycemia   • Acquired hypothyroidism   • Diabetic macular edema   • Menopause present   • Mixed hyperlipidemia   • Osteopenia   • Vitamin D deficiency   • Bilateral nondiabetic proliferative retinopathy   • Hip fracture, left   • Status post fall   • Type 2 diabetes mellitus with nephropathy   • Chronic kidney disease, stage IV (severe)   • Erythropoietin deficiency anemia   • Chronic kidney disease, stage V   • Dyspnea on exertion   • Hypoxia   • Symptomatic bradycardia   • Acute on chronic respiratory failure with hypoxia   • End stage renal disease on dialysis   • Sick sinus syndrome     Past Medical History:   Diagnosis Date   • Anemia    • Chronic kidney disease    • Chronic kidney disease    • Community acquired pneumonia    • Dexa Body Composition study lumosacral spine and femur     ostepenic   • Diabetes    • Diabetic nephropathy, type I    • Diabetic peripheral neuropathy type 1    • Disease of thyroid gland    • Fracture    • Hypertension    • Menopause    • Pneumonia      Past Surgical History:    Procedure Laterality Date   • CARDIAC ELECTROPHYSIOLOGY PROCEDURE N/A 1/3/2018    Procedure: Pacemaker DC new;  Surgeon: Bridger Swift MD;  Location: [facility] CATH INVASIVE LOCATION;  Service:    • CATARACT EXTRACTION     • CHOLECYSTECTOMY     • HIP CANNULATED SCREW PLACEMENT Left 6/11/2017    Procedure:  LEFT HIP MULTIPLE CANNULATED COMPRESSION SCREWS- FERMORAL NECK  FRACTURE;  Surgeon: Jimmy Sheehan MD;  Location: [facility] OR;  Service:           OT ASSESSMENT FLOWSHEET (last 72 hours)      OT Evaluation       01/04/18 1450 01/04/18 1448 01/03/18 1018 01/03/18 1017 01/02/18 1312    Rehab Evaluation    Document Type evaluation  -SD evaluation  -LM       Subjective Information agree to therapy;no complaints  -SD agree to therapy  -LM       Patient Effort, Rehab Treatment good  -SD good  -LM       Symptoms Noted During/After Treatment fatigue  -SD fatigue  -LM       General Information    Patient Profile Review yes  -SD yes  -LM       Onset of Illness/Injury or Date of Surgery Date 01/02/18  -SD 01/02/18  -LM       Referring Physician Dr. Ribera  -SD Bacilio  -LM       General Observations Supine in bed, IV intact, on 5L O2 via NC  -SD Pt received supine in bed. O2 @ 5 LPM per n/c.  -LM       Pertinent History Of Current Problem ESRD on HD; DM, HTN, Afib, CAD, hypothyroidism  -SD ESRD on HD;DMHTN,afib,CAD,hypothyoidism  -LM       Precautions/Limitations fall precautions;oxygen therapy device and L/min  -SD fall precautions;oxygen therapy device and L/min  -LM       Prior Level of Function independent:;community mobility   QC  -SD independent:;community mobility   with quad cane  -LM       Equipment Currently Used at Home cane, quad;oxygen;commode;shower chair;rollator  -SD cane, quad;oxygen;commode;shower chair;rollator  -LM cane, quad;oxygen   2 liters continuously  -AS      Plans/Goals Discussed With patient;agreed upon  -SD patient;agreed upon  -LM       Risks Reviewed patient:;LOB;increased discomfort   -SD patient:;LOB;increased discomfort  -LM       Benefits Reviewed patient:;improve function;increase independence;increase strength  -SD patient:;improve function;increase independence  -LM       Living Environment    Lives With spouse  -SD spouse  -LM spouse  -AS  spouse  -ASA    Living Arrangements house  -SD house  -LM house  -AS  house  -ASA    Home Accessibility bed and bath on same level;stairs to enter home  -SD bed and bath on same level;stairs to enter home  -LM bed and bath on same level;stairs to enter home;stairs within home  -AS  stairs to enter home  -ASA    Number of Stairs to Enter Home 3  -SD 3  -LM 2  -AS  2  -ASA    Stair Railings at Home none  -SD    none  -ASA    Type of Financial/Environmental Concern none  -SD  none  -AS  none  -ASA    Transportation Available family or friend will provide  -SD  car  -AS  car  -ASA    Living Environment Comment  2-story home;stays on first floor  -LM       Clinical Impression    Date of Referral to OT 01/04/18  -SD        OT Diagnosis ADL decline  -SD        Impairments Found (describe specific impairments) aerobic capacity/endurance;gait, locomotion, and balance  -SD        Patient/Family Goals Statement Increase strength and mobility  -SD        Criteria for Skilled Therapeutic Interventions Met yes  -SD        Rehab Potential good, to achieve stated therapy goals  -SD        Therapy Frequency 3-5 times/wk  -SD        Anticipated Discharge Disposition home with home health  -SD        Functional Level Prior    Ambulation    1-->assistive equipment  -AS     Transferring    1-->assistive equipment  -AS     Toileting    0-->independent  -AS     Bathing    0-->independent  -AS     Dressing    0-->independent  -AS     Eating    0-->independent  -AS     Communication    0-->understands/communicates without difficulty  -AS     Swallowing    0-->swallows foods/liquids without difficulty  -AS     Vital Signs    Pre SpO2 (%) 97  -SD 97  -LM       O2 Delivery Pre  Treatment supplemental O2   5L  -SD supplemental O2   5 LPM  -LM       Post SpO2 (%) 93  -SD 93  -LM       O2 Delivery Post Treatment supplemental O2   5L  -SD supplemental O2   5 LPM  -LM       Pain Assessment    Pain Assessment No/denies pain  -SD No/denies pain  -LM       Vision Assessment/Intervention    Visual Impairment WFL  -SD        Cognitive Assessment/Intervention    Current Cognitive/Communication Assessment functional  -SD functional  -LM       Orientation Status oriented x 4  -SD oriented x 4  -LM       Follows Commands/Answers Questions able to follow multi-step instructions;needs cueing  -SD able to follow multi-step instructions;needs cueing  -LM       Personal Safety WNL/WFL  -SD WNL/WFL  -LM       Personal Safety Interventions fall prevention program maintained;gait belt;nonskid shoes/slippers when out of bed  -SD fall prevention program maintained;gait belt;nonskid shoes/slippers when out of bed  -LM       ROM (Range of Motion)    General ROM upper extremity range of motion deficits identified  -SD upper extremity range of motion deficits identified  -LM       General ROM Detail L UE restricted d/t pacemaker placed 1/3  -SD L UE restricted d/t pacemaker placement.  -LM       MMT (Manual Muscle Testing)    General MMT Assessment upper extremity strength deficits identified;lower extremity strength deficits identified  -SD no strength deficits identified  -LM       General MMT Assessment Detail L UE DNT pacemaker placed   3+/5  -SD        Bed Mobility, Assessment/Treatment    Bed Mobility, Assistive Device bed rails;head of bed elevated  -SD bed rails;head of bed elevated  -LM       Bed Mob, Supine to Sit, [place] contact guard assist  -SD contact guard assist  -LM       Bed Mob, Sit to Supine, [place] contact guard assist  -SD contact guard assist  -LM       Bed Mobility, Impairments strength decreased;impaired balance  -SD strength decreased;impaired balance  -LM       Transfer  Assessment/Treatment    Transfers, Sit-Stand [place] contact guard assist  -SD contact guard assist;hand held assist  -LM       Transfers, Stand-Sit [place] contact guard assist  -SD contact guard assist;hand held assist  -LM       Transfer, Safety Issues balance decreased during turns;sequencing ability decreased;step length decreased;weight-shifting ability decreased  -SD balance decreased during turns;sequencing ability decreased;step length decreased;weight-shifting ability decreased  -LM       Transfer, Impairments strength decreased;impaired balance  -SD strength decreased;impaired balance  -LM       Transfer, Comment  trial RW  -LM       Functional Mobility    Functional Mobility- Ind. Level contact guard assist  -SD        Functional Mobility- Device other (see comments)   HHA  -SD        Functional Mobility-Distance (Feet) 111  -SD        Functional Mobility- Safety Issues balance decreased during turns;sequencing ability decreased;step length decreased;weight-shifting ability decreased;supplemental O2  -SD        Upper Body Bathing Assessment/Training    UB Bathing Assess/Train, [place] Level contact guard assist  -SD        Lower Body Bathing Assessment/Training    LB Bathing Assess/Train, [place] Level minimum assist (75% patient effort)  -SD        Upper Body Dressing Assessment/Training    UB Dressing Assess/Train, [place] contact guard assist  -SD        Lower Body Dressing Assessment/Training    LB Dressing Assess/Train, [place] contact guard assist  -SD        Toileting Assessment/Training    Toileting Assess/Train, Indep[place] Level contact guard assist  -SD        Grooming Assessment/Training    Grooming Assess/Train, [place]ep[place] Level supervision required  -SD        Positioning and Restraints    Pre-Treatment Position in bed  -SD in bed  -LM       Post Treatment Position bed  -SD bed  -LM       In Bed fowlers;call light within reach;encouraged to call for assist  -SD  fowlers;call light within reach;encouraged to call for assist  -LM         01/02/18 1255                General Information    Equipment Currently Used at Home cane, quad  -ASA        Functional Level Prior    Ambulation 1-->assistive equipment  -ASA        Transferring 1-->assistive equipment  -ASA        Toileting 1-->assistive equipment  -ASA        Bathing 0-->independent  -ASA        Dressing 0-->independent  -ASA        Eating 0-->independent  -ASA        Communication 0-->understands/communicates without difficulty  -ASA        Swallowing 0-->swallows foods/liquids without difficulty  -ASA        Prior Functional Level Comment none  -ASA          User Key  (r) = Recorded By, (t) = Taken By, (c) = Cosigned By    Initials Name Effective Dates    ASA Sharla Edwards RN 10/26/16 -     CLOTILDE Chin PT 10/26/16 -     AS Felicia Acuña 10/26/16 -     MINA Hess OT 06/30/17 -            Occupational Therapy Education     Title: PT OT SLP Therapies (Active)     Topic: Occupational Therapy (Active)     Point: ADL training (Done)    Description: Instruct learner(s) on proper safety adaptation and remediation techniques during self care or transfers.   Instruct in proper use of assistive devices.    Learning Progress Summary    Learner Readiness Method Response Comment Documented by Status   Patient Acceptance E VU Benefits of OT and OT POC SD 01/04/18 1629 Done                      User Key     Initials Effective Dates Name Provider Type Discipline    SD 06/30/17 -  Rosamaria Hess OT Occupational Therapist OT                  OT Recommendation and Plan  Anticipated Discharge Disposition: home with home health  Therapy Frequency: 3-5 times/wk  Plan of Care Review  Plan Of Care Reviewed With: patient  Progress: no change  Outcome Summary/Follow up Plan: OT eval completed. Patient presents deficits in strength, endurance, balance, mobility and ADL performance. Patient is expected to benefit from OT  services to improve overall functional performance and mobility prior to DC.           OT Goals       01/04/18 1624          Transfer Training 2 OT LTG    Transfer Training 2 OT LTG, Date Established 01/04/18  -SD      Transfer Training 2 OT LTG, Time to Achieve 2 wks  -SD      Transfer Training 2 OT LTG, Activity Type sit to stand/stand to sit  -SD      Transfer Training 2 OT LTG, [place] Level supervision required  -SD      Transfer Training 2 OT LTG, Assist Device walker, rolling  -SD      Transfer Training 2 OT LTG, Outcome goal ongoing  -SD      Bathing OT LTG    Bathing Goal OT LTG, Date Established 01/04/18  -SD      Bathing Goal OT LTG, Time to Achieve 2 wks  -SD      Bathing Goal OT LTG, Activity Type upper body bathing;lower body bathing  -SD      Bathing Goal OT LTG, [place] Level set up required  -SD      Bathing Goal OT LTG, Outcome goal ongoing  -SD      Toileting OT LTG    Toileting Goal OT LTG, Date Established 01/04/18  -SD      Toileting Goal OT LTG, Time to Achieve 2 wks  -SD      Toileting Goal OT LTG, [place] Level supervision required  -SD      Toileting Goal OT LTG, Outcome goal ongoing  -SD      Functional Mobility OT LTG    Functional Mobility Goal OT LTG, Date Established 01/04/18  -SD      Functional Mobility Goal OT LTG, Time to Achieve 2 wks  -SD      Functional Mobility Goal OT LTG, [place] Level standby assist  -SD      Functional Mobility Goal OT LTG, Assist Device rolling walker  -SD      Functional Mobility Goal OT LTG, Distance to Achieve in hallway  -SD      Functional Mobility Goal OT LTG, Additional Goal 200  -SD      Functional Mobility Goal OT LTG, Outcome goal ongoing  -SD        User Key  (r) = Recorded By, (t) = Taken By, (c) = Cosigned By    Initials Name Provider Type    SD Rosamaria Holabird, OT Occupational Therapist                Outcome Measures       01/04/18 1448          How much help from another person do you currently need...    Turning from  your back to your side while in flat bed without using bedrails? 3  -LM      Moving from lying on back to sitting on the side of a flat bed without bedrails? 3  -LM      Moving to and from a bed to a chair (including a wheelchair)? 3  -LM      Standing up from a chair using your arms (e.g., wheelchair, bedside chair)? 3  -LM      Climbing 3-5 steps with a railing? 2  -LM      To walk in hospital room? 3  -LM      AM-PAC 6 Clicks Score 17  -LM      Functional Assessment    Outcome Measure Options AM-PAC 6 Clicks Basic Mobility (PT)  -LM        User Key  (r) = Recorded By, (t) = Taken By, (c) = Cosigned By    Initials Name Provider Type    LM Shikha Chin, PT Physical Therapist          Time Calculation:   OT Start Time: 1450    Therapy Charges for Today     Code Description Service Date Service Provider Modifiers Qty    [id number]  OT EVAL LOW COMPLEXITY 4 1/4/2018 Rosamaria Hess, OT GO 1               Rosamaria Hess OT  1/4/2018

## 2018-01-05 NOTE — DISCHARGE SUMMARY
[facility]      Name:  Sigrid Casarez   Age:  77 y.o.  Sex:  female  :  1940  MRN:  [id number]   Visit Number:  [id number]    Admission Date:  2018  Date of Discharge:  2018  Primary Care Physician:  Tiburcio Hernandez MD   Primary Nephrologist: Deric Mejia MD  Primary Cardiologist: Bridger Swift MD    Discharge Diagnoses:     1.  Acute on chronic hypoxic respiratory failure, present on admission.  2.  Symptomatic bradycardia secondary to sick sinus syndrome, status post pacemaker placement on 1/3/2018.  3.  Paroxysmal atrial fibrillation.  4.  Bilateral pleural effusions with underlying COPD.  5.  Acute hypoglycemia.  6.  Essential hypertension.  7.  Coronary artery disease on medical therapy.  8.  Diabetes mellitus type 2 with nephropathy.  9.  Acquired hypothyroidism.  10.  End-stage renal disease on hemodialysis on Tuesday, Thursday and Saturday.  11.  Acute worsening of chronic anemia status post 1 unit of packed red blood cell transfusion.    Principal Problem:    Symptomatic bradycardia  Active Problems:    Acute on chronic respiratory failure with hypoxia    Chronic anemia    Essential hypertension    Bilateral nondiabetic proliferative retinopathy    Type 2 diabetes mellitus with nephropathy    End stage renal disease on dialysis    Acquired hypothyroidism    Sick sinus syndrome    Presenting Problem:    Symptomatic bradycardia [R00.1]  Symptomatic bradycardia [R00.1]     Consults:     Consults     Date and Time Order Name Status Description    2018 1508 Inpatient Consult to Nephrology Completed     2018 1334 Inpatient Consult to Cardiology          Consulting Physician(s)     Provider Relationship Specialty    Bridger Swift MD Consulting Physician Interventional Cardiology    Deric Mejia MD Consulting Physician Nephrology        Procedures Performed:    Procedure(s):  Pacemaker DC new - placement by Dr. Swift on  1/3/2018.    History of presenting illness:    This is a 77-year-old female with history of end-stage renal disease on hemodialysis, diabetes mellitus type 2,essential hypertension presented to the emergency room with history of episodes of intermittent shortness of breath going on for the last several weeks.  She also has history of some dizziness but denies any loss of consciousness at home.  She has been started on dialysis in the last couple of months.  She states that she did miss dialysis day before yesterday and her next dialysis is supposed to be today.  She denies any fevers, abdominal pain, nausea or vomiting.     Patient was evaluated in the emergency room.  She was initially noted to have hypoxia with oxygen saturation in the 80s but subsequently improved with nasal cannula.  However, while she was in the emergency room she did drop her heart rate in the 20s and apparently was briefly unconscious according to the son who is at the bedside.  She spontaneously improved with her heart rate and is currently in the 70s.  She was subsequently seen by Dr. Swift who reviewed her telemetry and felt that the patient likely has sick sinus syndrome with episodes of tachycardia causing her symptoms.  She was subsequently admitted to the ICU for further evaluation and management.  She is currently sitting up on the bed and is comfortable at rest.    Hospital Course:    Patient was admitted to the ICU due to her bradycardia.  She was seen by Dr. Swift felt that the patient has sick sinus syndrome with tachycardia and bradycardia.  He advised to continue the home medications without change including continuation of carvedilol.  Patient was placed on oxygen and bronchodilators.  She did have shortness of breath which was thought to be secondary to bradycardia as well as bilateral chronic pleural effusions.  She did have some Velcro type crackles and hence a high-resolution CT scan was done to rule out interstitial lung  disease.  CT scan of the chest showed centrilobular emphysematous but did not show any interstitial lung disease.  She was taken to the cardiac catheterization laboratory for pacemaker placement on 1/3/2018 and a pacemaker was placed without any complications.    During the hospital stay patient was noted to have hypoglycemia which was treated conservatively.  She does take 10 units of Lantus at home and she has been advised to decrease it to 5 units at night.  Her hypoglycemia is likely related to her renal disease.  She was seen by Dr. Mejia from nephrology and she was continued on hemodialysis.  Since she had missed a couple of hemodialysis prior to admission, she underwent hemodialysis back-to-back for 2 days with ultrafiltration.  She also received 1 unit of packed red blood cell transfusion during dialysis due to her anemia.  She was subsequently transferred to the medical floor with telemetry and is currently feeling better.  Her shortness of breath is multifactorial due to emphysema, bilateral pleural effusions, bradycardia and likely volume overload.  Hopefully with the interventions done during this hospitalization, her shortness of breath should improve over the next several days.  She already has home oxygen at 2 L which will be continued.  She refused home health at this time.  I have discussed the patient's condition and discharge plan with Dr. Swift and Dr. Mejia and she will be following up with Dr. Swift next week for pacemaker check.  I have discussed the patient's condition with her  who is at the bedside.    Vital Signs:    Temp:  [97.8 °F (36.6 °C)-99.7 °F (37.6 °C)] 98.6 °F (37 °C)  Heart Rate:  [69-78] 73  Resp:  [16-20] 17  BP: (131-172)/() 163/58    Physical Exam:    General Appearance:  Alert and cooperative, not in any acute distress.   Head:  Atraumatic and normocephalic, without obvious abnormality.   Eyes:          PERRLA, conjunctivae and sclerae normal, no Icterus. No  pallor. Extra-occular movements are within normal limits.   Ears:  Ears appear intact with no abnormalities noted.   Throat: No oral lesions, no thrush, oral mucosa moist.   Neck: Supple, trachea midline, no thyromegaly, no carotid bruit.   Back:   No kyphoscoliosis present. No tenderness to palpation,   range of motion normal.   Lungs:   Chest shape is normal. Breath sounds heard bilaterally equally.  No wheezing.  Occasional basal crackles heard. No Pleural rub or bronchial breathing.   Heart:  Normal S1 and S2, 2/6Systolic murmur, no gallop, no rub. No JVD.  Pacemaker is noted on the left upper chest.  Pacemaker site looks clean without any significant erythema or ecchymosis.     Abdomen:   Normal bowel sounds, no masses, no organomegaly. Soft, non-tender, non-distended, no guarding, no rebound tenderness.   Extremities: Moves all extremities well, no edema, no cyanosis, no clubbing.  AV fistula with bruit noted in the right arm.   Pulses: Pulses palpable and equal bilaterally.   Skin: No bleeding, bruising or rash.   Neurologic: Alert and oriented x 3. Moves all four limbs equally. No tremors. No facial asymetry.     Pertinent Lab Results:       Results from last 7 days  Lab Units 01/05/18  0655 01/04/18  0453 01/03/18 0457 01/02/18  0725   SODIUM mmol/L  --  134* 141 142   POTASSIUM mmol/L  --  4.4 3.6 4.0   CHLORIDE mmol/L  --  100 104 103   CO2 mmol/L  --  26.0 28.0 26.0   BUN mg/dL  --  34* 66* 58*   CREATININE mg/dL  --  4.10* 6.60* 6.00*   CALCIUM mg/dL  --  8.7 8.8 9.3   BILIRUBIN mg/dL  --   --   --  0.7   ALK PHOS U/L  --   --   --  98   ALT (SGPT) U/L  --   --   --  39   AST (SGOT) U/L  --   --   --  27   GLUCOSE mg/dL 177* 321* 26* 239*       Results from last 7 days  Lab Units 01/05/18  0541 01/04/18  0453 01/03/18 0457   WBC 10*3/mm3 8.13 5.97 5.39   HEMOGLOBIN g/dL 9.9* 7.6* 7.7*   HEMATOCRIT % 30.8* 23.7* 23.8*   PLATELETS 10*3/mm3 209 177 195           Results from last 7 days  Lab Units  01/03/18  0457 01/02/18  1350 01/02/18  0725   TROPONIN I ng/mL <0.012 <0.012 <0.012       Results from last 7 days  Lab Units 01/02/18  0725   PROBNP pg/mL 9960.0*               Results from last 7 days  Lab Units 01/02/18  0749   PH, ARTERIAL pH units 7.399   PO2 ART mm Hg 58.8*   PCO2, ARTERIAL mm Hg 41.8   HCO3 ART mmol/L 25.8       Results from last 7 days  Lab Units 01/02/18  1235   MRSA SCREEN CX  No Methicillin Resistant Staphylococcus aureus isolated       Pertinent Radiology Results:    Imaging Results (all)     Procedure Component Value Units Date/Time    XR Chest 1 View [[id number]] Collected:  01/02/18 0834     Updated:  01/02/18 0837    Narrative:       PROCEDURE: XR CHEST 1 VW-     HISTORY: SOA  triage protocol     COMPARISON: August 16, 2017.     FINDINGS: The heart is enlarged. The mediastinum is unremarkable. There  is interstitial pulmonary edema with small bilateral pleural effusions  and bibasilar atelectasis. There is no pneumothorax.  There are no acute  osseous abnormalities.           Impression:       Cardiomegaly with interstitial pulmonary edema and small  bilateral pleural effusions.     Continued followup is recommended.     This report was finalized on 1/2/2018 8:35 AM by Mariel Solano M.D..    CT Chest Hi Resolution [[id number]] Collected:  01/03/18 0812     Updated:  01/03/18 0815    Narrative:       PROCEDURE: CT CHEST HI RESOLUTION-     HISTORY: ? ILD; R00.1-Bradycardia, unspecified; R09.02-Hypoxemia;  R55-Syncope and collapse; R11.0-Nausea     COMPARISON: CT of the chest dated August 18, 2017.     PROCEDURE: 1 mm axial images were obtained at 10 mm increments of the  chest.      FINDINGS: There is centrilobular emphysema. There is no significant  interlobular septal thickening or honeycombing. There is no  bronchiectasis. There are bilateral pleural effusions and bibasilar  atelectasis. The heart size is mildly enlarged. There is no pericardial  effusion.              Impression:       Centrilobular emphysema with small bilateral pleural  effusions and diffuse bibasilar atelectasis.        82 mGy.cm          This study was performed with techniques to keep radiation doses as low  as reasonably achievable (ALARA). Individualized dose reduction  techniques using automated exposure control or adjustment of mA and/or  kV according to the patient size were employed.         This report was finalized on 1/3/2018 8:13 AM by Mariel Solano M.D..    XR Chest 1 View [[id number]] Collected:  01/03/18 1425     Updated:  01/03/18 1428    Narrative:       PROCEDURE: XR CHEST 1 VW-     HISTORY: S/P Pacemeker; R00.1-Bradycardia, unspecified;  R09.02-Hypoxemia; R55-Syncope and collapse; R11.0-Nausea; I49.5-Sick  sinus syndrome     COMPARISON: January 2, 2018.     FINDINGS: A left subclavian pacemaker is in place with leads in the  right atrium and ventricle. The heart is enlarged. The mediastinum is  unremarkable. There are bilateral pleural effusions and bibasilar  opacities which are unchanged. There is no pneumothorax.  There are no  acute osseous abnormalities.           Impression:       Placement of a left subclavian pacemaker with no evidence of  pneumothorax.     Continued followup is recommended.     This report was finalized on 1/3/2018 2:26 PM by Mariel Solano M.D..        Condition on Discharge:      Stable.    Code status during the hospital stay:    Full Code    Discharge Disposition:    Home or Self Care    Discharge Medications:     Sigrid Casarez   Home Medication Instructions DANIELA:[id number]    Printed on:01/05/18 1148   Medication Information                      amLODIPine (NORVASC) 5 MG tablet  Take 1 tablet by mouth Daily.             aspirin 81 MG EC tablet  Take 1 tablet by mouth daily.             atorvastatin (LIPITOR) 10 MG tablet  Take 1 tablet by mouth Daily.             B Complex-C-Folic Acid (AR-CLEOPATRA RX) 1 MG tablet  Take 1 tablet by mouth Daily.              carvedilol (COREG) 25 MG tablet  Take 1 tablet by mouth 2 (two) times a day.             Cholecalciferol (VITAMIN D) 1000 UNITS tablet  Take 1,000 Units by mouth 2 (Two) Times a Day.             citalopram (CeleXA) 10 MG tablet  Take 1 tablet by mouth Daily.             ferrous sulfate 325 (65 FE) MG tablet  Take 1 tablet by mouth 1 (One) Time Per Week. With meals             glucose blood (FREESTYLE LITE) test strip  3 (three) times a day.             Insulin Lispro (HUMALOG) 100 UNIT/ML solution pen-injector  4-10 units tid before meals             Insulin Pen Needle (BD ULTRA-FINE PEN NEEDLES) 29G X 12.7MM misc  Use 3 times daily             levothyroxine (SYNTHROID, LEVOTHROID) 75 MCG tablet  Take 2.5 tablets by mouth Daily.             lidocaine 3 % cream cream  Apply 1 application topically 3 (Three) Times a Week.             lidocaine-prilocaine (EMLA) 2.5-2.5 % cream  Apply 1 application topically 3 (Three) Times a Week.             ondansetron ODT (ZOFRAN-ODT) 4 MG disintegrating tablet               RELION PEN NEEDLE 31G/8MM 31G X 8 MM misc               TOUJEO SOLOSTAR 300 UNIT/ML solution pen-injector  Inject 5 Units under the skin Every Night.             valsartan (DIOVAN) 320 MG tablet  Take 1 tablet by mouth Daily.               Discharge Diet:     Diet Instructions     Diet: Consistent Carbohydrate, Renal; Thin       Discharge Diet:   Consistent Carbohydrate  Renal      Fluid Consistency:  Thin               Activity at Discharge:     Activity Instructions     Activity as Tolerated                   Follow-up Appointments:    Follow-up Information     Follow up with Bridger Swift MD Follow up on 1/10/2018.    Specialties:  Interventional Cardiology, Cardiology    Why:  @ 130 pm     Contact information:    [address] [address]  [phone]          Follow up with Tiburcio Hernandez MD Follow up in 2 week(s).    Specialty:  Internal Medicine    Contact  information:    [address] [place] [address] [place] [id number]  [phone]          Future Appointments  Date Time Provider Department Center   1/11/2018 2:15 PM Violeta Pittman MD [facility] None   1/17/2018 9:30 AM Jimmy Sheehan MD [facility] None       Test Results Pending at Discharge:    None.       Jordan Ribera MD  01/05/18  11:48 AM    Time spent: 25 min.    Dictated utilizing Dragon dictation.

## 2018-01-05 NOTE — PROGRESS NOTES
"Nephrology Progress Note.    LOS: 1 day    Patient Care Team:  Tiburcio Hernandez MD as PCP - General    Chief Complaint:    Chief Complaint   Patient presents with   • Shortness of Breath       Subjective     Interval History:     Patient Complaints: none    Patient seen and examined this morning.  Events from last night noted.  Patient denies having any fevers chills.  No nausea or vomiting no abdominal pain.  Denies any chest pain shortness of breath cough or sputum production.  There is no significant edema.   Patient also denies having new onset weakness of numbness of either extremity, she had episode of hypoglycemia and also had headache and it is better with treatment. She also has significant improvement in her shortness of breath with increase ultrafiltration.  History taken from: patient      Review of Systems:    The pertinent  ROS was done and it is noted above, rest  was negative.    Objective     Vital Signs  /58 (BP Location: Left arm, Patient Position: Lying)  Pulse 73  Temp 98.6 °F (37 °C) (Oral)   Resp 17  Ht 160 cm (63\")  Wt 60.5 kg (133 lb 5 oz)  LMP  (LMP Unknown)  SpO2 97%  Breastfeeding? No  BMI 23.62 kg/m2      I/O this shift:  In: 500 [I.V.:500]  Out: -     Intake/Output Summary (Last 24 hours) at 01/05/18 0955  Last data filed at 01/05/18 0729   Gross per 24 hour   Intake             1020 ml   Output             3100 ml   Net            -2080 ml       Physical Exam:    General Appearance: alert, oriented x 3, no acute distress,   HEENT: pupils round and reactive to light, oral mucosa dry, extra occular movements intact.  Neck: supple, no JVD, trachea midline  Lungs: Clear to Auscultation, unlabored breathing effort, Right basal crakles noted  Heart: RRR, normal S1 and S2, no S3, no rub  Abdomen: soft, non-tender, no palpable bladder, present bowel sounds to auscultation  Extremities: no edema, cyanosis or clubbing.   Neuro: normal speech and mental status, grossly non " focal.     Results Review:      Results from last 7 days  Lab Units 01/05/18  0655 01/04/18  0453 01/03/18  0457 01/02/18  0725   SODIUM mmol/L  --  134* 141 142   POTASSIUM mmol/L  --  4.4 3.6 4.0   CHLORIDE mmol/L  --  100 104 103   CO2 mmol/L  --  26.0 28.0 26.0   BUN mg/dL  --  34* 66* 58*   CREATININE mg/dL  --  4.10* 6.60* 6.00*   CALCIUM mg/dL  --  8.7 8.8 9.3   BILIRUBIN mg/dL  --   --   --  0.7   ALK PHOS U/L  --   --   --  98   ALT (SGPT) U/L  --   --   --  39   AST (SGOT) U/L  --   --   --  27   GLUCOSE mg/dL 177* 321* 26* 239*       Estimated Creatinine Clearance: 10.6 mL/min (by C-G formula based on Cr of 4.1).      Results from last 7 days  Lab Units 01/03/18  0457   MAGNESIUM mg/dL 2.7*               Results from last 7 days  Lab Units 01/05/18  0541 01/04/18  0453 01/03/18  0457 01/02/18  0725   WBC 10*3/mm3 8.13 5.97 5.39 7.71   HEMOGLOBIN g/dL 9.9* 7.6* 7.7* 9.1*   PLATELETS 10*3/mm3 209 177 195 214               Imaging Results (last 24 hours)     ** No results found for the last 24 hours. **          amLODIPine 5 mg Oral BID   aspirin 81 mg Oral Daily   atorvastatin 10 mg Oral Daily   b complex-vitamin c-folic acid 1 tablet Oral Daily   budesonide 0.5 mg Nebulization BID - RT   carvedilol 25 mg Oral BID With Meals   citalopram 10 mg Oral Nightly   heparin (porcine) 5,000 Units Subcutaneous Q12H   insulin aspart 0-7 Units Subcutaneous 4x Daily AC & at Bedtime   ipratropium-albuterol 3 mL Nebulization Q6H - RT   levothyroxine 175 mcg Oral Q AM   valsartan 320 mg Oral Q24H   Vitamin D 1,000 Units Oral Daily          Medication Review:   Current Facility-Administered Medications   Medication Dose Route Frequency Provider Last Rate Last Dose   • amLODIPine (NORVASC) tablet 5 mg  5 mg Oral BID Bridger Swift MD   5 mg at 01/05/18 0851   • aspirin EC tablet 81 mg  81 mg Oral Daily Jordan Ribera MD   81 mg at 01/05/18 0851   • atorvastatin (LIPITOR) tablet 10 mg  10 mg Oral Daily Jordan Ribera MD    10 mg at 01/05/18 0851   • b complex-vitamin c-folic acid (NEPHRO-CLEOPATRA) tablet 1 tablet  1 tablet Oral Daily Jordan Ribera MD   1 tablet at 01/05/18 0851   • budesonide (PULMICORT) nebulizer solution 0.5 mg  0.5 mg Nebulization BID - RT Jordan Ribera MD   0.5 mg at 01/05/18 0702   • carvedilol (COREG) tablet 25 mg  25 mg Oral BID With Meals Jordan Ribera MD   25 mg at 01/05/18 0851   • citalopram (CeleXA) tablet 10 mg  10 mg Oral Nightly Jordan Ribera MD   10 mg at 01/04/18 2140   • dextrose (D50W) solution 25 g  25 g Intravenous Q15 Min PRN Jordan Ribera MD   25 g at 01/05/18 0644   • dextrose (GLUTOSE) oral gel 1 tube  1 tube Oral Q15 Min PRN Jordan Ribera MD       • glucagon (human recombinant) (GLUCAGEN DIAGNOSTIC) injection 1 mg  1 mg Subcutaneous Q15 Min PRN Jordan Ribera MD       • heparin (porcine) 5000 UNIT/ML injection 5,000 Units  5,000 Units Subcutaneous Q12H Jordan Ribera MD   5,000 Units at 01/04/18 1345   • insulin aspart (novoLOG) injection 0-7 Units  0-7 Units Subcutaneous 4x Daily AC & at Bedtime Jordan Ribera MD   6 Units at 01/04/18 2140   • ipratropium-albuterol (DUO-NEB) nebulizer solution 3 mL  3 mL Nebulization Q4H PRN Jordan Ribera MD       • ipratropium-albuterol (DUO-NEB) nebulizer solution 3 mL  3 mL Nebulization Q6H - RT Jordan Ribera MD   3 mL at 01/05/18 0702   • levothyroxine (SYNTHROID, LEVOTHROID) tablet 175 mcg  175 mcg Oral Q AM Jordan Ribera MD   175 mcg at 01/05/18 0752   • Morphine sulfate (PF) injection 1 mg  1 mg Intravenous Q4H PRN Jordan Ribera MD   1 mg at 01/04/18 0102   • ondansetron (ZOFRAN) injection 4 mg  4 mg Intravenous Q6H PRN Bridger Swift MD       • ondansetron (ZOFRAN) tablet 4 mg  4 mg Oral Q6H PRN Jordan Ribera MD   4 mg at 01/03/18 0238    Or   • ondansetron ODT (ZOFRAN-ODT) disintegrating tablet 4 mg  4 mg Oral Q6H PRN Jordan Ribera MD       • sodium chloride 0.9 % flush 1-10 mL  1-10 mL Intravenous PRN Bridger Swift MD   10 mL at 01/04/18 3047   •  valsartan (DIOVAN) tablet 320 mg  320 mg Oral Q24H Jordan Ribera MD   320 mg at 01/05/18 0851   • Vitamin D 1,000 Units  1,000 Units Oral Daily Jordan Ribera MD   1,000 Units at 01/05/18 0851   • zolpidem (AMBIEN) tablet 5 mg  5 mg Oral Nightly PRN Bridger Swift MD           Assessment/Plan   1.   End stage renal disease on dialysis  2.   Symptomatic bradycardia  3.   CHF  4.   Chronic anemia  5.   Essential hypertension  6.   Acquired hypothyroidism  7.   Bilateral nondiabetic proliferative retinopathy  8.   Type 2 diabetes mellitus with nephropathy  9.   Acute on chronic respiratory failure with hypoxia  10.   Mixed hyperlipidemia     Plan:     · Continue routine dialysis as ordered, she will resume her outpatient dialysis tomorrow I have increased her AUGUST dose and adjusted her dry weight as well.  · She has a tachybradycardia syndrome, Dr. Swift has placed the permanent pacemaker and she is doing much better.   · Anemia she is on outpatient long-acting erythropoietin therapy will be continued. Her anemia is Significant we'll go ahead and transfuse 1 unit of blood with dialysis today.  Further adjust adjustments have been done in her outpatient schedule.  · Home medications reviewed and will be continued.  Because of recurrent hypoglycemia long-acting insulin will be cut down to 5 units at night, if her blood sugars go up she can probably go back up to 10 units at home.  · Details were discussed with the patient as well as family in the room.    · Details were also discussed with the hospitalist service.   · Further recommendations will depend on clinical course of the patient during the current hospitalization.    · I also discussed the details with the nursing staff.      Details were discussed with the patient as well as family in the room.  Further recommendations will depend on clinical course of the patient during the current hospitalization.      I also discussed the details with the nursing  staff.    Rest as ordered.    Deric Mejia MD  01/05/18  9:55 AM      EMR Dragon/Transcription disclaimer:   Much of this encounter note is an electronic transcription/translation of spoken language to printed text. The electronic translation of spoken language may permit erroneous, or at times, nonsensical words or phrases to be inadvertently transcribed; Although I have reviewed the note for such errors, some may still exist.

## 2018-01-05 NOTE — THERAPY DISCHARGE NOTE
Acute Care - Occupational Therapy Discharge Summary   [place]     Patient Name: Sigrid Casarez  : 1940  MRN: [id number]    Today's Date: 2018  Onset of Illness/Injury or Date of Surgery Date: 18    Date of Referral to OT: 18  Referring Physician: Dr. Ribera      Admit Date: 2018        OT Recommendation and Plan    Visit Dx:    ICD-10-CM ICD-9-CM   1. Symptomatic bradycardia R00.1 427.89   2. Hypoxia R09.02 799.02   3. Syncope, unspecified syncope type R55 780.2   4. Nausea R11.0 787.02   5. Sick sinus syndrome I49.5 427.81   6. Impaired functional mobility, balance, gait, and endurance Z74.09 V49.89   7. Impaired mobility and ADLs Z74.09 799.89                     OT Goals       18 1624          Transfer Training 2 OT LTG    Transfer Training 2 OT LTG, Date Established 18  -SD      Transfer Training 2 OT LTG, Time to Achieve 2 wks  -SD      Transfer Training 2 OT LTG, Activity Type sit to stand/stand to sit  -SD      Transfer Training 2 OT LTG, [place] Level supervision required  -SD      Transfer Training 2 OT LTG, Assist Device walker, rolling  -SD      Transfer Training 2 OT LTG, Outcome goal ongoing  -SD      Bathing OT LTG    Bathing Goal OT LTG, Date Established 18  -SD      Bathing Goal OT LTG, Time to Achieve 2 wks  -SD      Bathing Goal OT LTG, Activity Type upper body bathing;lower body bathing  -SD      Bathing Goal OT LTG, [place] Level set up required  -SD      Bathing Goal OT LTG, Outcome goal ongoing  -SD      Toileting OT LTG    Toileting Goal OT LTG, Date Established 18  -SD      Toileting Goal OT LTG, Time to Achieve 2 wks  -SD      Toileting Goal OT LTG, [place] Level supervision required  -SD      Toileting Goal OT LTG, Outcome goal ongoing  -SD      Functional Mobility OT LTG    Functional Mobility Goal OT LTG, Date Established 18  -SD      Functional Mobility Goal OT LTG, Time to Achieve 2 wks  -SD      Functional  Mobility Goal OT LTG, [place] Level standby assist  -SD      Functional Mobility Goal OT LTG, Assist Device rolling walker  -SD      Functional Mobility Goal OT LTG, Distance to Achieve in hallway  -SD      Functional Mobility Goal OT LTG, Additional Goal 200  -SD      Functional Mobility Goal OT LTG, Outcome goal ongoing  -SD        User Key  (r) = Recorded By, (t) = Taken By, (c) = Cosigned By    Initials Name Provider Type    MINA Hess OT Occupational Therapist                Outcome Measures       01/04/18 7698          How much help from another person do you currently need...    Turning from your back to your side while in flat bed without using bedrails? 3  -LM      Moving from lying on back to sitting on the side of a flat bed without bedrails? 3  -LM      Moving to and from a bed to a chair (including a wheelchair)? 3  -LM      Standing up from a chair using your arms (e.g., wheelchair, bedside chair)? 3  -LM      Climbing 3-5 steps with a railing? 2  -LM      To walk in hospital room? 3  -LM      AM-PAC 6 Clicks Score 17  -LM      Functional Assessment    Outcome Measure Options AM-PAC 6 Clicks Basic Mobility (PT)  -LM        User Key  (r) = Recorded By, (t) = Taken By, (c) = Cosigned By    Initials Name Provider Type    CLOTILDE Chin PT Physical Therapist          Therapy Charges for Today     Code Description Service Date Service Provider Modifiers Qty    [id number]  OT EVAL LOW COMPLEXITY 4 1/4/2018 Rosamaria Hess OT GO 1          OT Discharge Summary  Anticipated Discharge Disposition: home with home health  Reason for Discharge: Discharge from facility  Outcomes Achieved: Refer to plan of care for updates on goals achieved  Discharge Destination: Home with assist      Rosamaria Hess OT  1/5/2018

## 2018-01-05 NOTE — PROGRESS NOTES
Continued Stay Note  CHANDAN Matthews     Patient Name: Sigrid Casarez  MRN: [id number]  Today's Date: 1/5/2018    Admit Date: 1/2/2018          Discharge Plan       01/05/18 0913    Case Management/Social Work Plan    Plan Spoke to pt in room.  Plans to go home at discharge and has transportation.  Denies discharge needs.                Discharge Codes     None            Roxanna Pereira

## 2018-01-05 NOTE — PLAN OF CARE
Problem: Fall Risk (Adult)  Goal: Absence of Falls  Outcome: Ongoing (interventions implemented as appropriate)   01/05/18 0816   Fall Risk (Adult)   Absence of Falls making progress toward outcome

## 2018-01-05 NOTE — PROGRESS NOTES
Called to patient bedside for altered mentation with glucose <40. Dextrose amp given. Dextrose 5% solution ordered x 2 hours. Discussed with nursing.

## 2018-01-24 NOTE — PROGRESS NOTES
Subjective   Patient ID: Sigrid Casarez is a 78 y.o.  female is here today for a treatment planning discussion. Follow-up and Pain of the Left Hip         History of Present Illness    Patient is following up in regards to left hip ORIF.  Date of surgery was June 2017.  She states she is doing much better.  She uses a cane on occasion when she goes outside of her home.        Pain Score: 3  Pain Location: Hip  Pain Orientation: Left     Pain Descriptors: Aching, Burning  Pain Frequency: Intermittent                    Pain Intervention(s): Rest          Past Medical History:   Diagnosis Date   • Anemia    • Chronic kidney disease    • Chronic kidney disease    • Community acquired pneumonia    • Dexa Body Composition study lumosacral spine and femur     ostepenic   • Diabetes    • Diabetic nephropathy, type I    • Diabetic peripheral neuropathy type 1    • Disease of thyroid gland    • Fracture    • Hypertension    • Menopause    • Pneumonia         Past Surgical History:   Procedure Laterality Date   • CARDIAC ELECTROPHYSIOLOGY PROCEDURE N/A 1/3/2018    Procedure: Pacemaker DC new;  Surgeon: Bridger Swift MD;  Location: [facility] CATH INVASIVE LOCATION;  Service:    • CATARACT EXTRACTION     • CHOLECYSTECTOMY     • HIP CANNULATED SCREW PLACEMENT Left 6/11/2017    Procedure:  LEFT HIP MULTIPLE CANNULATED COMPRESSION SCREWS- FERMORAL NECK  FRACTURE;  Surgeon: Jimmy Sheehan MD;  Location: [facility] OR;  Service:        Family History   Problem Relation Age of Onset   • Diabetes Other    • Heart disease Other    • Heart attack Mother    • Lung cancer Father        Social History     Social History   • Marital status:      Spouse name: N/A   • Number of children: N/A   • Years of education: N/A     Occupational History   • Not on file.     Social History Main Topics   • Smoking status: Former Smoker   • Smokeless tobacco: Never Used   • Alcohol use No   • Drug use: No   • Sexual activity: Defer     Other  "Topics Concern   • Not on file     Social History Narrative       Allergies   Allergen Reactions   • Duloxetine    • Exenatide    • Lisinopril Cough   • Penicillins    • Phentermine        Review of Systems   Constitutional: Negative for fever.   HENT: Negative for voice change.    Eyes: Negative for visual disturbance.   Respiratory: Negative for shortness of breath.    Cardiovascular: Negative for chest pain.   Gastrointestinal: Negative for abdominal distention and abdominal pain.   Genitourinary: Negative for dysuria.   Musculoskeletal: Positive for arthralgias. Negative for gait problem and joint swelling.   Skin: Negative for rash.   Neurological: Negative for speech difficulty.   Hematological: Does not bruise/bleed easily.   Psychiatric/Behavioral: Negative for confusion.       Objective   Resp 18  Ht 160 cm (63\")  Wt 60.3 kg (133 lb)  LMP  (LMP Unknown)  BMI 23.56 kg/m2   Physical Exam   Constitutional: She is oriented to person, place, and time. She appears well-nourished.   Neck: No tracheal deviation present.   Pulmonary/Chest: Effort normal.   Musculoskeletal:        Left hip: She exhibits normal range of motion, normal strength, no tenderness, no bony tenderness, no swelling and no crepitus.   Neurological: She is alert and oriented to person, place, and time.   Skin: No rash noted.   Psychiatric: She has a normal mood and affect.   Vitals reviewed.    Ortho Exam   Extremity DVT signs are Negative on physical exam with negative Shavon sign, with no calf pain, with no palpable cords, with no increased pain with passive stretch/extension and with no skin tone change   Neurologic Exam     Mental Status   Oriented to person, place, and time.      Ortho Exam         Assessment/Plan   Independent Review of Radiographic Studies:    Indication to evaluate fracture healing, and compared with prior imaging, shows interim fracture healing with good maintained reduction and alignment and intact position of " fracture fixation hardware.      Procedures       Sigrid was seen today for follow-up and pain.    Diagnoses and all orders for this visit:    Closed fracture of neck of left femur with routine healing, subsequent encounter    Status post hip surgery     Orthopedic activities reviewed and patient expressed appreciation  Discussion of orthopedic goals  Risk, benefits, and merits of treatment alternatives reviewed with the patient and questions answered    Recommendations/Plan:  Exercise, medications, injections, other patient advice, and return appointment as noted.  Patient is encouraged to call or return for any issues or concerns.  Frankie the patient    Patient agreeable to call or return sooner for any concerns.

## 2018-02-09 ENCOUNTER — LAB SERVICES (OUTPATIENT)
Dept: OTHER | Age: 78
End: 2018-02-09

## 2018-02-09 ENCOUNTER — CHARTING TRANS (OUTPATIENT)
Dept: OTHER | Age: 78
End: 2018-02-09

## 2018-02-09 ASSESSMENT — PAIN SCALES - GENERAL: PAINLEVEL_OUTOF10: 5

## 2018-02-11 ENCOUNTER — CHARTING TRANS (OUTPATIENT)
Dept: OTHER | Age: 78
End: 2018-02-11

## 2018-02-11 LAB
ANION GAP SERPL CALC-SCNC: 13 MMOL/L (ref 10–20)
BUN SERPL-MCNC: 14 MG/DL (ref 6–20)
BUN/CREAT SERPL: 19 (ref 7–25)
CALCIUM SERPL-MCNC: 9.6 MG/DL (ref 8.4–10.2)
CHLORIDE SERPL-SCNC: 107 MMOL/L (ref 98–107)
CO2 SERPL-SCNC: 28 MMOL/L (ref 21–32)
CREAT SERPL-MCNC: 0.74 MG/DL (ref 0.51–0.95)
GLUCOSE SERPL-MCNC: 84 MG/DL (ref 65–99)
LENGTH OF FAST TIME PATIENT: NORMAL HRS
POTASSIUM SERPL-SCNC: 4.3 MMOL/L (ref 3.4–5.1)
SODIUM SERPL-SCNC: 144 MMOL/L (ref 135–145)

## 2018-03-02 NOTE — TELEPHONE ENCOUNTER
PT CALLED IN STATING SHE RECEIVED A VOICEMAIL FROM OUR CLINIC. STATES SHE SEES DR. MÁRQUEZ AND WAS WAITING TO HEAR BACK IN REGARDS TO LABS THAT WERE DONE.CHECKED THE PT'S CHART BUT DID NOT SEE ANY NOTES LISTED. BEST CALL BACK # [phone]

## 2018-03-03 ENCOUNTER — CHARTING TRANS (OUTPATIENT)
Dept: OTHER | Age: 78
End: 2018-03-03

## 2018-03-05 NOTE — TELEPHONE ENCOUNTER
Patient was advised I did not try to call her and she states it was this office number but she could not understand what the call was about  Pt does not have an upcoming appointment so I advised her if it was someone from this office they will probably call her back.

## 2018-03-06 ENCOUNTER — IMAGING SERVICES (OUTPATIENT)
Dept: OTHER | Age: 78
End: 2018-03-06

## 2018-03-06 ENCOUNTER — HOSPITAL (OUTPATIENT)
Dept: OTHER | Age: 78
End: 2018-03-06
Attending: FAMILY MEDICINE

## 2018-03-07 ENCOUNTER — CHARTING TRANS (OUTPATIENT)
Dept: OTHER | Age: 78
End: 2018-03-07

## 2018-03-07 NOTE — PROGRESS NOTES
CONSULT NOTE    Requested by:   Tiburcio Hernandez MD Anil Harrison, MD      Chief Complaint   Patient presents with   • Consult   • Shortness of Breath       Subjective   Sigrid Casarez is a 78 y.o. female.     History of Present Illness   Patient was sent in today for evaluation of shortness of breath. Patient says that for the past few years, she has had shortness of breath. Patient has had decreased exercise capacity that has been progressive for the past few years. Patient also says that she brings up phlegm in the morning along with cough.    Patient also notes having progressive worsening in her ability to walk up the hill or walk up a flight of stairs.     She reports positive history of smoking for 50 years.  Patient quit smoking, 11 years ago.    The following portions of the patient's history were reviewed and updated as appropriate: allergies, current medications, past family history, past medical history, past social history and past surgical history.    Review of Systems   Constitutional: Positive for chills and fatigue. Negative for fever.   HENT: Positive for postnasal drip and rhinorrhea. Negative for sinus pressure, sneezing and sore throat.    Respiratory: Positive for cough and shortness of breath. Negative for chest tightness and wheezing.    Cardiovascular: Positive for leg swelling. Negative for chest pain and palpitations.   Psychiatric/Behavioral: Negative for sleep disturbance.   All other systems reviewed and are negative.      Past Medical History:   Diagnosis Date   • Anemia    • Chronic kidney disease    • Chronic kidney disease    • Community acquired pneumonia    • Dexa Body Composition study lumosacral spine and femur     ostepenic   • Diabetes    • Diabetic nephropathy, type I    • Diabetic peripheral neuropathy type 1    • Disease of thyroid gland    • Fracture    • Hypertension    • Menopause    • Pneumonia        Social History   Substance Use Topics   • Smoking status: Former  "Smoker     Packs/day: 1.00     Years: 50.00     Types: Cigarettes   • Smokeless tobacco: Never Used   • Alcohol use No         Objective   Visit Vitals   • /60   • Pulse 71   • Resp 16   • Ht 160 cm (62.99\")   • Wt 59 kg (130 lb)   • LMP  (LMP Unknown)   • SpO2 93%  Comment: ROOM AIR   • BMI 23.03 kg/m2       Physical Exam   Constitutional: She is oriented to person, place, and time. She appears well-developed.   HENT:   Dentures noted.    Eyes: EOM are normal.   Neck: Neck supple. No JVD present.   Cardiovascular: Normal rate and regular rhythm.    PPM noted.    Pulmonary/Chest: Effort normal. She has wheezes. She has no rales.   Somewhat hyperresonant to percussion.  Decreased Air Entry Bilaterally.   Musculoskeletal:   Gait was normal.   Neurological: She is alert and oriented to person, place, and time.   Skin: Skin is warm and dry.   Psychiatric: She has a normal mood and affect. Her behavior is normal.   Vitals reviewed.      Assessment/Plan   Sigrid was seen today for consult and shortness of breath.    Diagnoses and all orders for this visit:    Shortness of breath  -     Pulmonary Function Test  -     Converted Six Minute Walk; Future    Chronic obstructive pulmonary disease, unspecified COPD type  -     Pulmonary Function Test  -     Converted Six Minute Walk; Future    Pulmonary emphysema, unspecified emphysema type    Personal history of tobacco use, presenting hazards to health    Other orders  -     tiotropium bromide-olodaterol (STIOLTO RESPIMAT) 2.5-2.5 MCG/ACT aerosol solution inhaler; Inhale 2 puffs Daily.         Return in about 3 months (around 6/7/2018) for Recheck, PFTs, For Elis, 6MWT on F/U.    DISCUSSION(if any):  I have reviewed the patient's imaging studies and shared them with her.  Head CT scan suggested emphysema.    PFTs were discussed.     ===========================  ===========================    Orders as above    Patient was told that the shortness of breath is most likely " from obstructive lung disease, along with contribution from end-stage renal disease.     Patient was educated on compliance with, and the correct method of using the pulmonary medicines.     Side effects, of prescribed medicines, discussed.    I have told the patient that we will made further recommendations, based on clinical response.     I have encouraged the patient to call or schedule a visit earlier, if there are any concerns.    Dictated utilizing Dragon dictation.    This document was electronically signed by Ismael Sanz MD March 7, 2018  2:35 PM

## 2018-03-08 ENCOUNTER — CHARTING TRANS (OUTPATIENT)
Dept: OTHER | Age: 78
End: 2018-03-08

## 2018-03-08 NOTE — TELEPHONE ENCOUNTER
MR ROBERTO HAS CALLED TO RESCHED PT'S APPT FROM 4/13 SINCE WILLI WILL BE OUT OF THE OFFICE. SHE HAS BEEN RESCHED 06/22/2018. HER LOV WAS 09/01/2017. IF YOU'D LIKE TO SEE HER SOONER PLEASE LET HER KNOW. SHE HAS DIALYSIS EVERY Tuesday AND Thursday OR I WOULD'VE SCHED HER FOR THIS MONTH.

## 2018-03-12 NOTE — TELEPHONE ENCOUNTER
I would work her in in April at 2:15, 3:15 or other time convenient for her that is available.  I see a lot of days where I only have 18-20 patients, so there is space to work her in. Thanks, curt

## 2018-03-29 ENCOUNTER — LAB SERVICES (OUTPATIENT)
Dept: OTHER | Age: 78
End: 2018-03-29

## 2018-03-30 LAB
BASOPHILS # BLD: 0 K/MCL (ref 0–0.3)
BASOPHILS NFR BLD: 0 %
CRP SERPL-MCNC: <0.3 MG/DL
DIFFERENTIAL METHOD BLD: NORMAL
EOSINOPHIL # BLD: 0.1 K/MCL (ref 0.1–0.5)
EOSINOPHIL NFR BLD: 1 %
ERYTHROCYTE [DISTWIDTH] IN BLOOD: 13.2 % (ref 11–15)
ERYTHROCYTE [SEDIMENTATION RATE] IN BLOOD BY WESTERGREN METHOD: 16 MM/HR (ref 0–20)
HEMATOCRIT: 41.3 % (ref 36–46.5)
HEMOGLOBIN: 13.5 G/DL (ref 12–15.5)
LYMPHOCYTES # BLD: 2.3 K/MCL (ref 1–4)
LYMPHOCYTES NFR BLD: 51 %
MEAN CORPUSCULAR HEMOGLOBIN: 30.4 PG (ref 26–34)
MEAN CORPUSCULAR HGB CONC: 32.7 G/DL (ref 32–36.5)
MEAN CORPUSCULAR VOLUME: 93 FL (ref 78–100)
MONOCYTES # BLD: 0.3 K/MCL (ref 0.3–0.9)
MONOCYTES NFR BLD: 6 %
NEUTROPHILS # BLD: 1.9 K/MCL (ref 1.8–7.7)
NEUTROPHILS NFR BLD: 42 %
PLATELET COUNT: 217 K/MCL (ref 140–450)
RED CELL COUNT: 4.44 MIL/MCL (ref 4–5.2)
WHITE BLOOD COUNT: 4.6 K/MCL (ref 4.2–11)

## 2018-04-11 LAB
ACHR BIND AB SER-SCNC: <0.3 MMOL/L
ACHR BLOCK AB SER-ACNC: <15 % OF INHIBITION
ACHR MOD AB SER-ACNC: <1 % BINDING INHIBITION

## 2018-04-12 LAB — ^USK ANTIBODY TEST (RMUSKR): NEGATIVE

## 2018-04-15 NOTE — ED PROVIDER NOTES
Subjective   78-year-old female presenting with sore throat.  States that for last 2 days she has had a sore throat, worse with swallowing, no alleviating factors.  She denies any fevers, chills, nausea, vomiting, chest pain, shortness of breath.  She does have a mild cough as well.  She does note that her  had similar symptoms and was treated with antibiotics.  She is a TTS dialysis patient and has not missed any sessions.            Review of Systems   Constitutional: Negative for chills and fever.   HENT: Positive for sore throat. Negative for congestion and rhinorrhea.    Eyes: Negative for pain.   Respiratory: Positive for cough. Negative for shortness of breath.    Cardiovascular: Negative for chest pain, palpitations and leg swelling.   Gastrointestinal: Negative for abdominal pain, diarrhea, nausea and vomiting.   Genitourinary: Negative for dysuria.   Musculoskeletal: Negative for arthralgias.   Skin: Negative for rash.   Neurological: Negative for weakness and numbness.   Psychiatric/Behavioral: Negative for behavioral problems.       Past Medical History:   Diagnosis Date   • Anemia    • Chronic kidney disease    • Chronic kidney disease    • Community acquired pneumonia    • COPD (chronic obstructive pulmonary disease)    • Dexa Body Composition study lumosacral spine and femur     ostepenic   • Diabetes    • Diabetic nephropathy, type I    • Diabetic peripheral neuropathy type 1    • Disease of thyroid gland    • Fracture    • Hypertension    • Menopause    • Pneumonia        Allergies   Allergen Reactions   • Penicillins Rash       Past Surgical History:   Procedure Laterality Date   • CARDIAC ELECTROPHYSIOLOGY PROCEDURE N/A 1/3/2018    Procedure: Pacemaker DC new;  Surgeon: Bridger Swift MD;  Location: [facility] INVASIVE LOCATION;  Service:    • CATARACT EXTRACTION     • CHOLECYSTECTOMY     • HIP CANNULATED SCREW PLACEMENT Left 6/11/2017    Procedure:  LEFT HIP MULTIPLE CANNULATED  COMPRESSION SCREWS- FERMORAL NECK  FRACTURE;  Surgeon: Jimmy Sheehan MD;  Location: [facility];  Service:        Family History   Problem Relation Age of Onset   • Diabetes Other    • Heart disease Other    • Heart attack Mother    • Lung cancer Father        Social History     Social History   • Marital status:      Social History Main Topics   • Smoking status: Former Smoker     Packs/day: 1.00     Years: 50.00     Types: Cigarettes   • Smokeless tobacco: Never Used   • Alcohol use No   • Drug use: No   • Sexual activity: Defer     Other Topics Concern   • Not on file           Objective   Physical Exam   Constitutional: She is oriented to person, place, and time. She appears well-developed and well-nourished. No distress.   HENT:   Head: Normocephalic and atraumatic.   Right Ear: External ear normal.   Left Ear: External ear normal.   Nose: Nose normal.   Erythema to the oropharynx, no significant tonsillar swelling or exudate   Eyes: Conjunctivae and EOM are normal. Pupils are equal, round, and reactive to light.   Neck: Normal range of motion. Neck supple.   Cardiovascular: Normal rate, regular rhythm, normal heart sounds and intact distal pulses.    Pulmonary/Chest: Effort normal and breath sounds normal. No respiratory distress. She has no wheezes. She has no rales.   Abdominal: Soft. Bowel sounds are normal. She exhibits no distension. There is no tenderness. There is no rebound and no guarding.   Musculoskeletal: Normal range of motion. She exhibits no edema, tenderness or deformity.   Neurological: She is alert and oriented to person, place, and time.   Skin: Skin is warm and dry. No rash noted.   Psychiatric: She has a normal mood and affect. Her behavior is normal.   Nursing note and vitals reviewed.      Procedures         ED Course  ED Course                  MDM  Number of Diagnoses or Management Options  Pharyngitis, unspecified etiology:   Diagnosis management comments: 78-year-old female  with sore throat.  Well-developed well-nourished elderly female in no distress with normal vital signs and exam otherwise as above.  Given her sick contact and multiple comorbidities we'll go ahead and treat with antibiotics.  Do not feel any further work up is indicated. We'll have her follow-up with her primary physician for further evaluation.    DDX: Pharyngitis, viral illness      Final diagnoses:   Pharyngitis, unspecified etiology            Lanre Dickey MD  04/15/18 0900

## 2018-04-23 PROBLEM — E10.21 TYPE 1 DIABETES MELLITUS WITH NEPHROPATHY (HCC): Status: ACTIVE | Noted: 2017-06-10

## 2018-04-23 NOTE — ASSESSMENT & PLAN NOTE
On hemodialysis with high and low sugars  Is on low dose toujeo, does adjust humalog according to sugar pre meal but is having a lot of pp lows, then higher sugars  Discussed need for her to try to get a mixed meal to reduce highs and lows   Is getting injections in eye for retinopathy   Neuropathy without amputation, bilateral heel callus today   ESRD on HD due to diabetic nephropathy   F/u 3 months   Adjustment of meals and insulin dosing prior to meals reviewed

## 2018-04-23 NOTE — PROGRESS NOTES
Sigrid Casarez 78 y.o.  CC:Follow-up; Diabetes (Type I, eye exam was last week by Dr Meliton Pozo); and Hypothyroidism    [place]: Follow-up; Diabetes (Type I, eye exam was last week by Dr Meliton Pozo); and Hypothyroidism    Blood sugar and 90 day average sugar reviewed  Results for orders placed or performed in visit on 04/23/18   POC Glycosylated Hemoglobin (Hb A1C)   Result Value Ref Range    Hemoglobin A1C 9.3 %   POC Glucose Fingerstick   Result Value Ref Range    Glucose 324 (A) 70 - 130 mg/dL     To er with virus last week  Sugars have been higher with this   They are also pushing her to eat more with dialysis- harder to keep sugars under control  She has lost appetite  Discussed higher average sugar   Is on toujeo and humalog  Is using sliding scale- lower sugars overnight   Had ems out there to help with low- did not go to hospital   They do not have glucogon but has glucose gel   Is utd with eye exam  Energy is good overall  Bilateral neuropathy- heel callus, fungal nail infection - signed for specialty shoes   Had labs via Dr Hernandez ([place])  Also needs form completed for shoes  Discussed sensor for recurrent severe low blood sugar   Discussed adjusting insulin dose at night (short acting)   Avoid dosing before bed  Also discussed increasing protein intake with meals to help reduce variability with blood sugars     Allergies   Allergen Reactions   • Penicillins Rash       Current Outpatient Prescriptions:   •  amLODIPine (NORVASC) 5 MG tablet, Take 1 tablet by mouth Daily., Disp: , Rfl:   •  aspirin 81 MG EC tablet, Take 1 tablet by mouth daily., Disp: , Rfl:   •  atorvastatin (LIPITOR) 10 MG tablet, Take 1 tablet by mouth Daily., Disp: 30 tablet, Rfl: 0  •  azithromycin (ZITHROMAX) 250 MG tablet, Take 1 tablet by mouth Daily. Take 2 tablets the first day, then 1 tablet daily for 4 days., Disp: 6 tablet, Rfl: 0  •  B Complex-C-Folic Acid (AR-CLEOPATRA RX) 1 MG tablet, Take 1 tablet by mouth Daily.,  Disp: , Rfl:   •  benzonatate (TESSALON) 100 MG capsule, Take 1 capsule by mouth 3 (Three) Times a Day As Needed for Cough., Disp: 20 capsule, Rfl: 0  •  carvedilol (COREG) 25 MG tablet, Take 1 tablet by mouth 2 (two) times a day., Disp: , Rfl:   •  Cholecalciferol (VITAMIN D) 1000 UNITS tablet, Take 1,000 Units by mouth 2 (Two) Times a Day., Disp: , Rfl:   •  citalopram (CeleXA) 10 MG tablet, Take 1 tablet by mouth Daily. (Patient taking differently: Take 10 mg by mouth Every Night.), Disp: 30 tablet, Rfl: 0  •  ferrous sulfate 325 (65 FE) MG tablet, Take 1 tablet by mouth 1 (One) Time Per Week. With meals, Disp: , Rfl:   •  glucose blood (FREESTYLE LITE) test strip, 3 (three) times a day., Disp: , Rfl:   •  Insulin Lispro (HUMALOG) 100 UNIT/ML solution pen-injector, 4-10 units tid before meals, Disp: 5 pen, Rfl: 0  •  Insulin Pen Needle (BD ULTRA-FINE PEN NEEDLES) 29G X 12.7MM misc, Use 3 times daily, Disp: , Rfl:   •  levothyroxine (SYNTHROID, LEVOTHROID) 75 MCG tablet, Take 2.5 tablets by mouth Daily. (Patient taking differently: Take  by mouth Daily.), Disp: 30 tablet, Rfl: 0  •  lidocaine 3 % cream cream, Apply 1 application topically 3 (Three) Times a Week., Disp: , Rfl:   •  lidocaine-prilocaine (EMLA) 2.5-2.5 % cream, Apply 1 application topically 3 (Three) Times a Week., Disp: , Rfl:   •  ondansetron ODT (ZOFRAN-ODT) 4 MG disintegrating tablet, , Disp: , Rfl:   •  RELION PEN NEEDLE 31G/8MM 31G X 8 MM misc, , Disp: , Rfl:   •  sevelamer (RENVELA) 800 MG tablet, Take 800 mg by mouth 3 (Three) Times a Day With Meals., Disp: , Rfl:   •  tiotropium bromide-olodaterol (STIOLTO RESPIMAT) 2.5-2.5 MCG/ACT aerosol solution inhaler, Inhale 2 puffs Daily., Disp: 1 inhaler, Rfl: 5  •  TOUJEO SOLOSTAR 300 UNIT/ML solution pen-injector, Inject 5 Units under the skin Every Night., Disp: 1 pen, Rfl: 0  •  valsartan (DIOVAN) 320 MG tablet, Take 1 tablet by mouth Daily., Disp: 30 tablet, Rfl: 0  Patient Active Problem List     Diagnosis   • Symptomatic bradycardia [R00.1]   • Acute on chronic respiratory failure with hypoxia [J96.21]   • End stage renal disease on dialysis [N18.6, Z99.2]   • Sick sinus syndrome [I49.5]     Overview Note:     Added automatically from request for surgery [id number]     • Dyspnea on exertion [R06.09]   • Hypoxia [R09.02]   • 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]   • Chronic kidney disease, stage V [N18.5]   • Hip fracture, left [S72.002A]   • Status post fall [Z91.81]   • Type 2 diabetes mellitus with nephropathy [E11.21]   • Chronic kidney disease, stage IV (severe) [N18.4]   • Bilateral nondiabetic proliferative retinopathy [H35.23]   • Chronic anemia [D64.9]     Overview Note:     Impression: 09/09/2015 - check cbc, iron and vitamin B12  Impression: 01/06/2015 - acd - followed by nephrology;      • Depression [F32.9]     Overview Note:     Impression: 01/06/2015 - add citalopram 20 mg daily;      • Diabetic peripheral neuropathy [E11.42]     Overview Note:     Impression: 05/15/2015 - stable without foot lesion  Impression: 04/30/2014 - trial elevil- if no help use ultram.;      • 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 [E10.3599]     Overview Note:     Impression: 05/15/2015 - discussed f/u - discuss occulomotor findings; Description: [facility] 2010     • Dizziness [R42]     Overview Note:     Impression: 09/09/2015 - some postural but not all orthostatic in nature  repeat bp is good  has tried zofran and meclizine  cat scan normal  refer ent, MRI scheduled  Impression: 05/15/2015 - supine frontal ha  check cat scan  meclizine at hs  zofran prn; Description: [facility]- vestibular training.     • Malaise and fatigue [R53.81, R53.83]   • Essential hypertension [I10]     Overview Note:     Impression: 09/09/2015 - higher bp, repeat improved  check bp daily at home, call if over 145/85  Impression: 05/15/2015 - bp repeat 172/70  add cardura 1 mg bid   Impression: 05/15/2015 - bp repeat 172/70  Impression: 01/06/2015 - bp high- double lasix  cont f/u with nephrology  Impression: 09/30/2014 - bp is good today, no changes  Impression: 04/30/2014 - bp is stable overall, goals reviewed;      • Hypoglycemia [E16.2]   • Acquired hypothyroidism [E03.9]     Overview Note:     Impression: 09/09/2015 - update tfts  we recently changed  Impression: 05/15/2015 - last tsh 1/15 normal  Impression: 01/06/2015 - we reduced supplement dose - check tft  Impression: 09/30/2014 - check tft  Impression: 04/30/2014 - check tft;      • Diabetic macular edema [E11.311]     Overview Note:     Impression: 09/30/2014 - stable eye exam per patient;      • Menopause present [Z78.0]   • Mixed hyperlipidemia [E78.2]     Overview Note:     Impression: 09/09/2015 - check flp  Impression: 09/30/2014 - update flp; Description: check flp     • Osteopenia [M85.80]     Overview Note:     Description: 3/10     • Vitamin D deficiency [E55.9]     Overview Note:     Impression: 09/09/2015 - repeat levels;        Review of Systems   Constitutional: Negative for activity change, appetite change, chills, diaphoresis, fatigue, fever and unexpected weight change.   HENT: Negative for congestion, dental problem, drooling, ear discharge, ear pain, facial swelling, hearing loss, mouth sores, nosebleeds, postnasal drip, rhinorrhea, sinus pressure, sneezing, sore throat, tinnitus, trouble swallowing and voice change.    Eyes: Positive for visual disturbance. Negative for photophobia, pain, discharge, redness and itching.   Respiratory: Negative for apnea, cough, choking, chest tightness, shortness of breath, wheezing and stridor.    Cardiovascular: Negative for chest pain, palpitations and leg swelling.   Gastrointestinal: Negative for abdominal distention, abdominal pain, anal bleeding, blood in stool, constipation, diarrhea, nausea, rectal pain and vomiting.   Endocrine: Negative for cold intolerance, heat  "intolerance, polydipsia, polyphagia and polyuria.   Genitourinary: Negative for decreased urine volume, difficulty urinating, dysuria, enuresis, flank pain, frequency, genital sores, hematuria and urgency.   Musculoskeletal: Negative for arthralgias, back pain, gait problem, joint swelling, myalgias, neck pain and neck stiffness.   Skin: Negative for color change, pallor, rash and wound.   Allergic/Immunologic: Negative for environmental allergies, food allergies and immunocompromised state.   Neurological: Positive for numbness. Negative for dizziness, tremors, seizures, syncope, facial asymmetry, speech difficulty, weakness, light-headedness and headaches.   Hematological: Negative for adenopathy. Does not bruise/bleed easily.   Psychiatric/Behavioral: Negative for agitation, behavioral problems, confusion, decreased concentration, dysphoric mood, hallucinations, self-injury, sleep disturbance and suicidal ideas. The patient is not nervous/anxious and is not hyperactive.      Social History     Social History   • Marital status:      Spouse name: N/A   • Number of children: N/A   • Years of education: N/A     Occupational History   • Not on file.     Social History Main Topics   • Smoking status: Former Smoker     Packs/day: 1.00     Years: 50.00     Types: Cigarettes   • Smokeless tobacco: Never Used   • Alcohol use No   • Drug use: No   • Sexual activity: Defer     Other Topics Concern   • Not on file     Social History Narrative   • No narrative on file     Family History   Problem Relation Age of Onset   • Diabetes Other    • Heart disease Other    • Heart attack Mother    • Lung cancer Father      /70   Pulse 72   Ht 160 cm (63\")   Wt 59.4 kg (131 lb)   LMP  (LMP Unknown)   SpO2 95%   BMI 23.21 kg/m²   Physical Exam   Constitutional: She is oriented to person, place, and time. She appears well-developed and well-nourished.   HENT:   Head: Normocephalic and atraumatic.   Nose: Nose normal. "   Mouth/Throat: Oropharynx is clear and moist.   Eyes: Conjunctivae, EOM and lids are normal. Pupils are equal, round, and reactive to light.   Neck: Trachea normal and normal range of motion. Neck supple. Carotid bruit is not present. No tracheal deviation present. No thyroid mass and no thyromegaly present.   Cardiovascular: Normal rate, regular rhythm, normal heart sounds and intact distal pulses.  Exam reveals no gallop and no friction rub.    No murmur heard.  Pulmonary/Chest: Effort normal and breath sounds normal. No respiratory distress. She has no wheezes.   Musculoskeletal: Normal range of motion. She exhibits no edema or deformity.    Sigrid had a diabetic foot exam performed today.   During the foot exam she had a monofilament test performed.    Neurological Sensory Findings - Unaltered hot/cold right ankle/foot discrimination and unaltered hot/cold left ankle/foot discrimination. Altered sharp/dull right ankle/foot discrimination and altered sharp/dull left ankle/foot discrimination. No right ankle/foot altered proprioception and no left ankle/foot altered proprioception  Vascular Status -  Her right foot exhibits abnormal foot edema. Her right foot exhibits normal foot vasculature . Her left foot exhibits abnormal foot edema. Her left foot exhibits normal foot vasculature .  Skin Integrity  -  Her right foot skin is intact.Her left foot skin is intact..  Lymphadenopathy:     She has no cervical adenopathy.   Neurological: She is alert and oriented to person, place, and time. She has normal reflexes. She displays normal reflexes. No cranial nerve deficit.   Skin: Skin is warm and dry. No rash noted. No cyanosis or erythema. Nails show no clubbing.   Psychiatric: She has a normal mood and affect. Her speech is normal and behavior is normal. Judgment and thought content normal. Cognition and memory are normal.   Nursing note and vitals reviewed.    Results for orders placed or performed during the hospital  encounter of 01/02/18   MRSA Screen Culture - Swab, Nares   Result Value Ref Range    MRSA SCREEN CX       No Methicillin Resistant Staphylococcus aureus isolated   VRE Culture - Swab, Per Rectum   Result Value Ref Range    VRE SCREEN CX No Vancomycin Resistant Enterococcus Isolated    Acinetobacter Screen - Swab, Axilla, Left   Result Value Ref Range    ACINETOBACTER SCREEN CX No growth    Comprehensive Metabolic Panel   Result Value Ref Range    Glucose 239 (H) 74 - 98 mg/dL    BUN 58 (H) 7 - 20 mg/dL    Creatinine 6.00 (H) 0.60 - 1.30 mg/dL    Sodium 142 137 - 145 mmol/L    Potassium 4.0 3.5 - 5.1 mmol/L    Chloride 103 98 - 107 mmol/L    CO2 26.0 26.0 - 30.0 mmol/L    Calcium 9.3 8.4 - 10.2 mg/dL    Total Protein 6.9 6.3 - 8.2 g/dL    Albumin 4.20 3.50 - 5.00 g/dL    ALT (SGPT) 39 13 - 69 U/L    AST (SGOT) 27 15 - 46 U/L    Alkaline Phosphatase 98 38 - 126 U/L    Total Bilirubin 0.7 0.2 - 1.3 mg/dL    eGFR Non African Amer 7 (L) >60 mL/min/1.73    Globulin 2.7 gm/dL    A/G Ratio 1.6 1.0 - 2.0 g/dL    BUN/Creatinine Ratio 9.7 7.1 - 23.5    Anion Gap 17.0 mmol/L   BNP   Result Value Ref Range    proBNP 9,960.0 (C) 0.0 - 450.0 pg/mL   Troponin   Result Value Ref Range    Troponin I <0.012 0.000 - 0.034 ng/mL   CBC Auto Differential   Result Value Ref Range    WBC 7.71 4.80 - 10.80 10*3/mm3    RBC 3.02 (L) 4.20 - 5.40 10*6/mm3    Hemoglobin 9.1 (L) 12.0 - 16.0 g/dL    Hematocrit 28.6 (L) 37.0 - 47.0 %    MCV 94.7 81.0 - 99.0 fL    MCH 30.1 27.0 - 31.0 pg    MCHC 31.8 30.0 - 37.0 g/dL    RDW 15.6 (H) 11.5 - 14.5 %    RDW-SD 54.5 (H) 37.0 - 54.0 fl    MPV 11.2 6.0 - 12.0 fL    Platelets 214 130 - 400 10*3/mm3    Neutrophil % 80.1 (H) 37.0 - 80.0 %    Lymphocyte % 11.3 10.0 - 50.0 %    Monocyte % 5.1 0.0 - 12.0 %    Eosinophil % 2.2 0.0 - 7.0 %    Basophil % 0.9 0.0 - 2.5 %    Immature Grans % 0.4 0.0 - 0.6 %    Neutrophils, Absolute 6.18 2.00 - 6.90 10*3/mm3    Lymphocytes, Absolute 0.87 0.60 - 3.40 10*3/mm3     Monocytes, Absolute 0.39 0.00 - 0.90 10*3/mm3    Eosinophils, Absolute 0.17 0.00 - 0.70 10*3/mm3    Basophils, Absolute 0.07 0.00 - 0.20 10*3/mm3    Immature Grans, Absolute 0.03 0.00 - 0.06 10*3/mm3    nRBC 0.0 0.0 - 0.0 /100 WBC   Blood Gas, Arterial With Co-Ox   Result Value Ref Range    Site Arterial: left brachial     Winston's Test Positive     pH, Arterial 7.399 7.300 - 7.500 pH units    pCO2, Arterial 41.8 35.0 - 45.0 mm Hg    pO2, Arterial 58.8 (L) 75.0 - 100.0 mm Hg    HCO3, Arterial 25.8 22.0 - 28.0 mmol/L    Base Excess, Arterial 1.0 mmol/L    O2 Saturation, Arterial 88.8 %    Hemoglobin, Blood Gas 8.2 (L) 12 - 18 g/dL    Oxyhemoglobin 87.2 (L) 94 - 99 %    Methemoglobin 1.40 %    Modality Cannula - Nasal     FIO2 36 %   Troponin   Result Value Ref Range    Troponin I <0.012 0.000 - 0.034 ng/mL   T4, Free   Result Value Ref Range    Free T4 2.97 (H) 0.78 - 2.19 ng/dL   TSH   Result Value Ref Range    TSH 0.134 (L) 0.470 - 4.680 mIU/mL   Hemoglobin A1c   Result Value Ref Range    Hemoglobin A1C 7.6 (H) 3 - 6 %   Troponin   Result Value Ref Range    Troponin I <0.012 0.000 - 0.034 ng/mL   Basic Metabolic Panel   Result Value Ref Range    Glucose 26 (C) 74 - 98 mg/dL    BUN 66 (H) 7 - 20 mg/dL    Creatinine 6.60 (H) 0.60 - 1.30 mg/dL    Sodium 141 137 - 145 mmol/L    Potassium 3.6 3.5 - 5.1 mmol/L    Chloride 104 98 - 107 mmol/L    CO2 28.0 26.0 - 30.0 mmol/L    Calcium 8.8 8.4 - 10.2 mg/dL    eGFR Non African Amer 6 (L) >60 mL/min/1.73    BUN/Creatinine Ratio 10.0 7.1 - 23.5    Anion Gap 12.6 mmol/L   CBC Auto Differential   Result Value Ref Range    WBC 5.39 4.80 - 10.80 10*3/mm3    RBC 2.52 (L) 4.20 - 5.40 10*6/mm3    Hemoglobin 7.7 (C) 12.0 - 16.0 g/dL    Hematocrit 23.8 (C) 37.0 - 47.0 %    MCV 94.4 81.0 - 99.0 fL    MCH 30.6 27.0 - 31.0 pg    MCHC 32.4 30.0 - 37.0 g/dL    RDW 15.7 (H) 11.5 - 14.5 %    RDW-SD 54.6 (H) 37.0 - 54.0 fl    MPV 11.7 6.0 - 12.0 fL    Platelets 195 130 - 400 10*3/mm3     Neutrophil % 61.2 37.0 - 80.0 %    Lymphocyte % 23.0 10.0 - 50.0 %    Monocyte % 11.5 0.0 - 12.0 %    Eosinophil % 3.0 0.0 - 7.0 %    Basophil % 1.1 0.0 - 2.5 %    Immature Grans % 0.2 0.0 - 0.6 %    Neutrophils, Absolute 3.30 2.00 - 6.90 10*3/mm3    Lymphocytes, Absolute 1.24 0.60 - 3.40 10*3/mm3    Monocytes, Absolute 0.62 0.00 - 0.90 10*3/mm3    Eosinophils, Absolute 0.16 0.00 - 0.70 10*3/mm3    Basophils, Absolute 0.06 0.00 - 0.20 10*3/mm3    Immature Grans, Absolute 0.01 0.00 - 0.06 10*3/mm3    nRBC 0.0 0.0 - 0.0 /100 WBC   Magnesium   Result Value Ref Range    Magnesium 2.7 (H) 1.6 - 2.3 mg/dL   CBC (No Diff)   Result Value Ref Range    WBC 5.97 4.80 - 10.80 10*3/mm3    RBC 2.52 (L) 4.20 - 5.40 10*6/mm3    Hemoglobin 7.6 (C) 12.0 - 16.0 g/dL    Hematocrit 23.7 (C) 37.0 - 47.0 %    MCV 94.0 81.0 - 99.0 fL    MCH 30.2 27.0 - 31.0 pg    MCHC 32.1 30.0 - 37.0 g/dL    RDW 15.4 (H) 11.5 - 14.5 %    RDW-SD 53.4 37.0 - 54.0 fl    MPV 11.6 6.0 - 12.0 fL    Platelets 177 130 - 400 10*3/mm3   Basic Metabolic Panel   Result Value Ref Range    Glucose 321 (H) 74 - 98 mg/dL    BUN 34 (H) 7 - 20 mg/dL    Creatinine 4.10 (H) 0.60 - 1.30 mg/dL    Sodium 134 (L) 137 - 145 mmol/L    Potassium 4.4 3.5 - 5.1 mmol/L    Chloride 100 98 - 107 mmol/L    CO2 26.0 26.0 - 30.0 mmol/L    Calcium 8.7 8.4 - 10.2 mg/dL    eGFR Non African Amer 10 (L) >60 mL/min/1.73    BUN/Creatinine Ratio 8.3 7.1 - 23.5    Anion Gap 12.4 mmol/L   Sedimentation Rate   Result Value Ref Range    Sed Rate 16 0 - 20 mm/hr   CBC Auto Differential   Result Value Ref Range    WBC 8.13 4.80 - 10.80 10*3/mm3    RBC 3.23 (L) 4.20 - 5.40 10*6/mm3    Hemoglobin 9.9 (L) 12.0 - 16.0 g/dL    Hematocrit 30.8 (L) 37.0 - 47.0 %    MCV 95.4 81.0 - 99.0 fL    MCH 30.7 27.0 - 31.0 pg    MCHC 32.1 30.0 - 37.0 g/dL    RDW 15.6 (H) 11.5 - 14.5 %    RDW-SD 54.1 (H) 37.0 - 54.0 fl    MPV 11.6 6.0 - 12.0 fL    Platelets 209 130 - 400 10*3/mm3    Neutrophil % 72.2 37.0 - 80.0 %     Lymphocyte % 14.6 10.0 - 50.0 %    Monocyte % 8.7 0.0 - 12.0 %    Eosinophil % 3.3 0.0 - 7.0 %    Basophil % 1.0 0.0 - 2.5 %    Immature Grans % 0.2 0.0 - 0.6 %    Neutrophils, Absolute 5.86 2.00 - 6.90 10*3/mm3    Lymphocytes, Absolute 1.19 0.60 - 3.40 10*3/mm3    Monocytes, Absolute 0.71 0.00 - 0.90 10*3/mm3    Eosinophils, Absolute 0.27 0.00 - 0.70 10*3/mm3    Basophils, Absolute 0.08 0.00 - 0.20 10*3/mm3    Immature Grans, Absolute 0.02 0.00 - 0.06 10*3/mm3    nRBC 0.0 0.0 - 0.0 /100 WBC   Glucose, Random   Result Value Ref Range    Glucose 177 (H) 74 - 98 mg/dL   POC Glucose Once   Result Value Ref Range    Glucose 239 (H) 70 - 130 mg/dL   POC Glucose Once   Result Value Ref Range    Glucose 165 (H) 70 - 130 mg/dL   POC Glucose Once   Result Value Ref Range    Glucose 301 (H) 70 - 130 mg/dL   POC Glucose Once   Result Value Ref Range    Glucose 251 (H) 70 - 130 mg/dL   POC Glucose Once   Result Value Ref Range    Glucose 30 (C) 70 - 130 mg/dL   POC Glucose Once   Result Value Ref Range    Glucose 28 (C) 70 - 130 mg/dL   POC Glucose Once   Result Value Ref Range    Glucose 164 (H) 70 - 130 mg/dL   POC Glucose Once   Result Value Ref Range    Glucose 238 (H) 70 - 130 mg/dL   POC Glucose Once   Result Value Ref Range    Glucose 205 (H) 70 - 130 mg/dL   POC Glucose Once   Result Value Ref Range    Glucose 419 (H) 70 - 130 mg/dL   POC Glucose Once   Result Value Ref Range    Glucose 416 (H) 70 - 130 mg/dL   POC Glucose Once   Result Value Ref Range    Glucose 315 (H) 70 - 130 mg/dL   POC Glucose Once   Result Value Ref Range    Glucose 293 (H) 70 - 130 mg/dL   POC Glucose Once   Result Value Ref Range    Glucose 270 (H) 70 - 130 mg/dL   POC Glucose Once   Result Value Ref Range    Glucose 197 (H) 70 - 130 mg/dL   POC Glucose Once   Result Value Ref Range    Glucose 378 (H) 70 - 130 mg/dL   POC Glucose Once   Result Value Ref Range    Glucose 396 (H) 70 - 130 mg/dL   POC Glucose Once   Result Value Ref Range     Glucose 28 (C) 70 - 130 mg/dL   POC Glucose Once   Result Value Ref Range    Glucose 23 (C) 70 - 130 mg/dL   POC Glucose Once   Result Value Ref Range    Glucose 185 (H) 70 - 130 mg/dL   POC Glucose Once   Result Value Ref Range    Glucose 235 (H) 70 - 130 mg/dL   POC Glucose Once   Result Value Ref Range    Glucose 282 (H) 70 - 130 mg/dL   Type & Screen   Result Value Ref Range    ABO Type A     RH type Positive     Antibody Screen Negative    Prepare RBC, 1 Units   Result Value Ref Range    Product Code [id number]     Unit Number [id number]     UNIT  ABO A     UNIT  RH POS     CROSSMATCH 1 INTERPRETATION Compatible     Dispense Status PT     Blood Type APOS     Blood Expiration Date [id number]     Blood Type Barcode 6200    Light Blue Top   Result Value Ref Range    Extra Tube hold for add-on    Lavender Top   Result Value Ref Range    Extra Tube hold for add-on    Gold Top - SST   Result Value Ref Range    Extra Tube Hold for add-ons.    Green Top (No Gel)   Result Value Ref Range    Extra Tube Hold for add-ons.      Sigrid was seen today for follow-up, diabetes and hypothyroidism.    Diagnoses and all orders for this visit:    Type 2 diabetes mellitus with nephropathy  -     POC Glycosylated Hemoglobin (Hb A1C)  -     POC Glucose Fingerstick      Problem List Items Addressed This Visit        Cardiovascular and Mediastinum    Mixed hyperlipidemia     Check flp - gave request  Continue low fat diet  Is having problems with appetite          Essential hypertension     bp higher, drops during dialysis   Continue to monitor   Is having some swelling, low salt/decreased fluids reviewed             Digestive    Vitamin D deficiency     Continue rda            Endocrine    Type 1 diabetes mellitus with nephropathy - Primary     On hemodialysis with high and low sugars  Is on low dose toujeo, does adjust humalog according to sugar pre meal but is having a lot of pp lows, then higher sugars  Discussed need for her to  try to get a mixed meal to reduce highs and lows   Is getting injections in eye for retinopathy   Neuropathy without amputation, bilateral heel callus today   ESRD on HD due to diabetic nephropathy   F/u 3 months   Adjustment of meals and insulin dosing prior to meals reviewed          Relevant Medications    glucagon (GLUCAGEN) 1 MG injection    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       Return in about 3 months (around 7/23/2018) for Recheck 30 min .      Mary Beth Emerson MA  Signed Violeta Pittman MD

## 2018-04-23 NOTE — ASSESSMENT & PLAN NOTE
bp higher, drops during dialysis   Continue to monitor   Is having some swelling, low salt/decreased fluids reviewed

## 2018-05-15 ENCOUNTER — CHARTING TRANS (OUTPATIENT)
Dept: OTHER | Age: 78
End: 2018-05-15

## 2018-05-15 ASSESSMENT — PAIN SCALES - GENERAL: PAINLEVEL_OUTOF10: 0

## 2018-05-22 NOTE — ED PROVIDER NOTES
"Subjective   Patient is here with complaint of feeling of some indigestion in her throat and some in her epigastrium chest area about an hour ago while getting dialysis she finished about 2 hours of her dialysis states when she started feeling these symptoms they \"turned down the machine\"  that her symptoms subsided she currently denies any chest pain no shortness of air no abdominal pain patient states she feels fine        History provided by:  Patient and relative      Review of Systems   Constitutional: Negative.    HENT: Negative.    Eyes: Negative.    Respiratory: Negative.  Negative for shortness of breath.    Cardiovascular: Negative.    Gastrointestinal: Negative for vomiting.        Indigestion   Genitourinary: Negative.    Musculoskeletal: Positive for arthralgias.   Skin: Negative.    Neurological: Negative.    Psychiatric/Behavioral: The patient is nervous/anxious.    All other systems reviewed and are negative.      Past Medical History:   Diagnosis Date   • Anemia    • Chronic kidney disease    • Chronic kidney disease    • Community acquired pneumonia    • COPD (chronic obstructive pulmonary disease)    • Dexa Body Composition study lumosacral spine and femur     ostepenic   • Diabetes    • Diabetic nephropathy, type I    • Diabetic peripheral neuropathy type 1    • Disease of thyroid gland    • Fracture    • Hypertension    • Menopause    • Pneumonia        Allergies   Allergen Reactions   • Penicillins Rash       Past Surgical History:   Procedure Laterality Date   • CARDIAC ELECTROPHYSIOLOGY PROCEDURE N/A 1/3/2018    Procedure: Pacemaker DC new;  Surgeon: Bridger Swift MD;  Location: [facility] CATH INVASIVE LOCATION;  Service:    • CATARACT EXTRACTION     • CHOLECYSTECTOMY     • HIP CANNULATED SCREW PLACEMENT Left 6/11/2017    Procedure:  LEFT HIP MULTIPLE CANNULATED COMPRESSION SCREWS- FERMORAL NECK  FRACTURE;  Surgeon: Jimmy Sheehan MD;  Location: [facility] OR;  Service:        Family " History   Problem Relation Age of Onset   • Diabetes Other    • Heart disease Other    • Heart attack Mother    • Lung cancer Father        Social History     Social History   • Marital status:      Social History Main Topics   • Smoking status: Former Smoker     Packs/day: 1.00     Years: 50.00     Types: Cigarettes   • Smokeless tobacco: Never Used   • Alcohol use No   • Drug use: No   • Sexual activity: Defer     Other Topics Concern   • Not on file           Objective   Physical Exam   Constitutional: She is oriented to person, place, and time. She appears well-developed and well-nourished. No distress.   Afebrile nontoxic no acute distress cheerful smiling   HENT:   Head: Normocephalic and atraumatic.   Mouth/Throat: Oropharynx is clear and moist.   Eyes: Conjunctivae and EOM are normal. Pupils are equal, round, and reactive to light.   Neck: Normal range of motion. Neck supple.   Cardiovascular: Normal rate, regular rhythm, normal heart sounds and intact distal pulses.    Pulmonary/Chest: Effort normal and breath sounds normal.   Abdominal: Soft. Bowel sounds are normal. She exhibits no mass. There is no tenderness. There is no guarding.   Musculoskeletal: Normal range of motion.   Neurological: She is alert and oriented to person, place, and time. No cranial nerve deficit or sensory deficit. Coordination normal.   Skin: Skin is warm and dry. Capillary refill takes less than 2 seconds. She is not diaphoretic.   Psychiatric: She has a normal mood and affect. Her behavior is normal. Judgment and thought content normal.   Nursing note and vitals reviewed.      Procedures           ED Course  ED Course as of May 22 1717   Tue May 22, 2018   1615 Pt case and mgmt reviewed with Dr. Hensley  [SC]   1624 EKG was reviewed by Dr Hensley  [SC]   8165 Patient's case labs management reviewed Dr Hensley... Patient has been asymptomatic patient refuses any further workup... We'll plan on discharge home follow-up with PCP  tomorrow return here if is any sudden changes recurring chest pain or any other concerns in the next 12-24 hours  [SC]      ED Course User Index  [SC] Delroy Noble PA-C                  MDM  Number of Diagnoses or Management Options     Amount and/or Complexity of Data Reviewed  Clinical lab tests: reviewed  Decide to obtain previous medical records or to obtain history from someone other than the patient: yes  Obtain history from someone other than the patient: yes  Review and summarize past medical records: yes  Discuss the patient with other providers: yes    Risk of Complications, Morbidity, and/or Mortality  Presenting problems: moderate  Diagnostic procedures: moderate  Management options: moderate          Final diagnoses:   Chest pain, atypical            Delroy Noble PA-C  05/22/18 [id number]

## 2018-06-13 NOTE — PROGRESS NOTES
"Chief Complaint   Patient presents with   • Follow-up   • Shortness of Breath         Subjective   Sigrid Casarez is a 78 y.o. female.     History of Present Illness   The patient comes in today for follow-up of this of breath and COPD.    She is using Stiolto daily and doing well with the medication. She is not using oxygen all the time. She does not use a rescue inhaler.     She feels like she sleeps well but she does have interrupted sleep at times. She sleeps in a recliner.    The following portions of the patient's history were reviewed and updated as appropriate: allergies, current medications, past family history, past medical history, past social history and past surgical history.    Review of Systems   HENT: Negative for sinus pressure, sneezing and sore throat.    Respiratory: Positive for shortness of breath. Negative for cough and wheezing.        Objective   Visit Vitals  /68   Pulse 73   Resp 18   Ht 160 cm (62.99\")   Wt 60.8 kg (134 lb)   LMP  (LMP Unknown)   SpO2 95%   BMI 23.74 kg/m²     Physical Exam   Constitutional: She is oriented to person, place, and time. She appears well-developed and well-nourished.   HENT:   Head: Normocephalic and atraumatic.   Crowded oropharynx. Dentures.    Eyes: EOM are normal.   Neck: Neck supple.   Cardiovascular: Normal rate and regular rhythm.    Pulmonary/Chest: Effort normal. No respiratory distress.   Somewhat decreased A/E without wheezing noted.   Musculoskeletal: She exhibits no edema.   Gait with cane.    Neurological: She is alert and oriented to person, place, and time.   Skin: Skin is warm and dry.   Psychiatric: She has a normal mood and affect.   Vitals reviewed.          Assessment/Plan   Sigrid was seen today for follow-up and shortness of breath.    Diagnoses and all orders for this visit:    SOB (shortness of breath)    Chronic obstructive pulmonary disease, unspecified COPD type    Personal history of tobacco use, presenting hazards to " health    Other orders  -     tiotropium bromide-olodaterol (STIOLTO RESPIMAT) 2.5-2.5 MCG/ACT aerosol solution inhaler; Inhale 2 puffs Daily.           Return in about 16 weeks (around 10/3/2018) for Recheck, For Dr. Sanz.    DISCUSSION (if any):  On 6 minute walk test, her oxygen saturation remained 94% and greater during the walk. Her total walk distance was 198 meters.     I reviewed her last CT scan from January which revealed centrilobular emphysema with small bilateral pleural effusion and diffuse bibasilar atelectasis. I discussed the results of the CT with the patient and her family.    Overall, the shortness of breath and moderate COPD seems to be stable with the current medication. I have advised her to continue Stiolto on a regular basis.     She does not want to discuss pneumonia vaccination today but is willing to discuss maybe at her next visit.     Dictated utilizing Dragon dictation.    This document was electronically signed by SANJUANITA Stanford June 13, 2018  10:33 AM

## 2018-06-22 NOTE — ASSESSMENT & PLAN NOTE
Blood sugar and 90 day average sugar reviewed  Results for orders placed or performed in visit on 06/22/18   POC Glycosylated Hemoglobin (Hb A1C)   Result Value Ref Range    Hemoglobin A1C 8.1 %   POC Glucose Fingerstick   Result Value Ref Range    Glucose 226 (A) 70 - 130 mg/dL     Average sugar 180  Is utd with eye exam  esrd on hemodialysis   Ur alb done via nephrology   No foot lesion   F/u 3-4 months

## 2018-06-22 NOTE — PROGRESS NOTES
Sigrid Casarez 78 y.o.  [facility]:Follow-up; Diabetes (Type I, eye exam was 4/18 with Dr Pozo); Hypothyroidism; and Peripheral Neuropathy    [place]: Follow-up; Diabetes (Type I, eye exam was 4/18 with Dr Pozo); Hypothyroidism; and Peripheral Neuropathy    Blood sugar and 90 day average sugar reviewed  Results for orders placed or performed in visit on 06/22/18   POC Glycosylated Hemoglobin (Hb A1C)   Result Value Ref Range    Hemoglobin A1C 8.1 %   POC Glucose Fingerstick   Result Value Ref Range    Glucose 226 (A) 70 - 130 mg/dL     Average sugar is 180   She needs new glucose meter  She is utd with eye exam  No neuropathy or foot lesion   Ur alb due today - has ckd - still seeing nephrology and is on dialysis   Recent tfts and flp reviewed  Did not get lab work last time and they said they couldn't do it     Allergies   Allergen Reactions   • Penicillins Rash       Current Outpatient Prescriptions:   •  levothyroxine (SYNTHROID, LEVOTHROID) 100 MCG tablet, Take 100 mcg by mouth Daily., Disp: , Rfl:   •  amLODIPine (NORVASC) 5 MG tablet, Take 1 tablet by mouth Daily., Disp: , Rfl:   •  aspirin 81 MG EC tablet, Take 1 tablet by mouth daily., Disp: , Rfl:   •  atorvastatin (LIPITOR) 10 MG tablet, Take 1 tablet by mouth Daily., Disp: 30 tablet, Rfl: 0  •  B Complex-C-Folic Acid (AR-CLEOPATRA RX) 1 MG tablet, Take 1 tablet by mouth Daily., Disp: , Rfl:   •  carvedilol (COREG) 25 MG tablet, Take 1 tablet by mouth 2 (two) times a day., Disp: , Rfl:   •  Cholecalciferol (VITAMIN D) 1000 UNITS tablet, Take 1,000 Units by mouth 2 (Two) Times a Day., Disp: , Rfl:   •  ferrous sulfate 325 (65 FE) MG tablet, Take 1 tablet by mouth 1 (One) Time Per Week. With meals, Disp: , Rfl:   •  glucagon (GLUCAGEN) 1 MG injection, Inject 1 mg under the skin See Admin Instructions. Follow package directions for low blood sugar., Disp: 1 kit, Rfl: 1  •  glucose blood (FREESTYLE LITE) test strip, 3 (three) times a day., Disp: , Rfl:   •   Insulin Lispro (HUMALOG) 100 UNIT/ML solution pen-injector, 4-10 units tid before meals, Disp: 5 pen, Rfl: 0  •  Insulin Pen Needle (BD ULTRA-FINE PEN NEEDLES) 29G X 12.7MM misc, Use 3 times daily, Disp: , Rfl:   •  lidocaine 3 % cream cream, Apply 1 application topically 3 (Three) Times a Week., Disp: , Rfl:   •  lidocaine-prilocaine (EMLA) 2.5-2.5 % cream, Apply 1 application topically 3 (Three) Times a Week., Disp: , Rfl:   •  ondansetron ODT (ZOFRAN-ODT) 4 MG disintegrating tablet, , Disp: , Rfl:   •  RELION PEN NEEDLE 31G/8MM 31G X 8 MM misc, , Disp: , Rfl:   •  sevelamer (RENVELA) 800 MG tablet, Take 800 mg by mouth 3 (Three) Times a Day With Meals., Disp: , Rfl:   •  tiotropium bromide-olodaterol (STIOLTO RESPIMAT) 2.5-2.5 MCG/ACT aerosol solution inhaler, Inhale 2 puffs Daily., Disp: 1 inhaler, Rfl: 5  •  TOUJEO SOLOSTAR 300 UNIT/ML solution pen-injector, Inject 5 Units under the skin Every Night. 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]   • Chronic kidney disease, stage V [N18.5]   • Hip fracture, left [S72.002A]   • Status post fall [Z91.81]   • Type 1 diabetes mellitus with nephropathy [E10.21]   • Chronic kidney disease, stage IV (severe) [N18.4]   • Bilateral nondiabetic proliferative retinopathy [H35.23]   • Chronic anemia [D64.9]     Overview Note:     Impression: 09/09/2015 - check cbc, iron and vitamin B12  Impression: 01/06/2015 - acd - followed by  nephrology;      • Depression [F32.9]     Overview Note:     Impression: 01/06/2015 - add citalopram 20 mg daily;      • Diabetic peripheral neuropathy [E11.42]     Overview Note:     Impression: 05/15/2015 - stable without foot lesion  Impression: 04/30/2014 - trial elevil- if no help use ultram.;      • 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 [E10.3599]     Overview Note:     Impression: 05/15/2015 - discussed f/u - discuss occulomotor findings; Description: [facility] 2010     • Dizziness [R42]     Overview Note:     Impression: 09/09/2015 - some postural but not all orthostatic in nature  repeat bp is good  has tried zofran and meclizine  cat scan normal  refer ent, MRI scheduled  Impression: 05/15/2015 - supine frontal ha  check cat scan  meclizine at hs  zofran prn; Description: [facility]- vestibular training.     • Malaise and fatigue [R53.81, R53.83]   • Essential hypertension [I10]     Overview Note:     Impression: 09/09/2015 - higher bp, repeat improved  check bp daily at home, call if over 145/85  Impression: 05/15/2015 - bp repeat 172/70  add cardura 1 mg bid  Impression: 05/15/2015 - bp repeat 172/70  Impression: 01/06/2015 - bp high- double lasix  cont f/u with nephrology  Impression: 09/30/2014 - bp is good today, no changes  Impression: 04/30/2014 - bp is stable overall, goals reviewed;      • Hypoglycemia [E16.2]   • Acquired hypothyroidism [E03.9]     Overview Note:     Impression: 09/09/2015 - update tfts  we recently changed  Impression: 05/15/2015 - last tsh 1/15 normal  Impression: 01/06/2015 - we reduced supplement dose - check tft  Impression: 09/30/2014 - check tft  Impression: 04/30/2014 - check tft;      • 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 Negative for activity change, appetite change, chills, diaphoresis, fever and unexpected weight change.   HENT: Negative for congestion, dental problem, drooling, ear discharge, ear pain, facial swelling, hearing loss, mouth sores, nosebleeds, postnasal drip, rhinorrhea, sinus pressure, sneezing, sore throat, tinnitus, trouble swallowing and voice change.    Eyes: Negative for photophobia, pain, discharge, redness, itching and visual disturbance.   Respiratory: Negative for apnea, cough, choking, chest tightness, shortness of breath, wheezing and stridor.    Cardiovascular: Positive for leg swelling. Negative for chest pain and palpitations.   Gastrointestinal: Negative for abdominal distention, abdominal pain, anal bleeding, blood in stool, constipation, diarrhea, nausea, rectal pain and vomiting.   Endocrine: Negative for cold intolerance, heat intolerance, polydipsia, polyphagia and polyuria.   Genitourinary: Negative for decreased urine volume, difficulty urinating, dysuria, enuresis, flank pain, frequency, genital sores, hematuria and urgency.   Musculoskeletal: Positive for arthralgias. Negative for back pain, gait problem, joint swelling, myalgias, neck pain and neck stiffness.   Skin: Negative for color change, pallor, rash and wound.   Allergic/Immunologic: Negative for environmental allergies, food allergies and immunocompromised state.   Neurological: Negative for dizziness, tremors, seizures, syncope, facial asymmetry, speech difficulty, weakness, light-headedness, numbness and headaches.   Hematological: Negative for adenopathy. Does not bruise/bleed easily.   Psychiatric/Behavioral: Negative for agitation,  "behavioral problems, confusion, decreased concentration, dysphoric mood, hallucinations, self-injury, sleep disturbance and suicidal ideas. The patient is not nervous/anxious and is not hyperactive.      Social History     Social History   • Marital status:      Spouse name: N/A   • Number of children: N/A   • Years of education: N/A     Occupational History   • Not on file.     Social History Main Topics   • Smoking status: Former Smoker     Packs/day: 1.00     Years: 50.00     Types: Cigarettes   • Smokeless tobacco: Never Used   • Alcohol use No   • Drug use: No   • Sexual activity: Defer     Other Topics Concern   • Not on file     Social History Narrative   • No narrative on file     Family History   Problem Relation Age of Onset   • Diabetes Other    • Heart disease Other    • Heart attack Mother    • Lung cancer Father      /60   Pulse 60   Ht 160 cm (63\")   Wt 59 kg (130 lb)   LMP  (LMP Unknown)   SpO2 96%   BMI 23.03 kg/m²   Physical Exam   Constitutional: She is oriented to person, place, and time. She appears well-developed and well-nourished.   HENT:   Head: Normocephalic and atraumatic.   Nose: Nose normal.   Mouth/Throat: Oropharynx is clear and moist.   Eyes: Conjunctivae, EOM and lids are normal. Pupils are equal, round, and reactive to light.   Neck: Trachea normal and normal range of motion. Neck supple. Carotid bruit is not present. No tracheal deviation present. No thyroid mass and no thyromegaly present.   Cardiovascular: Normal rate, regular rhythm, normal heart sounds and intact distal pulses.  Exam reveals no gallop and no friction rub.    No murmur heard.  Pulmonary/Chest: Effort normal and breath sounds normal. No respiratory distress. She has no wheezes.   Musculoskeletal: Normal range of motion. She exhibits no edema or deformity.    Sigrid had a diabetic foot exam performed today.   During the foot exam she had a monofilament test performed.    Neurological Sensory " Findings - Unaltered hot/cold right ankle/foot discrimination and unaltered hot/cold left ankle/foot discrimination. Unaltered sharp/dull right ankle/foot discrimination and unaltered sharp/dull left ankle/foot discrimination. No right ankle/foot altered proprioception and no left ankle/foot altered proprioception  Vascular Status -  Her right foot exhibits normal foot vasculature  and no edema. Her left foot exhibits normal foot vasculature  and no edema.  Skin Integrity  -  Her right foot skin is intact.Her left foot skin is intact..  Lymphadenopathy:     She has no cervical adenopathy.   Neurological: She is alert and oriented to person, place, and time. She has normal reflexes. She displays normal reflexes. No cranial nerve deficit.   Skin: Skin is warm and dry. No rash noted. No cyanosis or erythema. Nails show no clubbing.   Psychiatric: She has a normal mood and affect. Her speech is normal and behavior is normal. Judgment and thought content normal. Cognition and memory are normal.   Nursing note and vitals reviewed.    Results for orders placed or performed during the hospital encounter of 05/22/18   Comprehensive Metabolic Panel   Result Value Ref Range    Glucose 254 (H) 74 - 98 mg/dL    BUN 31 (H) 7 - 20 mg/dL    Creatinine 3.50 (H) 0.60 - 1.30 mg/dL    Sodium 139 137 - 145 mmol/L    Potassium 4.0 3.5 - 5.1 mmol/L    Chloride 100 98 - 107 mmol/L    CO2 32.0 (H) 26.0 - 30.0 mmol/L    Calcium 8.8 8.4 - 10.2 mg/dL    Total Protein 6.8 6.3 - 8.2 g/dL    Albumin 3.70 3.50 - 5.00 g/dL    ALT (SGPT) 29 13 - 69 U/L    AST (SGOT) 27 15 - 46 U/L    Alkaline Phosphatase 112 38 - 126 U/L    Total Bilirubin 0.5 0.2 - 1.3 mg/dL    eGFR Non African Amer 13 (L) >60 mL/min/1.73    Globulin 3.1 gm/dL    A/G Ratio 1.2 1.0 - 2.0 g/dL    BUN/Creatinine Ratio 8.9 7.1 - 23.5    Anion Gap 11.0 10.0 - 20.0 mmol/L   Troponin I-Stat   Result Value Ref Range    Troponin I 0.010 0.000 - 0.050 ng/mL   Troponin   Result Value Ref  Range    Troponin I <0.012 0.000 - 0.034 ng/mL   CBC Auto Differential   Result Value Ref Range    WBC 4.44 (L) 4.80 - 10.80 10*3/mm3    RBC 3.50 (L) 4.20 - 5.40 10*6/mm3    Hemoglobin 10.8 (L) 12.0 - 16.0 g/dL    Hematocrit 32.6 (L) 37.0 - 47.0 %    MCV 93.1 81.0 - 99.0 fL    MCH 30.9 27.0 - 31.0 pg    MCHC 33.1 30.0 - 37.0 g/dL    RDW 15.8 (H) 11.5 - 14.5 %    RDW-SD 53.0 37.0 - 54.0 fl    MPV 10.3 6.0 - 12.0 fL    Platelets 145 130 - 400 10*3/mm3    Neutrophil % 55.8 37.0 - 80.0 %    Lymphocyte % 27.9 10.0 - 50.0 %    Monocyte % 9.9 0.0 - 12.0 %    Eosinophil % 4.5 0.0 - 7.0 %    Basophil % 1.4 0.0 - 2.5 %    Immature Grans % 0.5 0.0 - 0.6 %    Neutrophils, Absolute 2.48 2.00 - 6.90 10*3/mm3    Lymphocytes, Absolute 1.24 0.60 - 3.40 10*3/mm3    Monocytes, Absolute 0.44 0.00 - 0.90 10*3/mm3    Eosinophils, Absolute 0.20 0.00 - 0.70 10*3/mm3    Basophils, Absolute 0.06 0.00 - 0.20 10*3/mm3    Immature Grans, Absolute 0.02 0.00 - 0.06 10*3/mm3    nRBC 0.0 0.0 - 0.0 /100 WBC   Light Blue Top   Result Value Ref Range    Extra Tube hold for add-on    Lavender Top   Result Value Ref Range    Extra Tube hold for add-on    Gold Top - SST   Result Value Ref Range    Extra Tube Hold for add-ons.    Green Top (No Gel)   Result Value Ref Range    Extra Tube Hold for add-ons.      Sigrid was seen today for follow-up, diabetes, hypothyroidism and peripheral neuropathy.    Diagnoses and all orders for this visit:    Type 1 diabetes mellitus with nephropathy  -     POC Glycosylated Hemoglobin (Hb A1C)  -     POC Glucose Fingerstick      Problem List Items Addressed This Visit        Cardiovascular and Mediastinum    Mixed hyperlipidemia     Check ldl          Essential hypertension     bp is good- continue to monitor             Endocrine    Type 1 diabetes mellitus with nephropathy - Primary     Blood sugar and 90 day average sugar reviewed  Results for orders placed or performed in visit on 06/22/18   POC Glycosylated Hemoglobin  (Hb A1C)   Result Value Ref Range    Hemoglobin A1C 8.1 %   POC Glucose Fingerstick   Result Value Ref Range    Glucose 226 (A) 70 - 130 mg/dL     Average sugar 180  Is utd with eye exam  esrd on hemodialysis   Ur alb done via nephrology   No foot lesion   F/u 3-4 months          Relevant Orders    POC Glycosylated Hemoglobin (Hb A1C) (Completed)    POC Glucose Fingerstick (Completed)       Genitourinary    Chronic kidney disease, stage V     On dialysis   Increasing nausea  Lower sugar assoc with this, no hypoglycemia however              Return in about 3 months (around 9/22/2018) for Recheck 30 min .      Mary Beth Emerson MA  Signed Violeta Pittman MD

## 2018-06-26 ENCOUNTER — CHARTING TRANS (OUTPATIENT)
Dept: OTHER | Age: 78
End: 2018-06-26

## 2018-06-26 ASSESSMENT — PAIN SCALES - GENERAL: PAINLEVEL_OUTOF10: 0

## 2018-06-27 NOTE — PROGRESS NOTES
Subjective   Patient ID: Sigrid Casarez is a 78 y.o. right hand dominant female  Follow-up of the Left Hip (Patient states her hip is doing fine but her leg hurts all the time. She states no new injuries.)         History of Present Illness    Patient presents as a routine follow-up in regards to left hip ORIF.  Date of surgery June 2017.  Patient states overall, she is doing better.  She states some days she has pain and stiffness to the hip some day she is pain-free.  She denies numbness or tingling to the lower extremity.      Past Medical History:   Diagnosis Date   • Anemia    • Chronic kidney disease    • Chronic kidney disease    • Community acquired pneumonia    • COPD (chronic obstructive pulmonary disease)    • Dexa Body Composition study lumosacral spine and femur     ostepenic   • Diabetes    • Diabetic nephropathy, type I    • Diabetic peripheral neuropathy type 1    • Disease of thyroid gland    • Fracture    • Hypertension    • Menopause    • Pneumonia         Past Surgical History:   Procedure Laterality Date   • CARDIAC ELECTROPHYSIOLOGY PROCEDURE N/A 1/3/2018    Procedure: Pacemaker DC new;  Surgeon: Bridger Swift MD;  Location: [facility] CATH INVASIVE LOCATION;  Service:    • CATARACT EXTRACTION     • CHOLECYSTECTOMY     • HIP CANNULATED SCREW PLACEMENT Left 6/11/2017    Procedure:  LEFT HIP MULTIPLE CANNULATED COMPRESSION SCREWS- FERMORAL NECK  FRACTURE;  Surgeon: Jimmy Sheehan MD;  Location: [facility] OR;  Service:        Family History   Problem Relation Age of Onset   • Diabetes Other    • Heart disease Other    • Heart attack Mother    • Lung cancer Father        Social History     Social History   • Marital status:      Spouse name: N/A   • Number of children: N/A   • Years of education: N/A     Occupational History   • Not on file.     Social History Main Topics   • Smoking status: Former Smoker     Packs/day: 1.00     Years: 50.00     Types: Cigarettes   • Smokeless  tobacco: Never Used   • Alcohol use No   • Drug use: No   • Sexual activity: Defer     Other Topics Concern   • Not on file     Social History Narrative   • No narrative on file         Current Outpatient Prescriptions:   •  amLODIPine (NORVASC) 5 MG tablet, Take 1 tablet by mouth Daily., Disp: , Rfl:   •  aspirin 81 MG EC tablet, Take 1 tablet by mouth daily., Disp: , Rfl:   •  atorvastatin (LIPITOR) 10 MG tablet, Take 1 tablet by mouth Daily., Disp: 30 tablet, Rfl: 0  •  B Complex-C-Folic Acid (AR-CLEOPATRA RX) 1 MG tablet, Take 1 tablet by mouth Daily., Disp: , Rfl:   •  Blood Glucose Monitoring Suppl (FREESTYLE FREEDOM LITE) w/Device kit, Use to test blood sugars daily, Disp: 1 each, Rfl: 0  •  carvedilol (COREG) 25 MG tablet, Take 1 tablet by mouth 2 (two) times a day., Disp: , Rfl:   •  Cholecalciferol (VITAMIN D) 1000 UNITS tablet, Take 1,000 Units by mouth 2 (Two) Times a Day., Disp: , Rfl:   •  ferrous sulfate 325 (65 FE) MG tablet, Take 1 tablet by mouth 1 (One) Time Per Week. With meals, Disp: , Rfl:   •  glucagon (GLUCAGEN) 1 MG injection, Inject 1 mg under the skin See Admin Instructions. Follow package directions for low blood sugar., Disp: 1 kit, Rfl: 1  •  glucose blood (FREESTYLE LITE) test strip, Test blood sugars TID, Disp: 100 each, Rfl: 11  •  Insulin Lispro (HUMALOG) 100 UNIT/ML solution pen-injector, 4-10 units tid before meals, Disp: 5 pen, Rfl: 0  •  Insulin Pen Needle (BD ULTRA-FINE PEN NEEDLES) 29G X 12.7MM misc, Use 3 times daily, Disp: , Rfl:   •  levothyroxine (SYNTHROID, LEVOTHROID) 100 MCG tablet, Take 100 mcg by mouth Daily., Disp: , Rfl:   •  lidocaine 3 % cream cream, Apply 1 application topically 3 (Three) Times a Week., Disp: , Rfl:   •  lidocaine-prilocaine (EMLA) 2.5-2.5 % cream, Apply 1 application topically 3 (Three) Times a Week., Disp: , Rfl:   •  ondansetron ODT (ZOFRAN-ODT) 4 MG disintegrating tablet, , Disp: , Rfl:   •  RELION PEN NEEDLE 31G/8MM 31G X 8 MM misc, , Disp: ,  "Rfl:   •  sevelamer (RENVELA) 800 MG tablet, Take 800 mg by mouth 3 (Three) Times a Day With Meals., Disp: , Rfl:   •  tiotropium bromide-olodaterol (STIOLTO RESPIMAT) 2.5-2.5 MCG/ACT aerosol solution inhaler, Inhale 2 puffs Daily., Disp: 1 inhaler, Rfl: 5  •  TOUJEO SOLOSTAR 300 UNIT/ML solution pen-injector, Inject 5 Units under the skin Every Night. (Patient taking differently: Inject 5-10 Units under the skin Every Night.), Disp: 1 pen, Rfl: 0  •  valsartan (DIOVAN) 320 MG tablet, Take 1 tablet by mouth Daily., Disp: 30 tablet, Rfl: 0    Allergies   Allergen Reactions   • Penicillins Rash       Review of Systems   Constitutional: Negative for fever.   HENT: Negative for voice change.    Eyes: Negative for visual disturbance.   Respiratory: Negative for shortness of breath.    Cardiovascular: Negative for chest pain.   Gastrointestinal: Negative for abdominal distention and abdominal pain.   Genitourinary: Negative for dysuria.   Musculoskeletal: Negative for arthralgias, gait problem and joint swelling.   Skin: Negative for rash.   Neurological: Negative for speech difficulty.   Hematological: Does not bruise/bleed easily.   Psychiatric/Behavioral: Negative for confusion.       Objective   Resp 18   Ht 160 cm (63\")   Wt 60.3 kg (133 lb)   LMP  (LMP Unknown)   BMI 23.56 kg/m²    Physical Exam   Constitutional: She is oriented to person, place, and time. She appears well-nourished.   Eyes: Conjunctivae are normal.   Pulmonary/Chest: No respiratory distress.   Musculoskeletal:        Left hip: She exhibits normal range of motion, normal strength, no tenderness, no bony tenderness, no swelling, no crepitus, no deformity and no laceration.        Left knee: She exhibits no swelling and no effusion. No tenderness found. No medial joint line and no lateral joint line tenderness noted.   Neurological: She is alert and oriented to person, place, and time.   Skin: Capillary refill takes less than 2 seconds. "   Psychiatric: She has a normal mood and affect. Her behavior is normal.   Vitals reviewed.    Left Knee Exam     Other   Effusion: no effusion present           Extremity DVT signs are Negative on physical exam with negative Shavon sign, with no calf pain, with no palpable cords, with no increased pain with passive stretch/extension and with no skin tone change   Neurologic Exam     Mental Status   Oriented to person, place, and time.      Left Knee Exam     Tenderness   The patient is experiencing tenderness in the no medial joint line, no lateral joint line.               Assessment/Plan   Independent Review of Radiographic Studies:    Indication to evaluate fracture healing, and compared with prior imaging, shows interim fracture healing with good maintained reduction and alignment and intact position of fracture fixation hardware.      Procedures       Sigrid was seen today for follow-up.    Diagnoses and all orders for this visit:    Closed fracture of neck of left femur with routine healing, subsequent encounter    Status post hip surgery    Post-traumatic osteoarthritis of left hip       Orthopedic activities reviewed and patient expressed appreciation  Discussion of orthopedic goals  Risk, benefits, and merits of treatment alternatives reviewed with the patient and questions answered    Recommendations/Plan:  Exercise, medications, injections, other patient advice, and return appointment as noted.  Patient is encouraged to call or return for any issues or concerns.    I offered the patient a left hip fluoroscopy guided hip injection but at this time she would like to hold off.  Patient agreeable to call or return sooner for any concerns.

## 2018-06-28 ENCOUNTER — HOSPITAL (OUTPATIENT)
Dept: OTHER | Age: 78
End: 2018-06-28
Attending: PAIN MEDICINE

## 2018-06-29 ENCOUNTER — CHARTING TRANS (OUTPATIENT)
Dept: OTHER | Age: 78
End: 2018-06-29

## 2018-07-07 NOTE — ED PROVIDER NOTES
Subjective   History of Present Illness   78-year-old female with diabetic retinal complications who presents with bilateral eye pain and blurry vision last for half hours.  States that she was seen by her retinal specialist and had injections in both eyes as well as with eyes dilated today.  However, 2 hours after this occurred she started to have blurry vision followed by pain in both eyes.  Denies any other specific symptoms.    Review of Systems   Eyes: Positive for pain and visual disturbance.   All other systems reviewed and are negative.      Past Medical History:   Diagnosis Date   • Anemia    • Chronic kidney disease    • Chronic kidney disease    • Community acquired pneumonia    • COPD (chronic obstructive pulmonary disease) (CMS/[facility])    • Dexa Body Composition study lumosacral spine and femur     ostepenic   • Diabetes (CMS/[facility])    • Diabetic nephropathy, type I (CMS/[facility])    • Diabetic peripheral neuropathy type 1    • Disease of thyroid gland    • Fracture    • Hypertension    • Menopause    • Pneumonia        Allergies   Allergen Reactions   • Penicillins Rash       Past Surgical History:   Procedure Laterality Date   • CARDIAC ELECTROPHYSIOLOGY PROCEDURE N/A 1/3/2018    Procedure: Pacemaker DC new;  Surgeon: Bridger Swift MD;  Location: [facility] CATH INVASIVE LOCATION;  Service:    • CATARACT EXTRACTION     • CHOLECYSTECTOMY     • HIP CANNULATED SCREW PLACEMENT Left 6/11/2017    Procedure:  LEFT HIP MULTIPLE CANNULATED COMPRESSION SCREWS- FERMORAL NECK  FRACTURE;  Surgeon: Jimmy Sheehan MD;  Location: [facility] OR;  Service:        Family History   Problem Relation Age of Onset   • Diabetes Other    • Heart disease Other    • Heart attack Mother    • Lung cancer Father        Social History     Social History   • Marital status:      Social History Main Topics   • Smoking status: Former Smoker     Packs/day: 1.00     Years: 50.00     Types: Cigarettes   • Smokeless tobacco: Never Used    • Alcohol use No   • Drug use: No   • Sexual activity: Defer     Other Topics Concern   • Not on file           Objective   Physical Exam   Constitutional: She appears well-developed and well-nourished. No distress.   Eyes: EOM are normal. Right eye exhibits no discharge. Left eye exhibits no discharge. No scleral icterus.   Bilateral eyes were dilated to 5 mm.    R eye: pressure 20  L eye: pressure 21    Visual acuity intact.  With Wood's lamp staining, both eyes show shallow abrasions over the visual axis.     Cardiovascular: Normal rate and intact distal pulses.    Pulmonary/Chest: Effort normal. No respiratory distress.   Skin: She is not diaphoretic.   Nursing note and vitals reviewed.      Procedures           ED Course                  MDM  78-year-old female here after injections in both eyes.  She is unclear what these injections are that the pain for 5 hours.  Afebrile, vital signs stable.  After placing tetracaine, the patient's symptoms have all resolved.  She does have abrasions over both eyes concerning for possible infection.  Pressure is normal.  Now the patient is back to baseline, we'll discharge home with antibiotic drops.    Final diagnoses:   Corneal abrasion, unspecified laterality, initial encounter            Jeff Gates MD  07/06/18 5599

## 2018-07-11 ENCOUNTER — HOSPITAL (OUTPATIENT)
Dept: OTHER | Age: 78
End: 2018-07-11

## 2018-07-11 ENCOUNTER — IMAGING SERVICES (OUTPATIENT)
Dept: OTHER | Age: 78
End: 2018-07-11

## 2018-07-17 PROBLEM — J44.1 COPD EXACERBATION (HCC): Status: ACTIVE | Noted: 2018-01-01

## 2018-07-17 NOTE — PROGRESS NOTES
"Chief Complaint   Patient presents with   • Follow-up   • Breathing Problem   • Shortness of Breath         Subjective   Sigrid Casarez is a 78 y.o. female.     History of Present Illness   The patient comes in today due to increased shortness of breath and coughing.    She states that she has been ill for the last 2 weeks with increased shortness of breath and coughing.  She was seen in the emergency room over the weekend and was treated for a COPD exacerbation according to the ER records.  She was discharged with doxycycline which she has began taking however she is not feeling any better.  Her blood sugar was elevated in the emergency room when she was given insulin as well.  She tells me her blood sugar has been over 800 for a couple of days.  She she tells me this morning however her blood sugar was better at around 250.  She also endorses vomiting on and off for the last week as well as diarrhea.  She is on dialysis and she was unable to go to her dialysis on Saturday which is when she went to the emergency room.  She also has had no appetite and has eaten very little.  She is complaining of feeling lifeless.    The following portions of the patient's history were reviewed and updated as appropriate: allergies, current medications, past family history, past medical history, past social history and past surgical history.    Review of Systems   Constitutional: Positive for chills and fatigue. Negative for fever.   HENT: Negative for sinus pressure, sneezing and sore throat.    Respiratory: Positive for cough and shortness of breath. Negative for chest tightness and wheezing.    Cardiovascular: Positive for chest pain.   Psychiatric/Behavioral: Negative for sleep disturbance.       Objective   Visit Vitals  BP (!) 80/40   Pulse 70   Resp 18   Ht 160 cm (62.99\")   Wt 60.8 kg (134 lb)   LMP  (LMP Unknown)   SpO2 98%   BMI 23.74 kg/m²     Physical Exam   Constitutional: She is oriented to person, place, and time. " She appears well-developed and well-nourished.   HENT:   Head: Normocephalic and atraumatic.   Eyes: EOM are normal.   Neck: Neck supple.   Cardiovascular: Normal rate and regular rhythm.    Pulmonary/Chest: Effort normal. No respiratory distress.   Somewhat decreased A/E without wheezing noted. Minimal crackles in bases.    Musculoskeletal: She exhibits no edema.   Walks slowly with cane.   Neurological: She is alert and oriented to person, place, and time.   Skin: Skin is warm and dry.   Psychiatric: She has a normal mood and affect.   Vitals reviewed.          Assessment/Plan   Sigrid was seen today for follow-up, breathing problem and shortness of breath.    Diagnoses and all orders for this visit:    SOB (shortness of breath)    COPD with acute exacerbation (CMS/HCC)    Hypotension, unspecified hypotension type    Vomiting and diarrhea           Return keep follow up for Oct..    DISCUSSION (if any):  Upon reviewing the emergency room records as well as her complicated past medical history I discussed with the patient that it would be best if she went to the emergency room for a more thorough evaluation especially given that she is diabetic and has been uncontrolled the last week, has no appetite, and is hypotensive today.  The patient agrees to go to the emergency room.    I called and spoke with Dr. Gates and discussed this case and the complications of treating this patient outpatient.  Considerations include it's difficult to give this patient steroids as she is a known diabetic who has been uncontrolled per her report and had to be given insulin in the emergency room.  If she needs steroids it would be better managed in the hospital setting so that  insulin can be given if needed due to elevated BS.  She is also hypotensive. She is not eating and/or drinking a great deal and she has missed dialysis and she could very easily go into DKA.  IF fluids are needed given her history she would need to be  monitored closely as well.      Dictated utilizing Dragon dictation.    This document was electronically signed by SANJUANITA Stanford July 17, 2018  11:01 AM

## 2018-07-21 ENCOUNTER — LAB SERVICES (OUTPATIENT)
Dept: OTHER | Age: 78
End: 2018-07-21

## 2018-07-23 LAB
ALBUMIN SERPL-MCNC: 4 G/DL (ref 3.6–5.1)
ALBUMIN/GLOB SERPL: 1.2 (ref 1–2.4)
ALP SERPL-CCNC: 44 UNITS/L (ref 45–117)
ALT SERPL-CCNC: 19 UNITS/L
ANION GAP SERPL CALC-SCNC: 15 MMOL/L (ref 10–20)
APTT PPP: 27 SEC (ref 22–30)
AST SERPL-CCNC: 22 UNITS/L
BASOPHILS # BLD: 0 K/MCL (ref 0–0.3)
BASOPHILS NFR BLD: 1 %
BILIRUB SERPL-MCNC: 0.4 MG/DL (ref 0.2–1)
BUN SERPL-MCNC: 12 MG/DL (ref 6–20)
BUN/CREAT SERPL: 16 (ref 7–25)
CALCIUM SERPL-MCNC: 9.5 MG/DL (ref 8.4–10.2)
CHLORIDE SERPL-SCNC: 106 MMOL/L (ref 98–107)
CO2 SERPL-SCNC: 25 MMOL/L (ref 21–32)
CREAT SERPL-MCNC: 0.74 MG/DL (ref 0.51–0.95)
DIFFERENTIAL METHOD BLD: ABNORMAL
EOSINOPHIL # BLD: 0.1 K/MCL (ref 0.1–0.5)
EOSINOPHIL NFR BLD: 3 %
ERYTHROCYTE [DISTWIDTH] IN BLOOD: 13.6 % (ref 11–15)
GLOBULIN SER-MCNC: 3.3 G/DL (ref 2–4)
GLUCOSE SERPL-MCNC: 84 MG/DL (ref 65–99)
HEMATOCRIT: 41.4 % (ref 36–46.5)
HEMOGLOBIN: 13.1 G/DL (ref 12–15.5)
IMM GRANULOCYTES # BLD AUTO: 0 K/MCL (ref 0–0.2)
IMM GRANULOCYTES NFR BLD: 0 %
INR PPP: 1
LENGTH OF FAST TIME PATIENT: ABNORMAL HRS
LYMPHOCYTES # BLD: 1.9 K/MCL (ref 1–4)
LYMPHOCYTES NFR BLD: 47 %
MEAN CORPUSCULAR HEMOGLOBIN: 30.8 PG (ref 26–34)
MEAN CORPUSCULAR HGB CONC: 31.6 G/DL (ref 32–36.5)
MEAN CORPUSCULAR VOLUME: 97.4 FL (ref 78–100)
MONOCYTES # BLD: 0.3 K/MCL (ref 0.3–0.9)
MONOCYTES NFR BLD: 8 %
NEUTROPHILS # BLD: 1.6 K/MCL (ref 1.8–7.7)
NEUTROPHILS NFR BLD: 41 %
NRBC (NRBCRE): 0 /100 WBC
PLATELET COUNT: 214 K/MCL (ref 140–450)
POTASSIUM SERPL-SCNC: 4 MMOL/L (ref 3.4–5.1)
PROTHROMBIN TIME: 11 SEC (ref 9.7–11.8)
RED CELL COUNT: 4.25 MIL/MCL (ref 4–5.2)
SODIUM SERPL-SCNC: 142 MMOL/L (ref 135–145)
TOTAL PROTEIN: 7.3 G/DL (ref 6.4–8.2)
WHITE BLOOD COUNT: 3.9 K/MCL (ref 4.2–11)

## 2018-07-25 ENCOUNTER — CHARTING TRANS (OUTPATIENT)
Dept: OTHER | Age: 78
End: 2018-07-25

## 2018-08-02 ENCOUNTER — HOSPITAL (OUTPATIENT)
Dept: OTHER | Age: 78
End: 2018-08-02

## 2018-08-04 ENCOUNTER — IMAGING SERVICES (OUTPATIENT)
Dept: OTHER | Age: 78
End: 2018-08-04

## 2018-08-04 ENCOUNTER — HOSPITAL (OUTPATIENT)
Dept: OTHER | Age: 78
End: 2018-08-04
Attending: EMERGENCY MEDICINE

## 2018-08-04 ENCOUNTER — CHARTING TRANS (OUTPATIENT)
Dept: OTHER | Age: 78
End: 2018-08-04

## 2018-08-04 LAB
ALBUMIN SERPL-MCNC: 4.3 GM/DL (ref 3.6–5.1)
ALP SERPL-CCNC: 48 UNIT/L (ref 45–117)
ALT SERPL-CCNC: 27 UNIT/L
ANALYZER ANC (IANC): ABNORMAL
ANION GAP SERPL CALC-SCNC: 15 MMOL/L (ref 10–20)
APPEARANCE UR: CLEAR
AST SERPL-CCNC: 24 UNIT/L
BACTERIA #/AREA URNS HPF: ABNORMAL /HPF
BASOPHILS # BLD: 0 THOUSAND/MCL (ref 0–0.3)
BASOPHILS NFR BLD: 0 %
BILIRUB CONJ SERPL-MCNC: 0.1 MG/DL (ref 0–0.2)
BILIRUB SERPL-MCNC: 0.5 MG/DL (ref 0.2–1)
BILIRUB UR QL: NEGATIVE
BUN SERPL-MCNC: 7 MG/DL (ref 6–20)
BUN/CREAT SERPL: 11 (ref 7–25)
CALCIUM SERPL-MCNC: 9.9 MG/DL (ref 8.4–10.2)
CHLORIDE: 101 MMOL/L (ref 98–107)
CO2 SERPL-SCNC: 24 MMOL/L (ref 21–32)
COLOR UR: YELLOW
CREAT SERPL-MCNC: 0.61 MG/DL (ref 0.51–0.95)
DIFFERENTIAL METHOD BLD: ABNORMAL
EOSINOPHIL # BLD: 0 THOUSAND/MCL (ref 0.1–0.5)
EOSINOPHIL NFR BLD: 0 %
ERYTHROCYTE [DISTWIDTH] IN BLOOD: 13.1 % (ref 11–15)
GLUCOSE SERPL-MCNC: 106 MG/DL (ref 65–99)
GLUCOSE UR-MCNC: NEGATIVE MG/DL
HEMATOCRIT: 40.8 % (ref 36–46.5)
HGB BLD-MCNC: 14.1 GM/DL (ref 12–15.5)
HGB UR QL: ABNORMAL
HYALINE CASTS #/AREA URNS LPF: ABNORMAL /LPF (ref 0–5)
KETONES UR-MCNC: 40 MG/DL
LEUKOCYTE ESTERASE UR QL STRIP: NEGATIVE
LIPASE SERPL-CCNC: 108 UNIT/L (ref 73–393)
LYMPHOCYTES # BLD: 1.4 THOUSAND/MCL (ref 1–4)
LYMPHOCYTES NFR BLD: 24 %
MCH RBC QN AUTO: 30.9 PG (ref 26–34)
MCHC RBC AUTO-ENTMCNC: 34.6 GM/DL (ref 32–36.5)
MCV RBC AUTO: 89.3 FL (ref 78–100)
MONOCYTES # BLD: 0.3 THOUSAND/MCL (ref 0.3–0.9)
MONOCYTES NFR BLD: 5 %
NEUTROPHILS # BLD: 4.2 THOUSAND/MCL (ref 1.8–7.7)
NEUTROPHILS NFR BLD: 71 %
NEUTS SEG NFR BLD: ABNORMAL %
NITRITE UR QL: NEGATIVE
NRBC (NRBCRE): ABNORMAL
PH UR: 5 UNIT (ref 5–7)
PLATELET # BLD: 210 THOUSAND/MCL (ref 140–450)
POTASSIUM SERPL-SCNC: 3.4 MMOL/L (ref 3.4–5.1)
PROT SERPL-MCNC: 8.5 GM/DL (ref 6.4–8.2)
PROT UR QL: NEGATIVE MG/DL
RBC # BLD: 4.57 MILLION/MCL (ref 4–5.2)
RBC #/AREA URNS HPF: ABNORMAL /HPF (ref 0–3)
SODIUM SERPL-SCNC: 137 MMOL/L (ref 135–145)
SP GR UR: <1.005 (ref 1–1.03)
SPECIMEN SOURCE: ABNORMAL
SQUAMOUS #/AREA URNS HPF: ABNORMAL /HPF (ref 0–5)
UROBILINOGEN UR QL: 0.2 MG/DL (ref 0–1)
WBC # BLD: 5.9 THOUSAND/MCL (ref 4.2–11)
WBC #/AREA URNS HPF: ABNORMAL /HPF (ref 0–5)

## 2018-08-04 ASSESSMENT — PAIN SCALES - GENERAL: PAINLEVEL_OUTOF10: 4

## 2018-08-06 ENCOUNTER — CHARTING TRANS (OUTPATIENT)
Dept: OTHER | Age: 78
End: 2018-08-06

## 2018-08-08 ENCOUNTER — HOSPITAL (OUTPATIENT)
Dept: OTHER | Age: 78
End: 2018-08-08
Attending: PAIN MEDICINE

## 2018-09-05 ENCOUNTER — CHARTING TRANS (OUTPATIENT)
Dept: OTHER | Age: 78
End: 2018-09-05

## 2018-09-06 ENCOUNTER — IMAGING SERVICES (OUTPATIENT)
Dept: OTHER | Age: 78
End: 2018-09-06

## 2018-09-07 ENCOUNTER — HOSPITAL (OUTPATIENT)
Dept: OTHER | Age: 78
End: 2018-09-07
Attending: INTERNAL MEDICINE

## 2018-09-07 ENCOUNTER — IMAGING SERVICES (OUTPATIENT)
Dept: OTHER | Age: 78
End: 2018-09-07

## 2018-09-07 LAB
ANALYZER ANC (IANC): ABNORMAL
ANION GAP SERPL CALC-SCNC: 12 MMOL/L (ref 10–20)
BASOPHILS # BLD: 0 THOUSAND/MCL (ref 0–0.3)
BASOPHILS NFR BLD: 0 %
BUN SERPL-MCNC: 12 MG/DL (ref 6–20)
BUN/CREAT SERPL: 15 (ref 7–25)
CALCIUM SERPL-MCNC: 9.5 MG/DL (ref 8.4–10.2)
CHLORIDE: 107 MMOL/L (ref 98–107)
CO2 SERPL-SCNC: 26 MMOL/L (ref 21–32)
CREAT SERPL-MCNC: 0.81 MG/DL (ref 0.51–0.95)
DIFFERENTIAL METHOD BLD: ABNORMAL
EOSINOPHIL # BLD: 0 THOUSAND/MCL (ref 0.1–0.5)
EOSINOPHIL NFR BLD: 1 %
ERYTHROCYTE [DISTWIDTH] IN BLOOD: 13.6 % (ref 11–15)
GLUCOSE SERPL-MCNC: 97 MG/DL (ref 65–99)
HEMATOCRIT: 38.1 % (ref 36–46.5)
HGB BLD-MCNC: 12.9 GM/DL (ref 12–15.5)
LYMPHOCYTES # BLD: 1.8 THOUSAND/MCL (ref 1–4)
LYMPHOCYTES NFR BLD: 27 %
MCH RBC QN AUTO: 31 PG (ref 26–34)
MCHC RBC AUTO-ENTMCNC: 33.9 GM/DL (ref 32–36.5)
MCV RBC AUTO: 91.6 FL (ref 78–100)
MONOCYTES # BLD: 0.6 THOUSAND/MCL (ref 0.3–0.9)
MONOCYTES NFR BLD: 9 %
NEUTROPHILS # BLD: 4.2 THOUSAND/MCL (ref 1.8–7.7)
NEUTROPHILS NFR BLD: 63 %
NEUTS SEG NFR BLD: ABNORMAL %
NRBC (NRBCRE): ABNORMAL
PLATELET # BLD: 187 THOUSAND/MCL (ref 140–450)
POTASSIUM SERPL-SCNC: 4 MMOL/L (ref 3.4–5.1)
RBC # BLD: 4.16 MILLION/MCL (ref 4–5.2)
SODIUM SERPL-SCNC: 141 MMOL/L (ref 135–145)
WBC # BLD: 6.6 THOUSAND/MCL (ref 4.2–11)

## 2018-09-10 ENCOUNTER — IMAGING SERVICES (OUTPATIENT)
Dept: OTHER | Age: 78
End: 2018-09-10

## 2018-09-10 ENCOUNTER — HOSPITAL (OUTPATIENT)
Dept: OTHER | Age: 78
End: 2018-09-10

## 2018-09-20 ENCOUNTER — HOSPITAL (OUTPATIENT)
Dept: OTHER | Age: 78
End: 2018-09-20
Attending: PAIN MEDICINE

## 2018-09-24 ENCOUNTER — CHARTING TRANS (OUTPATIENT)
Dept: OTHER | Age: 78
End: 2018-09-24

## 2018-09-25 PROBLEM — E11.9 DIABETES MELLITUS TYPE 2 IN NONOBESE (HCC): Status: ACTIVE | Noted: 2018-01-01

## 2018-09-25 PROBLEM — I16.0 HYPERTENSIVE URGENCY: Status: ACTIVE | Noted: 2018-01-01

## 2018-09-25 PROBLEM — Z99.2 END-STAGE RENAL DISEASE ON HEMODIALYSIS (HCC): Status: ACTIVE | Noted: 2018-01-01

## 2018-09-25 PROBLEM — G93.41 ACUTE METABOLIC ENCEPHALOPATHY: Status: ACTIVE | Noted: 2018-01-01

## 2018-09-25 PROBLEM — E11.01 HYPERGLYCEMIC HYPEROSMOLAR NONKETOTIC COMA (HCC): Status: ACTIVE | Noted: 2018-01-01

## 2018-09-25 PROBLEM — N18.6 END-STAGE RENAL DISEASE ON HEMODIALYSIS (HCC): Status: ACTIVE | Noted: 2018-01-01

## 2018-09-25 PROBLEM — R56.9 SEIZURE (HCC): Status: ACTIVE | Noted: 2018-01-01

## 2018-10-01 NOTE — PROGRESS NOTES
"CC: Acute Respiratory Failure.     S: Intubated and sedated.       ROS: Could not be obtained as the patient is on mechanical ventilator.    O:Vital signs reviewed. O2Sat: 97 % on 35%. Vent Day # 7. Left IJ Day # 6  /49   Pulse 72   Temp 98.5 °F (36.9 °C) (Oral)   Resp 18   Ht 162.6 cm (64\")   Wt 62.8 kg (138 lb 6.4 oz)   LMP  (LMP Unknown)   SpO2 100%   BMI 23.76 kg/m²     Temp (24hrs), Av.4 °F (37.4 °C), Min:98.5 °F (36.9 °C), Max:100.5 °F (38.1 °C)    I & Os reviewed.   Intake/Output       18 0700 - 10/01/18 0659    Intake (ml) 1823    Output (ml) --    Net (ml) 1823    Last Weight  62.8 kg (138 lb 6.4 oz)        General: Intubated. Sedated.  Eyes: PERRL.   Neck: No obvious masses noted.   Cardiovascular: S1 + S2. PPM. Left IJ line.   Respiratory: Transmitted Breath sounds noted. No wheezing heard. No crackles noted  GI: Soft. Bowel sounds hypoactive  Extremities: No edema noted. Right upper extremity AV fistula noted  Neurologic: Sedated.  Detailed exam couldn't be performed due to sedation.   Skin: Appeared somewhat dry     Labs: Reviewed.       Results from last 7 days  Lab Units 18  0413 18  0650 18  0420 18  0406   SODIUM mmol/L 131* 131* 129* 135*   POTASSIUM mmol/L 4.5 3.8 3.9 3.3*   CHLORIDE mmol/L 100 100 98 101   CO2 mmol/L 20.0* 23.0* 22.0* 24.0*   BUN mg/dL 37* 26* 35* 26*   CREATININE mg/dL 4.60* 3.70* 5.20* 4.40*   CALCIUM mg/dL 8.1* 8.3* 7.7* 8.1*   BILIRUBIN mg/dL 0.4 0.2  --  0.5   ALK PHOS U/L 107 111  --  74   ALT (SGPT) U/L 32 33  --  33   AST (SGOT) U/L 34 24  --  59*   GLUCOSE mg/dL 173* 170* 131* 87         Results from last 7 days  Lab Units 18  0650 18  0406 18  0110   MAGNESIUM mg/dL  --   --  2.2 2.3   PHOSPHORUS mg/dL 5.3* 4.2  --  4.8*         Results from last 7 days  Lab Units 18  0413 09/28/18  0650 18  0406 18  0606 18  0110   WBC 10*3/mm3 9.23 6.61 11.23* 13.79* 8.73 "   HEMOGLOBIN g/dL 11.2* 10.6* 11.2* 11.5* 12.4   PLATELETS 10*3/mm3 156 134 152 148 164         Results from last 7 days  Lab Units 09/26/18  1105   INR  1.03         sodium chloride 10 mL/hr Last Rate: 10 mL/hr (09/30/18 1257)       ABG: Reviewed  Lab Results   Component Value Date    PHART 7.377 09/29/2018    OSL0FMO 35.6 09/29/2018    PO2ART 118.0 (H) 09/29/2018    HGBBG 11.5 (L) 09/29/2018    Z9EUXMBX 98.9 09/29/2018    FCOHB 1.6 01/27/2016    CARBOXYHGB 0.8 09/29/2018    FMETHB 0.7 01/27/2016         CXRay: Reviewed from yesterday.     Assessment & Recommendations/Plan:   1.  Acute Respiratory Failure.  On mechanical ventilation  Is not awake enough to be extubated at this time.   Although she was not on Versed drip for too long, I will try Romazicon to see if slow metabolism benzodiazepine could explain some of her issues with nonresponsiveness etc.    2.  Anion Gap Metabolic acidosis due to DKA.   Resolved    3.  Seizures.  S/P Neuro input.   EEG again on 10/2/18.    4.  Moderate COPD with recent exacerbation (7/17/18).  No evidence of exacerbation at this time  Continue neb treatments    5.  ESRD.   On HD.    6.  Hyperglycemia  Being monitored closely.     Her condition continues to be critical.    She remained mostly unresponsive even to deep painful stimulus.     We have reviewed patient's current orders and changes, if any, have been suggested to primary care team. Plan was also discussed with nursing staff, as necessary.     Ismael Sanz MD  10/01/18  5:01 PM

## 2018-10-01 NOTE — PLAN OF CARE
Problem: Ventilation, Mechanical Invasive (Adult)  Goal: Signs and Symptoms of Listed Potential Problems Will be Absent, Minimized or Managed (Ventilation, Mechanical Invasive)  Outcome: Ongoing (interventions implemented as appropriate)   10/01/18 0595   Goal/Outcome Evaluation   Problems Assessed (Mechanical Ventilation, Invasive) all   Problems Present (Mech Vent, Invasive) none

## 2018-10-01 NOTE — PROGRESS NOTES
Adult Nutrition  Assessment/PES    Patient Name:  Sigrid Casarez  YOB: 1940  MRN: [id number]  Admit Date:  9/25/2018    Assessment Date:  10/1/2018    Comments:  Rec. #1: Continue with current Tube Feeding regimen: Nepro @ 22 ml/hr goal rate. Tube feeding to provide ~871 kcals, 39 gm protein, and 352 mL tube feeding water. Rec #2: Continue with Free water flushes of 160 mL Q4 or 6x daily. Rec # 3: Continue with ProStat 30 ml BID via feeding tube. ProStat provides ~200 kcal, 30 gms protein. RD to follow pt. Consult RD PRN.           Reason for Assessment     Row Name 10/01/18 1028          Reason for Assessment    Reason For Assessment follow-up protocol;TF/PN     Diagnosis diabetes diagnosis/complications;cardiac disease;renal disease   ESRD on HD, DM type 2, COPD, Intubated     Identified At Risk by Screening Criteria tube feeding or parenteral nutrition               Anthropometrics     Row Name 10/01/18 0500          Anthropometrics    Weight 62.8 kg (138 lb 6.4 oz)             Labs/Tests/Procedures/Meds     Row Name 10/01/18 1030          Labs/Procedures/Meds    Lab Results Reviewed reviewed, pertinent     Lab Results Comments High: BUN, Total Protein, Phosphorus  Low: Na, Calcium, Albumin, Hgb/Hct        Medications    Pertinent Medications Reviewed reviewed, pertinent             Physical Findings     Row Name 10/01/18 1031          Physical Findings    Overall Physical Appearance on ventilator support     Tubes nasogastric tube               Nutrition Prescription Ordered     Row Name 10/01/18 1031          Nutrition Prescription PO    Current PO Diet NPO        Nutrition Prescription EN    Enteral Route NG     Product Nepro     TF Delivery Method Continuous     Continuous TF Goal Rate (mL/hr) 22 mL/hr     Continuous TF Current Rate (mL/hr) 22 mL/hr     Continuous TF Goal Volume (mL) 484 mL     Continuous TF Current Volume (mL) 484 mL     Water flush (mL)  162 mL     Water Flush Frequency  Every 4 hours             Evaluation of Received Nutrient/Fluid Intake     Row Name 10/01/18 1031          PO Evaluation    Number of Days PO Intake Evaluated Insufficient Data        EN Evaluation    Number of Days EN Intake Evaluated Other (comment)   4     EN Average Volume Delivered (mL/day) 428 mL/day     % Goal Volume  88 %     HOB Greater than or equal to 30 degress             Problem/Interventions:        Problem 1     Row Name 10/01/18 1032          Nutrition Diagnoses Problem 1    Problem 1 Inadequate Intake/Infusion     Inadequate Intake Type Oral     Macronutrient Kcal;Fluid;Protein;Carbohydrate     Micronutrient Vitamin;Mineral     Etiology (related to) MNT for Treatment/Condition     Signs/Symptoms (evidenced by) NPO             Problem 2     Row Name 10/01/18 1032          Nutrition Diagnoses Problem 2    Problem 2 Increased Nutrient Needs     Macronutrient Kcal;Carbohydrate;Protein;Fluid     Micronutrient Vitamin;Mineral     Etiology (related to) Medical Diagnosis     Pulmonary/Critical Care COPD;Ventilator     Renal ESRD;Hemodialysis     Signs/Symptoms (evidenced by) Other (comment)   pulmonary, renal dysfunction             Problem 3     Row Name 10/01/18 1033          Nutrition Diagnoses Problem 3    Problem 3 Impaired Nutrient Utilization     Etiology (related to) Medical Diagnosis     Endocrine DM Type 1     Renal ESRD;Hemodialysis     Signs/Symptoms (evidenced by) Biochemical     Specific Labs Noted BUN;Phosphorus;Na+                 Intervention Goal     Row Name 10/01/18 1033          Intervention Goal    General Meet nutritional needs for age/condition     PO --   remain NPO while intubated     TF/PN Maintain TF/PN     Transition TF to PO     Weight Maintain weight             Nutrition Intervention     Row Name 10/01/18 1034          Nutrition Intervention    RD/Tech Action Follow Tx progress;Await begin PO;Recommend/ordered     Recommended/Ordered EN             Nutrition Prescription      Row Name 10/01/18 1034          Nutrition Prescription PO    PO Prescription --   continue NPO while pt. intubated        Nutrition Prescription EN    Enteral Prescription Continue same protocol     Enteral Route NG     Product Nepro     Modulars Liquid Protein (15 gm/30 mL)     Liquid Protein (15 gm/30 mL) 30 mL/1 packet     Protein Liquid Frequency 2 times a day     TF Delivery Method Continuous     Continuous TF Goal Rate (mL/hr) 22 mL/hr     Continuous TF Starting Rate (mL/hr) 22 mL/hr     Continuous TF Goal Volume (mL) 484 mL     Continuous TF Starting Volume (mL) 484 mL     Water flush (mL)  162 mL     Water Flush Frequency Every 4 hours        Other Orders    Obtain Weight Daily     Obtain Weight Ordered? No, recommended     Supplement Vitamin mineral supplement     Supplement Ordered? No, recommended     Other continue to monitor/replace electrolytes             Education/Evaluation     Row Name 10/01/18 1035          Education    Education Education not appropriate at this time     Please explain Patient intubated        Monitor/Evaluation    Monitor Per protocol;I&O;Supplement intake;Pertinent labs;TF delivery/tolerance;Weight;Skin status         Electronically signed by:  Massiel Claudio RD  10/01/18 10:35 AM

## 2018-10-01 NOTE — PROGRESS NOTES
"Nephrology Progress Note.    LOS: 6 days    Patient Care Team:  Rodriguez Eng DO as PCP - General (Family Medicine)  Babatunde Montes PA-C as PCP - Claims Attributed    Chief Complaint:    Chief Complaint   Patient presents with   • Hyperglycemia   • Vomiting   • Altered Mental Status       Subjective     Follow up for ESRD and related issues.    Interval History:     Patient Complaints: none    Patient seen and examined this morning.  Events from last night noted.  She is not waking up enough to be extubated, does not have any more seizures.  Still has minimal interaction with painful stimuli does randomly move lower extremities.    History taken from: patient    Review of Systems:    I am unable to obtain complete review of systems at this time due to the fact that the patient is sedated on mechanical ventilation.      Objective     Vital Signs  /51   Pulse 72   Temp 99.6 °F (37.6 °C) (Oral)   Resp 16   Ht 162.6 cm (64\")   Wt 62.8 kg (138 lb 6.4 oz)   LMP  (LMP Unknown)   SpO2 100%   BMI 23.76 kg/m²       I/O this shift:  In: 958 [I.V.:109; Other:606; NG/GT:243]  Out: -     Intake/Output Summary (Last 24 hours) at 10/01/18 0654  Last data filed at 10/01/18 0500   Gross per 24 hour   Intake             1823 ml   Output                0 ml   Net             1823 ml       Physical Exam:    General Appearance:  no acute distress,   HEENT: Oral mucosa dry, Sclera clear.  Skin: Warm and dry  Neck: supple, no JVD, trachea midline  Lungs:Chest shape is normal. Breath sounds heard bilaterally equally. No crackles, No wheezing.   Heart: regular rate and rhythm. normal S1 and S2, no S3, no rub, peripheral pulses weak but palpable.  Abdomen: soft, non-tender,  present bowel sounds to auscultation  : no palpable bladder.  Extremities: Trace edema, no cyanosis or clubbing.   Neuro: Intubated and sedated     Results Review:      Results from last 7 days  Lab Units 09/29/18  0413 09/28/18  0650 " 09/27/18  0420 09/26/18  0406   SODIUM mmol/L 131* 131* 129* 135*   POTASSIUM mmol/L 4.5 3.8 3.9 3.3*   CHLORIDE mmol/L 100 100 98 101   CO2 mmol/L 20.0* 23.0* 22.0* 24.0*   BUN mg/dL 37* 26* 35* 26*   CREATININE mg/dL 4.60* 3.70* 5.20* 4.40*   CALCIUM mg/dL 8.1* 8.3* 7.7* 8.1*   BILIRUBIN mg/dL 0.4 0.2  --  0.5   ALK PHOS U/L 107 111  --  74   ALT (SGPT) U/L 32 33  --  33   AST (SGOT) U/L 34 24  --  59*   GLUCOSE mg/dL 173* 170* 131* 87       Estimated Creatinine Clearance: 9.7 mL/min (A) (by C-G formula based on SCr of 4.6 mg/dL (H)).      Results from last 7 days  Lab Units 09/29/18  0413 09/28/18  0650 09/26/18  0406 09/25/18  0110   MAGNESIUM mg/dL  --   --  2.2 2.3   PHOSPHORUS mg/dL 5.3* 4.2  --  4.8*               Results from last 7 days  Lab Units 09/29/18  0413 09/28/18  0650 09/26/18  0406 09/25/18  0606 09/25/18  0110   WBC 10*3/mm3 9.23 6.61 11.23* 13.79* 8.73   HEMOGLOBIN g/dL 11.2* 10.6* 11.2* 11.5* 12.4   PLATELETS 10*3/mm3 156 134 152 148 164         Results from last 7 days  Lab Units 09/26/18  1105   INR  1.03         Imaging Results (last 24 hours)     ** No results found for the last 24 hours. **          amLODIPine 5 mg Oral Q24H   aspirin 81 mg Oral Daily   atorvastatin 10 mg Oral Daily   b complex-vitamin c-folic acid 1 tablet Oral Daily   budesonide 0.5 mg Nebulization BID - RT   carvedilol 12.5 mg Oral Q12H   chlorhexidine 15 mL Mouth/Throat Q12H   famotidine 20 mg Intravenous Daily   heparin (porcine) 5,000 Units Subcutaneous Q8H   insulin detemir 10 Units Subcutaneous Q12H   insulin regular 0-9 Units Subcutaneous Q6H   ipratropium-albuterol 3 mL Nebulization Q6H - RT   levothyroxine 100 mcg Oral QAM   losartan 50 mg Oral Daily   phenytoin 100 mg Intravenous Q6H   sevelamer 800 mg Oral TID With Meals   Vitamin D 1,000 Units Oral Daily       sodium chloride 10 mL/hr Last Rate: 10 mL/hr (09/30/18 1257)       Medication Review:   Current Facility-Administered Medications   Medication Dose  Route Frequency Provider Last Rate Last Dose   • amLODIPine (NORVASC) tablet 5 mg  5 mg Oral Q24H Bridger Swift MD   5 mg at 09/30/18 0841   • aspirin EC tablet 81 mg  81 mg Oral Daily Jordan Ribera MD   81 mg at 09/30/18 0842   • atorvastatin (LIPITOR) tablet 10 mg  10 mg Oral Daily Jordan Ribera MD   10 mg at 09/30/18 0841   • b complex-vitamin c-folic acid (NEPHRO-CLEOPATRA) tablet 1 tablet  1 tablet Oral Daily Jordan Ribera MD   1 tablet at 09/30/18 0841   • budesonide (PULMICORT) nebulizer solution 0.5 mg  0.5 mg Nebulization BID - RT Jordan Ribera MD   0.5 mg at 10/01/18 0650   • carvedilol (COREG) tablet 12.5 mg  12.5 mg Oral Q12H Bridger Swift MD   12.5 mg at 09/30/18 2120   • CHAPSTICK 1 each  1 each Apply externally Q2H PRN Jordan Ribera MD       • chlorhexidine (PERIDEX) 0.12 % solution 15 mL  15 mL Mouth/Throat Q12H Jordan Ribera MD   15 mL at 09/30/18 2120   • dextrose (D50W) 25 g/ 50mL Intravenous Solution 25 g  25 g Intravenous Q15 Min PRN Jordan Ribera MD   25 g at 10/01/18 0516   • dextrose (GLUTOSE) oral gel 1 tube  1 tube Oral Q15 Min PRN Jordan Ribera MD       • famotidine (PEPCID) injection 20 mg  20 mg Intravenous Daily Sharonda Bowens MD   20 mg at 09/30/18 0841   • glucagon (human recombinant) (GLUCAGEN DIAGNOSTIC) injection 1 mg  1 mg Subcutaneous PRN Jordan Ribera MD       • heparin (porcine) 5000 UNIT/ML injection 5,000 Units  5,000 Units Subcutaneous Q8H Sharonda Bowens MD   5,000 Units at 10/01/18 0516   • Influenza Vac Subunit Quad (FLUCELVAX) injection 0.5 mL  0.5 mL Intramuscular During Hospitalization Jordan Ribera MD       • insulin detemir (LEVEMIR) injection 10 Units  10 Units Subcutaneous Q12H Jordan Ribera MD   10 Units at 09/30/18 2120   • insulin regular (humuLIN R,novoLIN R) injection 0-9 Units  0-9 Units Subcutaneous Q6H Jordan Ribera MD   2 Units at 09/30/18 1303   • ipratropium-albuterol (DUO-NEB) nebulizer solution 3 mL  3 mL Nebulization Q6H - RT  Jordan Ribera MD   3 mL at 10/01/18 0650   • labetalol (NORMODYNE,TRANDATE) injection 10 mg  10 mg Intravenous Q4H PRN Sharonda Bowens MD   10 mg at 09/25/18 0541   • levothyroxine (SYNTHROID, LEVOTHROID) tablet 100 mcg  100 mcg Oral QAM Jordan Ribera MD   100 mcg at 10/01/18 0600   • losartan (COZAAR) tablet 50 mg  50 mg Oral Daily Jordan Ribera MD   50 mg at 09/30/18 0840   • ondansetron (ZOFRAN) tablet 4 mg  4 mg Oral Q6H PRN Sharonda Bowens MD        Or   • ondansetron ODT (ZOFRAN-ODT) disintegrating tablet 4 mg  4 mg Oral Q6H PRN Sharonda Bowens MD        Or   • ondansetron (ZOFRAN) injection 4 mg  4 mg Intravenous Q6H PRN Sharonda Bowens MD       • phenytoin (DILANTIN) injection 100 mg  100 mg Intravenous Q6H Jordan Ribera MD   100 mg at 10/01/18 0342   • pneumococcal polysaccharide 23-valent (PNEUMOVAX-23) vaccine 0.5 mL  0.5 mL Intramuscular During Hospitalization Jordan Ribera MD       • sevelamer (RENAGEL) tablet 800 mg  800 mg Oral TID With Meals Jordan Ribera MD   800 mg at 09/30/18 1743   • sodium chloride 0.9 % flush 1-10 mL  1-10 mL Intravenous PRN Sharonda Bowens MD       • sodium chloride 0.9 % infusion  10 mL/hr Intravenous Continuous Jordan Ribera MD 10 mL/hr at 09/30/18 1257 10 mL/hr at 09/30/18 1257   • Vitamin D 1,000 Units  1,000 Units Oral Daily Jordan Ribera MD   1,000 Units at 09/30/18 0841       Assessment/Plan     1. End-stage renal disease: Tuesday Thursday Saturday at [facility].  She does for 3 hours with a standard bath.  Usually gains 1-2 L in between dialysis.  2. Metabolic acidosis: Treated  3. Respiratory failure leading to intubation: It is more so for safety, or change her sedation to Versed.  4. Anemia of chronic kidney disease: She is fairly compliant and hemoglobin stays within the goal.  5. Essential hypertension: It is under fairly good control as if she is able to take the medications.  6. Poorly controlled diabetes: She has never been able to control  her diabetes real well A1c has ranged between 9-13.  7. Hyperosmolar state with coma: Likely combination off hyperosmolar state as well as seizures.  8. Mixed hyperlipidemia:   9. Status post pacemaker: Has done well since the pacemaker  10. COPD and recurrent exacerbation:   11. Seizure (CMS/HCC): Neurology evaluation is in progress    Plan:    · Will continue dialysis as scheduled.  · Continue to optimize medications.  · Neuro evaluation is in progress, she is still not awake enough to be extubated.  · Pulmonary as per Dr. Sanz.  · Details were discussed with the patient no family in the room.    · Details were also discussed with the hospitalist service.   · Surveillance labs.  · Further recommendations will depend on clinical course of the patient during the current hospitalization.    · I also discussed the details with the nursing staff.  · Rest as ordered.    Deric Mejia MD, FASN  10/01/18  6:54 AM    Dictated utilizing Dragon dictation.

## 2018-10-01 NOTE — DISCHARGE INSTR - OTHER ORDERS
If you have any questions about your recovery, please call the Psychiatric Nurse Call Center at [phone]. A registered nurse is available 24 hours a day 7 days a week to assist you.

## 2018-10-01 NOTE — PLAN OF CARE
Problem: Patient Care Overview  Goal: Plan of Care Review  Outcome: Ongoing (interventions implemented as appropriate)   10/01/18 0504   Coping/Psychosocial   Plan of Care Reviewed With patient   Plan of Care Review   Progress no change   OTHER   Outcome Summary Pt still not following commands off sedation. VSS, will continue to monitor. BREN Davis     Goal: Individualization and Mutuality  Outcome: Ongoing (interventions implemented as appropriate)    Goal: Discharge Needs Assessment  Outcome: Ongoing (interventions implemented as appropriate)    Goal: Interprofessional Rounds/Family Conf  Outcome: Ongoing (interventions implemented as appropriate)      Problem: Pain, Acute (Adult)  Goal: Identify Related Risk Factors and Signs and Symptoms  Outcome: Outcome(s) achieved Date Met: 10/01/18    Goal: Acceptable Pain Control/Comfort Level  Outcome: Ongoing (interventions implemented as appropriate)      Problem: Skin Injury Risk (Adult)  Goal: Skin Health and Integrity  Outcome: Ongoing (interventions implemented as appropriate)      Problem: Ventilation, Mechanical Invasive (Adult)  Goal: Signs and Symptoms of Listed Potential Problems Will be Absent, Minimized or Managed (Ventilation, Mechanical Invasive)  Outcome: Ongoing (interventions implemented as appropriate)      Problem: Nutrition, Enteral (Adult)  Goal: Signs and Symptoms of Listed Potential Problems Will be Absent, Minimized or Managed (Nutrition, Enteral)  Outcome: Ongoing (interventions implemented as appropriate)

## 2018-10-01 NOTE — PLAN OF CARE
Problem: Fall Risk (Adult)  Intervention: Monitor/Assist with Self Care   10/1940   Activity   Activity Assistance Provided assistance, 2 people     Intervention: Reduce Risk/Promote Restraint Free Environment   10/01/18 1900   Safety Management   Environmental Safety Modification assistive device/personal items within reach;clutter free environment maintained;lighting adjusted   Safety Promotion/Fall Prevention safety round/check completed     Intervention: Review Medications/Identify Contributors to Fall Risk   10/1940   Safety Management   Medication Review/Management medications reviewed;high risk medications identified;provider consulted         Problem: Seizure Disorder/Epilepsy (Adult)  Intervention: Promote Airway Safety and Patency   10/01/18 0803 10/1940   Positioning   Head of Bed (HOB) --  HOB at 30-45 degrees   Respiratory Interventions   Airway/Ventilation Management airway patency maintained;humidification applied;pulmonary hygiene promoted --          Problem: Skin Injury Risk (Adult)  Intervention: Promote/Optimize Nutrition   10/1940   Nutrition Interventions   Oral Nutrition Promotion calorie dense liquids provided;nutritional therapy counseling provided;physical activity promoted;social interaction promoted     Intervention: Prevent/Manage Excess Moisture   10/01/18 0200 10/01/18 1200 10/01/18 1602   Skin Interventions   Skin Protection --  adhesive use limited;drying agents applied;incontinence pads utilized;protective footwear used;pulse oximeter probe site changed;sacral silicone foam dressing in place;skin sealant/moisture barrier applied;skin-to-device areas padded;skin-to-skin areas padded;transparent dressing maintained;tubing/devices free from skin contact --    Hygiene Care   Perineal Care --  --  absorbent pad changed;perineum cleansed;protective cream applied   Bathing/Skin Care bath, complete;bedtime care;dressed/undressed;incontinence care;linen changed --  --       Intervention: Maintain Head of Bed Elevation Less Than 30 Degrees as Tolerated   10/1940   Positioning   Head of Bed (HOB) HOB at 30-45 degrees     Intervention: Prevent/Minimize Shear/Friction Injuries   10/01/18 1200 10/01/18 1900   Positioning   Positioning/Transfer Devices --  pillows   Skin Interventions   Pressure Reduction Devices foam padding utilized;heel offloading device utilized;positioning supports utilized;pressure-redistributing mattress utilized;alternating pressure pump utilized --      Intervention: Prevent or Minimize Pressure   10/01/18 1200 10/01/18 1900   Positioning   Body Position --  turned;side-lying, left;weight shift assistance provided   Skin Interventions   Pressure Reduction Techniques frequent weight shift encouraged;heels elevated off bed;positioned off wounds;pressure points protected;rest period provided between sit times;sit time limited to 1 hour;weight shift assistance provided --        Goal: Skin Health and Integrity  Outcome: Ongoing (interventions implemented as appropriate)   10/1940   Skin Injury Risk (Adult)   Skin Health and Integrity unable to achieve outcome       Problem: Ventilation, Mechanical Invasive (Adult)  Intervention: Prevent Airway Displacement/Mechanical Dysfunction   10/01/18 1800   Prevent Airway Displacement/Mechanical Dysfunction   Airway Safety Measures mask at bedside;manual resuscitator at bedside;manual resuscitator/mask/valve in room;suction at bedside     Intervention: Prevent Airway-Related Skin/Tissue Breakdown   10/01/18 1200   Skin Interventions   Device Skin Pressure Protection absorbent pad utilized/changed;adhesive use limited;positioning supports utilized;pressure points protected;skin-to-device areas padded;skin-to-skin areas padded     Intervention: Prevent Ventilator-Induced Lung Injury   10/01/18 0803   Respiratory Interventions   Lung Protection Measures fluid excess minimized;lung compliance monitored      Intervention: Promote/Provide Early Rehabilitation Treatment/Mobility Program   09/30/18 0800 10/01/18 0803 10/01/18 1200   Activity   Activity Management --  --  --    Prevent Contractures/Hypertrophic Scarring   Range of Motion --  Bilateral Upper and Lower Extremities --    LUE Range of Motion --  PROM (passive range of motion) performed --    RUE Range of Motion PROM (passive range of motion) performed --  --    LLE Range of Motion PROM (passive range of motion) performed --  --    RLE Range of Motion PROM (passive range of motion) performed --  --    Bilateral Upper and Lower Extremities Range of Motion --  --  PROM (passive range of motion) performed    10/1940   Activity   Activity Management bedrest   Prevent Contractures/Hypertrophic Scarring   Range of Motion --    LUE Range of Motion --    RUE Range of Motion --    LLE Range of Motion --    RLE Range of Motion --    Bilateral Upper and Lower Extremities Range of Motion --      Intervention: Support Psychosocial Response to Intubation/Mechanical Ventilation   10/01/18 0900 10/01/18 1800 10/1940   Coping/Psychosocial Interventions   Supportive Measures active listening utilized;counseling provided;decision-making supported;goal setting facilitated;positive reinforcement provided;problem solving facilitated --  --    Psychosocial Support   Family/Support System Care --  involvement promoted;presence promoted;self-care encouraged;support provided --    Promote Effective Communication   Communication Enhancement Strategies --  --  call light answered in person;communication board used     Intervention: Promote Early Weaning/Extubation   10/1940   Coping/Psychosocial Interventions   Environmental Support calm environment promoted;distractions minimized;environmental consistency promoted;personal routine supported;rest periods encouraged   Safety Management   Medication Review/Management medications reviewed;high risk medications  identified;provider consulted   Pain/Comfort/Sleep Interventions   Sleep/Rest Enhancement consistent schedule promoted;family presence promoted;noise level reduced;regular sleep/rest pattern promoted;relaxation techniques promoted     Intervention: Prevent Ventilator-Associated Pneumonia (VAP)   10/01/18 0803 10/1940   Positioning   Head of Bed (HOB) --  HOB at 30-45 degrees   Hygiene Care   Oral Care --  swabbed with antiseptic solution;oral rinse provided   VAP Prevention Measures   VAP Prevention Measures --  completed   VAP Prevention Bundle HOB elevation maintained;oral care regularly provided;readiness to extubate assessed;sedation interruption performed;spontaneous breathing trial performed;stress ulcer prophylaxis provided;vent circuit breaks minimized;VTE prophylaxis provided --      Intervention: Optimize Oxygenation/Ventilation   10/01/18 0803 10/01/18 1900   Positioning   Body Position --  turned;side-lying, left;weight shift assistance provided   Respiratory Interventions   Airway/Ventilation Management airway patency maintained;humidification applied;pulmonary hygiene promoted --      Intervention: Monitor/Manage Nutrition Support   10/01/18 0803   Nutrition Interventions   Nutrition Support Management tube feeding initiated       Goal: Signs and Symptoms of Listed Potential Problems Will be Absent, Minimized or Managed (Ventilation, Mechanical Invasive)   10/01/18 1525 10/1940   Goal/Outcome Evaluation   Problems Assessed (Mechanical Ventilation, Invasive) artificial airway-induced skin/tissue breakdown;gastritis/stress ulcer;inability to wean;mechanical dysfunction;ventilator-induced lung injury --    Problems Present ([facility] Vent, Invasive) --  immobility;inability to wean;malnutrition;mechanical dysfunction;situational response       Problem: Nutrition, Enteral (Adult)  Intervention: Manage Aspiration Risk   10/01/18 1602   Safety Interventions   Aspiration Precautions awake/alert before  oral intake;NPO pending swallow screening/evaluation     Intervention: Position with HOB Elevated   10/01/18 1900   Positioning   Head of Bed (HOB) HOB elevated   Body Position turned;side-lying, left;weight shift assistance provided     Intervention: Monitor/Manage Nutrition Support   10/01/18 0803   Nutrition Interventions   Nutrition Support Management tube feeding initiated     Intervention: Prevent Feeding-Related Adverse Events   10/01/18 1602 10/01/18 1800   Safety Management   Medication Review/Management --  medications reviewed   Hygiene Care   Oral Care oral rinse provided --      Intervention: Promote Magaly-Tube Skin/Mucosal Integrity   10/01/18 1200   Skin Interventions   Device Skin Pressure Protection absorbent pad utilized/changed;adhesive use limited;positioning supports utilized;pressure points protected;skin-to-device areas padded;skin-to-skin areas padded       Goal: Signs and Symptoms of Listed Potential Problems Will be Absent, Minimized or Managed (Nutrition, Enteral)   10/1940   Goal/Outcome Evaluation   Problems Assessed (Enteral Nutrition) all   Problems Present (Enteral Nutrition) malnutrition;skin/mucosal integrity impairment;mechanical complications

## 2018-10-01 NOTE — PROGRESS NOTES
Continued Stay Note  CHANDAN Matthews     Patient Name: Sigrid Casarez  MRN: [id number]  Today's Date: 10/1/2018    Admit Date: 9/25/2018          Discharge Plan     Row Name 10/01/18 1005       Plan    Plan Following for discharge planning .              Discharge Codes    No documentation.           Betty Cruz

## 2018-10-01 NOTE — PLAN OF CARE
Problem: Patient Care Overview  Goal: Plan of Care Review  Outcome: Ongoing (interventions implemented as appropriate)   10/01/18 1525   Coping/Psychosocial   Plan of Care Reviewed With spouse   Plan of Care Review   Progress no change       Problem: Ventilation, Mechanical Invasive (Adult)  Intervention: Promote Early Weaning/Extubation   10/01/18 1525   Coping/Psychosocial Interventions   Environmental Support distractions minimized;environmental consistency promoted   Safety Management   Medication Review/Management medications reviewed   Pain/Comfort/Sleep Interventions   Sleep/Rest Enhancement awakenings minimized;noise level reduced       Goal: Signs and Symptoms of Listed Potential Problems Will be Absent, Minimized or Managed (Ventilation, Mechanical Invasive)  Outcome: Ongoing (interventions implemented as appropriate)   10/01/18 1525   Goal/Outcome Evaluation   Problems Assessed (Mechanical Ventilation, Invasive) artificial airway-induced skin/tissue breakdown;gastritis/stress ulcer;inability to wean;mechanical dysfunction;ventilator-induced lung injury   Problems Present (Mech Vent, Invasive) inability to wean

## 2018-10-02 NOTE — PLAN OF CARE
Problem: Patient Care Overview  Goal: Plan of Care Review  Outcome: Ongoing (interventions implemented as appropriate)   10/02/18 0514   Coping/Psychosocial   Plan of Care Reviewed With patient   Plan of Care Review   Progress no change       Problem: Fall Risk (Adult)  Goal: Absence of Fall  Outcome: Ongoing (interventions implemented as appropriate)      Problem: Pain, Acute (Adult)  Goal: Acceptable Pain Control/Comfort Level  Outcome: Ongoing (interventions implemented as appropriate)      Problem: Skin Injury Risk (Adult)  Goal: Skin Health and Integrity  Outcome: Ongoing (interventions implemented as appropriate)      Problem: Ventilation, Mechanical Invasive (Adult)  Goal: Signs and Symptoms of Listed Potential Problems Will be Absent, Minimized or Managed (Ventilation, Mechanical Invasive)  Outcome: Ongoing (interventions implemented as appropriate)      Problem: Nutrition, Enteral (Adult)  Goal: Signs and Symptoms of Listed Potential Problems Will be Absent, Minimized or Managed (Nutrition, Enteral)  Outcome: Ongoing (interventions implemented as appropriate)

## 2018-10-02 NOTE — PLAN OF CARE
Problem: Patient Care Overview  Goal: Plan of Care Review  Outcome: Ongoing (interventions implemented as appropriate)   10/02/18 8903   Coping/Psychosocial   Plan of Care Reviewed With spouse   Plan of Care Review   Progress improving   OTHER   Outcome Summary Spoke with the patient's  at her bedside.  expressed concen about what he will need to do about her long term care. I assured hi that her  and  were gathering information and would talk to him about options for long term care.   stated that he is grateful for the support he has received and the care his wife is receiving. He also expressed thanks for the concern of his Worship and neighbors. I will continue to follow. to provide emotional support.

## 2018-10-02 NOTE — PROGRESS NOTES
Adult Nutrition  Assessment/PES    Patient Name:  Sigrid Casarez  YOB: 1940  MRN: [id number]  Admit Date:  9/25/2018    Assessment Date:  10/2/2018    Comments:  RD increased TF goal rate to Nepro @27 mL/hr. Tube feeding to provide ~1070 kcals, ~48 gm protein, and 432 mL tube feeding water. Rec #2: Decrease Free water flushes to 140 mL Q4 or 6x daily. Rec # 3: Continue with ProStat 30 ml BID via feeding tube. ProStat provides ~200 kcal, 30 gms protein. RD to follow pt. Consult RD PRN.           Reason for Assessment     Row Name 10/02/18 1010          Reason for Assessment    Reason For Assessment follow-up protocol;TF/PN     Diagnosis diabetes diagnosis/complications;cardiac disease;renal disease   ESRD on HD, DM type 2, COPD, Intubated     Identified At Risk by Screening Criteria tube feeding or parenteral nutrition               Anthropometrics     Row Name 10/02/18 0500          Anthropometrics    Weight 61.7 kg (135 lb 15.4 oz)             Labs/Tests/Procedures/Meds     Row Name 10/02/18 1011          Labs/Procedures/Meds    Lab Results Reviewed reviewed, pertinent     Lab Results Comments Low: Na, Alb  High: Glu, BUN, Creat        Medications    Pertinent Medications Reviewed reviewed, pertinent             Physical Findings     Row Name 10/02/18 1012          Physical Findings    Overall Physical Appearance on ventilator support     Tubes nasogastric tube               Nutrition Prescription Ordered     Row Name 10/02/18 1012          Nutrition Prescription PO    Current PO Diet NPO        Nutrition Prescription EN    Enteral Route NG     TF Delivery Method Continuous     Continuous TF Goal Rate (mL/hr) 22 mL/hr     Continuous TF Current Rate (mL/hr) 22 mL/hr     Continuous TF Goal Volume (mL) 484 mL     Continuous TF Current Volume (mL) 484 mL     Water flush (mL)  162 mL     Water Flush Frequency Every 4 hours             Evaluation of Received Nutrient/Fluid Intake     Row Name 10/02/18  1013          PO Evaluation    Number of Days PO Intake Evaluated Insufficient Data        EN Evaluation    Number of Days EN Intake Evaluated 3 days     EN Average Volume Delivered (mL/day) 484 mL/day     % Goal Volume  100 %             Problem/Interventions:        Problem 1     Row Name 10/02/18 1014          Nutrition Diagnoses Problem 1    Problem 1 Inadequate Intake/Infusion     Inadequate Intake Type Oral     Macronutrient Kcal;Fluid;Protein;Carbohydrate     Micronutrient Vitamin;Mineral     Etiology (related to) MNT for Treatment/Condition     Signs/Symptoms (evidenced by) NPO             Problem 2     Row Name 10/02/18 1014          Nutrition Diagnoses Problem 2    Problem 2 Increased Nutrient Needs     Macronutrient Kcal;Carbohydrate;Protein;Fluid     Micronutrient Vitamin;Mineral     Etiology (related to) Medical Diagnosis     Pulmonary/Critical Care COPD;Ventilator     Renal ESRD;Hemodialysis     Signs/Symptoms (evidenced by) Other (comment)   pulmonary, renal dysfunction             Problem 3     Row Name 10/02/18 1014          Nutrition Diagnoses Problem 3    Problem 3 Impaired Nutrient Utilization     Etiology (related to) Medical Diagnosis     Endocrine DM Type 1     Renal ESRD;Hemodialysis     Signs/Symptoms (evidenced by) Biochemical     Specific Labs Noted BUN;Phosphorus;Na+;Creatinine                 Intervention Goal     Row Name 10/02/18 1015          Intervention Goal    General Meet nutritional needs for age/condition;Nutrition support treatment     PO Other (comment)   Pt intubated, remain NPO     TF/PN Adjust TF/PN     Transition TF to PO     Weight Maintain weight             Nutrition Intervention     Row Name 10/02/18 1015          Nutrition Intervention    RD/Tech Action Follow Tx progress;Care plan reviewd;Recommend/ordered     Recommended/Ordered EN             Nutrition Prescription     Row Name 10/02/18 1016          Nutrition Prescription PO    PO Prescription Other (comment)    Remain NPO     New PO Prescription Ordered? No, recommended        Nutrition Prescription EN    Enteral Prescription Enteral begin/change     Enteral Route NG     Product Nepro     Modulars Liquid Protein (15 gm/30 mL)     Liquid Protein (15 gm/30 mL) 30 mL/1 packet     Protein Liquid Frequency 2 times a day     TF Delivery Method Continuous     Continuous TF Goal Rate (mL/hr) 27 mL/hr     Continuous TF Starting Rate (mL/hr) 22 mL/hr     Continuous TF Goal Volume (mL) 594 mL     Continuous TF Starting Volume (mL) 484 mL     Water flush (mL)  140 mL     Water Flush Frequency Every 4 hours     New EN Prescription Ordered? Yes        Other Orders    Obtain Weight Daily     Obtain Weight Ordered? No, recommended     Supplement Vitamin mineral supplement     Supplement Ordered? No, recommended     Labs Ordered? No, recommended     Other continue to monitor/replace electrolytes             Education/Evaluation     Row Name 10/02/18 1024          Education    Education Education not appropriate at this time     Please explain Patient intubated        Monitor/Evaluation    Monitor Per protocol;I&O;Supplement intake;Pertinent labs;TF delivery/tolerance;Weight;Skin status         Electronically signed by:  Jenni Young RD  10/02/18 10:25 AM

## 2018-10-02 NOTE — PROGRESS NOTES
[facility]    RN Gi made me aware that patients blood glucose has been low on several occasions since 9/28/18. She is on continuous TFs with regular sliding scale insulin q6h and levemir 10 units q12h.    Will decrease levemir to 3 units q12h.    Electronically signed by Triny Cooper MD, 10/01/18, 8:30 PM.

## 2018-10-02 NOTE — PROGRESS NOTES
"CC: Acute Respiratory Failure.     S: Intubated and sedated.       ROS: Could not be obtained as the patient is on mechanical ventilator.    O:Vital signs reviewed. O2Sat: 97 % on 35%. Vent Day # 8. Left IJ Day # 7  /57   Pulse 71   Temp 99.6 °F (37.6 °C) (Oral)   Resp 16   Ht 162.6 cm (64\")   Wt 61.7 kg (135 lb 15.4 oz)   LMP  (LMP Unknown)   SpO2 100%   BMI 23.34 kg/m²     Temp (24hrs), Av.9 °F (37.2 °C), Min:98.3 °F (36.8 °C), Max:99.6 °F (37.6 °C)    I & Os reviewed.   Intake/Output       10/01/18 0700 - 10/02/18 0659    Intake (ml) 1659    Output (ml) --    Net (ml) 1659    Last Weight  61.7 kg (135 lb 15.4 oz)        General: Intubated. Sedated.  Eyes: PERRL.   Neck: No obvious masses noted.   Cardiovascular: S1 + S2. PPM. Left IJ line.   Respiratory: Transmitted Breath sounds noted. No wheezing heard. No crackles noted  GI: Soft. Bowel sounds hypoactive  Extremities: No edema noted. Right upper extremity AV fistula noted  Neurologic: Sedated.  Detailed exam couldn't be performed due to sedation.   Skin: Appeared somewhat dry     Labs: Reviewed.       Results from last 7 days  Lab Units 10/02/18  0424 18  0413 18  0650  18  0406   SODIUM mmol/L 129* 131* 131*  < > 135*   POTASSIUM mmol/L 3.8 4.5 3.8  < > 3.3*   CHLORIDE mmol/L 99 100 100  < > 101   CO2 mmol/L 21.0* 20.0* 23.0*  < > 24.0*   BUN mg/dL 77* 37* 26*  < > 26*   CREATININE mg/dL 5.70* 4.60* 3.70*  < > 4.40*   CALCIUM mg/dL 7.9* 8.1* 8.3*  < > 8.1*   BILIRUBIN mg/dL  --  0.4 0.2  --  0.5   ALK PHOS U/L  --  107 111  --  74   ALT (SGPT) U/L  --  32 33  --  33   AST (SGOT) U/L  --  34 24  --  59*   GLUCOSE mg/dL 72* 173* 170*  < > 87   < > = values in this interval not displayed.      Results from last 7 days  Lab Units 183 18  0650 18  0406   MAGNESIUM mg/dL  --   --  2.2   PHOSPHORUS mg/dL 5.3* 4.2  --          Results from last 7 days  Lab Units 10/02/18  0424 18  0413 18  0650 " 09/26/18  0406   WBC 10*3/mm3 9.34 9.23 6.61 11.23*   HEMOGLOBIN g/dL 9.9* 11.2* 10.6* 11.2*   PLATELETS 10*3/mm3 226 156 134 152         Results from last 7 days  Lab Units 09/26/18  1105   INR  1.03         sodium chloride 10 mL/hr Last Rate: 10 mL/hr (09/30/18 1257)       ABG: Reviewed  Lab Results   Component Value Date    PHART 7.399 10/02/2018    IMQ8SEG 37.0 10/02/2018    PO2ART 104.0 (H) 10/02/2018    HGBBG 10.1 (L) 10/02/2018    P4ESPNGD 98.9 10/02/2018    FCOHB 1.6 01/27/2016    CARBOXYHGB 1.4 10/02/2018    FMETHB 0.7 01/27/2016         CXRay: Reviewed from today. No change overall.     Assessment & Recommendations/Plan:   1.  Acute Respiratory Failure.  On mechanical ventilation  Is not awake enough to be extubated at this time.   Although she was not on Versed drip for too long, I will try Romazicon to see if slow metabolism benzodiazepine could explain some of her issues with nonresponsiveness etc.    2.  Seizures.  Neurology following.   EEG again possibly today.    3.  Moderate COPD with recent exacerbation (7/17/18).  No evidence of exacerbation at this time  Continue neb treatments    4.  ESRD.   On HD.    5.  Hyperglycemia with labile control and episodes of hypoglycemia.   Being monitored closely.   Will recommend considering d/cing Levemir for now?    Her condition continues to be critical.    She remained mostly unresponsive even to deep painful stimulus.     We have updated the admitting attending and nursing staff, as appropriate, on the patient's current status and plan. I will be going off shift tonight and will be unavailable.       Ismael Sanz MD  10/02/18  7:32 AM

## 2018-10-02 NOTE — PROGRESS NOTES
"Hospitalist Progress Note.    LOS: 7 days    Patient Care Team:  Rodriguez Eng DO as PCP - General (Family Medicine)  Babatunde Montes PA-C as PCP - Claims Attributed    Chief Complaint:    F/u seizure disorder, on mechanical ventilation     Subjective   Patient seen and examined this morning, her  was at the bedside. She remains stuporous, no significant improvement in mentation. Ventilator settings minimal with Fio2 at 21%. She received 2 doses of flumazenil yesterday, again, without any real result. This morning, her blood sugars were low in 60s.     Review of Systems:    The pertinent  ROS was done and it is noted above, rest  was negative.    Objective     Vital Signs  /62   Pulse 72   Temp 98.6 °F (37 °C) (Oral)   Resp 18   Ht 162.6 cm (64\")   Wt 61.7 kg (135 lb 15.4 oz)   LMP  (LMP Unknown)   SpO2 99%   BMI 23.34 kg/m²       I/O this shift:  In: -   Out: 3450 [Other:3450]    Intake/Output Summary (Last 24 hours) at 10/02/18 1429  Last data filed at 10/02/18 1427   Gross per 24 hour   Intake             1659 ml   Output             3450 ml   Net            -1791 ml       Physical Exam:    General Appearance: stuporous but off sedation, on mechanical ventilation, no acute distress  HEENT: pupils round and sluggishly reactive to light, oral mucosa dry, extraocular movements intact. + ET tube, + NG tube   Neck: supple, no JVD, trachea midline, + IJ central line   Lungs: Clear to Auscultation, unlabored breathing effort  Heart: RRR, normal S1 and S2, no S3, no rub  Abdomen: soft, non-tender, no palpable bladder, present bowel sounds to auscultation  Extremities: no edema, cyanosis or clubbing. Intermittent jerking movement of RUE, muscle contractions noted with withdrawing from pain in bilateral lower extremities  Neuro: stuporous, grossly non focal.     Results Review:      Results from last 7 days  Lab Units 10/02/18  0424 09/29/18  0413 09/28/18  0650  09/26/18  0406   SODIUM mmol/L " 129* 131* 131*  < > 135*   POTASSIUM mmol/L 3.8 4.5 3.8  < > 3.3*   CHLORIDE mmol/L 99 100 100  < > 101   CO2 mmol/L 21.0* 20.0* 23.0*  < > 24.0*   BUN mg/dL 77* 37* 26*  < > 26*   CREATININE mg/dL 5.70* 4.60* 3.70*  < > 4.40*   CALCIUM mg/dL 7.9* 8.1* 8.3*  < > 8.1*   BILIRUBIN mg/dL  --  0.4 0.2  --  0.5   ALK PHOS U/L  --  107 111  --  74   ALT (SGPT) U/L  --  32 33  --  33   AST (SGOT) U/L  --  34 24  --  59*   GLUCOSE mg/dL 72* 173* 170*  < > 87   < > = values in this interval not displayed.    Estimated Creatinine Clearance: 7.7 mL/min (A) (by C-G formula based on SCr of 5.7 mg/dL (H)).      Results from last 7 days  Lab Units 10/02/18  0422 09/29/18  0413 09/28/18  0650 09/26/18  0406   MAGNESIUM mg/dL  --   --   --  2.2   PHOSPHORUS mg/dL 4.2 5.3* 4.2  --                Results from last 7 days  Lab Units 10/02/18  0424 09/29/18  0413 09/28/18  0650 09/26/18  0406   WBC 10*3/mm3 9.34 9.23 6.61 11.23*   HEMOGLOBIN g/dL 9.9* 11.2* 10.6* 11.2*   PLATELETS 10*3/mm3 226 156 134 152         Results from last 7 days  Lab Units 09/26/18  1105   INR  1.03         Imaging Results (last 24 hours)     Procedure Component Value Units Date/Time    XR Chest 1 View [[id number]] Collected:  10/02/18 0932     Updated:  10/02/18 0936    Narrative:       PROCEDURE: XR CHEST 1 VW-     HISTORY: Respiratory failure; R56.9-Unspecified convulsions;  R73.9-Hyperglycemia, unspecified; J96.01-Acute respiratory failure with  hypoxia; J96.02-Acute respiratory failure with hypercapnia     COMPARISON: September 29, 2018.     FINDINGS: Endotracheal tube is in place and is well-positioned with the  tip above the hector. A nasogastric tube extends below the diaphragm. A  left IJ CVC is in the SVC. A left subclavian pacemaker is unchanged. The  heart is normal in size. The mediastinum is unremarkable. There are  chronic interstitial lung changes. There are likely small effusions.  There is no pneumothorax.  There are no acute osseous  abnormalities.           Impression:       No significant change in the appearance of the chest.     Continued followup is recommended.     This report was finalized on 10/2/2018 9:34 AM by Mariel Solano M.D..          amLODIPine 5 mg Oral Q24H   aspirin 81 mg Oral Daily   atorvastatin 10 mg Oral Daily   b complex-vitamin c-folic acid 1 tablet Oral Daily   budesonide 0.5 mg Nebulization BID - RT   carvedilol 12.5 mg Oral Q12H   chlorhexidine 15 mL Mouth/Throat Q12H   famotidine 20 mg Intravenous Daily   heparin (porcine) 5,000 Units Subcutaneous Q8H   insulin regular 0-7 Units Subcutaneous Q6H   ipratropium-albuterol 3 mL Nebulization Q6H - RT   levothyroxine 100 mcg Oral QAM   losartan 50 mg Oral Daily   phenytoin 100 mg Intravenous Q6H   sevelamer 800 mg Oral TID With Meals   Vitamin D 1,000 Units Oral Daily       sodium chloride 10 mL/hr Last Rate: 10 mL/hr (09/30/18 1257)       Medication Review:   Current Facility-Administered Medications   Medication Dose Route Frequency Provider Last Rate Last Dose   • amLODIPine (NORVASC) tablet 5 mg  5 mg Oral Q24H Bridger Swift MD   5 mg at 10/02/18 0836   • aspirin EC tablet 81 mg  81 mg Oral Daily Jordan Ribera MD   81 mg at 10/02/18 0835   • atorvastatin (LIPITOR) tablet 10 mg  10 mg Oral Daily Jordan Ribera MD   10 mg at 10/02/18 0835   • b complex-vitamin c-folic acid (NEPHRO-CLEOPATRA) tablet 1 tablet  1 tablet Oral Daily Jordan Ribera MD   1 tablet at 10/02/18 0837   • budesonide (PULMICORT) nebulizer solution 0.5 mg  0.5 mg Nebulization BID - RT Jordan Ribera MD   0.5 mg at 10/02/18 0639   • carvedilol (COREG) tablet 12.5 mg  12.5 mg Oral Q12H Bridger Swift MD   12.5 mg at 10/02/18 0835   • CHAPSTICK 1 each  1 each Apply externally Q2H PRN Jordan Ribera MD       • chlorhexidine (PERIDEX) 0.12 % solution 15 mL  15 mL Mouth/Throat Q12H Jordan Ribera MD   15 mL at 10/02/18 0835   • dextrose (D50W) 25 g/ 50mL Intravenous Solution 25 g  25 g  Intravenous Q15 Min PRN Jordan Ribera MD   25 g at 10/02/18 0609   • dextrose (D50W) 25 g/ 50mL Intravenous Solution 25 g  25 g Intravenous Q15 Min PRN Triny Cooper MD       • dextrose (GLUTOSE) oral gel 1 tube  1 tube Oral Q15 Min PRN Jordan Ribera MD       • dextrose (GLUTOSE) oral gel 1 tube  1 tube Oral Q15 Min PRN Triny Cooper MD       • famotidine (PEPCID) injection 20 mg  20 mg Intravenous Daily Sharonda Bowens MD   20 mg at 10/02/18 0836   • glucagon (human recombinant) (GLUCAGEN DIAGNOSTIC) injection 1 mg  1 mg Subcutaneous PRN Jordan Ribera MD       • glucagon (human recombinant) (GLUCAGEN DIAGNOSTIC) injection 1 mg  1 mg Subcutaneous PRN Triny Cooper MD       • heparin (porcine) 5000 UNIT/ML injection 5,000 Units  5,000 Units Subcutaneous Q8H Sharonda Bowens MD   5,000 Units at 10/02/18 0608   • Influenza Vac Subunit Quad (FLUCELVAX) injection 0.5 mL  0.5 mL Intramuscular During Hospitalization Jordan Ribera MD       • insulin regular (humuLIN R,novoLIN R) injection 0-7 Units  0-7 Units Subcutaneous Q6H Triny Cooper MD   2 Units at 10/02/18 1200   • ipratropium-albuterol (DUO-NEB) nebulizer solution 3 mL  3 mL Nebulization Q6H - RT Jordan Ribera MD   3 mL at 10/02/18 1350   • labetalol (NORMODYNE,TRANDATE) injection 10 mg  10 mg Intravenous Q4H PRN Sharonda Bowens MD   10 mg at 09/25/18 0541   • levothyroxine (SYNTHROID, LEVOTHROID) tablet 100 mcg  100 mcg Oral QAM Jordan Ribera MD   100 mcg at 10/02/18 0609   • losartan (COZAAR) tablet 50 mg  50 mg Oral Daily Jordan Ribera MD   50 mg at 10/02/18 0835   • ondansetron (ZOFRAN) tablet 4 mg  4 mg Oral Q6H PRN Sharonda Bowesn MD        Or   • ondansetron ODT (ZOFRAN-ODT) disintegrating tablet 4 mg  4 mg Oral Q6H PRN Sharonda Bowens MD        Or   • ondansetron (ZOFRAN) injection 4 mg  4 mg Intravenous Q6H PRN Sharonda Bowens MD       • phenytoin (DILANTIN) injection 100 mg  100 mg Intravenous Q6H Jordan Ribera MD    100 mg at 10/02/18 0836   • pneumococcal polysaccharide 23-valent (PNEUMOVAX-23) vaccine 0.5 mL  0.5 mL Intramuscular During Hospitalization Jordan Ribera MD       • sevelamer (RENAGEL) tablet 800 mg  800 mg Oral TID With Meals Jordan Ribera MD   800 mg at 10/01/18 1821   • sodium chloride 0.9 % bolus 1,000 mL  1,000 mL Intravenous PRN Deric Mejia MD, FASN       • sodium chloride 0.9 % flush 1-10 mL  1-10 mL Intravenous PRN Sharonda Bowens MD       • sodium chloride 0.9 % infusion  10 mL/hr Intravenous Continuous Jordan Ribera MD 10 mL/hr at 09/30/18 1257 10 mL/hr at 09/30/18 1257   • Vitamin D 1,000 Units  1,000 Units Oral Daily Jordan Ribera MD   1,000 Units at 10/02/18 0835     Principal Problem:    Hyperglycemic hyperosmolar nonketotic coma (CMS/HCC)  Active Problems:    Essential hypertension    Acquired hypothyroidism    Sick sinus syndrome (CMS/HCC)    Seizure (CMS/HCC)    Acute metabolic encephalopathy    Diabetes mellitus type 2 in nonobese (CMS/HCC)    End-stage renal disease on hemodialysis (CMS/[facility])    Hypertensive urgency    Assessment/Plan:   1.  Acute metabolic encephalopathy, present on admission.  2.  New onset complex partial seizures with postictal state, present on admission.  3.  Hyperglycemic hyperosmolar nonketotic coma, present on admission, resolved  4.  Acute non-ST elevation myocardial infarction, type II myocardial infarction.  5.  Pseudohyponatremia, improving.  6.  End-stage renal disease on hemodialysis.  7.  Diabetes mellitus type 2 with nephropathy.  8.  Hypertensive urgency, resolved  9.  Chronic diastolic heart failure.  10.  Acquired hypothyroidism.  11.  Sick sinus syndrome status post pacemaker.  12.  COPD    There has no improvement in her mentation since yesterday even after receiving Romazicon. I spoke with the patient's  at length this morning. We spoke about the possibility of her needing to be on ventilator for a longer duration of time due to her  unresponsiveness. We spoke about the possibility of needing a tracheostomy and a PEG tube if that was the direction that he wanted to go. He verbalized understanding and wanted to wait until we do the repeat EEG and have Dr. Zavala re-evaluate the patient.   I also spoke with Dr. Sanz. He recommended that we wait until re-evaluation by Dr. Zavala, if the patient's  chooses to continue with all aggressive measures then recommends tracheostomy. He will not be available to perform the procedure as he is off service until Monday.   Levemir discontinued for now.   Continue all other medications as before.   She will be continued on HD as scheduled.   Her prognosis remains guarded.     I discussed the details with the nursing staff.    Rest as ordered.    Sharonda Bowens MD  10/02/18  2:29 PM

## 2018-10-02 NOTE — PLAN OF CARE
Problem: Patient Care Overview  Goal: Plan of Care Review  Outcome: Ongoing (interventions implemented as appropriate)   10/02/18 1524   Coping/Psychosocial   Plan of Care Reviewed With patient;spouse   Plan of Care Review   Progress no change     Goal: Individualization and Mutuality  Outcome: Ongoing (interventions implemented as appropriate)      Problem: Fall Risk (Adult)  Goal: Absence of Fall  Outcome: Outcome(s) achieved Date Met: 10/02/18      Problem: Skin Injury Risk (Adult)  Goal: Identify Related Risk Factors and Signs and Symptoms  Outcome: Ongoing (interventions implemented as appropriate)      Problem: Ventilation, Mechanical Invasive (Adult)  Goal: Signs and Symptoms of Listed Potential Problems Will be Absent, Minimized or Managed (Ventilation, Mechanical Invasive)  Outcome: Ongoing (interventions implemented as appropriate)      Problem: Nutrition, Enteral (Adult)  Goal: Signs and Symptoms of Listed Potential Problems Will be Absent, Minimized or Managed (Nutrition, Enteral)  Outcome: Ongoing (interventions implemented as appropriate)

## 2018-10-02 NOTE — DISCHARGE PLACEMENT REQUEST
"Curtis Casarez (78 y.o. Female)     Date of Birth Social Security Number Address Home Phone MRN    [id number]  [address] [place] [address] [phone] [id number]    Jehovah's witness Marital Status          Hindu        Admission Date Admission Type Admitting Provider Attending Provider Department, Room/Bed    9/25/18 Emergency Sharonda Bowens MD Majumder, Mosumi, MD [facility] INTENSIVE CARE, I04/1    Discharge Date Discharge Disposition Discharge Destination                       Attending Provider:  Sharonda Bowens MD    Allergies:  Penicillins    Isolation:  None   Infection:  None   Code Status:  CPR    Ht:  162.6 cm (64\")   Wt:  61.7 kg (135 lb 15.4 oz)    Admission Cmt:  None   Principal Problem:  Hyperglycemic hyperosmolar nonketotic coma (CMS/HCC) [E11.01,E11.65]                 Active Insurance as of 9/25/2018     Primary Coverage     Payor Plan Insurance Group Employer/Plan Group    MEDICARE MEDICARE A & B      Payor Plan Address Payor Plan Phone Number Effective From Effective To    [facility] [id number] [phone] 1/1/2003     [facility] [id number]       Subscriber Name Subscriber Birth Date Member ID       CURTIS CASAREZ 1940 [id number]           Secondary Coverage     Payor Plan Insurance Group Employer/Plan Group    MISC MED SUPP MISC MCARE SUPPLEMENT [id number]     Coverage Address Coverage Phone Number Effective From Effective To    [address] [facility] [phone] 3/1/2008     [facility] [id number]       Subscriber Name Subscriber Birth Date Member ID       CURTIS CASAREZ 1940 [id number]                 Emergency Contacts      (Rel.) Home Phone Work Phone Mobile Phone    Ghassan Casarez (Son) [phone] -- [phone]    Ji Casarez (Spouse) -- -- [phone]            Emergency Contact Information     Name Relation Home Work Mobile    Ghassan Casarez Son [phone]    Ji Casarez Spouse   [phone]          Insurance " Information                MEDICARE/MEDICARE A & B Phone: [phone]    Subscriber: Sigrid Casarez Subscriber#: [id number]    Group#:  Precert#:         MISC MED SUPP/MISC MCARE SUPPLEMENT Phone: [phone]    Subscriber: Sigrid Casarez Subscriber#: [id number]    Group#: [id number] Precert#:              History & Physical      Sharonda Bowens MD at 9/25/2018  3:50 AM              [facility] Medicine Services  HISTORY AND PHYSICAL    Primary Care Physician: Rodriguez Eng DO    Subjective     Chief Complaint:    Chief Complaint   Patient presents with   • Hyperglycemia   • Vomiting   • Altered Mental Status       History of Present Illness:     I have reviewed labs/imaging/records from this hospitalization, including ER staff to establish a comprehensive understanding of this patient's clinical issues, as well as to establish plan of care appropriately.     Patient is a 78 year old female with medical history of ESRD on HD, type 2 DM and COPD, who was brought in to the ER by EMS for evaluation of nausea and vomiting. Patient is intubated at the time of my encounter, history obtained from chart review and discussion with the ER physician. Patient apparently has been having nausea and vomiting over the last 2-3 days. Today, her  called EMS after she appeared to have a seizure. The seizures were described as generalized tonic-clonic in nature. She had another episode en route to the hospital for which she got 2 mg of Ativan with resolution of seizure but appeared post ictal. After arriving to the ER, patient again had another witnessed seizure with localized jerking of her right upper extremity. She continued to remain stuporous, so intubated for airway protection. Patient's vitals are notable for /100 with  and oxygen saturation of 93% on RA, she later became hypoxic to 86% on RA. CT head was negative for acute ICH or acute pathology. Urinalysis was largely unremarkable  other than mild ketones, glucose and 3+ proteinuria. CMP showed a glucose of 988 with BUN/Cr 46/6.9, sodium 124 and serum bicarb 14. AG 27.6. BNP was 58688. Troponin negative. Normal lactate and wbc. EKG shows sinus tachycardia. CXR on my read shows chronic changes. ET tube needed advancement and was done in the ER. She was given loading dose of Dilantin, 10mg IV labetalol, 1gm of clacium gluconate and placed on insulin drip with q1h fingersticks. The ER provider attempted to transfer the patient to a tertiary care center, however, no ICU beds available anywhere, so she's being admitted to our facility. She's on the waiting list at [facility].     Review of Systems   1. Cannot be obtained due to patient's altered mental status and inability to answer questions while intubated    Past Medical History:   Past Medical History:   Diagnosis Date   • Anemia    • Chronic kidney disease    • Chronic kidney disease    • Community acquired pneumonia    • COPD (chronic obstructive pulmonary disease) (CMS/HCC)    • Dexa Body Composition study lumosacral spine and femur     ostepenic   • Diabetes (CMS/HCC)    • Diabetic nephropathy, type I (CMS/HCC)    • Diabetic peripheral neuropathy type 1    • Disease of thyroid gland    • Fracture    • Hypertension    • Menopause    • Pneumonia        Past Surgical History:  Past Surgical History:   Procedure Laterality Date   • CARDIAC ELECTROPHYSIOLOGY PROCEDURE N/A 1/3/2018    Procedure: Pacemaker DC new;  Surgeon: Bridger Swift MD;  Location: [facility] CATH INVASIVE LOCATION;  Service:    • CATARACT EXTRACTION     • CHOLECYSTECTOMY     • HIP CANNULATED SCREW PLACEMENT Left 6/11/2017    Procedure:  LEFT HIP MULTIPLE CANNULATED COMPRESSION SCREWS- FERMORAL NECK  FRACTURE;  Surgeon: Jimmy Sheehan MD;  Location: [facility] OR;  Service:        Family History: family history includes Diabetes in her other; Heart attack in her mother; Heart disease in her other; Lung cancer in her  father.    Social History:  reports that she has quit smoking. Her smoking use included Cigarettes. She has a 50.00 pack-year smoking history. She has never used smokeless tobacco. She reports that she does not drink alcohol or use drugs.    Medications:    (Not in a hospital admission)    Allergies:  Allergies   Allergen Reactions   • Penicillins Rash         Objective     Physical Exam:  Vital Signs: BP (!) 220/98   Pulse 91   Temp 98.4 °F (36.9 °C) (Axillary)   Resp 22   LMP  (LMP Unknown)   SpO2 100%      Physical Exam:     General Appearance:   stuporous, in no acute distress.     Head:   Normocephalic, without obvious abnormality, atraumatic.     Eyes:       Normal, conjunctivae and sclerae, no icterus, no pallor, corneas clear, PERRLA        Throat:   Oral mucosa dry, + ET tube     Neck:  No adenopathy, supple, trachea midline, no thyromegaly, no carotid bruit, no JVD      Back:   No CVA tenderness on Percussion.     Lungs:   Diffuse rales, fair air entry bilaterally     Heart:   Regular rhythm and normal rate, normal S1 and S2.       Abdomen:   Obese. Normal bowel sounds, no masses, no organomegaly, soft, non-tender, non-distended, no guarding, no rebound tenderness        Extremities:  no edema, no cyanosis, no redness.     Pulses:  Pulses palpable and equal bilaterally but weak.     Skin:  No bleeding, bruising or rash        Neurologic:  Stuporous, sedated, rhythmic jerking of bilateral upper and lower extremities, appear to be myoclonic in nature         Results Review:  Lab Results (last 7 days)     Procedure Component Value Units Date/Time    Blood Gas, Arterial With Co-Ox [[id number]]  (Abnormal) Collected:  09/25/18 0334    Specimen:  Arterial Blood Updated:  09/25/18 0335     Site Left Radial     Winston's Test Positive     pH, Arterial 7.204 (C) pH units      pCO2, Arterial 36.7 mm Hg      pO2, Arterial 140.0 (H) mm Hg      HCO3, Arterial 14.5 (L) mmol/L      Base Excess, Arterial -12.7 (L)  mmol/L      O2 Saturation, Arterial 99.0 %      Hemoglobin, Blood Gas 12.7 g/dL      Hematocrit, Blood Gas 38.8 %      Oxyhemoglobin 96.9 %      Methemoglobin 0.90 %      Carboxyhemoglobin 1.2 %      Barometric Pressure for Blood Gas 735 mmHg      Modality Ventilator     FIO2 100 %      Ventilator Mode NA     Collected by rg     pH, Temp Corrected -- pH Units      pCO2, Temperature Corrected -- mm Hg      pO2, Temperature Corrected -- mm Hg     Urinalysis, Microscopic Only - Urine, Clean Catch [[id number]]  (Abnormal) Collected:  09/25/18 0310    Specimen:  Urine from Urine, Clean Catch Updated:  09/25/18 0323     RBC, UA 3-5 (A) /HPF      WBC, UA 0-2 (A) /HPF      Bacteria, UA Trace (A) /HPF      Squamous Epithelial Cells, UA 0-2 /HPF      Hyaline Casts, UA None Seen /LPF      Methodology Manual Light Microscopy    Urinalysis With Microscopic If Indicated (No Culture) - Urine, Clean Catch [[id number]]  (Abnormal) Collected:  09/25/18 0310    Specimen:  Urine from Urine, Clean Catch Updated:  09/25/18 0317     Color, UA Yellow     Appearance, UA Clear     pH, UA 6.0     Specific Gravity, UA 1.015     Glucose,  mg/dL (2+) (A)     Ketones, UA 40 mg/dL (2+) (A)     Bilirubin, UA Negative     Blood, UA Moderate (2+) (A)     Protein, UA >=300 mg/dL (3+) (A)     Leuk Esterase, UA Negative     Nitrite, UA Negative     Urobilinogen, UA 0.2 E.U./dL    POC Glucose Once [[id number]]  (Abnormal) Collected:  09/25/18 0313    Specimen:  Blood Updated:  09/25/18 0315     Glucose >599 (C) mg/dL      Comment: Serial Number: [id number]:  [id number]       BNP [[id number]]  (Abnormal) Collected:  09/25/18 0110    Specimen:  Blood Updated:  09/25/18 0203     proBNP 14,800.0 (C) pg/mL     Comprehensive Metabolic Panel [[id number]]  (Abnormal) Collected:  09/25/18 0110    Specimen:  Blood Updated:  09/25/18 0203     Glucose 988 (C) mg/dL      Comment: Glucose >180, Hemoglobin A1C recommended.        BUN 46 (H) mg/dL      Creatinine  6.90 (H) mg/dL      Sodium 124 (C) mmol/L      Potassium 4.6 mmol/L      Chloride 87 (L) mmol/L      CO2 14.0 (L) mmol/L      Calcium 8.9 mg/dL      Total Protein 6.3 g/dL      Albumin 3.90 g/dL      ALT (SGPT) 27 U/L      AST (SGOT) 30 U/L      Alkaline Phosphatase 137 (H) U/L      Total Bilirubin 0.9 mg/dL      eGFR Non African Amer 6 (L) mL/min/1.73      Comment: <15 Indicative of kidney failure.        eGFR   Amer -- mL/min/1.73      Comment: <15 Indicative of kidney failure.        Globulin 2.4 gm/dL      A/G Ratio 1.6 g/dL      BUN/Creatinine Ratio 6.7 (L)     Anion Gap 27.6 (H) mmol/L     Narrative:       The MDRD GFR formula is only valid for adults with stable renal function between ages 18 and 70.    Troponin [[id number]]  (Normal) Collected:  09/25/18 0110    Specimen:  Blood Updated:  09/25/18 0159     Troponin I <0.012 ng/mL     Narrative:       Normal Patient Upper Reference Limit (URL) (99th Percentile)=0.03 ng/mL   Non-AMI Illness Reference Limit=0.03-0.11 ng/mL   AMI Confirmation=0.12 ng/mL and above    Magnesium [[id number]]  (Normal) Collected:  09/25/18 0110    Specimen:  Blood Updated:  09/25/18 0159     Magnesium 2.3 mg/dL     Phosphorus [[id number]]  (Abnormal) Collected:  09/25/18 0110    Specimen:  Blood Updated:  09/25/18 0159     Phosphorus 4.8 (H) mg/dL     POC Glucose Once [[id number]]  (Abnormal) Collected:  09/25/18 0050    Specimen:  Blood Updated:  09/25/18 0155     Glucose >599 (C) mg/dL      Comment: Serial Number: [id number]:  [id number]       Lactic Acid, Plasma [[id number]]  (Normal) Collected:  09/25/18 0113    Specimen:  Blood Updated:  09/25/18 0151     Lactate 2.0 mmol/L     CBC & Differential [[id number]] Collected:  09/25/18 0110    Specimen:  Blood Updated:  09/25/18 0135    Narrative:       The following orders were created for panel order CBC & Differential.  Procedure                               Abnormality         Status                     ---------                                -----------         ------                     Scan Slide[[id number]]                                       Final result               CBC Auto Differential[[id number]]        Abnormal            Final result                 Please view results for these tests on the individual orders.    CBC Auto Differential [[id number]]  (Abnormal) Collected:  09/25/18 0110    Specimen:  Blood Updated:  09/25/18 0135     WBC 8.73 10*3/mm3      RBC 3.82 (L) 10*6/mm3      Hemoglobin 12.4 g/dL      Hematocrit 41.5 %      .6 (H) fL      MCH 32.5 (H) pg      MCHC 29.9 (L) g/dL      RDW 15.6 (H) %      RDW-SD 62.7 (H) fl      MPV 11.8 fL      Platelets 164 10*3/mm3      Neutrophil % 70.8 %      Lymphocyte % 18.2 %      Monocyte % 6.1 %      Eosinophil % 2.1 %      Basophil % 1.1 %      Immature Grans % 1.7 (H) %      Neutrophils, Absolute 6.18 10*3/mm3      Lymphocytes, Absolute 1.59 10*3/mm3      Monocytes, Absolute 0.53 10*3/mm3      Eosinophils, Absolute 0.18 10*3/mm3      Basophils, Absolute 0.10 10*3/mm3      Immature Grans, Absolute 0.15 (H) 10*3/mm3      nRBC 0.0 /100 WBC     Scan Slide [[id number]] Collected:  09/25/18 0110    Specimen:  Blood Updated:  09/25/18 0135     Anisocytosis Slight/1+     Macrocytes Slight/1+     WBC Morphology Normal     Platelet Estimate Adequate        Imaging Results (last 72 hours)     Procedure Component Value Units Date/Time    XR Chest 1 View [[id number]] Updated:  09/25/18 0138    CT Head Without Contrast [[id number]] Updated:  09/25/18 0108              I have personally reviewed and interpreted available lab data, radiology studies and ECG obtained at time of admission.     Assessment / Plan     Assessment/Problem List:   Active Problems:    Seizure (CMS/HCC)    1. Hyperosmolar hyperglycemic state with coma, POA  2. Seizures x 3, possibly in status epilepticus, POA  3. Inability maintain airway s/p intubation  4. Pseudohyponatremia  5. AG metabolic acidosis, likely due to  renal failure   6. Hypertensive urgency, POA  7. ESRD on HD  8. Type 2 diabetes mellitus, insulin dependent    Plan:  The etiology of patient's seizure episodes are unclear, but could be due to HHS. Given patient's extremely elevated blood sugars with minimal keotonuria, it is more likely to be HHS than DKA. The low pH can be due to acidosis in the setting of multiple seizures and ESRD.     Admit to ICU. Continue with insulin drip. Will switch versed to propofol. Unfortunately, no neurology on call, will call Dr. Zavala in AM. If BP does not improve after initiation of propofol, will start Cardene drip as well. Will consult Dr. Mejia for regular hemodialysis. Will consult Dr. Sanz for vent management.     Heparin for DVT ppx.   Pepcid IV for GI ppx.     No family member present at the bedside at the time of my encounter.   Further recommendations will depend on clinical course of the patient during the current hospitalization.    I also discussed the details with the nursing staff.  Rest as ordered.    Anticipated stay is greater than 2 midnights.    Sharonda Bowens MD 09/25/18 3:50 AM    Dictated using Dragon.    Electronically signed by Sharonda Bowens MD at 9/25/2018  4:22 AM       Lines, Drains & Airways    Active LDAs     Name:   Placement date:   Placement time:   Site:   Days:    CVC Triple Lumen 09/26/18 Left Internal jugular  09/26/18    1330    Internal jugular    5    NG/OG Tube Nasogastric 18 Fr Right nostril  09/29/18    1400    Right nostril    2    ETT   09/25/18    0200      7                Hospital Medications (active)       Dose Frequency Start End    amLODIPine (NORVASC) tablet 5 mg 5 mg Every 24 Hours Scheduled 9/27/2018     Sig - Route: Take 1 tablet by mouth Daily. - Oral    aspirin EC tablet 81 mg 81 mg Daily 9/25/2018     Sig - Route: Take 1 tablet by mouth Daily. - Oral    atorvastatin (LIPITOR) tablet 10 mg 10 mg Daily 9/25/2018     Sig - Route: Take 1 tablet by mouth Daily. - Oral    b  complex-vitamin c-folic acid (NEPHRO-CLEOPATRA) tablet 1 tablet 1 tablet Daily 9/25/2018     Sig - Route: Take 1 tablet by mouth Daily. - Oral    budesonide (PULMICORT) nebulizer solution 0.5 mg 0.5 mg 2 Times Daily - RT 9/25/2018     Sig - Route: Take 2 mL by nebulization 2 (Two) Times a Day. - Nebulization    carvedilol (COREG) tablet 12.5 mg 12.5 mg Every 12 Hours Scheduled 9/27/2018     Sig - Route: Take 1 tablet by mouth Every 12 (Twelve) Hours. - Oral    CHAPSTICK 1 each 1 each Every 2 Hours PRN 9/28/2018     Sig - Route: Apply 1 each topically Every 2 (Two) Hours As Needed (dry skin). - Apply externally    chlorhexidine (PERIDEX) 0.12 % solution 15 mL 15 mL Every 12 Hours Scheduled 9/26/2018     Sig - Route: Apply 15 mL to the mouth or throat Every 12 (Twelve) Hours. - Mouth/Throat    dextrose (D50W) 25 g/ 50mL Intravenous Solution 25 g 25 g Every 15 Minutes PRN 9/25/2018     Sig - Route: Infuse 50 mL into a venous catheter Every 15 (Fifteen) Minutes As Needed for Low Blood Sugar (Blood Sugar Less Than 70, Patient Has IV Access - Unresponsive, NPO or Unable To Safely Swallow). - Intravenous    dextrose (D50W) 25 g/ 50mL Intravenous Solution 25 g 25 g Every 15 Minutes PRN 10/1/2018     Sig - Route: Infuse 50 mL into a venous catheter Every 15 (Fifteen) Minutes As Needed for Low Blood Sugar (Blood Sugar Less Than 70, Patient Has IV Access - Unresponsive, NPO or Unable To Safely Swallow). - Intravenous    dextrose (GLUTOSE) oral gel 1 tube 1 tube Every 15 Minutes PRN 9/25/2018     Sig - Route: Take 1 tube by mouth Every 15 (Fifteen) Minutes As Needed for Low Blood Sugar (BS<70, Patient Alert, Is not NPO, Can safely swallow.). - Oral    dextrose (GLUTOSE) oral gel 1 tube 1 tube Every 15 Minutes PRN 10/1/2018     Sig - Route: Take 1 tube by mouth Every 15 (Fifteen) Minutes As Needed for Low Blood Sugar (BS<70, Patient Alert, Is not NPO, Can safely swallow.). - Oral    famotidine (PEPCID) injection 20 mg 20 mg Daily  9/25/2018     Sig - Route: Infuse 2 mL into a venous catheter Daily. - Intravenous    flumazenil (ROMAZICON) injection 0.2 mg 0.2 mg Once 10/1/2018 10/1/2018    Sig - Route: Infuse 2 mL into a venous catheter 1 (One) Time. - Intravenous    flumazenil (ROMAZICON) injection 0.2 mg 0.2 mg Once 10/1/2018 10/1/2018    Sig - Route: Infuse 2 mL into a venous catheter 1 (One) Time. - Intravenous    glucagon (human recombinant) (GLUCAGEN DIAGNOSTIC) injection 1 mg 1 mg As Needed 9/25/2018     Sig - Route: Inject 1 mg under the skin into the appropriate area as directed As Needed (Blood Glucose Less Than 70 - Patient Without IV Access - Unresponsive, NPO or Unable To Safely Swallow). - Subcutaneous    glucagon (human recombinant) (GLUCAGEN DIAGNOSTIC) injection 1 mg 1 mg As Needed 10/1/2018     Sig - Route: Inject 1 mg under the skin into the appropriate area as directed As Needed (Blood Glucose Less Than 70 - Patient Without IV Access - Unresponsive, NPO or Unable To Safely Swallow). - Subcutaneous    heparin (porcine) 5000 UNIT/ML injection 5,000 Units 5,000 Units Every 8 Hours Scheduled 9/25/2018     Sig - Route: Inject 1 mL under the skin into the appropriate area as directed Every 8 (Eight) Hours. - Subcutaneous    Influenza Vac Subunit Quad (FLUCELVAX) injection 0.5 mL 0.5 mL During Hospitalization 9/25/2018     Sig - Route: Inject 0.5 mL into the appropriate muscle as directed by prescriber During Hospitalization for Immunization. - Intramuscular    insulin detemir (LEVEMIR) injection 3 Units 3 Units Every 12 Hours Scheduled 10/1/2018     Sig - Route: Inject 3 Units under the skin into the appropriate area as directed Every 12 (Twelve) Hours. - Subcutaneous    insulin regular (humuLIN R,novoLIN R) injection 0-7 Units 0-7 Units Every 6 Hours Scheduled 10/2/2018     Sig - Route: Inject 0-7 Units under the skin into the appropriate area as directed Every 6 (Six) Hours. - Subcutaneous    ipratropium-albuterol (DUO-NEB)  "nebulizer solution 3 mL 3 mL Every 6 Hours - RT 9/25/2018     Sig - Route: Take 3 mL by nebulization Every 6 (Six) Hours. - Nebulization    labetalol (NORMODYNE,TRANDATE) injection 10 mg 10 mg Every 4 Hours PRN 9/25/2018     Sig - Route: Infuse 2 mL into a venous catheter Every 4 (Four) Hours As Needed for High Blood Pressure (systolic greater than 170). - Intravenous    levothyroxine (SYNTHROID, LEVOTHROID) tablet 100 mcg 100 mcg Every Morning 9/25/2018     Sig - Route: Take 1 tablet by mouth Every Morning. - Oral    losartan (COZAAR) tablet 50 mg 50 mg Daily 9/25/2018     Sig - Route: Take 1 tablet by mouth Daily. - Oral    ondansetron (ZOFRAN) injection 4 mg 4 mg Every 6 Hours PRN 9/25/2018     Sig - Route: Infuse 2 mL into a venous catheter Every 6 (Six) Hours As Needed for Nausea or Vomiting. - Intravenous    Linked Group 1:  \"Or\" Linked Group Details        ondansetron (ZOFRAN) tablet 4 mg 4 mg Every 6 Hours PRN 9/25/2018     Sig - Route: Take 1 tablet by mouth Every 6 (Six) Hours As Needed for Nausea or Vomiting. - Oral    Linked Group 1:  \"Or\" Linked Group Details        ondansetron ODT (ZOFRAN-ODT) disintegrating tablet 4 mg 4 mg Every 6 Hours PRN 9/25/2018     Sig - Route: Take 1 tablet by mouth Every 6 (Six) Hours As Needed for Nausea or Vomiting. - Oral    Linked Group 1:  \"Or\" Linked Group Details        phenytoin (DILANTIN) injection 100 mg 100 mg Every 6 Hours 9/27/2018     Sig - Route: Infuse 2 mL into a venous catheter Every 6 (Six) Hours. - Intravenous    pneumococcal polysaccharide 23-valent (PNEUMOVAX-23) vaccine 0.5 mL 0.5 mL During Hospitalization 9/25/2018     Sig - Route: Inject 0.5 mL into the appropriate muscle as directed by prescriber During Hospitalization for Immunization. - Intramuscular    sevelamer (RENAGEL) tablet 800 mg 800 mg 3 Times Daily With Meals 9/25/2018     Sig - Route: Take 1 tablet by mouth 3 (Three) Times a Day With Meals. - Oral    sodium chloride 0.9 % bolus 1,000 mL " 1,000 mL As Needed 10/1/2018 10/2/2018    Sig - Route: Infuse 1,000 mL into a venous catheter As Needed (to maintain SBP > 100 mmHG DURING DIALYSIS ONLY). - Intravenous    sodium chloride 0.9 % flush 1-10 mL 1-10 mL As Needed 9/25/2018     Sig - Route: Infuse 1-10 mL into a venous catheter As Needed for Line Care. - Intravenous    sodium chloride 0.9 % infusion 10 mL/hr Continuous 9/25/2018     Sig - Route: Infuse 10 mL/hr into a venous catheter Continuous. - Intravenous    Vitamin D 1,000 Units 1,000 Units Daily 9/25/2018     Sig - Route: Take 1 tablet by mouth Daily. - Oral    insulin detemir (LEVEMIR) injection 10 Units (Discontinued) 10 Units Every 12 Hours Scheduled 9/25/2018 10/1/2018    Sig - Route: Inject 10 Units under the skin into the appropriate area as directed Every 12 (Twelve) Hours. - Subcutaneous    insulin regular (humuLIN R,novoLIN R) injection 0-9 Units (Discontinued) 0-9 Units Every 6 Hours Scheduled 9/25/2018 10/1/2018    Sig - Route: Inject 0-9 Units under the skin into the appropriate area as directed Every 6 (Six) Hours. - Subcutaneous          Ventilator/Non-Invasive Ventilation Settings     Start     Ordered    10/02/18 0848  Ventilator - AC/VC; (16); 70 (21); 92%; 5; 450; 54  Continuous     Comments:  Titrate FI02 to maintain SA02 90 and greater.   Question Answer Comment   Vent Mode AC/VC    Breath rate  16   FiO2 70 21   FiO2 titrate for Sp02% =/> 92%    PEEP 5    Tidal Volume 450    Flow Rate 54        10/02/18 0849    09/28/18 1439  Ventilator - AC/VC  Continuous     Comments:  Per call to Dr. Sanz vent is to be changed to AC.   Question:  Vent Mode  Answer:  AC/VC    09/28/18 1443    09/25/18 0500  Ventilator - AC/VC; (16); 70; 92%; 5; 500; 54  Continuous,   Status:  Canceled     Question Answer Comment   Vent Mode AC/VC    Breath rate  16   FiO2 70    FiO2 titrate for Sp02% =/> 92%    PEEP 5    Tidal Volume 500    Flow Rate 54        09/25/18 0531               Physician Progress  "Notes (last 24 hours) (Notes from 10/1/2018  9:55 AM through 10/2/2018  9:55 AM)      Ismael Sanz MD at 10/2/2018  7:31 AM          CC: Acute Respiratory Failure.     S: Intubated and sedated.       ROS: Could not be obtained as the patient is on mechanical ventilator.    O:Vital signs reviewed. O2Sat: 97 % on 35%. Vent Day # 8. Left IJ Day # 7  /57   Pulse 71   Temp 99.6 °F (37.6 °C) (Oral)   Resp 16   Ht 162.6 cm (64\")   Wt 61.7 kg (135 lb 15.4 oz)   LMP  (LMP Unknown)   SpO2 100%   BMI 23.34 kg/m²      Temp (24hrs), Av.9 °F (37.2 °C), Min:98.3 °F (36.8 °C), Max:99.6 °F (37.6 °C)    I & Os reviewed.   Intake/Output       10/01/18 0700 - 10/02/18 0659    Intake (ml) 1659    Output (ml) --    Net (ml) 1659    Last Weight  61.7 kg (135 lb 15.4 oz)        General: Intubated. Sedated.  Eyes: PERRL.   Neck: No obvious masses noted.   Cardiovascular: S1 + S2. PPM. Left IJ line.   Respiratory: Transmitted Breath sounds noted. No wheezing heard. No crackles noted  GI: Soft. Bowel sounds hypoactive  Extremities: No edema noted. Right upper extremity AV fistula noted  Neurologic: Sedated.  Detailed exam couldn't be performed due to sedation.   Skin: Appeared somewhat dry     Labs: Reviewed.       Results from last 7 days  Lab Units 10/02/18  0424 18  0413 18  0650  18  0406   SODIUM mmol/L 129* 131* 131*  < > 135*   POTASSIUM mmol/L 3.8 4.5 3.8  < > 3.3*   CHLORIDE mmol/L 99 100 100  < > 101   CO2 mmol/L 21.0* 20.0* 23.0*  < > 24.0*   BUN mg/dL 77* 37* 26*  < > 26*   CREATININE mg/dL 5.70* 4.60* 3.70*  < > 4.40*   CALCIUM mg/dL 7.9* 8.1* 8.3*  < > 8.1*   BILIRUBIN mg/dL  --  0.4 0.2  --  0.5   ALK PHOS U/L  --  107 111  --  74   ALT (SGPT) U/L  --  32 33  --  33   AST (SGOT) U/L  --  34 24  --  59*   GLUCOSE mg/dL 72* 173* 170*  < > 87   < > = values in this interval not displayed.      Results from last 7 days  Lab Units 18  0413 18  0650 18  0406   MAGNESIUM " mg/dL  --   --  2.2   PHOSPHORUS mg/dL 5.3* 4.2  --          Results from last 7 days  Lab Units 10/02/18  0424 09/29/18  0413 09/28/18  0650 09/26/18  0406   WBC 10*3/mm3 9.34 9.23 6.61 11.23*   HEMOGLOBIN g/dL 9.9* 11.2* 10.6* 11.2*   PLATELETS 10*3/mm3 226 156 134 152         Results from last 7 days  Lab Units 09/26/18  1105   INR  1.03         sodium chloride 10 mL/hr Last Rate: 10 mL/hr (09/30/18 1257)       ABG: Reviewed  Lab Results   Component Value Date    PHART 7.399 10/02/2018    AMJ6PWV 37.0 10/02/2018    PO2ART 104.0 (H) 10/02/2018    HGBBG 10.1 (L) 10/02/2018    J9REAXBB 98.9 10/02/2018    FCOHB 1.6 01/27/2016    CARBOXYHGB 1.4 10/02/2018    FMETHB 0.7 01/27/2016         CXRay: Reviewed from today. No change overall.     Assessment & Recommendations/Plan:   1.  Acute Respiratory Failure.  On mechanical ventilation  Is not awake enough to be extubated at this time.   Although she was not on Versed drip for too long, I will try Romazicon to see if slow metabolism benzodiazepine could explain some of her issues with nonresponsiveness etc.    2.  Seizures.  Neurology following.   EEG again possibly today.    3.  Moderate COPD with recent exacerbation (7/17/18).  No evidence of exacerbation at this time  Continue neb treatments    4.  ESRD.   On HD.    5.  Hyperglycemia with labile control and episodes of hypoglycemia.   Being monitored closely.   Will recommend considering d/cing Levemir for now?    Her condition continues to be critical.    She remained mostly unresponsive even to deep painful stimulus.     We have updated the admitting attending and nursing staff, as appropriate, on the patient's current status and plan. I will be going off shift tonight and will be unavailable.       Ismael Sanz MD  10/02/18  7:32 AM      Electronically signed by Ismael Sanz MD at 10/2/2018  9:13 AM     Triny Cooper MD at 10/1/2018  8:30 PM        [facility]y[employer]  "Note    RN Gi made me aware that patients blood glucose has been low on several occasions since 18. She is on continuous TFs with regular sliding scale insulin q6h and levemir 10 units q12h.    Will decrease levemir to 3 units q12h.    Electronically signed by Triny Cooper MD, 10/01/18, 8:30 PM.      Electronically signed by Triny Cooper MD at 10/1/2018  8:33 PM     Ismael Sanz MD at 10/1/2018  5:01 PM          CC: Acute Respiratory Failure.     S: Intubated and sedated.       ROS: Could not be obtained as the patient is on mechanical ventilator.    O:Vital signs reviewed. O2Sat: 97 % on 35%. Vent Day # 7. Left IJ Day # 6  /49   Pulse 72   Temp 98.5 °F (36.9 °C) (Oral)   Resp 18   Ht 162.6 cm (64\")   Wt 62.8 kg (138 lb 6.4 oz)   LMP  (LMP Unknown)   SpO2 100%   BMI 23.76 kg/m²      Temp (24hrs), Av.4 °F (37.4 °C), Min:98.5 °F (36.9 °C), Max:100.5 °F (38.1 °C)    I & Os reviewed.   Intake/Output       18 0700 - 10/01/18 0659    Intake (ml) 1823    Output (ml) --    Net (ml) 1823    Last Weight  62.8 kg (138 lb 6.4 oz)        General: Intubated. Sedated.  Eyes: PERRL.   Neck: No obvious masses noted.   Cardiovascular: S1 + S2. PPM. Left IJ line.   Respiratory: Transmitted Breath sounds noted. No wheezing heard. No crackles noted  GI: Soft. Bowel sounds hypoactive  Extremities: No edema noted. Right upper extremity AV fistula noted  Neurologic: Sedated.  Detailed exam couldn't be performed due to sedation.   Skin: Appeared somewhat dry     Labs: Reviewed.       Results from last 7 days  Lab Units 18  0413 18  0650 18  0420 18  0406   SODIUM mmol/L 131* 131* 129* 135*   POTASSIUM mmol/L 4.5 3.8 3.9 3.3*   CHLORIDE mmol/L 100 100 98 101   CO2 mmol/L 20.0* 23.0* 22.0* 24.0*   BUN mg/dL 37* 26* 35* 26*   CREATININE mg/dL 4.60* 3.70* 5.20* 4.40*   CALCIUM mg/dL 8.1* 8.3* 7.7* 8.1*   BILIRUBIN mg/dL 0.4 0.2  --  0.5   ALK PHOS U/L 107 111  --  74 "   ALT (SGPT) U/L 32 33  --  33   AST (SGOT) U/L 34 24  --  59*   GLUCOSE mg/dL 173* 170* 131* 87         Results from last 7 days  Lab Units 09/29/18  0413 09/28/18  0650 09/26/18  0406 09/25/18  0110   MAGNESIUM mg/dL  --   --  2.2 2.3   PHOSPHORUS mg/dL 5.3* 4.2  --  4.8*         Results from last 7 days  Lab Units 09/29/18  0413 09/28/18  0650 09/26/18  0406 09/25/18  0606 09/25/18  0110   WBC 10*3/mm3 9.23 6.61 11.23* 13.79* 8.73   HEMOGLOBIN g/dL 11.2* 10.6* 11.2* 11.5* 12.4   PLATELETS 10*3/mm3 156 134 152 148 164         Results from last 7 days  Lab Units 09/26/18  1105   INR  1.03         sodium chloride 10 mL/hr Last Rate: 10 mL/hr (09/30/18 1257)       ABG: Reviewed  Lab Results   Component Value Date    PHART 7.377 09/29/2018    LDE6PCL 35.6 09/29/2018    PO2ART 118.0 (H) 09/29/2018    HGBBG 11.5 (L) 09/29/2018    G2JFQBGI 98.9 09/29/2018    FCOHB 1.6 01/27/2016    CARBOXYHGB 0.8 09/29/2018    FMETHB 0.7 01/27/2016         CXRay: Reviewed from yesterday.     Assessment & Recommendations/Plan:   1.  Acute Respiratory Failure.  On mechanical ventilation  Is not awake enough to be extubated at this time.   Although she was not on Versed drip for too long, I will try Romazicon to see if slow metabolism benzodiazepine could explain some of her issues with nonresponsiveness etc.    2.  Anion Gap Metabolic acidosis due to DKA.   Resolved    3.  Seizures.  S/P Neuro input.   EEG again on 10/2/18.    4.  Moderate COPD with recent exacerbation (7/17/18).  No evidence of exacerbation at this time  Continue neb treatments    5.  ESRD.   On HD.    6.  Hyperglycemia  Being monitored closely.     Her condition continues to be critical.    She remained mostly unresponsive even to deep painful stimulus.     We have reviewed patient's current orders and changes, if any, have been suggested to primary care team. Plan was also discussed with nursing staff, as necessary.     Ismael Sanz MD  10/01/18  5:01  "PM      Electronically signed by Ismael Sanz MD at 10/1/2018  5:04 PM     Sharonda Bowens MD at 10/1/2018 12:29 PM          Hospitalist Progress Note.    LOS: 6 days    Patient Care Team:  Rodriguez Eng DO as PCP - General (Family Medicine)  Babatunde Montes PA-C as PCP - Claims Attributed    Chief Complaint:    F/u seizure disorder, on mechanical ventilation     Subjective   Patient seen and examined this morning. She remains intubated and on mechanical ventilation as there has been no significant improvement in her mentation. She seems to be withdrawing to painful stimulus but continues to have some jerking movent of her right upper extremity which appears to be myoclonus. There has been no overnight events. She's on minimal ventilator setting. She's been off propofol for 48 hours.      Review of Systems:    The pertinent  ROS was done and it is noted above, rest  was negative.    Objective     Vital Signs  /46   Pulse 72   Temp 99.5 °F (37.5 °C) (Oral)   Resp 16   Ht 162.6 cm (64\")   Wt 62.8 kg (138 lb 6.4 oz)   LMP  (LMP Unknown)   SpO2 100%   BMI 23.76 kg/m²        No intake/output data recorded.    Intake/Output Summary (Last 24 hours) at 10/01/18 1229  Last data filed at 10/01/18 0500   Gross per 24 hour   Intake             1733 ml   Output                0 ml   Net             1733 ml       Physical Exam:    General Appearance: stuporous but off sedation, on mechanical ventilation, no acute distress  HEENT: pupils round and sluggishly reactive to light, oral mucosa dry, extraocular movements intact. + ET tube, + NG tube   Neck: supple, no JVD, trachea midline, + IJ central line   Lungs: Clear to Auscultation, unlabored breathing effort  Heart: RRR, normal S1 and S2, no S3, no rub  Abdomen: soft, non-tender, no palpable bladder, present bowel sounds to auscultation  Extremities: no edema, cyanosis or clubbing. Intermittent jerking movement of RUE, muscle contractions noted with " withdrawing from pain in bilateral lower extremities  Neuro: stuporous, grossly non focal.     Results Review:      Results from last 7 days  Lab Units 09/29/18  0413 09/28/18  0650 09/27/18  0420 09/26/18  0406   SODIUM mmol/L 131* 131* 129* 135*   POTASSIUM mmol/L 4.5 3.8 3.9 3.3*   CHLORIDE mmol/L 100 100 98 101   CO2 mmol/L 20.0* 23.0* 22.0* 24.0*   BUN mg/dL 37* 26* 35* 26*   CREATININE mg/dL 4.60* 3.70* 5.20* 4.40*   CALCIUM mg/dL 8.1* 8.3* 7.7* 8.1*   BILIRUBIN mg/dL 0.4 0.2  --  0.5   ALK PHOS U/L 107 111  --  74   ALT (SGPT) U/L 32 33  --  33   AST (SGOT) U/L 34 24  --  59*   GLUCOSE mg/dL 173* 170* 131* 87       Estimated Creatinine Clearance: 9.7 mL/min (A) (by C-G formula based on SCr of 4.6 mg/dL (H)).      Results from last 7 days  Lab Units 09/29/18  0413 09/28/18  0650 09/26/18  0406 09/25/18  0110   MAGNESIUM mg/dL  --   --  2.2 2.3   PHOSPHORUS mg/dL 5.3* 4.2  --  4.8*               Results from last 7 days  Lab Units 09/29/18  0413 09/28/18  0650 09/26/18  0406 09/25/18  0606 09/25/18  0110   WBC 10*3/mm3 9.23 6.61 11.23* 13.79* 8.73   HEMOGLOBIN g/dL 11.2* 10.6* 11.2* 11.5* 12.4   PLATELETS 10*3/mm3 156 134 152 148 164         Results from last 7 days  Lab Units 09/26/18  1105   INR  1.03         Imaging Results (last 24 hours)     ** No results found for the last 24 hours. **          amLODIPine 5 mg Oral Q24H   aspirin 81 mg Oral Daily   atorvastatin 10 mg Oral Daily   b complex-vitamin c-folic acid 1 tablet Oral Daily   budesonide 0.5 mg Nebulization BID - RT   carvedilol 12.5 mg Oral Q12H   chlorhexidine 15 mL Mouth/Throat Q12H   famotidine 20 mg Intravenous Daily   heparin (porcine) 5,000 Units Subcutaneous Q8H   insulin detemir 10 Units Subcutaneous Q12H   insulin regular 0-9 Units Subcutaneous Q6H   ipratropium-albuterol 3 mL Nebulization Q6H - RT   levothyroxine 100 mcg Oral QAM   losartan 50 mg Oral Daily   phenytoin 100 mg Intravenous Q6H   sevelamer 800 mg Oral TID With Meals   Vitamin  D 1,000 Units Oral Daily       sodium chloride 10 mL/hr Last Rate: 10 mL/hr (09/30/18 1257)       Medication Review:   Current Facility-Administered Medications   Medication Dose Route Frequency Provider Last Rate Last Dose   • amLODIPine (NORVASC) tablet 5 mg  5 mg Oral Q24H Bridger Swift MD   5 mg at 10/01/18 0902   • aspirin EC tablet 81 mg  81 mg Oral Daily Jordan Ribera MD   81 mg at 10/01/18 0902   • atorvastatin (LIPITOR) tablet 10 mg  10 mg Oral Daily Jordan Riebra MD   10 mg at 10/01/18 0902   • b complex-vitamin c-folic acid (NEPHRO-CLEOPATRA) tablet 1 tablet  1 tablet Oral Daily Jordan Ribera MD   1 tablet at 10/01/18 0859   • budesonide (PULMICORT) nebulizer solution 0.5 mg  0.5 mg Nebulization BID - RT Jordan Ribera MD   0.5 mg at 10/01/18 0650   • carvedilol (COREG) tablet 12.5 mg  12.5 mg Oral Q12H Bridger Swift MD   12.5 mg at 10/01/18 0901   • CHAPSTICK 1 each  1 each Apply externally Q2H PRN Jordan Ribera MD       • chlorhexidine (PERIDEX) 0.12 % solution 15 mL  15 mL Mouth/Throat Q12H Jordan Ribera MD   15 mL at 10/01/18 0859   • dextrose (D50W) 25 g/ 50mL Intravenous Solution 25 g  25 g Intravenous Q15 Min PRN Jordan Ribera MD   25 g at 10/01/18 0516   • dextrose (GLUTOSE) oral gel 1 tube  1 tube Oral Q15 Min PRN Jordan Ribera MD       • famotidine (PEPCID) injection 20 mg  20 mg Intravenous Daily Sharonda Bowens MD   20 mg at 10/01/18 0859   • glucagon (human recombinant) (GLUCAGEN DIAGNOSTIC) injection 1 mg  1 mg Subcutaneous PRN Jordan Ribera MD       • heparin (porcine) 5000 UNIT/ML injection 5,000 Units  5,000 Units Subcutaneous Q8H Sharonda Bowens MD   5,000 Units at 10/01/18 0516   • Influenza Vac Subunit Quad (FLUCELVAX) injection 0.5 mL  0.5 mL Intramuscular During Hospitalization Jordan Ribera MD       • insulin detemir (LEVEMIR) injection 10 Units  10 Units Subcutaneous Q12H Jordan Ribera MD   10 Units at 10/01/18 0858   • insulin regular (humuLIN R,novoLIN R)  injection 0-9 Units  0-9 Units Subcutaneous Q6H Jordan Ribera MD   2 Units at 09/30/18 1303   • ipratropium-albuterol (DUO-NEB) nebulizer solution 3 mL  3 mL Nebulization Q6H - RT Jordan Ribera MD   3 mL at 10/01/18 0650   • labetalol (NORMODYNE,TRANDATE) injection 10 mg  10 mg Intravenous Q4H PRN Sharonda Bowens MD   10 mg at 09/25/18 0541   • levothyroxine (SYNTHROID, LEVOTHROID) tablet 100 mcg  100 mcg Oral QAM Jordan Ribera MD   100 mcg at 10/01/18 0600   • losartan (COZAAR) tablet 50 mg  50 mg Oral Daily Jordan Ribera MD   50 mg at 10/01/18 0900   • ondansetron (ZOFRAN) tablet 4 mg  4 mg Oral Q6H PRN Sharonda Bowens MD        Or   • ondansetron ODT (ZOFRAN-ODT) disintegrating tablet 4 mg  4 mg Oral Q6H PRN Sharonda Bowens MD        Or   • ondansetron (ZOFRAN) injection 4 mg  4 mg Intravenous Q6H PRN Sharonda Bowens MD       • phenytoin (DILANTIN) injection 100 mg  100 mg Intravenous Q6H Jordan Ribera MD   100 mg at 10/01/18 0900   • pneumococcal polysaccharide 23-valent (PNEUMOVAX-23) vaccine 0.5 mL  0.5 mL Intramuscular During Hospitalization Jordan Ribera MD       • sevelamer (RENAGEL) tablet 800 mg  800 mg Oral TID With Meals Jordan Ribera MD   800 mg at 10/01/18 0859   • sodium chloride 0.9 % flush 1-10 mL  1-10 mL Intravenous PRN Sharonda Bowens MD       • sodium chloride 0.9 % infusion  10 mL/hr Intravenous Continuous Jordan Ribera MD 10 mL/hr at 09/30/18 1257 10 mL/hr at 09/30/18 1257   • Vitamin D 1,000 Units  1,000 Units Oral Daily Jordan Ribera MD   1,000 Units at 10/01/18 0902     Principal Problem:    Hyperglycemic hyperosmolar nonketotic coma (CMS/HCC)  Active Problems:    Essential hypertension    Acquired hypothyroidism    Sick sinus syndrome (CMS/HCC)    Seizure (CMS/HCC)    Acute metabolic encephalopathy    Diabetes mellitus type 2 in nonobese (CMS/HCC)    End-stage renal disease on hemodialysis (CMS/HCC)    Hypertensive urgency    Assessment/Plan:   1.  Acute metabolic  encephalopathy, present on admission.  2.  New onset complex partial seizures with postictal state, present on admission.  3.  Hyperglycemic hyperosmolar nonketotic coma, present on admission, resolved  4.  Acute non-ST elevation myocardial infarction, type II myocardial infarction.  5.  Pseudohyponatremia, improving.  6.  End-stage renal disease on hemodialysis.  7.  Diabetes mellitus type 2 with nephropathy.  8.  Hypertensive urgency, present on admission.  9.  Chronic diastolic heart failure.  10.  Acquired hypothyroidism.  11.  Sick sinus syndrome status post pacemaker.  12.  COPD    Patient remains obtunded and does not waking up despite being off any sedation for atleast 48 hours. It is possible that her renal failure could be contributing to decreased clearance of medications causing it to linger further in her circulation. It is possible that during patient's seizure episodes, patient suffered anoxic encephalopathy.  I did speak to Dr. Zavala about the patient's care this afternoon. He felt that due to her prolonged seizure episodes, there is high likelihood that she suffered significant anoxic brain injury, however, he did feel that based on the first EEG that she has potential to recover. He recommended that we repeat an EEG to see where her brain activity is currently and he will re-evaluate her tomorrow morning.     In the meantime, will continue with mechanical ventilation.   Continue all other medications as before.   She will be continued on HD as scheduled.   Her prognosis remains guarded.     I discussed the details with the nursing staff.    Rest as ordered.    Sharonda Bowens MD  10/01/18  12:29 PM      Electronically signed by Sharonda Bowens MD at 10/1/2018  1:12 PM       Consult Notes (last 24 hours) (Notes from 10/1/2018  9:55 AM through 10/2/2018  9:55 AM)     No notes of this type exist for this encounter.           Nutrition Notes (last 24 hours) (Notes from 10/1/2018  9:55 AM through  10/2/2018  9:55 AM)      Massiel Claudio RD at 10/1/2018 10:35 AM          Adult Nutrition  Assessment/PES    Patient Name:  Sigrid Casarez  YOB: 1940  MRN: [id number]  Admit Date:  9/25/2018    Assessment Date:  10/1/2018    Comments:  Rec. #1: Continue with current Tube Feeding regimen: Nepro @ 22 ml/hr goal rate. Tube feeding to provide ~871 kcals, 39 gm protein, and 352 mL tube feeding water. Rec #2: Continue with Free water flushes of 160 mL Q4 or 6x daily. Rec # 3: Continue with ProStat 30 ml BID via feeding tube. ProStat provides ~200 kcal, 30 gms protein. RD to follow pt. Consult RD PRN.           Reason for Assessment     Row Name 10/01/18 1028          Reason for Assessment    Reason For Assessment follow-up protocol;TF/PN     Diagnosis diabetes diagnosis/complications;cardiac disease;renal disease   ESRD on HD, DM type 2, COPD, Intubated     Identified At Risk by Screening Criteria tube feeding or parenteral nutrition               Anthropometrics     Row Name 10/01/18 0500          Anthropometrics    Weight 62.8 kg (138 lb 6.4 oz)             Labs/Tests/Procedures/Meds     Row Name 10/01/18 1030          Labs/Procedures/Meds    Lab Results Reviewed reviewed, pertinent     Lab Results Comments High: BUN, Total Protein, Phosphorus  Low: Na, Calcium, Albumin, Hgb/Hct        Medications    Pertinent Medications Reviewed reviewed, pertinent             Physical Findings     Row Name 10/01/18 1031          Physical Findings    Overall Physical Appearance on ventilator support     Tubes nasogastric tube               Nutrition Prescription Ordered     Row Name 10/01/18 1031          Nutrition Prescription PO    Current PO Diet NPO        Nutrition Prescription EN    Enteral Route NG     Product Nepro     TF Delivery Method Continuous     Continuous TF Goal Rate (mL/hr) 22 mL/hr     Continuous TF Current Rate (mL/hr) 22 mL/hr     Continuous TF Goal Volume (mL) 484 mL     Continuous TF Current  Volume (mL) 484 mL     Water flush (mL)  162 mL     Water Flush Frequency Every 4 hours             Evaluation of Received Nutrient/Fluid Intake     Row Name 10/01/18 1031          PO Evaluation    Number of Days PO Intake Evaluated Insufficient Data        EN Evaluation    Number of Days EN Intake Evaluated Other (comment)   4     EN Average Volume Delivered (mL/day) 428 mL/day     % Goal Volume  88 %     HOB Greater than or equal to 30 degress             Problem/Interventions:        Problem 1     Row Name 10/01/18 1032          Nutrition Diagnoses Problem 1    Problem 1 Inadequate Intake/Infusion     Inadequate Intake Type Oral     Macronutrient Kcal;Fluid;Protein;Carbohydrate     Micronutrient Vitamin;Mineral     Etiology (related to) MNT for Treatment/Condition     Signs/Symptoms (evidenced by) NPO             Problem 2     Row Name 10/01/18 1032          Nutrition Diagnoses Problem 2    Problem 2 Increased Nutrient Needs     Macronutrient Kcal;Carbohydrate;Protein;Fluid     Micronutrient Vitamin;Mineral     Etiology (related to) Medical Diagnosis     Pulmonary/Critical Care COPD;Ventilator     Renal ESRD;Hemodialysis     Signs/Symptoms (evidenced by) Other (comment)   pulmonary, renal dysfunction             Problem 3     Row Name 10/01/18 1033          Nutrition Diagnoses Problem 3    Problem 3 Impaired Nutrient Utilization     Etiology (related to) Medical Diagnosis     Endocrine DM Type 1     Renal ESRD;Hemodialysis     Signs/Symptoms (evidenced by) Biochemical     Specific Labs Noted BUN;Phosphorus;Na+                 Intervention Goal     Row Name 10/01/18 1033          Intervention Goal    General Meet nutritional needs for age/condition     PO --   remain NPO while intubated     TF/PN Maintain TF/PN     Transition TF to PO     Weight Maintain weight             Nutrition Intervention     Row Name 10/01/18 1034          Nutrition Intervention    RD/Tech Action Follow Tx progress;Await begin  PO;Recommend/ordered     Recommended/Ordered EN             Nutrition Prescription     Row Name 10/01/18 1034          Nutrition Prescription PO    PO Prescription --   continue NPO while pt. intubated        Nutrition Prescription EN    Enteral Prescription Continue same protocol     Enteral Route NG     Product Nepro     Modulars Liquid Protein (15 gm/30 mL)     Liquid Protein (15 gm/30 mL) 30 mL/1 packet     Protein Liquid Frequency 2 times a day     TF Delivery Method Continuous     Continuous TF Goal Rate (mL/hr) 22 mL/hr     Continuous TF Starting Rate (mL/hr) 22 mL/hr     Continuous TF Goal Volume (mL) 484 mL     Continuous TF Starting Volume (mL) 484 mL     Water flush (mL)  162 mL     Water Flush Frequency Every 4 hours        Other Orders    Obtain Weight Daily     Obtain Weight Ordered? No, recommended     Supplement Vitamin mineral supplement     Supplement Ordered? No, recommended     Other continue to monitor/replace electrolytes             Education/Evaluation     Row Name 10/01/18 1035          Education    Education Education not appropriate at this time     Please explain Patient intubated        Monitor/Evaluation    Monitor Per protocol;I&O;Supplement intake;Pertinent labs;TF delivery/tolerance;Weight;Skin status         Electronically signed by:  Massiel Claudio RD  10/01/18 10:35 AM    Electronically signed by Massiel Claudio RD at 10/1/2018 10:38 AM       Physical Therapy Notes (last 24 hours) (Notes from 10/1/2018  9:55 AM through 10/2/2018  9:55 AM)     No notes of this type exist for this encounter.        Occupational Therapy Notes (last 24 hours) (Notes from 10/1/2018  9:55 AM through 10/2/2018  9:55 AM)     No notes of this type exist for this encounter.        Speech Language Pathology Notes (last 24 hours) (Notes from 10/1/2018  9:55 AM through 10/2/2018  9:55 AM)     No notes of this type exist for this encounter.           Respiratory Therapy Notes (last 24 hours) (Notes from  10/1/2018  9:55 AM through 10/2/2018  9:55 AM)      Christian Garner, CRT at 10/2/2018  4:40 AM        Problem: Ventilation, Mechanical Invasive (Adult)  Goal: Signs and Symptoms of Listed Potential Problems Will be Absent, Minimized or Managed (Ventilation, Mechanical Invasive)  Outcome: Ongoing (interventions implemented as appropriate)          Electronically signed by Christian Garner CRT at 10/2/2018  4:40 AM     Beverly Doan CRT at 10/1/2018  3:26 PM          Problem: Patient Care Overview  Goal: Plan of Care Review  Outcome: Ongoing (interventions implemented as appropriate)   10/01/18 1525   Coping/Psychosocial   Plan of Care Reviewed With spouse   Plan of Care Review   Progress no change       Problem: Ventilation, Mechanical Invasive (Adult)  Intervention: Promote Early Weaning/Extubation   10/01/18 1525   Coping/Psychosocial Interventions   Environmental Support distractions minimized;environmental consistency promoted   Safety Management   Medication Review/Management medications reviewed   Pain/Comfort/Sleep Interventions   Sleep/Rest Enhancement awakenings minimized;noise level reduced       Goal: Signs and Symptoms of Listed Potential Problems Will be Absent, Minimized or Managed (Ventilation, Mechanical Invasive)  Outcome: Ongoing (interventions implemented as appropriate)   10/01/18 1525   Goal/Outcome Evaluation   Problems Assessed (Mechanical Ventilation, Invasive) artificial airway-induced skin/tissue breakdown;gastritis/stress ulcer;inability to wean;mechanical dysfunction;ventilator-induced lung injury   Problems Present (Mech Vent, Invasive) inability to wean           Electronically signed by Beverly Doan CRT at 10/1/2018  3:26 PM

## 2018-10-02 NOTE — PLAN OF CARE
Problem: Patient Care Overview  Goal: Plan of Care Review  Outcome: Ongoing (interventions implemented as appropriate)   10/02/18 1058   Coping/Psychosocial   Plan of Care Reviewed With spouse   Plan of Care Review   Progress no change       Problem: Ventilation, Mechanical Invasive (Adult)  Goal: Signs and Symptoms of Listed Potential Problems Will be Absent, Minimized or Managed (Ventilation, Mechanical Invasive)  Outcome: Ongoing (interventions implemented as appropriate)   10/02/18 1058   Goal/Outcome Evaluation   Problems Assessed (Mechanical Ventilation, Invasive) all   Problems Present (Mech Vent, Invasive) none

## 2018-10-02 NOTE — PROGRESS NOTES
"Nephrology Progress Note.    LOS: 7 days    Patient Care Team:  Rodriguez Eng DO as PCP - General (Family Medicine)  Babatunde Montes PA-C as PCP - Claims Attributed    Chief Complaint:    Chief Complaint   Patient presents with   • Hyperglycemia   • Vomiting   • Altered Mental Status       Subjective     Follow up for ESRD and related issues.    Interval History:     Patient Complaints: none    Patient seen and examined this morning.  Events from last night noted.  She is not waking up enough to be extubated, does not have any more seizures.  Still has minimal interaction with painful stimuli does randomly move lower extremities.    History taken from: patient    Review of Systems:    I am unable to obtain complete review of systems at this time due to the fact that the patient is sedated on mechanical ventilation.      Objective     Vital Signs  /52   Pulse 73   Temp 98.7 °F (37.1 °C) (Oral)   Resp 15   Ht 162.6 cm (64\")   Wt 61.7 kg (135 lb 15.4 oz)   LMP  (LMP Unknown)   SpO2 96%   BMI 23.34 kg/m²       No intake/output data recorded.    Intake/Output Summary (Last 24 hours) at 10/02/18 1150  Last data filed at 10/02/18 0500   Gross per 24 hour   Intake             1659 ml   Output                0 ml   Net             1659 ml       Physical Exam:    General Appearance:  no acute distress,   HEENT: Oral mucosa dry, Sclera clear.  Skin: Warm and dry  Neck: supple, no JVD, trachea midline  Lungs:Chest shape is normal. Breath sounds heard bilaterally equally. No crackles, No wheezing.   Heart: regular rate and rhythm. normal S1 and S2, no S3, no rub, peripheral pulses weak but palpable.  Abdomen: soft, non-tender,  present bowel sounds to auscultation  : no palpable bladder.  Extremities: Trace edema, no cyanosis or clubbing.   Neuro: Intubated and sedated     Results Review:      Results from last 7 days  Lab Units 10/02/18  0424 09/29/18  0413 09/28/18  0650  09/26/18  0406   SODIUM mmol/L " 129* 131* 131*  < > 135*   POTASSIUM mmol/L 3.8 4.5 3.8  < > 3.3*   CHLORIDE mmol/L 99 100 100  < > 101   CO2 mmol/L 21.0* 20.0* 23.0*  < > 24.0*   BUN mg/dL 77* 37* 26*  < > 26*   CREATININE mg/dL 5.70* 4.60* 3.70*  < > 4.40*   CALCIUM mg/dL 7.9* 8.1* 8.3*  < > 8.1*   BILIRUBIN mg/dL  --  0.4 0.2  --  0.5   ALK PHOS U/L  --  107 111  --  74   ALT (SGPT) U/L  --  32 33  --  33   AST (SGOT) U/L  --  34 24  --  59*   GLUCOSE mg/dL 72* 173* 170*  < > 87   < > = values in this interval not displayed.    Estimated Creatinine Clearance: 7.7 mL/min (A) (by C-G formula based on SCr of 5.7 mg/dL (H)).      Results from last 7 days  Lab Units 10/02/18  0422 09/29/18  0413 09/28/18  0650 09/26/18  0406   MAGNESIUM mg/dL  --   --   --  2.2   PHOSPHORUS mg/dL 4.2 5.3* 4.2  --                Results from last 7 days  Lab Units 10/02/18  0424 09/29/18  0413 09/28/18  0650 09/26/18  0406   WBC 10*3/mm3 9.34 9.23 6.61 11.23*   HEMOGLOBIN g/dL 9.9* 11.2* 10.6* 11.2*   PLATELETS 10*3/mm3 226 156 134 152         Results from last 7 days  Lab Units 09/26/18  1105   INR  1.03         Imaging Results (last 24 hours)     Procedure Component Value Units Date/Time    XR Chest 1 View [[id number]] Collected:  10/02/18 0932     Updated:  10/02/18 0936    Narrative:       PROCEDURE: XR CHEST 1 VW-     HISTORY: Respiratory failure; R56.9-Unspecified convulsions;  R73.9-Hyperglycemia, unspecified; J96.01-Acute respiratory failure with  hypoxia; J96.02-Acute respiratory failure with hypercapnia     COMPARISON: September 29, 2018.     FINDINGS: Endotracheal tube is in place and is well-positioned with the  tip above the hector. A nasogastric tube extends below the diaphragm. A  left IJ CVC is in the SVC. A left subclavian pacemaker is unchanged. The  heart is normal in size. The mediastinum is unremarkable. There are  chronic interstitial lung changes. There are likely small effusions.  There is no pneumothorax.  There are no acute osseous  abnormalities.           Impression:       No significant change in the appearance of the chest.     Continued followup is recommended.     This report was finalized on 10/2/2018 9:34 AM by Mariel Solano M.D..          amLODIPine 5 mg Oral Q24H   aspirin 81 mg Oral Daily   atorvastatin 10 mg Oral Daily   b complex-vitamin c-folic acid 1 tablet Oral Daily   budesonide 0.5 mg Nebulization BID - RT   carvedilol 12.5 mg Oral Q12H   chlorhexidine 15 mL Mouth/Throat Q12H   famotidine 20 mg Intravenous Daily   heparin (porcine) 5,000 Units Subcutaneous Q8H   insulin detemir 3 Units Subcutaneous Q12H   insulin regular 0-7 Units Subcutaneous Q6H   ipratropium-albuterol 3 mL Nebulization Q6H - RT   levothyroxine 100 mcg Oral QAM   losartan 50 mg Oral Daily   phenytoin 100 mg Intravenous Q6H   sevelamer 800 mg Oral TID With Meals   Vitamin D 1,000 Units Oral Daily       sodium chloride 10 mL/hr Last Rate: 10 mL/hr (09/30/18 1257)       Medication Review:   Current Facility-Administered Medications   Medication Dose Route Frequency Provider Last Rate Last Dose   • amLODIPine (NORVASC) tablet 5 mg  5 mg Oral Q24H Bridger Swift MD   5 mg at 10/02/18 0836   • aspirin EC tablet 81 mg  81 mg Oral Daily Jordan Ribera MD   81 mg at 10/02/18 0835   • atorvastatin (LIPITOR) tablet 10 mg  10 mg Oral Daily Jordan Ribera MD   10 mg at 10/02/18 0835   • b complex-vitamin c-folic acid (NEPHRO-CLEOPATRA) tablet 1 tablet  1 tablet Oral Daily Jordan Ribera MD   1 tablet at 10/02/18 0837   • budesonide (PULMICORT) nebulizer solution 0.5 mg  0.5 mg Nebulization BID - RT Jordan Ribera MD   0.5 mg at 10/02/18 0639   • carvedilol (COREG) tablet 12.5 mg  12.5 mg Oral Q12H Bridger Swift MD   12.5 mg at 10/02/18 0835   • CHAPSTICK 1 each  1 each Apply externally Q2H PRN Jordan Ribera MD       • chlorhexidine (PERIDEX) 0.12 % solution 15 mL  15 mL Mouth/Throat Q12H Jordan Ribera MD   15 mL at 10/02/18 0835   • dextrose (D50W) 25  g/ 50mL Intravenous Solution 25 g  25 g Intravenous Q15 Min PRN Jordan Ribera MD   25 g at 10/02/18 0609   • dextrose (D50W) 25 g/ 50mL Intravenous Solution 25 g  25 g Intravenous Q15 Min PRN Triny Cooper MD       • dextrose (GLUTOSE) oral gel 1 tube  1 tube Oral Q15 Min PRN Jordan Ribera MD       • dextrose (GLUTOSE) oral gel 1 tube  1 tube Oral Q15 Min PRN Triny Cooper MD       • famotidine (PEPCID) injection 20 mg  20 mg Intravenous Daily Sharonda Bowens MD   20 mg at 10/02/18 0836   • glucagon (human recombinant) (GLUCAGEN DIAGNOSTIC) injection 1 mg  1 mg Subcutaneous PRN Jordan Ribera MD       • glucagon (human recombinant) (GLUCAGEN DIAGNOSTIC) injection 1 mg  1 mg Subcutaneous PRN Triny Cooper MD       • heparin (porcine) 5000 UNIT/ML injection 5,000 Units  5,000 Units Subcutaneous Q8H Sharonda Bowens MD   5,000 Units at 10/02/18 0608   • Influenza Vac Subunit Quad (FLUCELVAX) injection 0.5 mL  0.5 mL Intramuscular During Hospitalization Jordan Ribera MD       • insulin detemir (LEVEMIR) injection 3 Units  3 Units Subcutaneous Q12H Triny Cooper MD   3 Units at 10/01/18 2142   • insulin regular (humuLIN R,novoLIN R) injection 0-7 Units  0-7 Units Subcutaneous Q6H Triny Cooper MD   2 Units at 10/02/18 0104   • ipratropium-albuterol (DUO-NEB) nebulizer solution 3 mL  3 mL Nebulization Q6H - RT Jordan Ribera MD   3 mL at 10/02/18 0639   • labetalol (NORMODYNE,TRANDATE) injection 10 mg  10 mg Intravenous Q4H PRN Sharonda Bowens MD   10 mg at 09/25/18 0541   • levothyroxine (SYNTHROID, LEVOTHROID) tablet 100 mcg  100 mcg Oral QAM Jordan Ribera MD   100 mcg at 10/02/18 0609   • losartan (COZAAR) tablet 50 mg  50 mg Oral Daily Jordan Ribera MD   50 mg at 10/02/18 0835   • ondansetron (ZOFRAN) tablet 4 mg  4 mg Oral Q6H PRN Sharonda Bowens MD        Or   • ondansetron ODT (ZOFRAN-ODT) disintegrating tablet 4 mg  4 mg Oral Q6H PRN Sharonda Bowens MD        Or   •  ondansetron (ZOFRAN) injection 4 mg  4 mg Intravenous Q6H PRN Sharonda Bowens MD       • phenytoin (DILANTIN) injection 100 mg  100 mg Intravenous Q6H Jordan Ribera MD   100 mg at 10/02/18 0836   • pneumococcal polysaccharide 23-valent (PNEUMOVAX-23) vaccine 0.5 mL  0.5 mL Intramuscular During Hospitalization Jordan Ribera MD       • sevelamer (RENAGEL) tablet 800 mg  800 mg Oral TID With Meals Jordan Ribera MD   800 mg at 10/01/18 1821   • sodium chloride 0.9 % bolus 1,000 mL  1,000 mL Intravenous PRN Deric Mejia MD, FASN       • sodium chloride 0.9 % flush 1-10 mL  1-10 mL Intravenous PRN Sharonda Bowens MD       • sodium chloride 0.9 % infusion  10 mL/hr Intravenous Continuous Jordan Ribera MD 10 mL/hr at 09/30/18 1257 10 mL/hr at 09/30/18 1257   • Vitamin D 1,000 Units  1,000 Units Oral Daily Jordan Ribera MD   1,000 Units at 10/02/18 0835       Assessment/Plan     1. End-stage renal disease: Tuesday Thursday Saturday at [facility].  She does for 3 hours with a standard bath.  Usually gains 1-2 L in between dialysis.  2. Metabolic acidosis: Treated  3. Respiratory failure leading to intubation: It is more so for safety, or change her sedation to Versed.  4. Anemia of chronic kidney disease: She is fairly compliant and hemoglobin stays within the goal.  5. Essential hypertension: It is under fairly good control as if she is able to take the medications.  6. Poorly controlled diabetes: She has never been able to control her diabetes real well A1c has ranged between 9-13.  7. Hyperosmolar state with coma: Likely combination off hyperosmolar state as well as seizures.  8. Mixed hyperlipidemia:   9. Status post pacemaker: Has done well since the pacemaker  10. COPD and recurrent exacerbation:   11. Seizure (CMS/HCC): Neurology evaluation is in progress    Plan:    · Will continue dialysis as scheduled.  · Continue to optimize medications.  · Neuro evaluation is in progress, she is still not awake  enough to be extubated.  I think it's time to consider tracheostomy and G-tube placement.  · Pulmonary as per Dr. Sanz.  · Details were discussed with the patient no family in the room.    · Details were also discussed with the hospitalist service.   · Surveillance labs.  · Further recommendations will depend on clinical course of the patient during the current hospitalization.    · I also discussed the details with the nursing staff.  · Rest as ordered.    Deric Mejia MD, FASFCO  10/02/18  11:50 AM    Dictated utilizing Dragon dictation.

## 2018-10-03 NOTE — PROGRESS NOTES
Continued Stay Note  CHANDAN Matthews     Patient Name: Sigrid Casarez  MRN: [id number]  Today's Date: 10/3/2018    Admit Date: 9/25/2018          Discharge Plan     Row Name 10/03/18 1119       Plan    Plan [facility] are both looking at pt. for [facility]              Discharge Codes    No documentation.       Expected Discharge Date and Time     Expected Discharge Date Expected Discharge Time    Oct 5, 2018             Betty Cruz

## 2018-10-03 NOTE — PROGRESS NOTES
Adult Nutrition  Assessment/PES    Patient Name:  Sigrid Casarez  YOB: 1940  MRN: [id number]  Admit Date:  9/25/2018    Assessment Date:  10/3/2018    Comments: Continue with current TF regimen: Nepro @27 mL/hr. Tube feeding to provide ~1070 kcals, ~48 gm protein, and 432 mL tube feeding water. Rec #2: Decrease Free water flushes to 140 mL Q4 or 6 times daily. Rec # 3: Continue with ProStat 30 ml BID via feeding tube. ProStat provides ~200 kcal, 30 gms protein. RD to follow pt. Consult RD PRN.           Reason for Assessment     Row Name 10/03/18 1423          Reason for Assessment    Reason For Assessment follow-up protocol;TF/PN     Diagnosis diabetes diagnosis/complications;cardiac disease;renal disease   ESRD on HD, DM Type 2, COPD, Intubated     Identified At Risk by Screening Criteria tube feeding or parenteral nutrition                 Labs/Tests/Procedures/Meds     Row Name 10/03/18 1424          Labs/Procedures/Meds    Lab Results Reviewed reviewed, pertinent     Lab Results Comments High: Gluc, BUN, Cr   Low: Na, Ca, Albumin, Hgb/Hct        Medications    Pertinent Medications Reviewed reviewed, pertinent             Physical Findings     Row Name 10/03/18 1425          Physical Findings    Overall Physical Appearance on ventilator support     Tubes nasogastric tube               Nutrition Prescription Ordered     Row Name 10/03/18 1425          Nutrition Prescription PO    Current PO Diet NPO        Nutrition Prescription EN    Enteral Route NG     Product Other (comment)   Nepro     Modulars Liquid Protein (15 gm/30 mL)     Liquid Protein (15 gm/30 mL) 30 mL/1 packet     Protein Liquid Frequency 2 times a day     TF Delivery Method Continuous     Continuous TF Goal Rate (mL/hr) 27 mL/hr     Continuous TF Current Rate (mL/hr) 27 mL/hr     Continuous TF Goal Volume (mL) 594 mL     Continuous TF Current Volume (mL) 594 mL     Water flush (mL)  140 mL     Water Flush Frequency Every 4 hours              Evaluation of Received Nutrient/Fluid Intake     Row Name 10/03/18 1438          PO Evaluation    Number of Days PO Intake Evaluated Insufficient Data        EN Evaluation    Number of Days EN Intake Evaluated 3 days     EN Average Volume Delivered (mL/day) 557 mL/day     % Goal Volume  100 %     HOB Greater than or equal to 30 degress             Problem/Interventions:        Problem 1     Row Name 10/03/18 1438          Nutrition Diagnoses Problem 1    Problem 1 Inadequate Intake/Infusion     Inadequate Intake Type Oral     Macronutrient Kcal;Fluid;Protein;Carbohydrate     Micronutrient Vitamin;Mineral     Etiology (related to) MNT for Treatment/Condition     Signs/Symptoms (evidenced by) NPO             Problem 2     Row Name 10/03/18 1438          Nutrition Diagnoses Problem 2    Problem 2 Increased Nutrient Needs     Macronutrient Kcal;Carbohydrate;Protein;Fluid     Micronutrient Vitamin;Mineral     Etiology (related to) Medical Diagnosis     Pulmonary/Critical Care COPD;Ventilator     Renal ESRD;Hemodialysis     Skin Skin breakdown     Signs/Symptoms (evidenced by) Other (comment)   pulmonary, renal dysfunction and wound healing             Problem 3     Row Name 10/03/18 1439          Nutrition Diagnoses Problem 3    Problem 3 Impaired Nutrient Utilization     Etiology (related to) Medical Diagnosis     Endocrine DM Type 1     Renal ESRD;Hemodialysis     Signs/Symptoms (evidenced by) Biochemical     Specific Labs Noted BUN;Na+;Creatinine;Glucose                 Intervention Goal     Row Name 10/03/18 1450          Intervention Goal    General Meet nutritional needs for age/condition;Nutrition support treatment     PO --   remain NPO while intubated     TF/PN Adjust TF/PN     Transition TF to PO     Weight Maintain weight             Nutrition Intervention     Row Name 10/03/18 1450          Nutrition Intervention    RD/Tech Action Follow Tx progress     Recommended/Ordered EN             Nutrition  Prescription     Row Name 10/03/18 1451          Nutrition Prescription PO    PO Prescription --   remain NPO while intubated        Nutrition Prescription EN    Enteral Prescription Continue same protocol     Enteral Route NG     Product Nepro     Modulars Liquid Protein (15 gm/30 mL)     Liquid Protein (15 gm/30 mL) 30 mL/1 packet     Protein Liquid Frequency 2 times a day     TF Delivery Method Continuous     Continuous TF Goal Rate (mL/hr) 27 mL/hr     Continuous TF Starting Rate (mL/hr) 22 mL/hr     Continuous TF Goal Volume (mL) 594 mL     Continuous TF Starting Volume (mL) 484 mL     Water flush (mL)  140 mL     Water Flush Frequency Every 4 hours        Other Orders    Supplement Vitamin mineral supplement   renal MVI     Supplement Ordered? No, recommended     Other continue to monitor/replace electrolytes             Education/Evaluation     Row Name 10/03/18 1452          Education    Education Education not appropriate at this time     Please explain Patient intubated        Monitor/Evaluation    Monitor Per protocol;I&O;Supplement intake;Pertinent labs;TF delivery/tolerance;Weight;Skin status         Electronically signed by:  Massiel Claudio RD  10/03/18 2:53 PM

## 2018-10-03 NOTE — PLAN OF CARE
Problem: Patient Care Overview  Goal: Discharge Needs Assessment  Outcome: Ongoing (interventions implemented as appropriate)   09/26/18 1030 10/03/18 0358   Discharge Needs Assessment   Readmission Within the Last 30 Days no previous admission in last 30 days --    Concerns to be Addressed --  discharge planning   Patient/Family Anticipates Transition to --  long term care facility   Patient/Family Anticipated Services at Transition --  rehabilitation services;skilled nursing   Transportation Concerns --  other (see comments)  (EMS)   Outpatient/Agency/Support Group Needs --  skilled nursing facility   Discharge Facility/Level of Care Needs --  LTACH (long term acute [facility])   Current Discharge Risk --  chronically ill;cognitively impaired;dependent with mobility/activities of daily living;physical impairment       Problem: Fall Risk (Adult)  Intervention: Monitor/Assist with Self Care   10/03/18 0358   Activity   Activity Assistance Provided assistance, 2 people     Intervention: Review Medications/Identify Contributors to Fall Risk   10/03/18 0358   Safety Management   Medication Review/Management medications reviewed       Goal: Identify Related Risk Factors and Signs and Symptoms  Outcome: Ongoing (interventions implemented as appropriate)   10/03/18 0358   Fall Risk (Adult)   Related Risk Factors (Fall Risk) age-related changes;gait/mobility problems;impaired vision;environment unfamiliar   Signs and Symptoms (Fall Risk) presence of risk factors       Problem: Seizure Disorder/Epilepsy (Adult)  Intervention: Promote Airway Safety and Patency   10/02/18 2002 10/03/18 0358   Positioning   Head of Bed (HOB) --  HOB at 30 degrees   Respiratory Interventions   Airway/Ventilation Management airway patency maintained --          Problem: Pain, Acute (Adult)  Goal: Identify Related Risk Factors and Signs and Symptoms  Outcome: Ongoing (interventions implemented as appropriate)   10/03/18 0358   Pain, Acute  (Adult)   Related Risk Factors (Acute Pain) communication barrier   Signs and Symptoms (Acute Pain) BADLs/IADLs reluctance/inability to perform;altered time perception;impaired thought process/concentration     Goal: Acceptable Pain Control/Comfort Level  Outcome: Ongoing (interventions implemented as appropriate)   10/03/18 0358   Pain, Acute (Adult)   Acceptable Pain Control/Comfort Level making progress toward outcome       Problem: Skin Injury Risk (Adult)  Intervention: Prevent/Manage Excess Moisture   10/02/18 2002 10/03/18 0000 10/03/18 0300   Skin Interventions   Skin Protection --  --  adhesive use limited;incontinence pads utilized;transparent dressing maintained;tubing/devices free from skin contact   Hygiene Care   Perineal Care perineum cleansed;protective cream applied --  --    Bathing/Skin Care --  shampoo --      Intervention: Maintain Head of Bed Elevation Less Than 30 Degrees as Tolerated   10/03/18 0300   Positioning   Head of Bed (HOB) HOB at 30 degrees     Intervention: Prevent/Minimize Shear/Friction Injuries   10/03/18 0300   Positioning   Positioning/Transfer Devices pillows;in use   Skin Interventions   Pressure Reduction Devices heel offloading device utilized;positioning supports utilized;pressure-redistributing mattress utilized     Intervention: Prevent or Minimize Pressure   10/03/18 0300   Positioning   Body Position turned;weight shift assistance provided;other (see comments)  (right tilt, bed in rotation mode)   Skin Interventions   Pressure Reduction Techniques heels elevated off bed;weight shift assistance provided       Goal: Identify Related Risk Factors and Signs and Symptoms  Outcome: Ongoing (interventions implemented as appropriate)   10/03/18 0358   Skin Injury Risk (Adult)   Related Risk Factors (Skin Injury Risk) advanced age;cognitive impairment;critical care admission;edema;hospitalization prolonged;infection;mechanical forces;medical devices;medication;mobility  impaired;tissue perfusion altered     Goal: Skin Health and Integrity  Outcome: Ongoing (interventions implemented as appropriate)   10/03/18 0358   Skin Injury Risk (Adult)   Skin Health and Integrity making progress toward outcome       Problem: Ventilation, Mechanical Invasive (Adult)  Intervention: Prevent Airway Displacement/Mechanical Dysfunction   10/03/18 0300   Prevent Airway Displacement/Mechanical Dysfunction   Airway Safety Measures manual resuscitator at bedside;suction at bedside     Intervention: Prevent Airway-Related Skin/Tissue Breakdown   10/03/18 0000   Skin Interventions   Device Skin Pressure Protection absorbent pad utilized/changed;adhesive use limited;positioning supports utilized     Intervention: Promote/Provide Early Rehabilitation Treatment/Mobility Program   10/02/18 2000 10/03/18 0300   Activity   Activity Management --  bedrest   Prevent Contractures/Hypertrophic Scarring   LUE Range of Motion PROM (passive range of motion) performed --      Intervention: Support Psychosocial Response to Intubation/Mechanical Ventilation   10/02/18 2002   Coping/Psychosocial Interventions   Supportive Measures positive reinforcement provided   Psychosocial Support   Family/Support System Care involvement promoted   Promote Effective Communication   Communication Enhancement Strategies extra time allowed for response     Intervention: Prevent Ventilator-Associated Pneumonia (VAP)   10/03/18 0000 10/03/18 0300   Positioning   Head of Bed (HOB) --  HOB at 30 degrees   Hygiene Care   Oral Care lip lubricant applied;swabbed with antiseptic solution --      Intervention: Optimize Oxygenation/Ventilation   10/02/18 2002 10/03/18 0300   Positioning   Body Position --  turned;weight shift assistance provided;other (see comments)  (right tilt, bed in rotation mode)   Respiratory Interventions   Airway/Ventilation Management airway patency maintained --      Intervention: Monitor/Manage Nutrition Support    10/02/18 2002   Nutrition Interventions   Nutrition Support Management other (see comments)  (tube feeding continued)       Goal: Signs and Symptoms of Listed Potential Problems Will be Absent, Minimized or Managed (Ventilation, Mechanical Invasive)  Outcome: Ongoing (interventions implemented as appropriate)   10/03/18 0358   Goal/Outcome Evaluation   Problems Assessed (Mechanical Ventilation, Invasive) all   Problems Present (Mech Vent, Invasive) none       Problem: Nutrition, Enteral (Adult)  Intervention: Manage Aspiration Risk   10/03/18 0358   Safety Interventions   Aspiration Precautions respiratory status monitored;tube feeding placement verified  (HOB elevated at all times)     Intervention: Position with HOB Elevated   10/03/18 0300   Positioning   Head of Bed (HOB) HOB at 30 degrees   Body Position turned;weight shift assistance provided;other (see comments)  (right tilt, bed in rotation mode)     Intervention: Monitor/Manage Fluid Electrolyte Balance   10/03/18 0358   Nutrition Interventions   Fluid/Electrolyte Management fluids provided     Intervention: Monitor/Manage Nutrition Support   10/02/18 2002   Nutrition Interventions   Nutrition Support Management other (see comments)  (tube feeding continued)     Intervention: Prevent Feeding-Related Adverse Events   10/03/18 0000 10/03/18 0300   Safety Management   Medication Review/Management --  medications reviewed   Hygiene Care   Oral Care lip lubricant applied;swabbed with antiseptic solution --      Intervention: Promote Magaly-Tube Skin/Mucosal Integrity   10/03/18 0000   Skin Interventions   Device Skin Pressure Protection absorbent pad utilized/changed;adhesive use limited;positioning supports utilized       Goal: Signs and Symptoms of Listed Potential Problems Will be Absent, Minimized or Managed (Nutrition, Enteral)  Outcome: Ongoing (interventions implemented as appropriate)   10/03/18 0358   Goal/Outcome Evaluation   Problems Assessed (Enteral  Nutrition) all   Problems Present (Enteral Nutrition) skin/mucosal integrity impairment

## 2018-10-03 NOTE — PROGRESS NOTES
"Nephrology Progress Note.    LOS: 8 days    Patient Care Team:  Rodriguez Eng DO as PCP - General (Family Medicine)  Babatunde Montes PA-C as PCP - Claims Attributed    Chief Complaint:    Chief Complaint   Patient presents with   • Hyperglycemia   • Vomiting   • Altered Mental Status       Subjective     Follow up for ESRD and related issues.    Interval History:     Patient Complaints: none    Patient seen and examined this morning.  Events from last night noted.  She is not waking up enough to be extubated, does not have any more seizures.  Still has minimal interaction with painful stimuli does randomly move lower extremities.  She had her dialysis yesterday.  She still appears swollen on the left arm more so as compared to rest of the body, chest x-ray reviewed had some increased vascular markings.    History taken from: patient    Review of Systems:    I am unable to obtain complete review of systems at this time due to the fact that the patient is sedated on mechanical ventilation.      Objective     Vital Signs  /44   Pulse 78   Temp 99.1 °F (37.3 °C) (Oral)   Resp 16   Ht 162.6 cm (64\")   Wt 66.6 kg (146 lb 12.8 oz)   LMP  (LMP Unknown)   SpO2 96%   BMI 25.20 kg/m²       No intake/output data recorded.    Intake/Output Summary (Last 24 hours) at 10/03/18 1018  Last data filed at 10/03/18 0600   Gross per 24 hour   Intake             1418 ml   Output             3450 ml   Net            -2032 ml       Physical Exam:    General Appearance:  no acute distress,   HEENT: Oral mucosa dry, Sclera clear.  Skin: Warm and dry  Neck: supple, no JVD, trachea midline  Lungs:Chest shape is normal. Breath sounds heard bilaterally equally. No crackles, No wheezing.   Heart: regular rate and rhythm. normal S1 and S2, no S3, no rub, peripheral pulses weak but palpable.  Abdomen: soft, non-tender,  present bowel sounds to auscultation  : no palpable bladder.  Extremities: Trace to 1+ edema, no cyanosis " or clubbing.   Neuro: Intubated and sedated     Results Review:      Results from last 7 days  Lab Units 10/02/18  0424 09/29/18  0413 09/28/18  0650   SODIUM mmol/L 129* 131* 131*   POTASSIUM mmol/L 3.8 4.5 3.8   CHLORIDE mmol/L 99 100 100   CO2 mmol/L 21.0* 20.0* 23.0*   BUN mg/dL 77* 37* 26*   CREATININE mg/dL 5.70* 4.60* 3.70*   CALCIUM mg/dL 7.9* 8.1* 8.3*   BILIRUBIN mg/dL  --  0.4 0.2   ALK PHOS U/L  --  107 111   ALT (SGPT) U/L  --  32 33   AST (SGOT) U/L  --  34 24   GLUCOSE mg/dL 72* 173* 170*       Estimated Creatinine Clearance: 7.4 mL/min (A) (by C-G formula based on SCr of 5.7 mg/dL (H)).      Results from last 7 days  Lab Units 10/02/18  0422 09/29/18  0413 09/28/18  0650   PHOSPHORUS mg/dL 4.2 5.3* 4.2               Results from last 7 days  Lab Units 10/02/18  0424 09/29/18  0413 09/28/18  0650   WBC 10*3/mm3 9.34 9.23 6.61   HEMOGLOBIN g/dL 9.9* 11.2* 10.6*   PLATELETS 10*3/mm3 226 156 134         Results from last 7 days  Lab Units 09/26/18  1105   INR  1.03         Imaging Results (last 24 hours)     ** No results found for the last 24 hours. **          amLODIPine 5 mg Oral Q24H   aspirin 81 mg Oral Daily   atorvastatin 10 mg Oral Daily   b complex-vitamin c-folic acid 1 tablet Oral Daily   budesonide 0.5 mg Nebulization BID - RT   carvedilol 12.5 mg Oral Q12H   chlorhexidine 15 mL Mouth/Throat Q12H   famotidine 20 mg Intravenous Daily   heparin (porcine) 5,000 Units Subcutaneous Q8H   insulin detemir 10 Units Subcutaneous Nightly   insulin regular 0-7 Units Subcutaneous Q6H   ipratropium-albuterol 3 mL Nebulization Q6H - RT   levothyroxine 100 mcg Oral QAM   losartan 50 mg Oral Daily   phenytoin 100 mg Intravenous Q6H   sevelamer 800 mg Oral TID With Meals   Vitamin D 1,000 Units Oral Daily       sodium chloride 10 mL/hr Last Rate: 10 mL/hr (09/30/18 1257)       Medication Review:   Current Facility-Administered Medications   Medication Dose Route Frequency Provider Last Rate Last Dose   •  amLODIPine (NORVASC) tablet 5 mg  5 mg Oral Q24H Bridger Swift MD   5 mg at 10/02/18 0836   • aspirin EC tablet 81 mg  81 mg Oral Daily Jordan Ribera MD   81 mg at 10/02/18 0835   • atorvastatin (LIPITOR) tablet 10 mg  10 mg Oral Daily Jordan Ribera MD   10 mg at 10/02/18 0835   • b complex-vitamin c-folic acid (NEPHRO-CLEOPATRA) tablet 1 tablet  1 tablet Oral Daily Jordan Ribera MD   1 tablet at 10/02/18 0837   • budesonide (PULMICORT) nebulizer solution 0.5 mg  0.5 mg Nebulization BID - RT Jordan Ribera MD   0.5 mg at 10/03/18 0704   • carvedilol (COREG) tablet 12.5 mg  12.5 mg Oral Q12H Bridger Swift MD   12.5 mg at 10/02/18 2146   • CHAPSTICK 1 each  1 each Apply externally Q2H PRN Jordan Ribera MD   1 each at 10/03/18 0641   • chlorhexidine (PERIDEX) 0.12 % solution 15 mL  15 mL Mouth/Throat Q12H Jordan Ribera MD   15 mL at 10/02/18 2146   • dextrose (D50W) 25 g/ 50mL Intravenous Solution 25 g  25 g Intravenous Q15 Min PRN Jordan Ribera MD   25 g at 10/02/18 0609   • dextrose (D50W) 25 g/ 50mL Intravenous Solution 25 g  25 g Intravenous Q15 Min PRN Triny Cooper MD       • dextrose (GLUTOSE) oral gel 1 tube  1 tube Oral Q15 Min PRN Jordan Ribera MD       • dextrose (GLUTOSE) oral gel 1 tube  1 tube Oral Q15 Min PRN Triny Cooper MD       • famotidine (PEPCID) injection 20 mg  20 mg Intravenous Daily Sharonda Bowens MD   20 mg at 10/02/18 0836   • glucagon (human recombinant) (GLUCAGEN DIAGNOSTIC) injection 1 mg  1 mg Subcutaneous PRN Jordan Ribera MD       • glucagon (human recombinant) (GLUCAGEN DIAGNOSTIC) injection 1 mg  1 mg Subcutaneous PRN Triny Cooper MD       • heparin (porcine) 5000 UNIT/ML injection 5,000 Units  5,000 Units Subcutaneous Q8H Sharonda Bowens MD   5,000 Units at 10/03/18 0640   • Influenza Vac Subunit Quad (FLUCELVAX) injection 0.5 mL  0.5 mL Intramuscular During Hospitalization Jordan Ribera MD       • insulin detemir (LEVEMIR) injection 10  Units  10 Units Subcutaneous Nightly Sharonda Bowens MD       • insulin regular (humuLIN R,novoLIN R) injection 0-7 Units  0-7 Units Subcutaneous Q6H Triny Cooper MD   5 Units at 10/03/18 0640   • ipratropium-albuterol (DUO-NEB) nebulizer solution 3 mL  3 mL Nebulization Q6H - RT Jordan Ribera MD   3 mL at 10/03/18 0704   • labetalol (NORMODYNE,TRANDATE) injection 10 mg  10 mg Intravenous Q4H PRN Sharonda Bowens MD   10 mg at 09/25/18 0541   • levothyroxine (SYNTHROID, LEVOTHROID) tablet 100 mcg  100 mcg Oral QAM Jordan Ribera MD   100 mcg at 10/03/18 0640   • losartan (COZAAR) tablet 50 mg  50 mg Oral Daily Jordan Ribera MD   50 mg at 10/02/18 0835   • ondansetron (ZOFRAN) tablet 4 mg  4 mg Oral Q6H PRN Sharonda Bowens MD        Or   • ondansetron ODT (ZOFRAN-ODT) disintegrating tablet 4 mg  4 mg Oral Q6H PRN Sharonda Bowens MD        Or   • ondansetron (ZOFRAN) injection 4 mg  4 mg Intravenous Q6H PRN Sharonda Bowens MD       • phenytoin (DILANTIN) injection 100 mg  100 mg Intravenous Q6H Jordan Ribera MD   100 mg at 10/03/18 0331   • pneumococcal polysaccharide 23-valent (PNEUMOVAX-23) vaccine 0.5 mL  0.5 mL Intramuscular During Hospitalization Jordan Ribera MD       • sevelamer (RENAGEL) tablet 800 mg  800 mg Oral TID With Meals Jordan Ribera MD   800 mg at 10/01/18 1821   • sodium chloride 0.9 % flush 1-10 mL  1-10 mL Intravenous PRN Sharonda Bowens MD       • sodium chloride 0.9 % infusion  10 mL/hr Intravenous Continuous Jordan Ribera MD 10 mL/hr at 09/30/18 1257 10 mL/hr at 09/30/18 1257   • Vitamin D 1,000 Units  1,000 Units Oral Daily Jordan Ribera MD   1,000 Units at 10/02/18 0835       Assessment/Plan     1. End-stage renal disease: Tuesday Thursday Saturday at [facility].  She does for 3 hours with a standard bath.  Usually gains 1-2 L in between dialysis.  2. Metabolic acidosis: Treated  3. Respiratory failure leading to intubation: It is more so for safety, or change her  sedation to Versed.  4. Anemia of chronic kidney disease: She is fairly compliant and hemoglobin stays within the goal.  5. Essential hypertension: It is under fairly good control as if she is able to take the medications.  6. Poorly controlled diabetes: She has never been able to control her diabetes real well A1c has ranged between 9-13.  7. Hyperosmolar state with coma: Likely combination off hyperosmolar state as well as seizures.  8. Mixed hyperlipidemia:   9. Status post pacemaker: Has done well since the pacemaker  10. COPD and recurrent exacerbation:   11. Seizure (CMS/[facility]): Neurology evaluation is in progress    Plan:    · Will continue dialysis as scheduled.  He appears swollen we'll go ahead and do an extra dialysis today.  · Continue to optimize medications.  · Neuro evaluation is in progress, she is still not awake enough to be extubated.  I think it's time to consider tracheostomy and G-tube placement.  · Pulmonary as per Dr. Sanz.  · Plan to transfer her to long-term acute care evaluation is in progress.  · Details were discussed with the patient no family in the room.    · Details were also discussed with the hospitalist service.   · Surveillance labs.  · Further recommendations will depend on clinical course of the patient during the current hospitalization.    · I also discussed the details with the nursing staff.  · Rest as ordered.    Deric Mejia MD, GRISELDAN  10/03/18  10:18 AM    Dictated utilizing Dragon dictation.

## 2018-10-03 NOTE — PLAN OF CARE
Problem: Patient Care Overview  Goal: Plan of Care Review  Outcome: Ongoing (interventions implemented as appropriate)   10/03/18 2810   Coping/Psychosocial   Plan of Care Reviewed With patient;spouse   Plan of Care Review   Progress no change     Goal: Individualization and Mutuality  Outcome: Ongoing (interventions implemented as appropriate)      Problem: Fall Risk (Adult)  Goal: Identify Related Risk Factors and Signs and Symptoms  Outcome: Ongoing (interventions implemented as appropriate)      Problem: Skin Injury Risk (Adult)  Goal: Skin Health and Integrity  Outcome: Ongoing (interventions implemented as appropriate)      Problem: Ventilation, Mechanical Invasive (Adult)  Goal: Signs and Symptoms of Listed Potential Problems Will be Absent, Minimized or Managed (Ventilation, Mechanical Invasive)  Outcome: Ongoing (interventions implemented as appropriate)      Problem: Nutrition, Enteral (Adult)  Goal: Signs and Symptoms of Listed Potential Problems Will be Absent, Minimized or Managed (Nutrition, Enteral)  Outcome: Ongoing (interventions implemented as appropriate)

## 2018-10-03 NOTE — PLAN OF CARE
Problem: Ventilation, Mechanical Invasive (Adult)  Intervention: Prevent Airway Displacement/Mechanical Dysfunction   10/03/18 1728   Prevent Airway Displacement/Mechanical Dysfunction   Airway Safety Measures manual resuscitator/mask/valve in room;suction at bedside     Intervention: Prevent Ventilator-Induced Lung Injury   10/03/18 1728   Respiratory Interventions   Lung Protection Measures ventilator waveforms monitored;plateau/inspiratory pressure monitored;lung compliance monitored;low inspiratory pressure provided     Intervention: Prevent Ventilator-Associated Pneumonia (VAP)   10/03/18 1728   Positioning   Head of Bed (HOB) HOB elevated     Intervention: Optimize Oxygenation/Ventilation   10/03/18 1728   Respiratory Interventions   Airway/Ventilation Management airway patency maintained;pulmonary hygiene promoted       Goal: Signs and Symptoms of Listed Potential Problems Will be Absent, Minimized or Managed (Ventilation, Mechanical Invasive)  Outcome: Ongoing (interventions implemented as appropriate)   10/03/18 1728   Goal/Outcome Evaluation   Problems Assessed (Mechanical Ventilation, Invasive) situational response;ventilator-induced lung injury;mechanical dysfunction;inability to wean   Problems Present (Mech Vent, Invasive) inability to wean

## 2018-10-03 NOTE — PROGRESS NOTES
"Order received for PC/RN consult for Goals of Care/ACP  Pt admitted with Hyperglycemia, N/V x 2-3 days, AMS, s/p seizure like activity at home and Acute metabolic acidosis.  Seizure activity witnessed by EMS and in ED.  Pt subsequently intubated for airway protection.  Placed on ventilator.   Hx:  ESRD-HD, COPD, DM-type 2, Pacemaker.  No previous hx seizure activity.  Pt appeared comfortable at time of visit.  Was informed pt has had slight movement of one arm.  Neurology consult in progress.  Awaiting results of second EEG.  Met with pt's spouse.  He reported they have been  almost 62yrs.  When asked, he reported he and pt both have [facility] and copy should be at Dr KATIA Eng's office.  Informed him I would contact them to request it be faxed to [facility] and placed on chart.  We discuss the recent option provided of LTAC.  I explained what a LTAC was and assured him pt would be receiving the same care as in [facility]U.    I inquired if pt had voiced her wishes regarding life prolonging treatment.  He stated she does not want her life prolonged if she \"is just lying there\".  He then added, Dr Zavala informed him that \"she may be able to recover from this\".  He stated \"I have to give her a chance to live\".  I discussed we would be hoping for the best but must also consider if the outcome is not what he wants.  I posed the question to him of what if she continues to live but there is no quality of life for her.  He became teary eyed and related then we would stop things.  I assured him the Palliative Care team was here to make sure pt is comfortable and that we are following her wishes.  We are also here for support of pt and family.  Spouse had several questions re: LTAC and insurance.  I encouraged him to ask those questions to the LTAC nurse that will visit today.  PC will continue to follow for needs.  "

## 2018-10-03 NOTE — PROGRESS NOTES
"Hospitalist Progress Note.    LOS: 8 days    Patient Care Team:  Rodriguez Eng DO as PCP - General (Family Medicine)  Babatunde Montes PA-C as PCP - Claims Attributed    Chief Complaint:    F/u seizure disorder, on mechanical ventilation     Subjective   Patient seen and examined this morning. She remains unresponsive to verbal stimuli, minimally responsive to painful stimuli. No overnight events. She was visited by her beloved pet last night. Her  is at the bedside.     Review of Systems:    The pertinent  ROS was done and it is noted above, rest  was negative.    Objective     Vital Signs  /61   Pulse 73   Temp 99.1 °F (37.3 °C) (Oral)   Resp 19   Ht 162.6 cm (64\")   Wt 66.6 kg (146 lb 12.8 oz)   LMP  (LMP Unknown)   SpO2 96%   BMI 25.20 kg/m²       I/O this shift:  In: 60 [Other:60]  Out: -     Intake/Output Summary (Last 24 hours) at 10/03/18 1353  Last data filed at 10/03/18 1000   Gross per 24 hour   Intake             1478 ml   Output             3450 ml   Net            -1972 ml       Physical Exam:    General Appearance: stuporous but off sedation, on mechanical ventilation, no acute distress  HEENT: pupils round and sluggishly reactive to light, oral mucosa dry, extraocular movements intact. + ET tube, + NG tube   Neck: supple, no JVD, trachea midline, + left IJ central line   Lungs: Clear to Auscultation, unlabored breathing effort  Heart: RRR, normal S1 and S2, no S3, no rub  Abdomen: soft, non-tender, no palpable bladder, present bowel sounds to auscultation  Extremities: no edema, cyanosis or clubbing. Intermittent jerking movement of RUE, muscle contractions noted with withdrawing from pain in bilateral lower extremities  Neuro: stuporous, grossly non focal.     Results Review:      Results from last 7 days  Lab Units 10/03/18  1055 10/02/18  0424 09/29/18  0413 09/28/18  0650   SODIUM mmol/L 127* 129* 131* 131*   POTASSIUM mmol/L 3.8 3.8 4.5 3.8   CHLORIDE mmol/L 98 99 " 100 100   CO2 mmol/L 21.0* 21.0* 20.0* 23.0*   BUN mg/dL 51* 77* 37* 26*   CREATININE mg/dL 4.80* 5.70* 4.60* 3.70*   CALCIUM mg/dL 8.3* 7.9* 8.1* 8.3*   BILIRUBIN mg/dL  --   --  0.4 0.2   ALK PHOS U/L  --   --  107 111   ALT (SGPT) U/L  --   --  32 33   AST (SGOT) U/L  --   --  34 24   GLUCOSE mg/dL 299* 72* 173* 170*       Estimated Creatinine Clearance: 8.8 mL/min (A) (by C-G formula based on SCr of 4.8 mg/dL (H)).      Results from last 7 days  Lab Units 10/03/18  1055 10/02/18  0422 09/29/18  0413   PHOSPHORUS mg/dL 3.4 4.2 5.3*               Results from last 7 days  Lab Units 10/03/18  1055 10/02/18  0424 09/29/18  0413 09/28/18  0650   WBC 10*3/mm3 7.73 9.34 9.23 6.61   HEMOGLOBIN g/dL 10.2* 9.9* 11.2* 10.6*   PLATELETS 10*3/mm3 245 226 156 134               Imaging Results (last 24 hours)     ** No results found for the last 24 hours. **          amLODIPine 5 mg Oral Q24H   aspirin 81 mg Oral Daily   atorvastatin 10 mg Oral Daily   b complex-vitamin c-folic acid 1 tablet Oral Daily   budesonide 0.5 mg Nebulization BID - RT   carvedilol 12.5 mg Oral Q12H   chlorhexidine 15 mL Mouth/Throat Q12H   famotidine 20 mg Intravenous Daily   heparin (porcine) 5,000 Units Subcutaneous Q8H   insulin detemir 10 Units Subcutaneous Nightly   insulin regular 0-7 Units Subcutaneous Q6H   ipratropium-albuterol 3 mL Nebulization Q6H - RT   levothyroxine 100 mcg Oral QAM   losartan 50 mg Oral Daily   phenytoin 100 mg Intravenous Q6H   sevelamer 800 mg Oral TID With Meals   Vitamin D 1,000 Units Oral Daily       sodium chloride 10 mL/hr Last Rate: 10 mL/hr (09/30/18 1257)       Medication Review:   Current Facility-Administered Medications   Medication Dose Route Frequency Provider Last Rate Last Dose   • amLODIPine (NORVASC) tablet 5 mg  5 mg Oral Q24H Bridger Swift MD   5 mg at 10/03/18 1025   • aspirin EC tablet 81 mg  81 mg Oral Daily Jordan Ribera MD   81 mg at 10/03/18 1025   • atorvastatin (LIPITOR) tablet 10  mg  10 mg Oral Daily Jordan Ribera MD   10 mg at 10/03/18 1025   • b complex-vitamin c-folic acid (NEPHRO-CLEOPATRA) tablet 1 tablet  1 tablet Oral Daily Jordan Ribera MD   1 tablet at 10/03/18 1024   • budesonide (PULMICORT) nebulizer solution 0.5 mg  0.5 mg Nebulization BID - RT Jordan Ribera MD   0.5 mg at 10/03/18 0704   • carvedilol (COREG) tablet 12.5 mg  12.5 mg Oral Q12H Bridger Swift MD   12.5 mg at 10/03/18 1025   • CHAPSTICK 1 each  1 each Apply externally Q2H PRN Jordan Ribera MD   1 each at 10/03/18 0641   • chlorhexidine (PERIDEX) 0.12 % solution 15 mL  15 mL Mouth/Throat Q12H Jordan Ribera MD   15 mL at 10/03/18 1024   • dextrose (D50W) 25 g/ 50mL Intravenous Solution 25 g  25 g Intravenous Q15 Min PRN Jordan Ribera MD   25 g at 10/02/18 0609   • dextrose (D50W) 25 g/ 50mL Intravenous Solution 25 g  25 g Intravenous Q15 Min PRN Triny Cooper MD       • dextrose (GLUTOSE) oral gel 1 tube  1 tube Oral Q15 Min PRN Jordan Ribera MD       • dextrose (GLUTOSE) oral gel 1 tube  1 tube Oral Q15 Min PRN Triny Cooper MD       • famotidine (PEPCID) injection 20 mg  20 mg Intravenous Daily Sharonda Bowens MD   20 mg at 10/03/18 1024   • glucagon (human recombinant) (GLUCAGEN DIAGNOSTIC) injection 1 mg  1 mg Subcutaneous PRN Jordan Ribera MD       • glucagon (human recombinant) (GLUCAGEN DIAGNOSTIC) injection 1 mg  1 mg Subcutaneous PRN Triny Cooper MD       • heparin (porcine) 5000 UNIT/ML injection 5,000 Units  5,000 Units Subcutaneous Q8H Sharonda Bowens MD   5,000 Units at 10/03/18 0640   • Influenza Vac Subunit Quad (FLUCELVAX) injection 0.5 mL  0.5 mL Intramuscular During Hospitalization Jordan Ribera MD       • insulin detemir (LEVEMIR) injection 10 Units  10 Units Subcutaneous Nightly Sharonda Bowens MD       • insulin regular (humuLIN R,novoLIN R) injection 0-7 Units  0-7 Units Subcutaneous Q6H Triny Cooper MD   4 Units at 10/03/18 1143   • ipratropium-albuterol  (DUO-NEB) nebulizer solution 3 mL  3 mL Nebulization Q6H - RT Jordan Ribera MD   3 mL at 10/03/18 1242   • labetalol (NORMODYNE,TRANDATE) injection 10 mg  10 mg Intravenous Q4H PRN Sharonda Bowens MD   10 mg at 09/25/18 0541   • levothyroxine (SYNTHROID, LEVOTHROID) tablet 100 mcg  100 mcg Oral QAM Jordan Ribera MD   100 mcg at 10/03/18 0640   • losartan (COZAAR) tablet 50 mg  50 mg Oral Daily Jordan Ribera MD   50 mg at 10/03/18 1025   • ondansetron (ZOFRAN) tablet 4 mg  4 mg Oral Q6H PRN Sharonda Bowens MD        Or   • ondansetron ODT (ZOFRAN-ODT) disintegrating tablet 4 mg  4 mg Oral Q6H PRN Sharonda Bowens MD        Or   • ondansetron (ZOFRAN) injection 4 mg  4 mg Intravenous Q6H PRN Sharonda Bowens MD       • phenytoin (DILANTIN) injection 100 mg  100 mg Intravenous Q6H Jordan Ribera MD   100 mg at 10/03/18 1024   • pneumococcal polysaccharide 23-valent (PNEUMOVAX-23) vaccine 0.5 mL  0.5 mL Intramuscular During Hospitalization Jordan Ribera MD       • sevelamer (RENAGEL) tablet 800 mg  800 mg Oral TID With Meals Jordan Ribera MD   800 mg at 10/01/18 1821   • sodium chloride 0.9 % bolus 1,000 mL  1,000 mL Intravenous PRN Deric Mejia MD, GRISELDAN       • sodium chloride 0.9 % bolus 1,000 mL  1,000 mL Intravenous PRN Deric Mejia MD, FASN       • sodium chloride 0.9 % flush 1-10 mL  1-10 mL Intravenous PRN Sharonda Bowens MD       • sodium chloride 0.9 % infusion  10 mL/hr Intravenous Continuous Jordan Ribera MD 10 mL/hr at 09/30/18 1257 10 mL/hr at 09/30/18 1257   • Vitamin D 1,000 Units  1,000 Units Oral Daily Jordan Ribera MD   1,000 Units at 10/03/18 1025     Principal Problem:    Hyperglycemic hyperosmolar nonketotic coma (CMS/HCC)  Active Problems:    Essential hypertension    Acquired hypothyroidism    Sick sinus syndrome (CMS/HCC)    Seizure (CMS/HCC)    Acute metabolic encephalopathy    Diabetes mellitus type 2 in nonobese (CMS/HCC)    End-stage renal disease on hemodialysis (CMS/HCC)     Hypertensive urgency    Assessment/Plan:   1.  Acute metabolic encephalopathy, present on admission.  2.  New onset complex partial seizures with postictal state, present on admission.  3.  Hyperglycemic hyperosmolar nonketotic coma, present on admission, resolved  4.  Acute non-ST elevation myocardial infarction, type II myocardial infarction.  5.  Pseudohyponatremia, improving.  6.  End-stage renal disease on hemodialysis.  7.  Diabetes mellitus type 2 with nephropathy.  8.  Hypertensive urgency, resolved  9.  Chronic diastolic heart failure.  10.  Acquired hypothyroidism.  11.  Sick sinus syndrome status post pacemaker.  12.  COPD    I spoke with the patient's  at the bedside again this morning. We discussed that the repeat EEG continues to show slowing of brain waves, but she is not seizing and she certainly is not brain dead. He felt that since there is a chance of neurological recovery, he would like to try and do everything at this time. However, he did verbalize that she would not want to stay hooked up to life support forever. Patient's referral was sent to LTACs, awaiting his decision and bed availability at this time.   She was evaluated by Dr. Mejia this morning and he plans to dialyze her for a short session today to take off a bit of extra fluid.   She is slightly hyponatremic, however, her blood sugars have trended back up after Levemir was discontinued yesterday. I will place her back on 10 units of Levemir twice daily.     Likely discharge tomorrow AM.   Continue all other medications as before.   Her prognosis remains guarded.     I discussed the details with the nursing staff.    Rest as ordered.    Sharonda Bowens MD  10/03/18  1:53 PM

## 2018-10-03 NOTE — PLAN OF CARE
Problem: Patient Care Overview  Goal: Plan of Care Review  Outcome: Ongoing (interventions implemented as appropriate)   10/03/18 1128   Coping/Psychosocial   Plan of Care Reviewed With spouse   OTHER   Outcome Summary Spoke with patient's  at her bedside. I was present for the conversation her  had with the physician about the patient's condition and options he will have for her ongoing care. Conversation with her  provided an opportunity to talk about living fatima and advanced directives, which he stated they have on file with their primary care physician. I offered a prayer at her bedside and will continue to follow to provide emotional support .

## 2018-10-03 NOTE — PROGRESS NOTES
Continued Stay Note  CHANDAN Matthews     Patient Name: Sigrid Casarez  MRN: [id number]  Today's Date: 10/3/2018    Admit Date: 9/25/2018          Discharge Plan     Row Name 10/03/18 1429       Plan    Plan Patient can transfer to [facility] acute [facility] tomorrow. Report number is [phone]              Discharge Codes    No documentation.       Expected Discharge Date and Time     Expected Discharge Date Expected Discharge Time    Oct 5, 2018             Betty Cruz

## 2018-10-04 NOTE — NURSING NOTE
No change in asssessment.   EMS here and patient  Transported to [facility] on vent with parametics.   [facility] called and report given.

## 2018-10-04 NOTE — DISCHARGE SUMMARY
[facility]   DISCHARGE SUMMARY      Name:  Sigrid Casarez   Age:  78 y.o.  Sex:  female  :  1940  MRN:  [id number]   Visit Number:  [id number]    Admission Date:  2018  Date of Discharge:  10/4/2018  Primary Care Physician:  Rodriguez Eng DO    Discharge Diagnoses:   1.  Acute metabolic encephalopathy, POA  2.  New onset complex partial seizures with postictal state, POA, no longer seizing  3.  Hyperglycemic hyperosmolar nonketotic coma, resolved  4.  Acute non-ST elevation myocardial infarction, type II myocardial infarction.  5.  Pseudohyponatremia, stable  6.  End-stage renal disease on hemodialysis.  7.  Diabetes mellitus type 2 with nephropathy.  8.  Hypertensive urgency, resolved  9.  Chronic diastolic heart failure.  10.  Acquired hypothyroidism.  11.  Sick sinus syndrome status post pacemaker.  12.  COPD    Principal Problem:    Hyperglycemic hyperosmolar nonketotic coma (CMS/HCC)  Active Problems:    Essential hypertension    Acquired hypothyroidism    Sick sinus syndrome (CMS/HCC)    Seizure (CMS/HCC)    Acute metabolic encephalopathy    Diabetes mellitus type 2 in nonobese (CMS/HCC)    End-stage renal disease on hemodialysis (CMS/[facility])    Hypertensive urgency      Presenting Problem:    Seizure (CMS/HCC) [R56.9]     Consults:     Consults     Date and Time Order Name Status Description    2018 1145 Inpatient Cardiology Consult      2018 1028 Inpatient Neurology Consult      2018 0501 Inpatient Nephrology Consult Completed     2018 0501 Inpatient Pulmonology Consult Completed         Consulting Physician(s)     Provider Relationship Specialty    Deric Mejia MD, FASN Consulting Physician Nephrology    Ismael Sanz MD Consulting Physician Pulmonary Disease    Bridger Swift MD Consulting Physician Interventional Cardiology    Gordon Zavala MD, FAAN Consulting Physician Sleep Medicine          Procedures Performed:            History of presenting illness/Hospital Course:  Patient is an 78-year-old female with medical history of end-stage renal disease on hemodialysis, type 2 diabetes mellitus, COPD, and sick sinus syndrome status post pacemaker placement who presented to the ER on 09/25/18 via EMS for evaluation of seizure-like activity.  Patient apparently had been having seizures from 3 days prior which appeared to be initially localized then developing into a generalized tonic-clonic.  Patient sustained multiple seizures with prolonged postictal phase and she was unable to protect her airway.  She was intubated in the ER and placed on mechanical ventilation.  She was then admitted to the ICU.  She was found to be in hyper osmolar hyperglycemic coma with blood sugars as high as 800.  She was placed on a insulin drip.  A CT of the head was obtained which did not reveal any acute intracranial abnormalities.  Chest x-ray did not reveal any acute infiltrates.    Patient sugars improved significantly and she was transitioned from an insulin drip to long and short acting insulin in the form of Levemir and NovoLog.    Unfortunately, patient's mental status did not improve despite being off sedation which included propofol and Ativan.  Benzodiazepine reversal agent, flumazenil, was tried without any significant improvement in her mental status.  Dr. Zavala from neurology did evaluate the patient on admission and patient was placed on IV Dilantin.  An EEG was done which revealed slowing of brain waves but there was no seizure activity.  This was repeated on 10/02/18 and continues to show similar activities.  Dr. aZvala feels that the patient has a decent chance of recovering with meaningful neurological recovery.  This was discussed with the patient's  who felt that he would want to continue current course of therapy and transfer the patient to long-term acute care if needed to give her more time to recover. He is agreeable with  tracheostomy and PEG placement if needed.     Patient was also found to have elevated troponins on admission.  Dr. Swift who is the patient's primary cardiologist was consulted. A 2-D echocardiogram was obtained which showed normal left ventricular ejection fraction with elevated left ventricular diastolic pressures. It was felt the elevated troponin levels were likely due to demand ischemia in the setting of hypoxia and acute illness. Dr. Swift recommended conservative medical therapy at this time.    Patient was being seen by Dr. Mejia from nephrology for her routine dialysis sessions.  Patient has been receiving dialysis without any significant issues.  She was found to be slightly hypovolemic over the last 3 days and was dialyzed for 3 consecutive days.  Patient will need to be continued on her regularly scheduled dialysis days which are Tuesday, Thursday and Saturday.    Patient is seen and examined this morning. There is no improvement in her mental status. She is stuporous, not responsive to any verbal stimulus. On touch, her legs and hands have minimal movements. She had a low grade fever of 100.5 this morning. She had a CXR on 10/02 which did not reveal any acute infiltrates.     Patient was evaluated by representative from [facility] and has been accepted. She is stable for discharge there this afternoon.     Vital Signs:    Temp:  [98.6 °F (37 °C)-100.5 °F (38.1 °C)] 100.5 °F (38.1 °C)  Heart Rate:  [71-81] 80  Resp:  [16-19] 16  BP: (109-193)/(38-77) 134/56  FiO2 (%):  [21 %] 21 %    Physical Exam:    General Appearance:  stuporous, not in any acute distress.   Head:  Atraumatic and normocephalic, without obvious abnormality.   Eyes/Nose:          PERRLA, conjunctivae and sclerae normal, no Icterus. No pallor. + NG tube    Ears:  Ears appear intact with no abnormalities noted.   Throat: No oral lesions, no thrush, oral mucosa moist. + ET tube   Neck: Supple, trachea midline, no thyromegaly, no carotid  bruit.   Back:   No kyphoscoliosis present. No tenderness to palpation,   range of motion normal.   Lungs:   Chest shape is normal. Breath sounds heard bilaterally equally.  No crackles or wheezing. No Pleural rub or bronchial breathing.   Heart:  Normal S1 and S2, no murmur, no gallop, no rub. No JVD.   Abdomen:   Normal bowel sounds, no masses, no organomegaly. Soft, non-tender, non-distended, no guarding, no rebound tenderness.   Extremities: Trace edema, no cyanosis, no clubbing.   Pulses: Pulses palpable and equal bilaterally.   Skin: No bleeding, bruising or rash.   Lymph nodes: No palpable adenopathy.   Neurologic: stuporous. Minimal response to tactile stimuli of the extremities, No tremors. No facial asymetry.     Pertinent Lab Results:       Results from last 7 days  Lab Units 10/04/18  0820 10/03/18  1055 10/02/18  0424 09/29/18  0413 09/28/18  0650   SODIUM mmol/L 133* 127* 129* 131* 131*   POTASSIUM mmol/L 3.3* 3.8 3.8 4.5 3.8   CHLORIDE mmol/L 100 98 99 100 100   CO2 mmol/L 25.0* 21.0* 21.0* 20.0* 23.0*   BUN mg/dL 40* 51* 77* 37* 26*   CREATININE mg/dL 4.40* 4.80* 5.70* 4.60* 3.70*   CALCIUM mg/dL 8.9 8.3* 7.9* 8.1* 8.3*   BILIRUBIN mg/dL  --   --   --  0.4 0.2   ALK PHOS U/L  --   --   --  107 111   ALT (SGPT) U/L  --   --   --  32 33   AST (SGOT) U/L  --   --   --  34 24   GLUCOSE mg/dL 187* 299* 72* 173* 170*       Results from last 7 days  Lab Units 10/04/18  0820 10/03/18  1055 10/02/18  0424   WBC 10*3/mm3 9.79 7.73 9.34   HEMOGLOBIN g/dL 10.2* 10.2* 9.9*   HEMATOCRIT % 31.9* 31.9* 30.8*   PLATELETS 10*3/mm3 263 245 226                           Results from last 7 days  Lab Units 10/04/18  0718   PH, ARTERIAL pH units 7.474   PO2 ART mm Hg 67.5*   PCO2, ARTERIAL mm Hg 36.1   HCO3 ART mmol/L 26.5           Invalid input(s): USDES,  BLOODU, NITRITITE, BACT, EP  Pain Management Panel     Pain Management Panel Latest Ref Rng & Units 8/22/2016 12/29/2015    CREATININE UR Not Estab. mg/dL 44.9 17.3               Pertinent Radiology Results:    Imaging Results (all)     Procedure Component Value Units Date/Time    XR Chest 1 View [[id number]] Collected:  10/02/18 0932     Updated:  10/02/18 0936    Narrative:       PROCEDURE: XR CHEST 1 VW-     HISTORY: Respiratory failure; R56.9-Unspecified convulsions;  R73.9-Hyperglycemia, unspecified; J96.01-Acute respiratory failure with  hypoxia; J96.02-Acute respiratory failure with hypercapnia     COMPARISON: September 29, 2018.     FINDINGS: Endotracheal tube is in place and is well-positioned with the  tip above the hector. A nasogastric tube extends below the diaphragm. A  left IJ CVC is in the SVC. A left subclavian pacemaker is unchanged. The  heart is normal in size. The mediastinum is unremarkable. There are  chronic interstitial lung changes. There are likely small effusions.  There is no pneumothorax.  There are no acute osseous abnormalities.           Impression:       No significant change in the appearance of the chest.     Continued followup is recommended.     This report was finalized on 10/2/2018 9:34 AM by Mariel Solano M.D..    XR Abdomen KUB [[id number]] Collected:  09/29/18 1557     Updated:  09/29/18 1558    Narrative:       FINAL REPORT    CLINICAL HISTORY:  ng placement verification    FINDINGS:  SINGLE VIEW ABDOMEN  A single view of the abdomen was obtained.  There is a nonobstructive bowel gas pattern. There are no  abnormally dilated loops of small bowel.  There is an NG tube  with the tip in the body the stomach.  No abnormal  calcifications are identified.      Impression:       Nonobstructive bowel gas pattern.    Authenticated by Mariel Solano MD on 09/29/2018 03:57:41 PM    XR Chest 1 View [[id number]] Collected:  09/29/18 0923     Updated:  09/29/18 0926    Narrative:       PROCEDURE: XR CHEST 1 VW-     HISTORY: Resp Filure; R56.9-Unspecified convulsions;  R73.9-Hyperglycemia, unspecified; J96.01-Acute respiratory failure  with  hypoxia; J96.02-Acute respiratory failure with hypercapnia     COMPARISON: 9/28/2018.     FINDINGS: The support tubes and lines are appropriately positioned. The  heart is normal in size. The mediastinum is unremarkable. There is some  blunting of the left costophrenic angle likely a small effusion. The  lungs are otherwise clear. There is no pneumothorax.  There are no acute  osseous abnormalities.           Impression:       Blunting of left costophrenic angle likely a small effusion.     Continued followup is recommended.     This report was finalized on 9/29/2018 9:24 AM by Mariel Solano M.D..    XR Chest 1 View [[id number]] Collected:  09/28/18 0834     Updated:  09/28/18 0840    Narrative:       PROCEDURE: XR CHEST 1 VW-     HISTORY: Resp Failure.; R56.9-Unspecified convulsions;  R73.9-Hyperglycemia, unspecified; J96.01-Acute respiratory failure with  hypoxia; J96.02-Acute respiratory failure with hypercapnia     COMPARISON: 9/27/2018.     FINDINGS: The support tubes and lines are appropriately positioned. A  left subclavian pacemaker is in place. The heart is normal in size. The  mediastinum is unremarkable. The lungs are clear. There is no  pneumothorax.  There are no acute osseous abnormalities.           Impression:       Support tubes and lines appropriately positioned.     Continued followup is recommended.     This report was finalized on 9/28/2018 8:38 AM by Mariel Solano M.D..    XR Chest 1 View [[id number]] Collected:  09/27/18 0903     Updated:  09/27/18 0906    Narrative:       PROCEDURE: XR CHEST 1 VW-     HISTORY: Ac Resp Failure.; R56.9-Unspecified convulsions;  R73.9-Hyperglycemia, unspecified; J96.01-Acute respiratory failure with  hypoxia; J96.02-Acute respiratory failure with hypercapnia     COMPARISON: 9/26/2018.     FINDINGS: The support tubes and lines are appropriately positioned. A  left subclavian pacemaker is in place. The heart is normal in size. The  mediastinum is  unremarkable. The lungs are clear. There is no  pneumothorax.  There are no acute osseous abnormalities.           Impression:       Support tubes and lines appropriately positioned.     Continued followup is recommended.           This report was finalized on 9/27/2018 9:03 AM by Mariel Solano M.D..    XR Chest 1 View [[id number]] Collected:  09/26/18 1404     Updated:  09/26/18 1407    Narrative:       PROCEDURE: XR CHEST 1 VW-     HISTORY: Ac Resp Failure.; R56.9-Unspecified convulsions;  R73.9-Hyperglycemia, unspecified; J96.01-Acute respiratory failure with  hypoxia; J96.02-Acute respiratory failure with hypercapnia     COMPARISON: 9/26/2018.     FINDINGS: Endotracheal and nasogastric tubes remain in place and are  well-positioned. There has been interval placement of a left internal  jugular central venous catheter with the tip in the SVC. The heart is  normal in size. The mediastinum is unremarkable. The lungs are clear.  There is no pneumothorax.  There are no acute osseous abnormalities.           Impression:       Placement of a left internal jugular central venous catheter  with no evidence of a pneumothorax.     Continued followup is recommended.     This report was finalized on 9/26/2018 2:05 PM by Mariel Solano M.D..    XR Chest 1 View [[id number]] Collected:  09/26/18 0908     Updated:  09/26/18 0912    Narrative:       PROCEDURE: XR CHEST 1 VW-     HISTORY: Resp Failure.; R56.9-Unspecified convulsions;  R73.9-Hyperglycemia, unspecified; J96.01-Acute respiratory failure with  hypoxia; J96.02-Acute respiratory failure with hypercapnia     COMPARISON: 9/25/2018.     FINDINGS: An endotracheal tube is in place and is well-positioned with  the tip above the hector. A nasogastric tube extends below the  diaphragm. A left subclavian pacemaker is in place. The heart is normal  in size. The mediastinum is unremarkable. There are chronic interstitial  lung changes. No focal opacities were effusions  are evident. There is no  pneumothorax.  There are no acute osseous abnormalities.           Impression:       Support tubes appropriately positioned.     Continued followup is recommended.           This report was finalized on 9/26/2018 9:10 AM by Mariel Solano M.D..    CT Head Without Contrast [[id number]] Collected:  09/25/18 0822     Updated:  09/25/18 0824    Narrative:       PROCEDURE: CT HEAD WO CONTRAST-     HISTORY: Seizures new or progressive     TECHNIQUE: Axial images were obtained from the skull vertex through the  base without contrast.      COMPARISON: None.     FINDINGS: There is generalized cerebral volume loss. There are patchy  hypodensities in the periventricular white matter consistent with  chronic small vessel ischemic change. There is no CT evidence of acute  hemorrhage. There is no mass, mass effect, or midline shift. There is no  hydrocephalus. Bone windows reveal no osseous abnormalities. The  visualized paranasal sinuses are clear.       Impression:       Findings consistent with chronic small vessel ischemic  change with no acute intracranial abnormality.        This study was performed with techniques to keep radiation doses as low  as reasonably achievable (ALARA). Individualized dose reduction  techniques using automated exposure control or adjustment of mA and/or  kV according to the patient size were employed.      This report was finalized on 9/25/2018 8:22 AM by Mariel Solano M.D..    XR Chest 1 View [[id number]] Collected:  09/25/18 0821     Updated:  09/25/18 0824    Narrative:       PROCEDURE: XR CHEST 1 VW-     HISTORY: intubation     COMPARISON: July 17, 2018.     FINDINGS: An endotracheal tube is in place with the tip just within the  thoracic inlet. The heart is normal in size. The mediastinum is  unremarkable. There are chronic interstitial lung changes. There is a  left basilar opacity which could reflect atelectasis or pneumonia. There  is no pneumothorax.   There are no acute osseous abnormalities.           Impression:       Endotracheal tube with the tip just within the thoracic  inlet.     Continued followup is recommended.     This report was finalized on 9/25/2018 8:22 AM by Mariel Solano M.D..    XR Chest 1 View [[id number]] Collected:  09/25/18 0815     Updated:  09/25/18 0823    Narrative:       PROCEDURE: XR CHEST 1 VW-     HISTORY: tube placement; R56.9-Unspecified convulsions;  R73.9-Hyperglycemia, unspecified; J96.01-Acute respiratory failure with  hypoxia; J96.02-Acute respiratory failure with hypercapnia     COMPARISON: 9/25/2018.     FINDINGS: There is been interval advancement of the endotracheal tube  which is now at the level of the hector. Retraction by approximately 1  cm is recommended. A nasogastric tube extends below the diaphragm. A  left subclavian pacemaker is in place. The heart is normal in size. The  mediastinum is unremarkable. There are chronic interstitial lung  changes. No focal opacities were effusions are evident. There is no  pneumothorax.  There are no acute osseous abnormalities.           Impression:       Advancement of the patient's endotracheal tube which is at  the level of the hector. Retraction by approximately 1 cm is  recommended.     Communication: The patient's nurse was notified of these findings at  8:17 AM on 9/25/2018. The study was also discussed with Dr. Sanz.     This report was finalized on 9/25/2018 8:21 AM by Mariel Solano M.D..          Condition on Discharge:      Stable.    Code status during the hospital stay:    Code Status and Medical Interventions:   Ordered at: 09/25/18 0421     Code Status:    CPR     Medical Interventions (Level of Support Prior to Arrest):    Full       Discharge Disposition:    Short Term Hospital (DC - External)    Discharge Medications:       Discharge Medications      New Medications      Instructions Start Date   amLODIPine 5 MG tablet  Commonly known as:  NORVASC    5 mg, Oral, Every 24 Hours Scheduled      carvedilol 12.5 MG tablet  Commonly known as:  COREG   12.5 mg, Oral, Every 12 Hours Scheduled      famotidine 10 MG/ML solution injection  Commonly known as:  PEPCID   20 mg, Intravenous, Daily      insulin detemir 100 UNIT/ML injection  Commonly known as:  LEVEMIR   10 Units, Subcutaneous, Nightly      insulin regular 100 UNIT/ML injection  Commonly known as:  humuLIN R,novoLIN R   0-7 Units, Subcutaneous, Every 6 Hours Scheduled      ipratropium-albuterol 0.5-2.5 mg/3 ml nebulizer  Commonly known as:  DUO-NEB   3 mL, Nebulization, Every 6 Hours - RT      phenytoin 50 MG/ML injection  Commonly known as:  DILANTIN   100 mg, Intravenous, Every 6 Hours         Changes to Medications      Instructions Start Date   sevelamer 800 MG tablet  Commonly known as:  RENVELA  What changed:  Another medication with the same name was removed. Continue taking this medication, and follow the directions you see here.   1,600 mg, Oral, 3 Times Daily With Meals      Vitamin D 1000 units tablet  What changed:  when to take this   1,000 Units, Oral, Daily         Continue These Medications      Instructions Start Date   aspirin 81 MG EC tablet   81 mg, Oral, Daily      atorvastatin 10 MG tablet  Commonly known as:  LIPITOR   10 mg, Oral, Daily      BD ULTRA-FINE PEN NEEDLES 29G X 12.7MM misc  Generic drug:  Insulin Pen Needle   Does not apply, Use 3 times daily      RELION PEN NEEDLE 31G/8MM 31G X 8 MM misc  Generic drug:  Insulin Pen Needle   No dose, route, or frequency recorded.      ferrous sulfate 325 (65 FE) MG tablet   1 tablet, Oral, Weekly, With meals      FREESTYLE FREEDOM LITE w/Device kit   Use to test blood sugars daily      glucose blood test strip  Commonly known as:  FREESTYLE LITE   Test blood sugars TID      levothyroxine 100 MCG tablet  Commonly known as:  SYNTHROID, LEVOTHROID   100 mcg, Oral, Daily      lidocaine 3 % cream cream   1 application, Topical, 3 Times Weekly       lidocaine-prilocaine 2.5-2.5 % cream  Commonly known as:  EMLA   1 application, Topical, 3 Times Weekly      losartan 50 MG tablet  Commonly known as:  COZAAR   50 mg, Oral, Daily      ondansetron 8 MG tablet  Commonly known as:  ZOFRAN   8 mg, Oral, Every 8 Hours PRN      AR-CLEOPATRA RX 1 MG tablet   1 tablet, Oral, Daily      tiotropium bromide-olodaterol 2.5-2.5 MCG/ACT aerosol solution inhaler  Commonly known as:  STIOLTO RESPIMAT   2 puffs, Inhalation, Daily         Stop These Medications    glucagon 1 MG injection  Commonly known as:  GLUCAGEN     Insulin Lispro 100 UNIT/ML solution pen-injector  Commonly known as:  HUMALOG     ondansetron ODT 4 MG disintegrating tablet  Commonly known as:  ZOFRAN-ODT            Discharge Diet:     Diet Instructions     Diet: Tube Feeding; Continuous; Nepro 27ml/hr       Discharge Diet:  Tube Feeding    Feeding Type:  Continuous    Formula & Rate:  Nepro 27ml/hr          Activity at Discharge:         Follow-up Appointments:    Follow-up Information     Rodriguez Eng DO .    Specialty:  Family Medicine  Contact information:  [address] [place] [address]  [phone]             Babatunde Montes PA-C .    Specialty:  Physician Assistant  Contact information:  [address] [place] [address]  [phone]                   Future Appointments  Date Time Provider Department Center   11/9/2018 1:30 PM Violeta Pittman MD [facility] None           Test Results Pending at Discharge:           Sharonda Bowens MD  10/04/18  11:14 AM    Time spent: 32 minutes    Dictated utilizing Dragon dictation.

## 2018-10-04 NOTE — PROGRESS NOTES
"Nephrology Progress Note.    LOS: 9 days    Patient Care Team:  Rodriguez Eng DO as PCP - General (Family Medicine)  Babatunde Montes PA-C as PCP - Claims Attributed    Chief Complaint:    Chief Complaint   Patient presents with   • Hyperglycemia   • Vomiting   • Altered Mental Status       Subjective     Follow up for ESRD and related issues.    Interval History:     Patient Complaints: none    Patient seen and examined this morning.  Events from last night noted.  She is not waking up enough to be extubated, does not appeared have any more seizures.  Still has minimal interaction with painful stimuli does randomly move lower extremities.  She is noted to be chewing on the ET tube.  She had her dialysis yesterday, it was because of extra fluid.  Clinically she looks much better today.    History taken from: patient    Review of Systems:    I am unable to obtain complete review of systems at this time due to the fact that the patient is sedated on mechanical ventilation.      Objective     Vital Signs  /76   Pulse 76   Temp 100.5 °F (38.1 °C) (Oral)   Resp 16   Ht 162.6 cm (64\")   Wt 62.7 kg (138 lb 4.8 oz)   LMP  (LMP Unknown)   SpO2 98%   BMI 23.74 kg/m²       No intake/output data recorded.    Intake/Output Summary (Last 24 hours) at 10/04/18 0929  Last data filed at 10/04/18 0602   Gross per 24 hour   Intake             1893 ml   Output             4025 ml   Net            -2132 ml       Physical Exam:    General Appearance:  no acute distress,   HEENT: Oral mucosa dry, Sclera clear.  Skin: Warm and dry  Neck: supple, no JVD, trachea midline  Lungs:Chest shape is normal. Breath sounds heard bilaterally equally. No crackles, No wheezing.   Heart: regular rate and rhythm. normal S1 and S2, no S3, no rub, peripheral pulses weak but palpable.  Abdomen: soft, non-tender,  present bowel sounds to auscultation  : no palpable bladder.  Extremities: Trace to 1+ edema, no cyanosis or clubbing. "   Neuro: Intubated and sedated     Results Review:      Results from last 7 days  Lab Units 10/04/18  0820 10/03/18  1055 10/02/18  0424 09/29/18  0413 09/28/18  0650   SODIUM mmol/L 133* 127* 129* 131* 131*   POTASSIUM mmol/L 3.3* 3.8 3.8 4.5 3.8   CHLORIDE mmol/L 100 98 99 100 100   CO2 mmol/L 25.0* 21.0* 21.0* 20.0* 23.0*   BUN mg/dL 40* 51* 77* 37* 26*   CREATININE mg/dL 4.40* 4.80* 5.70* 4.60* 3.70*   CALCIUM mg/dL 8.9 8.3* 7.9* 8.1* 8.3*   BILIRUBIN mg/dL  --   --   --  0.4 0.2   ALK PHOS U/L  --   --   --  107 111   ALT (SGPT) U/L  --   --   --  32 33   AST (SGOT) U/L  --   --   --  34 24   GLUCOSE mg/dL 187* 299* 72* 173* 170*       Estimated Creatinine Clearance: 10.1 mL/min (A) (by C-G formula based on SCr of 4.4 mg/dL (H)).      Results from last 7 days  Lab Units 10/03/18  1055 10/02/18  0422 09/29/18  0413   PHOSPHORUS mg/dL 3.4 4.2 5.3*               Results from last 7 days  Lab Units 10/04/18  0820 10/03/18  1055 10/02/18  0424 09/29/18  0413 09/28/18  0650   WBC 10*3/mm3 9.79 7.73 9.34 9.23 6.61   HEMOGLOBIN g/dL 10.2* 10.2* 9.9* 11.2* 10.6*   PLATELETS 10*3/mm3 263 245 226 156 134               Imaging Results (last 24 hours)     ** No results found for the last 24 hours. **          amLODIPine 5 mg Oral Q24H   aspirin 81 mg Oral Daily   atorvastatin 10 mg Oral Daily   b complex-vitamin c-folic acid 1 tablet Oral Daily   budesonide 0.5 mg Nebulization BID - RT   carvedilol 12.5 mg Oral Q12H   chlorhexidine 15 mL Mouth/Throat Q12H   famotidine 20 mg Intravenous Daily   heparin (porcine) 5,000 Units Subcutaneous Q8H   insulin detemir 10 Units Subcutaneous Nightly   insulin regular 0-7 Units Subcutaneous Q6H   ipratropium-albuterol 3 mL Nebulization Q6H - RT   levothyroxine 100 mcg Oral QAM   losartan 50 mg Oral Daily   phenytoin 100 mg Intravenous Q6H   sevelamer 800 mg Oral TID With Meals   Vitamin D 1,000 Units Oral Daily       sodium chloride 10 mL/hr Last Rate: 10 mL/hr (09/30/18 1257)        Medication Review:   Current Facility-Administered Medications   Medication Dose Route Frequency Provider Last Rate Last Dose   • amLODIPine (NORVASC) tablet 5 mg  5 mg Oral Q24H Bridger Swift MD   5 mg at 10/04/18 0848   • aspirin EC tablet 81 mg  81 mg Oral Daily Jordan Ribera MD   81 mg at 10/04/18 0848   • atorvastatin (LIPITOR) tablet 10 mg  10 mg Oral Daily Jordan Ribera MD   10 mg at 10/04/18 0848   • b complex-vitamin c-folic acid (NEPHRO-CLEOPATRA) tablet 1 tablet  1 tablet Oral Daily Jordan Ribera MD   1 tablet at 10/04/18 0848   • budesonide (PULMICORT) nebulizer solution 0.5 mg  0.5 mg Nebulization BID - RT Jordan Ribera MD   0.5 mg at 10/04/18 0712   • carvedilol (COREG) tablet 12.5 mg  12.5 mg Oral Q12H Bridger Swift MD   12.5 mg at 10/04/18 0848   • CHAPSTICK 1 each  1 each Apply externally Q2H PRN Jordan Ribera MD   1 each at 10/03/18 0641   • chlorhexidine (PERIDEX) 0.12 % solution 15 mL  15 mL Mouth/Throat Q12H Jordan Ribera MD   15 mL at 10/04/18 0848   • dextrose (D50W) 25 g/ 50mL Intravenous Solution 25 g  25 g Intravenous Q15 Min PRN Jordan Ribera MD   25 g at 10/02/18 0609   • dextrose (D50W) 25 g/ 50mL Intravenous Solution 25 g  25 g Intravenous Q15 Min PRN Triny Cooper MD       • dextrose (GLUTOSE) oral gel 1 tube  1 tube Oral Q15 Min PRN Jordan Ribera MD       • dextrose (GLUTOSE) oral gel 1 tube  1 tube Oral Q15 Min PRN Triny Cooper MD       • famotidine (PEPCID) injection 20 mg  20 mg Intravenous Daily Sharonda Bowens MD   20 mg at 10/04/18 0848   • glucagon (human recombinant) (GLUCAGEN DIAGNOSTIC) injection 1 mg  1 mg Subcutaneous PRN Jordan Ribera MD       • glucagon (human recombinant) (GLUCAGEN DIAGNOSTIC) injection 1 mg  1 mg Subcutaneous PRN Triny Cooper MD       • heparin (porcine) 5000 UNIT/ML injection 5,000 Units  5,000 Units Subcutaneous Q8H Sharonda Bowens MD   5,000 Units at 10/04/18 0620   • Influenza Vac Subunit Quad  (FLUCELVAX) injection 0.5 mL  0.5 mL Intramuscular During Hospitalization Jordan Ribera MD       • insulin detemir (LEVEMIR) injection 10 Units  10 Units Subcutaneous Nightly Sharonda Bowens MD   10 Units at 10/03/18 2154   • insulin regular (humuLIN R,novoLIN R) injection 0-7 Units  0-7 Units Subcutaneous Q6H Triny Cooper MD   3 Units at 10/04/18 0620   • ipratropium-albuterol (DUO-NEB) nebulizer solution 3 mL  3 mL Nebulization Q6H - RT Jordan Ribera MD   3 mL at 10/04/18 0712   • labetalol (NORMODYNE,TRANDATE) injection 10 mg  10 mg Intravenous Q4H PRN Sharonda Bowens MD   10 mg at 09/25/18 0541   • levothyroxine (SYNTHROID, LEVOTHROID) tablet 100 mcg  100 mcg Oral QAM Jordan Ribera MD   100 mcg at 10/04/18 0620   • losartan (COZAAR) tablet 50 mg  50 mg Oral Daily Jordan Ribera MD   50 mg at 10/04/18 0848   • ondansetron (ZOFRAN) tablet 4 mg  4 mg Oral Q6H PRN Sharonda Bowens MD        Or   • ondansetron ODT (ZOFRAN-ODT) disintegrating tablet 4 mg  4 mg Oral Q6H PRN Sharonda Bowens MD        Or   • ondansetron (ZOFRAN) injection 4 mg  4 mg Intravenous Q6H PRN Sharonda Bowens MD       • phenytoin (DILANTIN) injection 100 mg  100 mg Intravenous Q6H Jordan Ribera MD   100 mg at 10/04/18 0848   • pneumococcal polysaccharide 23-valent (PNEUMOVAX-23) vaccine 0.5 mL  0.5 mL Intramuscular During Hospitalization Jordan Ribera MD       • sevelamer (RENAGEL) tablet 800 mg  800 mg Oral TID With Meals Jordan Ribera MD   800 mg at 10/01/18 1821   • sodium chloride 0.9 % bolus 1,000 mL  1,000 mL Intravenous PRN Deric Mejia MD, FASN       • sodium chloride 0.9 % bolus 1,000 mL  1,000 mL Intravenous PRN Deric Mejia MD, FASN       • sodium chloride 0.9 % flush 1-10 mL  1-10 mL Intravenous PRN Sharonda Bowens MD       • sodium chloride 0.9 % infusion  10 mL/hr Intravenous Continuous Jordan Ribera MD 10 mL/hr at 09/30/18 1257 10 mL/hr at 09/30/18 1257   • Vitamin D 1,000 Units  1,000 Units Oral Daily  Jordan Ribera MD   1,000 Units at 10/04/18 0848       Assessment/Plan     1. End-stage renal disease: Tuesday Thursday Saturday at [facility].  She does for 3 hours with a standard bath.  Usually gains 1-2 L in between dialysis.  2. Metabolic acidosis: Treated  3. Respiratory failure leading to intubation: It is more so for safety, or change her sedation to Versed.  4. Anemia of chronic kidney disease: She is fairly compliant and hemoglobin stays within the goal.  5. Essential hypertension: It is under fairly good control as if she is able to take the medications.  6. Poorly controlled diabetes: She has never been able to control her diabetes real well A1c has ranged between 9-13.  7. Hyperosmolar state with coma: Likely combination off hyperosmolar state as well as seizures.  8. Mixed hyperlipidemia:   9. Status post pacemaker: Has done well since the pacemaker  10. COPD and recurrent exacerbation:   11. Seizure (CMS/[facility]): Neurology evaluation is in progress    Plan:    · Will continue dialysis as scheduled.  After today will continue with her Tuesday Thursday Saturday schedule.  · Low potassium noted will change her bath to 4 potassium bath.  · Continue to optimize medications.  · Neuro evaluation is in progress, she is still not awake enough to be extubated.  I think it's time to consider tracheostomy and G-tube placement.  · Pulmonary as per Dr. Sanz.  · Plan to transfer her to long-term acute care evaluation is in progress.  · Details were discussed with the patient no family in the room.    · Details were also discussed with the hospitalist service.   · Surveillance labs.  · Further recommendations will depend on clinical course of the patient during the current hospitalization.    · I also discussed the details with the nursing staff.  · Rest as ordered.    Deric Mejia MD, FASN  10/04/18  9:29 AM    Dictated utilizing Dragon dictation.

## 2018-10-04 NOTE — PROGRESS NOTES
Case Management Discharge Note    Final Note: [facility].    Destination - Selection Complete     Service Request Status Selected Specialties Address Phone Number Fax Number    [facility] [address] [place] [id number] [phone]      Durable Medical Equipment     No service has been selected for the patient.      Dialysis/Infusion     No service has been selected for the patient.      Home Medical Care     No service has been selected for the patient.      Social Care     No service has been selected for the patient.        Ambulance: [facility]    Final Discharge Disposition Code: 63 - [facility]

## 2018-10-04 NOTE — PLAN OF CARE
Problem: Fall Risk (Adult)  Intervention: Monitor/Assist with Self Care   10/04/18 0443   Activity   Activity Assistance Provided assistance, 2 people     Intervention: Reduce Risk/Promote Restraint Free Environment   10/04/18 0400   Safety Management   Environmental Safety Modification assistive device/personal items within reach;clutter free environment maintained;room near unit station   Safety Promotion/Fall Prevention fall prevention program maintained;safety round/check completed     Intervention: Review Medications/Identify Contributors to Fall Risk   10/04/18 0443   Safety Management   Medication Review/Management medications reviewed         Problem: Seizure Disorder/Epilepsy (Adult)  Intervention: Promote Airway Safety and Patency   10/04/18 0443   Positioning   Head of Bed (HOB) HOB at 30 degrees         Problem: Pain, Acute (Adult)  Goal: Identify Related Risk Factors and Signs and Symptoms  Outcome: Ongoing (interventions implemented as appropriate)   10/03/18 0358 10/04/18 0443   Pain, Acute (Adult)   Related Risk Factors (Acute Pain) --  communication barrier   Signs and Symptoms (Acute Pain) BADLs/IADLs reluctance/inability to perform;altered time perception;impaired thought process/concentration --      Goal: Acceptable Pain Control/Comfort Level  Outcome: Ongoing (interventions implemented as appropriate)   10/04/18 0443   Pain, Acute (Adult)   Acceptable Pain Control/Comfort Level making progress toward outcome       Problem: Skin Injury Risk (Adult)  Intervention: Prevent/Manage Excess Moisture   10/04/18 0300   Hygiene Care   Perineal Care absorbent pad changed;perineum cleansed;protective cream applied     Intervention: Maintain Head of Bed Elevation Less Than 30 Degrees as Tolerated   10/04/18 0443   Positioning   Head of Bed (HOB) HOB at 30 degrees     Intervention: Prevent/Minimize Shear/Friction Injuries   10/04/18 0400   Positioning   Positioning/Transfer Devices pillows;in use   Skin  Interventions   Pressure Reduction Devices heel offloading device utilized;positioning supports utilized;pressure-redistributing mattress utilized     Intervention: Prevent or Minimize Pressure   10/04/18 0400   Positioning   Body Position supine, legs elevated;upper extremity elevated, left;upper extremity elevated, right;weight shift assistance provided   Skin Interventions   Pressure Reduction Techniques heels elevated off bed;weight shift assistance provided       Goal: Identify Related Risk Factors and Signs and Symptoms  Outcome: Ongoing (interventions implemented as appropriate)   10/04/18 0443   Skin Injury Risk (Adult)   Related Risk Factors (Skin Injury Risk) advanced age;cognitive impairment;critical care admission;edema;hospitalization prolonged;infection;mechanical forces;medical devices;mobility impaired;tissue perfusion altered     Goal: Skin Health and Integrity  Outcome: Ongoing (interventions implemented as appropriate)   10/04/18 0443   Skin Injury Risk (Adult)   Skin Health and Integrity making progress toward outcome       Problem: Nutrition, Enteral (Adult)  Intervention: Manage Aspiration Risk   10/04/18 0400   Safety Interventions   Aspiration Precautions tube feeding placement verified;respiratory status monitored     Intervention: Position with HOB Elevated   10/04/18 0400   Positioning   Head of Bed (HOB) HOB at 30 degrees   Body Position supine, legs elevated;upper extremity elevated, left;upper extremity elevated, right;weight shift assistance provided     Intervention: Monitor/Manage Nutrition Support   10/03/18 2000   Nutrition Interventions   Nutrition Support Management other (see comments)  (tube feeding continued)     Intervention: Prevent Feeding-Related Adverse Events   10/04/18 0200 10/04/18 0400   Safety Management   Medication Review/Management --  medications reviewed   Hygiene Care   Oral Care lip lubricant applied --      Intervention: Promote Magaly-Tube Skin/Mucosal  Integrity   10/04/18 0400   Skin Interventions   Device Skin Pressure Protection absorbent pad utilized/changed;adhesive use limited;positioning supports utilized       Goal: Signs and Symptoms of Listed Potential Problems Will be Absent, Minimized or Managed (Nutrition, Enteral)  Outcome: Ongoing (interventions implemented as appropriate)   10/04/18 2899   Goal/Outcome Evaluation   Problems Assessed (Enteral Nutrition) all   Problems Present (Enteral Nutrition) gastrointestinal complications;skin/mucosal integrity impairment

## 2018-10-04 NOTE — PROGRESS NOTES
0830  Attempted to visit pt this am.  Pt receiving dialysis and spouse not present at that time.  Informed plan remains to DC to [facility] after dialysis.    1230  Pt's spouse here.  Family with him.  Awaiting DC to LTAC    1304   DC'd to [facility] via EMS.  No referral to HCP   bilateral yes/bilateral

## 2018-10-18 ENCOUNTER — HOSPITAL (OUTPATIENT)
Dept: OTHER | Age: 78
End: 2018-10-18
Attending: PAIN MEDICINE

## 2018-10-19 ENCOUNTER — CHARTING TRANS (OUTPATIENT)
Dept: OTHER | Age: 78
End: 2018-10-19

## 2018-10-23 ENCOUNTER — CHARTING TRANS (OUTPATIENT)
Dept: OTHER | Age: 78
End: 2018-10-23

## 2018-10-25 ENCOUNTER — CHARTING TRANS (OUTPATIENT)
Dept: OTHER | Age: 78
End: 2018-10-25

## 2018-10-31 VITALS
RESPIRATION RATE: 16 BRPM | SYSTOLIC BLOOD PRESSURE: 138 MMHG | OXYGEN SATURATION: 100 % | WEIGHT: 129.85 LBS | DIASTOLIC BLOOD PRESSURE: 64 MMHG | BODY MASS INDEX: 22.17 KG/M2 | HEART RATE: 56 BPM | TEMPERATURE: 98.3 F | HEIGHT: 64 IN

## 2018-10-31 VITALS
SYSTOLIC BLOOD PRESSURE: 141 MMHG | HEART RATE: 57 BPM | WEIGHT: 129.85 LBS | DIASTOLIC BLOOD PRESSURE: 55 MMHG | OXYGEN SATURATION: 99 % | BODY MASS INDEX: 22.17 KG/M2 | RESPIRATION RATE: 16 BRPM | TEMPERATURE: 98.1 F | HEIGHT: 64 IN

## 2018-10-31 VITALS
OXYGEN SATURATION: 100 % | WEIGHT: 128.2 LBS | DIASTOLIC BLOOD PRESSURE: 73 MMHG | SYSTOLIC BLOOD PRESSURE: 154 MMHG | RESPIRATION RATE: 20 BRPM | TEMPERATURE: 98.3 F | HEIGHT: 64 IN | HEART RATE: 83 BPM | BODY MASS INDEX: 21.89 KG/M2

## 2018-11-01 VITALS
DIASTOLIC BLOOD PRESSURE: 67 MMHG | WEIGHT: 133.27 LBS | HEART RATE: 58 BPM | TEMPERATURE: 97.8 F | RESPIRATION RATE: 15 BRPM | BODY MASS INDEX: 22.75 KG/M2 | HEIGHT: 64 IN | SYSTOLIC BLOOD PRESSURE: 135 MMHG

## 2018-11-01 VITALS
TEMPERATURE: 97.5 F | WEIGHT: 132.94 LBS | BODY MASS INDEX: 22.7 KG/M2 | DIASTOLIC BLOOD PRESSURE: 48 MMHG | RESPIRATION RATE: 16 BRPM | SYSTOLIC BLOOD PRESSURE: 130 MMHG | OXYGEN SATURATION: 99 % | HEART RATE: 53 BPM | HEIGHT: 64 IN

## 2018-11-02 VITALS
OXYGEN SATURATION: 98 % | SYSTOLIC BLOOD PRESSURE: 151 MMHG | HEART RATE: 53 BPM | RESPIRATION RATE: 16 BRPM | TEMPERATURE: 97.8 F | HEIGHT: 64 IN | WEIGHT: 131.72 LBS | DIASTOLIC BLOOD PRESSURE: 60 MMHG | BODY MASS INDEX: 22.49 KG/M2

## 2018-11-02 VITALS
TEMPERATURE: 98.6 F | HEART RATE: 49 BPM | RESPIRATION RATE: 17 BRPM | OXYGEN SATURATION: 100 % | SYSTOLIC BLOOD PRESSURE: 160 MMHG | DIASTOLIC BLOOD PRESSURE: 72 MMHG

## 2018-11-02 VITALS
DIASTOLIC BLOOD PRESSURE: 70 MMHG | HEART RATE: 51 BPM | HEIGHT: 64 IN | SYSTOLIC BLOOD PRESSURE: 176 MMHG | TEMPERATURE: 97.6 F | RESPIRATION RATE: 16 BRPM | BODY MASS INDEX: 22.71 KG/M2 | WEIGHT: 133.05 LBS

## 2018-11-03 VITALS
TEMPERATURE: 96.7 F | SYSTOLIC BLOOD PRESSURE: 114 MMHG | WEIGHT: 127.2 LBS | RESPIRATION RATE: 16 BRPM | HEIGHT: 64 IN | DIASTOLIC BLOOD PRESSURE: 55 MMHG | BODY MASS INDEX: 21.72 KG/M2 | OXYGEN SATURATION: 98 % | HEART RATE: 64 BPM

## 2018-11-03 VITALS
HEIGHT: 64 IN | HEART RATE: 53 BPM | WEIGHT: 130.4 LBS | DIASTOLIC BLOOD PRESSURE: 78 MMHG | TEMPERATURE: 97.7 F | RESPIRATION RATE: 16 BRPM | BODY MASS INDEX: 22.26 KG/M2 | OXYGEN SATURATION: 100 % | SYSTOLIC BLOOD PRESSURE: 116 MMHG

## 2018-11-03 VITALS
RESPIRATION RATE: 16 BRPM | OXYGEN SATURATION: 98 % | HEIGHT: 64 IN | DIASTOLIC BLOOD PRESSURE: 56 MMHG | TEMPERATURE: 98 F | BODY MASS INDEX: 21.85 KG/M2 | SYSTOLIC BLOOD PRESSURE: 117 MMHG | HEART RATE: 80 BPM | WEIGHT: 128 LBS

## 2018-11-05 ENCOUNTER — CHARTING TRANS (OUTPATIENT)
Dept: OTHER | Age: 78
End: 2018-11-05

## 2018-11-05 VITALS
HEIGHT: 64 IN | WEIGHT: 128.75 LBS | SYSTOLIC BLOOD PRESSURE: 124 MMHG | HEART RATE: 85 BPM | BODY MASS INDEX: 21.98 KG/M2 | DIASTOLIC BLOOD PRESSURE: 60 MMHG | RESPIRATION RATE: 16 BRPM | OXYGEN SATURATION: 98 % | TEMPERATURE: 96.8 F

## 2018-11-23 ENCOUNTER — IMAGING SERVICES (OUTPATIENT)
Dept: OTHER | Age: 78
End: 2018-11-23

## 2018-11-27 ENCOUNTER — CHARTING TRANS (OUTPATIENT)
Dept: OTHER | Age: 78
End: 2018-11-27

## 2018-11-27 VITALS
SYSTOLIC BLOOD PRESSURE: 146 MMHG | DIASTOLIC BLOOD PRESSURE: 62 MMHG | WEIGHT: 128.86 LBS | BODY MASS INDEX: 22 KG/M2 | RESPIRATION RATE: 18 BRPM | OXYGEN SATURATION: 100 % | HEART RATE: 52 BPM | HEIGHT: 64 IN | TEMPERATURE: 96.8 F

## 2018-11-27 VITALS
SYSTOLIC BLOOD PRESSURE: 136 MMHG | HEART RATE: 55 BPM | BODY MASS INDEX: 21.79 KG/M2 | RESPIRATION RATE: 18 BRPM | TEMPERATURE: 97.4 F | OXYGEN SATURATION: 100 % | WEIGHT: 127.65 LBS | HEIGHT: 64 IN | DIASTOLIC BLOOD PRESSURE: 62 MMHG

## 2018-11-27 VITALS
HEART RATE: 53 BPM | TEMPERATURE: 95.6 F | SYSTOLIC BLOOD PRESSURE: 148 MMHG | BODY MASS INDEX: 22.24 KG/M2 | WEIGHT: 130.29 LBS | DIASTOLIC BLOOD PRESSURE: 72 MMHG | HEIGHT: 64 IN | OXYGEN SATURATION: 100 % | RESPIRATION RATE: 18 BRPM

## 2018-11-27 VITALS
TEMPERATURE: 97.4 F | RESPIRATION RATE: 18 BRPM | SYSTOLIC BLOOD PRESSURE: 128 MMHG | DIASTOLIC BLOOD PRESSURE: 62 MMHG | OXYGEN SATURATION: 100 % | HEART RATE: 77 BPM

## 2018-12-01 ENCOUNTER — TELEPHONE (OUTPATIENT)
Dept: CARDIOLOGY | Age: 78
End: 2018-12-01

## 2018-12-07 VITALS
HEART RATE: 59 BPM | RESPIRATION RATE: 18 BRPM | SYSTOLIC BLOOD PRESSURE: 138 MMHG | DIASTOLIC BLOOD PRESSURE: 72 MMHG | OXYGEN SATURATION: 100 % | TEMPERATURE: 98.5 F

## 2018-12-10 DIAGNOSIS — M25.532 LEFT WRIST PAIN: Primary | ICD-10-CM

## 2019-01-01 ENCOUNTER — EXTERNAL RECORD (OUTPATIENT)
Dept: HEALTH INFORMATION MANAGEMENT | Age: 79
End: 2019-01-01

## 2019-01-11 ENCOUNTER — TELEPHONE (OUTPATIENT)
Dept: SCHEDULING | Age: 79
End: 2019-01-11

## 2019-01-11 RX ORDER — ESCITALOPRAM OXALATE 10 MG/1
10 TABLET ORAL DAILY
COMMUNITY
End: 2019-01-11 | Stop reason: SDUPTHER

## 2019-01-11 RX ORDER — ESCITALOPRAM OXALATE 10 MG/1
10 TABLET ORAL DAILY
Qty: 30 TABLET | Refills: 1 | Status: SHIPPED | OUTPATIENT
Start: 2019-01-11 | End: 2019-02-27 | Stop reason: SDUPTHER

## 2019-01-15 ENCOUNTER — TELEPHONE (OUTPATIENT)
Dept: SCHEDULING | Age: 79
End: 2019-01-15

## 2019-01-16 DIAGNOSIS — M81.0 OSTEOPOROSIS, UNSPECIFIED OSTEOPOROSIS TYPE, UNSPECIFIED PATHOLOGICAL FRACTURE PRESENCE: Primary | ICD-10-CM

## 2019-01-16 RX ORDER — ALENDRONATE SODIUM 70 MG/1
TABLET ORAL
Qty: 4 TABLET | Refills: 3 | Status: SHIPPED | OUTPATIENT
Start: 2019-01-16 | End: 2019-05-07 | Stop reason: SDUPTHER

## 2019-01-16 RX ORDER — ALENDRONATE SODIUM 70 MG/1
1 TABLET ORAL
COMMUNITY
Start: 2018-05-01 | End: 2019-01-16 | Stop reason: SDUPTHER

## 2019-01-18 RX ORDER — ALENDRONATE SODIUM 70 MG/1
TABLET ORAL
Qty: 4 TABLET | Refills: 3 | OUTPATIENT
Start: 2019-01-18

## 2019-01-21 ENCOUNTER — HOSPITAL (OUTPATIENT)
Dept: OTHER | Age: 79
End: 2019-01-21
Attending: ANESTHESIOLOGY

## 2019-02-06 ENCOUNTER — TELEPHONE (OUTPATIENT)
Dept: INTERNAL MEDICINE | Age: 79
End: 2019-02-06

## 2019-02-06 RX ORDER — ANASTROZOLE 1 MG/1
TABLET ORAL
Qty: 90 TABLET | Refills: 0 | Status: SHIPPED | OUTPATIENT
Start: 2019-02-06 | End: 2019-04-30 | Stop reason: SDUPTHER

## 2019-02-12 RX ORDER — BACLOFEN 20 MG/1
TABLET ORAL
COMMUNITY
Start: 2018-11-28 | End: 2019-11-28

## 2019-02-12 RX ORDER — SIMVASTATIN 20 MG
TABLET ORAL
COMMUNITY
Start: 2018-09-10 | End: 2019-03-31 | Stop reason: SDUPTHER

## 2019-02-12 RX ORDER — MAGNESIUM CARB/ALUMINUM HYDROX 105-160MG
TABLET,CHEWABLE ORAL
COMMUNITY
End: 2021-01-07 | Stop reason: ALTCHOICE

## 2019-02-12 RX ORDER — TRAMADOL HYDROCHLORIDE 50 MG/1
TABLET ORAL
COMMUNITY
End: 2021-06-10

## 2019-02-12 RX ORDER — ALPRAZOLAM 0.25 MG/1
TABLET ORAL
COMMUNITY
Start: 2018-09-05 | End: 2019-03-04

## 2019-02-12 RX ORDER — GABAPENTIN 300 MG/1
CAPSULE ORAL
COMMUNITY
Start: 2018-09-07 | End: 2019-02-27 | Stop reason: SDUPTHER

## 2019-02-26 ENCOUNTER — TELEPHONE (OUTPATIENT)
Dept: SCHEDULING | Age: 79
End: 2019-02-26

## 2019-02-27 ENCOUNTER — OFFICE VISIT (OUTPATIENT)
Dept: FAMILY MEDICINE | Age: 79
End: 2019-02-27

## 2019-02-27 VITALS
WEIGHT: 129.52 LBS | DIASTOLIC BLOOD PRESSURE: 59 MMHG | SYSTOLIC BLOOD PRESSURE: 118 MMHG | RESPIRATION RATE: 16 BRPM | TEMPERATURE: 97.4 F | HEIGHT: 63 IN | HEART RATE: 53 BPM | OXYGEN SATURATION: 100 % | BODY MASS INDEX: 22.95 KG/M2

## 2019-02-27 DIAGNOSIS — M47.816 FACET ARTHRITIS OF LUMBAR REGION: ICD-10-CM

## 2019-02-27 DIAGNOSIS — R76.12 NONSPECIFIC REACTION TO CELL MEDIATED IMMUNITY MEASUREMENT OF GAMMA INTERFERON ANTIGEN RESPONSE WITHOUT ACTIVE TUBERCULOSIS: Primary | ICD-10-CM

## 2019-02-27 DIAGNOSIS — Z11.1 SCREENING EXAMINATION FOR PULMONARY TUBERCULOSIS: ICD-10-CM

## 2019-02-27 DIAGNOSIS — K58.2 IRRITABLE BOWEL SYNDROME WITH BOTH CONSTIPATION AND DIARRHEA: ICD-10-CM

## 2019-02-27 DIAGNOSIS — I10 BENIGN ESSENTIAL HYPERTENSION: ICD-10-CM

## 2019-02-27 PROCEDURE — 86580 TB INTRADERMAL TEST: CPT

## 2019-02-27 PROCEDURE — 99214 OFFICE O/P EST MOD 30 MIN: CPT | Performed by: FAMILY MEDICINE

## 2019-02-27 PROCEDURE — 3074F SYST BP LT 130 MM HG: CPT | Performed by: FAMILY MEDICINE

## 2019-02-27 PROCEDURE — 3078F DIAST BP <80 MM HG: CPT | Performed by: FAMILY MEDICINE

## 2019-02-27 RX ORDER — ESCITALOPRAM OXALATE 10 MG/1
10 TABLET ORAL DAILY
Qty: 90 TABLET | Refills: 2 | Status: SHIPPED | OUTPATIENT
Start: 2019-02-27 | End: 2019-11-24 | Stop reason: SDUPTHER

## 2019-02-27 RX ORDER — GABAPENTIN 300 MG/1
300 CAPSULE ORAL 2 TIMES DAILY
Qty: 180 CAPSULE | Refills: 2 | Status: SHIPPED | OUTPATIENT
Start: 2019-02-27 | End: 2019-11-14 | Stop reason: SDUPTHER

## 2019-02-27 SDOH — HEALTH STABILITY: MENTAL HEALTH: HOW OFTEN DO YOU HAVE A DRINK CONTAINING ALCOHOL?: NEVER

## 2019-03-01 DIAGNOSIS — M54.10 RADICULOPATHY, UNSPECIFIED SPINAL REGION: Primary | ICD-10-CM

## 2019-03-01 PROBLEM — M65.322 TRIGGER INDEX FINGER OF LEFT HAND: Status: ACTIVE | Noted: 2017-07-21

## 2019-03-01 PROBLEM — H02.409 PTOSIS: Status: ACTIVE | Noted: 2018-03-29

## 2019-03-01 PROBLEM — M25.532 LEFT WRIST PAIN: Status: ACTIVE | Noted: 2018-11-05

## 2019-03-01 PROBLEM — M65.311 TRIGGER FINGER OF RIGHT THUMB: Status: ACTIVE | Noted: 2018-02-09

## 2019-03-01 PROBLEM — M47.816 FACET ARTHRITIS OF LUMBAR REGION: Status: ACTIVE | Noted: 2019-03-01

## 2019-03-01 RX ORDER — DICYCLOMINE HYDROCHLORIDE 10 MG/1
10 CAPSULE ORAL 2 TIMES DAILY PRN
Qty: 60 CAPSULE | Refills: 2 | Status: SHIPPED | OUTPATIENT
Start: 2019-03-01 | End: 2019-06-29 | Stop reason: SDUPTHER

## 2019-03-04 ENCOUNTER — HOSPITAL (OUTPATIENT)
Dept: OTHER | Age: 79
End: 2019-03-04
Attending: ANESTHESIOLOGY

## 2019-03-11 ENCOUNTER — HOSPITAL (OUTPATIENT)
Dept: OTHER | Age: 79
End: 2019-03-11
Attending: FAMILY MEDICINE

## 2019-03-28 ENCOUNTER — LAB SERVICES (OUTPATIENT)
Dept: LAB | Age: 79
End: 2019-03-28

## 2019-03-28 LAB
ALBUMIN SERPL-MCNC: 4.1 G/DL (ref 3.6–5.1)
ALBUMIN/GLOB SERPL: 1.2 {RATIO} (ref 1–2.4)
ALP SERPL-CCNC: 50 UNITS/L (ref 45–117)
ALT SERPL-CCNC: 18 UNITS/L
ANION GAP SERPL CALC-SCNC: 10 MMOL/L (ref 10–20)
AST SERPL-CCNC: 24 UNITS/L
BASOPHILS # BLD AUTO: 0 K/MCL (ref 0–0.3)
BASOPHILS NFR BLD AUTO: 1 %
BILIRUB SERPL-MCNC: 0.4 MG/DL (ref 0.2–1)
BUN SERPL-MCNC: 22 MG/DL (ref 6–20)
BUN/CREAT SERPL: 27 (ref 7–25)
CALCIUM SERPL-MCNC: 9.7 MG/DL (ref 8.4–10.2)
CHLORIDE SERPL-SCNC: 108 MMOL/L (ref 98–107)
CHOLEST SERPL-MCNC: 174 MG/DL
CHOLEST/HDLC SERPL: 2.5 {RATIO}
CO2 SERPL-SCNC: 29 MMOL/L (ref 21–32)
CREAT SERPL-MCNC: 0.82 MG/DL (ref 0.51–0.95)
DIFFERENTIAL METHOD BLD: ABNORMAL
EOSINOPHIL # BLD AUTO: 0.1 K/MCL (ref 0.1–0.5)
EOSINOPHIL NFR SPEC: 2 %
ERYTHROCYTE [DISTWIDTH] IN BLOOD: 13.4 % (ref 11–15)
FASTING STATUS PATIENT QL REPORTED: ABNORMAL HRS
GLOBULIN SER-MCNC: 3.4 G/DL (ref 2–4)
GLUCOSE SERPL-MCNC: 83 MG/DL (ref 65–99)
HCT VFR BLD CALC: 41.2 % (ref 36–46.5)
HDLC SERPL-MCNC: 71 MG/DL
HGB BLD-MCNC: 13.2 G/DL (ref 12–15.5)
IMM GRANULOCYTES # BLD AUTO: 0 K/MCL (ref 0–0.2)
IMM GRANULOCYTES NFR BLD: 0 %
LDLC SERPL-MCNC: 80 MG/DL
LENGTH OF FAST TIME PATIENT: NORMAL HRS
LYMPHOCYTES # BLD MANUAL: 1.5 K/MCL (ref 1–4)
LYMPHOCYTES NFR BLD MANUAL: 38 %
MCH RBC QN AUTO: 30.6 PG (ref 26–34)
MCHC RBC AUTO-ENTMCNC: 32 G/DL (ref 32–36.5)
MCV RBC AUTO: 95.4 FL (ref 78–100)
MONOCYTES # BLD MANUAL: 0.4 K/MCL (ref 0.3–0.9)
MONOCYTES NFR BLD MANUAL: 9 %
NEUTROPHILS # BLD: 2.1 K/MCL (ref 1.8–7.7)
NEUTROPHILS NFR BLD AUTO: 50 %
NONHDLC SERPL-MCNC: 103 MG/DL
NRBC BLD MANUAL-RTO: 0 /100 WBC
PLATELET # BLD: 208 K/MCL (ref 140–450)
POTASSIUM SERPL-SCNC: 4.6 MMOL/L (ref 3.4–5.1)
PROT SERPL-MCNC: 7.5 G/DL (ref 6.4–8.2)
RBC # BLD: 4.32 MIL/MCL (ref 4–5.2)
SODIUM SERPL-SCNC: 142 MMOL/L (ref 135–145)
TRIGL SERPL-MCNC: 116 MG/DL
TSH SERPL-ACNC: 1.89 MCUNITS/ML (ref 0.35–5)
WBC # BLD: 4 K/MCL (ref 4.2–11)

## 2019-03-28 PROCEDURE — 80053 COMPREHEN METABOLIC PANEL: CPT | Performed by: FAMILY MEDICINE

## 2019-03-28 PROCEDURE — 80061 LIPID PANEL: CPT | Performed by: FAMILY MEDICINE

## 2019-03-28 PROCEDURE — 84443 ASSAY THYROID STIM HORMONE: CPT | Performed by: FAMILY MEDICINE

## 2019-03-28 PROCEDURE — 85025 COMPLETE CBC W/AUTO DIFF WBC: CPT | Performed by: FAMILY MEDICINE

## 2019-04-01 ENCOUNTER — TELEPHONE (OUTPATIENT)
Dept: SCHEDULING | Age: 79
End: 2019-04-01

## 2019-04-01 RX ORDER — SIMVASTATIN 20 MG
TABLET ORAL
Qty: 90 TABLET | Refills: 1 | Status: SHIPPED | OUTPATIENT
Start: 2019-04-01 | End: 2019-09-15 | Stop reason: SDUPTHER

## 2019-04-30 RX ORDER — ANASTROZOLE 1 MG/1
TABLET ORAL
Qty: 90 TABLET | Refills: 0 | Status: SHIPPED | OUTPATIENT
Start: 2019-04-30 | End: 2019-08-13 | Stop reason: SDUPTHER

## 2019-05-07 DIAGNOSIS — M81.0 OSTEOPOROSIS, UNSPECIFIED OSTEOPOROSIS TYPE, UNSPECIFIED PATHOLOGICAL FRACTURE PRESENCE: ICD-10-CM

## 2019-05-08 RX ORDER — ALENDRONATE SODIUM 70 MG/1
TABLET ORAL
Qty: 4 TABLET | Refills: 0 | Status: SHIPPED | OUTPATIENT
Start: 2019-05-08 | End: 2019-06-04 | Stop reason: SDUPTHER

## 2019-05-09 ENCOUNTER — TELEPHONE (OUTPATIENT)
Dept: SCHEDULING | Age: 79
End: 2019-05-09

## 2019-05-17 ENCOUNTER — OFFICE VISIT (OUTPATIENT)
Dept: FAMILY MEDICINE | Age: 79
End: 2019-05-17

## 2019-05-17 VITALS
SYSTOLIC BLOOD PRESSURE: 118 MMHG | OXYGEN SATURATION: 100 % | BODY MASS INDEX: 22.77 KG/M2 | WEIGHT: 128.53 LBS | DIASTOLIC BLOOD PRESSURE: 54 MMHG | HEART RATE: 55 BPM | TEMPERATURE: 96.1 F | HEIGHT: 63 IN | RESPIRATION RATE: 16 BRPM

## 2019-05-17 DIAGNOSIS — I10 BENIGN ESSENTIAL HYPERTENSION: ICD-10-CM

## 2019-05-17 DIAGNOSIS — K58.2 IRRITABLE BOWEL SYNDROME WITH BOTH CONSTIPATION AND DIARRHEA: Primary | ICD-10-CM

## 2019-05-17 DIAGNOSIS — F41.9 ANXIETY DISORDER, UNSPECIFIED TYPE: ICD-10-CM

## 2019-05-17 DIAGNOSIS — E78.5 HYPERLIPIDEMIA, UNSPECIFIED HYPERLIPIDEMIA TYPE: ICD-10-CM

## 2019-05-17 PROCEDURE — 99214 OFFICE O/P EST MOD 30 MIN: CPT | Performed by: FAMILY MEDICINE

## 2019-05-17 PROCEDURE — 3078F DIAST BP <80 MM HG: CPT | Performed by: FAMILY MEDICINE

## 2019-05-17 PROCEDURE — 3074F SYST BP LT 130 MM HG: CPT | Performed by: FAMILY MEDICINE

## 2019-05-17 ASSESSMENT — ENCOUNTER SYMPTOMS
CONSTITUTIONAL NEGATIVE: 1
RESPIRATORY NEGATIVE: 1
NEUROLOGICAL NEGATIVE: 1

## 2019-05-17 ASSESSMENT — PATIENT HEALTH QUESTIONNAIRE - PHQ9
SUM OF ALL RESPONSES TO PHQ9 QUESTIONS 1 AND 2: 0
SUM OF ALL RESPONSES TO PHQ9 QUESTIONS 1 AND 2: 0
2. FEELING DOWN, DEPRESSED OR HOPELESS: NOT AT ALL
1. LITTLE INTEREST OR PLEASURE IN DOING THINGS: NOT AT ALL

## 2019-06-04 DIAGNOSIS — M81.0 OSTEOPOROSIS, UNSPECIFIED OSTEOPOROSIS TYPE, UNSPECIFIED PATHOLOGICAL FRACTURE PRESENCE: ICD-10-CM

## 2019-06-05 RX ORDER — ALENDRONATE SODIUM 70 MG/1
TABLET ORAL
Qty: 4 TABLET | Refills: 11 | Status: SHIPPED | OUTPATIENT
Start: 2019-06-05 | End: 2020-03-30

## 2019-06-08 ENCOUNTER — TELEPHONE (OUTPATIENT)
Dept: SCHEDULING | Age: 79
End: 2019-06-08

## 2019-06-11 ENCOUNTER — APPOINTMENT (OUTPATIENT)
Dept: HEMATOLOGY/ONCOLOGY | Age: 79
End: 2019-06-11

## 2019-06-18 ENCOUNTER — OFFICE VISIT (OUTPATIENT)
Dept: HEMATOLOGY/ONCOLOGY | Age: 79
End: 2019-06-18

## 2019-06-18 VITALS
SYSTOLIC BLOOD PRESSURE: 154 MMHG | HEIGHT: 65 IN | WEIGHT: 128.42 LBS | RESPIRATION RATE: 15 BRPM | HEART RATE: 53 BPM | BODY MASS INDEX: 21.4 KG/M2 | DIASTOLIC BLOOD PRESSURE: 69 MMHG | TEMPERATURE: 97.9 F

## 2019-06-18 DIAGNOSIS — Z85.3 HISTORY OF BREAST CANCER: ICD-10-CM

## 2019-06-18 DIAGNOSIS — C50.912 ADENOCARCINOMA OF LEFT BREAST (CMD): Primary | ICD-10-CM

## 2019-06-18 DIAGNOSIS — C50.912 RECURRENT MALIGNANT NEOPLASM OF LEFT BREAST (CMD): ICD-10-CM

## 2019-06-18 PROCEDURE — 3077F SYST BP >= 140 MM HG: CPT | Performed by: INTERNAL MEDICINE

## 2019-06-18 PROCEDURE — 99214 OFFICE O/P EST MOD 30 MIN: CPT | Performed by: INTERNAL MEDICINE

## 2019-06-18 PROCEDURE — 3078F DIAST BP <80 MM HG: CPT | Performed by: INTERNAL MEDICINE

## 2019-06-18 ASSESSMENT — ENCOUNTER SYMPTOMS
STRIDOR: 0
CHILLS: 0
DIARRHEA: 0
CHOKING: 0
FEVER: 0
UNEXPECTED WEIGHT CHANGE: 0
VOICE CHANGE: 0
CONSTIPATION: 0
BACK PAIN: 0
ABDOMINAL DISTENTION: 0
NAUSEA: 0
WEAKNESS: 0
VOMITING: 0
FACIAL SWELLING: 0
DIZZINESS: 0
SHORTNESS OF BREATH: 0
ACTIVITY CHANGE: 0
SORE THROAT: 0
SEIZURES: 0
ANAL BLEEDING: 0
DIAPHORESIS: 0
TREMORS: 0
ADENOPATHY: 0
FACIAL ASYMMETRY: 0
WHEEZING: 0
SPEECH DIFFICULTY: 0
ABDOMINAL PAIN: 0
BLOOD IN STOOL: 0
HEADACHES: 0
TROUBLE SWALLOWING: 0
FATIGUE: 0
CHEST TIGHTNESS: 0
APNEA: 0
RECTAL PAIN: 0
APPETITE CHANGE: 0
BRUISES/BLEEDS EASILY: 0

## 2019-07-02 RX ORDER — DICYCLOMINE HYDROCHLORIDE 10 MG/1
10 CAPSULE ORAL 2 TIMES DAILY PRN
Qty: 60 CAPSULE | Refills: 2 | Status: SHIPPED | OUTPATIENT
Start: 2019-07-02 | End: 2019-07-16 | Stop reason: SDUPTHER

## 2019-07-16 RX ORDER — DICYCLOMINE HYDROCHLORIDE 10 MG/1
10 CAPSULE ORAL 2 TIMES DAILY PRN
Qty: 180 CAPSULE | Refills: 1 | Status: SHIPPED | OUTPATIENT
Start: 2019-07-16 | End: 2019-09-18 | Stop reason: SDUPTHER

## 2019-08-12 ENCOUNTER — TELEPHONE (OUTPATIENT)
Dept: SCHEDULING | Age: 79
End: 2019-08-12

## 2019-08-12 DIAGNOSIS — H40.9 GLAUCOMA, UNSPECIFIED GLAUCOMA TYPE, UNSPECIFIED LATERALITY: ICD-10-CM

## 2019-08-12 DIAGNOSIS — H26.9 CATARACT, UNSPECIFIED CATARACT TYPE, UNSPECIFIED LATERALITY: Primary | ICD-10-CM

## 2019-08-14 RX ORDER — ANASTROZOLE 1 MG/1
TABLET ORAL
Qty: 90 TABLET | Refills: 0 | Status: SHIPPED | OUTPATIENT
Start: 2019-08-14 | End: 2019-11-11 | Stop reason: SDUPTHER

## 2019-08-19 ENCOUNTER — OFFICE VISIT (OUTPATIENT)
Dept: FAMILY MEDICINE | Age: 79
End: 2019-08-19

## 2019-08-19 DIAGNOSIS — K58.2 IRRITABLE BOWEL SYNDROME WITH BOTH CONSTIPATION AND DIARRHEA: Primary | ICD-10-CM

## 2019-08-19 DIAGNOSIS — J20.8 ACUTE BACTERIAL BRONCHITIS: ICD-10-CM

## 2019-08-19 DIAGNOSIS — E78.5 HYPERLIPIDEMIA, UNSPECIFIED HYPERLIPIDEMIA TYPE: ICD-10-CM

## 2019-08-19 DIAGNOSIS — R13.10 DYSPHAGIA, UNSPECIFIED TYPE: ICD-10-CM

## 2019-08-19 DIAGNOSIS — J01.90 ACUTE BACTERIAL SINUSITIS: ICD-10-CM

## 2019-08-19 DIAGNOSIS — I10 BENIGN ESSENTIAL HYPERTENSION: ICD-10-CM

## 2019-08-19 DIAGNOSIS — B96.89 ACUTE BACTERIAL SINUSITIS: ICD-10-CM

## 2019-08-19 DIAGNOSIS — B96.89 ACUTE BACTERIAL BRONCHITIS: ICD-10-CM

## 2019-08-19 PROCEDURE — 3075F SYST BP GE 130 - 139MM HG: CPT | Performed by: FAMILY MEDICINE

## 2019-08-19 PROCEDURE — 3078F DIAST BP <80 MM HG: CPT | Performed by: FAMILY MEDICINE

## 2019-08-19 PROCEDURE — 99214 OFFICE O/P EST MOD 30 MIN: CPT | Performed by: FAMILY MEDICINE

## 2019-08-19 RX ORDER — AZITHROMYCIN 250 MG/1
TABLET, FILM COATED ORAL
Qty: 6 TABLET | Refills: 0 | Status: SHIPPED | OUTPATIENT
Start: 2019-08-19 | End: 2021-01-07 | Stop reason: ALTCHOICE

## 2019-08-19 RX ORDER — PREDNISONE 20 MG/1
20 TABLET ORAL DAILY
Qty: 5 TABLET | Refills: 0 | Status: SHIPPED | OUTPATIENT
Start: 2019-08-19 | End: 2021-01-07 | Stop reason: ALTCHOICE

## 2019-08-19 SDOH — HEALTH STABILITY: MENTAL HEALTH: HOW OFTEN DO YOU HAVE A DRINK CONTAINING ALCOHOL?: NEVER

## 2019-08-19 ASSESSMENT — PAIN SCALES - GENERAL: PAINLEVEL: 0

## 2019-08-19 ASSESSMENT — PATIENT HEALTH QUESTIONNAIRE - PHQ9
1. LITTLE INTEREST OR PLEASURE IN DOING THINGS: NOT AT ALL
1. LITTLE INTEREST OR PLEASURE IN DOING THINGS: NOT AT ALL
2. FEELING DOWN, DEPRESSED OR HOPELESS: NOT AT ALL
1. LITTLE INTEREST OR PLEASURE IN DOING THINGS: NOT AT ALL
2. FEELING DOWN, DEPRESSED OR HOPELESS: NOT AT ALL
SUM OF ALL RESPONSES TO PHQ9 QUESTIONS 1 AND 2: 0
SUM OF ALL RESPONSES TO PHQ9 QUESTIONS 1 AND 2: 0
2. FEELING DOWN, DEPRESSED OR HOPELESS: NOT AT ALL
SUM OF ALL RESPONSES TO PHQ9 QUESTIONS 1 AND 2: 0
SUM OF ALL RESPONSES TO PHQ9 QUESTIONS 1 AND 2: 0

## 2019-08-20 ENCOUNTER — TELEPHONE (OUTPATIENT)
Dept: SCHEDULING | Age: 79
End: 2019-08-20

## 2019-08-23 ENCOUNTER — IMAGING SERVICES (OUTPATIENT)
Dept: GENERAL RADIOLOGY | Age: 79
End: 2019-08-23

## 2019-08-23 DIAGNOSIS — J20.8 ACUTE BACTERIAL BRONCHITIS: ICD-10-CM

## 2019-08-23 DIAGNOSIS — B96.89 ACUTE BACTERIAL BRONCHITIS: ICD-10-CM

## 2019-08-23 PROCEDURE — 71046 X-RAY EXAM CHEST 2 VIEWS: CPT | Performed by: EMERGENCY MEDICINE

## 2019-09-04 ENCOUNTER — TELEPHONE (OUTPATIENT)
Dept: SCHEDULING | Age: 79
End: 2019-09-04

## 2019-09-16 RX ORDER — SIMVASTATIN 20 MG
TABLET ORAL
Qty: 90 TABLET | Refills: 1 | Status: SHIPPED | OUTPATIENT
Start: 2019-09-16 | End: 2020-03-12 | Stop reason: SDUPTHER

## 2019-09-18 ENCOUNTER — OFFICE VISIT (OUTPATIENT)
Dept: GASTROENTEROLOGY | Age: 79
End: 2019-09-18

## 2019-09-18 VITALS
BODY MASS INDEX: 23.18 KG/M2 | SYSTOLIC BLOOD PRESSURE: 148 MMHG | WEIGHT: 130.8 LBS | RESPIRATION RATE: 16 BRPM | HEIGHT: 63 IN | HEART RATE: 60 BPM | DIASTOLIC BLOOD PRESSURE: 62 MMHG | TEMPERATURE: 97.7 F

## 2019-09-18 DIAGNOSIS — K58.0 IRRITABLE BOWEL SYNDROME WITH DIARRHEA: Primary | ICD-10-CM

## 2019-09-18 PROCEDURE — 99204 OFFICE O/P NEW MOD 45 MIN: CPT | Performed by: INTERNAL MEDICINE

## 2019-09-18 RX ORDER — DICYCLOMINE HYDROCHLORIDE 10 MG/1
10 CAPSULE ORAL 2 TIMES DAILY PRN
Qty: 180 CAPSULE | Refills: 1 | Status: SHIPPED | OUTPATIENT
Start: 2019-09-18 | End: 2020-11-30 | Stop reason: SDUPTHER

## 2019-09-18 SDOH — HEALTH STABILITY: MENTAL HEALTH: HOW OFTEN DO YOU HAVE A DRINK CONTAINING ALCOHOL?: NEVER

## 2019-09-29 ENCOUNTER — TELEPHONE (OUTPATIENT)
Dept: SCHEDULING | Age: 79
End: 2019-09-29

## 2019-09-29 DIAGNOSIS — M25.532 LEFT WRIST PAIN: Primary | ICD-10-CM

## 2019-10-15 ENCOUNTER — HOSPITAL (OUTPATIENT)
Dept: OTHER | Age: 79
End: 2019-10-15
Attending: ANESTHESIOLOGY

## 2019-10-16 ENCOUNTER — TELEPHONE (OUTPATIENT)
Dept: FAMILY MEDICINE | Age: 79
End: 2019-10-16

## 2019-10-18 ENCOUNTER — TELEPHONE (OUTPATIENT)
Dept: SCHEDULING | Age: 79
End: 2019-10-18

## 2019-10-29 ENCOUNTER — OFFICE VISIT (OUTPATIENT)
Dept: INTERNAL MEDICINE | Age: 79
End: 2019-10-29

## 2019-10-29 VITALS — TEMPERATURE: 97.9 F

## 2019-10-29 DIAGNOSIS — Z23 NEED FOR IMMUNIZATION AGAINST INFLUENZA: Primary | ICD-10-CM

## 2019-10-29 PROCEDURE — 90471 IMMUNIZATION ADMIN: CPT

## 2019-10-29 PROCEDURE — 90662 IIV NO PRSV INCREASED AG IM: CPT

## 2019-11-07 DIAGNOSIS — R20.0 NUMBNESS AND TINGLING IN LEFT HAND: Primary | ICD-10-CM

## 2019-11-07 DIAGNOSIS — R20.2 NUMBNESS AND TINGLING IN LEFT HAND: Primary | ICD-10-CM

## 2019-11-11 RX ORDER — ANASTROZOLE 1 MG/1
TABLET ORAL
Qty: 90 TABLET | Refills: 0 | Status: SHIPPED | OUTPATIENT
Start: 2019-11-11 | End: 2020-02-05 | Stop reason: SDUPTHER

## 2019-11-15 RX ORDER — GABAPENTIN 300 MG/1
CAPSULE ORAL
Qty: 180 CAPSULE | Refills: 2 | Status: SHIPPED | OUTPATIENT
Start: 2019-11-15 | End: 2019-12-31 | Stop reason: SDUPTHER

## 2019-11-25 RX ORDER — ESCITALOPRAM OXALATE 10 MG/1
TABLET ORAL
Qty: 90 TABLET | Refills: 2 | Status: SHIPPED | OUTPATIENT
Start: 2019-11-25 | End: 2020-08-24

## 2019-12-05 ENCOUNTER — TELEPHONE (OUTPATIENT)
Dept: SCHEDULING | Age: 79
End: 2019-12-05

## 2019-12-30 ENCOUNTER — TELEPHONE (OUTPATIENT)
Dept: SCHEDULING | Age: 79
End: 2019-12-30

## 2019-12-31 ENCOUNTER — TELEPHONE (OUTPATIENT)
Dept: FAMILY MEDICINE | Age: 79
End: 2019-12-31

## 2019-12-31 RX ORDER — GABAPENTIN 300 MG/1
300 CAPSULE ORAL 4 TIMES DAILY PRN
Qty: 360 CAPSULE | Refills: 2 | Status: SHIPPED | OUTPATIENT
Start: 2019-12-31 | End: 2020-09-16 | Stop reason: SDUPTHER

## 2020-01-01 ENCOUNTER — EXTERNAL RECORD (OUTPATIENT)
Dept: HEALTH INFORMATION MANAGEMENT | Facility: OTHER | Age: 80
End: 2020-01-01

## 2020-01-17 ENCOUNTER — HOSPITAL (OUTPATIENT)
Dept: OTHER | Age: 80
End: 2020-01-17
Attending: FAMILY MEDICINE

## 2020-01-18 ENCOUNTER — DIAGNOSTIC TRANS (OUTPATIENT)
Dept: OTHER | Age: 80
End: 2020-01-18

## 2020-02-05 RX ORDER — ANASTROZOLE 1 MG/1
TABLET ORAL
Qty: 90 TABLET | Refills: 0 | Status: SHIPPED | OUTPATIENT
Start: 2020-02-05 | End: 2020-05-01

## 2020-02-10 ENCOUNTER — TELEPHONE (OUTPATIENT)
Dept: SCHEDULING | Age: 80
End: 2020-02-10

## 2020-02-19 ENCOUNTER — OFFICE VISIT (OUTPATIENT)
Dept: FAMILY MEDICINE | Age: 80
End: 2020-02-19

## 2020-02-19 DIAGNOSIS — R42 EPISODIC LIGHTHEADEDNESS: Primary | ICD-10-CM

## 2020-02-19 DIAGNOSIS — Z23 NEED FOR VACCINATION: ICD-10-CM

## 2020-02-19 DIAGNOSIS — Z00.00 BLOOD TESTS FOR ROUTINE GENERAL PHYSICAL EXAMINATION: ICD-10-CM

## 2020-02-19 DIAGNOSIS — I10 BENIGN ESSENTIAL HYPERTENSION: ICD-10-CM

## 2020-02-19 DIAGNOSIS — R00.1 BRADYCARDIA: ICD-10-CM

## 2020-02-19 DIAGNOSIS — C50.912 RECURRENT MALIGNANT NEOPLASM OF LEFT BREAST (CMD): ICD-10-CM

## 2020-02-19 PROCEDURE — 90471 IMMUNIZATION ADMIN: CPT

## 2020-02-19 PROCEDURE — 90750 HZV VACC RECOMBINANT IM: CPT

## 2020-02-19 PROCEDURE — 3077F SYST BP >= 140 MM HG: CPT | Performed by: FAMILY MEDICINE

## 2020-02-19 PROCEDURE — 3078F DIAST BP <80 MM HG: CPT | Performed by: FAMILY MEDICINE

## 2020-02-19 PROCEDURE — 99214 OFFICE O/P EST MOD 30 MIN: CPT | Performed by: FAMILY MEDICINE

## 2020-02-19 ASSESSMENT — COGNITIVE AND FUNCTIONAL STATUS - GENERAL
DO YOU HAVE DIFFICULTY DRESSING OR BATHING: NO
BECAUSE OF A PHYSICAL, MENTAL, OR EMOTIONAL CONDITION, DO YOU HAVE SERIOUS DIFFICULTY CONCENTRATING, REMEMBERING OR MAKING DECISIONS: NO
BECAUSE OF A PHYSICAL, MENTAL, OR EMOTIONAL CONDITION, DO YOU HAVE SERIOUS DIFFICULTY CONCENTRATING, REMEMBERING OR MAKING DECISIONS: NO
DO YOU HAVE DIFFICULTY DRESSING OR BATHING: NO
BECAUSE OF A PHYSICAL, MENTAL, OR EMOTIONAL CONDITION, DO YOU HAVE DIFFICULTY DOING ERRANDS ALONE: NO
DO YOU HAVE SERIOUS DIFFICULTY WALKING OR CLIMBING STAIRS: NO
BECAUSE OF A PHYSICAL, MENTAL, OR EMOTIONAL CONDITION, DO YOU HAVE DIFFICULTY DOING ERRANDS ALONE: NO
DO YOU HAVE SERIOUS DIFFICULTY WALKING OR CLIMBING STAIRS: NO

## 2020-02-19 ASSESSMENT — PAIN SCALES - GENERAL: PAINLEVEL: 0

## 2020-02-21 ENCOUNTER — LAB SERVICES (OUTPATIENT)
Dept: LAB | Age: 80
End: 2020-02-21

## 2020-02-21 ENCOUNTER — NURSE ONLY (OUTPATIENT)
Dept: INTERNAL MEDICINE | Age: 80
End: 2020-02-21

## 2020-02-21 DIAGNOSIS — Z23 NEED FOR PROPHYLACTIC VACCINATION WITH TUBERCULOSIS (BCG) VACCINE: Primary | ICD-10-CM

## 2020-02-21 DIAGNOSIS — Z00.00 BLOOD TESTS FOR ROUTINE GENERAL PHYSICAL EXAMINATION: ICD-10-CM

## 2020-02-21 DIAGNOSIS — R00.1 BRADYCARDIA: ICD-10-CM

## 2020-02-21 DIAGNOSIS — I10 BENIGN ESSENTIAL HYPERTENSION: ICD-10-CM

## 2020-02-21 LAB
BASOPHILS # BLD AUTO: 0 K/MCL (ref 0–0.3)
BASOPHILS NFR BLD AUTO: 1 %
DIFFERENTIAL METHOD BLD: ABNORMAL
EOSINOPHIL # BLD AUTO: 0.2 K/MCL (ref 0.1–0.5)
EOSINOPHIL NFR SPEC: 3 %
ERYTHROCYTE [DISTWIDTH] IN BLOOD: 13.5 % (ref 11–15)
HCT VFR BLD CALC: 42.4 % (ref 36–46.5)
HGB BLD-MCNC: 13.5 G/DL (ref 12–15.5)
IMM GRANULOCYTES # BLD AUTO: 0 K/MCL (ref 0–0.2)
IMM GRANULOCYTES NFR BLD: 0 %
LYMPHOCYTES # BLD MANUAL: 1.8 K/MCL (ref 1–4)
LYMPHOCYTES NFR BLD MANUAL: 33 %
MCH RBC QN AUTO: 31.3 PG (ref 26–34)
MCHC RBC AUTO-ENTMCNC: 31.8 G/DL (ref 32–36.5)
MCV RBC AUTO: 98.1 FL (ref 78–100)
MONOCYTES # BLD MANUAL: 0.5 K/MCL (ref 0.3–0.9)
MONOCYTES NFR BLD MANUAL: 10 %
NEUTROPHILS # BLD: 3 K/MCL (ref 1.8–7.7)
NEUTROPHILS NFR BLD AUTO: 53 %
NRBC BLD MANUAL-RTO: 0 /100 WBC
PLATELET # BLD: 216 K/MCL (ref 140–450)
RBC # BLD: 4.32 MIL/MCL (ref 4–5.2)
WBC # BLD: 5.5 K/MCL (ref 4.2–11)

## 2020-02-21 PROCEDURE — 3078F DIAST BP <80 MM HG: CPT

## 2020-02-21 PROCEDURE — 80061 LIPID PANEL: CPT | Performed by: FAMILY MEDICINE

## 2020-02-21 PROCEDURE — 80053 COMPREHEN METABOLIC PANEL: CPT | Performed by: FAMILY MEDICINE

## 2020-02-21 PROCEDURE — 86580 TB INTRADERMAL TEST: CPT

## 2020-02-21 PROCEDURE — 3075F SYST BP GE 130 - 139MM HG: CPT

## 2020-02-21 PROCEDURE — 85025 COMPLETE CBC W/AUTO DIFF WBC: CPT | Performed by: FAMILY MEDICINE

## 2020-02-21 PROCEDURE — 84443 ASSAY THYROID STIM HORMONE: CPT | Performed by: FAMILY MEDICINE

## 2020-02-21 ASSESSMENT — PAIN SCALES - GENERAL: PAINLEVEL: 0

## 2020-02-22 LAB
ALBUMIN SERPL-MCNC: 4 G/DL (ref 3.6–5.1)
ALBUMIN/GLOB SERPL: 1.1 {RATIO} (ref 1–2.4)
ALP SERPL-CCNC: 53 UNITS/L (ref 45–117)
ALT SERPL-CCNC: 19 UNITS/L
ANION GAP SERPL CALC-SCNC: 10 MMOL/L (ref 10–20)
AST SERPL-CCNC: 18 UNITS/L
BILIRUB SERPL-MCNC: 0.4 MG/DL (ref 0.2–1)
BUN SERPL-MCNC: 12 MG/DL (ref 6–20)
BUN/CREAT SERPL: 18 (ref 7–25)
CALCIUM SERPL-MCNC: 9.4 MG/DL (ref 8.4–10.2)
CHLORIDE SERPL-SCNC: 110 MMOL/L (ref 98–107)
CHOLEST SERPL-MCNC: 168 MG/DL
CHOLEST/HDLC SERPL: 2.5 {RATIO}
CO2 SERPL-SCNC: 28 MMOL/L (ref 21–32)
CREAT SERPL-MCNC: 0.68 MG/DL (ref 0.51–0.95)
FASTING STATUS PATIENT QL REPORTED: ABNORMAL HRS
GLOBULIN SER-MCNC: 3.5 G/DL (ref 2–4)
GLUCOSE SERPL-MCNC: 77 MG/DL (ref 65–99)
HDLC SERPL-MCNC: 68 MG/DL
LDLC SERPL-MCNC: 79 MG/DL
LENGTH OF FAST TIME PATIENT: NORMAL HRS
NONHDLC SERPL-MCNC: 100 MG/DL
POTASSIUM SERPL-SCNC: 4.6 MMOL/L (ref 3.4–5.1)
PROT SERPL-MCNC: 7.5 G/DL (ref 6.4–8.2)
SODIUM SERPL-SCNC: 143 MMOL/L (ref 135–145)
TRIGL SERPL-MCNC: 107 MG/DL
TSH SERPL-ACNC: 1.95 MCUNITS/ML (ref 0.35–5)

## 2020-02-24 ENCOUNTER — TELEPHONE (OUTPATIENT)
Dept: FAMILY MEDICINE | Age: 80
End: 2020-02-24

## 2020-02-24 ENCOUNTER — NURSE ONLY (OUTPATIENT)
Dept: INTERNAL MEDICINE | Age: 80
End: 2020-02-24

## 2020-02-24 DIAGNOSIS — Z11.1 SCREENING EXAMINATION FOR PULMONARY TUBERCULOSIS: Primary | ICD-10-CM

## 2020-02-24 LAB
INDURATION: 0 MM (ref 0–?)
SKIN TEST RESULT: NEGATIVE

## 2020-02-24 PROCEDURE — X1094 NO CHARGE VISIT: HCPCS

## 2020-03-12 RX ORDER — SIMVASTATIN 20 MG
TABLET ORAL
Qty: 90 TABLET | Refills: 1 | Status: SHIPPED | OUTPATIENT
Start: 2020-03-12 | End: 2020-09-01

## 2020-03-19 ENCOUNTER — HOSPITAL (OUTPATIENT)
Dept: OTHER | Age: 80
End: 2020-03-19
Attending: FAMILY MEDICINE

## 2020-03-28 DIAGNOSIS — M81.0 OSTEOPOROSIS, UNSPECIFIED OSTEOPOROSIS TYPE, UNSPECIFIED PATHOLOGICAL FRACTURE PRESENCE: ICD-10-CM

## 2020-03-30 RX ORDER — ALENDRONATE SODIUM 70 MG/1
TABLET ORAL
Qty: 12 TABLET | Refills: 3 | Status: SHIPPED | OUTPATIENT
Start: 2020-03-30 | End: 2021-02-08

## 2020-04-01 ENCOUNTER — TELEPHONE (OUTPATIENT)
Dept: SCHEDULING | Age: 80
End: 2020-04-01

## 2020-05-01 RX ORDER — ANASTROZOLE 1 MG/1
TABLET ORAL
Qty: 90 TABLET | Refills: 0 | Status: SHIPPED | OUTPATIENT
Start: 2020-05-01 | End: 2020-07-30 | Stop reason: SDUPTHER

## 2020-05-28 ENCOUNTER — TELEPHONE (OUTPATIENT)
Dept: SCHEDULING | Age: 80
End: 2020-05-28

## 2020-05-28 DIAGNOSIS — M51.36 LUMBAR DEGENERATIVE DISC DISEASE: Primary | ICD-10-CM

## 2020-06-02 ENCOUNTER — HOSPITAL (OUTPATIENT)
Dept: OTHER | Age: 80
End: 2020-06-02
Attending: FAMILY MEDICINE

## 2020-06-09 ENCOUNTER — APPOINTMENT (OUTPATIENT)
Dept: HEMATOLOGY/ONCOLOGY | Age: 80
End: 2020-06-09

## 2020-06-10 ENCOUNTER — APPOINTMENT (OUTPATIENT)
Dept: HEMATOLOGY/ONCOLOGY | Age: 80
End: 2020-06-10

## 2020-06-16 ENCOUNTER — APPOINTMENT (OUTPATIENT)
Dept: HEMATOLOGY/ONCOLOGY | Age: 80
End: 2020-06-16

## 2020-07-01 ENCOUNTER — APPOINTMENT (OUTPATIENT)
Dept: HEMATOLOGY/ONCOLOGY | Age: 80
End: 2020-07-01

## 2020-07-11 ENCOUNTER — TELEPHONE (OUTPATIENT)
Dept: SCHEDULING | Age: 80
End: 2020-07-11

## 2020-07-15 ENCOUNTER — TELEPHONE (OUTPATIENT)
Dept: SCHEDULING | Age: 80
End: 2020-07-15

## 2020-07-15 DIAGNOSIS — M79.641 BILATERAL HAND PAIN: Primary | ICD-10-CM

## 2020-07-15 DIAGNOSIS — M25.531 BILATERAL WRIST PAIN: ICD-10-CM

## 2020-07-15 DIAGNOSIS — M25.532 BILATERAL WRIST PAIN: ICD-10-CM

## 2020-07-15 DIAGNOSIS — M79.642 BILATERAL HAND PAIN: Primary | ICD-10-CM

## 2020-07-31 RX ORDER — ANASTROZOLE 1 MG/1
TABLET ORAL
Qty: 90 TABLET | Refills: 0 | Status: SHIPPED | OUTPATIENT
Start: 2020-07-31 | End: 2021-01-07 | Stop reason: SDUPTHER

## 2020-07-31 RX ORDER — ANASTROZOLE 1 MG/1
1 TABLET ORAL DAILY
Qty: 90 TABLET | Refills: 0 | Status: SHIPPED | OUTPATIENT
Start: 2020-07-31 | End: 2020-08-31

## 2020-08-13 ENCOUNTER — TELEPHONE (OUTPATIENT)
Dept: SCHEDULING | Age: 80
End: 2020-08-13

## 2020-08-14 ENCOUNTER — TELEPHONE (OUTPATIENT)
Dept: SCHEDULING | Age: 80
End: 2020-08-14

## 2020-08-14 DIAGNOSIS — M51.36 LUMBAR DEGENERATIVE DISC DISEASE: Primary | ICD-10-CM

## 2020-08-19 ENCOUNTER — OFFICE VISIT (OUTPATIENT)
Dept: HEMATOLOGY/ONCOLOGY | Age: 80
End: 2020-08-19

## 2020-08-19 DIAGNOSIS — Z85.3 HISTORY OF BREAST CANCER: ICD-10-CM

## 2020-08-19 DIAGNOSIS — D05.10 DUCTAL CARCINOMA IN SITU (DCIS) OF BREAST, UNSPECIFIED LATERALITY: ICD-10-CM

## 2020-08-19 DIAGNOSIS — C50.912 ADENOCARCINOMA OF LEFT BREAST (CMD): ICD-10-CM

## 2020-08-19 DIAGNOSIS — C50.912 RECURRENT MALIGNANT NEOPLASM OF LEFT BREAST (CMD): Primary | ICD-10-CM

## 2020-08-19 PROCEDURE — 3078F DIAST BP <80 MM HG: CPT | Performed by: INTERNAL MEDICINE

## 2020-08-19 PROCEDURE — 3074F SYST BP LT 130 MM HG: CPT | Performed by: INTERNAL MEDICINE

## 2020-08-19 PROCEDURE — 99214 OFFICE O/P EST MOD 30 MIN: CPT | Performed by: INTERNAL MEDICINE

## 2020-08-19 ASSESSMENT — ENCOUNTER SYMPTOMS
APPETITE CHANGE: 0
FACIAL ASYMMETRY: 0
UNEXPECTED WEIGHT CHANGE: 0
FACIAL SWELLING: 0
BACK PAIN: 0
BLOOD IN STOOL: 0
CHEST TIGHTNESS: 0
FEVER: 0
CONSTIPATION: 0
CHOKING: 0
VOICE CHANGE: 0
BRUISES/BLEEDS EASILY: 0
ABDOMINAL PAIN: 0
TREMORS: 0
WHEEZING: 0
CHILLS: 0
DIAPHORESIS: 0
FATIGUE: 0
DIZZINESS: 0
DIARRHEA: 0
ANAL BLEEDING: 0
NAUSEA: 0
ACTIVITY CHANGE: 0
APNEA: 0
TROUBLE SWALLOWING: 0
WEAKNESS: 0
SEIZURES: 0
HEADACHES: 0
RECTAL PAIN: 0
SHORTNESS OF BREATH: 0
ADENOPATHY: 0
STRIDOR: 0
VOMITING: 0
SPEECH DIFFICULTY: 0
SORE THROAT: 0
ABDOMINAL DISTENTION: 0

## 2020-08-19 ASSESSMENT — PAIN SCALES - GENERAL: PAINLEVEL: 0

## 2020-08-24 RX ORDER — ESCITALOPRAM OXALATE 10 MG/1
TABLET ORAL
Qty: 90 TABLET | Refills: 0 | Status: SHIPPED | OUTPATIENT
Start: 2020-08-24 | End: 2020-11-03

## 2020-08-25 ENCOUNTER — TELEPHONE (OUTPATIENT)
Dept: SCHEDULING | Age: 80
End: 2020-08-25

## 2020-08-31 DIAGNOSIS — I10 BENIGN ESSENTIAL HYPERTENSION: ICD-10-CM

## 2020-08-31 DIAGNOSIS — E78.5 HYPERLIPIDEMIA, UNSPECIFIED HYPERLIPIDEMIA TYPE: Primary | ICD-10-CM

## 2020-08-31 RX ORDER — ANASTROZOLE 1 MG/1
TABLET ORAL
Qty: 90 TABLET | Refills: 0 | Status: SHIPPED | OUTPATIENT
Start: 2020-08-31 | End: 2021-01-25

## 2020-09-01 RX ORDER — SIMVASTATIN 20 MG
TABLET ORAL
Qty: 90 TABLET | Refills: 1 | Status: SHIPPED | OUTPATIENT
Start: 2020-09-01 | End: 2021-02-08

## 2020-09-02 ENCOUNTER — TELEPHONE (OUTPATIENT)
Dept: SCHEDULING | Age: 80
End: 2020-09-02

## 2020-09-16 ENCOUNTER — OFFICE VISIT (OUTPATIENT)
Dept: FAMILY MEDICINE | Age: 80
End: 2020-09-16

## 2020-09-16 VITALS
HEIGHT: 64 IN | OXYGEN SATURATION: 98 % | WEIGHT: 133.05 LBS | RESPIRATION RATE: 14 BRPM | SYSTOLIC BLOOD PRESSURE: 148 MMHG | BODY MASS INDEX: 22.71 KG/M2 | DIASTOLIC BLOOD PRESSURE: 71 MMHG | TEMPERATURE: 97.2 F | HEART RATE: 55 BPM

## 2020-09-16 DIAGNOSIS — I10 BENIGN ESSENTIAL HYPERTENSION: ICD-10-CM

## 2020-09-16 DIAGNOSIS — Z23 NEED FOR INFLUENZA VACCINATION: ICD-10-CM

## 2020-09-16 DIAGNOSIS — E78.5 HYPERLIPIDEMIA, UNSPECIFIED HYPERLIPIDEMIA TYPE: ICD-10-CM

## 2020-09-16 DIAGNOSIS — K58.2 IRRITABLE BOWEL SYNDROME WITH BOTH CONSTIPATION AND DIARRHEA: ICD-10-CM

## 2020-09-16 DIAGNOSIS — C50.912 RECURRENT MALIGNANT NEOPLASM OF LEFT BREAST (CMD): ICD-10-CM

## 2020-09-16 DIAGNOSIS — Z23 NEED FOR SHINGLES VACCINE: ICD-10-CM

## 2020-09-16 DIAGNOSIS — Z00.00 WELLNESS EXAMINATION: Primary | ICD-10-CM

## 2020-09-16 PROCEDURE — 3078F DIAST BP <80 MM HG: CPT | Performed by: FAMILY MEDICINE

## 2020-09-16 PROCEDURE — 99214 OFFICE O/P EST MOD 30 MIN: CPT | Performed by: FAMILY MEDICINE

## 2020-09-16 PROCEDURE — 90750 HZV VACC RECOMBINANT IM: CPT

## 2020-09-16 PROCEDURE — 3077F SYST BP >= 140 MM HG: CPT | Performed by: FAMILY MEDICINE

## 2020-09-16 RX ORDER — GABAPENTIN 300 MG/1
300 CAPSULE ORAL 4 TIMES DAILY PRN
Qty: 360 CAPSULE | Refills: 2 | Status: SHIPPED | OUTPATIENT
Start: 2020-09-16 | End: 2021-04-15 | Stop reason: DRUGHIGH

## 2020-09-16 ASSESSMENT — PATIENT HEALTH QUESTIONNAIRE - PHQ9
CLINICAL INTERPRETATION OF PHQ9 SCORE: NO FURTHER SCREENING NEEDED
CLINICAL INTERPRETATION OF PHQ2 SCORE: NO FURTHER SCREENING NEEDED
1. LITTLE INTEREST OR PLEASURE IN DOING THINGS: NOT AT ALL
SUM OF ALL RESPONSES TO PHQ9 QUESTIONS 1 AND 2: 0
SUM OF ALL RESPONSES TO PHQ9 QUESTIONS 1 AND 2: 0
2. FEELING DOWN, DEPRESSED OR HOPELESS: NOT AT ALL

## 2020-10-15 ENCOUNTER — TELEPHONE (OUTPATIENT)
Dept: SCHEDULING | Age: 80
End: 2020-10-15

## 2020-10-17 ENCOUNTER — TELEPHONE (OUTPATIENT)
Dept: SCHEDULING | Age: 80
End: 2020-10-17

## 2020-10-23 ENCOUNTER — TELEPHONE (OUTPATIENT)
Dept: SCHEDULING | Age: 80
End: 2020-10-23

## 2020-11-03 RX ORDER — ESCITALOPRAM OXALATE 10 MG/1
TABLET ORAL
Qty: 90 TABLET | Refills: 2 | Status: SHIPPED | OUTPATIENT
Start: 2020-11-03 | End: 2021-08-30

## 2020-11-10 ENCOUNTER — TELEPHONE (OUTPATIENT)
Dept: SCHEDULING | Age: 80
End: 2020-11-10

## 2020-11-15 ENCOUNTER — TELEPHONE (OUTPATIENT)
Dept: SCHEDULING | Age: 80
End: 2020-11-15

## 2020-11-17 ENCOUNTER — TELEPHONE (OUTPATIENT)
Dept: SCHEDULING | Age: 80
End: 2020-11-17

## 2020-11-17 ENCOUNTER — TELEPHONE (OUTPATIENT)
Dept: INTERNAL MEDICINE | Age: 80
End: 2020-11-17

## 2020-11-17 DIAGNOSIS — K58.2 IRRITABLE BOWEL SYNDROME WITH BOTH CONSTIPATION AND DIARRHEA: Primary | ICD-10-CM

## 2020-11-17 DIAGNOSIS — R10.9 STOMACH PAIN: ICD-10-CM

## 2020-11-18 ENCOUNTER — TELEPHONE (OUTPATIENT)
Dept: GASTROENTEROLOGY | Age: 80
End: 2020-11-18

## 2020-11-20 ENCOUNTER — TELEPHONE (OUTPATIENT)
Dept: SCHEDULING | Age: 80
End: 2020-11-20

## 2020-11-20 RX ORDER — DICYCLOMINE HYDROCHLORIDE 10 MG/1
10 CAPSULE ORAL 2 TIMES DAILY PRN
Qty: 180 CAPSULE | Refills: 1 | Status: CANCELLED | OUTPATIENT
Start: 2020-11-20

## 2020-11-25 ENCOUNTER — TELEPHONE (OUTPATIENT)
Dept: GASTROENTEROLOGY | Age: 80
End: 2020-11-25

## 2020-11-29 ENCOUNTER — TELEPHONE (OUTPATIENT)
Dept: SCHEDULING | Age: 80
End: 2020-11-29

## 2020-11-30 RX ORDER — DICYCLOMINE HYDROCHLORIDE 10 MG/1
10 CAPSULE ORAL 2 TIMES DAILY PRN
Qty: 180 CAPSULE | Refills: 1 | Status: SHIPPED | OUTPATIENT
Start: 2020-11-30 | End: 2021-11-17

## 2020-12-17 DIAGNOSIS — I10 BENIGN ESSENTIAL HYPERTENSION: ICD-10-CM

## 2021-01-01 ENCOUNTER — EXTERNAL RECORD (OUTPATIENT)
Dept: HEALTH INFORMATION MANAGEMENT | Facility: OTHER | Age: 81
End: 2021-01-01

## 2021-01-02 ENCOUNTER — TELEPHONE (OUTPATIENT)
Dept: SCHEDULING | Age: 81
End: 2021-01-02

## 2021-01-04 ENCOUNTER — TELEPHONE (OUTPATIENT)
Dept: SCHEDULING | Age: 81
End: 2021-01-04

## 2021-01-07 ENCOUNTER — OFFICE VISIT (OUTPATIENT)
Dept: FAMILY MEDICINE | Age: 81
End: 2021-01-07

## 2021-01-07 VITALS
OXYGEN SATURATION: 99 % | HEIGHT: 64 IN | SYSTOLIC BLOOD PRESSURE: 152 MMHG | DIASTOLIC BLOOD PRESSURE: 69 MMHG | TEMPERATURE: 98.6 F | RESPIRATION RATE: 16 BRPM | BODY MASS INDEX: 23 KG/M2 | HEART RATE: 54 BPM | WEIGHT: 134.7 LBS

## 2021-01-07 DIAGNOSIS — Z96.89 SPINAL CORD STIMULATOR STATUS: ICD-10-CM

## 2021-01-07 DIAGNOSIS — M85.80 OSTEOPENIA, UNSPECIFIED LOCATION: ICD-10-CM

## 2021-01-07 DIAGNOSIS — G56.02 CARPAL TUNNEL SYNDROME OF LEFT WRIST: ICD-10-CM

## 2021-01-07 DIAGNOSIS — M51.36 LUMBAR DEGENERATIVE DISC DISEASE: ICD-10-CM

## 2021-01-07 DIAGNOSIS — M17.12 PRIMARY OSTEOARTHRITIS OF LEFT KNEE: Primary | ICD-10-CM

## 2021-01-07 DIAGNOSIS — I10 BENIGN ESSENTIAL HYPERTENSION: ICD-10-CM

## 2021-01-07 DIAGNOSIS — K58.2 IRRITABLE BOWEL SYNDROME WITH BOTH CONSTIPATION AND DIARRHEA: ICD-10-CM

## 2021-01-07 DIAGNOSIS — M46.96 UNSPECIFIED INFLAMMATORY SPONDYLOPATHY, LUMBAR REGION (CMD): ICD-10-CM

## 2021-01-07 DIAGNOSIS — F41.9 ANXIETY DISORDER, UNSPECIFIED TYPE: ICD-10-CM

## 2021-01-07 DIAGNOSIS — M25.552 LEFT HIP PAIN: ICD-10-CM

## 2021-01-07 PROCEDURE — 3078F DIAST BP <80 MM HG: CPT | Performed by: FAMILY MEDICINE

## 2021-01-07 PROCEDURE — 99214 OFFICE O/P EST MOD 30 MIN: CPT | Performed by: FAMILY MEDICINE

## 2021-01-07 PROCEDURE — 3077F SYST BP >= 140 MM HG: CPT | Performed by: FAMILY MEDICINE

## 2021-01-07 SDOH — HEALTH STABILITY: MENTAL HEALTH: HOW OFTEN DO YOU HAVE A DRINK CONTAINING ALCOHOL?: NEVER

## 2021-01-07 ASSESSMENT — PATIENT HEALTH QUESTIONNAIRE - PHQ9
CLINICAL INTERPRETATION OF PHQ9 SCORE: NO FURTHER SCREENING NEEDED
CLINICAL INTERPRETATION OF PHQ2 SCORE: NO FURTHER SCREENING NEEDED
1. LITTLE INTEREST OR PLEASURE IN DOING THINGS: NOT AT ALL
2. FEELING DOWN, DEPRESSED OR HOPELESS: NOT AT ALL
SUM OF ALL RESPONSES TO PHQ9 QUESTIONS 1 AND 2: 0
SUM OF ALL RESPONSES TO PHQ9 QUESTIONS 1 AND 2: 0

## 2021-01-07 ASSESSMENT — COGNITIVE AND FUNCTIONAL STATUS - GENERAL
DO YOU HAVE SERIOUS DIFFICULTY WALKING OR CLIMBING STAIRS: NO
BECAUSE OF A PHYSICAL, MENTAL, OR EMOTIONAL CONDITION, DO YOU HAVE SERIOUS DIFFICULTY CONCENTRATING, REMEMBERING OR MAKING DECISIONS: NO
BECAUSE OF A PHYSICAL, MENTAL, OR EMOTIONAL CONDITION, DO YOU HAVE DIFFICULTY DOING ERRANDS ALONE: NO
DO YOU HAVE DIFFICULTY DRESSING OR BATHING: NO

## 2021-01-25 RX ORDER — ANASTROZOLE 1 MG/1
TABLET ORAL
Qty: 90 TABLET | Refills: 0 | Status: SHIPPED | OUTPATIENT
Start: 2021-01-25 | End: 2021-04-26

## 2021-02-06 DIAGNOSIS — E78.5 HYPERLIPIDEMIA, UNSPECIFIED HYPERLIPIDEMIA TYPE: ICD-10-CM

## 2021-02-06 DIAGNOSIS — M81.0 OSTEOPOROSIS, UNSPECIFIED OSTEOPOROSIS TYPE, UNSPECIFIED PATHOLOGICAL FRACTURE PRESENCE: ICD-10-CM

## 2021-02-08 RX ORDER — SIMVASTATIN 20 MG
TABLET ORAL
Qty: 90 TABLET | Refills: 1 | Status: SHIPPED | OUTPATIENT
Start: 2021-02-08 | End: 2021-09-14

## 2021-02-08 RX ORDER — ALENDRONATE SODIUM 70 MG/1
TABLET ORAL
Qty: 12 TABLET | Refills: 3 | Status: SHIPPED | OUTPATIENT
Start: 2021-02-08 | End: 2022-01-20

## 2021-02-22 ENCOUNTER — TELEPHONE (OUTPATIENT)
Dept: SCHEDULING | Age: 81
End: 2021-02-22

## 2021-02-22 DIAGNOSIS — H40.9 GLAUCOMA, UNSPECIFIED GLAUCOMA TYPE, UNSPECIFIED LATERALITY: Primary | ICD-10-CM

## 2021-02-22 DIAGNOSIS — H26.9 CATARACT, UNSPECIFIED CATARACT TYPE, UNSPECIFIED LATERALITY: ICD-10-CM

## 2021-02-28 ENCOUNTER — IMMUNIZATION (OUTPATIENT)
Dept: LAB | Age: 81
End: 2021-02-28

## 2021-02-28 DIAGNOSIS — Z23 NEED FOR VACCINATION: Primary | ICD-10-CM

## 2021-02-28 PROCEDURE — 0001A COVID 19 PFIZER-BIONTECH: CPT

## 2021-02-28 PROCEDURE — 91300 COVID 19 PFIZER-BIONTECH: CPT

## 2021-03-10 ENCOUNTER — TELEPHONE (OUTPATIENT)
Dept: SCHEDULING | Age: 81
End: 2021-03-10

## 2021-03-17 ENCOUNTER — TELEPHONE (OUTPATIENT)
Dept: SCHEDULING | Age: 81
End: 2021-03-17

## 2021-03-18 ENCOUNTER — TELEPHONE (OUTPATIENT)
Dept: SCHEDULING | Age: 81
End: 2021-03-18

## 2021-03-18 DIAGNOSIS — R20.0 NUMBNESS AND TINGLING IN LEFT HAND: Primary | ICD-10-CM

## 2021-03-18 DIAGNOSIS — R20.2 NUMBNESS AND TINGLING IN LEFT HAND: Primary | ICD-10-CM

## 2021-03-29 ENCOUNTER — IMMUNIZATION (OUTPATIENT)
Dept: LAB | Age: 81
End: 2021-03-29
Attending: HOSPITALIST

## 2021-03-29 DIAGNOSIS — Z23 NEED FOR VACCINATION: Primary | ICD-10-CM

## 2021-03-29 PROCEDURE — 91300 COVID 19 PFIZER-BIONTECH: CPT

## 2021-03-29 PROCEDURE — 0001A COVID 19 PFIZER-BIONTECH: CPT

## 2021-04-13 ENCOUNTER — TELEPHONE (OUTPATIENT)
Dept: SCHEDULING | Age: 81
End: 2021-04-13

## 2021-04-15 ENCOUNTER — OFFICE VISIT (OUTPATIENT)
Dept: FAMILY MEDICINE | Age: 81
End: 2021-04-15

## 2021-04-15 ENCOUNTER — LAB SERVICES (OUTPATIENT)
Dept: LAB | Age: 81
End: 2021-04-15

## 2021-04-15 DIAGNOSIS — I10 BENIGN ESSENTIAL HYPERTENSION: ICD-10-CM

## 2021-04-15 DIAGNOSIS — E78.5 HYPERLIPIDEMIA, UNSPECIFIED HYPERLIPIDEMIA TYPE: ICD-10-CM

## 2021-04-15 DIAGNOSIS — R00.1 BRADYCARDIA: ICD-10-CM

## 2021-04-15 DIAGNOSIS — M46.96 UNSPECIFIED INFLAMMATORY SPONDYLOPATHY, LUMBAR REGION (CMD): ICD-10-CM

## 2021-04-15 DIAGNOSIS — K58.2 IRRITABLE BOWEL SYNDROME WITH BOTH CONSTIPATION AND DIARRHEA: Primary | ICD-10-CM

## 2021-04-15 DIAGNOSIS — G56.02 CARPAL TUNNEL SYNDROME OF LEFT WRIST: ICD-10-CM

## 2021-04-15 PROCEDURE — 3077F SYST BP >= 140 MM HG: CPT | Performed by: FAMILY MEDICINE

## 2021-04-15 PROCEDURE — 84443 ASSAY THYROID STIM HORMONE: CPT | Performed by: FAMILY MEDICINE

## 2021-04-15 PROCEDURE — 80053 COMPREHEN METABOLIC PANEL: CPT | Performed by: FAMILY MEDICINE

## 2021-04-15 PROCEDURE — 3078F DIAST BP <80 MM HG: CPT | Performed by: FAMILY MEDICINE

## 2021-04-15 PROCEDURE — 80061 LIPID PANEL: CPT | Performed by: FAMILY MEDICINE

## 2021-04-15 PROCEDURE — 85025 COMPLETE CBC W/AUTO DIFF WBC: CPT | Performed by: FAMILY MEDICINE

## 2021-04-15 PROCEDURE — 99214 OFFICE O/P EST MOD 30 MIN: CPT | Performed by: FAMILY MEDICINE

## 2021-04-15 RX ORDER — LATANOPROST 50 UG/ML
SOLUTION/ DROPS OPHTHALMIC
COMMUNITY
Start: 2021-02-19

## 2021-04-15 RX ORDER — DORZOLAMIDE HYDROCHLORIDE AND TIMOLOL MALEATE 20; 5 MG/ML; MG/ML
SOLUTION/ DROPS OPHTHALMIC
COMMUNITY
Start: 2021-04-04

## 2021-04-15 RX ORDER — FELODIPINE 5 MG/1
5 TABLET, EXTENDED RELEASE ORAL DAILY
Qty: 90 TABLET | Refills: 3 | Status: SHIPPED | OUTPATIENT
Start: 2021-04-15 | End: 2022-03-08

## 2021-04-15 RX ORDER — GABAPENTIN 400 MG/1
400 CAPSULE ORAL 4 TIMES DAILY
Qty: 360 CAPSULE | Refills: 3 | Status: SHIPPED | OUTPATIENT
Start: 2021-04-15 | End: 2022-03-08

## 2021-04-15 ASSESSMENT — PATIENT HEALTH QUESTIONNAIRE - PHQ9
SUM OF ALL RESPONSES TO PHQ9 QUESTIONS 1 AND 2: 0
1. LITTLE INTEREST OR PLEASURE IN DOING THINGS: NOT AT ALL
CLINICAL INTERPRETATION OF PHQ2 SCORE: NO FURTHER SCREENING NEEDED
CLINICAL INTERPRETATION OF PHQ9 SCORE: NO FURTHER SCREENING NEEDED
2. FEELING DOWN, DEPRESSED OR HOPELESS: NOT AT ALL
SUM OF ALL RESPONSES TO PHQ9 QUESTIONS 1 AND 2: 0

## 2021-04-15 ASSESSMENT — PAIN SCALES - GENERAL: PAINLEVEL: 7-8

## 2021-04-16 LAB
ALBUMIN SERPL-MCNC: 4.2 G/DL (ref 3.6–5.1)
ALBUMIN/GLOB SERPL: 1.2 {RATIO} (ref 1–2.4)
ALP SERPL-CCNC: 53 UNITS/L (ref 45–117)
ALT SERPL-CCNC: 21 UNITS/L
ANION GAP SERPL CALC-SCNC: 9 MMOL/L (ref 10–20)
AST SERPL-CCNC: 18 UNITS/L
BASOPHILS # BLD: 0 K/MCL (ref 0–0.3)
BASOPHILS NFR BLD: 0 %
BILIRUB SERPL-MCNC: 0.4 MG/DL (ref 0.2–1)
BUN SERPL-MCNC: 13 MG/DL (ref 6–20)
BUN/CREAT SERPL: 19 (ref 7–25)
CALCIUM SERPL-MCNC: 10.2 MG/DL (ref 8.4–10.2)
CHLORIDE SERPL-SCNC: 108 MMOL/L (ref 98–107)
CHOLEST SERPL-MCNC: 163 MG/DL
CHOLEST/HDLC SERPL: 2.4 {RATIO}
CO2 SERPL-SCNC: 28 MMOL/L (ref 21–32)
CREAT SERPL-MCNC: 0.67 MG/DL (ref 0.51–0.95)
DEPRECATED RDW RBC: 49.4 FL (ref 39–50)
EOSINOPHIL # BLD: 0.1 K/MCL (ref 0–0.5)
EOSINOPHIL NFR BLD: 2 %
ERYTHROCYTE [DISTWIDTH] IN BLOOD: 13.9 % (ref 11–15)
FASTING DURATION TIME PATIENT: ABNORMAL H
FASTING DURATION TIME PATIENT: NORMAL H
GFR SERPLBLD BASED ON 1.73 SQ M-ARVRAT: >90 ML/MIN/1.73M2
GLOBULIN SER-MCNC: 3.5 G/DL (ref 2–4)
GLUCOSE SERPL-MCNC: 82 MG/DL (ref 65–99)
HCT VFR BLD CALC: 44.5 % (ref 36–46.5)
HDLC SERPL-MCNC: 67 MG/DL
HGB BLD-MCNC: 13.9 G/DL (ref 12–15.5)
IMM GRANULOCYTES # BLD AUTO: 0 K/MCL (ref 0–0.2)
IMM GRANULOCYTES # BLD: 0 %
LDLC SERPL CALC-MCNC: 73 MG/DL
LYMPHOCYTES # BLD: 1.8 K/MCL (ref 1–4)
LYMPHOCYTES NFR BLD: 36 %
MCH RBC QN AUTO: 30.2 PG (ref 26–34)
MCHC RBC AUTO-ENTMCNC: 31.2 G/DL (ref 32–36.5)
MCV RBC AUTO: 96.5 FL (ref 78–100)
MONOCYTES # BLD: 0.4 K/MCL (ref 0.3–0.9)
MONOCYTES NFR BLD: 9 %
NEUTROPHILS # BLD: 2.6 K/MCL (ref 1.8–7.7)
NEUTROPHILS NFR BLD: 53 %
NONHDLC SERPL-MCNC: 96 MG/DL
NRBC BLD MANUAL-RTO: 0 /100 WBC
PLATELET # BLD AUTO: 230 K/MCL (ref 140–450)
POTASSIUM SERPL-SCNC: 4.3 MMOL/L (ref 3.4–5.1)
PROT SERPL-MCNC: 7.7 G/DL (ref 6.4–8.2)
RBC # BLD: 4.61 MIL/MCL (ref 4–5.2)
SODIUM SERPL-SCNC: 141 MMOL/L (ref 135–145)
TRIGL SERPL-MCNC: 113 MG/DL
TSH SERPL-ACNC: 1.7 MCUNITS/ML (ref 0.35–5)
WBC # BLD: 4.9 K/MCL (ref 4.2–11)

## 2021-04-26 RX ORDER — ANASTROZOLE 1 MG/1
TABLET ORAL
Qty: 90 TABLET | Refills: 0 | Status: SHIPPED | OUTPATIENT
Start: 2021-04-26 | End: 2021-06-02

## 2021-05-11 ENCOUNTER — TELEPHONE (OUTPATIENT)
Dept: SCHEDULING | Age: 81
End: 2021-05-11

## 2021-05-13 ENCOUNTER — TELEPHONE (OUTPATIENT)
Dept: SCHEDULING | Age: 81
End: 2021-05-13

## 2021-05-25 VITALS
TEMPERATURE: 97.8 F | WEIGHT: 130.95 LBS | BODY MASS INDEX: 23.57 KG/M2 | RESPIRATION RATE: 16 BRPM | SYSTOLIC BLOOD PRESSURE: 133 MMHG | RESPIRATION RATE: 16 BRPM | HEART RATE: 55 BPM | BODY MASS INDEX: 22.56 KG/M2 | SYSTOLIC BLOOD PRESSURE: 149 MMHG | RESPIRATION RATE: 16 BRPM | HEART RATE: 52 BPM | HEART RATE: 54 BPM | HEIGHT: 63 IN | SYSTOLIC BLOOD PRESSURE: 160 MMHG | OXYGEN SATURATION: 100 % | BODY MASS INDEX: 23.2 KG/M2 | TEMPERATURE: 97.9 F | HEART RATE: 55 BPM | HEART RATE: 54 BPM | WEIGHT: 133.05 LBS | DIASTOLIC BLOOD PRESSURE: 72 MMHG | WEIGHT: 134.92 LBS | DIASTOLIC BLOOD PRESSURE: 62 MMHG | TEMPERATURE: 97.3 F | OXYGEN SATURATION: 100 % | RESPIRATION RATE: 16 BRPM | HEIGHT: 63 IN | RESPIRATION RATE: 18 BRPM | SYSTOLIC BLOOD PRESSURE: 139 MMHG | HEIGHT: 63 IN | DIASTOLIC BLOOD PRESSURE: 62 MMHG | BODY MASS INDEX: 23.91 KG/M2 | DIASTOLIC BLOOD PRESSURE: 63 MMHG | TEMPERATURE: 97.3 F | SYSTOLIC BLOOD PRESSURE: 128 MMHG | WEIGHT: 132.17 LBS | TEMPERATURE: 98 F | HEIGHT: 64 IN | OXYGEN SATURATION: 100 % | DIASTOLIC BLOOD PRESSURE: 62 MMHG

## 2021-06-02 DIAGNOSIS — I10 BENIGN ESSENTIAL HYPERTENSION: ICD-10-CM

## 2021-06-02 RX ORDER — ANASTROZOLE 1 MG/1
TABLET ORAL
Qty: 90 TABLET | Refills: 0 | Status: SHIPPED | OUTPATIENT
Start: 2021-06-02 | End: 2021-10-14

## 2021-06-10 ENCOUNTER — LAB SERVICES (OUTPATIENT)
Dept: LAB | Age: 81
End: 2021-06-10

## 2021-06-10 DIAGNOSIS — Z01.812 PRE-PROCEDURE LAB EXAM: Primary | ICD-10-CM

## 2021-06-10 LAB
SARS-COV-2 RNA RESP QL NAA+PROBE: NOT DETECTED
SERVICE CMNT-IMP: NORMAL
SERVICE CMNT-IMP: NORMAL

## 2021-06-10 PROCEDURE — U0005 INFEC AGEN DETEC AMPLI PROBE: HCPCS | Performed by: PLASTIC SURGERY

## 2021-06-10 PROCEDURE — U0003 INFECTIOUS AGENT DETECTION BY NUCLEIC ACID (DNA OR RNA); SEVERE ACUTE RESPIRATORY SYNDROME CORONAVIRUS 2 (SARS-COV-2) (CORONAVIRUS DISEASE [COVID-19]), AMPLIFIED PROBE TECHNIQUE, MAKING USE OF HIGH THROUGHPUT TECHNOLOGIES AS DESCRIBED BY CMS-2020-01-R: HCPCS | Performed by: PLASTIC SURGERY

## 2021-06-11 ENCOUNTER — HOSPITAL ENCOUNTER (OUTPATIENT)
Age: 81
Discharge: HOME | End: 2021-06-11
Attending: PLASTIC SURGERY | Admitting: PLASTIC SURGERY

## 2021-06-11 VITALS
DIASTOLIC BLOOD PRESSURE: 70 MMHG | RESPIRATION RATE: 16 BRPM | BODY MASS INDEX: 23.04 KG/M2 | OXYGEN SATURATION: 100 % | HEIGHT: 63 IN | WEIGHT: 130 LBS | SYSTOLIC BLOOD PRESSURE: 169 MMHG | HEART RATE: 61 BPM | TEMPERATURE: 97.3 F

## 2021-06-11 DIAGNOSIS — M65.342 TRIGGER FINGER OF ALL DIGITS OF LEFT HAND: ICD-10-CM

## 2021-06-11 DIAGNOSIS — M65.332 TRIGGER FINGER OF ALL DIGITS OF LEFT HAND: ICD-10-CM

## 2021-06-11 DIAGNOSIS — M65.352 TRIGGER FINGER OF ALL DIGITS OF LEFT HAND: ICD-10-CM

## 2021-06-11 DIAGNOSIS — M65.312 TRIGGER FINGER OF ALL DIGITS OF LEFT HAND: ICD-10-CM

## 2021-06-11 DIAGNOSIS — M65.322 TRIGGER FINGER OF ALL DIGITS OF LEFT HAND: ICD-10-CM

## 2021-06-11 DIAGNOSIS — G56.02 LEFT CARPAL TUNNEL SYNDROME: Primary | ICD-10-CM

## 2021-06-11 PROCEDURE — 10002801 HB RX 250 W/O HCPCS: Performed by: PLASTIC SURGERY

## 2021-06-11 PROCEDURE — 13000055 HB LOCAL 2 CASE CHARGE: Performed by: PLASTIC SURGERY

## 2021-06-11 PROCEDURE — 10006023 HB SUPPLY 272: Performed by: PLASTIC SURGERY

## 2021-06-11 RX ORDER — LIDOCAINE HYDROCHLORIDE 10 MG/ML
INJECTION, SOLUTION INFILTRATION; PERINEURAL PRN
Status: DISCONTINUED | OUTPATIENT
Start: 2021-06-11 | End: 2021-06-11 | Stop reason: HOSPADM

## 2021-06-11 RX ORDER — BUPIVACAINE HYDROCHLORIDE 2.5 MG/ML
INJECTION, SOLUTION EPIDURAL; INFILTRATION; INTRACAUDAL PRN
Status: DISCONTINUED | OUTPATIENT
Start: 2021-06-11 | End: 2021-06-11 | Stop reason: HOSPADM

## 2021-06-11 ASSESSMENT — PAIN SCALES - GENERAL
PAINLEVEL_OUTOF10: 0
PAINLEVEL_OUTOF10: 0

## 2021-06-16 ENCOUNTER — APPOINTMENT (OUTPATIENT)
Dept: MAMMOGRAPHY | Age: 81
End: 2021-06-16
Attending: FAMILY MEDICINE

## 2021-07-02 ENCOUNTER — HOSPITAL ENCOUNTER (OUTPATIENT)
Dept: MAMMOGRAPHY | Age: 81
Discharge: HOME | End: 2021-07-02
Attending: FAMILY MEDICINE

## 2021-07-02 DIAGNOSIS — Z12.31 ENCOUNTER FOR SCREENING MAMMOGRAM FOR MALIGNANT NEOPLASM OF BREAST: ICD-10-CM

## 2021-07-02 PROCEDURE — 77063 BREAST TOMOSYNTHESIS BI: CPT

## 2021-07-15 ENCOUNTER — TELEPHONE (OUTPATIENT)
Dept: SCHEDULING | Age: 81
End: 2021-07-15

## 2021-07-19 ENCOUNTER — TELEPHONE (OUTPATIENT)
Dept: SCHEDULING | Age: 81
End: 2021-07-19

## 2021-07-19 DIAGNOSIS — M51.36 LUMBAR DEGENERATIVE DISC DISEASE: Primary | ICD-10-CM

## 2021-07-19 DIAGNOSIS — M46.96 UNSPECIFIED INFLAMMATORY SPONDYLOPATHY, LUMBAR REGION (CMD): ICD-10-CM

## 2021-08-17 ENCOUNTER — APPOINTMENT (OUTPATIENT)
Dept: HEMATOLOGY/ONCOLOGY | Age: 81
End: 2021-08-17

## 2021-08-19 ENCOUNTER — OFFICE VISIT (OUTPATIENT)
Dept: FAMILY MEDICINE | Age: 81
End: 2021-08-19

## 2021-08-19 VITALS
BODY MASS INDEX: 22.06 KG/M2 | SYSTOLIC BLOOD PRESSURE: 129 MMHG | DIASTOLIC BLOOD PRESSURE: 62 MMHG | WEIGHT: 129.19 LBS | HEIGHT: 64 IN | HEART RATE: 58 BPM | OXYGEN SATURATION: 98 % | TEMPERATURE: 96.3 F

## 2021-08-19 DIAGNOSIS — C50.912 RECURRENT MALIGNANT NEOPLASM OF LEFT BREAST (CMD): ICD-10-CM

## 2021-08-19 DIAGNOSIS — I10 BENIGN ESSENTIAL HYPERTENSION: ICD-10-CM

## 2021-08-19 DIAGNOSIS — Z90.12 HX OF LEFT MASTECTOMY: ICD-10-CM

## 2021-08-19 DIAGNOSIS — K58.2 IRRITABLE BOWEL SYNDROME WITH BOTH CONSTIPATION AND DIARRHEA: ICD-10-CM

## 2021-08-19 DIAGNOSIS — M17.12 PRIMARY OSTEOARTHRITIS OF LEFT KNEE: Primary | ICD-10-CM

## 2021-08-19 DIAGNOSIS — M51.36 LUMBAR DEGENERATIVE DISC DISEASE: ICD-10-CM

## 2021-08-19 PROCEDURE — 99214 OFFICE O/P EST MOD 30 MIN: CPT | Performed by: FAMILY MEDICINE

## 2021-08-19 PROCEDURE — 3074F SYST BP LT 130 MM HG: CPT | Performed by: FAMILY MEDICINE

## 2021-08-19 PROCEDURE — 3078F DIAST BP <80 MM HG: CPT | Performed by: FAMILY MEDICINE

## 2021-08-19 ASSESSMENT — PAIN SCALES - GENERAL: PAINLEVEL: 8

## 2021-08-19 ASSESSMENT — PATIENT HEALTH QUESTIONNAIRE - PHQ9
1. LITTLE INTEREST OR PLEASURE IN DOING THINGS: NOT AT ALL
CLINICAL INTERPRETATION OF PHQ9 SCORE: NO FURTHER SCREENING NEEDED
SUM OF ALL RESPONSES TO PHQ9 QUESTIONS 1 AND 2: 0
SUM OF ALL RESPONSES TO PHQ9 QUESTIONS 1 AND 2: 0
CLINICAL INTERPRETATION OF PHQ2 SCORE: NO FURTHER SCREENING NEEDED
2. FEELING DOWN, DEPRESSED OR HOPELESS: NOT AT ALL

## 2021-08-20 ENCOUNTER — TELEPHONE (OUTPATIENT)
Dept: SCHEDULING | Age: 81
End: 2021-08-20

## 2021-08-20 PROBLEM — Z90.12 HX OF LEFT MASTECTOMY: Status: ACTIVE | Noted: 2021-08-20

## 2021-08-30 RX ORDER — ESCITALOPRAM OXALATE 10 MG/1
TABLET ORAL
Qty: 90 TABLET | Refills: 2 | Status: SHIPPED | OUTPATIENT
Start: 2021-08-30 | End: 2022-03-17

## 2021-09-07 ENCOUNTER — OFFICE VISIT (OUTPATIENT)
Dept: HEMATOLOGY/ONCOLOGY | Age: 81
End: 2021-09-07

## 2021-09-07 VITALS
RESPIRATION RATE: 15 BRPM | HEART RATE: 65 BPM | HEIGHT: 64 IN | SYSTOLIC BLOOD PRESSURE: 137 MMHG | WEIGHT: 132.83 LBS | TEMPERATURE: 98 F | DIASTOLIC BLOOD PRESSURE: 63 MMHG | BODY MASS INDEX: 22.68 KG/M2

## 2021-09-07 DIAGNOSIS — D05.12 DUCTAL CARCINOMA IN SITU (DCIS) OF LEFT BREAST: ICD-10-CM

## 2021-09-07 DIAGNOSIS — C50.911 RECURRENT MALIGNANT NEOPLASM OF RIGHT BREAST (CMD): ICD-10-CM

## 2021-09-07 DIAGNOSIS — Z90.12 HX OF LEFT MASTECTOMY: ICD-10-CM

## 2021-09-07 DIAGNOSIS — C50.912 ADENOCARCINOMA OF LEFT BREAST (CMD): Primary | ICD-10-CM

## 2021-09-07 PROCEDURE — 99214 OFFICE O/P EST MOD 30 MIN: CPT | Performed by: INTERNAL MEDICINE

## 2021-09-07 PROCEDURE — 3075F SYST BP GE 130 - 139MM HG: CPT | Performed by: INTERNAL MEDICINE

## 2021-09-07 PROCEDURE — 3078F DIAST BP <80 MM HG: CPT | Performed by: INTERNAL MEDICINE

## 2021-09-07 ASSESSMENT — ENCOUNTER SYMPTOMS
APNEA: 0
SHORTNESS OF BREATH: 0
TREMORS: 0
HEADACHES: 0
DIARRHEA: 0
VOMITING: 0
ADENOPATHY: 0
WHEEZING: 0
UNEXPECTED WEIGHT CHANGE: 0
CHOKING: 0
BLOOD IN STOOL: 0
ANAL BLEEDING: 0
FACIAL SWELLING: 0
VOICE CHANGE: 0
BACK PAIN: 0
RECTAL PAIN: 0
DIAPHORESIS: 0
FACIAL ASYMMETRY: 0
ABDOMINAL PAIN: 0
APPETITE CHANGE: 0
WEAKNESS: 0
FATIGUE: 0
CHILLS: 0
SPEECH DIFFICULTY: 0
CHEST TIGHTNESS: 0
CONSTIPATION: 0
TROUBLE SWALLOWING: 0
ACTIVITY CHANGE: 0
FEVER: 0
NAUSEA: 0
BRUISES/BLEEDS EASILY: 0
SEIZURES: 0
SORE THROAT: 0
DIZZINESS: 0
STRIDOR: 0
ABDOMINAL DISTENTION: 0

## 2021-09-07 ASSESSMENT — PATIENT HEALTH QUESTIONNAIRE - PHQ9
1. LITTLE INTEREST OR PLEASURE IN DOING THINGS: NOT AT ALL
CLINICAL INTERPRETATION OF PHQ9 SCORE: NO FURTHER SCREENING NEEDED
CLINICAL INTERPRETATION OF PHQ2 SCORE: NO FURTHER SCREENING NEEDED
SUM OF ALL RESPONSES TO PHQ9 QUESTIONS 1 AND 2: 0
2. FEELING DOWN, DEPRESSED OR HOPELESS: NOT AT ALL
SUM OF ALL RESPONSES TO PHQ9 QUESTIONS 1 AND 2: 0

## 2021-09-07 ASSESSMENT — PAIN SCALES - GENERAL: PAINLEVEL: 0

## 2021-09-14 DIAGNOSIS — E78.5 HYPERLIPIDEMIA, UNSPECIFIED HYPERLIPIDEMIA TYPE: ICD-10-CM

## 2021-09-14 RX ORDER — SIMVASTATIN 20 MG
TABLET ORAL
Qty: 90 TABLET | Refills: 1 | Status: SHIPPED | OUTPATIENT
Start: 2021-09-14 | End: 2022-03-10

## 2021-10-07 ENCOUNTER — TELEPHONE (OUTPATIENT)
Dept: SCHEDULING | Age: 81
End: 2021-10-07

## 2021-10-08 ENCOUNTER — TELEPHONE (OUTPATIENT)
Dept: SCHEDULING | Age: 81
End: 2021-10-08

## 2021-10-11 DIAGNOSIS — Z90.12 HX OF LEFT MASTECTOMY: ICD-10-CM

## 2021-10-11 DIAGNOSIS — C50.919 RECURRENT MALIGNANT NEOPLASM OF BREAST, UNSPECIFIED LATERALITY (CMD): Primary | ICD-10-CM

## 2021-10-14 DIAGNOSIS — C50.919 RECURRENT MALIGNANT NEOPLASM OF BREAST, UNSPECIFIED LATERALITY (CMD): Primary | ICD-10-CM

## 2021-10-14 DIAGNOSIS — Z90.12 HX OF LEFT MASTECTOMY: ICD-10-CM

## 2021-10-14 RX ORDER — ANASTROZOLE 1 MG/1
TABLET ORAL
Qty: 90 TABLET | Refills: 0 | Status: SHIPPED | OUTPATIENT
Start: 2021-10-14 | End: 2022-01-18

## 2021-10-19 ENCOUNTER — IMMUNIZATION (OUTPATIENT)
Dept: FAMILY MEDICINE | Age: 81
End: 2021-10-19

## 2021-10-19 DIAGNOSIS — Z23 NEED FOR VACCINATION: Primary | ICD-10-CM

## 2021-10-19 PROCEDURE — 91300 COVID 19 PFIZER-BIONTECH: CPT

## 2021-10-19 PROCEDURE — 90662 IIV NO PRSV INCREASED AG IM: CPT

## 2021-10-19 PROCEDURE — 0002A COVID 19 PFIZER-BIONTECH: CPT

## 2021-10-19 PROCEDURE — 90471 IMMUNIZATION ADMIN: CPT

## 2021-11-17 ENCOUNTER — TELEPHONE (OUTPATIENT)
Dept: FAMILY MEDICINE | Age: 81
End: 2021-11-17

## 2021-11-17 RX ORDER — DICYCLOMINE HYDROCHLORIDE 10 MG/1
10 CAPSULE ORAL 2 TIMES DAILY PRN
Qty: 180 CAPSULE | Refills: 1 | Status: SHIPPED | OUTPATIENT
Start: 2021-11-17 | End: 2022-07-06

## 2022-01-17 DIAGNOSIS — C50.919 RECURRENT MALIGNANT NEOPLASM OF BREAST, UNSPECIFIED LATERALITY (CMD): Primary | ICD-10-CM

## 2022-01-18 RX ORDER — ANASTROZOLE 1 MG/1
TABLET ORAL
Qty: 90 TABLET | Refills: 0 | Status: SHIPPED | OUTPATIENT
Start: 2022-01-18 | End: 2022-04-26

## 2022-01-20 DIAGNOSIS — M81.0 OSTEOPOROSIS, UNSPECIFIED OSTEOPOROSIS TYPE, UNSPECIFIED PATHOLOGICAL FRACTURE PRESENCE: ICD-10-CM

## 2022-01-20 RX ORDER — ALENDRONATE SODIUM 70 MG/1
TABLET ORAL
Qty: 12 TABLET | Refills: 2 | Status: SHIPPED | OUTPATIENT
Start: 2022-01-20 | End: 2022-09-28

## 2022-01-24 ENCOUNTER — TELEPHONE (OUTPATIENT)
Dept: SCHEDULING | Age: 82
End: 2022-01-24

## 2022-01-24 DIAGNOSIS — R10.9 CHRONIC ABDOMINAL PAIN: Primary | ICD-10-CM

## 2022-01-24 DIAGNOSIS — G89.29 CHRONIC ABDOMINAL PAIN: Primary | ICD-10-CM

## 2022-01-24 DIAGNOSIS — K58.2 IRRITABLE BOWEL SYNDROME WITH BOTH CONSTIPATION AND DIARRHEA: ICD-10-CM

## 2022-02-24 ENCOUNTER — OFFICE VISIT (OUTPATIENT)
Dept: FAMILY MEDICINE | Age: 82
End: 2022-02-24

## 2022-02-24 VITALS
SYSTOLIC BLOOD PRESSURE: 150 MMHG | HEART RATE: 57 BPM | DIASTOLIC BLOOD PRESSURE: 68 MMHG | HEIGHT: 64 IN | BODY MASS INDEX: 22.7 KG/M2 | RESPIRATION RATE: 17 BRPM | TEMPERATURE: 97.8 F | OXYGEN SATURATION: 97 % | WEIGHT: 132.94 LBS

## 2022-02-24 DIAGNOSIS — R06.02 SHORTNESS OF BREATH: Primary | ICD-10-CM

## 2022-02-24 DIAGNOSIS — R10.9 CHRONIC ABDOMINAL PAIN: ICD-10-CM

## 2022-02-24 DIAGNOSIS — G89.29 CHRONIC ABDOMINAL PAIN: ICD-10-CM

## 2022-02-24 DIAGNOSIS — L29.9 PRURITUS: ICD-10-CM

## 2022-02-24 DIAGNOSIS — R20.2 PARESTHESIA OF LEFT LOWER EXTREMITY: ICD-10-CM

## 2022-02-24 DIAGNOSIS — E55.9 VITAMIN D DEFICIENCY: ICD-10-CM

## 2022-02-24 DIAGNOSIS — Z00.00 BLOOD TESTS FOR ROUTINE GENERAL PHYSICAL EXAMINATION: ICD-10-CM

## 2022-02-24 PROCEDURE — 3078F DIAST BP <80 MM HG: CPT | Performed by: FAMILY MEDICINE

## 2022-02-24 PROCEDURE — 3077F SYST BP >= 140 MM HG: CPT | Performed by: FAMILY MEDICINE

## 2022-02-24 PROCEDURE — 99214 OFFICE O/P EST MOD 30 MIN: CPT | Performed by: FAMILY MEDICINE

## 2022-02-24 ASSESSMENT — PATIENT HEALTH QUESTIONNAIRE - PHQ9
CLINICAL INTERPRETATION OF PHQ2 SCORE: NO FURTHER SCREENING NEEDED
1. LITTLE INTEREST OR PLEASURE IN DOING THINGS: NOT AT ALL
SUM OF ALL RESPONSES TO PHQ9 QUESTIONS 1 AND 2: 0
2. FEELING DOWN, DEPRESSED OR HOPELESS: NOT AT ALL
SUM OF ALL RESPONSES TO PHQ9 QUESTIONS 1 AND 2: 0

## 2022-02-24 ASSESSMENT — PAIN SCALES - GENERAL: PAINLEVEL: 0

## 2022-03-08 RX ORDER — GABAPENTIN 400 MG/1
CAPSULE ORAL
Qty: 360 CAPSULE | Refills: 3 | Status: SHIPPED | OUTPATIENT
Start: 2022-03-08 | End: 2023-05-16 | Stop reason: SDUPTHER

## 2022-03-08 RX ORDER — FELODIPINE 5 MG/1
TABLET, EXTENDED RELEASE ORAL
Qty: 90 TABLET | Refills: 3 | Status: SHIPPED | OUTPATIENT
Start: 2022-03-08 | End: 2023-02-17 | Stop reason: SDUPTHER

## 2022-03-09 ENCOUNTER — LAB SERVICES (OUTPATIENT)
Dept: LAB | Age: 82
End: 2022-03-09

## 2022-03-09 DIAGNOSIS — L29.9 PRURITUS: ICD-10-CM

## 2022-03-09 DIAGNOSIS — Z00.00 BLOOD TESTS FOR ROUTINE GENERAL PHYSICAL EXAMINATION: ICD-10-CM

## 2022-03-09 DIAGNOSIS — E55.9 VITAMIN D DEFICIENCY: ICD-10-CM

## 2022-03-09 DIAGNOSIS — R06.02 SHORTNESS OF BREATH: ICD-10-CM

## 2022-03-09 DIAGNOSIS — R20.2 PARESTHESIA OF LEFT LOWER EXTREMITY: ICD-10-CM

## 2022-03-09 PROCEDURE — 36415 COLL VENOUS BLD VENIPUNCTURE: CPT | Performed by: FAMILY MEDICINE

## 2022-03-09 PROCEDURE — 82306 VITAMIN D 25 HYDROXY: CPT | Performed by: PSYCHIATRY & NEUROLOGY

## 2022-03-09 PROCEDURE — 80061 LIPID PANEL: CPT | Performed by: PSYCHIATRY & NEUROLOGY

## 2022-03-09 PROCEDURE — 85025 COMPLETE CBC W/AUTO DIFF WBC: CPT | Performed by: PSYCHIATRY & NEUROLOGY

## 2022-03-09 PROCEDURE — 84443 ASSAY THYROID STIM HORMONE: CPT | Performed by: PSYCHIATRY & NEUROLOGY

## 2022-03-09 PROCEDURE — 80053 COMPREHEN METABOLIC PANEL: CPT | Performed by: PSYCHIATRY & NEUROLOGY

## 2022-03-10 DIAGNOSIS — E78.5 HYPERLIPIDEMIA, UNSPECIFIED HYPERLIPIDEMIA TYPE: ICD-10-CM

## 2022-03-10 LAB
25(OH)D3+25(OH)D2 SERPL-MCNC: 19.2 NG/ML (ref 30–100)
ALBUMIN SERPL-MCNC: 4 G/DL (ref 3.6–5.1)
ALBUMIN/GLOB SERPL: 1.3 {RATIO} (ref 1–2.4)
ALP SERPL-CCNC: 56 UNITS/L (ref 45–117)
ALT SERPL-CCNC: 20 UNITS/L
ANION GAP SERPL CALC-SCNC: 10 MMOL/L (ref 10–20)
AST SERPL-CCNC: 25 UNITS/L
BASOPHILS # BLD: 0 K/MCL (ref 0–0.3)
BASOPHILS NFR BLD: 1 %
BILIRUB SERPL-MCNC: 0.3 MG/DL (ref 0.2–1)
BUN SERPL-MCNC: 13 MG/DL (ref 6–20)
BUN/CREAT SERPL: 21 (ref 7–25)
CALCIUM SERPL-MCNC: 9.5 MG/DL (ref 8.4–10.2)
CHLORIDE SERPL-SCNC: 109 MMOL/L (ref 98–107)
CHOLEST SERPL-MCNC: 162 MG/DL
CHOLEST/HDLC SERPL: 2.3 {RATIO}
CO2 SERPL-SCNC: 26 MMOL/L (ref 21–32)
CREAT SERPL-MCNC: 0.63 MG/DL (ref 0.51–0.95)
DEPRECATED RDW RBC: 50.5 FL (ref 39–50)
EOSINOPHIL # BLD: 0.1 K/MCL (ref 0–0.5)
EOSINOPHIL NFR BLD: 1 %
ERYTHROCYTE [DISTWIDTH] IN BLOOD: 14.6 % (ref 11–15)
FASTING DURATION TIME PATIENT: ABNORMAL H
FASTING DURATION TIME PATIENT: NORMAL H
GFR SERPLBLD BASED ON 1.73 SQ M-ARVRAT: >90 ML/MIN
GLOBULIN SER-MCNC: 3.1 G/DL (ref 2–4)
GLUCOSE SERPL-MCNC: 80 MG/DL (ref 70–99)
HCT VFR BLD CALC: 40.7 % (ref 36–46.5)
HDLC SERPL-MCNC: 69 MG/DL
HGB BLD-MCNC: 13 G/DL (ref 12–15.5)
IMM GRANULOCYTES # BLD AUTO: 0 K/MCL (ref 0–0.2)
IMM GRANULOCYTES # BLD: 0 %
LDLC SERPL CALC-MCNC: 72 MG/DL
LYMPHOCYTES # BLD: 2.3 K/MCL (ref 1–4)
LYMPHOCYTES NFR BLD: 45 %
MCH RBC QN AUTO: 30.2 PG (ref 26–34)
MCHC RBC AUTO-ENTMCNC: 31.9 G/DL (ref 32–36.5)
MCV RBC AUTO: 94.7 FL (ref 78–100)
MONOCYTES # BLD: 0.4 K/MCL (ref 0.3–0.9)
MONOCYTES NFR BLD: 8 %
NEUTROPHILS # BLD: 2.4 K/MCL (ref 1.8–7.7)
NEUTROPHILS NFR BLD: 45 %
NONHDLC SERPL-MCNC: 93 MG/DL
NRBC BLD MANUAL-RTO: 0 /100 WBC
PLATELET # BLD AUTO: 224 K/MCL (ref 140–450)
POTASSIUM SERPL-SCNC: 4.1 MMOL/L (ref 3.4–5.1)
PROT SERPL-MCNC: 7.1 G/DL (ref 6.4–8.2)
RBC # BLD: 4.3 MIL/MCL (ref 4–5.2)
SODIUM SERPL-SCNC: 141 MMOL/L (ref 135–145)
TRIGL SERPL-MCNC: 106 MG/DL
TSH SERPL-ACNC: 1.88 MCUNITS/ML (ref 0.35–5)
WBC # BLD: 5.1 K/MCL (ref 4.2–11)

## 2022-03-10 RX ORDER — SIMVASTATIN 20 MG
TABLET ORAL
Qty: 90 TABLET | Refills: 2 | Status: SHIPPED | OUTPATIENT
Start: 2022-03-10 | End: 2022-10-31

## 2022-03-17 RX ORDER — ESCITALOPRAM OXALATE 10 MG/1
TABLET ORAL
Qty: 90 TABLET | Refills: 2 | Status: SHIPPED | OUTPATIENT
Start: 2022-03-17 | End: 2022-08-23 | Stop reason: DRUGHIGH

## 2022-03-22 ENCOUNTER — OFFICE VISIT (OUTPATIENT)
Dept: GASTROENTEROLOGY | Age: 82
End: 2022-03-22

## 2022-03-22 VITALS
OXYGEN SATURATION: 98 % | HEART RATE: 62 BPM | DIASTOLIC BLOOD PRESSURE: 64 MMHG | HEIGHT: 64 IN | BODY MASS INDEX: 22.53 KG/M2 | RESPIRATION RATE: 16 BRPM | SYSTOLIC BLOOD PRESSURE: 147 MMHG | WEIGHT: 132 LBS

## 2022-03-22 DIAGNOSIS — K58.2 IRRITABLE BOWEL SYNDROME WITH BOTH CONSTIPATION AND DIARRHEA: Primary | ICD-10-CM

## 2022-03-22 DIAGNOSIS — R10.9 CHRONIC ABDOMINAL PAIN: ICD-10-CM

## 2022-03-22 DIAGNOSIS — G89.29 CHRONIC ABDOMINAL PAIN: ICD-10-CM

## 2022-03-22 PROCEDURE — 99213 OFFICE O/P EST LOW 20 MIN: CPT | Performed by: INTERNAL MEDICINE

## 2022-03-22 PROCEDURE — 3077F SYST BP >= 140 MM HG: CPT | Performed by: INTERNAL MEDICINE

## 2022-03-22 PROCEDURE — 3078F DIAST BP <80 MM HG: CPT | Performed by: INTERNAL MEDICINE

## 2022-04-06 DIAGNOSIS — C50.919 RECURRENT MALIGNANT NEOPLASM OF BREAST, UNSPECIFIED LATERALITY (CMD): ICD-10-CM

## 2022-04-06 RX ORDER — ANASTROZOLE 1 MG/1
TABLET ORAL
Qty: 90 TABLET | Refills: 0 | OUTPATIENT
Start: 2022-04-06

## 2022-04-14 ENCOUNTER — TELEPHONE (OUTPATIENT)
Dept: SCHEDULING | Age: 82
End: 2022-04-14

## 2022-04-15 DIAGNOSIS — H26.9 CATARACT, UNSPECIFIED CATARACT TYPE, UNSPECIFIED LATERALITY: Primary | ICD-10-CM

## 2022-04-15 DIAGNOSIS — H40.9 GLAUCOMA, UNSPECIFIED GLAUCOMA TYPE, UNSPECIFIED LATERALITY: ICD-10-CM

## 2022-04-26 DIAGNOSIS — C50.919 RECURRENT MALIGNANT NEOPLASM OF BREAST, UNSPECIFIED LATERALITY (CMD): ICD-10-CM

## 2022-04-26 RX ORDER — ANASTROZOLE 1 MG/1
TABLET ORAL
Qty: 90 TABLET | Refills: 0 | Status: SHIPPED | OUTPATIENT
Start: 2022-04-26 | End: 2022-07-06

## 2022-05-26 ENCOUNTER — TELEPHONE (OUTPATIENT)
Dept: SCHEDULING | Age: 82
End: 2022-05-26

## 2022-05-27 DIAGNOSIS — M51.36 LUMBAR DEGENERATIVE DISC DISEASE: Primary | ICD-10-CM

## 2022-05-27 DIAGNOSIS — M46.96 UNSPECIFIED INFLAMMATORY SPONDYLOPATHY, LUMBAR REGION (CMD): ICD-10-CM

## 2022-05-30 ENCOUNTER — TELEPHONE (OUTPATIENT)
Dept: SCHEDULING | Age: 82
End: 2022-05-30

## 2022-06-08 DIAGNOSIS — C50.919 RECURRENT MALIGNANT NEOPLASM OF BREAST, UNSPECIFIED LATERALITY (CMD): ICD-10-CM

## 2022-06-08 RX ORDER — ANASTROZOLE 1 MG/1
TABLET ORAL
Qty: 90 TABLET | Refills: 0 | OUTPATIENT
Start: 2022-06-08

## 2022-06-22 ENCOUNTER — IMMUNIZATION (OUTPATIENT)
Dept: FAMILY MEDICINE | Age: 82
End: 2022-06-22

## 2022-06-22 DIAGNOSIS — Z23 NEED FOR VACCINATION: Primary | ICD-10-CM

## 2022-06-22 PROCEDURE — 91305 COVID 19 12Y AND OLDER PFIZER-BIONTECH - DO NOT DILUTE: CPT | Performed by: FAMILY MEDICINE

## 2022-06-22 PROCEDURE — 0054A COVID 19 12Y AND OLDER PFIZER-BIONTECH - DO NOT DILUTE: CPT | Performed by: FAMILY MEDICINE

## 2022-06-28 ENCOUNTER — OFFICE VISIT (OUTPATIENT)
Dept: GASTROENTEROLOGY | Age: 82
End: 2022-06-28

## 2022-06-28 VITALS
RESPIRATION RATE: 16 BRPM | SYSTOLIC BLOOD PRESSURE: 131 MMHG | HEART RATE: 62 BPM | WEIGHT: 131 LBS | HEIGHT: 64 IN | BODY MASS INDEX: 22.36 KG/M2 | TEMPERATURE: 96 F | DIASTOLIC BLOOD PRESSURE: 65 MMHG | OXYGEN SATURATION: 100 %

## 2022-06-28 DIAGNOSIS — K58.2 IRRITABLE BOWEL SYNDROME WITH BOTH CONSTIPATION AND DIARRHEA: Primary | ICD-10-CM

## 2022-06-28 PROCEDURE — 3078F DIAST BP <80 MM HG: CPT | Performed by: INTERNAL MEDICINE

## 2022-06-28 PROCEDURE — 3075F SYST BP GE 130 - 139MM HG: CPT | Performed by: INTERNAL MEDICINE

## 2022-06-28 PROCEDURE — 99213 OFFICE O/P EST LOW 20 MIN: CPT | Performed by: INTERNAL MEDICINE

## 2022-07-05 ENCOUNTER — NURSE TRIAGE (OUTPATIENT)
Dept: SCHEDULING | Age: 82
End: 2022-07-05

## 2022-07-05 ENCOUNTER — TELEPHONE (OUTPATIENT)
Dept: SCHEDULING | Age: 82
End: 2022-07-05

## 2022-07-06 ENCOUNTER — HOSPITAL ENCOUNTER (OUTPATIENT)
Dept: MAMMOGRAPHY | Age: 82
Discharge: HOME | End: 2022-07-06
Attending: FAMILY MEDICINE

## 2022-07-06 DIAGNOSIS — Z12.31 VISIT FOR SCREENING MAMMOGRAM: ICD-10-CM

## 2022-07-06 DIAGNOSIS — C50.919 RECURRENT MALIGNANT NEOPLASM OF BREAST, UNSPECIFIED LATERALITY (CMD): ICD-10-CM

## 2022-07-06 PROCEDURE — 77067 SCR MAMMO BI INCL CAD: CPT

## 2022-07-06 RX ORDER — ANASTROZOLE 1 MG/1
TABLET ORAL
Qty: 90 TABLET | Refills: 0 | Status: SHIPPED | OUTPATIENT
Start: 2022-07-06 | End: 2022-10-18 | Stop reason: SDUPTHER

## 2022-07-06 RX ORDER — DICYCLOMINE HYDROCHLORIDE 10 MG/1
10 CAPSULE ORAL 2 TIMES DAILY PRN
Qty: 180 CAPSULE | Refills: 1 | Status: SHIPPED | OUTPATIENT
Start: 2022-07-06 | End: 2023-04-11 | Stop reason: ALTCHOICE

## 2022-07-09 ENCOUNTER — OFFICE VISIT (OUTPATIENT)
Dept: FAMILY MEDICINE | Age: 82
End: 2022-07-09

## 2022-07-09 VITALS
OXYGEN SATURATION: 99 % | RESPIRATION RATE: 17 BRPM | BODY MASS INDEX: 22.32 KG/M2 | DIASTOLIC BLOOD PRESSURE: 64 MMHG | TEMPERATURE: 97.5 F | SYSTOLIC BLOOD PRESSURE: 140 MMHG | WEIGHT: 130.73 LBS | HEART RATE: 57 BPM | HEIGHT: 64 IN

## 2022-07-09 DIAGNOSIS — M17.0 PRIMARY OSTEOARTHRITIS OF BOTH KNEES: ICD-10-CM

## 2022-07-09 DIAGNOSIS — M19.041 PRIMARY OSTEOARTHRITIS OF BOTH HANDS: ICD-10-CM

## 2022-07-09 DIAGNOSIS — C50.919 RECURRENT MALIGNANT NEOPLASM OF BREAST, UNSPECIFIED LATERALITY (CMD): ICD-10-CM

## 2022-07-09 DIAGNOSIS — M19.042 PRIMARY OSTEOARTHRITIS OF BOTH HANDS: ICD-10-CM

## 2022-07-09 DIAGNOSIS — R23.2 HOT FLASHES: ICD-10-CM

## 2022-07-09 DIAGNOSIS — M54.16 LUMBAR RADICULOPATHY: Primary | ICD-10-CM

## 2022-07-09 DIAGNOSIS — I10 BENIGN ESSENTIAL HYPERTENSION: ICD-10-CM

## 2022-07-09 PROCEDURE — 3078F DIAST BP <80 MM HG: CPT | Performed by: FAMILY MEDICINE

## 2022-07-09 PROCEDURE — 99214 OFFICE O/P EST MOD 30 MIN: CPT | Performed by: FAMILY MEDICINE

## 2022-07-09 PROCEDURE — 3077F SYST BP >= 140 MM HG: CPT | Performed by: FAMILY MEDICINE

## 2022-07-09 ASSESSMENT — PATIENT HEALTH QUESTIONNAIRE - PHQ9
2. FEELING DOWN, DEPRESSED OR HOPELESS: NOT AT ALL
1. LITTLE INTEREST OR PLEASURE IN DOING THINGS: NOT AT ALL
CLINICAL INTERPRETATION OF PHQ2 SCORE: NO FURTHER SCREENING NEEDED
SUM OF ALL RESPONSES TO PHQ9 QUESTIONS 1 AND 2: 0
SUM OF ALL RESPONSES TO PHQ9 QUESTIONS 1 AND 2: 0

## 2022-07-09 ASSESSMENT — PAIN SCALES - GENERAL: PAINLEVEL: 8

## 2022-08-08 DIAGNOSIS — C50.919 RECURRENT MALIGNANT NEOPLASM OF BREAST, UNSPECIFIED LATERALITY (CMD): ICD-10-CM

## 2022-08-08 RX ORDER — ANASTROZOLE 1 MG/1
TABLET ORAL
Qty: 90 TABLET | Refills: 0 | OUTPATIENT
Start: 2022-08-08

## 2022-08-22 ENCOUNTER — TELEPHONE (OUTPATIENT)
Dept: SCHEDULING | Age: 82
End: 2022-08-22

## 2022-08-23 ENCOUNTER — APPOINTMENT (OUTPATIENT)
Dept: FAMILY MEDICINE | Age: 82
End: 2022-08-23

## 2022-08-23 RX ORDER — ESCITALOPRAM OXALATE 20 MG/1
20 TABLET ORAL DAILY
Qty: 90 TABLET | Refills: 1 | Status: SHIPPED | OUTPATIENT
Start: 2022-08-23 | End: 2022-11-23 | Stop reason: DRUGHIGH

## 2022-09-07 ENCOUNTER — APPOINTMENT (OUTPATIENT)
Dept: HEMATOLOGY/ONCOLOGY | Age: 82
End: 2022-09-07

## 2022-09-13 ENCOUNTER — TELEPHONE (OUTPATIENT)
Dept: SCHEDULING | Age: 82
End: 2022-09-13

## 2022-09-16 ENCOUNTER — OFFICE VISIT (OUTPATIENT)
Dept: HEMATOLOGY/ONCOLOGY | Age: 82
End: 2022-09-16

## 2022-09-16 VITALS
RESPIRATION RATE: 16 BRPM | OXYGEN SATURATION: 99 % | SYSTOLIC BLOOD PRESSURE: 121 MMHG | BODY MASS INDEX: 22.41 KG/M2 | DIASTOLIC BLOOD PRESSURE: 60 MMHG | HEIGHT: 64 IN | WEIGHT: 131.28 LBS | HEART RATE: 61 BPM

## 2022-09-16 DIAGNOSIS — Z85.3 HISTORY OF BREAST CANCER: Primary | ICD-10-CM

## 2022-09-16 ASSESSMENT — PAIN SCALES - GENERAL: PAINLEVEL: 0

## 2022-09-28 DIAGNOSIS — M81.0 OSTEOPOROSIS, UNSPECIFIED OSTEOPOROSIS TYPE, UNSPECIFIED PATHOLOGICAL FRACTURE PRESENCE: ICD-10-CM

## 2022-09-28 RX ORDER — ALENDRONATE SODIUM 70 MG/1
TABLET ORAL
Qty: 12 TABLET | Refills: 2 | Status: SHIPPED | OUTPATIENT
Start: 2022-09-28 | End: 2023-06-23

## 2022-10-17 ENCOUNTER — TELEPHONE (OUTPATIENT)
Dept: SURGERY | Age: 82
End: 2022-10-17

## 2022-10-18 DIAGNOSIS — C50.919 RECURRENT MALIGNANT NEOPLASM OF BREAST, UNSPECIFIED LATERALITY (CMD): ICD-10-CM

## 2022-10-18 RX ORDER — ANASTROZOLE 1 MG/1
1 TABLET ORAL DAILY
Qty: 90 TABLET | Refills: 3 | Status: SHIPPED | OUTPATIENT
Start: 2022-10-18 | End: 2023-11-07 | Stop reason: SDUPTHER

## 2022-10-31 DIAGNOSIS — E78.5 HYPERLIPIDEMIA, UNSPECIFIED HYPERLIPIDEMIA TYPE: ICD-10-CM

## 2022-10-31 RX ORDER — SIMVASTATIN 20 MG
TABLET ORAL
Qty: 90 TABLET | Refills: 2 | Status: SHIPPED | OUTPATIENT
Start: 2022-10-31 | End: 2023-07-25 | Stop reason: SDUPTHER

## 2022-11-16 ENCOUNTER — IMMUNIZATION (OUTPATIENT)
Dept: FAMILY MEDICINE | Age: 82
End: 2022-11-16

## 2022-11-16 DIAGNOSIS — Z23 NEED FOR VACCINATION: Primary | ICD-10-CM

## 2022-11-16 PROCEDURE — 90662 IIV NO PRSV INCREASED AG IM: CPT | Performed by: PEDIATRICS

## 2022-11-16 PROCEDURE — 91312 COVID-19 12Y+ PFIZER BIVALENT BOOSTER VACCINE: CPT | Performed by: PEDIATRICS

## 2022-11-16 PROCEDURE — 90471 IMMUNIZATION ADMIN: CPT | Performed by: PEDIATRICS

## 2022-11-16 PROCEDURE — 0124A COVID-19 12Y+ PFIZER BIVALENT BOOSTER VACCINE: CPT | Performed by: PEDIATRICS

## 2022-11-22 ENCOUNTER — TELEPHONE (OUTPATIENT)
Dept: SCHEDULING | Age: 82
End: 2022-11-22

## 2022-11-23 RX ORDER — ESCITALOPRAM OXALATE 10 MG/1
10 TABLET ORAL DAILY
Qty: 90 TABLET | Refills: 3 | Status: SHIPPED | OUTPATIENT
Start: 2022-11-23 | End: 2023-07-25 | Stop reason: SDUPTHER

## 2022-12-09 ENCOUNTER — TELEPHONE (OUTPATIENT)
Dept: SCHEDULING | Age: 82
End: 2022-12-09

## 2023-02-06 ENCOUNTER — OFFICE VISIT (OUTPATIENT)
Dept: OBGYN | Age: 83
End: 2023-02-06

## 2023-02-06 VITALS
HEART RATE: 54 BPM | SYSTOLIC BLOOD PRESSURE: 140 MMHG | HEIGHT: 64 IN | BODY MASS INDEX: 22.6 KG/M2 | WEIGHT: 132.39 LBS | DIASTOLIC BLOOD PRESSURE: 66 MMHG

## 2023-02-06 DIAGNOSIS — N89.8 VAGINAL LESION: Primary | ICD-10-CM

## 2023-02-06 PROCEDURE — 3078F DIAST BP <80 MM HG: CPT | Performed by: OBSTETRICS & GYNECOLOGY

## 2023-02-06 PROCEDURE — 99213 OFFICE O/P EST LOW 20 MIN: CPT | Performed by: OBSTETRICS & GYNECOLOGY

## 2023-02-06 PROCEDURE — 3077F SYST BP >= 140 MM HG: CPT | Performed by: OBSTETRICS & GYNECOLOGY

## 2023-02-06 ASSESSMENT — PAIN SCALES - GENERAL: PAINLEVEL: 0

## 2023-02-17 ENCOUNTER — OFFICE VISIT (OUTPATIENT)
Dept: FAMILY MEDICINE | Age: 83
End: 2023-02-17

## 2023-02-17 ENCOUNTER — LAB SERVICES (OUTPATIENT)
Dept: LAB | Age: 83
End: 2023-02-17

## 2023-02-17 VITALS
BODY MASS INDEX: 22.56 KG/M2 | HEART RATE: 64 BPM | WEIGHT: 132.17 LBS | RESPIRATION RATE: 16 BRPM | TEMPERATURE: 97.4 F | DIASTOLIC BLOOD PRESSURE: 55 MMHG | SYSTOLIC BLOOD PRESSURE: 116 MMHG | HEIGHT: 64 IN | OXYGEN SATURATION: 100 %

## 2023-02-17 DIAGNOSIS — K58.2 IRRITABLE BOWEL SYNDROME WITH BOTH CONSTIPATION AND DIARRHEA: ICD-10-CM

## 2023-02-17 DIAGNOSIS — R35.1 NOCTURIA: ICD-10-CM

## 2023-02-17 DIAGNOSIS — M81.0 AGE-RELATED OSTEOPOROSIS WITHOUT CURRENT PATHOLOGICAL FRACTURE: ICD-10-CM

## 2023-02-17 DIAGNOSIS — E78.00 HYPERCHOLESTEREMIA: ICD-10-CM

## 2023-02-17 DIAGNOSIS — M15.9 PRIMARY OSTEOARTHRITIS INVOLVING MULTIPLE JOINTS: ICD-10-CM

## 2023-02-17 DIAGNOSIS — Z00.00 ANNUAL PHYSICAL EXAM: Primary | ICD-10-CM

## 2023-02-17 DIAGNOSIS — I10 BENIGN ESSENTIAL HYPERTENSION: ICD-10-CM

## 2023-02-17 DIAGNOSIS — M51.36 LUMBAR DEGENERATIVE DISC DISEASE: ICD-10-CM

## 2023-02-17 DIAGNOSIS — N95.1 MENOPAUSAL HOT FLUSHES: ICD-10-CM

## 2023-02-17 DIAGNOSIS — Z00.00 ANNUAL PHYSICAL EXAM: ICD-10-CM

## 2023-02-17 PROCEDURE — 3074F SYST BP LT 130 MM HG: CPT | Performed by: FAMILY MEDICINE

## 2023-02-17 PROCEDURE — 80053 COMPREHEN METABOLIC PANEL: CPT | Performed by: INTERNAL MEDICINE

## 2023-02-17 PROCEDURE — 85025 COMPLETE CBC W/AUTO DIFF WBC: CPT | Performed by: INTERNAL MEDICINE

## 2023-02-17 PROCEDURE — 3078F DIAST BP <80 MM HG: CPT | Performed by: FAMILY MEDICINE

## 2023-02-17 PROCEDURE — 99397 PER PM REEVAL EST PAT 65+ YR: CPT | Performed by: FAMILY MEDICINE

## 2023-02-17 PROCEDURE — 84443 ASSAY THYROID STIM HORMONE: CPT | Performed by: INTERNAL MEDICINE

## 2023-02-17 PROCEDURE — 80061 LIPID PANEL: CPT | Performed by: INTERNAL MEDICINE

## 2023-02-17 PROCEDURE — 36415 COLL VENOUS BLD VENIPUNCTURE: CPT | Performed by: FAMILY MEDICINE

## 2023-02-17 RX ORDER — FELODIPINE 5 MG/1
5 TABLET, EXTENDED RELEASE ORAL DAILY
Qty: 90 TABLET | Refills: 3 | Status: SHIPPED | OUTPATIENT
Start: 2023-02-17

## 2023-02-17 ASSESSMENT — PAIN SCALES - GENERAL: PAINLEVEL: 0

## 2023-02-17 ASSESSMENT — PATIENT HEALTH QUESTIONNAIRE - PHQ9
CLINICAL INTERPRETATION OF PHQ2 SCORE: NO FURTHER SCREENING NEEDED
SUM OF ALL RESPONSES TO PHQ9 QUESTIONS 1 AND 2: 0
2. FEELING DOWN, DEPRESSED OR HOPELESS: NOT AT ALL
SUM OF ALL RESPONSES TO PHQ9 QUESTIONS 1 AND 2: 0
1. LITTLE INTEREST OR PLEASURE IN DOING THINGS: NOT AT ALL

## 2023-02-17 ASSESSMENT — COGNITIVE AND FUNCTIONAL STATUS - GENERAL
DO YOU HAVE SERIOUS DIFFICULTY WALKING OR CLIMBING STAIRS: NO
BECAUSE OF A PHYSICAL, MENTAL, OR EMOTIONAL CONDITION, DO YOU HAVE SERIOUS DIFFICULTY CONCENTRATING, REMEMBERING OR MAKING DECISIONS: NO
DO YOU HAVE DIFFICULTY DRESSING OR BATHING: NO
BECAUSE OF A PHYSICAL, MENTAL, OR EMOTIONAL CONDITION, DO YOU HAVE DIFFICULTY DOING ERRANDS ALONE: NO

## 2023-02-18 LAB
ALBUMIN SERPL-MCNC: 4.1 G/DL (ref 3.6–5.1)
ALBUMIN/GLOB SERPL: 1.2 {RATIO} (ref 1–2.4)
ALP SERPL-CCNC: 53 UNITS/L (ref 45–117)
ALT SERPL-CCNC: 21 UNITS/L
ANION GAP SERPL CALC-SCNC: 9 MMOL/L (ref 7–19)
AST SERPL-CCNC: 24 UNITS/L
BASOPHILS # BLD: 0 K/MCL (ref 0–0.3)
BASOPHILS NFR BLD: 1 %
BILIRUB SERPL-MCNC: 0.6 MG/DL (ref 0.2–1)
BUN SERPL-MCNC: 19 MG/DL (ref 6–20)
BUN/CREAT SERPL: 27 (ref 7–25)
CALCIUM SERPL-MCNC: 9.7 MG/DL (ref 8.4–10.2)
CHLORIDE SERPL-SCNC: 108 MMOL/L (ref 97–110)
CHOLEST SERPL-MCNC: 182 MG/DL
CHOLEST/HDLC SERPL: 2.6 {RATIO}
CO2 SERPL-SCNC: 24 MMOL/L (ref 21–32)
CREAT SERPL-MCNC: 0.71 MG/DL (ref 0.51–0.95)
DEPRECATED RDW RBC: 49.4 FL (ref 39–50)
EOSINOPHIL # BLD: 0.1 K/MCL (ref 0–0.5)
EOSINOPHIL NFR BLD: 1 %
ERYTHROCYTE [DISTWIDTH] IN BLOOD: 14.4 % (ref 11–15)
FASTING DURATION TIME PATIENT: ABNORMAL H
FASTING DURATION TIME PATIENT: NORMAL H
GFR SERPLBLD BASED ON 1.73 SQ M-ARVRAT: 83 ML/MIN
GLOBULIN SER-MCNC: 3.3 G/DL (ref 2–4)
GLUCOSE SERPL-MCNC: 74 MG/DL (ref 70–99)
HCT VFR BLD CALC: 40.4 % (ref 36–46.5)
HDLC SERPL-MCNC: 70 MG/DL
HGB BLD-MCNC: 13 G/DL (ref 12–15.5)
IMM GRANULOCYTES # BLD AUTO: 0 K/MCL (ref 0–0.2)
IMM GRANULOCYTES # BLD: 0 %
LDLC SERPL CALC-MCNC: 90 MG/DL
LYMPHOCYTES # BLD: 2.2 K/MCL (ref 1–4)
LYMPHOCYTES NFR BLD: 41 %
MCH RBC QN AUTO: 30.4 PG (ref 26–34)
MCHC RBC AUTO-ENTMCNC: 32.2 G/DL (ref 32–36.5)
MCV RBC AUTO: 94.4 FL (ref 78–100)
MONOCYTES # BLD: 0.4 K/MCL (ref 0.3–0.9)
MONOCYTES NFR BLD: 8 %
NEUTROPHILS # BLD: 2.6 K/MCL (ref 1.8–7.7)
NEUTROPHILS NFR BLD: 49 %
NONHDLC SERPL-MCNC: 112 MG/DL
NRBC BLD MANUAL-RTO: 0 /100 WBC
PLATELET # BLD AUTO: 225 K/MCL (ref 140–450)
POTASSIUM SERPL-SCNC: 4.3 MMOL/L (ref 3.4–5.1)
PROT SERPL-MCNC: 7.4 G/DL (ref 6.4–8.2)
RBC # BLD: 4.28 MIL/MCL (ref 4–5.2)
SODIUM SERPL-SCNC: 137 MMOL/L (ref 135–145)
TRIGL SERPL-MCNC: 109 MG/DL
TSH SERPL-ACNC: 2.24 MCUNITS/ML (ref 0.35–5)
WBC # BLD: 5.4 K/MCL (ref 4.2–11)

## 2023-02-23 ENCOUNTER — TELEPHONE (OUTPATIENT)
Dept: FAMILY MEDICINE | Age: 83
End: 2023-02-23

## 2023-03-11 RX ORDER — ESCITALOPRAM OXALATE 20 MG/1
TABLET ORAL
Qty: 90 TABLET | Refills: 1 | OUTPATIENT
Start: 2023-03-11

## 2023-04-11 ENCOUNTER — OFFICE VISIT (OUTPATIENT)
Dept: GASTROENTEROLOGY | Age: 83
End: 2023-04-11

## 2023-04-11 VITALS
SYSTOLIC BLOOD PRESSURE: 134 MMHG | WEIGHT: 131.39 LBS | BODY MASS INDEX: 22.43 KG/M2 | OXYGEN SATURATION: 98 % | RESPIRATION RATE: 16 BRPM | HEART RATE: 57 BPM | DIASTOLIC BLOOD PRESSURE: 62 MMHG | TEMPERATURE: 96.5 F | HEIGHT: 64 IN

## 2023-04-11 DIAGNOSIS — R10.13 DYSPEPSIA: Primary | ICD-10-CM

## 2023-04-11 PROCEDURE — 3078F DIAST BP <80 MM HG: CPT | Performed by: INTERNAL MEDICINE

## 2023-04-11 PROCEDURE — 3075F SYST BP GE 130 - 139MM HG: CPT | Performed by: INTERNAL MEDICINE

## 2023-04-11 PROCEDURE — 99214 OFFICE O/P EST MOD 30 MIN: CPT | Performed by: INTERNAL MEDICINE

## 2023-04-11 RX ORDER — OMEPRAZOLE 20 MG/1
20 CAPSULE, DELAYED RELEASE ORAL
Qty: 60 CAPSULE | Refills: 1 | Status: SHIPPED | OUTPATIENT
Start: 2023-04-11 | End: 2023-05-08

## 2023-04-18 NOTE — TELEPHONE ENCOUNTER
rx refill sent for furosemide per request from [facility]   Fractionation Number (Evaluation): 16

## 2023-04-28 NOTE — PROGRESS NOTES
"Nephrology Progress Note.    LOS: 0 days    Patient Care Team:  Tiburcio Hernandez MD as PCP - General    Chief Complaint:    Chief Complaint   Patient presents with   • Shortness of Breath       Subjective     Interval History:     Patient Complaints: none    Patient seen and examined this morning.  Events from last night noted.  Patient denies having any fevers chills.  No nausea or vomiting no abdominal pain.  Denies any chest pain shortness of breath cough or sputum production.  There is no significant edema.   Patient also denies having new onset weakness of numbness of either extremity, she had episode of hypoglycemia and also had headache and it is better with treatment.  History taken from: patient      Review of Systems:    The pertinent  ROS was done and it is noted above, rest  was negative.    Objective     Vital Signs  /66  Pulse 77  Temp 99.8 °F (37.7 °C) (Oral)   Resp 16  Ht 160 cm (63\")  Wt 62.3 kg (137 lb 4.8 oz)  LMP  (LMP Unknown)  SpO2 95%  Breastfeeding? No  BMI 24.32 kg/m2           Intake/Output Summary (Last 24 hours) at 01/04/18 0936  Last data filed at 01/04/18 0515   Gross per 24 hour   Intake             2058 ml   Output             2800 ml   Net             -742 ml       Physical Exam:    General Appearance: alert, oriented x 3, no acute distress,   HEENT: pupils round and reactive to light, oral mucosa dry, extra occular movements intact.  Neck: supple, no JVD, trachea midline  Lungs: Clear to Auscultation, unlabored breathing effort, Right basal crakles noted  Heart: RRR, normal S1 and S2, no S3, no rub  Abdomen: soft, non-tender, no palpable bladder, present bowel sounds to auscultation  Extremities: no edema, cyanosis or clubbing.   Neuro: normal speech and mental status, grossly non focal.     Results Review:      Results from last 7 days  Lab Units 01/04/18  0453 01/03/18  0457 01/02/18  0725   SODIUM mmol/L 134* 141 142   POTASSIUM mmol/L 4.4 3.6 4.0   CHLORIDE mmol/L " 100 104 103   CO2 mmol/L 26.0 28.0 26.0   BUN mg/dL 34* 66* 58*   CREATININE mg/dL 4.10* 6.60* 6.00*   CALCIUM mg/dL 8.7 8.8 9.3   BILIRUBIN mg/dL  --   --  0.7   ALK PHOS U/L  --   --  98   ALT (SGPT) U/L  --   --  39   AST (SGOT) U/L  --   --  27   GLUCOSE mg/dL 321* 26* 239*       Estimated Creatinine Clearance: 10.9 mL/min (by C-G formula based on Cr of 4.1).      Results from last 7 days  Lab Units 01/03/18  0457   MAGNESIUM mg/dL 2.7*               Results from last 7 days  Lab Units 01/04/18  0453 01/03/18  0457 01/02/18  0725   WBC 10*3/mm3 5.97 5.39 7.71   HEMOGLOBIN g/dL 7.6* 7.7* 9.1*   PLATELETS 10*3/mm3 177 195 214               Imaging Results (last 24 hours)     Procedure Component Value Units Date/Time    XR Chest 1 View [[id number]] Collected:  01/03/18 1425     Updated:  01/03/18 1428    Narrative:       PROCEDURE: XR CHEST 1 VW-     HISTORY: S/P Pacemeker; R00.1-Bradycardia, unspecified;  R09.02-Hypoxemia; R55-Syncope and collapse; R11.0-Nausea; I49.5-Sick  sinus syndrome     COMPARISON: January 2, 2018.     FINDINGS: A left subclavian pacemaker is in place with leads in the  right atrium and ventricle. The heart is enlarged. The mediastinum is  unremarkable. There are bilateral pleural effusions and bibasilar  opacities which are unchanged. There is no pneumothorax.  There are no  acute osseous abnormalities.           Impression:       Placement of a left subclavian pacemaker with no evidence of  pneumothorax.     Continued followup is recommended.     This report was finalized on 1/3/2018 2:26 PM by Mariel Solano M.D..          amLODIPine 5 mg Oral BID   aspirin 81 mg Oral Daily   atorvastatin 10 mg Oral Daily   b complex-vitamin c-folic acid 1 tablet Oral Daily   budesonide 0.5 mg Nebulization BID - RT   carvedilol 25 mg Oral BID With Meals   citalopram 10 mg Oral Nightly   heparin (porcine) 5,000 Units Subcutaneous Q12H   insulin aspart 0-7 Units Subcutaneous 4x Daily AC & at Bedtime    insulin detemir 10 Units Subcutaneous Nightly   ipratropium-albuterol 3 mL Nebulization Q6H - RT   levothyroxine 175 mcg Oral Q AM   valsartan 320 mg Oral Q24H   Vitamin D 1,000 Units Oral Daily          Medication Review:   Current Facility-Administered Medications   Medication Dose Route Frequency Provider Last Rate Last Dose   • amLODIPine (NORVASC) tablet 5 mg  5 mg Oral BID Bridgercherelle Swift MD   5 mg at 01/04/18 0833   • aspirin EC tablet 81 mg  81 mg Oral Daily Jordan Ribera MD   81 mg at 01/04/18 0832   • atorvastatin (LIPITOR) tablet 10 mg  10 mg Oral Daily Jordan Ribera MD   10 mg at 01/04/18 0831   • b complex-vitamin c-folic acid (NEPHRO-CLEOPATRA) tablet 1 tablet  1 tablet Oral Daily Jordan Ribera MD   1 tablet at 01/04/18 0832   • budesonide (PULMICORT) nebulizer solution 0.5 mg  0.5 mg Nebulization BID - RT Jordan Ribera MD   0.5 mg at 01/04/18 0715   • carvedilol (COREG) tablet 25 mg  25 mg Oral BID With Meals Jordan Ribera MD   25 mg at 01/04/18 0832   • citalopram (CeleXA) tablet 10 mg  10 mg Oral Nightly Jordan Ribera MD   10 mg at 01/03/18 2201   • dextrose (D50W) solution 25 g  25 g Intravenous Q15 Min PRN Jordan Ribera MD   25 g at 01/03/18 0601   • dextrose (GLUTOSE) oral gel 1 tube  1 tube Oral Q15 Min PRN Jordan Ribera MD       • glucagon (human recombinant) (GLUCAGEN DIAGNOSTIC) injection 1 mg  1 mg Subcutaneous Q15 Min PRN Jordan Ribera MD       • heparin (porcine) 5000 UNIT/ML injection 5,000 Units  5,000 Units Subcutaneous Q12H Jordan Ribera MD   5,000 Units at 01/04/18 0102   • insulin aspart (novoLOG) injection 0-7 Units  0-7 Units Subcutaneous 4x Daily AC & at Bedtime Jordan Ribera MD   4 Units at 01/04/18 0630   • insulin detemir (LEVEMIR) injection 10 Units  10 Units Subcutaneous Nightly Jordan Ribera MD   10 Units at 01/03/18 2202   • ipratropium-albuterol (DUO-NEB) nebulizer solution 3 mL  3 mL Nebulization Q4H PRN Jordan Ribera MD       • ipratropium-albuterol (DUO-NEB) nebulizer solution  3 mL  3 mL Nebulization Q6H - RT Jordan Ribera MD   3 mL at 01/04/18 0715   • levothyroxine (SYNTHROID, LEVOTHROID) tablet 175 mcg  175 mcg Oral Q AM Jordan Ribera MD   175 mcg at 01/04/18 0525   • Morphine sulfate (PF) injection 1 mg  1 mg Intravenous Q4H PRN Jordan Ribera MD   1 mg at 01/04/18 0102   • ondansetron (ZOFRAN) injection 4 mg  4 mg Intravenous Q6H PRN Bridger Swift MD       • ondansetron (ZOFRAN) tablet 4 mg  4 mg Oral Q6H PRN Jordan Ribera MD   4 mg at 01/03/18 0238    Or   • ondansetron ODT (ZOFRAN-ODT) disintegrating tablet 4 mg  4 mg Oral Q6H PRN Jordan Ribera MD       • sodium chloride 0.9 % bolus 1,000 mL  1,000 mL Intravenous PRN Deric Mejia MD       • sodium chloride 0.9 % bolus 1,000 mL  1,000 mL Intravenous PRN Deric Mejia MD       • sodium chloride 0.9 % flush 1-10 mL  1-10 mL Intravenous PRN Bridger Swift MD       • valsartan (DIOVAN) tablet 320 mg  320 mg Oral Q24H Jordan Ribera MD   320 mg at 01/04/18 0831   • Vitamin D 1,000 Units  1,000 Units Oral Daily Jordan Ribera MD   1,000 Units at 01/04/18 0832   • zolpidem (AMBIEN) tablet 5 mg  5 mg Oral Nightly PRN Bridger Swift MD           Assessment/Plan   1.   End stage renal disease on dialysis  2.   Symptomatic bradycardia  3.   CHF  4.   Chronic anemia  5.   Essential hypertension  6.   Acquired hypothyroidism  7.   Bilateral nondiabetic proliferative retinopathy  8.   Type 2 diabetes mellitus with nephropathy  9.   Acute on chronic respiratory failure with hypoxia  10.   Mixed hyperlipidemia     Plan:     · Continue routine dialysis as ordered  · She has a tachybradycardia syndrome, Dr. Swift has placed the permanent pacemaker yesterday.  · Anemia she is on outpatient long-acting erythropoietin therapy will be continued. Her anemia is Significant we'll go ahead and transfuse 1 unit of blood with dialysis today.  · Increase ultrafiltration with dialysis again today, she had 2700ml taken off yesterday with  dialysis.  · Home medications reviewed and will be continued.  · Details were discussed with the patient as well as family in the room.    · Details were also discussed with the hospitalist service.   · Further recommendations will depend on clinical course of the patient during the current hospitalization.    · I also discussed the details with the nursing staff.      Details were discussed with the patient as well as family in the room.  Further recommendations will depend on clinical course of the patient during the current hospitalization.      I also discussed the details with the nursing staff.    Rest as ordered.    Deric Mejia MD  01/04/18  9:36 AM      EMR Dragon/Transcription disclaimer:   Much of this encounter note is an electronic transcription/translation of spoken language to printed text. The electronic translation of spoken language may permit erroneous, or at times, nonsensical words or phrases to be inadvertently transcribed; Although I have reviewed the note for such errors, some may still exist.    None

## 2023-05-01 ENCOUNTER — TELEPHONE (OUTPATIENT)
Dept: FAMILY MEDICINE | Age: 83
End: 2023-05-01

## 2023-05-02 ENCOUNTER — EXTERNAL RECORD (OUTPATIENT)
Dept: OTHER | Age: 83
End: 2023-05-02

## 2023-05-02 DIAGNOSIS — H40.9 GLAUCOMA, UNSPECIFIED GLAUCOMA TYPE, UNSPECIFIED LATERALITY: ICD-10-CM

## 2023-05-02 DIAGNOSIS — H26.9 CATARACT, UNSPECIFIED CATARACT TYPE, UNSPECIFIED LATERALITY: Primary | ICD-10-CM

## 2023-05-05 DIAGNOSIS — R10.13 DYSPEPSIA: ICD-10-CM

## 2023-05-08 RX ORDER — OMEPRAZOLE 20 MG/1
20 CAPSULE, DELAYED RELEASE ORAL
Qty: 60 CAPSULE | Refills: 1 | Status: SHIPPED | OUTPATIENT
Start: 2023-05-08 | End: 2023-05-24

## 2023-05-15 ENCOUNTER — NURSE TRIAGE (OUTPATIENT)
Dept: TELEHEALTH | Age: 83
End: 2023-05-15

## 2023-05-16 ENCOUNTER — IMAGING SERVICES (OUTPATIENT)
Dept: GENERAL RADIOLOGY | Age: 83
End: 2023-05-16

## 2023-05-16 ENCOUNTER — OFFICE VISIT (OUTPATIENT)
Dept: FAMILY MEDICINE | Age: 83
End: 2023-05-16

## 2023-05-16 VITALS
TEMPERATURE: 97.6 F | SYSTOLIC BLOOD PRESSURE: 138 MMHG | OXYGEN SATURATION: 100 % | HEART RATE: 78 BPM | WEIGHT: 133.1 LBS | RESPIRATION RATE: 16 BRPM | BODY MASS INDEX: 22.72 KG/M2 | HEIGHT: 64 IN | DIASTOLIC BLOOD PRESSURE: 62 MMHG

## 2023-05-16 DIAGNOSIS — S29.011A PECTORALIS MUSCLE STRAIN, INITIAL ENCOUNTER: Primary | ICD-10-CM

## 2023-05-16 DIAGNOSIS — Z76.0 MEDICATION REFILL: ICD-10-CM

## 2023-05-16 PROCEDURE — 3075F SYST BP GE 130 - 139MM HG: CPT | Performed by: FAMILY MEDICINE

## 2023-05-16 PROCEDURE — 3078F DIAST BP <80 MM HG: CPT | Performed by: FAMILY MEDICINE

## 2023-05-16 PROCEDURE — 99214 OFFICE O/P EST MOD 30 MIN: CPT | Performed by: FAMILY MEDICINE

## 2023-05-16 PROCEDURE — 71046 X-RAY EXAM CHEST 2 VIEWS: CPT | Performed by: FAMILY MEDICINE

## 2023-05-16 RX ORDER — GABAPENTIN 400 MG/1
400 CAPSULE ORAL 4 TIMES DAILY
Qty: 360 CAPSULE | Refills: 3 | Status: SHIPPED | OUTPATIENT
Start: 2023-05-16

## 2023-05-16 ASSESSMENT — PATIENT HEALTH QUESTIONNAIRE - PHQ9
2. FEELING DOWN, DEPRESSED OR HOPELESS: NOT AT ALL
CLINICAL INTERPRETATION OF PHQ2 SCORE: NO FURTHER SCREENING NEEDED
SUM OF ALL RESPONSES TO PHQ9 QUESTIONS 1 AND 2: 0
SUM OF ALL RESPONSES TO PHQ9 QUESTIONS 1 AND 2: 0
1. LITTLE INTEREST OR PLEASURE IN DOING THINGS: NOT AT ALL

## 2023-05-17 ENCOUNTER — TELEPHONE (OUTPATIENT)
Dept: FAMILY MEDICINE | Age: 83
End: 2023-05-17

## 2023-05-24 DIAGNOSIS — R10.13 DYSPEPSIA: ICD-10-CM

## 2023-05-24 RX ORDER — OMEPRAZOLE 20 MG/1
CAPSULE, DELAYED RELEASE ORAL
Qty: 180 CAPSULE | Refills: 1 | Status: SHIPPED | OUTPATIENT
Start: 2023-05-24

## 2023-06-23 DIAGNOSIS — M81.0 OSTEOPOROSIS, UNSPECIFIED OSTEOPOROSIS TYPE, UNSPECIFIED PATHOLOGICAL FRACTURE PRESENCE: ICD-10-CM

## 2023-06-23 RX ORDER — ALENDRONATE SODIUM 70 MG/1
TABLET ORAL
Qty: 12 TABLET | Refills: 0 | Status: SHIPPED | OUTPATIENT
Start: 2023-06-23 | End: 2023-07-25 | Stop reason: SDUPTHER

## 2023-06-29 DIAGNOSIS — Z12.31 VISIT FOR SCREENING MAMMOGRAM: Primary | ICD-10-CM

## 2023-07-20 ENCOUNTER — APPOINTMENT (OUTPATIENT)
Dept: FAMILY MEDICINE | Age: 83
End: 2023-07-20

## 2023-07-25 ENCOUNTER — OFFICE VISIT (OUTPATIENT)
Dept: FAMILY MEDICINE | Age: 83
End: 2023-07-25

## 2023-07-25 VITALS
DIASTOLIC BLOOD PRESSURE: 58 MMHG | HEIGHT: 64 IN | SYSTOLIC BLOOD PRESSURE: 131 MMHG | OXYGEN SATURATION: 100 % | RESPIRATION RATE: 16 BRPM | TEMPERATURE: 96.9 F | WEIGHT: 128.31 LBS | BODY MASS INDEX: 21.91 KG/M2 | HEART RATE: 62 BPM

## 2023-07-25 DIAGNOSIS — M17.0 PRIMARY OSTEOARTHRITIS OF BOTH KNEES: ICD-10-CM

## 2023-07-25 DIAGNOSIS — M79.641 BILATERAL HAND PAIN: ICD-10-CM

## 2023-07-25 DIAGNOSIS — R35.0 URINE FREQUENCY: ICD-10-CM

## 2023-07-25 DIAGNOSIS — M81.0 AGE-RELATED OSTEOPOROSIS WITHOUT CURRENT PATHOLOGICAL FRACTURE: ICD-10-CM

## 2023-07-25 DIAGNOSIS — I10 PRIMARY HYPERTENSION: ICD-10-CM

## 2023-07-25 DIAGNOSIS — E78.00 HYPERCHOLESTEREMIA: ICD-10-CM

## 2023-07-25 DIAGNOSIS — G56.02 CARPAL TUNNEL SYNDROME OF LEFT WRIST: ICD-10-CM

## 2023-07-25 DIAGNOSIS — M54.16 LUMBAR RADICULOPATHY: ICD-10-CM

## 2023-07-25 DIAGNOSIS — H40.1123 PRIMARY OPEN ANGLE GLAUCOMA (POAG) OF LEFT EYE, SEVERE STAGE: Primary | ICD-10-CM

## 2023-07-25 DIAGNOSIS — Z01.818 PREOPERATIVE EXAMINATION: ICD-10-CM

## 2023-07-25 DIAGNOSIS — H25.12 AGE-RELATED NUCLEAR CATARACT, LEFT: ICD-10-CM

## 2023-07-25 DIAGNOSIS — M79.642 BILATERAL HAND PAIN: ICD-10-CM

## 2023-07-25 PROCEDURE — 3078F DIAST BP <80 MM HG: CPT | Performed by: FAMILY MEDICINE

## 2023-07-25 PROCEDURE — 3075F SYST BP GE 130 - 139MM HG: CPT | Performed by: FAMILY MEDICINE

## 2023-07-25 PROCEDURE — 99244 OFF/OP CNSLTJ NEW/EST MOD 40: CPT | Performed by: FAMILY MEDICINE

## 2023-07-25 RX ORDER — ESCITALOPRAM OXALATE 10 MG/1
10 TABLET ORAL DAILY
Qty: 90 TABLET | Refills: 2 | Status: SHIPPED | OUTPATIENT
Start: 2023-07-25

## 2023-07-25 RX ORDER — ALENDRONATE SODIUM 70 MG/1
70 TABLET ORAL
Qty: 12 TABLET | Refills: 2 | Status: SHIPPED | OUTPATIENT
Start: 2023-07-25 | End: 2023-08-17

## 2023-07-25 RX ORDER — SIMVASTATIN 20 MG
20 TABLET ORAL NIGHTLY
Qty: 90 TABLET | Refills: 2 | Status: SHIPPED | OUTPATIENT
Start: 2023-07-25

## 2023-07-25 ASSESSMENT — COGNITIVE AND FUNCTIONAL STATUS - GENERAL
BECAUSE OF A PHYSICAL, MENTAL, OR EMOTIONAL CONDITION, DO YOU HAVE DIFFICULTY DOING ERRANDS ALONE: NO
DO YOU HAVE DIFFICULTY DRESSING OR BATHING: NO
BECAUSE OF A PHYSICAL, MENTAL, OR EMOTIONAL CONDITION, DO YOU HAVE SERIOUS DIFFICULTY CONCENTRATING, REMEMBERING OR MAKING DECISIONS: NO
DO YOU HAVE SERIOUS DIFFICULTY WALKING OR CLIMBING STAIRS: NO

## 2023-07-31 DIAGNOSIS — M81.0 AGE-RELATED OSTEOPOROSIS WITHOUT CURRENT PATHOLOGICAL FRACTURE: ICD-10-CM

## 2023-07-31 RX ORDER — ALENDRONATE SODIUM 70 MG/1
TABLET ORAL
Qty: 12 TABLET | Refills: 2 | OUTPATIENT
Start: 2023-07-31

## 2023-08-09 ENCOUNTER — TELEPHONE (OUTPATIENT)
Dept: FAMILY MEDICINE | Age: 83
End: 2023-08-09

## 2023-08-17 DIAGNOSIS — M81.0 AGE-RELATED OSTEOPOROSIS WITHOUT CURRENT PATHOLOGICAL FRACTURE: ICD-10-CM

## 2023-08-17 RX ORDER — ALENDRONATE SODIUM 70 MG/1
70 TABLET ORAL
Qty: 12 TABLET | Refills: 0 | Status: SHIPPED | OUTPATIENT
Start: 2023-08-17 | End: 2023-11-01

## 2023-08-17 RX ORDER — ESCITALOPRAM OXALATE 20 MG/1
20 TABLET ORAL DAILY
Qty: 90 TABLET | Refills: 1 | OUTPATIENT
Start: 2023-08-17

## 2023-08-18 ENCOUNTER — APPOINTMENT (OUTPATIENT)
Dept: FAMILY MEDICINE | Age: 83
End: 2023-08-18

## 2023-08-23 ENCOUNTER — HOSPITAL ENCOUNTER (OUTPATIENT)
Dept: MAMMOGRAPHY | Age: 83
Discharge: HOME | End: 2023-08-23
Attending: FAMILY MEDICINE

## 2023-08-23 DIAGNOSIS — Z12.31 VISIT FOR SCREENING MAMMOGRAM: ICD-10-CM

## 2023-08-23 PROCEDURE — 77067 SCR MAMMO BI INCL CAD: CPT

## 2023-09-18 ENCOUNTER — OFFICE VISIT (OUTPATIENT)
Dept: HEMATOLOGY/ONCOLOGY | Age: 83
End: 2023-09-18

## 2023-09-18 VITALS
TEMPERATURE: 98.1 F | DIASTOLIC BLOOD PRESSURE: 60 MMHG | OXYGEN SATURATION: 99 % | BODY MASS INDEX: 21.96 KG/M2 | RESPIRATION RATE: 17 BRPM | HEIGHT: 64 IN | SYSTOLIC BLOOD PRESSURE: 129 MMHG | HEART RATE: 64 BPM | WEIGHT: 128.64 LBS

## 2023-09-18 DIAGNOSIS — Z85.3 HISTORY OF BREAST CANCER: Primary | ICD-10-CM

## 2023-09-18 PROCEDURE — 99214 OFFICE O/P EST MOD 30 MIN: CPT | Performed by: INTERNAL MEDICINE

## 2023-09-18 PROCEDURE — 3078F DIAST BP <80 MM HG: CPT | Performed by: INTERNAL MEDICINE

## 2023-09-18 PROCEDURE — 3074F SYST BP LT 130 MM HG: CPT | Performed by: INTERNAL MEDICINE

## 2023-09-18 ASSESSMENT — PATIENT HEALTH QUESTIONNAIRE - PHQ9
1. LITTLE INTEREST OR PLEASURE IN DOING THINGS: NOT AT ALL
2. FEELING DOWN, DEPRESSED OR HOPELESS: NOT AT ALL
SUM OF ALL RESPONSES TO PHQ9 QUESTIONS 1 AND 2: 0
SUM OF ALL RESPONSES TO PHQ9 QUESTIONS 1 AND 2: 0
CLINICAL INTERPRETATION OF PHQ2 SCORE: NO FURTHER SCREENING NEEDED

## 2023-09-18 ASSESSMENT — PAIN SCALES - GENERAL: PAINLEVEL: 8

## 2023-10-04 ENCOUNTER — EXTERNAL RECORD (OUTPATIENT)
Dept: HEALTH INFORMATION MANAGEMENT | Facility: OTHER | Age: 83
End: 2023-10-04

## 2023-10-04 ENCOUNTER — TELEPHONE (OUTPATIENT)
Dept: FAMILY MEDICINE | Age: 83
End: 2023-10-04

## 2023-10-05 DIAGNOSIS — M81.0 AGE-RELATED OSTEOPOROSIS WITHOUT CURRENT PATHOLOGICAL FRACTURE: Primary | ICD-10-CM

## 2023-10-06 ENCOUNTER — TELEPHONE (OUTPATIENT)
Dept: FAMILY MEDICINE | Age: 83
End: 2023-10-06

## 2023-10-23 ENCOUNTER — CLINICAL ABSTRACT (OUTPATIENT)
Dept: HEALTH INFORMATION MANAGEMENT | Facility: OTHER | Age: 83
End: 2023-10-23

## 2023-10-23 ENCOUNTER — APPOINTMENT (OUTPATIENT)
Dept: FAMILY MEDICINE | Age: 83
End: 2023-10-23

## 2023-10-24 ENCOUNTER — IMMUNIZATION (OUTPATIENT)
Dept: FAMILY MEDICINE | Age: 83
End: 2023-10-24

## 2023-10-24 DIAGNOSIS — Z23 NEED FOR VACCINATION: Primary | ICD-10-CM

## 2023-10-24 PROCEDURE — 90662 IIV NO PRSV INCREASED AG IM: CPT | Performed by: PEDIATRICS

## 2023-10-24 PROCEDURE — 90471 IMMUNIZATION ADMIN: CPT | Performed by: PEDIATRICS

## 2023-11-01 DIAGNOSIS — M81.0 AGE-RELATED OSTEOPOROSIS WITHOUT CURRENT PATHOLOGICAL FRACTURE: ICD-10-CM

## 2023-11-01 RX ORDER — ALENDRONATE SODIUM 70 MG/1
70 TABLET ORAL
Qty: 12 TABLET | Refills: 0 | Status: SHIPPED | OUTPATIENT
Start: 2023-11-01

## 2023-11-07 ENCOUNTER — TELEPHONE (OUTPATIENT)
Dept: HEMATOLOGY/ONCOLOGY | Age: 83
End: 2023-11-07

## 2023-11-07 DIAGNOSIS — C50.919 RECURRENT MALIGNANT NEOPLASM OF BREAST, UNSPECIFIED LATERALITY (CMD): ICD-10-CM

## 2023-11-07 RX ORDER — ANASTROZOLE 1 MG/1
1 TABLET ORAL DAILY
Qty: 90 TABLET | Refills: 1 | Status: SHIPPED | OUTPATIENT
Start: 2023-11-07

## 2023-12-13 ENCOUNTER — CLINICAL ABSTRACT (OUTPATIENT)
Dept: HEALTH INFORMATION MANAGEMENT | Facility: OTHER | Age: 83
End: 2023-12-13

## 2023-12-13 ENCOUNTER — APPOINTMENT (OUTPATIENT)
Dept: FAMILY MEDICINE | Age: 83
End: 2023-12-13

## 2023-12-13 VITALS
DIASTOLIC BLOOD PRESSURE: 56 MMHG | RESPIRATION RATE: 18 BRPM | OXYGEN SATURATION: 98 % | HEIGHT: 63 IN | BODY MASS INDEX: 22.68 KG/M2 | WEIGHT: 127.98 LBS | TEMPERATURE: 97.6 F | SYSTOLIC BLOOD PRESSURE: 128 MMHG

## 2023-12-13 DIAGNOSIS — I10 PRIMARY HYPERTENSION: ICD-10-CM

## 2023-12-13 DIAGNOSIS — R42 DIZZY SPELLS: ICD-10-CM

## 2023-12-13 DIAGNOSIS — K58.2 IRRITABLE BOWEL SYNDROME WITH BOTH CONSTIPATION AND DIARRHEA: ICD-10-CM

## 2023-12-13 DIAGNOSIS — M25.531 RIGHT WRIST PAIN: Primary | ICD-10-CM

## 2023-12-13 DIAGNOSIS — G56.02 CARPAL TUNNEL SYNDROME OF LEFT WRIST: ICD-10-CM

## 2023-12-13 DIAGNOSIS — G56.01 RIGHT CARPAL TUNNEL SYNDROME: ICD-10-CM

## 2023-12-13 PROCEDURE — 3078F DIAST BP <80 MM HG: CPT | Performed by: FAMILY MEDICINE

## 2023-12-13 PROCEDURE — 3074F SYST BP LT 130 MM HG: CPT | Performed by: FAMILY MEDICINE

## 2023-12-13 PROCEDURE — 99214 OFFICE O/P EST MOD 30 MIN: CPT | Performed by: FAMILY MEDICINE

## 2023-12-13 RX ORDER — HYOSCYAMINE SULFATE 0.125 MG
0.12 TABLET ORAL EVERY 8 HOURS PRN
Qty: 90 TABLET | Refills: 1 | Status: SHIPPED | OUTPATIENT
Start: 2023-12-13

## 2023-12-13 RX ORDER — FELODIPINE 2.5 MG/1
2.5 TABLET, EXTENDED RELEASE ORAL DAILY
Qty: 90 TABLET | Refills: 1 | Status: SHIPPED | OUTPATIENT
Start: 2023-12-13

## 2023-12-13 ASSESSMENT — PATIENT HEALTH QUESTIONNAIRE - PHQ9
SUM OF ALL RESPONSES TO PHQ9 QUESTIONS 1 AND 2: 0
SUM OF ALL RESPONSES TO PHQ9 QUESTIONS 1 AND 2: 0
2. FEELING DOWN, DEPRESSED OR HOPELESS: NOT AT ALL
1. LITTLE INTEREST OR PLEASURE IN DOING THINGS: NOT AT ALL
CLINICAL INTERPRETATION OF PHQ2 SCORE: NO FURTHER SCREENING NEEDED

## 2023-12-26 DIAGNOSIS — M81.0 AGE-RELATED OSTEOPOROSIS WITHOUT CURRENT PATHOLOGICAL FRACTURE: ICD-10-CM

## 2023-12-26 RX ORDER — ALENDRONATE SODIUM 70 MG/1
70 TABLET ORAL
Qty: 12 TABLET | Refills: 0 | Status: SHIPPED | OUTPATIENT
Start: 2023-12-26

## 2023-12-26 RX ORDER — ESCITALOPRAM OXALATE 20 MG/1
20 TABLET ORAL DAILY
Qty: 90 TABLET | Refills: 1 | OUTPATIENT
Start: 2023-12-26

## 2024-01-08 ENCOUNTER — E-ADVICE (OUTPATIENT)
Dept: OTHER | Age: 84
End: 2024-01-08

## 2024-01-30 DIAGNOSIS — Z76.0 MEDICATION REFILL: ICD-10-CM

## 2024-01-31 RX ORDER — GABAPENTIN 400 MG/1
400 CAPSULE ORAL 4 TIMES DAILY
Qty: 360 CAPSULE | Refills: 3 | Status: SHIPPED | OUTPATIENT
Start: 2024-01-31

## 2024-02-07 ENCOUNTER — APPOINTMENT (OUTPATIENT)
Dept: FAMILY MEDICINE | Age: 84
End: 2024-02-07

## 2024-02-07 ENCOUNTER — LAB SERVICES (OUTPATIENT)
Dept: LAB | Age: 84
End: 2024-02-07

## 2024-02-07 ENCOUNTER — IMAGING SERVICES (OUTPATIENT)
Dept: GENERAL RADIOLOGY | Age: 84
End: 2024-02-07

## 2024-02-07 VITALS
WEIGHT: 129 LBS | TEMPERATURE: 97 F | HEART RATE: 63 BPM | BODY MASS INDEX: 22.86 KG/M2 | OXYGEN SATURATION: 100 % | RESPIRATION RATE: 17 BRPM | HEIGHT: 63 IN | SYSTOLIC BLOOD PRESSURE: 137 MMHG | DIASTOLIC BLOOD PRESSURE: 64 MMHG

## 2024-02-07 DIAGNOSIS — M25.531 RIGHT WRIST PAIN: ICD-10-CM

## 2024-02-07 DIAGNOSIS — I10 PRIMARY HYPERTENSION: ICD-10-CM

## 2024-02-07 DIAGNOSIS — H40.1123 PRIMARY OPEN ANGLE GLAUCOMA (POAG) OF LEFT EYE, SEVERE STAGE: ICD-10-CM

## 2024-02-07 DIAGNOSIS — H25.12 AGE-RELATED NUCLEAR CATARACT, LEFT: ICD-10-CM

## 2024-02-07 DIAGNOSIS — Z00.00 MEDICARE ANNUAL WELLNESS VISIT, SUBSEQUENT: Primary | ICD-10-CM

## 2024-02-07 DIAGNOSIS — Z29.11 NEED FOR RSV VACCINATION: ICD-10-CM

## 2024-02-07 DIAGNOSIS — Z01.818 PREOPERATIVE EXAMINATION: ICD-10-CM

## 2024-02-07 DIAGNOSIS — Z00.00 MEDICARE ANNUAL WELLNESS VISIT, SUBSEQUENT: ICD-10-CM

## 2024-02-07 PROCEDURE — 80050 GENERAL HEALTH PANEL: CPT | Performed by: CLINICAL MEDICAL LABORATORY

## 2024-02-07 PROCEDURE — 73110 X-RAY EXAM OF WRIST: CPT | Performed by: FAMILY MEDICINE

## 2024-02-07 PROCEDURE — 80061 LIPID PANEL: CPT | Performed by: CLINICAL MEDICAL LABORATORY

## 2024-02-07 PROCEDURE — 36415 COLL VENOUS BLD VENIPUNCTURE: CPT | Performed by: FAMILY MEDICINE

## 2024-02-07 ASSESSMENT — MINI COG
PATIENT ABLE TO FILL IN THE CLOCK FACE WITH 10 MINUTES PAST 11 O'CLOCK?: YES, CLOCK IS CORRECT
PATIENT ABLE TO REPEAT THE 3 WORDS GIVEN PREVIOUSLY?: WAS ABLE TO REPEAT BACK 3 WORDS CORRECTLY
PATIENT WAS GIVEN REPEAT BACK WORDS FROM VERSION: 1 - BANANA SUNRISE CHAIR
TOTAL SCORE: 5

## 2024-02-07 ASSESSMENT — PAIN SCALES - GENERAL: PAINLEVEL: 0

## 2024-02-08 ENCOUNTER — TELEPHONE (OUTPATIENT)
Dept: FAMILY MEDICINE | Age: 84
End: 2024-02-08

## 2024-02-08 DIAGNOSIS — R79.89 ELEVATED SERUM CREATININE: Primary | ICD-10-CM

## 2024-02-08 LAB
ALBUMIN SERPL-MCNC: 4.1 G/DL (ref 3.6–5.1)
ALBUMIN/GLOB SERPL: 1.3 {RATIO} (ref 1–2.4)
ALP SERPL-CCNC: 63 UNITS/L (ref 45–117)
ALT SERPL-CCNC: 20 UNITS/L
ANION GAP SERPL CALC-SCNC: 11 MMOL/L (ref 7–19)
AST SERPL-CCNC: 19 UNITS/L
BASOPHILS # BLD: 0 K/MCL (ref 0–0.3)
BASOPHILS NFR BLD: 0 %
BILIRUB SERPL-MCNC: 0.4 MG/DL (ref 0.2–1)
BUN SERPL-MCNC: 19 MG/DL (ref 6–20)
BUN/CREAT SERPL: 20 (ref 7–25)
CALCIUM SERPL-MCNC: 9.7 MG/DL (ref 8.4–10.2)
CHLORIDE SERPL-SCNC: 108 MMOL/L (ref 97–110)
CHOLEST SERPL-MCNC: 155 MG/DL
CHOLEST/HDLC SERPL: 2.4 {RATIO}
CO2 SERPL-SCNC: 25 MMOL/L (ref 21–32)
CREAT SERPL-MCNC: 0.97 MG/DL (ref 0.51–0.95)
DEPRECATED RDW RBC: 48.4 FL (ref 39–50)
EGFRCR SERPLBLD CKD-EPI 2021: 57 ML/MIN/{1.73_M2}
EOSINOPHIL # BLD: 0.1 K/MCL (ref 0–0.5)
EOSINOPHIL NFR BLD: 1 %
ERYTHROCYTE [DISTWIDTH] IN BLOOD: 13.9 % (ref 11–15)
FASTING DURATION TIME PATIENT: ABNORMAL H
GLOBULIN SER-MCNC: 3.1 G/DL (ref 2–4)
GLUCOSE SERPL-MCNC: 86 MG/DL (ref 70–99)
HCT VFR BLD CALC: 40.3 % (ref 36–46.5)
HDLC SERPL-MCNC: 64 MG/DL
HGB BLD-MCNC: 13.1 G/DL (ref 12–15.5)
IMM GRANULOCYTES # BLD AUTO: 0 K/MCL (ref 0–0.2)
IMM GRANULOCYTES # BLD: 0 %
LDLC SERPL CALC-MCNC: 62 MG/DL
LYMPHOCYTES # BLD: 2.4 K/MCL (ref 1–4)
LYMPHOCYTES NFR BLD: 42 %
MCH RBC QN AUTO: 30.8 PG (ref 26–34)
MCHC RBC AUTO-ENTMCNC: 32.5 G/DL (ref 32–36.5)
MCV RBC AUTO: 94.6 FL (ref 78–100)
MONOCYTES # BLD: 0.4 K/MCL (ref 0.3–0.9)
MONOCYTES NFR BLD: 7 %
NEUTROPHILS # BLD: 2.8 K/MCL (ref 1.8–7.7)
NEUTROPHILS NFR BLD: 50 %
NONHDLC SERPL-MCNC: 91 MG/DL
NRBC BLD MANUAL-RTO: 0 /100 WBC
PLATELET # BLD AUTO: 234 K/MCL (ref 140–450)
POTASSIUM SERPL-SCNC: 3.9 MMOL/L (ref 3.4–5.1)
PROT SERPL-MCNC: 7.2 G/DL (ref 6.4–8.2)
RBC # BLD: 4.26 MIL/MCL (ref 4–5.2)
SODIUM SERPL-SCNC: 140 MMOL/L (ref 135–145)
TRIGL SERPL-MCNC: 144 MG/DL
TSH SERPL-ACNC: 1.15 MCUNITS/ML (ref 0.35–5)
WBC # BLD: 5.8 K/MCL (ref 4.2–11)

## 2024-02-12 ENCOUNTER — APPOINTMENT (OUTPATIENT)
Dept: INTERNAL MEDICINE | Age: 84
End: 2024-02-12

## 2024-02-22 NOTE — PLAN OF CARE
----- Message from Thom Akins MD sent at 2/21/2024  5:51 PM CST -----  Normal labs   Problem: Patient Care Overview  Goal: Plan of Care Review  Outcome: Outcome(s) achieved Date Met: 10/02/18   10/02/18 1212   Coping/Psychosocial   Plan of Care Reviewed With patient;family   Plan of Care Review   Progress improving     Goal: Individualization and Mutuality  Outcome: Outcome(s) achieved Date Met: 10/02/18      Problem: Fall Risk (Adult)  Goal: Absence of Fall  Outcome: Outcome(s) achieved Date Met: 10/02/18      Problem: Pain, Acute (Adult)  Goal: Acceptable Pain Control/Comfort Level  Outcome: Outcome(s) achieved Date Met: 10/02/18      Problem: Skin Injury Risk (Adult)  Goal: Skin Health and Integrity  Outcome: Outcome(s) achieved Date Met: 10/02/18      Problem: Nutrition, Enteral (Adult)  Goal: Signs and Symptoms of Listed Potential Problems Will be Absent, Minimized or Managed (Nutrition, Enteral)  Outcome: Outcome(s) achieved Date Met: 10/02/18

## 2024-03-01 ENCOUNTER — TELEPHONE (OUTPATIENT)
Dept: HEMATOLOGY/ONCOLOGY | Age: 84
End: 2024-03-01

## 2024-03-15 RX ORDER — FELODIPINE 2.5 MG/1
2.5 TABLET, EXTENDED RELEASE ORAL DAILY
Qty: 90 TABLET | Refills: 0 | Status: SHIPPED | OUTPATIENT
Start: 2024-03-15

## 2024-04-06 ENCOUNTER — HOSPITAL ENCOUNTER (OUTPATIENT)
Dept: GENERAL RADIOLOGY | Age: 84
Discharge: HOME | End: 2024-04-06
Attending: FAMILY MEDICINE

## 2024-04-06 DIAGNOSIS — M81.0 AGE-RELATED OSTEOPOROSIS WITHOUT CURRENT PATHOLOGICAL FRACTURE: ICD-10-CM

## 2024-04-06 PROCEDURE — 77080 DXA BONE DENSITY AXIAL: CPT

## 2024-04-15 ENCOUNTER — APPOINTMENT (OUTPATIENT)
Dept: HEMATOLOGY/ONCOLOGY | Age: 84
End: 2024-04-15

## 2024-04-17 ENCOUNTER — EXTERNAL RECORD (OUTPATIENT)
Dept: HEALTH INFORMATION MANAGEMENT | Facility: OTHER | Age: 84
End: 2024-04-17

## 2024-04-22 ENCOUNTER — TELEPHONE (OUTPATIENT)
Dept: FAMILY MEDICINE | Age: 84
End: 2024-04-22

## 2024-04-24 ENCOUNTER — APPOINTMENT (OUTPATIENT)
Dept: HEMATOLOGY/ONCOLOGY | Age: 84
End: 2024-04-24

## 2024-04-24 VITALS
HEIGHT: 63 IN | OXYGEN SATURATION: 100 % | SYSTOLIC BLOOD PRESSURE: 132 MMHG | RESPIRATION RATE: 16 BRPM | WEIGHT: 128.09 LBS | TEMPERATURE: 97.3 F | HEART RATE: 66 BPM | DIASTOLIC BLOOD PRESSURE: 67 MMHG | BODY MASS INDEX: 22.7 KG/M2

## 2024-04-24 DIAGNOSIS — Z85.3 HISTORY OF BREAST CANCER: Primary | ICD-10-CM

## 2024-04-24 PROCEDURE — 99213 OFFICE O/P EST LOW 20 MIN: CPT | Performed by: INTERNAL MEDICINE

## 2024-04-24 ASSESSMENT — PAIN SCALES - GENERAL: PAINLEVEL: 0

## 2024-04-24 ASSESSMENT — PATIENT HEALTH QUESTIONNAIRE - PHQ9
SUM OF ALL RESPONSES TO PHQ9 QUESTIONS 1 AND 2: 0
CLINICAL INTERPRETATION OF PHQ2 SCORE: NO FURTHER SCREENING NEEDED
2. FEELING DOWN, DEPRESSED OR HOPELESS: NOT AT ALL
SUM OF ALL RESPONSES TO PHQ9 QUESTIONS 1 AND 2: 0
1. LITTLE INTEREST OR PLEASURE IN DOING THINGS: NOT AT ALL

## 2024-05-14 DIAGNOSIS — E78.00 HYPERCHOLESTEREMIA: ICD-10-CM

## 2024-05-14 RX ORDER — SIMVASTATIN 20 MG
20 TABLET ORAL NIGHTLY
Qty: 90 TABLET | Refills: 0 | Status: SHIPPED | OUTPATIENT
Start: 2024-05-14

## 2024-06-17 RX ORDER — FELODIPINE 2.5 MG/1
2.5 TABLET, EXTENDED RELEASE ORAL DAILY
Qty: 90 TABLET | Refills: 0 | Status: SHIPPED | OUTPATIENT
Start: 2024-06-17

## 2024-06-24 RX ORDER — ESCITALOPRAM OXALATE 10 MG/1
10 TABLET ORAL DAILY
Qty: 90 TABLET | Refills: 0 | Status: SHIPPED | OUTPATIENT
Start: 2024-06-24

## 2024-07-03 ENCOUNTER — EXTERNAL RECORD (OUTPATIENT)
Dept: HEALTH INFORMATION MANAGEMENT | Facility: OTHER | Age: 84
End: 2024-07-03

## 2024-07-19 DIAGNOSIS — Z12.31 VISIT FOR SCREENING MAMMOGRAM: Primary | ICD-10-CM

## 2024-08-01 RX ORDER — FELODIPINE 2.5 MG/1
2.5 TABLET, EXTENDED RELEASE ORAL DAILY
Qty: 90 TABLET | Refills: 0 | Status: SHIPPED | OUTPATIENT
Start: 2024-08-01

## 2024-08-07 ENCOUNTER — CLINICAL ABSTRACT (OUTPATIENT)
Dept: HEALTH INFORMATION MANAGEMENT | Facility: OTHER | Age: 84
End: 2024-08-07

## 2024-08-07 ENCOUNTER — LAB SERVICES (OUTPATIENT)
Dept: LAB | Age: 84
End: 2024-08-07

## 2024-08-07 ENCOUNTER — APPOINTMENT (OUTPATIENT)
Dept: FAMILY MEDICINE | Age: 84
End: 2024-08-07

## 2024-08-07 VITALS
BODY MASS INDEX: 22.3 KG/M2 | SYSTOLIC BLOOD PRESSURE: 126 MMHG | HEIGHT: 63 IN | WEIGHT: 125.88 LBS | TEMPERATURE: 97 F | DIASTOLIC BLOOD PRESSURE: 67 MMHG | RESPIRATION RATE: 18 BRPM | HEART RATE: 61 BPM | OXYGEN SATURATION: 100 %

## 2024-08-07 DIAGNOSIS — R79.89 INCREASE IN SERUM CREATININE FROM PRIOR MEASUREMENT: ICD-10-CM

## 2024-08-07 DIAGNOSIS — G89.29 CHRONIC ABDOMINAL PAIN: ICD-10-CM

## 2024-08-07 DIAGNOSIS — K58.2 IRRITABLE BOWEL SYNDROME WITH BOTH CONSTIPATION AND DIARRHEA: ICD-10-CM

## 2024-08-07 DIAGNOSIS — Z23 NEED FOR TDAP VACCINATION: ICD-10-CM

## 2024-08-07 DIAGNOSIS — Z71.85 VACCINE COUNSELING: ICD-10-CM

## 2024-08-07 DIAGNOSIS — I10 PRIMARY HYPERTENSION: ICD-10-CM

## 2024-08-07 DIAGNOSIS — I10 PRIMARY HYPERTENSION: Primary | ICD-10-CM

## 2024-08-07 DIAGNOSIS — R10.9 CHRONIC ABDOMINAL PAIN: ICD-10-CM

## 2024-08-07 DIAGNOSIS — M17.0 PRIMARY OSTEOARTHRITIS OF BOTH KNEES: ICD-10-CM

## 2024-08-07 DIAGNOSIS — E78.00 HYPERCHOLESTEREMIA: ICD-10-CM

## 2024-08-07 PROCEDURE — 36415 COLL VENOUS BLD VENIPUNCTURE: CPT | Performed by: FAMILY MEDICINE

## 2024-08-07 PROCEDURE — 99214 OFFICE O/P EST MOD 30 MIN: CPT | Performed by: FAMILY MEDICINE

## 2024-08-07 PROCEDURE — 90471 IMMUNIZATION ADMIN: CPT | Performed by: FAMILY MEDICINE

## 2024-08-07 PROCEDURE — 90715 TDAP VACCINE 7 YRS/> IM: CPT | Performed by: FAMILY MEDICINE

## 2024-08-07 PROCEDURE — 80048 BASIC METABOLIC PNL TOTAL CA: CPT | Performed by: CLINICAL MEDICAL LABORATORY

## 2024-08-07 RX ORDER — KETOROLAC TROMETHAMINE 5 MG/ML
SOLUTION OPHTHALMIC
COMMUNITY
Start: 2024-03-24

## 2024-08-07 ASSESSMENT — PATIENT HEALTH QUESTIONNAIRE - PHQ9
2. FEELING DOWN, DEPRESSED OR HOPELESS: NOT AT ALL
CLINICAL INTERPRETATION OF PHQ2 SCORE: NO FURTHER SCREENING NEEDED
SUM OF ALL RESPONSES TO PHQ9 QUESTIONS 1 AND 2: 0
1. LITTLE INTEREST OR PLEASURE IN DOING THINGS: NOT AT ALL
SUM OF ALL RESPONSES TO PHQ9 QUESTIONS 1 AND 2: 0

## 2024-08-07 ASSESSMENT — PAIN SCALES - GENERAL: PAINLEVEL: 0

## 2024-08-08 LAB
ANION GAP SERPL CALC-SCNC: 11 MMOL/L (ref 7–19)
BUN SERPL-MCNC: 14 MG/DL (ref 6–20)
BUN/CREAT SERPL: 21 (ref 7–25)
CALCIUM SERPL-MCNC: 9.8 MG/DL (ref 8.4–10.2)
CHLORIDE SERPL-SCNC: 105 MMOL/L (ref 97–110)
CO2 SERPL-SCNC: 26 MMOL/L (ref 21–32)
CREAT SERPL-MCNC: 0.68 MG/DL (ref 0.51–0.95)
EGFRCR SERPLBLD CKD-EPI 2021: 85 ML/MIN/{1.73_M2}
FASTING DURATION TIME PATIENT: NORMAL H
GLUCOSE SERPL-MCNC: 98 MG/DL (ref 70–99)
POTASSIUM SERPL-SCNC: 4.4 MMOL/L (ref 3.4–5.1)
SODIUM SERPL-SCNC: 138 MMOL/L (ref 135–145)

## 2024-08-13 ENCOUNTER — TELEPHONE (OUTPATIENT)
Dept: FAMILY MEDICINE | Age: 84
End: 2024-08-13

## 2024-08-13 DIAGNOSIS — E78.00 HYPERCHOLESTEREMIA: ICD-10-CM

## 2024-08-13 RX ORDER — SIMVASTATIN 20 MG
20 TABLET ORAL NIGHTLY
Qty: 90 TABLET | Refills: 0 | Status: SHIPPED | OUTPATIENT
Start: 2024-08-13

## 2024-08-14 ENCOUNTER — IMAGING SERVICES (OUTPATIENT)
Dept: GENERAL RADIOLOGY | Age: 84
End: 2024-08-14

## 2024-08-14 ENCOUNTER — WALK IN (OUTPATIENT)
Dept: URGENT CARE | Age: 84
End: 2024-08-14

## 2024-08-14 VITALS
OXYGEN SATURATION: 100 % | SYSTOLIC BLOOD PRESSURE: 134 MMHG | RESPIRATION RATE: 15 BRPM | HEIGHT: 63 IN | HEART RATE: 62 BPM | TEMPERATURE: 97.2 F | WEIGHT: 125 LBS | BODY MASS INDEX: 22.15 KG/M2 | DIASTOLIC BLOOD PRESSURE: 63 MMHG

## 2024-08-14 DIAGNOSIS — M25.512 ACUTE PAIN OF LEFT SHOULDER: Primary | ICD-10-CM

## 2024-08-14 DIAGNOSIS — M25.512 ACUTE PAIN OF LEFT SHOULDER: ICD-10-CM

## 2024-08-14 PROCEDURE — 73030 X-RAY EXAM OF SHOULDER: CPT | Performed by: FAMILY MEDICINE

## 2024-08-14 PROCEDURE — 99214 OFFICE O/P EST MOD 30 MIN: CPT | Performed by: FAMILY MEDICINE

## 2024-08-14 RX ORDER — HYOSCYAMINE SULFATE 0.125 MG
0.12 TABLET ORAL EVERY 8 HOURS PRN
Qty: 90 TABLET | Refills: 0 | Status: SHIPPED | OUTPATIENT
Start: 2024-08-14

## 2024-08-14 ASSESSMENT — PAIN SCALES - GENERAL: PAINLEVEL: 9

## 2024-08-14 ASSESSMENT — ENCOUNTER SYMPTOMS
WEAKNESS: 0
NUMBNESS: 0

## 2024-08-26 ENCOUNTER — HOSPITAL ENCOUNTER (OUTPATIENT)
Dept: MAMMOGRAPHY | Age: 84
Discharge: HOME | End: 2024-08-26
Attending: FAMILY MEDICINE

## 2024-08-26 DIAGNOSIS — Z12.31 VISIT FOR SCREENING MAMMOGRAM: ICD-10-CM

## 2024-08-26 PROCEDURE — 77063 BREAST TOMOSYNTHESIS BI: CPT

## 2024-08-28 ENCOUNTER — APPOINTMENT (OUTPATIENT)
Dept: HEMATOLOGY/ONCOLOGY | Age: 84
End: 2024-08-28

## 2024-09-04 DIAGNOSIS — M81.0 AGE-RELATED OSTEOPOROSIS WITHOUT CURRENT PATHOLOGICAL FRACTURE: ICD-10-CM

## 2024-09-04 RX ORDER — ALENDRONATE SODIUM 70 MG/1
70 TABLET ORAL
Qty: 12 TABLET | Refills: 0 | Status: SHIPPED | OUTPATIENT
Start: 2024-09-04

## 2024-09-06 RX ORDER — HYOSCYAMINE SULFATE 0.125 MG
TABLET ORAL
Qty: 270 TABLET | Refills: 1 | Status: SHIPPED | OUTPATIENT
Start: 2024-09-06

## 2024-09-09 ENCOUNTER — APPOINTMENT (OUTPATIENT)
Dept: GASTROENTEROLOGY | Age: 84
End: 2024-09-09

## 2024-09-09 ENCOUNTER — LAB SERVICES (OUTPATIENT)
Dept: LAB | Age: 84
End: 2024-09-09

## 2024-09-09 VITALS
BODY MASS INDEX: 22.15 KG/M2 | HEIGHT: 63 IN | WEIGHT: 125 LBS | DIASTOLIC BLOOD PRESSURE: 71 MMHG | RESPIRATION RATE: 16 BRPM | HEART RATE: 64 BPM | OXYGEN SATURATION: 100 % | SYSTOLIC BLOOD PRESSURE: 169 MMHG

## 2024-09-09 DIAGNOSIS — R10.13 EPIGASTRIC PAIN: ICD-10-CM

## 2024-09-09 DIAGNOSIS — R10.13 EPIGASTRIC PAIN: Primary | ICD-10-CM

## 2024-09-30 ENCOUNTER — APPOINTMENT (OUTPATIENT)
Dept: FAMILY MEDICINE | Age: 84
End: 2024-09-30

## 2024-09-30 ENCOUNTER — APPOINTMENT (OUTPATIENT)
Dept: INTERNAL MEDICINE | Age: 84
End: 2024-09-30

## 2024-09-30 VITALS — TEMPERATURE: 97.5 F

## 2024-09-30 DIAGNOSIS — Z23 NEED FOR VACCINATION: Primary | ICD-10-CM

## 2024-09-30 DIAGNOSIS — Z23 COVID-19 VACCINE ADMINISTERED: Primary | ICD-10-CM

## 2024-09-30 PROCEDURE — 90471 IMMUNIZATION ADMIN: CPT | Performed by: PEDIATRICS

## 2024-09-30 PROCEDURE — 90662 IIV NO PRSV INCREASED AG IM: CPT | Performed by: PEDIATRICS

## 2024-10-28 ENCOUNTER — APPOINTMENT (OUTPATIENT)
Dept: HEMATOLOGY/ONCOLOGY | Age: 84
End: 2024-10-28

## 2024-10-28 VITALS
TEMPERATURE: 97.6 F | HEIGHT: 62 IN | SYSTOLIC BLOOD PRESSURE: 126 MMHG | WEIGHT: 123.68 LBS | DIASTOLIC BLOOD PRESSURE: 62 MMHG | OXYGEN SATURATION: 98 % | BODY MASS INDEX: 22.76 KG/M2 | RESPIRATION RATE: 18 BRPM | HEART RATE: 64 BPM

## 2024-10-28 DIAGNOSIS — Z85.3 HISTORY OF BREAST CANCER: Primary | ICD-10-CM

## 2024-10-28 PROCEDURE — 99212 OFFICE O/P EST SF 10 MIN: CPT | Performed by: INTERNAL MEDICINE

## 2024-10-28 ASSESSMENT — PAIN SCALES - GENERAL: PAINLEVEL: 0

## 2024-10-28 ASSESSMENT — PATIENT HEALTH QUESTIONNAIRE - PHQ9
CLINICAL INTERPRETATION OF PHQ2 SCORE: NO FURTHER SCREENING NEEDED
SUM OF ALL RESPONSES TO PHQ9 QUESTIONS 1 AND 2: 0
CLINICAL INTERPRETATION OF PHQ2 SCORE: NO FURTHER SCREENING NEEDED
SUM OF ALL RESPONSES TO PHQ9 QUESTIONS 1 AND 2: 0
1. LITTLE INTEREST OR PLEASURE IN DOING THINGS: NOT AT ALL
2. FEELING DOWN, DEPRESSED OR HOPELESS: NOT AT ALL
1. LITTLE INTEREST OR PLEASURE IN DOING THINGS: NOT AT ALL
SUM OF ALL RESPONSES TO PHQ9 QUESTIONS 1 AND 2: 0
SUM OF ALL RESPONSES TO PHQ9 QUESTIONS 1 AND 2: 0
2. FEELING DOWN, DEPRESSED OR HOPELESS: NOT AT ALL

## 2024-10-30 ENCOUNTER — TELEPHONE (OUTPATIENT)
Dept: HEMATOLOGY/ONCOLOGY | Age: 84
End: 2024-10-30

## 2024-11-08 RX ORDER — FELODIPINE 2.5 MG/1
TABLET, EXTENDED RELEASE ORAL
Qty: 90 TABLET | Refills: 0 | Status: SHIPPED | OUTPATIENT
Start: 2024-11-08

## 2024-11-10 DIAGNOSIS — M81.0 AGE-RELATED OSTEOPOROSIS WITHOUT CURRENT PATHOLOGICAL FRACTURE: ICD-10-CM

## 2024-11-11 RX ORDER — ALENDRONATE SODIUM 70 MG/1
70 TABLET ORAL
Qty: 12 TABLET | Refills: 2 | Status: SHIPPED | OUTPATIENT
Start: 2024-11-11

## 2024-11-14 ENCOUNTER — TELEPHONE (OUTPATIENT)
Dept: FAMILY MEDICINE | Age: 84
End: 2024-11-14

## 2024-11-15 RX ORDER — AMLODIPINE BESYLATE 2.5 MG/1
2.5 TABLET ORAL DAILY
Qty: 90 TABLET | Refills: 3 | Status: SHIPPED | OUTPATIENT
Start: 2024-11-15

## 2024-12-27 RX ORDER — ESCITALOPRAM OXALATE 10 MG/1
10 TABLET ORAL DAILY
Qty: 90 TABLET | Refills: 0 | Status: SHIPPED | OUTPATIENT
Start: 2024-12-27

## 2025-01-12 ENCOUNTER — NURSE TRIAGE (OUTPATIENT)
Dept: TELEHEALTH | Age: 85
End: 2025-01-12

## 2025-01-15 DIAGNOSIS — C50.919 RECURRENT MALIGNANT NEOPLASM OF BREAST, UNSPECIFIED LATERALITY  (CMD): ICD-10-CM

## 2025-01-16 RX ORDER — ANASTROZOLE 1 MG/1
1 TABLET ORAL DAILY
Qty: 90 TABLET | Refills: 1 | OUTPATIENT
Start: 2025-01-16

## 2025-01-20 ENCOUNTER — TELEPHONE (OUTPATIENT)
Dept: FAMILY MEDICINE | Age: 85
End: 2025-01-20

## 2025-01-22 ENCOUNTER — APPOINTMENT (OUTPATIENT)
Dept: INTERNAL MEDICINE | Age: 85
End: 2025-01-22

## 2025-02-12 ENCOUNTER — APPOINTMENT (OUTPATIENT)
Dept: FAMILY MEDICINE | Age: 85
End: 2025-02-12

## 2025-02-12 ENCOUNTER — OFFICE VISIT (OUTPATIENT)
Dept: FAMILY MEDICINE | Age: 85
End: 2025-02-12

## 2025-02-12 ENCOUNTER — LAB SERVICES (OUTPATIENT)
Dept: LAB | Age: 85
End: 2025-02-12

## 2025-02-12 VITALS
WEIGHT: 130.07 LBS | HEIGHT: 62 IN | RESPIRATION RATE: 18 BRPM | DIASTOLIC BLOOD PRESSURE: 68 MMHG | OXYGEN SATURATION: 100 % | SYSTOLIC BLOOD PRESSURE: 136 MMHG | HEART RATE: 66 BPM | TEMPERATURE: 98.7 F | BODY MASS INDEX: 23.94 KG/M2

## 2025-02-12 DIAGNOSIS — J98.8 RESPIRATORY INFECTION: ICD-10-CM

## 2025-02-12 DIAGNOSIS — M81.0 AGE-RELATED OSTEOPOROSIS WITHOUT CURRENT PATHOLOGICAL FRACTURE: ICD-10-CM

## 2025-02-12 DIAGNOSIS — Z00.00 ANNUAL PHYSICAL EXAM: ICD-10-CM

## 2025-02-12 DIAGNOSIS — F41.9 ANXIETY DISORDER, UNSPECIFIED TYPE: ICD-10-CM

## 2025-02-12 DIAGNOSIS — I10 PRIMARY HYPERTENSION: ICD-10-CM

## 2025-02-12 DIAGNOSIS — Z00.00 ANNUAL PHYSICAL EXAM: Primary | ICD-10-CM

## 2025-02-12 DIAGNOSIS — E78.00 HYPERCHOLESTEREMIA: ICD-10-CM

## 2025-02-12 PROCEDURE — 80050 GENERAL HEALTH PANEL: CPT | Performed by: CLINICAL MEDICAL LABORATORY

## 2025-02-12 PROCEDURE — 36415 COLL VENOUS BLD VENIPUNCTURE: CPT | Performed by: FAMILY MEDICINE

## 2025-02-12 PROCEDURE — 99397 PER PM REEVAL EST PAT 65+ YR: CPT | Performed by: FAMILY MEDICINE

## 2025-02-12 PROCEDURE — 80061 LIPID PANEL: CPT | Performed by: CLINICAL MEDICAL LABORATORY

## 2025-02-12 RX ORDER — ESCITALOPRAM OXALATE 10 MG/1
10 TABLET ORAL DAILY
Qty: 90 TABLET | Refills: 3 | Status: SHIPPED | OUTPATIENT
Start: 2025-02-12

## 2025-02-12 RX ORDER — SIMVASTATIN 20 MG
20 TABLET ORAL NIGHTLY
Qty: 90 TABLET | Refills: 3 | Status: SHIPPED | OUTPATIENT
Start: 2025-02-12

## 2025-02-12 RX ORDER — FLUTICASONE PROPIONATE 50 MCG
2 SPRAY, SUSPENSION (ML) NASAL DAILY
Qty: 16 G | Refills: 1 | Status: SHIPPED | OUTPATIENT
Start: 2025-02-12

## 2025-02-12 RX ORDER — DOXYCYCLINE 100 MG/1
100 TABLET ORAL 2 TIMES DAILY
Qty: 20 TABLET | Refills: 0 | Status: SHIPPED | OUTPATIENT
Start: 2025-02-12 | End: 2025-02-22

## 2025-02-12 ASSESSMENT — PAIN SCALES - GENERAL: PAINLEVEL: 0

## 2025-02-12 ASSESSMENT — PATIENT HEALTH QUESTIONNAIRE - PHQ9
SUM OF ALL RESPONSES TO PHQ9 QUESTIONS 1 AND 2: 0
2. FEELING DOWN, DEPRESSED OR HOPELESS: NOT AT ALL
1. LITTLE INTEREST OR PLEASURE IN DOING THINGS: NOT AT ALL
CLINICAL INTERPRETATION OF PHQ2 SCORE: NO FURTHER SCREENING NEEDED
SUM OF ALL RESPONSES TO PHQ9 QUESTIONS 1 AND 2: 0

## 2025-02-13 LAB
ALBUMIN SERPL-MCNC: 3.9 G/DL (ref 3.4–5)
ALBUMIN/GLOB SERPL: 1.1 {RATIO} (ref 1–2.4)
ALP SERPL-CCNC: 57 UNITS/L (ref 45–117)
ALT SERPL-CCNC: 18 UNITS/L
ANION GAP SERPL CALC-SCNC: 8 MMOL/L (ref 7–19)
AST SERPL-CCNC: 24 UNITS/L
BASOPHILS # BLD: 0 K/MCL (ref 0–0.3)
BASOPHILS NFR BLD: 1 %
BILIRUB SERPL-MCNC: 0.4 MG/DL (ref 0.2–1)
BUN SERPL-MCNC: 15 MG/DL (ref 6–20)
BUN/CREAT SERPL: 23 (ref 7–25)
CALCIUM SERPL-MCNC: 10.5 MG/DL (ref 8.4–10.2)
CHLORIDE SERPL-SCNC: 111 MMOL/L (ref 97–110)
CHOLEST SERPL-MCNC: 175 MG/DL
CHOLEST/HDLC SERPL: 2.6 {RATIO}
CO2 SERPL-SCNC: 26 MMOL/L (ref 21–32)
CREAT SERPL-MCNC: 0.64 MG/DL (ref 0.51–0.95)
DEPRECATED RDW RBC: 46.7 FL (ref 39–50)
EGFRCR SERPLBLD CKD-EPI 2021: 85 ML/MIN/{1.73_M2}
EOSINOPHIL # BLD: 0.1 K/MCL (ref 0–0.5)
EOSINOPHIL NFR BLD: 1 %
ERYTHROCYTE [DISTWIDTH] IN BLOOD: 13.7 % (ref 11–15)
FASTING DURATION TIME PATIENT: ABNORMAL H
GLOBULIN SER-MCNC: 3.5 G/DL (ref 2–4)
GLUCOSE SERPL-MCNC: 81 MG/DL (ref 70–99)
HCT VFR BLD CALC: 41.3 % (ref 36–46.5)
HDLC SERPL-MCNC: 67 MG/DL
HGB BLD-MCNC: 13.7 G/DL (ref 12–15.5)
IMM GRANULOCYTES # BLD AUTO: 0 K/MCL (ref 0–0.2)
IMM GRANULOCYTES # BLD: 0 %
LDLC SERPL CALC-MCNC: 68 MG/DL
LYMPHOCYTES # BLD: 2.1 K/MCL (ref 1–4)
LYMPHOCYTES NFR BLD: 38 %
MCH RBC QN AUTO: 30.8 PG (ref 26–34)
MCHC RBC AUTO-ENTMCNC: 33.2 G/DL (ref 32–36.5)
MCV RBC AUTO: 92.8 FL (ref 78–100)
MONOCYTES # BLD: 0.5 K/MCL (ref 0.3–0.9)
MONOCYTES NFR BLD: 9 %
NEUTROPHILS # BLD: 2.9 K/MCL (ref 1.8–7.7)
NEUTROPHILS NFR BLD: 51 %
NONHDLC SERPL-MCNC: 108 MG/DL
NRBC BLD MANUAL-RTO: 0 /100 WBC
PLATELET # BLD AUTO: 239 K/MCL (ref 140–450)
POTASSIUM SERPL-SCNC: 4.4 MMOL/L (ref 3.4–5.1)
PROT SERPL-MCNC: 7.4 G/DL (ref 6.4–8.2)
RBC # BLD: 4.45 MIL/MCL (ref 4–5.2)
SODIUM SERPL-SCNC: 141 MMOL/L (ref 135–145)
TRIGL SERPL-MCNC: 202 MG/DL
TSH SERPL-ACNC: 1.26 MCUNITS/ML (ref 0.35–5)
WBC # BLD: 5.6 K/MCL (ref 4.2–11)

## 2025-03-04 ENCOUNTER — NURSE TRIAGE (OUTPATIENT)
Dept: TELEHEALTH | Age: 85
End: 2025-03-04

## 2025-03-06 RX ORDER — FLUTICASONE PROPIONATE 50 MCG
2 SPRAY, SUSPENSION (ML) NASAL DAILY
Qty: 48 ML | Refills: 1 | Status: SHIPPED | OUTPATIENT
Start: 2025-03-06

## 2025-03-11 ENCOUNTER — IMAGING SERVICES (OUTPATIENT)
Dept: GENERAL RADIOLOGY | Age: 85
End: 2025-03-11

## 2025-03-11 ENCOUNTER — OFFICE VISIT (OUTPATIENT)
Dept: FAMILY MEDICINE | Age: 85
End: 2025-03-11

## 2025-03-11 VITALS
HEIGHT: 62 IN | TEMPERATURE: 97.7 F | SYSTOLIC BLOOD PRESSURE: 144 MMHG | DIASTOLIC BLOOD PRESSURE: 67 MMHG | HEART RATE: 68 BPM | OXYGEN SATURATION: 100 % | BODY MASS INDEX: 24.18 KG/M2 | WEIGHT: 131.39 LBS | RESPIRATION RATE: 19 BRPM

## 2025-03-11 DIAGNOSIS — Z96.89 SPINAL CORD STIMULATOR STATUS: ICD-10-CM

## 2025-03-11 DIAGNOSIS — M54.17 LUMBOSACRAL RADICULOPATHY: Primary | ICD-10-CM

## 2025-03-11 DIAGNOSIS — M25.551 RIGHT HIP PAIN: ICD-10-CM

## 2025-03-11 DIAGNOSIS — M54.17 LUMBOSACRAL RADICULOPATHY: ICD-10-CM

## 2025-03-11 PROCEDURE — 73502 X-RAY EXAM HIP UNI 2-3 VIEWS: CPT | Performed by: FAMILY MEDICINE

## 2025-03-11 PROCEDURE — 72100 X-RAY EXAM L-S SPINE 2/3 VWS: CPT | Performed by: FAMILY MEDICINE

## 2025-03-11 PROCEDURE — 99213 OFFICE O/P EST LOW 20 MIN: CPT | Performed by: FAMILY MEDICINE

## 2025-03-11 ASSESSMENT — PAIN SCALES - GENERAL: PAINLEVEL: 8

## 2025-03-11 ASSESSMENT — PATIENT HEALTH QUESTIONNAIRE - PHQ9
1. LITTLE INTEREST OR PLEASURE IN DOING THINGS: NOT AT ALL
SUM OF ALL RESPONSES TO PHQ9 QUESTIONS 1 AND 2: 0
SUM OF ALL RESPONSES TO PHQ9 QUESTIONS 1 AND 2: 0
CLINICAL INTERPRETATION OF PHQ2 SCORE: NO FURTHER SCREENING NEEDED
2. FEELING DOWN, DEPRESSED OR HOPELESS: NOT AT ALL

## 2025-03-14 ENCOUNTER — NURSE TRIAGE (OUTPATIENT)
Dept: TELEHEALTH | Age: 85
End: 2025-03-14

## 2025-03-19 NOTE — ED PROVIDER NOTES
Subjective   HPI Comments: 77-year-old female presenting with episode of hypoglycemia.  She states she was at her ophthalmologist for an appointment.  She is unsure exactly what happened.  Her  says that she began to feel faint, was disoriented.  He assumed her blood sugar was low but they do not have anything to feed her.  EMS was contacted, her glucose was 24.  She received IV dextrose and became alert.  She tells me that her glucose this morning was elevated so she took 15 units of regular insulin.  She states that she is a very brittle diabetic and has had similar episodes multiple times over the years.  There is no report of any fall.  She has no complaints at this time other than being very angry that she is in the emergency department.  She would like to leave to go to dialysis.      Review of Systems   Constitutional: Negative for chills and fever.   HENT: Negative for congestion, rhinorrhea and sore throat.    Eyes: Negative for pain.   Respiratory: Negative for cough and shortness of breath.    Cardiovascular: Negative for chest pain, palpitations and leg swelling.   Gastrointestinal: Negative for abdominal pain, diarrhea, nausea and vomiting.   Genitourinary: Negative for dysuria.   Musculoskeletal: Negative for arthralgias.   Skin: Negative for rash.   Neurological: Negative for weakness and numbness.   Psychiatric/Behavioral: Negative for behavioral problems.       Past Medical History:   Diagnosis Date   • Anemia    • Chronic kidney disease    • Chronic kidney disease    • Community acquired pneumonia    • Dexa Body Composition study lumosacral spine and femur     ostepenic   • Diabetes    • Diabetic nephropathy, type I    • Diabetic peripheral neuropathy type 1    • Disease of thyroid gland    • Fracture    • Hypertension    • Menopause    • Pneumonia        Allergies   Allergen Reactions   • Duloxetine    • Exenatide    • Lisinopril Cough   • Penicillins    • Phentermine        Past Surgical  History:   Procedure Laterality Date   • CATARACT EXTRACTION     • CHOLECYSTECTOMY     • HIP CANNULATED SCREW PLACEMENT Left 6/11/2017    Procedure:  LEFT HIP MULTIPLE CANNULATED COMPRESSION SCREWS- FERMORAL NECK  FRACTURE;  Surgeon: Jimmy Sheehan MD;  Location: [facility] OR;  Service:        Family History   Problem Relation Age of Onset   • Diabetes Other    • Heart disease Other    • Heart attack Mother    • Lung cancer Father        Social History     Social History   • Marital status:      Spouse name: N/A   • Number of children: N/A   • Years of education: N/A     Social History Main Topics   • Smoking status: Former Smoker   • Smokeless tobacco: None   • Alcohol use No   • Drug use: No   • Sexual activity: Defer     Other Topics Concern   • None     Social History Narrative           Objective   Physical Exam   Constitutional: She is oriented to person, place, and time. No distress.   Chronically ill-appearing   HENT:   Head: Normocephalic and atraumatic.   Right Ear: External ear normal.   Left Ear: External ear normal.   Nose: Nose normal.   Mouth/Throat: Oropharynx is clear and moist.   Eyes: Conjunctivae and EOM are normal. Pupils are equal, round, and reactive to light.   Neck: Normal range of motion. Neck supple.   Cardiovascular: Normal rate, regular rhythm, normal heart sounds and intact distal pulses.    Right upper extremity AV fistula   Pulmonary/Chest: Effort normal and breath sounds normal. No respiratory distress. She has no wheezes. She has no rales.   Abdominal: Soft. Bowel sounds are normal. She exhibits no distension. There is no tenderness. There is no rebound and no guarding.   Musculoskeletal: Normal range of motion. She exhibits no edema, tenderness or deformity.   No peripheral edema   Neurological: She is alert and oriented to person, place, and time.   Skin: Skin is warm and dry. No rash noted.   Psychiatric: She has a normal mood and affect. Her behavior is normal.   Nursing  note and vitals reviewed.      Procedures         ED Course  ED Course                  MDM  Number of Diagnoses or Management Options  Hypoglycemia:   Diagnosis management comments: 77-year-old female with episode of hypoglycemia.  Chronically ill-appearing elderly female in no distress with normal vital signs and normal mentation at this time.  I encouraged her to allow us to check labs, she refuses this and wants to go to dialysis.  We compromised and we'll keep her here for a short time and recheck blood glucose.  Disposition is likely to home.    Ddx: Hypoglycemia    Repeat fingerstick has remained normal.  Will discharge to dialysis.       Amount and/or Complexity of Data Reviewed  Clinical lab tests: reviewed        Final diagnoses:   Hypoglycemia            Lanre Dickey MD  09/12/17 3143     5

## 2025-03-21 ENCOUNTER — TELEPHONE (OUTPATIENT)
Dept: FAMILY MEDICINE | Age: 85
End: 2025-03-21

## 2025-04-28 ENCOUNTER — APPOINTMENT (OUTPATIENT)
Dept: HEMATOLOGY/ONCOLOGY | Age: 85
End: 2025-04-28

## 2025-04-28 VITALS
HEIGHT: 62 IN | OXYGEN SATURATION: 98 % | SYSTOLIC BLOOD PRESSURE: 130 MMHG | RESPIRATION RATE: 18 BRPM | DIASTOLIC BLOOD PRESSURE: 67 MMHG | TEMPERATURE: 97.6 F | BODY MASS INDEX: 23.81 KG/M2 | WEIGHT: 129.41 LBS | HEART RATE: 65 BPM

## 2025-04-28 DIAGNOSIS — Z12.39 ENCOUNTER FOR SCREENING FOR MALIGNANT NEOPLASM OF BREAST, UNSPECIFIED SCREENING MODALITY: ICD-10-CM

## 2025-04-28 DIAGNOSIS — Z78.0 ASYMPTOMATIC MENOPAUSAL STATE: ICD-10-CM

## 2025-04-28 DIAGNOSIS — Z12.31 ENCOUNTER FOR SCREENING MAMMOGRAM FOR MALIGNANT NEOPLASM OF BREAST: ICD-10-CM

## 2025-04-28 DIAGNOSIS — Z85.3 HISTORY OF BREAST CANCER: Primary | ICD-10-CM

## 2025-04-28 DIAGNOSIS — M85.80 OSTEOPENIA, UNSPECIFIED LOCATION: ICD-10-CM

## 2025-04-28 PROCEDURE — 99213 OFFICE O/P EST LOW 20 MIN: CPT | Performed by: INTERNAL MEDICINE

## 2025-04-28 ASSESSMENT — PAIN SCALES - GENERAL: PAINLEVEL_OUTOF10: 6

## 2025-04-28 ASSESSMENT — PATIENT HEALTH QUESTIONNAIRE - PHQ9: SUM OF ALL RESPONSES TO PHQ9 QUESTIONS 1 AND 2: 0

## 2025-05-06 DIAGNOSIS — Z76.0 MEDICATION REFILL: ICD-10-CM

## 2025-05-06 RX ORDER — GABAPENTIN 400 MG/1
CAPSULE ORAL
Qty: 360 CAPSULE | Refills: 3 | Status: SHIPPED | OUTPATIENT
Start: 2025-05-06

## 2025-05-08 DIAGNOSIS — M81.0 AGE-RELATED OSTEOPOROSIS WITHOUT CURRENT PATHOLOGICAL FRACTURE: ICD-10-CM

## 2025-05-08 RX ORDER — ALENDRONATE SODIUM 70 MG/1
70 TABLET ORAL
Qty: 12 TABLET | Refills: 2 | Status: SHIPPED | OUTPATIENT
Start: 2025-05-08

## 2025-06-19 ENCOUNTER — APPOINTMENT (OUTPATIENT)
Dept: FAMILY MEDICINE | Age: 85
End: 2025-06-19

## 2025-06-19 VITALS
WEIGHT: 129.41 LBS | HEART RATE: 67 BPM | BODY MASS INDEX: 23.81 KG/M2 | HEIGHT: 62 IN | DIASTOLIC BLOOD PRESSURE: 65 MMHG | RESPIRATION RATE: 18 BRPM | TEMPERATURE: 98.9 F | SYSTOLIC BLOOD PRESSURE: 137 MMHG | OXYGEN SATURATION: 100 %

## 2025-06-19 DIAGNOSIS — G89.29 CHRONIC ABDOMINAL PAIN: ICD-10-CM

## 2025-06-19 DIAGNOSIS — R10.9 CHRONIC ABDOMINAL PAIN: ICD-10-CM

## 2025-06-19 DIAGNOSIS — F41.9 ANXIETY DISORDER, UNSPECIFIED TYPE: ICD-10-CM

## 2025-06-19 DIAGNOSIS — E78.00 HYPERCHOLESTEREMIA: ICD-10-CM

## 2025-06-19 DIAGNOSIS — M81.0 AGE-RELATED OSTEOPOROSIS WITHOUT CURRENT PATHOLOGICAL FRACTURE: ICD-10-CM

## 2025-06-19 DIAGNOSIS — L29.9 ITCHING: Primary | ICD-10-CM

## 2025-06-19 DIAGNOSIS — I10 PRIMARY HYPERTENSION: ICD-10-CM

## 2025-06-19 DIAGNOSIS — Z00.00 MEDICARE ANNUAL WELLNESS VISIT, SUBSEQUENT: ICD-10-CM

## 2025-06-19 RX ORDER — ESCITALOPRAM OXALATE 5 MG/1
5 TABLET ORAL DAILY
Qty: 90 TABLET | Refills: 1 | Status: SHIPPED | OUTPATIENT
Start: 2025-06-19

## 2025-06-19 ASSESSMENT — PATIENT HEALTH QUESTIONNAIRE - PHQ9
SUM OF ALL RESPONSES TO PHQ9 QUESTIONS 1 AND 2: 0
2. FEELING DOWN, DEPRESSED OR HOPELESS: NOT AT ALL
CLINICAL INTERPRETATION OF PHQ2 SCORE: NO FURTHER SCREENING NEEDED
1. LITTLE INTEREST OR PLEASURE IN DOING THINGS: NOT AT ALL
SUM OF ALL RESPONSES TO PHQ9 QUESTIONS 1 AND 2: 0

## 2025-06-19 ASSESSMENT — MINI COG
PATIENT ABLE TO FILL IN THE CLOCK FACE WITH 10 MINUTES PAST 11 O'CLOCK?: YES, CLOCK IS CORRECT
PATIENT WAS GIVEN REPEAT BACK WORDS FROM VERSION: 1 - BANANA SUNRISE CHAIR

## 2025-06-19 ASSESSMENT — PAIN SCALES - GENERAL: PAINLEVEL_OUTOF10: 0

## 2025-06-25 ENCOUNTER — TELEPHONE (OUTPATIENT)
Dept: FAMILY MEDICINE | Age: 85
End: 2025-06-25

## 2025-07-14 ENCOUNTER — APPOINTMENT (OUTPATIENT)
Dept: GASTROENTEROLOGY | Age: 85
End: 2025-07-14

## 2025-07-21 ENCOUNTER — WALK IN (OUTPATIENT)
Dept: URGENT CARE | Age: 85
End: 2025-07-21

## 2025-07-21 VITALS
DIASTOLIC BLOOD PRESSURE: 68 MMHG | RESPIRATION RATE: 18 BRPM | HEART RATE: 78 BPM | TEMPERATURE: 99.6 F | OXYGEN SATURATION: 100 % | SYSTOLIC BLOOD PRESSURE: 155 MMHG

## 2025-07-21 DIAGNOSIS — N30.01 ACUTE CYSTITIS WITH HEMATURIA: Primary | ICD-10-CM

## 2025-07-21 DIAGNOSIS — R19.7 DIARRHEA, UNSPECIFIED TYPE: ICD-10-CM

## 2025-07-21 DIAGNOSIS — R30.0 DYSURIA: ICD-10-CM

## 2025-07-21 LAB
APPEARANCE, POC: ABNORMAL
BILIRUB UR QL STRIP: NEGATIVE
COLOR UR: ABNORMAL
GLUCOSE UR-MCNC: NEGATIVE MG/DL
HGB UR QL STRIP: ABNORMAL
KETONES UR STRIP-MCNC: NEGATIVE MG/DL
LEUKOCYTE ESTERASE UR QL STRIP: ABNORMAL
NITRITE UR QL STRIP: NEGATIVE
PH UR: 6.5 [PH] (ref 5–7)
PROT UR-MCNC: ABNORMAL MG/DL
SP GR UR: 1.01 (ref 1–1.03)
TEST LOT EXPIRATION DATE: ABNORMAL
TEST LOT NUMBER: ABNORMAL
UROBILINOGEN UR-MCNC: 0.2 MG/DL (ref 0–1)

## 2025-07-21 PROCEDURE — 99203 OFFICE O/P NEW LOW 30 MIN: CPT | Performed by: NURSE PRACTITIONER

## 2025-07-21 PROCEDURE — 81002 URINALYSIS NONAUTO W/O SCOPE: CPT | Performed by: NURSE PRACTITIONER

## 2025-07-21 RX ORDER — LOPERAMIDE HYDROCHLORIDE 2 MG/1
2 CAPSULE ORAL 4 TIMES DAILY PRN
Qty: 12 CAPSULE | Refills: 0 | Status: SHIPPED | OUTPATIENT
Start: 2025-07-21 | End: 2025-07-24

## 2025-07-21 RX ORDER — SULFAMETHOXAZOLE AND TRIMETHOPRIM 400; 80 MG/1; MG/1
1 TABLET ORAL 2 TIMES DAILY
Qty: 10 TABLET | Refills: 0 | Status: SHIPPED | OUTPATIENT
Start: 2025-07-21 | End: 2025-07-26

## 2025-07-21 ASSESSMENT — ENCOUNTER SYMPTOMS: DIARRHEA: 1

## 2025-08-01 ENCOUNTER — TELEPHONE (OUTPATIENT)
Dept: FAMILY MEDICINE | Age: 85
End: 2025-08-01

## 2025-08-01 DIAGNOSIS — N32.81 OVERACTIVE BLADDER: Primary | ICD-10-CM

## 2025-08-12 ENCOUNTER — APPOINTMENT (OUTPATIENT)
Dept: FAMILY MEDICINE | Age: 85
End: 2025-08-12

## 2025-08-28 ENCOUNTER — HOSPITAL ENCOUNTER (OUTPATIENT)
Dept: MAMMOGRAPHY | Age: 85
Discharge: HOME | End: 2025-08-28
Attending: INTERNAL MEDICINE

## 2025-08-28 DIAGNOSIS — Z12.31 ENCOUNTER FOR SCREENING MAMMOGRAM FOR MALIGNANT NEOPLASM OF BREAST: ICD-10-CM

## 2025-08-28 DIAGNOSIS — Z12.39 ENCOUNTER FOR SCREENING FOR MALIGNANT NEOPLASM OF BREAST, UNSPECIFIED SCREENING MODALITY: ICD-10-CM

## 2025-08-28 PROCEDURE — 77067 SCR MAMMO BI INCL CAD: CPT

## 2025-08-28 RX ORDER — AMLODIPINE BESYLATE 2.5 MG/1
2.5 TABLET ORAL DAILY
Qty: 90 TABLET | Refills: 3 | Status: SHIPPED | OUTPATIENT
Start: 2025-08-28

## 2025-11-20 ENCOUNTER — APPOINTMENT (OUTPATIENT)
Dept: FAMILY MEDICINE | Age: 85
End: 2025-11-20

## 2026-04-27 ENCOUNTER — APPOINTMENT (OUTPATIENT)
Dept: HEMATOLOGY/ONCOLOGY | Age: 86
End: 2026-04-27

## (undated) DEVICE — Device

## (undated) DEVICE — SUT VIC 2/0 CT1 27IN J259H

## (undated) DEVICE — CLAVICLE STRAP: Brand: DEROYAL

## (undated) DEVICE — 2108 SERIES SAGITTAL BLADE, NO OFFSET  (24.8 X 1.24 X 80.1MM)

## (undated) DEVICE — 3M™ IOBAN™ 2 ANTIMICROBIAL INCISE DRAPE 6650EZ: Brand: IOBAN™ 2

## (undated) DEVICE — SUT VIC 0 CT 36IN J958H

## (undated) DEVICE — PK HIP GEN 20

## (undated) DEVICE — PAD,ABDOMINAL,5"X9",STERILE,LF,1/PK: Brand: MEDLINE INDUSTRIES, INC.

## (undated) DEVICE — RICH MAJOR PROCEDURE: Brand: MEDLINE INDUSTRIES, INC.

## (undated) DEVICE — LAWSON - BANDAGE SOFORM CONFORM STL2X75

## (undated) DEVICE — GLV SURG SENSICARE W/ALOE PF LF 8 STRL

## (undated) DEVICE — DRSNG SURESITE123 6X8IN

## (undated) DEVICE — OCCLUSIVE GAUZE STRIP,3% BISMUTH TRIBROMOPHENATE IN PETROLATUM BLEND: Brand: XEROFORM

## (undated) DEVICE — SUT ETHIB 5 V37 30IN MB66G

## (undated) DEVICE — INTRO SHEATH PRELUDE SNAP .038 6F 13CM W/SDPRT

## (undated) DEVICE — SHEET,DRAPE,70X100,STERILE: Brand: MEDLINE

## (undated) DEVICE — SPNG GZ WOVN 4X4IN 12PLY 10/BX STRL

## (undated) DEVICE — 3M™ STERI-DRAPE™ U-DRAPE 1015: Brand: STERI-DRAPE™

## (undated) DEVICE — ELECTRD PAD DEFIB A/

## (undated) DEVICE — PROXIMATE SKIN STAPLERS (35 WIDE) CONTAINS 35 STAINLESS STEEL STAPLES (FIXED HEAD): Brand: PROXIMATE

## (undated) DEVICE — SYR CT MUL PK 200ML HANDIFILL

## (undated) DEVICE — 1000 S-DRAPE TOWEL DRAPE 10/BX: Brand: STERI-DRAPE™

## (undated) DEVICE — GLV SURG TRIUMPH ORTHO W/ALOE PF LTX 8 STRL

## (undated) DEVICE — PLUS HANDPIECE WITH MULTIPLE-ORIFICE TIP WITH SOFT CONE SPLASH SHIELD: Brand: SURGILAV

## (undated) DEVICE — FLEXIBLE YANKAUER,MEDIUM TIP, NO VACUUM CONTROL: Brand: ARGYLE

## (undated) DEVICE — GW THRD 2X230MM FOR 7MM CANN SCRW

## (undated) DEVICE — VIOLET BRAIDED (POLYGLACTIN 910), SYNTHETIC ABSORBABLE SUTURE: Brand: COATED VICRYL